# Patient Record
Sex: FEMALE | Race: WHITE | Employment: OTHER | URBAN - METROPOLITAN AREA
[De-identification: names, ages, dates, MRNs, and addresses within clinical notes are randomized per-mention and may not be internally consistent; named-entity substitution may affect disease eponyms.]

---

## 2017-01-04 DIAGNOSIS — E11.9 DIABETES TYPE 2, CONTROLLED (HCC): ICD-10-CM

## 2017-01-05 ENCOUNTER — TELEPHONE (OUTPATIENT)
Dept: FAMILY MEDICINE CLINIC | Age: 77
End: 2017-01-05

## 2017-01-05 DIAGNOSIS — B37.9 YEAST INFECTION: Primary | ICD-10-CM

## 2017-01-05 RX ORDER — FLUCONAZOLE 150 MG/1
150 TABLET ORAL ONCE
Qty: 1 TABLET | Refills: 0 | Status: SHIPPED | OUTPATIENT
Start: 2017-01-05 | End: 2017-01-05

## 2017-01-06 ENCOUNTER — HOSPITAL ENCOUNTER (OUTPATIENT)
Dept: PHYSICAL THERAPY | Age: 77
Discharge: HOME | End: 2017-01-06
Admitting: FAMILY MEDICINE

## 2017-01-09 ENCOUNTER — HOSPITAL ENCOUNTER (OUTPATIENT)
Dept: PHYSICAL THERAPY | Age: 77
Discharge: HOME | End: 2017-01-09
Admitting: FAMILY MEDICINE

## 2017-01-09 RX ORDER — FLUCONAZOLE 150 MG/1
150 TABLET ORAL ONCE
Qty: 1 TABLET | Refills: 1 | Status: SHIPPED | OUTPATIENT
Start: 2017-01-09 | End: 2017-01-09

## 2017-01-13 ENCOUNTER — HOSPITAL ENCOUNTER (OUTPATIENT)
Dept: PHYSICAL THERAPY | Age: 77
Discharge: HOME | End: 2017-01-13
Admitting: FAMILY MEDICINE

## 2017-01-20 ENCOUNTER — HOSPITAL ENCOUNTER (OUTPATIENT)
Dept: PHYSICAL THERAPY | Age: 77
Discharge: HOME | End: 2017-01-20
Admitting: FAMILY MEDICINE

## 2017-01-25 ENCOUNTER — OFFICE VISIT (OUTPATIENT)
Dept: FAMILY MEDICINE CLINIC | Age: 77
End: 2017-01-25

## 2017-01-25 VITALS
TEMPERATURE: 98.2 F | SYSTOLIC BLOOD PRESSURE: 120 MMHG | RESPIRATION RATE: 18 BRPM | BODY MASS INDEX: 39.15 KG/M2 | HEIGHT: 65 IN | HEART RATE: 88 BPM | WEIGHT: 235 LBS | DIASTOLIC BLOOD PRESSURE: 80 MMHG | OXYGEN SATURATION: 96 %

## 2017-01-25 DIAGNOSIS — K21.9 GASTROESOPHAGEAL REFLUX DISEASE WITHOUT ESOPHAGITIS: ICD-10-CM

## 2017-01-25 DIAGNOSIS — I50.9 CHRONIC CONGESTIVE HEART FAILURE, UNSPECIFIED CONGESTIVE HEART FAILURE TYPE: ICD-10-CM

## 2017-01-25 DIAGNOSIS — J30.1 SEASONAL ALLERGIC RHINITIS DUE TO POLLEN: Primary | ICD-10-CM

## 2017-01-25 DIAGNOSIS — E11.40 CONTROLLED TYPE 2 DIABETES MELLITUS WITH DIABETIC NEUROPATHY, WITHOUT LONG-TERM CURRENT USE OF INSULIN (HCC): ICD-10-CM

## 2017-01-25 DIAGNOSIS — E11.49 OTHER DIABETIC NEUROLOGICAL COMPLICATION ASSOCIATED WITH TYPE 2 DIABETES MELLITUS (HCC): ICD-10-CM

## 2017-01-25 PROCEDURE — G8419 CALC BMI OUT NRM PARAM NOF/U: HCPCS | Performed by: FAMILY MEDICINE

## 2017-01-25 PROCEDURE — G8484 FLU IMMUNIZE NO ADMIN: HCPCS | Performed by: FAMILY MEDICINE

## 2017-01-25 PROCEDURE — G8598 ASA/ANTIPLAT THER USED: HCPCS | Performed by: FAMILY MEDICINE

## 2017-01-25 PROCEDURE — 4040F PNEUMOC VAC/ADMIN/RCVD: CPT | Performed by: FAMILY MEDICINE

## 2017-01-25 PROCEDURE — 99213 OFFICE O/P EST LOW 20 MIN: CPT | Performed by: FAMILY MEDICINE

## 2017-01-25 PROCEDURE — 3017F COLORECTAL CA SCREEN DOC REV: CPT | Performed by: FAMILY MEDICINE

## 2017-01-25 PROCEDURE — G8400 PT W/DXA NO RESULTS DOC: HCPCS | Performed by: FAMILY MEDICINE

## 2017-01-25 PROCEDURE — G8427 DOCREV CUR MEDS BY ELIG CLIN: HCPCS | Performed by: FAMILY MEDICINE

## 2017-01-25 PROCEDURE — 3045F PR MOST RECENT HEMOGLOBIN A1C LEVEL 7.0-9.0%: CPT | Performed by: FAMILY MEDICINE

## 2017-01-25 PROCEDURE — 1123F ACP DISCUSS/DSCN MKR DOCD: CPT | Performed by: FAMILY MEDICINE

## 2017-01-25 PROCEDURE — 1090F PRES/ABSN URINE INCON ASSESS: CPT | Performed by: FAMILY MEDICINE

## 2017-01-25 PROCEDURE — 1036F TOBACCO NON-USER: CPT | Performed by: FAMILY MEDICINE

## 2017-01-25 RX ORDER — BENZONATATE 200 MG/1
200 CAPSULE ORAL 3 TIMES DAILY PRN
Qty: 90 CAPSULE | Refills: 1 | Status: SHIPPED | OUTPATIENT
Start: 2017-01-25 | End: 2017-02-04

## 2017-01-25 RX ORDER — AZELASTINE 1 MG/ML
2 SPRAY, METERED NASAL 2 TIMES DAILY
Qty: 1 BOTTLE | Refills: 3 | Status: SHIPPED | OUTPATIENT
Start: 2017-01-25 | End: 2017-06-26 | Stop reason: DRUGHIGH

## 2017-01-25 ASSESSMENT — ENCOUNTER SYMPTOMS
RHINORRHEA: 0
SORE THROAT: 0
EYES NEGATIVE: 1
COUGH: 1
STRIDOR: 0
APNEA: 0
FACIAL SWELLING: 0
CHEST TIGHTNESS: 0
SINUS PRESSURE: 0

## 2017-01-30 ENCOUNTER — HOSPITAL ENCOUNTER (OUTPATIENT)
Dept: PHYSICAL THERAPY | Age: 77
Discharge: HOME | End: 2017-01-30
Admitting: FAMILY MEDICINE

## 2017-01-31 ENCOUNTER — OFFICE VISIT (OUTPATIENT)
Dept: PULMONOLOGY | Age: 77
End: 2017-01-31

## 2017-01-31 VITALS
DIASTOLIC BLOOD PRESSURE: 92 MMHG | OXYGEN SATURATION: 94 % | RESPIRATION RATE: 16 BRPM | SYSTOLIC BLOOD PRESSURE: 150 MMHG | HEIGHT: 65 IN | BODY MASS INDEX: 39.15 KG/M2 | TEMPERATURE: 98.3 F | HEART RATE: 98 BPM | WEIGHT: 235 LBS

## 2017-01-31 DIAGNOSIS — Z99.89 OSA ON CPAP: Primary | Chronic | ICD-10-CM

## 2017-01-31 DIAGNOSIS — E66.9 OBESITY (BMI 30-39.9): ICD-10-CM

## 2017-01-31 DIAGNOSIS — G47.33 OSA ON CPAP: Primary | Chronic | ICD-10-CM

## 2017-01-31 DIAGNOSIS — I10 ESSENTIAL HYPERTENSION: Chronic | ICD-10-CM

## 2017-01-31 PROCEDURE — G8427 DOCREV CUR MEDS BY ELIG CLIN: HCPCS | Performed by: NURSE PRACTITIONER

## 2017-01-31 PROCEDURE — 4040F PNEUMOC VAC/ADMIN/RCVD: CPT | Performed by: NURSE PRACTITIONER

## 2017-01-31 PROCEDURE — G8598 ASA/ANTIPLAT THER USED: HCPCS | Performed by: NURSE PRACTITIONER

## 2017-01-31 PROCEDURE — G8484 FLU IMMUNIZE NO ADMIN: HCPCS | Performed by: NURSE PRACTITIONER

## 2017-01-31 PROCEDURE — 3017F COLORECTAL CA SCREEN DOC REV: CPT | Performed by: NURSE PRACTITIONER

## 2017-01-31 PROCEDURE — 99213 OFFICE O/P EST LOW 20 MIN: CPT | Performed by: NURSE PRACTITIONER

## 2017-01-31 PROCEDURE — 1090F PRES/ABSN URINE INCON ASSESS: CPT | Performed by: NURSE PRACTITIONER

## 2017-01-31 PROCEDURE — G8419 CALC BMI OUT NRM PARAM NOF/U: HCPCS | Performed by: NURSE PRACTITIONER

## 2017-01-31 PROCEDURE — G8400 PT W/DXA NO RESULTS DOC: HCPCS | Performed by: NURSE PRACTITIONER

## 2017-01-31 PROCEDURE — 1036F TOBACCO NON-USER: CPT | Performed by: NURSE PRACTITIONER

## 2017-01-31 PROCEDURE — 1123F ACP DISCUSS/DSCN MKR DOCD: CPT | Performed by: NURSE PRACTITIONER

## 2017-01-31 ASSESSMENT — SLEEP AND FATIGUE QUESTIONNAIRES
NECK CIRCUMFERENCE (INCHES): 18
HOW LIKELY ARE YOU TO NOD OFF OR FALL ASLEEP WHILE SITTING INACTIVE IN A PUBLIC PLACE: 0
HOW LIKELY ARE YOU TO NOD OFF OR FALL ASLEEP WHILE SITTING QUIETLY AFTER LUNCH WITHOUT ALCOHOL: 3
HOW LIKELY ARE YOU TO NOD OFF OR FALL ASLEEP WHEN YOU ARE A PASSENGER IN A CAR FOR AN HOUR WITHOUT A BREAK: 3
HOW LIKELY ARE YOU TO NOD OFF OR FALL ASLEEP WHILE WATCHING TV: 1
ESS TOTAL SCORE: 11
HOW LIKELY ARE YOU TO NOD OFF OR FALL ASLEEP IN A CAR, WHILE STOPPED FOR A FEW MINUTES IN TRAFFIC: 0
HOW LIKELY ARE YOU TO NOD OFF OR FALL ASLEEP WHILE LYING DOWN TO REST IN THE AFTERNOON WHEN CIRCUMSTANCES PERMIT: 3
HOW LIKELY ARE YOU TO NOD OFF OR FALL ASLEEP WHILE SITTING AND TALKING TO SOMEONE: 0
HOW LIKELY ARE YOU TO NOD OFF OR FALL ASLEEP WHILE SITTING AND READING: 1

## 2017-02-02 ENCOUNTER — OFFICE VISIT (OUTPATIENT)
Dept: FAMILY MEDICINE CLINIC | Age: 77
End: 2017-02-02

## 2017-02-02 VITALS
WEIGHT: 238 LBS | OXYGEN SATURATION: 95 % | TEMPERATURE: 98.6 F | BODY MASS INDEX: 39.65 KG/M2 | RESPIRATION RATE: 16 BRPM | HEART RATE: 86 BPM | SYSTOLIC BLOOD PRESSURE: 140 MMHG | DIASTOLIC BLOOD PRESSURE: 80 MMHG | HEIGHT: 65 IN

## 2017-02-02 DIAGNOSIS — K29.50 CHRONIC GASTRITIS WITHOUT BLEEDING, UNSPECIFIED GASTRITIS TYPE: ICD-10-CM

## 2017-02-02 DIAGNOSIS — E11.40 CONTROLLED TYPE 2 DIABETES MELLITUS WITH DIABETIC NEUROPATHY, WITHOUT LONG-TERM CURRENT USE OF INSULIN (HCC): ICD-10-CM

## 2017-02-02 DIAGNOSIS — R11.2 NON-INTRACTABLE VOMITING WITH NAUSEA, UNSPECIFIED VOMITING TYPE: ICD-10-CM

## 2017-02-02 DIAGNOSIS — F41.9 ANXIETY AND DEPRESSION: Primary | ICD-10-CM

## 2017-02-02 DIAGNOSIS — F32.A ANXIETY AND DEPRESSION: Primary | ICD-10-CM

## 2017-02-02 PROCEDURE — 4040F PNEUMOC VAC/ADMIN/RCVD: CPT | Performed by: FAMILY MEDICINE

## 2017-02-02 PROCEDURE — 1036F TOBACCO NON-USER: CPT | Performed by: FAMILY MEDICINE

## 2017-02-02 PROCEDURE — 1123F ACP DISCUSS/DSCN MKR DOCD: CPT | Performed by: FAMILY MEDICINE

## 2017-02-02 PROCEDURE — G8598 ASA/ANTIPLAT THER USED: HCPCS | Performed by: FAMILY MEDICINE

## 2017-02-02 PROCEDURE — G8400 PT W/DXA NO RESULTS DOC: HCPCS | Performed by: FAMILY MEDICINE

## 2017-02-02 PROCEDURE — 99214 OFFICE O/P EST MOD 30 MIN: CPT | Performed by: FAMILY MEDICINE

## 2017-02-02 PROCEDURE — 3045F PR MOST RECENT HEMOGLOBIN A1C LEVEL 7.0-9.0%: CPT | Performed by: FAMILY MEDICINE

## 2017-02-02 PROCEDURE — G8484 FLU IMMUNIZE NO ADMIN: HCPCS | Performed by: FAMILY MEDICINE

## 2017-02-02 PROCEDURE — 1090F PRES/ABSN URINE INCON ASSESS: CPT | Performed by: FAMILY MEDICINE

## 2017-02-02 PROCEDURE — G8419 CALC BMI OUT NRM PARAM NOF/U: HCPCS | Performed by: FAMILY MEDICINE

## 2017-02-02 PROCEDURE — 3017F COLORECTAL CA SCREEN DOC REV: CPT | Performed by: FAMILY MEDICINE

## 2017-02-02 PROCEDURE — G8427 DOCREV CUR MEDS BY ELIG CLIN: HCPCS | Performed by: FAMILY MEDICINE

## 2017-02-02 RX ORDER — DULOXETIN HYDROCHLORIDE 60 MG/1
60 CAPSULE, DELAYED RELEASE ORAL DAILY
Qty: 30 CAPSULE | Refills: 3 | Status: SHIPPED | OUTPATIENT
Start: 2017-02-02 | End: 2017-03-31 | Stop reason: SDUPTHER

## 2017-02-02 RX ORDER — ONDANSETRON 4 MG/1
4 TABLET, FILM COATED ORAL DAILY PRN
Qty: 60 TABLET | Refills: 1 | Status: SHIPPED | OUTPATIENT
Start: 2017-02-02 | End: 2017-08-31 | Stop reason: SDUPTHER

## 2017-02-02 ASSESSMENT — ENCOUNTER SYMPTOMS
BLOOD IN STOOL: 0
DIARRHEA: 0
CONSTIPATION: 0
ABDOMINAL PAIN: 1
NAUSEA: 1
ABDOMINAL DISTENTION: 1
ANAL BLEEDING: 0
VOMITING: 1
EYES NEGATIVE: 1
RECTAL PAIN: 0
RESPIRATORY NEGATIVE: 1

## 2017-02-03 ENCOUNTER — HOSPITAL ENCOUNTER (OUTPATIENT)
Dept: PHYSICAL THERAPY | Age: 77
Discharge: HOME | End: 2017-02-03
Admitting: FAMILY MEDICINE

## 2017-02-10 ENCOUNTER — HOSPITAL ENCOUNTER (OUTPATIENT)
Dept: PHYSICAL THERAPY | Age: 77
Discharge: HOME | End: 2017-02-10
Admitting: FAMILY MEDICINE

## 2017-02-10 DIAGNOSIS — E11.9 DIABETES TYPE 2, CONTROLLED (HCC): ICD-10-CM

## 2017-02-13 ENCOUNTER — HOSPITAL ENCOUNTER (OUTPATIENT)
Dept: PHYSICAL THERAPY | Age: 77
Discharge: HOME | End: 2017-02-13
Admitting: FAMILY MEDICINE

## 2017-02-16 ENCOUNTER — OFFICE VISIT (OUTPATIENT)
Dept: CARDIOLOGY CLINIC | Age: 77
End: 2017-02-16

## 2017-02-16 VITALS
DIASTOLIC BLOOD PRESSURE: 88 MMHG | WEIGHT: 236 LBS | OXYGEN SATURATION: 98 % | BODY MASS INDEX: 39.32 KG/M2 | HEART RATE: 99 BPM | SYSTOLIC BLOOD PRESSURE: 152 MMHG | HEIGHT: 65 IN

## 2017-02-16 DIAGNOSIS — I50.32 CHRONIC DIASTOLIC CONGESTIVE HEART FAILURE (HCC): ICD-10-CM

## 2017-02-16 DIAGNOSIS — R53.83 OTHER FATIGUE: ICD-10-CM

## 2017-02-16 DIAGNOSIS — E78.2 MIXED HYPERLIPIDEMIA: ICD-10-CM

## 2017-02-16 DIAGNOSIS — I10 ESSENTIAL HYPERTENSION: ICD-10-CM

## 2017-02-16 DIAGNOSIS — Z99.89 OSA ON CPAP: ICD-10-CM

## 2017-02-16 DIAGNOSIS — G47.33 OSA ON CPAP: ICD-10-CM

## 2017-02-16 DIAGNOSIS — Q24.8 LEFT VENTRICULAR OUTFLOW TRACT OBSTRUCTION: Primary | ICD-10-CM

## 2017-02-16 PROCEDURE — G8400 PT W/DXA NO RESULTS DOC: HCPCS | Performed by: NURSE PRACTITIONER

## 2017-02-16 PROCEDURE — 3017F COLORECTAL CA SCREEN DOC REV: CPT | Performed by: NURSE PRACTITIONER

## 2017-02-16 PROCEDURE — 1123F ACP DISCUSS/DSCN MKR DOCD: CPT | Performed by: NURSE PRACTITIONER

## 2017-02-16 PROCEDURE — 99214 OFFICE O/P EST MOD 30 MIN: CPT | Performed by: NURSE PRACTITIONER

## 2017-02-16 PROCEDURE — 4040F PNEUMOC VAC/ADMIN/RCVD: CPT | Performed by: NURSE PRACTITIONER

## 2017-02-16 PROCEDURE — 1036F TOBACCO NON-USER: CPT | Performed by: NURSE PRACTITIONER

## 2017-02-16 PROCEDURE — 1090F PRES/ABSN URINE INCON ASSESS: CPT | Performed by: NURSE PRACTITIONER

## 2017-02-16 PROCEDURE — G8427 DOCREV CUR MEDS BY ELIG CLIN: HCPCS | Performed by: NURSE PRACTITIONER

## 2017-02-16 PROCEDURE — G8484 FLU IMMUNIZE NO ADMIN: HCPCS | Performed by: NURSE PRACTITIONER

## 2017-02-16 PROCEDURE — G8417 CALC BMI ABV UP PARAM F/U: HCPCS | Performed by: NURSE PRACTITIONER

## 2017-02-16 PROCEDURE — G8598 ASA/ANTIPLAT THER USED: HCPCS | Performed by: NURSE PRACTITIONER

## 2017-02-16 RX ORDER — CARVEDILOL 6.25 MG/1
TABLET ORAL
Qty: 60 TABLET | Refills: 3 | Status: SHIPPED | OUTPATIENT
Start: 2017-02-16 | End: 2017-07-18 | Stop reason: SDUPTHER

## 2017-02-17 ENCOUNTER — HOSPITAL ENCOUNTER (OUTPATIENT)
Dept: PHYSICAL THERAPY | Age: 77
Discharge: HOME | End: 2017-02-17
Admitting: FAMILY MEDICINE

## 2017-02-20 ENCOUNTER — HOSPITAL ENCOUNTER (OUTPATIENT)
Dept: PHYSICAL THERAPY | Age: 77
Discharge: HOME | End: 2017-02-20
Admitting: FAMILY MEDICINE

## 2017-02-23 RX ORDER — PRAMIPEXOLE DIHYDROCHLORIDE 0.5 MG/1
TABLET ORAL
Qty: 90 TABLET | Refills: 0 | Status: SHIPPED | OUTPATIENT
Start: 2017-02-23 | End: 2017-08-06 | Stop reason: SDUPTHER

## 2017-02-27 ENCOUNTER — HOSPITAL ENCOUNTER (OUTPATIENT)
Dept: PHYSICAL THERAPY | Age: 77
Discharge: HOME | End: 2017-02-27
Admitting: FAMILY MEDICINE

## 2017-03-03 ENCOUNTER — HOSPITAL ENCOUNTER (OUTPATIENT)
Dept: PHYSICAL THERAPY | Age: 77
Discharge: HOME | End: 2017-03-03
Admitting: FAMILY MEDICINE

## 2017-03-09 ENCOUNTER — HOSPITAL ENCOUNTER (OUTPATIENT)
Dept: PHYSICAL THERAPY | Age: 77
Discharge: HOME | End: 2017-03-09
Admitting: FAMILY MEDICINE

## 2017-03-10 ENCOUNTER — TELEPHONE (OUTPATIENT)
Dept: PULMONOLOGY | Age: 77
End: 2017-03-10

## 2017-03-15 DIAGNOSIS — F32.A ANXIETY AND DEPRESSION: ICD-10-CM

## 2017-03-15 DIAGNOSIS — F41.9 ANXIETY AND DEPRESSION: ICD-10-CM

## 2017-03-15 RX ORDER — BUPROPION HYDROCHLORIDE 150 MG/1
TABLET ORAL
Qty: 30 TABLET | Refills: 4 | Status: SHIPPED | OUTPATIENT
Start: 2017-03-15 | End: 2017-03-31 | Stop reason: SDUPTHER

## 2017-03-31 DIAGNOSIS — F32.A ANXIETY AND DEPRESSION: ICD-10-CM

## 2017-03-31 DIAGNOSIS — F41.9 ANXIETY AND DEPRESSION: ICD-10-CM

## 2017-03-31 RX ORDER — ISOSORBIDE MONONITRATE 30 MG/1
TABLET, EXTENDED RELEASE ORAL
Qty: 45 TABLET | Refills: 3 | Status: SHIPPED | OUTPATIENT
Start: 2017-03-31 | End: 2017-11-15 | Stop reason: SDUPTHER

## 2017-03-31 RX ORDER — BUPROPION HYDROCHLORIDE 150 MG/1
150 TABLET ORAL EVERY MORNING
Qty: 90 TABLET | Refills: 1 | Status: SHIPPED | OUTPATIENT
Start: 2017-03-31 | End: 2017-08-01

## 2017-03-31 RX ORDER — DULOXETIN HYDROCHLORIDE 60 MG/1
60 CAPSULE, DELAYED RELEASE ORAL DAILY
Qty: 90 CAPSULE | Refills: 1 | Status: SHIPPED | OUTPATIENT
Start: 2017-03-31 | End: 2017-06-26 | Stop reason: SDUPTHER

## 2017-03-31 RX ORDER — PRAVASTATIN SODIUM 20 MG
TABLET ORAL
Qty: 90 TABLET | Refills: 3 | Status: SHIPPED | OUTPATIENT
Start: 2017-03-31 | End: 2017-11-15 | Stop reason: SDUPTHER

## 2017-04-10 ENCOUNTER — OFFICE VISIT (OUTPATIENT)
Dept: FAMILY MEDICINE CLINIC | Age: 77
End: 2017-04-10

## 2017-04-10 VITALS
DIASTOLIC BLOOD PRESSURE: 80 MMHG | TEMPERATURE: 98.4 F | HEART RATE: 89 BPM | RESPIRATION RATE: 16 BRPM | BODY MASS INDEX: 39.15 KG/M2 | SYSTOLIC BLOOD PRESSURE: 118 MMHG | WEIGHT: 235 LBS | OXYGEN SATURATION: 94 % | HEIGHT: 65 IN

## 2017-04-10 DIAGNOSIS — I25.10 CORONARY ARTERY DISEASE INVOLVING NATIVE CORONARY ARTERY OF NATIVE HEART WITHOUT ANGINA PECTORIS: ICD-10-CM

## 2017-04-10 DIAGNOSIS — Z01.818 PRE-OP EVALUATION: Primary | ICD-10-CM

## 2017-04-10 DIAGNOSIS — J45.40 MODERATE PERSISTENT ASTHMA WITHOUT COMPLICATION: Chronic | ICD-10-CM

## 2017-04-10 DIAGNOSIS — N30.10 CHRONIC INTERSTITIAL CYSTITIS WITHOUT HEMATURIA: ICD-10-CM

## 2017-04-10 DIAGNOSIS — N39.41 URGE INCONTINENCE: ICD-10-CM

## 2017-04-10 DIAGNOSIS — I50.32 CHRONIC DIASTOLIC CONGESTIVE HEART FAILURE (HCC): ICD-10-CM

## 2017-04-10 DIAGNOSIS — N35.9 URETHRAL STRICTURE, UNSPECIFIED STRICTURE TYPE: ICD-10-CM

## 2017-04-10 DIAGNOSIS — E78.2 MIXED HYPERLIPIDEMIA: ICD-10-CM

## 2017-04-10 PROCEDURE — 1090F PRES/ABSN URINE INCON ASSESS: CPT | Performed by: FAMILY MEDICINE

## 2017-04-10 PROCEDURE — 93000 ELECTROCARDIOGRAM COMPLETE: CPT | Performed by: FAMILY MEDICINE

## 2017-04-10 PROCEDURE — G8598 ASA/ANTIPLAT THER USED: HCPCS | Performed by: FAMILY MEDICINE

## 2017-04-10 PROCEDURE — G8427 DOCREV CUR MEDS BY ELIG CLIN: HCPCS | Performed by: FAMILY MEDICINE

## 2017-04-10 PROCEDURE — G8400 PT W/DXA NO RESULTS DOC: HCPCS | Performed by: FAMILY MEDICINE

## 2017-04-10 PROCEDURE — 0509F URINE INCON PLAN DOCD: CPT | Performed by: FAMILY MEDICINE

## 2017-04-10 PROCEDURE — 1123F ACP DISCUSS/DSCN MKR DOCD: CPT | Performed by: FAMILY MEDICINE

## 2017-04-10 PROCEDURE — 4040F PNEUMOC VAC/ADMIN/RCVD: CPT | Performed by: FAMILY MEDICINE

## 2017-04-10 PROCEDURE — 99214 OFFICE O/P EST MOD 30 MIN: CPT | Performed by: FAMILY MEDICINE

## 2017-04-10 PROCEDURE — 3017F COLORECTAL CA SCREEN DOC REV: CPT | Performed by: FAMILY MEDICINE

## 2017-04-10 PROCEDURE — G8417 CALC BMI ABV UP PARAM F/U: HCPCS | Performed by: FAMILY MEDICINE

## 2017-04-10 PROCEDURE — 1036F TOBACCO NON-USER: CPT | Performed by: FAMILY MEDICINE

## 2017-04-10 ASSESSMENT — ENCOUNTER SYMPTOMS
COUGH: 0
HEMOPTYSIS: 0
SPUTUM PRODUCTION: 0
SHORTNESS OF BREATH: 0
WHEEZING: 0
GASTROINTESTINAL NEGATIVE: 1
EYES NEGATIVE: 1

## 2017-04-13 ENCOUNTER — TELEPHONE (OUTPATIENT)
Dept: PULMONOLOGY | Age: 77
End: 2017-04-13

## 2017-04-21 ENCOUNTER — OFFICE VISIT (OUTPATIENT)
Dept: ORTHOPEDIC SURGERY | Age: 77
End: 2017-04-21

## 2017-04-21 VITALS — HEIGHT: 65 IN | WEIGHT: 235.01 LBS | BODY MASS INDEX: 39.16 KG/M2

## 2017-04-21 DIAGNOSIS — M19.012 PRIMARY OSTEOARTHRITIS OF LEFT SHOULDER: ICD-10-CM

## 2017-04-21 DIAGNOSIS — M25.512 LEFT SHOULDER PAIN, UNSPECIFIED CHRONICITY: Primary | ICD-10-CM

## 2017-04-21 PROCEDURE — 3017F COLORECTAL CA SCREEN DOC REV: CPT | Performed by: ORTHOPAEDIC SURGERY

## 2017-04-21 PROCEDURE — 99214 OFFICE O/P EST MOD 30 MIN: CPT | Performed by: ORTHOPAEDIC SURGERY

## 2017-04-21 PROCEDURE — 4040F PNEUMOC VAC/ADMIN/RCVD: CPT | Performed by: ORTHOPAEDIC SURGERY

## 2017-04-21 PROCEDURE — 1036F TOBACCO NON-USER: CPT | Performed by: ORTHOPAEDIC SURGERY

## 2017-04-21 PROCEDURE — 20611 DRAIN/INJ JOINT/BURSA W/US: CPT | Performed by: ORTHOPAEDIC SURGERY

## 2017-04-21 PROCEDURE — G8400 PT W/DXA NO RESULTS DOC: HCPCS | Performed by: ORTHOPAEDIC SURGERY

## 2017-04-21 PROCEDURE — 1090F PRES/ABSN URINE INCON ASSESS: CPT | Performed by: ORTHOPAEDIC SURGERY

## 2017-04-21 PROCEDURE — G8417 CALC BMI ABV UP PARAM F/U: HCPCS | Performed by: ORTHOPAEDIC SURGERY

## 2017-04-21 PROCEDURE — G8598 ASA/ANTIPLAT THER USED: HCPCS | Performed by: ORTHOPAEDIC SURGERY

## 2017-04-21 PROCEDURE — 73030 X-RAY EXAM OF SHOULDER: CPT | Performed by: ORTHOPAEDIC SURGERY

## 2017-04-21 PROCEDURE — 1123F ACP DISCUSS/DSCN MKR DOCD: CPT | Performed by: ORTHOPAEDIC SURGERY

## 2017-04-21 PROCEDURE — G8427 DOCREV CUR MEDS BY ELIG CLIN: HCPCS | Performed by: ORTHOPAEDIC SURGERY

## 2017-04-26 ENCOUNTER — OFFICE VISIT (OUTPATIENT)
Dept: PULMONOLOGY | Age: 77
End: 2017-04-26

## 2017-04-26 VITALS
HEART RATE: 86 BPM | RESPIRATION RATE: 16 BRPM | OXYGEN SATURATION: 97 % | SYSTOLIC BLOOD PRESSURE: 154 MMHG | TEMPERATURE: 97.3 F | BODY MASS INDEX: 41.36 KG/M2 | DIASTOLIC BLOOD PRESSURE: 84 MMHG | WEIGHT: 233.4 LBS | HEIGHT: 63 IN

## 2017-04-26 DIAGNOSIS — I10 ESSENTIAL HYPERTENSION: Chronic | ICD-10-CM

## 2017-04-26 DIAGNOSIS — G47.33 OSA ON CPAP: Primary | Chronic | ICD-10-CM

## 2017-04-26 DIAGNOSIS — E66.01 OBESITY, CLASS III, BMI 40-49.9 (MORBID OBESITY) (HCC): ICD-10-CM

## 2017-04-26 DIAGNOSIS — Z99.89 OSA ON CPAP: Primary | Chronic | ICD-10-CM

## 2017-04-26 PROCEDURE — 1036F TOBACCO NON-USER: CPT | Performed by: NURSE PRACTITIONER

## 2017-04-26 PROCEDURE — G8427 DOCREV CUR MEDS BY ELIG CLIN: HCPCS | Performed by: NURSE PRACTITIONER

## 2017-04-26 PROCEDURE — G8598 ASA/ANTIPLAT THER USED: HCPCS | Performed by: NURSE PRACTITIONER

## 2017-04-26 PROCEDURE — 1123F ACP DISCUSS/DSCN MKR DOCD: CPT | Performed by: NURSE PRACTITIONER

## 2017-04-26 PROCEDURE — 1090F PRES/ABSN URINE INCON ASSESS: CPT | Performed by: NURSE PRACTITIONER

## 2017-04-26 PROCEDURE — G8417 CALC BMI ABV UP PARAM F/U: HCPCS | Performed by: NURSE PRACTITIONER

## 2017-04-26 PROCEDURE — G8400 PT W/DXA NO RESULTS DOC: HCPCS | Performed by: NURSE PRACTITIONER

## 2017-04-26 PROCEDURE — 99213 OFFICE O/P EST LOW 20 MIN: CPT | Performed by: NURSE PRACTITIONER

## 2017-04-26 PROCEDURE — 4040F PNEUMOC VAC/ADMIN/RCVD: CPT | Performed by: NURSE PRACTITIONER

## 2017-04-26 PROCEDURE — 3017F COLORECTAL CA SCREEN DOC REV: CPT | Performed by: NURSE PRACTITIONER

## 2017-04-26 ASSESSMENT — SLEEP AND FATIGUE QUESTIONNAIRES
HOW LIKELY ARE YOU TO NOD OFF OR FALL ASLEEP WHILE SITTING AND TALKING TO SOMEONE: 0
HOW LIKELY ARE YOU TO NOD OFF OR FALL ASLEEP WHILE LYING DOWN TO REST IN THE AFTERNOON WHEN CIRCUMSTANCES PERMIT: 2
HOW LIKELY ARE YOU TO NOD OFF OR FALL ASLEEP WHILE SITTING QUIETLY AFTER LUNCH WITHOUT ALCOHOL: 3
HOW LIKELY ARE YOU TO NOD OFF OR FALL ASLEEP IN A CAR, WHILE STOPPED FOR A FEW MINUTES IN TRAFFIC: 0
HOW LIKELY ARE YOU TO NOD OFF OR FALL ASLEEP WHILE SITTING INACTIVE IN A PUBLIC PLACE: 0
HOW LIKELY ARE YOU TO NOD OFF OR FALL ASLEEP WHEN YOU ARE A PASSENGER IN A CAR FOR AN HOUR WITHOUT A BREAK: 1
HOW LIKELY ARE YOU TO NOD OFF OR FALL ASLEEP WHILE SITTING AND READING: 1
HOW LIKELY ARE YOU TO NOD OFF OR FALL ASLEEP WHILE WATCHING TV: 2
ESS TOTAL SCORE: 9
NECK CIRCUMFERENCE (INCHES): 19

## 2017-05-02 ENCOUNTER — OFFICE VISIT (OUTPATIENT)
Dept: URGENT CARE | Age: 77
End: 2017-05-02

## 2017-05-02 VITALS
DIASTOLIC BLOOD PRESSURE: 80 MMHG | WEIGHT: 230 LBS | HEART RATE: 89 BPM | HEIGHT: 65 IN | OXYGEN SATURATION: 93 % | BODY MASS INDEX: 38.32 KG/M2 | RESPIRATION RATE: 16 BRPM | SYSTOLIC BLOOD PRESSURE: 124 MMHG

## 2017-05-02 DIAGNOSIS — J40 BRONCHITIS: Primary | ICD-10-CM

## 2017-05-02 PROCEDURE — 1090F PRES/ABSN URINE INCON ASSESS: CPT | Performed by: EMERGENCY MEDICINE

## 2017-05-02 PROCEDURE — 99203 OFFICE O/P NEW LOW 30 MIN: CPT | Performed by: EMERGENCY MEDICINE

## 2017-05-02 PROCEDURE — G8427 DOCREV CUR MEDS BY ELIG CLIN: HCPCS | Performed by: EMERGENCY MEDICINE

## 2017-05-02 PROCEDURE — 1123F ACP DISCUSS/DSCN MKR DOCD: CPT | Performed by: EMERGENCY MEDICINE

## 2017-05-02 PROCEDURE — G8417 CALC BMI ABV UP PARAM F/U: HCPCS | Performed by: EMERGENCY MEDICINE

## 2017-05-02 PROCEDURE — 3017F COLORECTAL CA SCREEN DOC REV: CPT | Performed by: EMERGENCY MEDICINE

## 2017-05-02 PROCEDURE — G8400 PT W/DXA NO RESULTS DOC: HCPCS | Performed by: EMERGENCY MEDICINE

## 2017-05-02 PROCEDURE — 1036F TOBACCO NON-USER: CPT | Performed by: EMERGENCY MEDICINE

## 2017-05-02 PROCEDURE — G8598 ASA/ANTIPLAT THER USED: HCPCS | Performed by: EMERGENCY MEDICINE

## 2017-05-02 PROCEDURE — 4040F PNEUMOC VAC/ADMIN/RCVD: CPT | Performed by: EMERGENCY MEDICINE

## 2017-05-02 RX ORDER — BENZONATATE 100 MG/1
100 CAPSULE ORAL 3 TIMES DAILY PRN
Qty: 20 CAPSULE | Refills: 0 | Status: SHIPPED | OUTPATIENT
Start: 2017-05-02 | End: 2017-05-09

## 2017-05-02 RX ORDER — AZITHROMYCIN 250 MG/1
TABLET, FILM COATED ORAL
Qty: 1 PACKET | Refills: 0 | Status: SHIPPED | OUTPATIENT
Start: 2017-05-02 | End: 2017-05-12

## 2017-05-02 ASSESSMENT — ENCOUNTER SYMPTOMS
WHEEZING: 0
COUGH: 1

## 2017-05-09 ENCOUNTER — HOSPITAL ENCOUNTER (OUTPATIENT)
Dept: PHYSICAL THERAPY | Age: 77
Discharge: HOME | End: 2017-04-30
Admitting: ORTHOPAEDIC SURGERY

## 2017-05-11 RX ORDER — ERGOCALCIFEROL 1.25 MG/1
CAPSULE ORAL
Qty: 4 CAPSULE | Refills: 5 | Status: SHIPPED | OUTPATIENT
Start: 2017-05-11 | End: 2017-11-15 | Stop reason: SDUPTHER

## 2017-05-30 ENCOUNTER — TELEPHONE (OUTPATIENT)
Dept: FAMILY MEDICINE CLINIC | Age: 77
End: 2017-05-30

## 2017-06-05 ENCOUNTER — OFFICE VISIT (OUTPATIENT)
Dept: FAMILY MEDICINE CLINIC | Age: 77
End: 2017-06-05

## 2017-06-05 VITALS
DIASTOLIC BLOOD PRESSURE: 82 MMHG | SYSTOLIC BLOOD PRESSURE: 122 MMHG | HEART RATE: 78 BPM | BODY MASS INDEX: 38.32 KG/M2 | RESPIRATION RATE: 16 BRPM | TEMPERATURE: 98.6 F | WEIGHT: 230 LBS | HEIGHT: 65 IN | OXYGEN SATURATION: 97 %

## 2017-06-05 DIAGNOSIS — B37.2 YEAST INFECTION OF THE SKIN: ICD-10-CM

## 2017-06-05 DIAGNOSIS — R22.0 SWELLING, MASS, OR LUMP ON FACE: Primary | ICD-10-CM

## 2017-06-05 PROCEDURE — G8598 ASA/ANTIPLAT THER USED: HCPCS | Performed by: FAMILY MEDICINE

## 2017-06-05 PROCEDURE — G8400 PT W/DXA NO RESULTS DOC: HCPCS | Performed by: FAMILY MEDICINE

## 2017-06-05 PROCEDURE — 1036F TOBACCO NON-USER: CPT | Performed by: FAMILY MEDICINE

## 2017-06-05 PROCEDURE — G8427 DOCREV CUR MEDS BY ELIG CLIN: HCPCS | Performed by: FAMILY MEDICINE

## 2017-06-05 PROCEDURE — 1123F ACP DISCUSS/DSCN MKR DOCD: CPT | Performed by: FAMILY MEDICINE

## 2017-06-05 PROCEDURE — 99213 OFFICE O/P EST LOW 20 MIN: CPT | Performed by: FAMILY MEDICINE

## 2017-06-05 PROCEDURE — G8417 CALC BMI ABV UP PARAM F/U: HCPCS | Performed by: FAMILY MEDICINE

## 2017-06-05 PROCEDURE — 4040F PNEUMOC VAC/ADMIN/RCVD: CPT | Performed by: FAMILY MEDICINE

## 2017-06-05 PROCEDURE — 1090F PRES/ABSN URINE INCON ASSESS: CPT | Performed by: FAMILY MEDICINE

## 2017-06-05 RX ORDER — AZITHROMYCIN 250 MG/1
TABLET, FILM COATED ORAL
Qty: 1 PACKET | Refills: 0 | Status: SHIPPED | OUTPATIENT
Start: 2017-06-05 | End: 2017-06-15

## 2017-06-05 RX ORDER — CLOTRIMAZOLE AND BETAMETHASONE DIPROPIONATE 10; .64 MG/G; MG/G
CREAM TOPICAL
Qty: 45 G | Refills: 1 | Status: SHIPPED | OUTPATIENT
Start: 2017-06-05 | End: 2017-10-16 | Stop reason: SDUPTHER

## 2017-06-14 ENCOUNTER — TELEPHONE (OUTPATIENT)
Dept: FAMILY MEDICINE CLINIC | Age: 77
End: 2017-06-14

## 2017-06-23 ENCOUNTER — CARE COORDINATION (OUTPATIENT)
Dept: CARE COORDINATION | Age: 77
End: 2017-06-23

## 2017-06-26 ENCOUNTER — OFFICE VISIT (OUTPATIENT)
Dept: FAMILY MEDICINE CLINIC | Age: 77
End: 2017-06-26

## 2017-06-26 VITALS
RESPIRATION RATE: 18 BRPM | HEART RATE: 86 BPM | WEIGHT: 235 LBS | BODY MASS INDEX: 39.15 KG/M2 | DIASTOLIC BLOOD PRESSURE: 78 MMHG | HEIGHT: 65 IN | SYSTOLIC BLOOD PRESSURE: 130 MMHG | OXYGEN SATURATION: 95 % | TEMPERATURE: 98.3 F

## 2017-06-26 DIAGNOSIS — F41.9 ANXIETY AND DEPRESSION: Primary | ICD-10-CM

## 2017-06-26 DIAGNOSIS — R22.0 PREAURICULAR MASS: ICD-10-CM

## 2017-06-26 DIAGNOSIS — J30.1 SEASONAL ALLERGIC RHINITIS DUE TO POLLEN: ICD-10-CM

## 2017-06-26 DIAGNOSIS — F32.A ANXIETY AND DEPRESSION: Primary | ICD-10-CM

## 2017-06-26 PROCEDURE — G8417 CALC BMI ABV UP PARAM F/U: HCPCS | Performed by: FAMILY MEDICINE

## 2017-06-26 PROCEDURE — G8400 PT W/DXA NO RESULTS DOC: HCPCS | Performed by: FAMILY MEDICINE

## 2017-06-26 PROCEDURE — 1090F PRES/ABSN URINE INCON ASSESS: CPT | Performed by: FAMILY MEDICINE

## 2017-06-26 PROCEDURE — 4040F PNEUMOC VAC/ADMIN/RCVD: CPT | Performed by: FAMILY MEDICINE

## 2017-06-26 PROCEDURE — 99214 OFFICE O/P EST MOD 30 MIN: CPT | Performed by: FAMILY MEDICINE

## 2017-06-26 PROCEDURE — 1123F ACP DISCUSS/DSCN MKR DOCD: CPT | Performed by: FAMILY MEDICINE

## 2017-06-26 PROCEDURE — 1036F TOBACCO NON-USER: CPT | Performed by: FAMILY MEDICINE

## 2017-06-26 PROCEDURE — G8598 ASA/ANTIPLAT THER USED: HCPCS | Performed by: FAMILY MEDICINE

## 2017-06-26 PROCEDURE — G8427 DOCREV CUR MEDS BY ELIG CLIN: HCPCS | Performed by: FAMILY MEDICINE

## 2017-06-26 RX ORDER — DULOXETIN HYDROCHLORIDE 60 MG/1
60 CAPSULE, DELAYED RELEASE ORAL DAILY
Qty: 90 CAPSULE | Refills: 2 | Status: SHIPPED | OUTPATIENT
Start: 2017-06-26 | End: 2017-11-15 | Stop reason: SDUPTHER

## 2017-06-26 RX ORDER — AZELASTINE 1 MG/ML
2 SPRAY, METERED NASAL 2 TIMES DAILY
Qty: 1 BOTTLE | Refills: 3 | Status: SHIPPED | OUTPATIENT
Start: 2017-06-26 | End: 2017-08-01 | Stop reason: SDUPTHER

## 2017-06-26 RX ORDER — BENZONATATE 100 MG/1
100 CAPSULE ORAL 3 TIMES DAILY PRN
Qty: 90 CAPSULE | Refills: 1 | Status: SHIPPED | OUTPATIENT
Start: 2017-06-26 | End: 2017-11-27

## 2017-06-26 RX ORDER — AZELASTINE 1 MG/ML
2 SPRAY, METERED NASAL 2 TIMES DAILY
Qty: 1 BOTTLE | Refills: 5 | Status: SHIPPED | OUTPATIENT
Start: 2017-06-26 | End: 2017-11-15 | Stop reason: SDUPTHER

## 2017-06-26 ASSESSMENT — ENCOUNTER SYMPTOMS
SINUS PRESSURE: 0
EYES NEGATIVE: 1
RHINORRHEA: 1
RESPIRATORY NEGATIVE: 1

## 2017-06-30 ENCOUNTER — TELEPHONE (OUTPATIENT)
Dept: FAMILY MEDICINE CLINIC | Age: 77
End: 2017-06-30

## 2017-06-30 DIAGNOSIS — E78.2 MIXED HYPERLIPIDEMIA: ICD-10-CM

## 2017-06-30 DIAGNOSIS — I10 ESSENTIAL HYPERTENSION: Primary | Chronic | ICD-10-CM

## 2017-06-30 DIAGNOSIS — E11.40 CONTROLLED TYPE 2 DIABETES MELLITUS WITH DIABETIC NEUROPATHY, WITHOUT LONG-TERM CURRENT USE OF INSULIN (HCC): ICD-10-CM

## 2017-07-01 ENCOUNTER — HOSPITAL ENCOUNTER (OUTPATIENT)
Dept: OTHER | Age: 77
Discharge: HOME | End: 2017-07-01
Attending: FAMILY MEDICINE | Admitting: FAMILY MEDICINE

## 2017-07-01 DIAGNOSIS — I10 ESSENTIAL HYPERTENSION: Chronic | ICD-10-CM

## 2017-07-01 DIAGNOSIS — E11.40 CONTROLLED TYPE 2 DIABETES MELLITUS WITH DIABETIC NEUROPATHY, WITHOUT LONG-TERM CURRENT USE OF INSULIN (HCC): ICD-10-CM

## 2017-07-01 DIAGNOSIS — E78.2 MIXED HYPERLIPIDEMIA: ICD-10-CM

## 2017-07-01 LAB
ALBUMIN SERPL-MCNC: 4.2 G/DL (ref 3.4–5)
ALP BLD-CCNC: 67 U/L (ref 40–129)
ALT SERPL-CCNC: 9 U/L (ref 10–40)
ANION GAP SERPL CALCULATED.3IONS-SCNC: 14 MMOL/L (ref 3–16)
AST SERPL-CCNC: 11 U/L (ref 15–37)
BASOPHILS ABSOLUTE: 0.1 K/UL (ref 0–0.2)
BASOPHILS RELATIVE PERCENT: 1.1 %
BILIRUB SERPL-MCNC: 0.4 MG/DL (ref 0–1)
BILIRUBIN DIRECT: <0.2 MG/DL (ref 0–0.3)
BILIRUBIN, INDIRECT: ABNORMAL MG/DL (ref 0–1)
BUN BLDV-MCNC: 20 MG/DL (ref 7–20)
CALCIUM SERPL-MCNC: 9.9 MG/DL (ref 8.3–10.6)
CHLORIDE BLD-SCNC: 98 MMOL/L (ref 99–110)
CHOLESTEROL, TOTAL: 179 MG/DL (ref 0–199)
CO2: 30 MMOL/L (ref 21–32)
CREAT SERPL-MCNC: 0.9 MG/DL (ref 0.6–1.2)
CREATININE URINE: 55.4 MG/DL (ref 28–259)
EOSINOPHILS ABSOLUTE: 0.2 K/UL (ref 0–0.6)
EOSINOPHILS RELATIVE PERCENT: 2 %
GFR AFRICAN AMERICAN: >60
GFR NON-AFRICAN AMERICAN: >60
GLUCOSE BLD-MCNC: 129 MG/DL (ref 70–99)
HCT VFR BLD CALC: 36.8 % (ref 36–48)
HDLC SERPL-MCNC: 54 MG/DL (ref 40–60)
HEMOGLOBIN: 11.9 G/DL (ref 12–16)
LDL CHOLESTEROL CALCULATED: 100 MG/DL
LYMPHOCYTES ABSOLUTE: 2.5 K/UL (ref 1–5.1)
LYMPHOCYTES RELATIVE PERCENT: 30.9 %
MCH RBC QN AUTO: 30.2 PG (ref 26–34)
MCHC RBC AUTO-ENTMCNC: 32.3 G/DL (ref 31–36)
MCV RBC AUTO: 93.5 FL (ref 80–100)
MICROALBUMIN UR-MCNC: 3.3 MG/DL
MICROALBUMIN/CREAT UR-RTO: 59.6 MG/G (ref 0–30)
MONOCYTES ABSOLUTE: 0.6 K/UL (ref 0–1.3)
MONOCYTES RELATIVE PERCENT: 7.4 %
NEUTROPHILS ABSOLUTE: 4.8 K/UL (ref 1.7–7.7)
NEUTROPHILS RELATIVE PERCENT: 58.6 %
PDW BLD-RTO: 15.9 % (ref 12.4–15.4)
PLATELET # BLD: 292 K/UL (ref 135–450)
PMV BLD AUTO: 9 FL (ref 5–10.5)
POTASSIUM SERPL-SCNC: 4.5 MMOL/L (ref 3.5–5.1)
RBC # BLD: 3.94 M/UL (ref 4–5.2)
SODIUM BLD-SCNC: 142 MMOL/L (ref 136–145)
TOTAL PROTEIN: 7 G/DL (ref 6.4–8.2)
TRIGL SERPL-MCNC: 124 MG/DL (ref 0–150)
VLDLC SERPL CALC-MCNC: 25 MG/DL
WBC # BLD: 8.2 K/UL (ref 4–11)

## 2017-07-02 LAB
ESTIMATED AVERAGE GLUCOSE: 159.9 MG/DL
HBA1C MFR BLD: 7.2 %

## 2017-07-05 DIAGNOSIS — E11.9 DIABETES TYPE 2, CONTROLLED (HCC): ICD-10-CM

## 2017-07-07 ENCOUNTER — CARE COORDINATION (OUTPATIENT)
Dept: CARE COORDINATION | Age: 77
End: 2017-07-07

## 2017-07-10 ENCOUNTER — OFFICE VISIT (OUTPATIENT)
Dept: ENT CLINIC | Age: 77
End: 2017-07-10

## 2017-07-10 VITALS
SYSTOLIC BLOOD PRESSURE: 122 MMHG | TEMPERATURE: 98.2 F | WEIGHT: 238.2 LBS | DIASTOLIC BLOOD PRESSURE: 74 MMHG | BODY MASS INDEX: 39.69 KG/M2 | HEIGHT: 65 IN

## 2017-07-10 DIAGNOSIS — K11.8 PAROTID MASS: Primary | ICD-10-CM

## 2017-07-10 PROCEDURE — 1123F ACP DISCUSS/DSCN MKR DOCD: CPT | Performed by: OTOLARYNGOLOGY

## 2017-07-10 PROCEDURE — 1036F TOBACCO NON-USER: CPT | Performed by: OTOLARYNGOLOGY

## 2017-07-10 PROCEDURE — G8417 CALC BMI ABV UP PARAM F/U: HCPCS | Performed by: OTOLARYNGOLOGY

## 2017-07-10 PROCEDURE — 4040F PNEUMOC VAC/ADMIN/RCVD: CPT | Performed by: OTOLARYNGOLOGY

## 2017-07-10 PROCEDURE — G8400 PT W/DXA NO RESULTS DOC: HCPCS | Performed by: OTOLARYNGOLOGY

## 2017-07-10 PROCEDURE — G8598 ASA/ANTIPLAT THER USED: HCPCS | Performed by: OTOLARYNGOLOGY

## 2017-07-10 PROCEDURE — G8427 DOCREV CUR MEDS BY ELIG CLIN: HCPCS | Performed by: OTOLARYNGOLOGY

## 2017-07-10 PROCEDURE — 1090F PRES/ABSN URINE INCON ASSESS: CPT | Performed by: OTOLARYNGOLOGY

## 2017-07-10 PROCEDURE — 99215 OFFICE O/P EST HI 40 MIN: CPT | Performed by: OTOLARYNGOLOGY

## 2017-07-14 ENCOUNTER — CARE COORDINATION (OUTPATIENT)
Dept: CARE COORDINATION | Age: 77
End: 2017-07-14

## 2017-07-18 RX ORDER — CARVEDILOL 6.25 MG/1
TABLET ORAL
Qty: 60 TABLET | Refills: 3 | Status: SHIPPED | OUTPATIENT
Start: 2017-07-18 | End: 2017-11-15 | Stop reason: SDUPTHER

## 2017-07-20 ENCOUNTER — HOSPITAL ENCOUNTER (OUTPATIENT)
Dept: ULTRASOUND IMAGING | Age: 77
Discharge: HOME | End: 2017-07-20
Attending: OTOLARYNGOLOGY | Admitting: OTOLARYNGOLOGY

## 2017-07-20 DIAGNOSIS — K11.8 PAROTID MASS: ICD-10-CM

## 2017-07-20 DIAGNOSIS — R22.0 LOCALIZED SWELLING, MASS, AND LUMP OF HEAD: ICD-10-CM

## 2017-07-25 ENCOUNTER — CARE COORDINATION (OUTPATIENT)
Dept: CARE COORDINATION | Age: 77
End: 2017-07-25

## 2017-07-31 ENCOUNTER — TELEPHONE (OUTPATIENT)
Dept: ENT CLINIC | Age: 77
End: 2017-07-31

## 2017-08-01 ENCOUNTER — CARE COORDINATION (OUTPATIENT)
Dept: CARE COORDINATION | Age: 77
End: 2017-08-01

## 2017-08-01 ENCOUNTER — TELEPHONE (OUTPATIENT)
Dept: CARDIOLOGY CLINIC | Age: 77
End: 2017-08-01

## 2017-08-01 DIAGNOSIS — K11.8 PAROTID MASS: Primary | ICD-10-CM

## 2017-08-02 ENCOUNTER — OFFICE VISIT (OUTPATIENT)
Dept: FAMILY MEDICINE CLINIC | Age: 77
End: 2017-08-02

## 2017-08-02 VITALS
HEIGHT: 65 IN | HEART RATE: 82 BPM | TEMPERATURE: 98.5 F | WEIGHT: 237 LBS | OXYGEN SATURATION: 97 % | DIASTOLIC BLOOD PRESSURE: 78 MMHG | SYSTOLIC BLOOD PRESSURE: 120 MMHG | RESPIRATION RATE: 16 BRPM | BODY MASS INDEX: 39.49 KG/M2

## 2017-08-02 DIAGNOSIS — L08.9 SKIN INFECTION: ICD-10-CM

## 2017-08-02 DIAGNOSIS — L30.9 DERMATITIS: Primary | ICD-10-CM

## 2017-08-02 PROCEDURE — G8417 CALC BMI ABV UP PARAM F/U: HCPCS | Performed by: FAMILY MEDICINE

## 2017-08-02 PROCEDURE — 1090F PRES/ABSN URINE INCON ASSESS: CPT | Performed by: FAMILY MEDICINE

## 2017-08-02 PROCEDURE — G8400 PT W/DXA NO RESULTS DOC: HCPCS | Performed by: FAMILY MEDICINE

## 2017-08-02 PROCEDURE — 1036F TOBACCO NON-USER: CPT | Performed by: FAMILY MEDICINE

## 2017-08-02 PROCEDURE — G8427 DOCREV CUR MEDS BY ELIG CLIN: HCPCS | Performed by: FAMILY MEDICINE

## 2017-08-02 PROCEDURE — 1123F ACP DISCUSS/DSCN MKR DOCD: CPT | Performed by: FAMILY MEDICINE

## 2017-08-02 PROCEDURE — 99213 OFFICE O/P EST LOW 20 MIN: CPT | Performed by: FAMILY MEDICINE

## 2017-08-02 PROCEDURE — G8598 ASA/ANTIPLAT THER USED: HCPCS | Performed by: FAMILY MEDICINE

## 2017-08-02 PROCEDURE — 4040F PNEUMOC VAC/ADMIN/RCVD: CPT | Performed by: FAMILY MEDICINE

## 2017-08-02 RX ORDER — FLUCONAZOLE 150 MG/1
TABLET ORAL
Qty: 3 TABLET | Refills: 1 | Status: SHIPPED | OUTPATIENT
Start: 2017-08-02 | End: 2017-12-12 | Stop reason: SDUPTHER

## 2017-08-02 RX ORDER — AZITHROMYCIN 250 MG/1
TABLET, FILM COATED ORAL
Qty: 1 PACKET | Refills: 0 | Status: SHIPPED | OUTPATIENT
Start: 2017-08-02 | End: 2017-08-12

## 2017-08-02 ASSESSMENT — ENCOUNTER SYMPTOMS
SORE THROAT: 0
SINUS PRESSURE: 0
COLOR CHANGE: 1

## 2017-08-07 RX ORDER — PRAMIPEXOLE DIHYDROCHLORIDE 0.5 MG/1
TABLET ORAL
Qty: 90 TABLET | Refills: 1 | Status: SHIPPED | OUTPATIENT
Start: 2017-08-07 | End: 2017-11-15 | Stop reason: SDUPTHER

## 2017-08-08 ENCOUNTER — CARE COORDINATION (OUTPATIENT)
Dept: CARE COORDINATION | Age: 77
End: 2017-08-08

## 2017-08-09 ENCOUNTER — HOSPITAL ENCOUNTER (OUTPATIENT)
Dept: ULTRASOUND IMAGING | Age: 77
Discharge: HOME | End: 2017-08-09
Attending: OTOLARYNGOLOGY | Admitting: OTOLARYNGOLOGY

## 2017-08-09 ENCOUNTER — CARE COORDINATION (OUTPATIENT)
Dept: CARE COORDINATION | Age: 77
End: 2017-08-09

## 2017-08-09 DIAGNOSIS — K11.9 DISEASE OF SALIVARY GLAND: ICD-10-CM

## 2017-08-09 DIAGNOSIS — K11.8 PAROTID MASS: ICD-10-CM

## 2017-08-16 ENCOUNTER — CARE COORDINATION (OUTPATIENT)
Dept: CARE COORDINATION | Age: 77
End: 2017-08-16

## 2017-08-23 ENCOUNTER — CARE COORDINATION (OUTPATIENT)
Dept: CARE COORDINATION | Age: 77
End: 2017-08-23

## 2017-08-29 ENCOUNTER — OFFICE VISIT (OUTPATIENT)
Dept: FAMILY MEDICINE CLINIC | Age: 77
End: 2017-08-29

## 2017-08-29 VITALS
RESPIRATION RATE: 16 BRPM | SYSTOLIC BLOOD PRESSURE: 122 MMHG | DIASTOLIC BLOOD PRESSURE: 78 MMHG | TEMPERATURE: 98.3 F | HEART RATE: 76 BPM | BODY MASS INDEX: 39.49 KG/M2 | HEIGHT: 65 IN | WEIGHT: 237 LBS | OXYGEN SATURATION: 96 %

## 2017-08-29 DIAGNOSIS — L30.9 DERMATITIS: ICD-10-CM

## 2017-08-29 DIAGNOSIS — R39.9 UTI SYMPTOMS: Primary | ICD-10-CM

## 2017-08-29 LAB
BILIRUBIN, POC: NORMAL
BLOOD URINE, POC: NORMAL
CLARITY, POC: CLEAR
COLOR, POC: YELLOW
GLUCOSE URINE, POC: NORMAL
KETONES, POC: NORMAL
LEUKOCYTE EST, POC: NORMAL
NITRITE, POC: NORMAL
PH, POC: 7
PROTEIN, POC: NORMAL
SPECIFIC GRAVITY, POC: 1.02
UROBILINOGEN, POC: 0.2

## 2017-08-29 PROCEDURE — 1123F ACP DISCUSS/DSCN MKR DOCD: CPT | Performed by: FAMILY MEDICINE

## 2017-08-29 PROCEDURE — 4040F PNEUMOC VAC/ADMIN/RCVD: CPT | Performed by: FAMILY MEDICINE

## 2017-08-29 PROCEDURE — G8598 ASA/ANTIPLAT THER USED: HCPCS | Performed by: FAMILY MEDICINE

## 2017-08-29 PROCEDURE — G8417 CALC BMI ABV UP PARAM F/U: HCPCS | Performed by: FAMILY MEDICINE

## 2017-08-29 PROCEDURE — G8400 PT W/DXA NO RESULTS DOC: HCPCS | Performed by: FAMILY MEDICINE

## 2017-08-29 PROCEDURE — G8427 DOCREV CUR MEDS BY ELIG CLIN: HCPCS | Performed by: FAMILY MEDICINE

## 2017-08-29 PROCEDURE — 99213 OFFICE O/P EST LOW 20 MIN: CPT | Performed by: FAMILY MEDICINE

## 2017-08-29 PROCEDURE — 1090F PRES/ABSN URINE INCON ASSESS: CPT | Performed by: FAMILY MEDICINE

## 2017-08-29 PROCEDURE — 1036F TOBACCO NON-USER: CPT | Performed by: FAMILY MEDICINE

## 2017-08-29 PROCEDURE — 81002 URINALYSIS NONAUTO W/O SCOPE: CPT | Performed by: FAMILY MEDICINE

## 2017-08-29 RX ORDER — AZITHROMYCIN 250 MG/1
TABLET, FILM COATED ORAL
Qty: 1 PACKET | Refills: 0 | Status: SHIPPED | OUTPATIENT
Start: 2017-08-29 | End: 2017-09-08

## 2017-08-29 ASSESSMENT — ENCOUNTER SYMPTOMS
NAUSEA: 0
ANAL BLEEDING: 0
ABDOMINAL PAIN: 1
DIARRHEA: 0
CONSTIPATION: 0
VOMITING: 0
ABDOMINAL DISTENTION: 1
RECTAL PAIN: 0

## 2017-08-30 ENCOUNTER — TELEPHONE (OUTPATIENT)
Dept: ENT CLINIC | Age: 77
End: 2017-08-30

## 2017-08-30 LAB
ORGANISM: ABNORMAL
URINE CULTURE, ROUTINE: ABNORMAL

## 2017-08-31 ENCOUNTER — TELEPHONE (OUTPATIENT)
Dept: FAMILY MEDICINE CLINIC | Age: 77
End: 2017-08-31

## 2017-08-31 DIAGNOSIS — R11.2 NON-INTRACTABLE VOMITING WITH NAUSEA, UNSPECIFIED VOMITING TYPE: ICD-10-CM

## 2017-08-31 RX ORDER — ONDANSETRON 4 MG/1
4 TABLET, FILM COATED ORAL DAILY PRN
Qty: 60 TABLET | Refills: 1 | Status: SHIPPED | OUTPATIENT
Start: 2017-08-31 | End: 2017-11-15 | Stop reason: SDUPTHER

## 2017-08-31 RX ORDER — ONDANSETRON 4 MG/1
4 TABLET, FILM COATED ORAL DAILY PRN
Qty: 30 TABLET | Refills: 0 | Status: CANCELLED | OUTPATIENT
Start: 2017-08-31

## 2017-09-08 DIAGNOSIS — E11.9 DIABETES TYPE 2, CONTROLLED (HCC): ICD-10-CM

## 2017-09-20 ENCOUNTER — TELEPHONE (OUTPATIENT)
Dept: CARDIOLOGY CLINIC | Age: 77
End: 2017-09-20

## 2017-09-22 ENCOUNTER — TELEPHONE (OUTPATIENT)
Dept: CARDIOLOGY CLINIC | Age: 77
End: 2017-09-22

## 2017-09-22 NOTE — TELEPHONE ENCOUNTER
[place] Marie, pt called to reschedule her August 2017 appt for her yearly followup. She cancelled the August appt due to bad leg cramping. Your next available date is 11-21-17. Would you be willing to  [facility] your schedule on 10-16-17 at [facility]? I informed the pt that our office will call her back.

## 2017-09-27 NOTE — TELEPHONE ENCOUNTER
Added on to schedule 10/17/17 LM on pt AM and notified she was added on and may have a slight wait Bridget Bright RN

## 2017-09-29 ENCOUNTER — OFFICE VISIT (OUTPATIENT)
Dept: FAMILY MEDICINE CLINIC | Age: 77
End: 2017-09-29

## 2017-09-29 VITALS
TEMPERATURE: 98.1 F | HEIGHT: 65 IN | BODY MASS INDEX: 40.98 KG/M2 | RESPIRATION RATE: 16 BRPM | HEART RATE: 84 BPM | OXYGEN SATURATION: 95 % | DIASTOLIC BLOOD PRESSURE: 74 MMHG | SYSTOLIC BLOOD PRESSURE: 126 MMHG | WEIGHT: 246 LBS

## 2017-09-29 DIAGNOSIS — Z01.818 PRE-OP EXAM: ICD-10-CM

## 2017-09-29 DIAGNOSIS — N30.10 INTERSTITIAL CYSTITIS: Primary | ICD-10-CM

## 2017-09-29 PROCEDURE — 99214 OFFICE O/P EST MOD 30 MIN: CPT | Performed by: FAMILY MEDICINE

## 2017-09-29 PROCEDURE — G8598 ASA/ANTIPLAT THER USED: HCPCS | Performed by: FAMILY MEDICINE

## 2017-09-29 PROCEDURE — 1036F TOBACCO NON-USER: CPT | Performed by: FAMILY MEDICINE

## 2017-09-29 PROCEDURE — G8427 DOCREV CUR MEDS BY ELIG CLIN: HCPCS | Performed by: FAMILY MEDICINE

## 2017-09-29 PROCEDURE — G8400 PT W/DXA NO RESULTS DOC: HCPCS | Performed by: FAMILY MEDICINE

## 2017-09-29 PROCEDURE — 1123F ACP DISCUSS/DSCN MKR DOCD: CPT | Performed by: FAMILY MEDICINE

## 2017-09-29 PROCEDURE — G8417 CALC BMI ABV UP PARAM F/U: HCPCS | Performed by: FAMILY MEDICINE

## 2017-09-29 PROCEDURE — 1090F PRES/ABSN URINE INCON ASSESS: CPT | Performed by: FAMILY MEDICINE

## 2017-09-29 PROCEDURE — 4040F PNEUMOC VAC/ADMIN/RCVD: CPT | Performed by: FAMILY MEDICINE

## 2017-09-29 PROCEDURE — 93000 ELECTROCARDIOGRAM COMPLETE: CPT | Performed by: FAMILY MEDICINE

## 2017-10-02 RX ORDER — FUROSEMIDE 40 MG/1
TABLET ORAL
Qty: 60 TABLET | Refills: 5 | Status: SHIPPED | OUTPATIENT
Start: 2017-10-02 | End: 2017-11-15 | Stop reason: SDUPTHER

## 2017-10-04 ENCOUNTER — CARE COORDINATION (OUTPATIENT)
Dept: CARE COORDINATION | Age: 77
End: 2017-10-04

## 2017-10-04 VITALS — WEIGHT: 247 LBS | BODY MASS INDEX: 41.1 KG/M2

## 2017-10-04 RX ORDER — LANCETS
EACH MISCELLANEOUS
Qty: 50 EACH | Refills: 5 | Status: SHIPPED | OUTPATIENT
Start: 2017-10-04 | End: 2017-11-15 | Stop reason: SDUPTHER

## 2017-10-04 NOTE — TELEPHONE ENCOUNTER
Refill request for Accu-Chek lansets medication.      Name of AudioCatch    Last visit - 9/29/17    Last refill -8/11/16

## 2017-10-06 ENCOUNTER — OFFICE VISIT (OUTPATIENT)
Dept: ENT CLINIC | Age: 77
End: 2017-10-06

## 2017-10-06 VITALS
WEIGHT: 241 LBS | HEIGHT: 65 IN | SYSTOLIC BLOOD PRESSURE: 136 MMHG | BODY MASS INDEX: 40.15 KG/M2 | DIASTOLIC BLOOD PRESSURE: 69 MMHG

## 2017-10-06 DIAGNOSIS — K11.8 PAROTID MASS: Primary | ICD-10-CM

## 2017-10-06 PROCEDURE — G8417 CALC BMI ABV UP PARAM F/U: HCPCS | Performed by: OTOLARYNGOLOGY

## 2017-10-06 PROCEDURE — 1036F TOBACCO NON-USER: CPT | Performed by: OTOLARYNGOLOGY

## 2017-10-06 PROCEDURE — G8484 FLU IMMUNIZE NO ADMIN: HCPCS | Performed by: OTOLARYNGOLOGY

## 2017-10-06 PROCEDURE — 99214 OFFICE O/P EST MOD 30 MIN: CPT | Performed by: OTOLARYNGOLOGY

## 2017-10-06 PROCEDURE — G8427 DOCREV CUR MEDS BY ELIG CLIN: HCPCS | Performed by: OTOLARYNGOLOGY

## 2017-10-06 PROCEDURE — 1090F PRES/ABSN URINE INCON ASSESS: CPT | Performed by: OTOLARYNGOLOGY

## 2017-10-06 PROCEDURE — G8598 ASA/ANTIPLAT THER USED: HCPCS | Performed by: OTOLARYNGOLOGY

## 2017-10-06 PROCEDURE — 4040F PNEUMOC VAC/ADMIN/RCVD: CPT | Performed by: OTOLARYNGOLOGY

## 2017-10-06 NOTE — PROGRESS NOTES
CHIEF COMPLAINT: Mass right parotid present for several months. HISTORY OF PRESENT ILLNESS:  69 y.o. female who presents with a mass of the right parotid which is been present for several months. It is painless. It is stable in size. Has been evaluated in the past both with ultrasound with fine-needle aspiration    PAST MEDICAL HISTORY:   History   Smoking Status    Former Smoker    Packs/day: 0.25    Years: 0.10    Types: Cigarettes   Smokeless Tobacco    Never Used     Comment: only smoked for 1 month                                                    History   Alcohol Use No                                                    Current Outpatient Prescriptions:     Accu-Chek Softclix Lancets MISC, USE TO TEST BLOOD SUGAR TWO TIMES DAILY, Disp: 50 each, Rfl: 5    furosemide (LASIX) 40 MG tablet, TAKE ONE TABLET BY MOUTH TWICE A DAY, Disp: 60 tablet, Rfl: 5    metFORMIN (GLUCOPHAGE) 500 MG tablet, TAKE ONE TABLET BY MOUTH THREE TIMES A DAY, Disp: 90 tablet, Rfl: 0    ondansetron (ZOFRAN) 4 MG tablet, Take 1 tablet by mouth daily as needed for Nausea or Vomiting, Disp: 60 tablet, Rfl: 1    pramipexole (MIRAPEX) 0.5 MG tablet, TAKE ONE TABLET BY MOUTH ONCE NIGHTLY, Disp: 90 tablet, Rfl: 1    fluconazole (DIFLUCAN) 150 MG tablet, Take  1 tablet once a week x 3 weeks. , Disp: 3 tablet, Rfl: 1    carvedilol (COREG) 6.25 MG tablet, TAKE ONE TABLET BY MOUTH TWICE A DAY WITH MEALS, Disp: 60 tablet, Rfl: 3    DULoxetine (CYMBALTA) 60 MG extended release capsule, Take 1 capsule by mouth daily, Disp: 90 capsule, Rfl: 2    benzonatate (TESSALON PERLES) 100 MG capsule, Take 1 capsule by mouth 3 times daily as needed for Cough, Disp: 90 capsule, Rfl: 1    azelastine (ASTELIN) 0.1 % nasal spray, 2 sprays by Nasal route 2 times daily Use in each nostril as directed, Disp: 1 Bottle, Rfl: 5    clotrimazole-betamethasone (LOTRISONE) 1-0.05 % cream, Apply bid to affected area., Disp: 45 g, Rfl: 1    vitamin D (ERGOCALCIFEROL) 85145 UNITS CAPS capsule, TAKE ONE CAPSULE BY MOUTH ONCE WEEKLY, Disp: 4 capsule, Rfl: 5    POTASSIUM PO, Take by mouth, Disp: , Rfl:     metFORMIN (GLUCOPHAGE) 500 MG tablet, TAKE ONE TABLET BY MOUTH THREE TIMES A DAY, Disp: 90 tablet, Rfl: 1    pravastatin (PRAVACHOL) 20 MG tablet, TAKE ONE TABLET BY MOUTH DAILY, Disp: 90 tablet, Rfl: 3    isosorbide mononitrate (IMDUR) 30 MG extended release tablet, TAKE ONE-HALF TABLET BY MOUTH ONE TIME A DAY, Disp: 45 tablet, Rfl: 3    glucose blood VI test strips (ACCU-CHEK ARABELLA PLUS) strip, Test blood sugar BID, Disp: 200 strip, Rfl: 5    mometasone-formoterol (DULERA) 100-5 MCG/ACT inhaler, Inhale 2 puffs into the lungs 2 times daily, Disp: 1 Inhaler, Rfl: 1    glucose blood VI test strips (EXACTECH TEST) strip, Apply 1 each topically 2 times daily Lancets #100 for BID testing.    Glucometer #1  For BS test., Disp: 100 strip, Rfl: 5    hydrocortisone 2.5 % cream, APPLY TOPICALLY TWO TIMES A DAY, Disp: 1 Tube, Rfl: 1    DiphenhydrAMINE HCl (BENADRYL ALLERGY PO), Take by mouth as needed, Disp: , Rfl:     Accu-Chek Multiclix Lancets MISC, 1 Units by Does not apply route 2 times daily, Disp: 50 each, Rfl: 5    calcium carbonate 600 MG TABS tablet, Take 1 tablet by mouth 2 times daily, Disp: , Rfl:     ONE TOUCH ULTRASOFT LANCETS MISC, Test blood sugar 6 times a day, Disp: 50 each, Rfl: 5    albuterol sulfate HFA (PROAIR HFA) 108 (90 BASE) MCG/ACT inhaler, Inhale 2 puffs into the lungs every 6 hours as needed for Wheezing, Disp: 1 Inhaler, Rfl: 5    Zinc 25 MG TABS, Take 50 mg by mouth nightly , Disp: , Rfl:     famotidine (PEPCID) 20 MG tablet, Take 1 tablet by mouth 2 times daily (Patient taking differently: Take 20 mg by mouth 3 times daily ), Disp: 20 tablet, Rfl: 0    nitroGLYCERIN (NITROSTAT) 0.4 MG SL tablet, Place 1 tablet under the tongue every 5 minutes as needed for Chest pain, Disp: 25 tablet, Rfl: 5    nystatin-triamcinolone (MYCOLOG) 339755-7.1 UNIT/GM-% ointment, APPLY A SMALL AMOUNT TOPICALLY FOUR TIMES A DAY, Disp: 1 Tube, Rfl: 5    vitamin E 400 UNIT capsule, Take 400 Units by mouth daily, Disp: , Rfl:     Blood Glucose Monitoring Suppl (ONE TOUCH ULTRA 2) W/DEVICE KIT, ZORAIDA BLOOD SUGAR 6 TIMES DAILY, Disp: 1 kit, Rfl: 0    lisinopril (PRINIVIL;ZESTRIL) 10 MG tablet, TAKE ONE TABLET BY MOUTH EVERY DAY, Disp: 30 tablet, Rfl: 5    clonazePAM (KLONOPIN) 0.5 MG tablet, Take 1 tablet by mouth 2 times daily as needed. , Disp: 60 tablet, Rfl: 0    acetaminophen (TYLENOL) 500 MG tablet, Take 500 mg by mouth every 6 hours as needed for Pain., Disp: , Rfl:     aspirin 81 MG tablet, Take 81 mg by mouth daily. , Disp: , Rfl:                                                  Past Medical History:   Diagnosis Date    Arthritis     Asthma     Bronchial asthma     Bursitis 12/2011    ACHILLES TENDON LEFT    [facility]     Constipation     COPD (chronic obstructive pulmonary disease) (HCC)     Depression     Diverticulitis     Dizziness     Hyperlipidemia     Hypertension     Leg edema     Nausea & vomiting     POSTOPERATIVE    PONV (postoperative nausea and vomiting)     Thyroid disease     Type II or unspecified type diabetes mellitus without mention of complication, not stated as uncontrolled     Unspecified cerebral artery occlusion with cerebral infarction     mini stroke                                                    Past Surgical History:   Procedure Laterality Date    ACHILLES TENDON SURGERY  2/2/12    LEFT ACHILLES DEBRIDEMENT, HAGLUNDS AND RETROCALCANEAL BURSA EXCISION WITH FLEXOR HALLUS LONGUS TENDON TRANSFER WITH BLOCK FOR PAIN CONTROL    APPENDECTOMY      CARDIAC CATHETERIZATION  8/21/14    CARPAL TUNNEL RELEASE      both hands    CHOLECYSTECTOMY      COLONOSCOPY      COLONOSCOPY      COLONOSCOPY  2/10/2016    DIAGNOSTIC CARDIAC CATH LAB PROCEDURE      EYE SURGERY     

## 2017-10-11 ENCOUNTER — CARE COORDINATION (OUTPATIENT)
Dept: CARE COORDINATION | Age: 77
End: 2017-10-11

## 2017-10-11 NOTE — CARE COORDINATION
Ambulatory Care Coordination Note  10/11/2017  CM Risk Score: 9  Pablo Mortality Risk Score: 15.8    ACC: Luan Salcedo, RN    Summary Note: Spoke with patient to follow up on multiple chronic health conditions, recent ED visit, and to check on daily weight. She reports she has an appt with Dr. Silvano Gomes on 10/25/2017 at 2:45 pm, and appt with Dr. Arianna Huang on 10/16/2017, was seen in the ED and diagnosed with diverticulosis on CT Scan from 10/6/2017. Mailing clinical references on diverticulosis and patient to follow up with Dr. Arianna Huang as scheduled. Discussed weight fluctuations and concerned about medication compliance and consistent weights. Per last conversation on 10/6/2017, patient to resume normal dose of Lasix 40 mg BID and to take extra Lasix dose prn weight gain. Patient could not verbalize these instructions from last week. Instructed patient to write down dose of Lasix at 40 mg BID and to take additional dose prn weight gain of 3 lbs in 24 hours and/or 5 lbs in 1 week. Patient reports slight edema, denies SOB, denies chest pain. Re-educated on CHF management and to continue to weigh daily and record on log. Will mail clinical reference on COPD and highlight specific areas on how to care for herself at home and when it is important to act on worsening symptoms:  Top 5 self-care tips for every day  1. Take your medicines as prescribed. This gives them the best chance of helping you. 2. Watch for signs that you're getting worse. Weighing yourself every day is one of the best ways to do this. Weight gain may be a sign that your body is holding on to too much fluid. Weigh yourself at the same time each day, using the same scale, on a hard, flat surface. The best time is in the morning after you go to the bathroom and before you eat or drink anything. 3. Find out what your triggers are, and learn to avoid them. Triggers are things that make your heart failure worse, often suddenly.  A trigger may be eating too much salt, missing a dose of your medicine, or exercising too hard. 4. Limit sodium. This helps keep fluid from building up and may help you feel better. Your doctor may suggest that you limit sodium to 2,000 milligrams (mg) a day or less. That's less than 1 teaspoon. You can stay under this amount by limiting the salt you eat at home and by watching for \"hidden\" sodium when you eat out or shop for food. 5. Try to exercise throughout the week. Exercise makes your heart stronger and can help you avoid symptoms. Walking is a great way to get exercise. If your doctor says it's safe, start out with some short walks, and then slowly build up to longer ones. When should you act? Try to become familiar with signs that mean your heart failure is getting worse. If you need help, talk with your doctor about making a personal plan. Here are some things to watch for as you practice your daily self-care. Call your doctor if:  · You have sudden weight gain, such as more than 2 to 3 pounds in a day or 5 pounds in a week. (Your doctor may suggest a different range of weight gain.)  · You have new or worse swelling in your feet, ankles, or legs. · Your breathing gets worse. Activities that did not make you short of breath before are hard for you now. · Your breathing when you lie down is worse than usual, or you wake up at night needing to catch your breath. Be sure to make and go to all of your follow-up appointments. And it's always a good idea to call your doctor anytime you have a sudden change in symptoms. Weight  10/11-  240 lbs- Due to weight gain of 3 lbs in 24 hours, patient to take extra dose of Lasix today and continue to monitor weight. 10/10-  237 lbs  10/9-  239 lbs  10/8-  239 lbs  10/7-  238 lbs      Care Coordination Interventions    Program Enrollment:  Complex Care  Referral from Primary Care Provider:  No  Suggested Interventions and Community Resources  Diabetes Education:   In Process (Comment: With [facility])  Disease Specific Clinic:  In Process (Comment: CHF education with [facility])  Zone Management Tools:  Completed (Comment: CHF, Diabetes [facility] Management mailed to patient 8/1/2017)  Other Services or Interventions:  low Na diet, weight log mailed to patient 8/9/2017         Goals Addressed             Most Recent       Care Coordination     Conditions and Symptoms   No change (10/11/2017)             I will schedule office visits, as directed by my provider. I will notify my provider of any barriers to my plan of care. I will notify my provider of any symptoms that indicate a worsening of my condition. Barriers: lack of education  Plan for overcoming my barriers: I will call the [facility] with any problems maintaining health  Confidence: 6/10  Anticipated Goal Completion Date: 11/1/2017           Nutrition Plan   No change (10/11/2017)             I will follow a nutritional plan as directed   Low Sodium:  2g  I will consistent decreased carbohydrate diet     Barriers: lack of education  Plan for overcoming my barriers: Will call [facility] with any questions and will read all educational resources mailed. Confidence: 7/10  Anticipated Goal Completion Date: 11/1/2017         Self Monitoring   Improving (10/11/2017)             Self-Monitored Blood Glucose - I will check my blood sugar Fasting blood sugar  I will notify my provider of any trends of increasing or decreasing blood sugars over a 1 month period. Daily Weights - I will weight myself as directed - Daily and write down weights  I will notify my provider of any increase in weight by 3 or more pounds in 2 days OR 5 or more pounds in a week.     Patient Reported Weight:   Patient Reported Blood Glucose:   Blood Glucose Tracker 10/4/2017 10/3/2017 10/2/2017 9/30/2017 9/29/2017   Before breakfast blood glucose [phone] 133 125       Barriers: lack of education  Plan for overcoming my barriers: Patient to work with [facility] and read all educational resources given  Confidence: 7/10  Anticipated Goal Completion Date: 12/1/2017                Prior to Admission medications    Medication Sig Start Date End Date Taking? Authorizing Provider   nystatin (MYCOSTATIN) 573474 UNIT/GM cream Apply topically 2 times daily. 10/6/17   David Ruff MD   Accu-Chek Softclix Lancets MISC USE TO TEST BLOOD SUGAR TWO TIMES DAILY 10/4/17   La Nena Ramirez MD   furosemide (LASIX) 40 MG tablet TAKE ONE TABLET BY MOUTH TWICE A DAY 10/2/17   La Nena Ramirez MD   metFORMIN (GLUCOPHAGE) 500 MG tablet TAKE ONE TABLET BY MOUTH THREE TIMES A DAY 9/8/17   Kervin Aly MD   ondansetron [facility]) 4 MG tablet Take 1 tablet by mouth daily as needed for Nausea or Vomiting 8/31/17   La Nena Ramirez MD   pramipexole (MIRAPEX) 0.5 MG tablet TAKE ONE TABLET BY MOUTH ONCE NIGHTLY 8/7/17   La Nena Ramirez MD   fluconazole (DIFLUCAN) 150 MG tablet Take  1 tablet once a week x 3 weeks. 8/2/17   La Nena Ramirez MD   carvedilol (COREG) 6.25 MG tablet TAKE ONE TABLET BY MOUTH TWICE A DAY WITH MEALS 7/18/17   Luigi Carrier, NP   DULoxetine (CYMBALTA) 60 MG extended release capsule Take 1 capsule by mouth daily 6/26/17   La Nena Ramirez MD   benzonatate (TESSALON PERLES) 100 MG capsule Take 1 capsule by mouth 3 times daily as needed for Cough 6/26/17   La Nena Ramirez MD   azelastine (ASTELIN) 0.1 % nasal spray 2 sprays by Nasal route 2 times daily Use in each nostril as directed 6/26/17   La Nena Ramirez MD   clotrimazole-betamethasone (LOTRISONE) 1-0.05 % cream Apply bid to affected area.  6/5/17   La Nena Ramirez MD   vitamin D (ERGOCALCIFEROL) 83546 UNITS CAPS capsule TAKE ONE CAPSULE BY MOUTH ONCE WEEKLY 5/11/17   La Nena Ramirez MD   POTASSIUM PO Take by mouth    Historical Provider, MD   metFORMIN (GLUCOPHAGE) 500 MG tablet TAKE ONE TABLET BY MOUTH THREE TIMES A DAY 3/31/17   Kervin Aly MD   pravastatin (PRAVACHOL) 20 MG tablet TAKE ONE TABLET BY MOUTH DAILY 3/31/17   Lona Pham Duran Callahan MD   lisinopril (PRINIVIL;ZESTRIL) 10 MG tablet TAKE ONE TABLET BY MOUTH EVERY DAY 5/20/15   Carmen Owen MD   clonazePAM (KLONOPIN) 0.5 MG tablet Take 1 tablet by mouth 2 times daily as needed. 10/30/14   Carmen Owen MD   acetaminophen (TYLENOL) 500 MG tablet Take 500 mg by mouth every 6 hours as needed for Pain. Historical Provider, MD   aspirin 81 MG tablet Take 81 mg by mouth daily.     Historical Provider, MD       Future Appointments  Date Time Provider Ann Yelitza   10/16/2017 1:00 PM Carmen Owen MD [facility]   10/17/2017 9:00 AM Kyra Bennett MD [facility]   12/5/2017 3:00 PM [address] [place] [address]   4/10/2018 1:00 PM Hermilo Billings MD [email or web address]   4/26/2018 10:30 AM Nancy Gasca CNP [facility]     Erik Gonzalez, RN   Care Coordinator  [phone]

## 2017-10-16 ENCOUNTER — OFFICE VISIT (OUTPATIENT)
Dept: FAMILY MEDICINE CLINIC | Age: 77
End: 2017-10-16

## 2017-10-16 VITALS
WEIGHT: 241 LBS | SYSTOLIC BLOOD PRESSURE: 128 MMHG | DIASTOLIC BLOOD PRESSURE: 78 MMHG | HEART RATE: 78 BPM | HEIGHT: 65 IN | OXYGEN SATURATION: 95 % | BODY MASS INDEX: 40.15 KG/M2 | TEMPERATURE: 98.3 F | RESPIRATION RATE: 18 BRPM

## 2017-10-16 DIAGNOSIS — E66.09 OBESITY DUE TO EXCESS CALORIES WITHOUT SERIOUS COMORBIDITY, UNSPECIFIED CLASSIFICATION: ICD-10-CM

## 2017-10-16 DIAGNOSIS — B37.2 YEAST INFECTION OF THE SKIN: Primary | ICD-10-CM

## 2017-10-16 PROCEDURE — G8400 PT W/DXA NO RESULTS DOC: HCPCS | Performed by: FAMILY MEDICINE

## 2017-10-16 PROCEDURE — G8417 CALC BMI ABV UP PARAM F/U: HCPCS | Performed by: FAMILY MEDICINE

## 2017-10-16 PROCEDURE — G8427 DOCREV CUR MEDS BY ELIG CLIN: HCPCS | Performed by: FAMILY MEDICINE

## 2017-10-16 PROCEDURE — G8484 FLU IMMUNIZE NO ADMIN: HCPCS | Performed by: FAMILY MEDICINE

## 2017-10-16 PROCEDURE — 1090F PRES/ABSN URINE INCON ASSESS: CPT | Performed by: FAMILY MEDICINE

## 2017-10-16 PROCEDURE — 4040F PNEUMOC VAC/ADMIN/RCVD: CPT | Performed by: FAMILY MEDICINE

## 2017-10-16 PROCEDURE — G8598 ASA/ANTIPLAT THER USED: HCPCS | Performed by: FAMILY MEDICINE

## 2017-10-16 PROCEDURE — 1036F TOBACCO NON-USER: CPT | Performed by: FAMILY MEDICINE

## 2017-10-16 PROCEDURE — 99213 OFFICE O/P EST LOW 20 MIN: CPT | Performed by: FAMILY MEDICINE

## 2017-10-16 PROCEDURE — 1123F ACP DISCUSS/DSCN MKR DOCD: CPT | Performed by: FAMILY MEDICINE

## 2017-10-16 RX ORDER — CLOTRIMAZOLE AND BETAMETHASONE DIPROPIONATE 10; .64 MG/G; MG/G
CREAM TOPICAL
Qty: 45 G | Refills: 3 | Status: SHIPPED | OUTPATIENT
Start: 2017-10-16 | End: 2017-11-15 | Stop reason: SDUPTHER

## 2017-10-16 NOTE — PROGRESS NOTES
Subjective:      Patient ID: Urvashi Alvarez is a 69 y.o. female. HPIPatient is here for follow up of ED visit. She had abdominal pain  Just after  A urethral dilation, she had the pain for a week. Until she went to the ED. She  Had a CT  Which showed Diverticulosis. She  Has an appt. With Dr. Keaton Frankel. She  Still have occasional discomfort. Have  Yeast infection in her groin, have been using the Lotrisone  Cream, it comes and goes. .    Review of Systems  Comprehensive  ROS reviewed are negative except the positives in [place]    Objective:   Physical Exam   Constitutional: She is oriented to person, place, and time. She appears well-developed and well-nourished. No distress. Abdominal: Soft. Bowel sounds are normal. She exhibits distension. She exhibits no mass. There is no tenderness. There is no rebound and no guarding. Neurological: She is alert and oriented to person, place, and time. Skin: Skin is warm and dry. She is not diaphoretic. Have well defined  Erythematous rash in her groin. No  Vesiculations. Nursing note and vitals reviewed. Assessment:      1. Yeast infection of the skin    2. Obesity due to excess calories without serious comorbidity, unspecified classification             Plan:      Orders Placed This Encounter   Medications    clotrimazole-betamethasone (LOTRISONE) 1-0.05 % cream     Sig: Apply bid to affected area. Dispense:  45 g     Refill:  3   Advised to keep the area dry and clean .

## 2017-10-17 ENCOUNTER — OFFICE VISIT (OUTPATIENT)
Dept: CARDIOLOGY CLINIC | Age: 77
End: 2017-10-17

## 2017-10-17 VITALS
WEIGHT: 242.4 LBS | SYSTOLIC BLOOD PRESSURE: 148 MMHG | DIASTOLIC BLOOD PRESSURE: 84 MMHG | BODY MASS INDEX: 40.39 KG/M2 | HEART RATE: 91 BPM | HEIGHT: 65 IN | OXYGEN SATURATION: 93 %

## 2017-10-17 DIAGNOSIS — I25.10 CORONARY ARTERY DISEASE INVOLVING NATIVE CORONARY ARTERY OF NATIVE HEART WITHOUT ANGINA PECTORIS: ICD-10-CM

## 2017-10-17 DIAGNOSIS — I50.32 CHRONIC DIASTOLIC CONGESTIVE HEART FAILURE (HCC): Chronic | ICD-10-CM

## 2017-10-17 DIAGNOSIS — I10 ESSENTIAL HYPERTENSION: Primary | Chronic | ICD-10-CM

## 2017-10-17 DIAGNOSIS — I35.0 AORTIC STENOSIS, MILD: ICD-10-CM

## 2017-10-17 DIAGNOSIS — I49.1 PREMATURE ATRIAL BEATS: ICD-10-CM

## 2017-10-17 DIAGNOSIS — E11.9 DIABETES TYPE 2, CONTROLLED (HCC): ICD-10-CM

## 2017-10-17 PROCEDURE — G8484 FLU IMMUNIZE NO ADMIN: HCPCS | Performed by: INTERNAL MEDICINE

## 2017-10-17 PROCEDURE — 1123F ACP DISCUSS/DSCN MKR DOCD: CPT | Performed by: INTERNAL MEDICINE

## 2017-10-17 PROCEDURE — G8598 ASA/ANTIPLAT THER USED: HCPCS | Performed by: INTERNAL MEDICINE

## 2017-10-17 PROCEDURE — G8427 DOCREV CUR MEDS BY ELIG CLIN: HCPCS | Performed by: INTERNAL MEDICINE

## 2017-10-17 PROCEDURE — 99214 OFFICE O/P EST MOD 30 MIN: CPT | Performed by: INTERNAL MEDICINE

## 2017-10-17 PROCEDURE — 1036F TOBACCO NON-USER: CPT | Performed by: INTERNAL MEDICINE

## 2017-10-17 PROCEDURE — 4040F PNEUMOC VAC/ADMIN/RCVD: CPT | Performed by: INTERNAL MEDICINE

## 2017-10-17 PROCEDURE — G8417 CALC BMI ABV UP PARAM F/U: HCPCS | Performed by: INTERNAL MEDICINE

## 2017-10-17 PROCEDURE — 1090F PRES/ABSN URINE INCON ASSESS: CPT | Performed by: INTERNAL MEDICINE

## 2017-10-17 PROCEDURE — G8400 PT W/DXA NO RESULTS DOC: HCPCS | Performed by: INTERNAL MEDICINE

## 2017-10-17 NOTE — COMMUNICATION BODY
[address] Office Note  10/17/2017     Subjective:  Ms. Oscar Celaya is here for cardiology follow up of CAD, CHF, HTN, HLD   Today she reports feeling well overall. She states she woke last night and felt like her heart was beating very slow. HR today is 91. Denies chest pain, shortness of breath, edema, dizziness, palpitations and syncope. HPI: She has dynamic out flow tract obstruction. Non obst CAD per cath Aug. 2014. States at times she feels she has to gasp for air at times, however she does not feel SOB. She denies chest pain. Review of Systems: 12 point ROS negative in all areas as listed below except as in [place]  Constitutional, EENT, Cardiovascular, pulmonary, GI, , Musculoskeletal, skin, neurological, hematological, endocrine, Psychiatric    Reviewed past medical history, social, and family history. Non-smoker  Mother, , age 68, CHG;  Father, , age 76, cancer; Brother, living, OHS at age 77; Brother, rheumatic fever; Sister, living, age 71, irregular/skipping heart beats  Past Medical History:   Diagnosis Date    Arthritis     Asthma     Bronchial asthma     Bursitis 2011    ACHILLES TENDON LEFT    [facility] [address]     Constipation     COPD [facility]ronic obstructive [facility] disease) [facility] [address]     Depression     Diverticulitis     Dizziness     Hyperlipidemia     Hypertension     Leg edema     Nausea & vomiting     POSTOPERATIVE    PONV (postoperative nausea and vomiting)     Thyroid disease     Type II or unspecified type diabetes mellitus without mention of complication, not stated as uncontrolled     Unspecified cerebral artery occlusion with cerebral infarction     mini stroke     Past Surgical History:   Procedure Laterality Date    ACHILLES TENDON SURGERY  12    LEFT ACHILLES DEBRIDEMENT, HAGLUNDS AND RETROCALCANEAL BURSA EXCISION WITH FLEXOR HALLUS LONGUS TENDON TRANSFER WITH BLOCK FOR PAIN CONTROL    APPENDECTOMY      CARDIAC CATHETERIZATION  8/21/14    CARPAL TUNNEL RELEASE      both hands    CHOLECYSTECTOMY      COLONOSCOPY      COLONOSCOPY      COLONOSCOPY  2/10/2016    CYSTOSCOPY  10/02/2017    DIAGNOSTIC CARDIAC CATH LAB PROCEDURE      EYE SURGERY      HYSTERECTOMY      JOINT REPLACEMENT      bilateral knee    KNEE SURGERY      both replaced    POLYPECTOMY      SHOULDER SURGERY      TOENAIL EXCISION  08/04/2016    right big toe    TONSILLECTOMY      TUBAL LIGATION      UPPER GASTROINTESTINAL ENDOSCOPY  10/29/12    gastritis    UPPER GASTROINTESTINAL ENDOSCOPY  2/10/2016    URETHRAL STRICTURE DILATATION         Objective:   BP (!) 148/84   Pulse 91   Ht 5' 5\" (1.651 m)   Wt 242 lb 6.4 oz (110 kg)   SpO2 93%   BMI 40.34 kg/m²      Wt Readings from Last 3 Encounters:   10/17/17 242 lb 6.4 oz (110 kg)   10/16/17 241 lb (109.3 kg)   10/06/17 237 lb (107.5 kg)       Physical Exam:  General: No Respiratory distress, appears well developed and well nourished. Eyes:  Sclera nonicteric  Nose/Sinuses:  negative findings: nose shows no deformity, asymmetry, or inflammation, nasal mucosa normal, septum midline with no perforation or bleeding  Back:  no pain to palpation  Joint:  no active joint inflammation  Musculoskeletal:  negative  Skin:  Warm and dry  Neck:  Negative for JVD and R Carotid Bruits. Chest:  Clear to auscultation, respiration easy  Cardiovascular:  RRR, S1S2 normal, 2/6 systolic murmur in aortic area, no rub or thrill. Extremities: Trace non-pitting edema left leg, No clubbing, cyanosis,  Pulses: Pedal pulses are normal.  Neuro: intact    Medications:   Current Outpatient Prescriptions   Medication Sig Dispense Refill    IBUPROFEN PO Take by mouth as needed      clotrimazole-betamethasone (LOTRISONE) 1-0.05 % cream Apply bid to affected area. 45 g 3    nystatin (MYCOSTATIN) 618140 UNIT/GM cream Apply topically 2 times daily.  15 g 0    Accu-Chek Softclix Lancets [facility] USE TO TEST BLOOD SUGAR TWO aortic regurgitation is present. The tricuspid valve was not well visualized appears grossly normal.  Mild tricuspid regurgitation. Systolic pulmonary artery pressure (SPAP) is estimated at 41 mmHg   consistent  with mild pulmonary hypertension (RA pressure 8 mmHg). There is a trivial localized near right ventricle pericardial effusion  noted. 1/27/14 ECHO   Summary  1. Normal left ventricle size, borderline concentric left ventricular  hypertrophy and systolic function with an estimated ejection fraction of  60-65%. 2. There is a late peaking gradient recorded across the LV outflow tract  with valsalva consistent with dynamic obstruction with a peak   gradient  of 30mmHg. 3. Mild calcification of the posterior leaflet of the mitral valve. Mild  mitral regurgitation. 4. The aortic valve is thickened/calcified with decreased leaflet   mobility. Mild aortic stenosis. Mild aortic valve regurgitation. 01/09/2013 Normal EF of 65%. Diastolic non compliance. Mild LV out flow and mild aortic regurgitation. 48 hour Holter Monitor 01/09/2013: The rhythm was sinus premature atrial beats no symptoms. 01/09/2013 Normal EF of 65%. Diastolic non compliance. Mild LV out flow and mild aortic regurgitation. 48 hour Holter Monitor 01/09/2013: The rhythm was sinus premature atrial beats no symptoms. CATH 8/21/14   Mild coronary artery disease with preserved left ventricular  function. There is evidence for volume overload and slight decompensation. RECOMMENDATIONS: At this point is aggressive risk factor modification along  with aggressive diuresis. Carol Yee MD  [id number]  DD: 08/21/2014 14:20     Stress test:  9/7/2012  1. Normal left ventricular ejection fraction and wall motion. EF 79%. 2. No large areas of reversibility to suggest ischemia. Assessment:  1. Hypertension controlled  2. CHF (congestive heart failure)diastolic compensated  3.  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 Non obst CAD        Plan:  1. Healthy lifestyle education reviewed including nutrition, exercise and activity  2. Continue taking all medications as prescribed, she is compliant with therapy  3.  Follow up with me in 1 year        Lia Ocampo MD 10/17/2017 9:40 AM

## 2017-10-17 NOTE — LETTER
 mometasone-formoterol (DULERA) 100-5 MCG/ACT inhaler Inhale 2 puffs into the lungs 2 times daily 1 Inhaler 1    glucose blood VI test strips (EXACTECH TEST) strip Apply 1 each topically 2 times daily Lancets #100 for BID testing. Glucometer #1  For BS test. 100 strip 5    hydrocortisone 2.5 % cream APPLY TOPICALLY TWO TIMES A DAY 1 Tube 1    DiphenhydrAMINE HCl (BENADRYL ALLERGY PO) Take by mouth as needed      Accu-Chek Multiclix Lancets MISC 1 Units by Does not apply route 2 times daily 50 each 5    calcium carbonate 600 MG TABS tablet Take 1 tablet by mouth 2 times daily      ONE TOUCH ULTRASOFT LANCETS MISC Test blood sugar 6 times a day 50 each 5    albuterol sulfate HFA (PROAIR HFA) 108 (90 BASE) MCG/ACT inhaler Inhale 2 puffs into the lungs every 6 hours as needed for Wheezing 1 Inhaler 5    Zinc 25 MG TABS Take 50 mg by mouth nightly       famotidine (PEPCID) 20 MG tablet Take 1 tablet by mouth 2 times daily (Patient taking differently: Take 20 mg by mouth 3 times daily ) 20 tablet 0    nitroGLYCERIN (NITROSTAT) 0.4 MG SL tablet Place 1 tablet under the tongue every 5 minutes as needed for Chest pain 25 tablet 5    nystatin-triamcinolone (MYCOLOG) 970366-9.1 UNIT/GM-% ointment APPLY A SMALL AMOUNT TOPICALLY FOUR TIMES A DAY 1 Tube 5    vitamin E 400 UNIT capsule Take 400 Units by mouth daily      Blood Glucose Monitoring Suppl (ONE TOUCH ULTRA 2) W/DEVICE KIT ZORAIDA BLOOD SUGAR 6 TIMES DAILY 1 kit 0    lisinopril (PRINIVIL;ZESTRIL) 10 MG tablet TAKE ONE TABLET BY MOUTH EVERY DAY 30 tablet 5    clonazePAM (KLONOPIN) 0.5 MG tablet Take 1 tablet by mouth 2 times daily as needed. 60 tablet 0    aspirin 81 MG tablet Take 81 mg by mouth daily.       metFORMIN (GLUCOPHAGE) 500 MG tablet TAKE ONE TABLET BY MOUTH THREE TIMES A DAY 90 tablet 0    ondansetron (ZOFRAN) 4 MG tablet Take 1 tablet by mouth daily as needed for Nausea or Vomiting 60 tablet 1  acetaminophen (TYLENOL) 500 MG tablet Take 500 mg by mouth every 6 hours as needed for Pain. No current facility-administered medications for this visit. Lab Data:  CBC: No results for input(s): WBC, HGB, HCT, MCV, PLT in the last 72 hours. BMP:   No results for input(s): NA, K, CL, CO2, PHOS, BUN, CREATININE in the last 72 hours. Invalid input(s): CA  LIVER PROFILE: No results for input(s): AST, ALT, LIPASE, BILIDIR, BILITOT, ALKPHOS in the last 72 hours. Invalid input(s): AMYLASE,  ALB  LIPID:   No components found for: CHLPL  Lab Results   Component Value Date    TRIG 124 07/01/2017    TRIG 177 (H) 08/18/2015    TRIG 94 02/06/2015     Lab Results   Component Value Date    HDL 54 07/01/2017    HDL 40 08/18/2015    HDL 46 02/06/2015     Lab Results   Component Value Date    LDLCALC 100 (H) 07/01/2017    LDLCALC 85 08/18/2015    LDLCALC 98 02/06/2015     Lab Results   Component Value Date    LABVLDL 25 07/01/2017    LABVLDL 35 08/18/2015    LABVLDL 19 02/06/2015     PT/INR: No results for input(s): PROTIME, INR in the last 72 hours. A1C:   Lab Results   Component Value Date    LABA1C 7.2 07/01/2017     BNP:  No results for input(s): BNP in the last 72 hours. IMAGING:   Carotid Doppler 12/7/16  1. There is no gross plaque or stenosis in the internal carotid arteries  bilaterally. 2. The vertebral arteries are patent with antegrade flow bilaterally. 3. Changes in lab criteria have down-graded the severity of disease  bilaterally compared to previous exam on 3/5/2014. Echo doppler of heart 0.30.99   Normal systolic function with an estimated ejection fraction of 55%.   Mild concentric left ventricular hypertrophy.   Diastolic filling parameters suggests grade I diastolic dysfunction.   Mitral annular calcification is present. Mild mitral regurgitation.   The aortic valve is thickened/calcified with decreased leaflet mobility c/w   mild aortic stenosis.   Mild aortic regurgitation.  Systolic pulmonary artery pressure (SPAP) is normal and estimated at 26 mmHg   (RA pressure 3 mmHg).    EKG 8.9.15 normal sinus rhythm. Computer read it wrong, no flutter present on my review. 8/21/14 ECHO  Summary  Left ventricle size is normal.  Mild concentric left ventricular hypertrophy is present. Global ejection fraction is normal and estimated from 50 % to 55 %. No regional wall motion abnormalities are noted. Diastolic filling parameters suggests grade I diastolic dysfunction . Mild mitral regurgitation is present. Mild annular calcification is present. Aortic valve appears sclerotic but opens adequately. Mild aortic regurgitation is present. The tricuspid valve was not well visualized appears grossly normal.  Mild tricuspid regurgitation. Systolic pulmonary artery pressure (SPAP) is estimated at 41 mmHg   consistent  with mild pulmonary hypertension (RA pressure 8 mmHg). There is a trivial localized near right ventricle pericardial effusion  noted. 1/27/14 ECHO   Summary  1. Normal left ventricle size, borderline concentric left ventricular  hypertrophy and systolic function with an estimated ejection fraction of  60-65%. 2. There is a late peaking gradient recorded across the LV outflow tract  with valsalva consistent with dynamic obstruction with a peak   gradient  of 30mmHg. 3. Mild calcification of the posterior leaflet of the mitral valve. Mild  mitral regurgitation. 4. The aortic valve is thickened/calcified with decreased leaflet   mobility. Mild aortic stenosis. Mild aortic valve regurgitation. 01/09/2013 Normal EF of 65%. Diastolic non compliance. Mild LV out flow and mild aortic regurgitation. 48 hour Holter Monitor 01/09/2013: The rhythm was sinus premature atrial beats no symptoms. 01/09/2013 Normal EF of 65%. Diastolic non compliance. Mild LV out flow and mild aortic regurgitation. 48 hour Holter Monitor 01/09/2013:  The rhythm was sinus premature atrial beats no symptoms. CATH 8/21/14   Mild coronary artery disease with preserved left ventricular  function. There is evidence for volume overload and slight decompensation. RECOMMENDATIONS: At this point is aggressive risk factor modification along  with aggressive diuresis. MD FRANKY Becerra/[id number]  DD: 08/21/2014 14:20     Stress test:  9/7/2012  1. Normal left ventricular ejection fraction and wall motion. EF 79%. 2. No large areas of reversibility to suggest ischemia. Assessment:  1. Hypertension controlled  2. CHF (congestive heart failure)diastolic compensated  3. 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 Non obst CAD        Plan:  1. Healthy lifestyle education reviewed including nutrition, exercise and activity  2. Continue taking all medications as prescribed, she is compliant with therapy  3. Follow up with me in 1 year        Robby Saeed MD 10/17/2017 9:40 AM            If you have questions, please do not hesitate to call me. I look forward to following Glenna along with you.     Sincerely,        [address] MD [place]

## 2017-10-17 NOTE — PROGRESS NOTES
CATHETERIZATION  8/21/14    CARPAL TUNNEL RELEASE      both hands    CHOLECYSTECTOMY      COLONOSCOPY      COLONOSCOPY      COLONOSCOPY  2/10/2016    CYSTOSCOPY  10/02/2017    DIAGNOSTIC CARDIAC CATH LAB PROCEDURE      EYE SURGERY      HYSTERECTOMY      JOINT REPLACEMENT      bilateral knee    KNEE SURGERY      both replaced    POLYPECTOMY      SHOULDER SURGERY      TOENAIL EXCISION  08/04/2016    right big toe    TONSILLECTOMY      TUBAL LIGATION      UPPER GASTROINTESTINAL ENDOSCOPY  10/29/12    gastritis    UPPER GASTROINTESTINAL ENDOSCOPY  2/10/2016    URETHRAL STRICTURE DILATATION         Objective:   BP (!) 148/84   Pulse 91   Ht 5' 5\" (1.651 m)   Wt 242 lb 6.4 oz (110 kg)   SpO2 93%   BMI 40.34 kg/m²     Wt Readings from Last 3 Encounters:   10/17/17 242 lb 6.4 oz (110 kg)   10/16/17 241 lb (109.3 kg)   10/06/17 237 lb (107.5 kg)       Physical Exam:  General: No Respiratory distress, appears well developed and well nourished. Eyes:  Sclera nonicteric  Nose/Sinuses:  negative findings: nose shows no deformity, asymmetry, or inflammation, nasal mucosa normal, septum midline with no perforation or bleeding  Back:  no pain to palpation  Joint:  no active joint inflammation  Musculoskeletal:  negative  Skin:  Warm and dry  Neck:  Negative for JVD and R Carotid Bruits. Chest:  Clear to auscultation, respiration easy  Cardiovascular:  RRR, S1S2 normal, 2/6 systolic murmur in aortic area, no rub or thrill. Extremities: Trace non-pitting edema left leg, No clubbing, cyanosis,  Pulses: Pedal pulses are normal.  Neuro: intact    Medications:   Current Outpatient Prescriptions   Medication Sig Dispense Refill    IBUPROFEN PO Take by mouth as needed      clotrimazole-betamethasone (LOTRISONE) 1-0.05 % cream Apply bid to affected area. 45 g 3    nystatin (MYCOSTATIN) 524743 UNIT/GM cream Apply topically 2 times daily.  15 g 0    Accu-Chek Softclix Lancets [facility] USE TO TEST BLOOD SUGAR TWO TIMES DAILY 50 each 5    furosemide (LASIX) 40 MG tablet TAKE ONE TABLET BY MOUTH TWICE A DAY 60 tablet 5    pramipexole (MIRAPEX) 0.5 MG tablet TAKE ONE TABLET BY MOUTH ONCE NIGHTLY 90 tablet 1    fluconazole (DIFLUCAN) 150 MG tablet Take  1 tablet once a week x 3 weeks. 3 tablet 1    carvedilol (COREG) 6.25 MG tablet TAKE ONE TABLET BY MOUTH TWICE A DAY WITH MEALS 60 tablet 3    DULoxetine (CYMBALTA) 60 MG extended release capsule Take 1 capsule by mouth daily 90 capsule 2    benzonatate (TESSALON PERLES) 100 MG capsule Take 1 capsule by mouth 3 times daily as needed for Cough 90 capsule 1    azelastine (ASTELIN) 0.1 % nasal spray 2 sprays by Nasal route 2 times daily Use in each nostril as directed 1 Bottle 5    vitamin D (ERGOCALCIFEROL) 73132 UNITS CAPS capsule TAKE ONE CAPSULE BY MOUTH ONCE WEEKLY 4 capsule 5    POTASSIUM PO Take by mouth daily       metFORMIN (GLUCOPHAGE) 500 MG tablet TAKE ONE TABLET BY MOUTH THREE TIMES A DAY (Patient taking differently: Takes Two in the AM and one tablet at night) 90 tablet 1    pravastatin (PRAVACHOL) 20 MG tablet TAKE ONE TABLET BY MOUTH DAILY 90 tablet 3    isosorbide mononitrate (IMDUR) 30 MG extended release tablet TAKE ONE-HALF TABLET BY MOUTH ONE TIME A DAY 45 tablet 3    glucose blood VI test strips (ACCU-CHEK ARABELLA PLUS) strip Test blood sugar  strip 5    mometasone-formoterol (DULERA) 100-5 MCG/ACT inhaler Inhale 2 puffs into the lungs 2 times daily 1 Inhaler 1    glucose blood VI test strips (EXACTECH TEST) strip Apply 1 each topically 2 times daily Lancets #100 for BID testing.       Glucometer #1  For BS test. 100 strip 5    hydrocortisone 2.5 % cream APPLY TOPICALLY TWO TIMES A DAY 1 Tube 1    DiphenhydrAMINE HCl (BENADRYL ALLERGY PO) Take by mouth as needed      Accu-Chek Multiclix Lancets MISC 1 Units by Does not apply route 2 times daily 50 each 5    calcium carbonate 600 MG TABS tablet Take 1 tablet by mouth 2 times daily      TRIG 94 02/06/2015     Lab Results   Component Value Date    HDL 54 07/01/2017    HDL 40 08/18/2015    HDL 46 02/06/2015     Lab Results   Component Value Date    LDLCALC 100 (H) 07/01/2017    LDLCALC 85 08/18/2015    LDLCALC 98 02/06/2015     Lab Results   Component Value Date    LABVLDL 25 07/01/2017    LABVLDL 35 08/18/2015    LABVLDL 19 02/06/2015     PT/INR: No results for input(s): PROTIME, INR in the last 72 hours. A1C:   Lab Results   Component Value Date    LABA1C 7.2 07/01/2017     BNP:  No results for input(s): BNP in the last 72 hours. IMAGING:   Carotid Doppler 12/7/16  1. There is no gross plaque or stenosis in the internal carotid arteries  bilaterally. 2. The vertebral arteries are patent with antegrade flow bilaterally. 3. Changes in lab criteria have down-graded the severity of disease  bilaterally compared to previous exam on 3/5/2014. Echo doppler of heart 2.26.59   Normal systolic function with an estimated ejection fraction of 55%.   Mild concentric left ventricular hypertrophy.   Diastolic filling parameters suggests grade I diastolic dysfunction.   Mitral annular calcification is present. Mild mitral regurgitation.   The aortic valve is thickened/calcified with decreased leaflet mobility c/w   mild aortic stenosis.   Mild aortic regurgitation.   Systolic pulmonary artery pressure (SPAP) is normal and estimated at 26 mmHg   (RA pressure 3 mmHg).    EKG 8.9.15 normal sinus rhythm. Computer read it wrong, no flutter present on my review. 8/21/14 ECHO  Summary  Left ventricle size is normal.  Mild concentric left ventricular hypertrophy is present. Global ejection fraction is normal and estimated from 50 % to 55 %. No regional wall motion abnormalities are noted. Diastolic filling parameters suggests grade I diastolic dysfunction . Mild mitral regurgitation is present. Mild annular calcification is present. Aortic valve appears sclerotic but opens adequately.   Mild aortic regurgitation is present. The tricuspid valve was not well visualized appears grossly normal.  Mild tricuspid regurgitation. Systolic pulmonary artery pressure (SPAP) is estimated at 41 mmHg   consistent  with mild pulmonary hypertension (RA pressure 8 mmHg). There is a trivial localized near right ventricle pericardial effusion  noted. 1/27/14 ECHO   Summary  1. Normal left ventricle size, borderline concentric left ventricular  hypertrophy and systolic function with an estimated ejection fraction of  60-65%. 2. There is a late peaking gradient recorded across the LV outflow tract  with valsalva consistent with dynamic obstruction with a peak   gradient  of 30mmHg. 3. Mild calcification of the posterior leaflet of the mitral valve. Mild  mitral regurgitation. 4. The aortic valve is thickened/calcified with decreased leaflet   mobility. Mild aortic stenosis. Mild aortic valve regurgitation. 01/09/2013 Normal EF of 65%. Diastolic non compliance. Mild LV out flow and mild aortic regurgitation. 48 hour Holter Monitor 01/09/2013: The rhythm was sinus premature atrial beats no symptoms. 01/09/2013 Normal EF of 65%. Diastolic non compliance. Mild LV out flow and mild aortic regurgitation. 48 hour Holter Monitor 01/09/2013: The rhythm was sinus premature atrial beats no symptoms. CATH 8/21/14   Mild coronary artery disease with preserved left ventricular  function. There is evidence for volume overload and slight decompensation. RECOMMENDATIONS: At this point is aggressive risk factor modification along  with aggressive diuresis. MD LAI Anne/[id number]  DD: 08/21/2014 14:20     Stress test:  9/7/2012  1. Normal left ventricular ejection fraction and wall motion. EF 79%. 2. No large areas of reversibility to suggest ischemia. Assessment:  1. Hypertension controlled  2. CHF (congestive heart failure)diastolic compensated  3.  Dynamic outflow tract obstruction no

## 2017-10-19 ENCOUNTER — CARE COORDINATION (OUTPATIENT)
Dept: CARE COORDINATION | Age: 77
End: 2017-10-19

## 2017-10-19 NOTE — CARE COORDINATION
Contacted patient to obtain Daily BS readings and weights. Patient reports that it was not a good time to obtain readings and to call back tomorrow afternoon. Will follow up with patient again tomorrow.     [facility] Coordination   [phone]

## 2017-10-26 ENCOUNTER — CARE COORDINATION (OUTPATIENT)
Dept: CARE COORDINATION | Age: 77
End: 2017-10-26

## 2017-10-26 NOTE — CARE COORDINATION
Contacted patient to obtain Daily BS readings and weights. Patient reports that she is not at home currently. Informed patient I will follow up with her again tomorrow to obtain BS readings and weights, she was agreeable.     [facility] Coordination   [phone]

## 2017-11-02 ENCOUNTER — CARE COORDINATION (OUTPATIENT)
Dept: CARE COORDINATION | Age: 77
End: 2017-11-02

## 2017-11-02 NOTE — CARE COORDINATION
Contacted patient to obtain Daily BS readings and weights. Left message for a return call to obtain BS readings and weights. Will wait for a return call.          [facility] Coordination   [phone]

## 2017-11-07 ENCOUNTER — TELEPHONE (OUTPATIENT)
Dept: FAMILY MEDICINE CLINIC | Age: 77
End: 2017-11-07

## 2017-11-08 ENCOUNTER — CARE COORDINATION (OUTPATIENT)
Dept: CARE COORDINATION | Age: 77
End: 2017-11-08

## 2017-11-08 ENCOUNTER — TELEPHONE (OUTPATIENT)
Dept: FAMILY MEDICINE CLINIC | Age: 77
End: 2017-11-08

## 2017-11-08 DIAGNOSIS — M19.90 ARTHRITIS: Primary | Chronic | ICD-10-CM

## 2017-11-08 NOTE — CARE COORDINATION
Contacted patient to obtain daily blood sugar readings and weights. Please see readings below. Patient apologized for not returning my call last week and reports that she is out and about most of the time and returning calls is the last thing she wants to do after she gets home. Will follow up with patient again next week. Daily Weights                  Day Weight                   10/28 240         10/29 239         10/30 239         10/31 240         11/1 240         11/2 237         11/3 236         11/4 236         11/5 239         11/6 238         11/7 238         11/8 238           Average BGs by Time of Day                   Day Before After  Before 2 hours after Before 2 hours After      Breakfast Breakfast Lunch Lunch Dinner Dinner    10/28 150         10/29 129         10/30 131         10/31 187         11/1 131         11/2 134         11/3 142         11/4 129         11/5 157         11/6 114                             AVG BG  140 #DIV/0! #DIV/0! #DIV/0! #DIV/0! #DIV/0!      11/7 136         11/8 147                                                                                                   AVG BG  142 #DIV/0! #DIV/0! #DIV/0! #DIV/0! #DIV/0!      [facility] Coordination   [phone]

## 2017-11-08 NOTE — TELEPHONE ENCOUNTER
You're welcome! Will follow up with patient again next week.     [address] [facility] Coordination   [phone]

## 2017-11-15 ENCOUNTER — TELEPHONE (OUTPATIENT)
Dept: FAMILY MEDICINE CLINIC | Age: 77
End: 2017-11-15

## 2017-11-15 DIAGNOSIS — F32.A ANXIETY AND DEPRESSION: ICD-10-CM

## 2017-11-15 DIAGNOSIS — I10 ESSENTIAL HYPERTENSION: Primary | ICD-10-CM

## 2017-11-15 DIAGNOSIS — R11.2 NON-INTRACTABLE VOMITING WITH NAUSEA, UNSPECIFIED VOMITING TYPE: ICD-10-CM

## 2017-11-15 DIAGNOSIS — J30.1 CHRONIC SEASONAL ALLERGIC RHINITIS DUE TO POLLEN: ICD-10-CM

## 2017-11-15 DIAGNOSIS — B37.2 YEAST INFECTION OF THE SKIN: ICD-10-CM

## 2017-11-15 DIAGNOSIS — E78.2 MIXED HYPERLIPIDEMIA: ICD-10-CM

## 2017-11-15 DIAGNOSIS — E11.9 CONTROLLED TYPE 2 DIABETES MELLITUS WITHOUT COMPLICATION, WITHOUT LONG-TERM CURRENT USE OF INSULIN (HCC): ICD-10-CM

## 2017-11-15 DIAGNOSIS — E55.9 VITAMIN D DEFICIENCY: ICD-10-CM

## 2017-11-15 DIAGNOSIS — G25.81 RLS (RESTLESS LEGS SYNDROME): ICD-10-CM

## 2017-11-15 DIAGNOSIS — F41.9 ANXIETY AND DEPRESSION: ICD-10-CM

## 2017-11-15 RX ORDER — CLOTRIMAZOLE AND BETAMETHASONE DIPROPIONATE 10; .64 MG/G; MG/G
CREAM TOPICAL
Qty: 45 G | Refills: 3 | Status: SHIPPED | OUTPATIENT
Start: 2017-11-15 | End: 2018-02-15 | Stop reason: SDUPTHER

## 2017-11-15 RX ORDER — AZELASTINE 1 MG/ML
2 SPRAY, METERED NASAL 2 TIMES DAILY
Qty: 1 BOTTLE | Refills: 5 | Status: ON HOLD | OUTPATIENT
Start: 2017-11-15 | End: 2018-01-08

## 2017-11-15 RX ORDER — ONDANSETRON 4 MG/1
4 TABLET, FILM COATED ORAL DAILY PRN
Qty: 60 TABLET | Refills: 1 | Status: SHIPPED | OUTPATIENT
Start: 2017-11-15 | End: 2017-11-27 | Stop reason: ALTCHOICE

## 2017-11-15 RX ORDER — NYSTATIN 100000 U/G
CREAM TOPICAL
Qty: 15 G | Refills: 5 | Status: SHIPPED | OUTPATIENT
Start: 2017-11-15 | End: 2018-02-21 | Stop reason: ALTCHOICE

## 2017-11-15 RX ORDER — LANCETS
EACH MISCELLANEOUS
Qty: 50 EACH | Refills: 5 | Status: ON HOLD | OUTPATIENT
Start: 2017-11-15 | End: 2019-09-11 | Stop reason: HOSPADM

## 2017-11-15 RX ORDER — ISOSORBIDE MONONITRATE 30 MG/1
TABLET, EXTENDED RELEASE ORAL
Qty: 45 TABLET | Refills: 3 | Status: SHIPPED | OUTPATIENT
Start: 2017-11-15 | End: 2019-01-15 | Stop reason: SDUPTHER

## 2017-11-15 RX ORDER — PRAVASTATIN SODIUM 20 MG
TABLET ORAL
Qty: 90 TABLET | Refills: 3 | Status: SHIPPED | OUTPATIENT
Start: 2017-11-15 | End: 2018-11-26 | Stop reason: SDUPTHER

## 2017-11-15 RX ORDER — CARVEDILOL 6.25 MG/1
TABLET ORAL
Qty: 60 TABLET | Refills: 5 | Status: SHIPPED | OUTPATIENT
Start: 2017-11-15 | End: 2018-06-26 | Stop reason: DRUGHIGH

## 2017-11-15 RX ORDER — PRAMIPEXOLE DIHYDROCHLORIDE 0.5 MG/1
TABLET ORAL
Qty: 30 TABLET | Refills: 5 | Status: SHIPPED | OUTPATIENT
Start: 2017-11-15 | End: 2018-11-26 | Stop reason: SDUPTHER

## 2017-11-15 RX ORDER — ERGOCALCIFEROL 1.25 MG/1
CAPSULE ORAL
Qty: 4 CAPSULE | Refills: 5 | Status: SHIPPED | OUTPATIENT
Start: 2017-11-15 | End: 2018-09-11 | Stop reason: SDUPTHER

## 2017-11-15 RX ORDER — FUROSEMIDE 40 MG/1
TABLET ORAL
Qty: 60 TABLET | Refills: 5 | Status: SHIPPED | OUTPATIENT
Start: 2017-11-15 | End: 2018-06-25 | Stop reason: SDUPTHER

## 2017-11-15 RX ORDER — DULOXETIN HYDROCHLORIDE 60 MG/1
60 CAPSULE, DELAYED RELEASE ORAL DAILY
Qty: 90 CAPSULE | Refills: 2 | Status: SHIPPED | OUTPATIENT
Start: 2017-11-15 | End: 2018-06-25 | Stop reason: SDUPTHER

## 2017-11-15 NOTE — TELEPHONE ENCOUNTER
Pt called and stated that before the doctor is gone she needs ALL of her medication refills sent to her pharmacy. She uses the [facility] in [facility]. Pt phone # to be reached at is [phone]. Please Advise. Thank You.

## 2017-11-16 ENCOUNTER — CARE COORDINATION (OUTPATIENT)
Dept: CARE COORDINATION | Age: 77
End: 2017-11-16

## 2017-11-16 NOTE — CARE COORDINATION
Contacted patient to obtain daily blood sugar readings and weights. Patient reports that she is sleeping right now, she had to go and pick daughter up from the hospital last night. Patient reports that she will call me back when she gets up. Will wait for a return call.      [facility] Coordination   [phone]

## 2017-11-17 NOTE — TELEPHONE ENCOUNTER
Patient is doing well tracking her blood sugars and weights as instructed. Thank you for continued follow up!     Radha Miller RN   Care Coordinator  [phone]

## 2017-11-24 ENCOUNTER — CARE COORDINATION (OUTPATIENT)
Dept: CARE COORDINATION | Age: 77
End: 2017-11-24

## 2017-11-24 NOTE — CARE COORDINATION
[facility] ED Follow up Call    Reason for ED visit:  bronchitis  Status:     Improved, no longer wheezing, afebrile. Did you call your PCP prior to going to the ED? Yes      Did you receive a discharge instructions from the Emergency Room? Yes  Review of Instructions:     Understands what to report/when to return?:  Yes   Understands discharge instructions?:  Yes   Following discharge instructions?:  Yes   If not why? N/A    Are there any new complaints of pain? No  New Pain Meds? No    Constipation prophylaxis needed? N/A    If you have a wound is the dressing clean, dry, and intact? N/A  Understands wound care regimen? N/A    Are there any other complaints/concerns that you wish to tell your provider? No    FU appts/Provider:    Future Appointments  Date Time Provider Ann Torre   12/5/2017 3:00 PM DO TASHA Harrell [facility]   4/10/2018 1:00 PM Prosper Rodríguez MD [facility]   4/26/2018 10:30 AM Tanja Escalante CNP [facility]       New Medications?:   Yes, patient states she was prescribed an inhaler, cough medicine, and steroids. She has not filled the steroids and states, \"I can't take those because they make me so nervous so I won't fill that. \"  Patient did fill all other medications and also has antibiotics she is on. Medication Reconciliation by phone - Yes  Understands Medications? Yes  Taking Medications? Yes  Can you swallow your pills? Yes    Any further needs in the home i.e. Equipment?   No    Link to services in community?:  No   Which services:  N/A      Luis Devine RN   Care Coordinator  [phone]

## 2017-11-27 ENCOUNTER — CARE COORDINATION (OUTPATIENT)
Dept: CARE COORDINATION | Age: 77
End: 2017-11-27

## 2017-11-27 ENCOUNTER — OFFICE VISIT (OUTPATIENT)
Dept: URGENT CARE | Age: 77
End: 2017-11-27

## 2017-11-27 VITALS
TEMPERATURE: 98.8 F | HEART RATE: 92 BPM | OXYGEN SATURATION: 94 % | SYSTOLIC BLOOD PRESSURE: 152 MMHG | RESPIRATION RATE: 24 BRPM | DIASTOLIC BLOOD PRESSURE: 94 MMHG

## 2017-11-27 DIAGNOSIS — J40 BRONCHITIS: Primary | ICD-10-CM

## 2017-11-27 DIAGNOSIS — Z76.0 MEDICATION REFILL: ICD-10-CM

## 2017-11-27 PROCEDURE — 1123F ACP DISCUSS/DSCN MKR DOCD: CPT | Performed by: EMERGENCY MEDICINE

## 2017-11-27 PROCEDURE — G8417 CALC BMI ABV UP PARAM F/U: HCPCS | Performed by: EMERGENCY MEDICINE

## 2017-11-27 PROCEDURE — 1090F PRES/ABSN URINE INCON ASSESS: CPT | Performed by: EMERGENCY MEDICINE

## 2017-11-27 PROCEDURE — G8598 ASA/ANTIPLAT THER USED: HCPCS | Performed by: EMERGENCY MEDICINE

## 2017-11-27 PROCEDURE — G8484 FLU IMMUNIZE NO ADMIN: HCPCS | Performed by: EMERGENCY MEDICINE

## 2017-11-27 PROCEDURE — 99214 OFFICE O/P EST MOD 30 MIN: CPT | Performed by: EMERGENCY MEDICINE

## 2017-11-27 PROCEDURE — G8427 DOCREV CUR MEDS BY ELIG CLIN: HCPCS | Performed by: EMERGENCY MEDICINE

## 2017-11-27 PROCEDURE — 1036F TOBACCO NON-USER: CPT | Performed by: EMERGENCY MEDICINE

## 2017-11-27 PROCEDURE — G8400 PT W/DXA NO RESULTS DOC: HCPCS | Performed by: EMERGENCY MEDICINE

## 2017-11-27 PROCEDURE — 4040F PNEUMOC VAC/ADMIN/RCVD: CPT | Performed by: EMERGENCY MEDICINE

## 2017-11-27 RX ORDER — LISINOPRIL 10 MG/1
10 TABLET ORAL DAILY
Qty: 30 TABLET | Refills: 0 | Status: SHIPPED | OUTPATIENT
Start: 2017-11-27 | End: 2017-12-27 | Stop reason: CLARIF

## 2017-11-27 RX ORDER — METFORMIN HYDROCHLORIDE 500 MG/1
500 TABLET, EXTENDED RELEASE ORAL 3 TIMES DAILY
Qty: 90 TABLET | Refills: 0 | Status: SHIPPED | OUTPATIENT
Start: 2017-11-27 | End: 2018-01-09 | Stop reason: SDUPTHER

## 2017-11-27 ASSESSMENT — ENCOUNTER SYMPTOMS
COUGH: 1
WHEEZING: 1
SHORTNESS OF BREATH: 1

## 2017-11-27 NOTE — CARE COORDINATION
Spoke with patient and she states she is not feeling much better since being diagnosed and treated for bronchitis. She c/o cough, congestion, wheezing, and SOB, denies fever. Patient's PCP has retired and she is scheduled to see new PCP, Dr. Charissa Moses, next week. Advised patient be seen today at [facility] in [facility], provided directions, and patient states she will go today. She will contact this [facility] with any further needs or concerns.      Future Appointments  Date Time Provider Ann Torre   12/5/2017 3:00 PM [facility]   4/10/2018 1:00 PM Chantel Jackson MD [facility]   4/26/2018 10:30 AM [facility]       Juliana Monday, RN   Care Coordinator  [phone]

## 2017-11-27 NOTE — TELEPHONE ENCOUNTER
Thank you for following up. I spoke with patient today- see note, referred to urgent care, just FYI.     Zayda Aaron, RN   Care Coordinator  [phone]

## 2017-11-27 NOTE — PROGRESS NOTES
Soft. Bowel sounds are normal.   Musculoskeletal: Normal range of motion. Lymphadenopathy:     She has no cervical adenopathy. Neurological: She is alert and oriented to person, place, and time. She has normal reflexes. No cranial nerve deficit. She exhibits normal muscle tone. Coordination normal.   Skin: Skin is warm and dry. She is not diaphoretic. Psychiatric: She has a normal mood and affect. Her behavior is normal. Judgment and thought content normal.   Nursing note and vitals reviewed. Assessment:      1. Bronchitis    2. Medication refill              Plan:     Jersey Warren was seen today for cough. Diagnoses and all orders for this visit:    Bronchitis    Medication refill    Other orders  -     metFORMIN (GLUCOPHAGE XR) 500 MG extended release tablet; Take 1 tablet by mouth three times daily  -     lisinopril (PRINIVIL) 10 MG tablet; Take 1 tablet by mouth daily          Patient advised that her medical problem was beyond the scope of what I could address in an urgent care setting. Risks and benefits of seeking further treatment were discussed at length with the patient at bedside. Patient verbalized her understanding of the risks and benefits to myself. Patient was advised to go directly to the emergency department for further evaluation. Patient to go to hospitals ED by private vehicle for reevaluation. I reviewed records from her 11/22/17 ED evaluation. Patient reports she feels significantly worse.

## 2017-12-05 ENCOUNTER — OFFICE VISIT (OUTPATIENT)
Dept: FAMILY MEDICINE CLINIC | Age: 77
End: 2017-12-05

## 2017-12-05 VITALS
DIASTOLIC BLOOD PRESSURE: 80 MMHG | BODY MASS INDEX: 39.77 KG/M2 | WEIGHT: 239 LBS | SYSTOLIC BLOOD PRESSURE: 122 MMHG | OXYGEN SATURATION: 98 % | HEART RATE: 69 BPM

## 2017-12-05 DIAGNOSIS — J20.9 ACUTE BRONCHITIS, UNSPECIFIED ORGANISM: Primary | ICD-10-CM

## 2017-12-05 DIAGNOSIS — I50.32 CHRONIC DIASTOLIC CONGESTIVE HEART FAILURE (HCC): Chronic | ICD-10-CM

## 2017-12-05 DIAGNOSIS — J44.9 CHRONIC OBSTRUCTIVE PULMONARY DISEASE, UNSPECIFIED COPD TYPE (HCC): ICD-10-CM

## 2017-12-05 DIAGNOSIS — I10 ESSENTIAL HYPERTENSION: ICD-10-CM

## 2017-12-05 LAB
A/G RATIO: 1.9 (ref 1.1–2.2)
ALBUMIN SERPL-MCNC: 4.4 G/DL (ref 3.4–5)
ALP BLD-CCNC: 81 U/L (ref 40–129)
ALT SERPL-CCNC: 12 U/L (ref 10–40)
ANION GAP SERPL CALCULATED.3IONS-SCNC: 16 MMOL/L (ref 3–16)
AST SERPL-CCNC: 15 U/L (ref 15–37)
BILIRUB SERPL-MCNC: 0.5 MG/DL (ref 0–1)
BUN BLDV-MCNC: 13 MG/DL (ref 7–20)
CALCIUM SERPL-MCNC: 9.9 MG/DL (ref 8.3–10.6)
CHLORIDE BLD-SCNC: 95 MMOL/L (ref 99–110)
CO2: 29 MMOL/L (ref 21–32)
CREAT SERPL-MCNC: 0.8 MG/DL (ref 0.6–1.2)
GFR AFRICAN AMERICAN: >60
GFR NON-AFRICAN AMERICAN: >60
GLOBULIN: 2.3 G/DL
GLUCOSE BLD-MCNC: 177 MG/DL (ref 70–99)
POTASSIUM SERPL-SCNC: 4.2 MMOL/L (ref 3.5–5.1)
SODIUM BLD-SCNC: 140 MMOL/L (ref 136–145)
TOTAL PROTEIN: 6.7 G/DL (ref 6.4–8.2)

## 2017-12-05 PROCEDURE — 99214 OFFICE O/P EST MOD 30 MIN: CPT | Performed by: FAMILY MEDICINE

## 2017-12-05 PROCEDURE — 3023F SPIROM DOC REV: CPT | Performed by: FAMILY MEDICINE

## 2017-12-05 PROCEDURE — G8484 FLU IMMUNIZE NO ADMIN: HCPCS | Performed by: FAMILY MEDICINE

## 2017-12-05 PROCEDURE — G8926 SPIRO NO PERF OR DOC: HCPCS | Performed by: FAMILY MEDICINE

## 2017-12-05 PROCEDURE — G8400 PT W/DXA NO RESULTS DOC: HCPCS | Performed by: FAMILY MEDICINE

## 2017-12-05 PROCEDURE — 4040F PNEUMOC VAC/ADMIN/RCVD: CPT | Performed by: FAMILY MEDICINE

## 2017-12-05 PROCEDURE — G8598 ASA/ANTIPLAT THER USED: HCPCS | Performed by: FAMILY MEDICINE

## 2017-12-05 PROCEDURE — 1036F TOBACCO NON-USER: CPT | Performed by: FAMILY MEDICINE

## 2017-12-05 PROCEDURE — 1123F ACP DISCUSS/DSCN MKR DOCD: CPT | Performed by: FAMILY MEDICINE

## 2017-12-05 PROCEDURE — G8427 DOCREV CUR MEDS BY ELIG CLIN: HCPCS | Performed by: FAMILY MEDICINE

## 2017-12-05 PROCEDURE — 1090F PRES/ABSN URINE INCON ASSESS: CPT | Performed by: FAMILY MEDICINE

## 2017-12-05 PROCEDURE — G8417 CALC BMI ABV UP PARAM F/U: HCPCS | Performed by: FAMILY MEDICINE

## 2017-12-05 RX ORDER — BENZONATATE 200 MG/1
200 CAPSULE ORAL 3 TIMES DAILY PRN
Qty: 30 CAPSULE | Refills: 1 | Status: SHIPPED | OUTPATIENT
Start: 2017-12-05 | End: 2018-02-21 | Stop reason: ALTCHOICE

## 2017-12-05 RX ORDER — AZITHROMYCIN 250 MG/1
TABLET, FILM COATED ORAL
Qty: 1 PACKET | Refills: 0 | Status: ON HOLD | OUTPATIENT
Start: 2017-12-05 | End: 2018-01-08 | Stop reason: HOSPADM

## 2017-12-05 RX ORDER — GUAIFENESIN/DEXTROMETHORPHAN 100-10MG/5
5 SYRUP ORAL 3 TIMES DAILY PRN
Qty: 120 ML | Refills: 1 | Status: SHIPPED | OUTPATIENT
Start: 2017-12-05 | End: 2017-12-15

## 2017-12-05 ASSESSMENT — ENCOUNTER SYMPTOMS: COUGH: 1

## 2017-12-05 NOTE — PROGRESS NOTES
affect. Current Outpatient Prescriptions   Medication Sig Dispense Refill    benzonatate (TESSALON) 200 MG capsule Take 1 capsule by mouth 3 times daily as needed for Cough 30 capsule 1    guaiFENesin-dextromethorphan (ROBITUSSIN DM) 100-10 MG/5ML syrup Take 5 mLs by mouth 3 times daily as needed for Cough 120 mL 1    azithromycin (ZITHROMAX) 250 MG tablet Take 2 tabs (500 mg) on Day 1, and take 1 tab (250 mg) on days 2 through 5. 1 packet 0    mometasone-formoterol (DULERA) 100-5 MCG/ACT inhaler Inhale 2 puffs into the lungs 2 times daily 1 Inhaler 1    metFORMIN (GLUCOPHAGE XR) 500 MG extended release tablet Take 1 tablet by mouth three times daily 90 tablet 0    lisinopril (PRINIVIL) 10 MG tablet Take 1 tablet by mouth daily 30 tablet 0    montelukast (SINGULAIR) 10 MG tablet Take 10 mg by mouth nightly      clotrimazole-betamethasone (LOTRISONE) 1-0.05 % cream Apply bid to affected area. 45 g 3    nystatin (MYCOSTATIN) 370075 UNIT/GM cream Apply topically 2 times daily.  15 g 5    furosemide (LASIX) 40 MG tablet TAKE ONE TABLET BY MOUTH TWICE A DAY 60 tablet 5    pramipexole (MIRAPEX) 0.5 MG tablet TAKE ONE TABLET BY MOUTH ONCE NIGHTLY 30 tablet 5    carvedilol (COREG) 6.25 MG tablet TAKE ONE TABLET BY MOUTH TWICE A DAY WITH MEALS 60 tablet 5    DULoxetine (CYMBALTA) 60 MG extended release capsule Take 1 capsule by mouth daily 90 capsule 2    azelastine (ASTELIN) 0.1 % nasal spray 2 sprays by Nasal route 2 times daily Use in each nostril as directed 1 Bottle 5    vitamin D (ERGOCALCIFEROL) 57324 units CAPS capsule TAKE ONE CAPSULE BY MOUTH ONCE WEEKLY 4 capsule 5    pravastatin (PRAVACHOL) 20 MG tablet TAKE ONE TABLET BY MOUTH DAILY 90 tablet 3    isosorbide mononitrate (IMDUR) 30 MG extended release tablet TAKE ONE-HALF TABLET BY MOUTH ONE TIME A DAY 45 tablet 3    ACCU-CHEK SOFTCLIX LANCETS MISC USE TO TEST BLOOD SUGAR TWO TIMES DAILY 50 each 5    fluconazole (DIFLUCAN) 150 MG tablet Take  1 tablet once a week x 3 weeks. 3 tablet 1    POTASSIUM PO Take by mouth daily       glucose blood VI test strips (ACCU-CHEK ARABELLA PLUS) strip Test blood sugar  strip 5    Accu-Chek Multiclix Lancets MISC 1 Units by Does not apply route 2 times daily 50 each 5    calcium carbonate 600 MG TABS tablet Take 1 tablet by mouth 2 times daily      ONE TOUCH ULTRASOFT LANCETS MISC Test blood sugar 6 times a day 50 each 5    albuterol sulfate HFA (PROAIR HFA) 108 (90 BASE) MCG/ACT inhaler Inhale 2 puffs into the lungs every 6 hours as needed for Wheezing 1 Inhaler 5    Zinc 25 MG TABS Take 50 mg by mouth nightly       famotidine (PEPCID) 20 MG tablet Take 1 tablet by mouth 2 times daily (Patient taking differently: Take 20 mg by mouth 3 times daily ) 20 tablet 0    nitroGLYCERIN (NITROSTAT) 0.4 MG SL tablet Place 1 tablet under the tongue every 5 minutes as needed for Chest pain 25 tablet 5    nystatin-triamcinolone (MYCOLOG) 722500-5.1 UNIT/GM-% ointment APPLY A SMALL AMOUNT TOPICALLY FOUR TIMES A DAY 1 Tube 5    vitamin E 400 UNIT capsule Take 400 Units by mouth daily      lisinopril (PRINIVIL;ZESTRIL) 10 MG tablet TAKE ONE TABLET BY MOUTH EVERY DAY 30 tablet 5    clonazePAM (KLONOPIN) 0.5 MG tablet Take 1 tablet by mouth 2 times daily as needed. 60 tablet 0    aspirin 81 MG tablet Take 81 mg by mouth daily. No current facility-administered medications for this visit. Assessment:    1. Acute bronchitis, unspecified organism    2. Chronic obstructive pulmonary disease, unspecified COPD type ([address]    3. Essential hypertension    4. Chronic diastolic congestive heart failure ([address]        Plan:    1. Acute bronchitis, unspecified organism  Given 3 weeks of symptoms, will treat with Z-pack. She said it is the only antibiotic that will work for her. Patient cannot tolerate steroids. - benzonatate (TESSALON) 200 MG capsule;  Take 1 capsule by mouth 3 times daily as needed for Cough

## 2017-12-06 ENCOUNTER — TELEPHONE (OUTPATIENT)
Dept: FAMILY MEDICINE CLINIC | Age: 77
End: 2017-12-06

## 2017-12-11 ENCOUNTER — CARE COORDINATION (OUTPATIENT)
Dept: CARE COORDINATION | Age: 77
End: 2017-12-11

## 2017-12-11 ENCOUNTER — TELEPHONE (OUTPATIENT)
Dept: FAMILY MEDICINE CLINIC | Age: 77
End: 2017-12-11

## 2017-12-11 DIAGNOSIS — L30.9 DERMATITIS: ICD-10-CM

## 2017-12-12 RX ORDER — FLUCONAZOLE 150 MG/1
TABLET ORAL
Qty: 3 TABLET | Refills: 1 | Status: SHIPPED | OUTPATIENT
Start: 2017-12-12 | End: 2018-02-27 | Stop reason: ALTCHOICE

## 2017-12-20 ENCOUNTER — HOSPITAL ENCOUNTER (OUTPATIENT)
Dept: MAMMOGRAPHY | Age: 77
Discharge: HOME | End: 2017-12-20
Attending: OBSTETRICS & GYNECOLOGY | Admitting: OBSTETRICS & GYNECOLOGY

## 2017-12-20 DIAGNOSIS — Z12.31 ENCOUNTER FOR SCREENING MAMMOGRAM FOR BREAST CANCER: ICD-10-CM

## 2017-12-27 ENCOUNTER — CARE COORDINATION (OUTPATIENT)
Dept: CARE COORDINATION | Age: 77
End: 2017-12-27

## 2017-12-27 ENCOUNTER — CARE COORDINATION (OUTPATIENT)
Dept: CASE MANAGEMENT | Age: 77
End: 2017-12-27

## 2017-12-27 RX ORDER — LISINOPRIL 10 MG/1
TABLET ORAL
Qty: 30 TABLET | Refills: 3 | Status: ON HOLD | OUTPATIENT
Start: 2017-12-27 | End: 2018-01-08 | Stop reason: HOSPADM

## 2017-12-27 NOTE — TELEPHONE ENCOUNTER
Last office visit 10/16/2017     Last written 11-27-17  # 30 0 RF    Next office visit scheduled Visit date not found    Requested Prescriptions     Pending Prescriptions Disp Refills    lisinopril (PRINIVIL;ZESTRIL) 10 MG tablet [Pharmacy Med Name: LISINOPRIL 10 MG TABLET] 30 tablet 2     Sig: TAKE ONE TABLET BY MOUTH DAILY

## 2017-12-28 ENCOUNTER — CARE COORDINATION (OUTPATIENT)
Dept: CARE COORDINATION | Age: 77
End: 2017-12-28

## 2017-12-28 NOTE — CARE COORDINATION
TC to pt for readings, see below. Pt states she was in the ED on December 26th for her COPD and that she hasn't been feeling well in weeks. Pt was called by [facility] for ED follow up, but would like to talk with her ACC. Advised pt that I would send note to Quinten Amado, who is covering for [place] pass. Pt agreeable.      Average BGs by Time of Day   Average Wts           Day Before   Day Weight    Breakfast       12.12 144   12.12 238   12.13 193   12.13 239   12.14 148   12.14 236   12.15 153   12.15 237   12.16 172   12.16 235   12.17 173   12.17 238   12.18 150   12.18 237   12.19 148   12.19 237   12.20 150   12.20 237   12.21 154   12.21 237   12.22 136   12.22 236   12.23 98   12.23 236   12.24 161   12.24 235   12.25 195   12.25 238   AVG BG  155   AVG WT 82 [facility]  [phone]

## 2017-12-29 ENCOUNTER — OFFICE VISIT (OUTPATIENT)
Dept: FAMILY MEDICINE CLINIC | Age: 77
End: 2017-12-29

## 2017-12-29 VITALS
BODY MASS INDEX: 39.49 KG/M2 | DIASTOLIC BLOOD PRESSURE: 88 MMHG | TEMPERATURE: 98 F | HEART RATE: 83 BPM | SYSTOLIC BLOOD PRESSURE: 130 MMHG | WEIGHT: 237 LBS | OXYGEN SATURATION: 95 % | HEIGHT: 65 IN

## 2017-12-29 DIAGNOSIS — J20.9 ACUTE BRONCHITIS, UNSPECIFIED ORGANISM: Primary | ICD-10-CM

## 2017-12-29 PROCEDURE — 1090F PRES/ABSN URINE INCON ASSESS: CPT | Performed by: FAMILY MEDICINE

## 2017-12-29 PROCEDURE — 1036F TOBACCO NON-USER: CPT | Performed by: FAMILY MEDICINE

## 2017-12-29 PROCEDURE — G8484 FLU IMMUNIZE NO ADMIN: HCPCS | Performed by: FAMILY MEDICINE

## 2017-12-29 PROCEDURE — 99213 OFFICE O/P EST LOW 20 MIN: CPT | Performed by: FAMILY MEDICINE

## 2017-12-29 PROCEDURE — G8417 CALC BMI ABV UP PARAM F/U: HCPCS | Performed by: FAMILY MEDICINE

## 2017-12-29 PROCEDURE — G8400 PT W/DXA NO RESULTS DOC: HCPCS | Performed by: FAMILY MEDICINE

## 2017-12-29 PROCEDURE — G8427 DOCREV CUR MEDS BY ELIG CLIN: HCPCS | Performed by: FAMILY MEDICINE

## 2017-12-29 PROCEDURE — G8598 ASA/ANTIPLAT THER USED: HCPCS | Performed by: FAMILY MEDICINE

## 2017-12-29 PROCEDURE — 1123F ACP DISCUSS/DSCN MKR DOCD: CPT | Performed by: FAMILY MEDICINE

## 2017-12-29 PROCEDURE — 4040F PNEUMOC VAC/ADMIN/RCVD: CPT | Performed by: FAMILY MEDICINE

## 2017-12-29 RX ORDER — IPRATROPIUM BROMIDE AND ALBUTEROL SULFATE 2.5; .5 MG/3ML; MG/3ML
1 SOLUTION RESPIRATORY (INHALATION) ONCE
Status: DISCONTINUED | OUTPATIENT
Start: 2017-12-29 | End: 2018-06-26 | Stop reason: ALTCHOICE

## 2017-12-29 RX ORDER — AZITHROMYCIN 250 MG/1
TABLET, FILM COATED ORAL
Qty: 6 TABLET | Refills: 0 | Status: ON HOLD | OUTPATIENT
Start: 2017-12-29 | End: 2018-01-08 | Stop reason: HOSPADM

## 2017-12-29 RX ORDER — PROMETHAZINE HYDROCHLORIDE AND CODEINE PHOSPHATE 6.25; 1 MG/5ML; MG/5ML
5 SYRUP ORAL EVERY 6 HOURS PRN
Qty: 180 ML | Refills: 0 | Status: SHIPPED | OUTPATIENT
Start: 2017-12-29 | End: 2017-12-31

## 2017-12-29 ASSESSMENT — PATIENT HEALTH QUESTIONNAIRE - PHQ9
SUM OF ALL RESPONSES TO PHQ QUESTIONS 1-9: 0
SUM OF ALL RESPONSES TO PHQ9 QUESTIONS 1 & 2: 0
1. LITTLE INTEREST OR PLEASURE IN DOING THINGS: 0
2. FEELING DOWN, DEPRESSED OR HOPELESS: 0

## 2017-12-29 ASSESSMENT — ENCOUNTER SYMPTOMS
COUGH: 1
SHORTNESS OF BREATH: 1
NAUSEA: 1

## 2017-12-29 NOTE — CARE COORDINATION
substitute another form of Doxycycline if insurance requires. 12/26/17 1/5/18  Richie Sheffield MD   predniSONE (DELTASONE) 20 MG tablet 3 tabs po qam for 3 days then 2 tabs qam for 3 days the 1 tab qam for 3 days 12/26/17 1/5/18  Richie Sheffield MD   fluconazole (DIFLUCAN) 150 MG tablet Take  1 tablet once a week x 3 weeks. 12/12/17   Leyditirjayde Adkins DO   benzonatate (TESSALON) 200 MG capsule Take 1 capsule by mouth 3 times daily as needed for Cough 12/5/17   Tiffanie Adkins DO   azithromycin (ZITHROMAX) 250 MG tablet Take 2 tabs (500 mg) on Day 1, and take 1 tab (250 mg) on days 2 through 5. 12/5/17   Tiffanie Adkins DO   mometasone-formoterol (DULERA) 100-5 MCG/ACT inhaler Inhale 2 puffs into the lungs 2 times daily 12/5/17   Tiffanie Adkins DO   metFORMIN (GLUCOPHAGE XR) 500 MG extended release tablet Take 1 tablet by mouth three times daily 11/27/17   Taty Keith MD   montelukast (SINGULAIR) 10 MG tablet Take 10 mg by mouth nightly    Wisam Haque MD   clotrimazole-betamethasone (LOTRISONE) 1-0.05 % cream Apply bid to affected area. 11/15/17   Abbi Muñoz MD   nystatin (MYCOSTATIN) 349224 UNIT/GM cream Apply topically 2 times daily.  11/15/17   Abbi Muñoz MD   furosemide (LASIX) 40 MG tablet TAKE ONE TABLET BY MOUTH TWICE A DAY 11/15/17   Abbi Muñoz MD   pramipexole (MIRAPEX) 0.5 MG tablet TAKE ONE TABLET BY MOUTH ONCE NIGHTLY 11/15/17   Abbi Muñoz MD   carvedilol (COREG) 6.25 MG tablet TAKE ONE TABLET BY MOUTH TWICE A DAY WITH MEALS 11/15/17   Abbi Muñoz MD   DULoxetine (CYMBALTA) 60 MG extended release capsule Take 1 capsule by mouth daily 11/15/17   Abbi Muñoz MD   azelastine (ASTELIN) 0.1 % nasal spray 2 sprays by Nasal route 2 times daily Use in each nostril as directed 11/15/17   Abbi Muñoz MD   vitamin D (ERGOCALCIFEROL) 01800 units CAPS capsule TAKE ONE CAPSULE BY MOUTH ONCE WEEKLY 11/15/17   Abbi Muñoz MD   pravastatin (PRAVACHOL) 20 MG tablet TAKE ONE TABLET BY MOUTH DAILY 11/15/17   Carmen Owen MD   isosorbide mononitrate (IMDUR) 30 MG extended release tablet TAKE ONE-HALF TABLET BY MOUTH ONE TIME A DAY 11/15/17   Carmen Owen MD   ACCU-CHEK SOFTCLIX LANCETS MISC USE TO TEST BLOOD SUGAR TWO TIMES DAILY 11/15/17   Carmen Owen MD   POTASSIUM PO Take by mouth daily     Historical Provider, MD   glucose blood VI test strips (ACCU-CHEK ARABELLA PLUS) strip Test blood sugar BID 2/2/17   Carmen Owen MD   Accu-Chek Multiclix Lancets MISC 1 Units by Does not apply route 2 times daily 8/11/16   Carmen Owen MD   calcium carbonate 600 MG TABS tablet Take 1 tablet by mouth 2 times daily    Historical Provider, MD   ONE TOUCH ULTRASOFT LANCETS MISC Test blood sugar 6 times a day 6/13/16   Carmen Owen MD   Zinc 25 MG TABS Take 50 mg by mouth nightly     Historical Provider, MD   famotidine (PEPCID) 20 MG tablet Take 1 tablet by mouth 2 times daily  Patient taking differently: Take 20 mg by mouth 3 times daily  5/22/16   Hina Garcia DO   nitroGLYCERIN (NITROSTAT) 0.4 MG SL tablet Place 1 tablet under the tongue every 5 minutes as needed for Chest pain 5/13/16   Carmen Owen MD   nystatin-triamcinolone [facility]) 198121-8.1 UNIT/GM-% ointment APPLY A SMALL AMOUNT TOPICALLY FOUR TIMES A DAY 3/21/16   Carmen Owen MD   vitamin E 400 UNIT capsule Take 400 Units by mouth daily    Historical Provider, MD   clonazePAM (KLONOPIN) 0.5 MG tablet Take 1 tablet by mouth 2 times daily as needed. 10/30/14   Carmen Owen MD   aspirin 81 MG tablet Take 81 mg by mouth daily.     Historical Provider, MD       Future Appointments  Date Time Provider Ann Torre   12/29/2017 3:00 PM Katherine Woodard DO [facility]   1/8/2018 1:30 PM Tiffanie Adkins DO [facility]   4/10/2018 1:00 PM [facility]   4/26/2018 10:30 AM Nancy Gasca CNP [facility]     General Assessment    Do you have any symptoms that are causing

## 2017-12-29 NOTE — PROGRESS NOTES
Sig Take 1 tablet by mouth 2 times daily, Taking? Yes, Authorizing Provider Wisam Haque MD    Medication ONE TOUCH ULTRASOFT LANCETS MISC, Sig Test blood sugar 6 times a day, Taking? Yes, Authorizing Provider Dre Santiago MD    Medication Zinc 25 MG TABS, Sig Take 50 mg by mouth nightly , Taking? Yes, Authorizing Provider Wisam Haque MD    Medication famotidine (PEPCID) 20 MG tablet, Sig Take 1 tablet by mouth 2 times daily  Patient taking differently: Take 20 mg by mouth 3 times daily , Taking? Yes, Authorizing Provider Seb Gums,     Medication nitroGLYCERIN (NITROSTAT) 0.4 MG SL tablet, Sig Place 1 tablet under the tongue every 5 minutes as needed for Chest pain, Taking? Yes, Authorizing Provider Dre Santiago MD    Medication nystatin-triamcinolone (MYCOLOG) 518881-5.2 UNIT/GM-% ointment, Sig APPLY A SMALL AMOUNT TOPICALLY FOUR TIMES A DAY, Taking? Yes, Authorizing Provider Dre Santiago MD    Medication vitamin E 400 UNIT capsule, Sig Take 400 Units by mouth daily, Taking? Yes, Authorizing Provider Wisam Haque MD    Medication clonazePAM (KLONOPIN) 0.5 MG tablet, Sig Take 1 tablet by mouth 2 times daily as needed. , Taking? Yes, Authorizing Provider Dre Santiago MD    Medication aspirin 81 MG tablet, Sig Take 81 mg by mouth daily. , Taking?  Yes, Authorizing Provider Wisam Haque MD    Medication benzonatate (TESSALON) 200 MG capsule, Sig Take 1 capsule by mouth 3 times daily as needed for Cough, Taking? , Authorizing Provider Keanu Pop DO    Medication mometasone-formoterol (DULERA) 100-5 MCG/ACT inhaler, Sig Inhale 2 puffs into the lungs 2 times daily, Taking? , Authorizing Provider Keanu Pop DO      Past Medical History:  No date: Arthritis  No date: Asthma  No date: Bronchial asthma  12/2011: Bursitis      Comment: ACHILLES TENDON LEFT  No date: CHF (congestive heart failure) ([facility])  No date: Constipation  No date: COPD (chronic obstructive

## 2018-01-03 ENCOUNTER — OFFICE VISIT (OUTPATIENT)
Dept: FAMILY MEDICINE CLINIC | Age: 78
End: 2018-01-03

## 2018-01-03 VITALS
HEART RATE: 115 BPM | WEIGHT: 230 LBS | SYSTOLIC BLOOD PRESSURE: 128 MMHG | BODY MASS INDEX: 38.27 KG/M2 | OXYGEN SATURATION: 97 % | TEMPERATURE: 98.1 F | RESPIRATION RATE: 28 BRPM | DIASTOLIC BLOOD PRESSURE: 82 MMHG

## 2018-01-03 DIAGNOSIS — J18.9 PNEUMONIA OF BOTH LOWER LOBES DUE TO INFECTIOUS ORGANISM: Primary | ICD-10-CM

## 2018-01-03 PROCEDURE — 4040F PNEUMOC VAC/ADMIN/RCVD: CPT | Performed by: FAMILY MEDICINE

## 2018-01-03 PROCEDURE — 1090F PRES/ABSN URINE INCON ASSESS: CPT | Performed by: FAMILY MEDICINE

## 2018-01-03 PROCEDURE — G8417 CALC BMI ABV UP PARAM F/U: HCPCS | Performed by: FAMILY MEDICINE

## 2018-01-03 PROCEDURE — 1123F ACP DISCUSS/DSCN MKR DOCD: CPT | Performed by: FAMILY MEDICINE

## 2018-01-03 PROCEDURE — G8400 PT W/DXA NO RESULTS DOC: HCPCS | Performed by: FAMILY MEDICINE

## 2018-01-03 PROCEDURE — G8598 ASA/ANTIPLAT THER USED: HCPCS | Performed by: FAMILY MEDICINE

## 2018-01-03 PROCEDURE — 1036F TOBACCO NON-USER: CPT | Performed by: FAMILY MEDICINE

## 2018-01-03 PROCEDURE — G8427 DOCREV CUR MEDS BY ELIG CLIN: HCPCS | Performed by: FAMILY MEDICINE

## 2018-01-03 PROCEDURE — 99213 OFFICE O/P EST LOW 20 MIN: CPT | Performed by: FAMILY MEDICINE

## 2018-01-03 PROCEDURE — G8484 FLU IMMUNIZE NO ADMIN: HCPCS | Performed by: FAMILY MEDICINE

## 2018-01-04 PROBLEM — J20.9 ACUTE BRONCHITIS: Status: ACTIVE | Noted: 2018-01-04

## 2018-01-04 PROBLEM — R05.3 PERSISTENT COUGH: Status: ACTIVE | Noted: 2018-01-04

## 2018-01-04 PROBLEM — J44.1 COPD EXACERBATION (HCC): Status: ACTIVE | Noted: 2018-01-04

## 2018-01-04 NOTE — PROGRESS NOTES
Chief Complaint   Patient presents with    Nausea     Trouble breathing, coughing, unable to eat. Nausea. Severe pain in stomach. Flu test negative in hospital     Other     Was in the hospital 12/26/17         C/o intractable coughing, dyspnea ongoing x 2-3wks and worsening; generalized weakness, SWEET  Here with friend who tries to help out  S/p 3 rounds abx, steroids, albuterol. .... No improvement  Cannot stop coughing. Lives alone, no family to help per pt. [facility], has not help per friend who brings to visit tonight  No eating/drinking well, feels like going to fall down-  Gait instability as baseline uses wheeled walker  Dizziness, lightheaded, nausea,    Lost 11 lbs over last few wks     /82 (Site: Left Arm, Position: Sitting, Cuff Size: Large Adult)   Pulse 115   Temp 98.1 °F (36.7 °C)   Resp 28   Wt 230 lb (104.3 kg)   SpO2 97%   Breastfeeding? No   BMI 38.27 kg/m²     A+Ox4, appears weak, tired, frequent coughing  Conj clear, no icterus  OP clear, MMM  Neck supple, no LAD  Lungs L rales basilar to mid field, end exp wheezes. R basilar rales. CV: RRR, 3/6 murmur noted  Abd: soft, NT/ND  Extr: tr bilat edema  Skin: warm dry, no rash no obvious lesions    Assessment/plan:     Mariana Bautista was seen today for nausea and other. Diagnoses and all orders for this visit:    Pneumonia - suspected with tachypnea, tachycardia,     Outpatient evaluation/treatment has been ineffective and needs more urgent eval and given late evening, limited social resources, risk of fall et cet see above, referred to ER for eval and possible admission. Notified MD at [facility] of above (friend will drive).

## 2018-01-08 ENCOUNTER — TELEPHONE (OUTPATIENT)
Dept: PULMONOLOGY | Age: 78
End: 2018-01-08

## 2018-01-09 ENCOUNTER — CARE COORDINATION (OUTPATIENT)
Dept: CASE MANAGEMENT | Age: 78
End: 2018-01-09

## 2018-01-09 RX ORDER — METFORMIN HYDROCHLORIDE 500 MG/1
500 TABLET, EXTENDED RELEASE ORAL 3 TIMES DAILY
Qty: 90 TABLET | Refills: 3 | Status: SHIPPED | OUTPATIENT
Start: 2018-01-09 | End: 2018-03-14 | Stop reason: SDUPTHER

## 2018-01-10 NOTE — CARE COORDINATION
[facility] Transitions Initial Follow Up Call    Call within 2 business days of discharge: Yes    Patient: Ky Guevara Patient : 1940   MRN: [id number]  Reason for Admission: COPD  Discharge Date: 18 RARS: [facility] Risk Score: 12.25     Spoke with: Gavi Nav (the patient)    Facility: [facility]    Non-face-to-face services provided:  Obtained and reviewed discharge summary and/or continuity of care documents  Communication with home health agencies or other community services the patient is currently [facility]  Education of patient/family/caregiver/guardian to support self-management-CHF education  Assessment and support for treatment adherence and medication management-attempted med review - incomplete    Care Transitions 24 Hour Call    Do you have any ongoing symptoms?:  Yes  Patient-reported symptoms:  Vomiting  Interventions for patient-reported symptoms:  Notified Home Care (Comment: [facility])  Do you have a copy of your discharge instructions?:  Yes  Do you have all of your prescriptions and are they filled?:  Yes  Have you been contacted by a [address] [place] [address] Avenue?:  No  Have you scheduled your follow up appointment?:  Yes  How are you going to get to your appointment?:  Car - drive self  Were you discharged with any Home Care or Post Acute Services:  Yes  Post Acute Services:  Home Health (Comment: [facility])  Patient DME:  Tub transfer bench, Walker, Noah walker, Straight cane, Other  Other Patient DME:  3in1 commode, grab bars in shower and around toilet, reacher  Patient Home Equipment:  CPAP  Do you have support at home?:  Alone  Do you feel like you have everything you need to keep you well at home?:  Yes  Are you an active caregiver in your home?:  No  Care Transitions Interventions     Other Services:  (Comment: food pantry information )    Meals on Wheels:  Completed       Senior Services:  Completed         Patient reports she had abd pain, congestion and vomiting overnight.  At the

## 2018-01-11 ENCOUNTER — CARE COORDINATION (OUTPATIENT)
Dept: CASE MANAGEMENT | Age: 78
End: 2018-01-11

## 2018-01-12 NOTE — CARE COORDINATION
[facility] Transitions Follow Up Call    2018    Patient: Genesis Encarnacion  Patient : 1940   MRN: [id number]  Reason for Admission: COPD Exac  Discharge Date: 18 RARS: Risk Score: 12.25       Spoke with: patient [facility] Transitions Subsequent and Final Call    Subsequent and Final Calls  Do you have any ongoing symptoms?:  Yes  Patient-reported symptoms:  Vomiting, Abdominal Pain, Other  Have your medications changed?:  No  Do you have any questions related to your medications?:  No  Do you currently have any active services?:  Yes  Are you currently active with any services?:  Home Health  Care Transitions Interventions     Other Services:  (Comment: food pantry information )    Meals on Wheels:  Completed       Senior Services:  Completed    Other Interventions:        Patient reports that she is having v/d since being home from the hosp. Says has emesis once daily and diarrhea 2-3x daily-loose to formed. Says stomach upset at times. Cannot determine a pattern to the v/d. Said she has been eating and drinking. Asked her if she had called her PCP and she said no that she has an appt on Mon and if it gets worse she will call 911. Asked her if nurse has been out and she said she was out yesterday, she declined for me to call for nurse visit today stating she didn't feel that bad. Said she's been out walking in the halls every evening to get her exercise and keep her lungs expanding for her breathing. Said her breathing has been pretty good. Told her to continue to drink fluids to prevent dehydration and if necessary go to urgent care over the w/e or call [facility] for a nurse visit, she verbalized understanding. Agreeable to f/u calls.      Follow Up  Future Appointments  Date Time Provider Ann Torre   1/15/2018 3:30 PM DO TASHA Harrell   2018 1:40 PM Charli Pham MD AND [facility]   2018 8:15 AM MD Gila Dias [facility]   4/10/2018 1:00

## 2018-01-15 ENCOUNTER — OFFICE VISIT (OUTPATIENT)
Dept: FAMILY MEDICINE CLINIC | Age: 78
End: 2018-01-15

## 2018-01-15 VITALS — DIASTOLIC BLOOD PRESSURE: 84 MMHG | OXYGEN SATURATION: 95 % | SYSTOLIC BLOOD PRESSURE: 132 MMHG | HEART RATE: 113 BPM

## 2018-01-15 DIAGNOSIS — I50.32 CHRONIC DIASTOLIC CONGESTIVE HEART FAILURE (HCC): ICD-10-CM

## 2018-01-15 DIAGNOSIS — J44.1 COPD EXACERBATION (HCC): ICD-10-CM

## 2018-01-15 DIAGNOSIS — R05.3 CHRONIC COUGH: ICD-10-CM

## 2018-01-15 DIAGNOSIS — I10 ESSENTIAL HYPERTENSION: ICD-10-CM

## 2018-01-15 DIAGNOSIS — J44.9 CHRONIC OBSTRUCTIVE PULMONARY DISEASE, UNSPECIFIED COPD TYPE (HCC): Primary | ICD-10-CM

## 2018-01-15 DIAGNOSIS — E11.42 DIABETIC POLYNEUROPATHY ASSOCIATED WITH TYPE 2 DIABETES MELLITUS (HCC): ICD-10-CM

## 2018-01-15 PROCEDURE — 1111F DSCHRG MED/CURRENT MED MERGE: CPT | Performed by: FAMILY MEDICINE

## 2018-01-15 PROCEDURE — 1036F TOBACCO NON-USER: CPT | Performed by: FAMILY MEDICINE

## 2018-01-15 PROCEDURE — 4040F PNEUMOC VAC/ADMIN/RCVD: CPT | Performed by: FAMILY MEDICINE

## 2018-01-15 PROCEDURE — 99214 OFFICE O/P EST MOD 30 MIN: CPT | Performed by: FAMILY MEDICINE

## 2018-01-15 PROCEDURE — G8400 PT W/DXA NO RESULTS DOC: HCPCS | Performed by: FAMILY MEDICINE

## 2018-01-15 PROCEDURE — 1123F ACP DISCUSS/DSCN MKR DOCD: CPT | Performed by: FAMILY MEDICINE

## 2018-01-15 PROCEDURE — G8926 SPIRO NO PERF OR DOC: HCPCS | Performed by: FAMILY MEDICINE

## 2018-01-15 PROCEDURE — G8417 CALC BMI ABV UP PARAM F/U: HCPCS | Performed by: FAMILY MEDICINE

## 2018-01-15 PROCEDURE — 3023F SPIROM DOC REV: CPT | Performed by: FAMILY MEDICINE

## 2018-01-15 PROCEDURE — G8428 CUR MEDS NOT DOCUMENT: HCPCS | Performed by: FAMILY MEDICINE

## 2018-01-15 PROCEDURE — G8484 FLU IMMUNIZE NO ADMIN: HCPCS | Performed by: FAMILY MEDICINE

## 2018-01-15 PROCEDURE — 1090F PRES/ABSN URINE INCON ASSESS: CPT | Performed by: FAMILY MEDICINE

## 2018-01-15 PROCEDURE — G8598 ASA/ANTIPLAT THER USED: HCPCS | Performed by: FAMILY MEDICINE

## 2018-01-15 NOTE — PROGRESS NOTES
2 times daily             fluconazole (DIFLUCAN) 150 MG tablet  Take  1 tablet once a week x 3 weeks. fluticasone (FLONASE) 50 MCG/ACT nasal spray  1 spray by Nasal route daily             furosemide (LASIX) 40 MG tablet  TAKE ONE TABLET BY MOUTH TWICE A DAY             glucose blood VI test strips (ACCU-CHEK ARABELLA PLUS) strip  Test blood sugar BID             guaiFENesin-codeine (GUAIFENESIN AC) 100-10 MG/5ML liquid  Take 5 mLs by mouth every 4 hours as needed for Cough for up to 7 days. hydrALAZINE (APRESOLINE) 25 MG tablet  Take 1 tablet by mouth every 8 hours             isosorbide mononitrate (IMDUR) 30 MG extended release tablet  TAKE ONE-HALF TABLET BY MOUTH ONE TIME A DAY             metFORMIN (GLUCOPHAGE XR) 500 MG extended release tablet  Take 1 tablet by mouth three times daily             mometasone-formoterol (DULERA) 100-5 MCG/ACT inhaler  Inhale 2 puffs into the lungs 2 times daily             montelukast (SINGULAIR) 10 MG tablet  Take 10 mg by mouth nightly             nitroGLYCERIN (NITROSTAT) 0.4 MG SL tablet  Place 1 tablet under the tongue every 5 minutes as needed for Chest pain             nystatin (MYCOSTATIN) 959801 UNIT/GM cream  Apply topically 2 times daily.              ONE TOUCH ULTRASOFT LANCETS MISC  Test blood sugar 6 times a day             POTASSIUM PO  Take by mouth daily              pramipexole (MIRAPEX) 0.5 MG tablet  TAKE ONE TABLET BY MOUTH ONCE NIGHTLY             pravastatin (PRAVACHOL) 20 MG tablet  TAKE ONE TABLET BY MOUTH DAILY             vitamin D (ERGOCALCIFEROL) 20561 units CAPS capsule  TAKE ONE CAPSULE BY MOUTH ONCE WEEKLY             vitamin E 400 UNIT capsule  Take 400 Units by mouth daily             Zinc 25 MG TABS  Take 50 mg by mouth nightly                    Medications marked \"taking\" at this time  Outpatient Prescriptions Marked as Taking for the 1/15/18 encounter (Office Visit) with Rivas Monk, DO   Medication Sig Dispense isosorbide mononitrate (IMDUR) 30 MG extended release tablet TAKE ONE-HALF TABLET BY MOUTH ONE TIME A DAY 45 tablet 3    ACCU-CHEK SOFTCLIX LANCETS MISC USE TO TEST BLOOD SUGAR TWO TIMES DAILY 50 each 5    POTASSIUM PO Take by mouth daily       glucose blood VI test strips (ACCU-CHEK ARABELLA PLUS) strip Test blood sugar  strip 5    Accu-Chek Multiclix Lancets MISC 1 Units by Does not apply route 2 times daily 50 each 5    calcium carbonate 600 MG TABS tablet Take 1 tablet by mouth 2 times daily      ONE TOUCH ULTRASOFT LANCETS MISC Test blood sugar 6 times a day 50 each 5    Zinc 25 MG TABS Take 50 mg by mouth nightly       famotidine (PEPCID) 20 MG tablet Take 1 tablet by mouth 2 times daily (Patient taking differently: Take 20 mg by mouth 3 times daily ) 20 tablet 0    nitroGLYCERIN (NITROSTAT) 0.4 MG SL tablet Place 1 tablet under the tongue every 5 minutes as needed for Chest pain 25 tablet 5    vitamin E 400 UNIT capsule Take 400 Units by mouth daily      clonazePAM (KLONOPIN) 0.5 MG tablet Take 1 tablet by mouth 2 times daily as needed. 60 tablet 0    aspirin 81 MG tablet Take 81 mg by mouth daily. Medications patient taking as of now reconciled against medications ordered at time of hospital discharge: hydralazine    Vitals:    01/15/18 1525   BP: 132/84   Site: Left Arm   Position: Sitting   Cuff Size: Large Adult   Pulse: 113   SpO2: 95%     There is no height or weight on file to calculate BMI. Wt Readings from Last 3 Encounters:   01/08/18 231 lb 9.6 oz (105.1 kg)   01/03/18 230 lb (104.3 kg)   12/29/17 237 lb (107.5 kg)     BP Readings from Last 3 Encounters:   01/15/18 132/84   01/08/18 132/83   01/03/18 128/82        Inpatient course: Discharge summary reviewed- see chart. Chief Complaint   Patient presents with    Follow-Up from Hospital     COPD, will have more testing on breathing after she gets rid of cold. See's pulm. tomorrow.       History of Present illness -

## 2018-01-16 ENCOUNTER — CARE COORDINATION (OUTPATIENT)
Dept: CASE MANAGEMENT | Age: 78
End: 2018-01-16

## 2018-01-16 ENCOUNTER — TELEPHONE (OUTPATIENT)
Dept: PULMONOLOGY | Age: 78
End: 2018-01-16

## 2018-01-17 ENCOUNTER — TELEPHONE (OUTPATIENT)
Dept: CARDIOLOGY CLINIC | Age: 78
End: 2018-01-17

## 2018-01-18 ENCOUNTER — CARE COORDINATION (OUTPATIENT)
Dept: CASE MANAGEMENT | Age: 78
End: 2018-01-18

## 2018-01-18 NOTE — CARE COORDINATION
[facility] Transitions Follow Up Call    2018    Patient: Joanie oCelho  Patient : 1940   MRN: [id number]  Reason for Admission: COPD Exacerbation   Discharge Date: 18 RARS: Risk Score: 12.25        2nd attempt made to reach patient for transition call, no answer. VM left stating purpose of call along with my contact information requesting a return call.     Mima Brothers, RN  Care Transitions Coordinator  [phone]      Follow Up  Future Appointments  Date Time Provider Ann Torre   2018 1:00 PM Sarah Hamlin MD AND [facility]M [facility]   2018 8:15 AM [facility]   4/10/2018 1:00 PM MD LOLY Ellington [facility]   2018 2:00 PM [address] [place] [address]   2018 10:30 AM Ashley Oglesby [facility]       Mima Brothers RN

## 2018-01-19 ENCOUNTER — OFFICE VISIT (OUTPATIENT)
Dept: PULMONOLOGY | Age: 78
End: 2018-01-19

## 2018-01-19 VITALS
WEIGHT: 234 LBS | SYSTOLIC BLOOD PRESSURE: 140 MMHG | HEART RATE: 102 BPM | BODY MASS INDEX: 38.99 KG/M2 | DIASTOLIC BLOOD PRESSURE: 78 MMHG | OXYGEN SATURATION: 99 % | HEIGHT: 65 IN | TEMPERATURE: 97.6 F | RESPIRATION RATE: 16 BRPM

## 2018-01-19 DIAGNOSIS — G47.33 OBSTRUCTIVE SLEEP APNEA: ICD-10-CM

## 2018-01-19 DIAGNOSIS — R05.9 COUGH: ICD-10-CM

## 2018-01-19 DIAGNOSIS — R06.02 SOB (SHORTNESS OF BREATH): ICD-10-CM

## 2018-01-19 DIAGNOSIS — J45.909 UNCOMPLICATED ASTHMA, UNSPECIFIED ASTHMA SEVERITY, UNSPECIFIED WHETHER PERSISTENT: Primary | Chronic | ICD-10-CM

## 2018-01-19 DIAGNOSIS — K21.9 GASTROESOPHAGEAL REFLUX DISEASE, ESOPHAGITIS PRESENCE NOT SPECIFIED: ICD-10-CM

## 2018-01-19 DIAGNOSIS — J30.89 CHRONIC NON-SEASONAL ALLERGIC RHINITIS, UNSPECIFIED TRIGGER: ICD-10-CM

## 2018-01-19 PROCEDURE — G8598 ASA/ANTIPLAT THER USED: HCPCS | Performed by: INTERNAL MEDICINE

## 2018-01-19 PROCEDURE — G8484 FLU IMMUNIZE NO ADMIN: HCPCS | Performed by: INTERNAL MEDICINE

## 2018-01-19 PROCEDURE — 1090F PRES/ABSN URINE INCON ASSESS: CPT | Performed by: INTERNAL MEDICINE

## 2018-01-19 PROCEDURE — 1111F DSCHRG MED/CURRENT MED MERGE: CPT | Performed by: INTERNAL MEDICINE

## 2018-01-19 PROCEDURE — G8427 DOCREV CUR MEDS BY ELIG CLIN: HCPCS | Performed by: INTERNAL MEDICINE

## 2018-01-19 PROCEDURE — G8417 CALC BMI ABV UP PARAM F/U: HCPCS | Performed by: INTERNAL MEDICINE

## 2018-01-19 PROCEDURE — 1123F ACP DISCUSS/DSCN MKR DOCD: CPT | Performed by: INTERNAL MEDICINE

## 2018-01-19 PROCEDURE — G8400 PT W/DXA NO RESULTS DOC: HCPCS | Performed by: INTERNAL MEDICINE

## 2018-01-19 PROCEDURE — 99213 OFFICE O/P EST LOW 20 MIN: CPT | Performed by: INTERNAL MEDICINE

## 2018-01-19 PROCEDURE — 4040F PNEUMOC VAC/ADMIN/RCVD: CPT | Performed by: INTERNAL MEDICINE

## 2018-01-19 PROCEDURE — 1036F TOBACCO NON-USER: CPT | Performed by: INTERNAL MEDICINE

## 2018-01-19 RX ORDER — OMEPRAZOLE 20 MG/1
20 CAPSULE, DELAYED RELEASE ORAL 2 TIMES DAILY
Qty: 30 CAPSULE | Refills: 3 | Status: SHIPPED | OUTPATIENT
Start: 2018-01-19 | End: 2018-04-12 | Stop reason: SDUPTHER

## 2018-01-19 NOTE — CARE COORDINATION
Jase [facility] Transitions Follow Up Call    2018    Patient: Margarita Kenney  Patient : 1940   MRN: [id number]  Reason for Admission: COPD Exac  Discharge Date: 18 RARS: Risk Score: 12.25  CMRS 10       Spoke with: patient [place]    Care Transitions Subsequent and Final Call    Subsequent and Final Calls  Do you have any ongoing symptoms?:  No  Have your medications changed?:  Yes  Do you have any questions related to your medications?:  No  Do you currently have any active services?:  Yes  Are you currently active with any services?:  Home Health  Do you have any needs or concerns that I can assist you with?:  No  Identified Barriers:  None  Care Transitions Interventions     Other Services:  (Comment: food pantry information )    Meals on Wheels:  Completed       Senior Services:  Completed    Other Interventions:        Informed patient of final call. Reports she's doing much better. States she just got home from the \"lung doctor\" and he was very pleased and said she was \"healing really good\". Said she only gets SOB with exertion. Ej Martinez she is weighing herself daily-229 today, was able to tell me when she would need to call MD.  Denies any edema. Said v/d had resolved and she is eating well, said doctor today did start her on another med for her acid reflux-per AVS started Omeprazole 20mg QD. Still active with [facility]. Educated patient on s/s of the flu:  Fever or feeling feverish/chills,Cough, Sore throat, Runny or stuffy nose, Muscle or body aches, Headaches Fatigue (tiredness), Some people may have vomiting and diarrhea, Difficulty breathing. Advised patient to notify PCP/seek medical attention at onset of any of the above sx. Advised patient to avoid crowds unnecessarily, wash hands frequently with soap and water and to get flu vaccine if they haven't already done so if isn't medically contraindicated.  She stated she did NOT get the flu shot and is NOT going to b/c the last time she got it Jorge A Jacob was deathly sick all winter\". Denies any needs at present time, in very good spirits. Advised patient that [address] at PCP office would be coordinating care moving forward and provided patient with that contact information.      Follow Up  Future Appointments  Date Time Provider Ann Torre   2/2/2018 8:15 AM Cooper Goldmann, MD Christel Gu[facility]   2/21/2018 10:30 AM PULMONARY FUNCTION   SAM A PFT None   2/21/2018 11:20 AM Daniel Oneil MD AND PULM [facility]   4/10/2018 1:00 PM Mark Garcia MD [facility]   4/18/2018 2:00 PM [facility]   4/26/2018 10:30 AM Shane Mott CNP [facility]       Lala Peters RN

## 2018-01-19 NOTE — PROGRESS NOTES
of all the time. Since yesterday, she has had no phlegm production, occasionally had greenish phlegm. She remains short of breath. She has perennial allergic rhinitis. She has severe GERD, was on Pepcid AC. She was much better with Nexium. She has been told by a home visiting nurse that she has wheezing. The patient has sleep apnea, was diagnosed about 11 years ago. She received a new machine about one year ago, has been using nasal pillows, is not very comfortable with it being pulled up tight, if it is slightly loose she has a leak. Spirometry when hospitalized:  FVC 1.39 L, 51% predicted, FEV1 1.09 L, 54% predicted, with a ratio of 78%. Sleep data downloaded:  Usage for greater than 4 hours 20/30 nights. Thirty-day average usage was 5.6 hours. Mask fit over 30 days 93%. 30 day AHI average 5.7. No periodic breathing. Previous hospital consult note reviewed and edited as necessary. Chief Complaint/Referring Provider:  Patient is being seen at the request of KIMI Machado for a consultation for persistent cough. Presenting HPI: Hilda Leonard is a 67-year-old nonsmoking female who presented to the ER in the evening of 1/3/18 with several weeks of worsening cough, shortness of breath. She had been treated for bronchitis with 2 rounds of Z-Rakesh and a course of doxycycline. She had also been given steroids and Dulera inhaler. She was also treated with the promethazine plus codeine cough suppressant. She had a nonproductive cough, denied chest pain, fever, chills. She was evaluated in the ER and found to be afebrile, mildly tachycardic. Chest imaging including CTA was performed. She was admitted to the medical floor on 1/4/18. She was noted to have bibasilar crackles and the possibility of CHF was entertained. She was given a trial of diuresis. She is on metformin at home, which was changed to NPH and SSI.   She was placed on Lovenox for DVT prophylaxis.     This is a very pleasant overweight 60-year-old female, who lives by herself. Her friends were visiting) bedside. The patient has been a lifelong nonsmoker, denies any prior history of pneumonia or asthma. At the onset of her cough symptoms, she did have fever and chills. There was no travel or sick contacts. She describes chest pain that is nonspecific, present during her episode of bronchitis. When she started out with bronchitis, cough is productive of sputum, presently it is essentially dry. She has GE reflux for which she is on Pepcid AC. If she does not take her medication, she does have reflux. She has mild postnasal drip which is present throughout the year. At times her cough is hard where she has incontinence. There is no diurnal or positional variation in her cough, she coughs through the day and night. She is tired from her persistent cough. She has mild exertional dyspnea, no wheezing. She lives alone, is not aware whether she has sleep apnea.     CXR 1/6/18  No acute abnormality     Cta Pulmonary W Contrast  Result Date: 1/3/2018  EXAMINATION: CTA OF THE CHEST 1/3/2018 10:58 pm TECHNIQUE: CTA of the chest was performed after the administration of intravenous contrast.  Multiplanar reformatted images are provided for review. MIP images are provided for review. Dose modulation, iterative reconstruction, and/or weight based adjustment of the mA/kV was utilized to reduce the radiation dose to as low as reasonably achievable. COMPARISON: 05/20/2016 CT HISTORY: ORDERING PHYSICIAN PROVIDED HISTORY: cp/sob TECHNOLOGIST PROVIDED HISTORY: Technologist Provided Reason for Exam: chest pain, cough, and recent dx pneumonia; pt state sick with bronchitis since nov 2017; FINDINGS: Pulmonary Arteries: Study is of good technical quality for evaluation of pulmonary embolism. There are no filling defects to suggest pulmonary embolism. Main pulmonary artery is normal in caliber. No evidence of right ventricular strain. Hyperlipidemia     Hypertension     Leg edema     Nausea & vomiting     POSTOPERATIVE    PONV (postoperative nausea and vomiting)     Thyroid disease     Type II or unspecified type diabetes mellitus without mention of complication, not stated as uncontrolled     Unspecified cerebral artery occlusion with cerebral infarction     mini stroke       Past Surgical History:   Procedure Laterality Date    ACHILLES TENDON SURGERY  2/2/12    LEFT ACHILLES DEBRIDEMENT, HAGLUNDS AND RETROCALCANEAL BURSA EXCISION WITH FLEXOR HALLUS LONGUS TENDON TRANSFER WITH BLOCK FOR PAIN CONTROL    APPENDECTOMY      CARDIAC CATHETERIZATION  8/21/14    CARPAL TUNNEL RELEASE      both hands    CHOLECYSTECTOMY      COLONOSCOPY      COLONOSCOPY      COLONOSCOPY  2/10/2016    CYSTOSCOPY  10/02/2017    DIAGNOSTIC CARDIAC CATH LAB PROCEDURE      EYE SURGERY      HYSTERECTOMY      JOINT REPLACEMENT      bilateral knee    KNEE SURGERY      both replaced    POLYPECTOMY      SHOULDER SURGERY      TOENAIL EXCISION  08/04/2016    right big toe    TONSILLECTOMY      TUBAL LIGATION      UPPER GASTROINTESTINAL ENDOSCOPY  10/29/12    gastritis    UPPER GASTROINTESTINAL ENDOSCOPY  2/10/2016    URETHRAL STRICTURE DILATATION         Social History   Substance Use Topics    Smoking status: Former Smoker     Packs/day: 0.25     Years: 0.10     Types: Cigarettes     Quit date: 1/4/1975    Smokeless tobacco: Never Used      Comment: only smoked for 1 month    Alcohol use No       Family History   Problem Relation Age of Onset    Cancer Father      prostate    Heart Disease Mother     Heart Disease Brother     Heart Disease Brother     Heart Disease Sister     Diabetes Sister          Current Outpatient Prescriptions:     omeprazole (PRILOSEC) 20 MG delayed release capsule, Take 1 capsule by mouth 2 times daily, Disp: 30 capsule, Rfl: 3    metFORMIN (GLUCOPHAGE XR) 500 MG extended release tablet, Take 1 tablet by mouth three times daily, Disp: 90 tablet, Rfl: 3    hydrALAZINE (APRESOLINE) 25 MG tablet, Take 1 tablet by mouth every 8 hours, Disp: 90 tablet, Rfl: 3    azelastine (ASTELIN) 0.1 % nasal spray, 2 sprays by Nasal route 2 times daily Use in each nostril as directed, Disp: 1 Bottle, Rfl: 0    fluticasone (FLONASE) 50 MCG/ACT nasal spray, 1 spray by Nasal route daily, Disp: 1 Bottle, Rfl: 0    albuterol sulfate HFA (PROAIR HFA) 108 (90 Base) MCG/ACT inhaler, Use every 4 hours while awake for 7-10 days then PRN wheezing  Dispense with SPACER and Instruct on use. May sub Ventolin or Proventil as needed per Insurance., Disp: 1 Inhaler, Rfl: 1    fluconazole (DIFLUCAN) 150 MG tablet, Take  1 tablet once a week x 3 weeks. , Disp: 3 tablet, Rfl: 1    montelukast (SINGULAIR) 10 MG tablet, Take 10 mg by mouth nightly, Disp: , Rfl:     clotrimazole-betamethasone (LOTRISONE) 1-0.05 % cream, Apply bid to affected area., Disp: 45 g, Rfl: 3    furosemide (LASIX) 40 MG tablet, TAKE ONE TABLET BY MOUTH TWICE A DAY, Disp: 60 tablet, Rfl: 5    pramipexole (MIRAPEX) 0.5 MG tablet, TAKE ONE TABLET BY MOUTH ONCE NIGHTLY, Disp: 30 tablet, Rfl: 5    carvedilol (COREG) 6.25 MG tablet, TAKE ONE TABLET BY MOUTH TWICE A DAY WITH MEALS, Disp: 60 tablet, Rfl: 5    DULoxetine (CYMBALTA) 60 MG extended release capsule, Take 1 capsule by mouth daily, Disp: 90 capsule, Rfl: 2    vitamin D (ERGOCALCIFEROL) 02801 units CAPS capsule, TAKE ONE CAPSULE BY MOUTH ONCE WEEKLY, Disp: 4 capsule, Rfl: 5    pravastatin (PRAVACHOL) 20 MG tablet, TAKE ONE TABLET BY MOUTH DAILY, Disp: 90 tablet, Rfl: 3    isosorbide mononitrate (IMDUR) 30 MG extended release tablet, TAKE ONE-HALF TABLET BY MOUTH ONE TIME A DAY, Disp: 45 tablet, Rfl: 3    ACCU-CHEK SOFTCLIX LANCETS MISC, USE TO TEST BLOOD SUGAR TWO TIMES DAILY, Disp: 50 each, Rfl: 5    POTASSIUM PO, Take by mouth daily , Disp: , Rfl:     glucose blood VI test strips (ACCU-CHEK ARABELLA PLUS) strip, Test blood rhinorrhea. Respiratory: Positive for cough, shortness of breath and wheezing. Cardiovascular: Positive for chest pain. Gastrointestinal:        OLY   Psychiatric/Behavioral: Positive for sleep disturbance. All other systems reviewed and are negative. Physical Exam   Constitutional: She is oriented to person, place, and time. She appears well-developed and well-nourished. No distress. Pleasant, obese   HENT:   Head: Normocephalic and atraumatic. Nose: Nose normal.   Mouth/Throat: Oropharynx is clear and moist. No oropharyngeal exudate. Class III airway, dental fillings   Eyes: Conjunctivae are normal. Pupils are equal, round, and reactive to light. Right eye exhibits no discharge. Left eye exhibits no discharge. No scleral icterus. Neck: Normal range of motion. Neck supple. No JVD present. No tracheal deviation present. No thyromegaly present. Relatively short and large neck   Cardiovascular: Normal rate and regular rhythm. Exam reveals no gallop. No murmur heard. Pulmonary/Chest: Effort normal and breath sounds normal. No stridor. No respiratory distress. She has no wheezes. She has no rales. Abdominal: Soft. There is no tenderness. Fatty abdomen   Musculoskeletal: She exhibits no edema. Lymphadenopathy:     She has no cervical adenopathy. Neurological: She is alert and oriented to person, place, and time. Skin: Skin is warm and dry. No rash noted. She is not diaphoretic. No erythema. No pallor. Psychiatric: She has a normal mood and affect. Her behavior is normal.   Vitals reviewed.

## 2018-01-21 ASSESSMENT — ENCOUNTER SYMPTOMS
SHORTNESS OF BREATH: 1
COUGH: 1
WHEEZING: 1
RHINORRHEA: 1

## 2018-01-22 ENCOUNTER — CARE COORDINATION (OUTPATIENT)
Dept: CARE COORDINATION | Age: 78
End: 2018-01-22

## 2018-01-22 NOTE — CARE COORDINATION
TC to pt to get blood sugar readings and daily weights. Left VM for pt detailing reason for call. Also reminded pt of new ACC in office and to call with questions or concerns.  Will call pt again

## 2018-01-22 NOTE — LETTER
1/22/2018    Frankie Mancia Do [facility] [address]      Ms. Farrell,    Good day to you. I hope this letter finds you well. I wanted to take this time to introduce myself. I am the new Care Coordinator at [address] [place] [address]. I work with the physicians and nurse practioners here and will be part of your care team moving forward. I will work  closely with Lyubov Frankel DO in supporting your care. As a care coordinator, I am able to provide additional support and resources like supporting you in managing your health. This can be done by educating and assisting our patients who have chronic health conditions, multiple health conditions, and or complex health needs. Examples of the types of support provided include service coordination (finding providers in your network and community, assistance in scheduling services, ensuring test results are available, etc), education, and promoting your ability to follow your doctor's recommended treatment plan. I may work with you following some of your appointments in our office. I will also contact you via phone to help you learn and understand how to manage your health and work towards your chosen health goals. Please feel free to call me for any questions or concerns you may have. I look forward to working with you.       Sincerely,     Urban Yeung, RN, BSN  RN Care Coordinator  [facility]   [phone]

## 2018-01-26 NOTE — CARE COORDINATION
TC to pt for readings, see below. Pt is still active with Yamilet Padron and states she doesn't feel great, but her HHN told her that she is still recovering and it may take a while. Pt reports some SOB, but states she is using what sounds like an incentive spirometer and that helps. Pt states she is taking her Lasix and it helps with any swelling she may have. Pt ran out of test strips and hasn't taken readings for the past two days, but will be picking up more tonight. Reminded pt that she has a new ACC in the office and gave her Dione's number. Advised her to call [facility] or office with questions or concerns. CCSS will follow up in two weeks, pt agreeable.      Average BGs by Time of Day  Average Wts          Day Before  Day Weight    Breakfast      1.9   1.9    1.10   1.10    1.11   1.11    1.12 217  1.12 225   1.13 195  1.13 226   1.14 165  1.14 226   1.15 167  1.15 226   1.16 183  1.16 230   1.17 175  1.17 230   1.18 167  1.18 230   1.19 171  1.19 229   1.20 158  1.20 230   1.21 165  1.21 232   1.22 178  1.22 231   1.23 172  1.23 232   1.24 152  1.24 232   1.25   1.25 233   1.26   1.26 232   AVG BG  174  AVG WT [facility] Coordination   [phone]

## 2018-02-01 ENCOUNTER — OFFICE VISIT (OUTPATIENT)
Dept: CARDIOLOGY CLINIC | Age: 78
End: 2018-02-01

## 2018-02-01 VITALS
SYSTOLIC BLOOD PRESSURE: 136 MMHG | DIASTOLIC BLOOD PRESSURE: 60 MMHG | HEIGHT: 65 IN | OXYGEN SATURATION: 96 % | HEART RATE: 90 BPM | BODY MASS INDEX: 40.24 KG/M2 | WEIGHT: 241.5 LBS

## 2018-02-01 DIAGNOSIS — I10 ESSENTIAL HYPERTENSION: Primary | Chronic | ICD-10-CM

## 2018-02-01 DIAGNOSIS — E78.2 MIXED HYPERLIPIDEMIA: ICD-10-CM

## 2018-02-01 DIAGNOSIS — I50.32 CHRONIC DIASTOLIC CONGESTIVE HEART FAILURE (HCC): Chronic | ICD-10-CM

## 2018-02-01 PROCEDURE — 4040F PNEUMOC VAC/ADMIN/RCVD: CPT | Performed by: INTERNAL MEDICINE

## 2018-02-01 PROCEDURE — 99214 OFFICE O/P EST MOD 30 MIN: CPT | Performed by: INTERNAL MEDICINE

## 2018-02-01 PROCEDURE — 1090F PRES/ABSN URINE INCON ASSESS: CPT | Performed by: INTERNAL MEDICINE

## 2018-02-01 PROCEDURE — G8598 ASA/ANTIPLAT THER USED: HCPCS | Performed by: INTERNAL MEDICINE

## 2018-02-01 PROCEDURE — 1111F DSCHRG MED/CURRENT MED MERGE: CPT | Performed by: INTERNAL MEDICINE

## 2018-02-01 PROCEDURE — 1036F TOBACCO NON-USER: CPT | Performed by: INTERNAL MEDICINE

## 2018-02-01 PROCEDURE — 1123F ACP DISCUSS/DSCN MKR DOCD: CPT | Performed by: INTERNAL MEDICINE

## 2018-02-01 PROCEDURE — G8484 FLU IMMUNIZE NO ADMIN: HCPCS | Performed by: INTERNAL MEDICINE

## 2018-02-01 PROCEDURE — G8400 PT W/DXA NO RESULTS DOC: HCPCS | Performed by: INTERNAL MEDICINE

## 2018-02-01 PROCEDURE — G8417 CALC BMI ABV UP PARAM F/U: HCPCS | Performed by: INTERNAL MEDICINE

## 2018-02-01 PROCEDURE — G8427 DOCREV CUR MEDS BY ELIG CLIN: HCPCS | Performed by: INTERNAL MEDICINE

## 2018-02-01 NOTE — LETTER
Joint:  no active joint inflammation  Musculoskeletal:  negative  Skin:  Warm and dry  Neck:  Negative for JVD and R Carotid Bruits. Chest:  Clear to auscultation, respiration easy  Cardiovascular:  RRR, S1S2 normal, 2/6 systolic murmur in aortic area as well as left sternal border, no rub or thrill. Extremities: 1+ edema left leg, No clubbing, cyanosis,  Pulses: Pedal pulses are normal.  Neuro: intact    Medications:   Current Outpatient Prescriptions   Medication Sig Dispense Refill    omeprazole (PRILOSEC) 20 MG delayed release capsule Take 1 capsule by mouth 2 times daily 30 capsule 3    metFORMIN (GLUCOPHAGE XR) 500 MG extended release tablet Take 1 tablet by mouth three times daily 90 tablet 3    hydrALAZINE (APRESOLINE) 25 MG tablet Take 1 tablet by mouth every 8 hours 90 tablet 3    azelastine (ASTELIN) 0.1 % nasal spray 2 sprays by Nasal route 2 times daily Use in each nostril as directed 1 Bottle 0    fluticasone (FLONASE) 50 MCG/ACT nasal spray 1 spray by Nasal route daily 1 Bottle 0    albuterol sulfate HFA (PROAIR HFA) 108 (90 Base) MCG/ACT inhaler Use every 4 hours while awake for 7-10 days then PRN wheezing  Dispense with SPACER and Instruct on use. May sub Ventolin or Proventil as needed per [employer]. 1 Inhaler 1    clotrimazole-betamethasone (LOTRISONE) 1-0.05 % cream Apply bid to affected area.  45 g 3    furosemide (LASIX) 40 MG tablet TAKE ONE TABLET BY MOUTH TWICE A DAY 60 tablet 5    pramipexole (MIRAPEX) 0.5 MG tablet TAKE ONE TABLET BY MOUTH ONCE NIGHTLY 30 tablet 5    carvedilol (COREG) 6.25 MG tablet TAKE ONE TABLET BY MOUTH TWICE A DAY WITH MEALS 60 tablet 5    DULoxetine (CYMBALTA) 60 MG extended release capsule Take 1 capsule by mouth daily 90 capsule 2    vitamin D (ERGOCALCIFEROL) 21211 units CAPS capsule TAKE ONE CAPSULE BY MOUTH ONCE WEEKLY 4 capsule 5    pravastatin (PRAVACHOL) 20 MG tablet TAKE ONE TABLET BY MOUTH DAILY 90 tablet 3 LIVER PROFILE: No results for input(s): AST, ALT, LIPASE, BILIDIR, BILITOT, ALKPHOS in the last 72 hours. Invalid input(s): AMYLASE,  ALB  LIPID:   No components found for: CHLPL  Lab Results   Component Value Date    TRIG 124 07/01/2017    TRIG 177 (H) 08/18/2015    TRIG 94 02/06/2015     Lab Results   Component Value Date    HDL 54 07/01/2017    HDL 40 08/18/2015    HDL 46 02/06/2015     Lab Results   Component Value Date    LDLCALC 100 (H) 07/01/2017    LDLCALC 85 08/18/2015    LDLCALC 98 02/06/2015     Lab Results   Component Value Date    LABVLDL 25 07/01/2017    LABVLDL 35 08/18/2015    LABVLDL 19 02/06/2015     PT/INR: No results for input(s): PROTIME, INR in the last 72 hours. A1C:   Lab Results   Component Value Date    LABA1C 7.2 07/01/2017     BNP:  No results for input(s): BNP in the last 72 hours. IMAGING:   Carotid Doppler 12/7/16  1. There is no gross plaque or stenosis in the internal carotid arteries  bilaterally. 2. The vertebral arteries are patent with antegrade flow bilaterally. 3. Changes in lab criteria have down-graded the severity of disease  bilaterally compared to previous exam on 3/5/2014. Echo doppler of heart 2.69.77   Normal systolic function with an estimated ejection fraction of 55%.   Mild concentric left ventricular hypertrophy.   Diastolic filling parameters suggests grade I diastolic dysfunction.   Mitral annular calcification is present. Mild mitral regurgitation.   The aortic valve is thickened/calcified with decreased leaflet mobility c/w   mild aortic stenosis.   Mild aortic regurgitation.   Systolic pulmonary artery pressure (SPAP) is normal and estimated at 26 mmHg   (RA pressure 3 mmHg).    EKG 8.9.15 normal sinus rhythm. Computer read it wrong, no flutter present on my review. 1/27/14 ECHO   Summary  1. Normal left ventricle size, borderline concentric left ventricular  hypertrophy and systolic function with an estimated ejection fraction of  60-65%.

## 2018-02-01 NOTE — PROGRESS NOTES
[address] Office Note  2018     Subjective:  Ms. Rigoberto Patel is here for cardiology follow up of CAD, CHF, HTN, HLD     Today she states she is feeling ok. She is wearing a c-pap at night and following up with her pulmonologist. She states she is tired a lot. She states she continues with shortness of breath. She is doing therapy at home for this. She denies chest pain, syncope, dizziness. HPI: She has dynamic out flow tract obstruction. Non obst CAD per cath Aug. 2014. States at times she feels she has to gasp for air at times, however she does not feel SOB. She denies chest pain. She is being evaluated by pulmonary Dr Tarah Dickey for dyspnea. She has dynamic left ventricular outflow obstruction treated with beta blockers. Review of Systems: 12 point ROS negative in all areas as listed below except as in [place]  Constitutional, EENT, Cardiovascular, pulmonary, GI, , Musculoskeletal, skin, neurological, hematological, endocrine, Psychiatric    Reviewed past medical history, social, and family history. Non-smoker  Mother, , age 68, CHG;  Father, , age 76, cancer; Brother, living, OHS at age 77; Brother, rheumatic fever; Sister, living, age 71, irregular/skipping heart beats  Past Medical History:   Diagnosis Date    Arthritis     Asthma     Bronchial asthma     Bursitis 2011    ACHILLES TENDON LEFT    [facility] [address]     Constipation     COPD [facility]ronic obstructive pulmonary disease) [facility] [address]     Depression     Diverticulitis     Dizziness     Hyperlipidemia     Hypertension     Leg edema     Nausea & vomiting     POSTOPERATIVE    PONV (postoperative nausea and vomiting)     Thyroid disease     Type II or unspecified type diabetes mellitus without mention of complication, not stated as uncontrolled     Unspecified cerebral artery occlusion with cerebral infarction     mini stroke     Past Surgical History:   Procedure Laterality Date    ACHILLES Zinc 25 MG TABS Take 50 mg by mouth nightly       famotidine (PEPCID) 20 MG tablet Take 1 tablet by mouth 2 times daily (Patient taking differently: Take 20 mg by mouth 3 times daily ) 20 tablet 0    nitroGLYCERIN (NITROSTAT) 0.4 MG SL tablet Place 1 tablet under the tongue every 5 minutes as needed for Chest pain 25 tablet 5    vitamin E 400 UNIT capsule Take 400 Units by mouth daily      clonazePAM (KLONOPIN) 0.5 MG tablet Take 1 tablet by mouth 2 times daily as needed. 60 tablet 0    aspirin 81 MG tablet Take 81 mg by mouth daily.  fluconazole (DIFLUCAN) 150 MG tablet Take  1 tablet once a week x 3 weeks. 3 tablet 1    benzonatate (TESSALON) 200 MG capsule Take 1 capsule by mouth 3 times daily as needed for Cough 30 capsule 1    montelukast (SINGULAIR) 10 MG tablet Take 10 mg by mouth nightly      nystatin (MYCOSTATIN) 696002 UNIT/GM cream Apply topically 2 times daily. 15 g 5     Current Facility-Administered Medications   Medication Dose Route Frequency Provider Last Rate Last Dose    ipratropium-albuterol (DUONEB) nebulizer solution 1 ampule  1 ampule Inhalation Once Doc Campbell DO           Lab Data:  CBC: No results for input(s): WBC, HGB, HCT, MCV, PLT in the last 72 hours. BMP:   No results for input(s): NA, K, CL, CO2, PHOS, BUN, CREATININE in the last 72 hours. Invalid input(s): CA  LIVER PROFILE: No results for input(s): AST, ALT, LIPASE, BILIDIR, BILITOT, ALKPHOS in the last 72 hours. Invalid input(s):   AMYLASE,  ALB  LIPID:   No components found for: CHLPL  Lab Results   Component Value Date    TRIG 124 07/01/2017    TRIG 177 (H) 08/18/2015    TRIG 94 02/06/2015     Lab Results   Component Value Date    HDL 54 07/01/2017    HDL 40 08/18/2015    HDL 46 02/06/2015     Lab Results   Component Value Date    LDLCALC 100 (H) 07/01/2017    LDLCALC 85 08/18/2015    LDLCALC 98 02/06/2015     Lab Results   Component Value Date    LABVLDL 25 07/01/2017    LABVLDL 35 08/18/2015    LABVLDL 19 02/06/2015     PT/INR: No results for input(s): PROTIME, INR in the last 72 hours. A1C:   Lab Results   Component Value Date    LABA1C 7.2 07/01/2017     BNP:  No results for input(s): BNP in the last 72 hours. IMAGING:   Carotid Doppler 12/7/16  1. There is no gross plaque or stenosis in the internal carotid arteries  bilaterally. 2. The vertebral arteries are patent with antegrade flow bilaterally. 3. Changes in lab criteria have down-graded the severity of disease  bilaterally compared to previous exam on 3/5/2014. Echo doppler of heart 9.83.51   Normal systolic function with an estimated ejection fraction of 55%.   Mild concentric left ventricular hypertrophy.   Diastolic filling parameters suggests grade I diastolic dysfunction.   Mitral annular calcification is present. Mild mitral regurgitation.   The aortic valve is thickened/calcified with decreased leaflet mobility c/w   mild aortic stenosis.   Mild aortic regurgitation.   Systolic pulmonary artery pressure (SPAP) is normal and estimated at 26 mmHg   (RA pressure 3 mmHg).    EKG 8.9.15 normal sinus rhythm. Computer read it wrong, no flutter present on my review. 1/27/14 ECHO   Summary  1. Normal left ventricle size, borderline concentric left ventricular  hypertrophy and systolic function with an estimated ejection fraction of  60-65%. 2. There is a late peaking gradient recorded across the LV outflow tract  with valsalva consistent with dynamic obstruction with a peak   gradient  of 30mmHg. 3. Mild calcification of the posterior leaflet of the mitral valve. Mild  mitral regurgitation. 4. The aortic valve is thickened/calcified with decreased leaflet   mobility. Mild aortic stenosis. Mild aortic valve regurgitation. 48 hour Holter Monitor 01/09/2013: The rhythm was sinus premature atrial beats no symptoms. CATH 8/21/14   Mild coronary artery disease with preserved left ventricular  function.  There is evidence for volume

## 2018-02-01 NOTE — PATIENT INSTRUCTIONS
Plan:  1. Healthy lifestyle education reviewed including nutrition, exercise and activity  2. Continue taking all medications as prescribed, she is compliant with therapy  3.  Follow up with me in 6 months

## 2018-02-09 ENCOUNTER — CARE COORDINATION (OUTPATIENT)
Dept: CARE COORDINATION | Age: 78
End: 2018-02-09

## 2018-02-09 NOTE — CARE COORDINATION
TC to pt for blood sugar readings and weights for the past two weeks. Pt did not answer, left VM and will call again before end of day.

## 2018-02-14 NOTE — COMMUNICATION BODY
[facility] Office Note  2018     Subjective:  Ms. Lucas Dodge is here for cardiology follow up of CAD, CHF, HTN, HLD     Today she states she is feeling ok. She is wearing a c-pap at night and following up with her pulmonologist. She states she is tired a lot. She states she continues with shortness of breath. She is doing therapy at home for this. She denies chest pain, syncope, dizziness. HPI: She has dynamic out flow tract obstruction. Non obst CAD per cath Aug. 2014. States at times she feels she has to gasp for air at times, however she does not feel SOB. She denies chest pain. She is being evaluated by pulmonary Dr Maulik Goldman for dyspnea. She has dynamic left ventricular outflow obstruction treated with beta blockers. Review of Systems: 12 point ROS negative in all areas as listed below except as in [place]  Constitutional, EENT, Cardiovascular, pulmonary, GI, , Musculoskeletal, skin, neurological, hematological, endocrine, Psychiatric    Reviewed past medical history, social, and family history. Non-smoker  Mother, , age 68, CHG;  Father, , age 76, cancer; Brother, living, OHS at age 77; Brother, rheumatic fever; Sister, living, age 71, irregular/skipping heart beats  Past Medical History:   Diagnosis Date    Arthritis     Asthma     Bronchial asthma     Bursitis 2011    ACHILLES TENDON LEFT    [facility] [address]     Constipation     COPD (chronic obstructive pulmonary disease) ([facility] [address]     Depression     Diverticulitis     Dizziness     Hyperlipidemia     Hypertension     Leg edema     Nausea & vomiting     POSTOPERATIVE    PONV (postoperative nausea and vomiting)     Thyroid disease     Type II or unspecified type diabetes mellitus without mention of complication, not stated as uncontrolled     Unspecified cerebral artery occlusion with cerebral infarction     mini stroke     Past Surgical History:   Procedure Laterality Date    ACHILLES by mouth 2 times daily 30 capsule 3    metFORMIN (GLUCOPHAGE XR) 500 MG extended release tablet Take 1 tablet by mouth three times daily 90 tablet 3    hydrALAZINE (APRESOLINE) 25 MG tablet Take 1 tablet by mouth every 8 hours 90 tablet 3    azelastine (ASTELIN) 0.1 % nasal spray 2 sprays by Nasal route 2 times daily Use in each nostril as directed 1 Bottle 0    fluticasone (FLONASE) 50 MCG/ACT nasal spray 1 spray by Nasal route daily 1 Bottle 0    albuterol sulfate HFA (PROAIR HFA) 108 (90 Base) MCG/ACT inhaler Use every 4 hours while awake for 7-10 days then PRN wheezing  Dispense with SPACER and Instruct on use. May sub Ventolin or Proventil as needed per [employer]. 1 Inhaler 1    clotrimazole-betamethasone (LOTRISONE) 1-0.05 % cream Apply bid to affected area.  45 g 3    furosemide (LASIX) 40 MG tablet TAKE ONE TABLET BY MOUTH TWICE A DAY 60 tablet 5    pramipexole (MIRAPEX) 0.5 MG tablet TAKE ONE TABLET BY MOUTH ONCE NIGHTLY 30 tablet 5    carvedilol (COREG) 6.25 MG tablet TAKE ONE TABLET BY MOUTH TWICE A DAY WITH MEALS 60 tablet 5    DULoxetine (CYMBALTA) 60 MG extended release capsule Take 1 capsule by mouth daily 90 capsule 2    vitamin D (ERGOCALCIFEROL) 14011 units CAPS capsule TAKE ONE CAPSULE BY MOUTH ONCE WEEKLY 4 capsule 5    pravastatin (PRAVACHOL) 20 MG tablet TAKE ONE TABLET BY MOUTH DAILY 90 tablet 3    isosorbide mononitrate (IMDUR) 30 MG extended release tablet TAKE ONE-HALF TABLET BY MOUTH ONE TIME A DAY 45 tablet 3    ACCU-CHEK SOFTCLIX LANCETS MISC USE TO TEST BLOOD SUGAR TWO TIMES DAILY 50 each 5    POTASSIUM PO Take by mouth daily       glucose blood VI test strips (ACCU-CHEK ARABELLA PLUS) strip Test blood sugar  strip 5    Accu-Chek Multiclix Lancets MISC 1 Units by Does not apply route 2 times daily 50 each 5    calcium carbonate 600 MG TABS tablet Take 1 tablet by mouth 2 times daily      ONE TOUCH ULTRASOFT LANCETS MISC Test blood sugar 6 times a day 50 each 5    Zinc 25 MG TABS Take 50 mg by mouth nightly       famotidine (PEPCID) 20 MG tablet Take 1 tablet by mouth 2 times daily (Patient taking differently: Take 20 mg by mouth 3 times daily ) 20 tablet 0    nitroGLYCERIN (NITROSTAT) 0.4 MG SL tablet Place 1 tablet under the tongue every 5 minutes as needed for Chest pain 25 tablet 5    vitamin E 400 UNIT capsule Take 400 Units by mouth daily      clonazePAM (KLONOPIN) 0.5 MG tablet Take 1 tablet by mouth 2 times daily as needed. 60 tablet 0    aspirin 81 MG tablet Take 81 mg by mouth daily.  fluconazole (DIFLUCAN) 150 MG tablet Take  1 tablet once a week x 3 weeks. 3 tablet 1    benzonatate (TESSALON) 200 MG capsule Take 1 capsule by mouth 3 times daily as needed for Cough 30 capsule 1    montelukast (SINGULAIR) 10 MG tablet Take 10 mg by mouth nightly      nystatin (MYCOSTATIN) 909395 UNIT/GM cream Apply topically 2 times daily. 15 g 5     Current Facility-Administered Medications   Medication Dose Route Frequency Provider Last Rate Last Dose    ipratropium-albuterol (DUONEB) nebulizer solution 1 ampule  1 ampule Inhalation Once Doc Campbell DO           Lab Data:  CBC: No results for input(s): WBC, HGB, HCT, MCV, PLT in the last 72 hours. BMP:   No results for input(s): NA, K, CL, CO2, PHOS, BUN, CREATININE in the last 72 hours. Invalid input(s): CA  LIVER PROFILE: No results for input(s): AST, ALT, LIPASE, BILIDIR, BILITOT, ALKPHOS in the last 72 hours. Invalid input(s):   AMYLASE,  ALB  LIPID:   No components found for: CHLPL  Lab Results   Component Value Date    TRIG 124 07/01/2017    TRIG 177 (H) 08/18/2015    TRIG 94 02/06/2015     Lab Results   Component Value Date    HDL 54 07/01/2017    HDL 40 08/18/2015    HDL 46 02/06/2015     Lab Results   Component Value Date    LDLCALC 100 (H) 07/01/2017    LDLCALC 85 08/18/2015    LDLCALC 98 02/06/2015     Lab Results   Component Value Date    LABVLDL 25 07/01/2017    LABVLDL 35 08/18/2015    LABVLDL

## 2018-02-15 DIAGNOSIS — B37.2 YEAST INFECTION OF THE SKIN: ICD-10-CM

## 2018-02-15 RX ORDER — CLOTRIMAZOLE AND BETAMETHASONE DIPROPIONATE 10; .64 MG/G; MG/G
CREAM TOPICAL
Qty: 45 G | Refills: 3 | Status: SHIPPED | OUTPATIENT
Start: 2018-02-15 | End: 2018-10-12 | Stop reason: SDUPTHER

## 2018-02-19 ENCOUNTER — CARE COORDINATION (OUTPATIENT)
Dept: CARE COORDINATION | Age: 78
End: 2018-02-19

## 2018-02-19 NOTE — CARE COORDINATION
mailed to patient 8/9/2017         Goals Addressed     None          Prior to Admission medications    Medication Sig Start Date End Date Taking? Authorizing Provider   clotrimazole-betamethasone (LOTRISONE) 1-0.05 % cream Apply bid to affected area. 2/15/18   Tiffanie Adkins DO   hydrocortisone 2.5 % cream APPLY TOPICALLY TWO TIMES A DAY 2/15/18   Tiffanie Adkins DO   omeprazole (PRILOSEC) 20 MG delayed release capsule Take 1 capsule by mouth 2 times daily 1/19/18   Nimco Mckeon MD   metFORMIN (GLUCOPHAGE XR) 500 MG extended release tablet Take 1 tablet by mouth three times daily 1/9/18   Tiffanie Adkins DO   hydrALAZINE (APRESOLINE) 25 MG tablet Take 1 tablet by mouth every 8 hours 1/8/18   Darling Vazquez MD   azelastine (ASTELIN) 0.1 % nasal spray 2 sprays by Nasal route 2 times daily Use in each nostril as directed 1/8/18   Darling Vazquez MD   fluticasone (FLONASE) 50 MCG/ACT nasal spray 1 spray by Nasal route daily 1/9/18   Darling Vazquez MD   albuterol sulfate HFA (PROAIR HFA) 108 (90 Base) MCG/ACT inhaler Use every 4 hours while awake for 7-10 days then PRN wheezing  Dispense with SPACER and Instruct on use. May sub Ventolin or Proventil as needed per [employer]. 12/26/17   Zhou Modi MD   fluconazole (DIFLUCAN) 150 MG tablet Take  1 tablet once a week x 3 weeks. 12/12/17   Tiffanie Adkins DO   benzonatate (TESSALON) 200 MG capsule Take 1 capsule by mouth 3 times daily as needed for Cough 12/5/17   Tiffanie Adkins DO   montelukast (SINGULAIR) 10 MG tablet Take 10 mg by mouth nightly    Historical Provider, MD   nystatin (MYCOSTATIN) 508472 UNIT/GM cream Apply topically 2 times daily.  11/15/17   Betzaida Carreno MD   furosemide (LASIX) 40 MG tablet TAKE ONE TABLET BY MOUTH TWICE A DAY 11/15/17   Betzaida Carreno MD   pramipexole (MIRAPEX) 0.5 MG tablet TAKE ONE TABLET BY MOUTH ONCE NIGHTLY 11/15/17   Betzaida Carreno MD   carvedilol (COREG) 6.25 MG tablet TAKE ONE TABLET BY MOUTH TWICE A DAY

## 2018-02-21 ENCOUNTER — OFFICE VISIT (OUTPATIENT)
Dept: PULMONOLOGY | Age: 78
End: 2018-02-21

## 2018-02-21 ENCOUNTER — HOSPITAL ENCOUNTER (OUTPATIENT)
Dept: PULMONOLOGY | Age: 78
Discharge: HOME | End: 2018-02-21
Attending: INTERNAL MEDICINE

## 2018-02-21 VITALS
HEIGHT: 65 IN | TEMPERATURE: 97.3 F | SYSTOLIC BLOOD PRESSURE: 138 MMHG | WEIGHT: 239 LBS | OXYGEN SATURATION: 95 % | BODY MASS INDEX: 39.82 KG/M2 | HEART RATE: 84 BPM | DIASTOLIC BLOOD PRESSURE: 80 MMHG | RESPIRATION RATE: 16 BRPM

## 2018-02-21 VITALS — OXYGEN SATURATION: 94 %

## 2018-02-21 DIAGNOSIS — J45.909 UNCOMPLICATED ASTHMA: ICD-10-CM

## 2018-02-21 DIAGNOSIS — K21.9 GASTROESOPHAGEAL REFLUX DISEASE, ESOPHAGITIS PRESENCE NOT SPECIFIED: ICD-10-CM

## 2018-02-21 DIAGNOSIS — J45.909 UNCOMPLICATED ASTHMA, UNSPECIFIED ASTHMA SEVERITY, UNSPECIFIED WHETHER PERSISTENT: Primary | ICD-10-CM

## 2018-02-21 DIAGNOSIS — G47.33 OSA ON CPAP: ICD-10-CM

## 2018-02-21 DIAGNOSIS — R06.02 SOB (SHORTNESS OF BREATH): ICD-10-CM

## 2018-02-21 DIAGNOSIS — Z99.89 OSA ON CPAP: ICD-10-CM

## 2018-02-21 DIAGNOSIS — E66.9 OBESITY (BMI 30-39.9): ICD-10-CM

## 2018-02-21 PROCEDURE — 1123F ACP DISCUSS/DSCN MKR DOCD: CPT | Performed by: INTERNAL MEDICINE

## 2018-02-21 PROCEDURE — G8598 ASA/ANTIPLAT THER USED: HCPCS | Performed by: INTERNAL MEDICINE

## 2018-02-21 PROCEDURE — G8484 FLU IMMUNIZE NO ADMIN: HCPCS | Performed by: INTERNAL MEDICINE

## 2018-02-21 PROCEDURE — G8400 PT W/DXA NO RESULTS DOC: HCPCS | Performed by: INTERNAL MEDICINE

## 2018-02-21 PROCEDURE — 99213 OFFICE O/P EST LOW 20 MIN: CPT | Performed by: INTERNAL MEDICINE

## 2018-02-21 PROCEDURE — G8417 CALC BMI ABV UP PARAM F/U: HCPCS | Performed by: INTERNAL MEDICINE

## 2018-02-21 PROCEDURE — 1036F TOBACCO NON-USER: CPT | Performed by: INTERNAL MEDICINE

## 2018-02-21 PROCEDURE — 1090F PRES/ABSN URINE INCON ASSESS: CPT | Performed by: INTERNAL MEDICINE

## 2018-02-21 PROCEDURE — G8427 DOCREV CUR MEDS BY ELIG CLIN: HCPCS | Performed by: INTERNAL MEDICINE

## 2018-02-21 PROCEDURE — 4040F PNEUMOC VAC/ADMIN/RCVD: CPT | Performed by: INTERNAL MEDICINE

## 2018-02-21 RX ORDER — ALBUTEROL SULFATE 90 UG/1
4 AEROSOL, METERED RESPIRATORY (INHALATION) ONCE
Status: COMPLETED | OUTPATIENT
Start: 2018-02-21 | End: 2018-02-21

## 2018-02-21 RX ADMIN — ALBUTEROL SULFATE 4 PUFF: 90 AEROSOL, METERED RESPIRATORY (INHALATION) at 10:37

## 2018-02-21 ASSESSMENT — ENCOUNTER SYMPTOMS
SHORTNESS OF BREATH: 1
RHINORRHEA: 1
WHEEZING: 1
COUGH: 1

## 2018-02-21 NOTE — PROGRESS NOTES
a leak. Spirometry when hospitalized:  FVC 1.39 L, 51% predicted, FEV1 1.09 L, 54% predicted, with a ratio of 78%. Sleep data downloaded:  Usage for greater than 4 hours 20/30 nights. Thirty-day average usage was 5.6 hours. Mask fit over 30 days 93%. 30 day AHI average 5.7. No periodic breathing. Previous hospital consult note reviewed and edited as necessary. Chief Complaint/Referring Provider:  Patient is being seen at the request of KIMI Malloy for a consultation for persistent cough. Presenting HPI: Viri Cortez is a 67-year-old nonsmoking female who presented to the ER in the evening of 1/3/18 with several weeks of worsening cough, shortness of breath. She had been treated for bronchitis with 2 rounds of Z-Rakesh and a course of doxycycline. She had also been given steroids and Dulera inhaler. She was also treated with the promethazine plus codeine cough suppressant. She had a nonproductive cough, denied chest pain, fever, chills. She was evaluated in the ER and found to be afebrile, mildly tachycardic. Chest imaging including CTA was performed. She was admitted to the medical floor on 1/4/18. She was noted to have bibasilar crackles and the possibility of CHF was entertained. She was given a trial of diuresis. She is on metformin at home, which was changed to NPH and SSI. She was placed on Lovenox for DVT prophylaxis.     This is a very pleasant overweight 67-year-old female, who lives by herself. Her friends were visiting) bedside. The patient has been a lifelong nonsmoker, denies any prior history of pneumonia or asthma. At the onset of her cough symptoms, she did have fever and chills. There was no travel or sick contacts. She describes chest pain that is nonspecific, present during her episode of bronchitis. When she started out with bronchitis, cough is productive of sputum, presently it is essentially dry. She has GE reflux for which she is on Pepcid AC.   If she does not take her medication, she does have reflux. She has mild postnasal drip which is present throughout the year. At times her cough is hard where she has incontinence. There is no diurnal or positional variation in her cough, she coughs through the day and night. She is tired from her persistent cough. She has mild exertional dyspnea, no wheezing. She lives alone, is not aware whether she has sleep apnea.     CXR 1/6/18  No acute abnormality     Cta Pulmonary W Contrast  Result Date: 1/3/2018  EXAMINATION: CTA OF THE CHEST 1/3/2018 10:58 pm TECHNIQUE: CTA of the chest was performed after the administration of intravenous contrast.  Multiplanar reformatted images are provided for review. MIP images are provided for review. Dose modulation, iterative reconstruction, and/or weight based adjustment of the mA/kV was utilized to reduce the radiation dose to as low as reasonably achievable. COMPARISON: 05/20/2016 CT HISTORY: ORDERING PHYSICIAN PROVIDED HISTORY: cp/sob TECHNOLOGIST PROVIDED HISTORY: Technologist Provided Reason for Exam: chest pain, cough, and recent dx pneumonia; pt state sick with bronchitis since nov 2017; FINDINGS: Pulmonary Arteries: Study is of good technical quality for evaluation of pulmonary embolism. There are no filling defects to suggest pulmonary embolism. Main pulmonary artery is normal in caliber. No evidence of right ventricular strain. Mediastinum: The heart size is normal. Aorta is normal in caliber. Superior mediastinum is normal. No mediastinal or hilar lymph node enlargement. Lungs/pleura: Airways are patent. No pleural fluid is present. No pneumothorax. The lungs are well expanded and clear. Upper Abdomen: Visualized abdominal structures in the upper abdomen are normal. Soft Tissues/Bones: No skeletal abnormalities throughout the chest.      1. No pulmonary embolism.  2. No other acute findings in the chest.         Echocardiogram 1/8/18:   Technically difficult 5    carvedilol (COREG) 6.25 MG tablet, TAKE ONE TABLET BY MOUTH TWICE A DAY WITH MEALS, Disp: 60 tablet, Rfl: 5    DULoxetine (CYMBALTA) 60 MG extended release capsule, Take 1 capsule by mouth daily, Disp: 90 capsule, Rfl: 2    vitamin D (ERGOCALCIFEROL) 64810 units CAPS capsule, TAKE ONE CAPSULE BY MOUTH ONCE WEEKLY, Disp: 4 capsule, Rfl: 5    pravastatin (PRAVACHOL) 20 MG tablet, TAKE ONE TABLET BY MOUTH DAILY, Disp: 90 tablet, Rfl: 3    ACCU-CHEK SOFTCLIX LANCETS MISC, USE TO TEST BLOOD SUGAR TWO TIMES DAILY, Disp: 50 each, Rfl: 5    POTASSIUM PO, Take by mouth daily , Disp: , Rfl:     glucose blood VI test strips (ACCU-CHEK ARABELLA PLUS) strip, Test blood sugar BID, Disp: 200 strip, Rfl: 5    Accu-Chek Multiclix Lancets MISC, 1 Units by Does not apply route 2 times daily, Disp: 50 each, Rfl: 5    calcium carbonate 600 MG TABS tablet, Take 1 tablet by mouth 2 times daily, Disp: , Rfl:     ONE TOUCH ULTRASOFT LANCETS MISC, Test blood sugar 6 times a day, Disp: 50 each, Rfl: 5    Zinc 25 MG TABS, Take 50 mg by mouth nightly , Disp: , Rfl:     famotidine (PEPCID) 20 MG tablet, Take 1 tablet by mouth 2 times daily (Patient taking differently: Take 20 mg by mouth 3 times daily ), Disp: 20 tablet, Rfl: 0    nitroGLYCERIN (NITROSTAT) 0.4 MG SL tablet, Place 1 tablet under the tongue every 5 minutes as needed for Chest pain, Disp: 25 tablet, Rfl: 5    vitamin E 400 UNIT capsule, Take 400 Units by mouth daily, Disp: , Rfl:     clonazePAM (KLONOPIN) 0.5 MG tablet, Take 1 tablet by mouth 2 times daily as needed. , Disp: 60 tablet, Rfl: 0    aspirin 81 MG tablet, Take 81 mg by mouth daily. , Disp: , Rfl:     azelastine (ASTELIN) 0.1 % nasal spray, 2 sprays by Nasal route 2 times daily Use in each nostril as directed, Disp: 1 Bottle, Rfl: 0    fluticasone (FLONASE) 50 MCG/ACT nasal spray, 1 spray by Nasal route daily, Disp: 1 Bottle, Rfl: 0    albuterol sulfate HFA (PROAIR HFA) 108 (90 Base) MCG/ACT inhaler,

## 2018-02-22 ENCOUNTER — TELEPHONE (OUTPATIENT)
Dept: FAMILY MEDICINE CLINIC | Age: 78
End: 2018-02-22

## 2018-02-22 DIAGNOSIS — E66.01 OBESITY, CLASS III, BMI 40-49.9 (MORBID OBESITY) (HCC): Primary | ICD-10-CM

## 2018-02-22 NOTE — PROCEDURES
[address] [facility]                   [facility]                                PULMONARY FUNCTION    PATIENT NAME: Baljeet Mark                   :        1940  MED REC NO:   [id number]                          ROOM:  ACCOUNT NO:   [de-identified]                          ADMIT DATE: 2018  PROVIDER:     Yaa Hsu MD    DATE OF PROCEDURE:  2018    Spirometry showed FVC 1.96 liters, 65% predicted, FEV1 1.53 liters, 71%  predicted, with FEV1/FVC ratio of 78%. There was no response to  bronchodilator. TLC was mildly reduced at 75% predicted. Diffusion  capacity was normal at 104% predicted. IMPRESSION:  PFT is compatible with a moderate restrictive defect with no  evidence of obstruction. Clinical correlation is recommended.         Yany Stevenson MD    D: 2018 15:56:56       T: 2018 19:01:08     [id number]  Job#: [id number]     Doc#: [id number]    CC:  Rosalio Dawn DO

## 2018-02-23 ENCOUNTER — CARE COORDINATION (OUTPATIENT)
Dept: CARE COORDINATION | Age: 78
End: 2018-02-23

## 2018-02-27 ENCOUNTER — TELEPHONE (OUTPATIENT)
Dept: FAMILY MEDICINE CLINIC | Age: 78
End: 2018-02-27

## 2018-02-27 RX ORDER — HYDRALAZINE HYDROCHLORIDE 25 MG/1
25 TABLET, FILM COATED ORAL EVERY 8 HOURS SCHEDULED
Qty: 90 TABLET | Refills: 3 | Status: SHIPPED | OUTPATIENT
Start: 2018-02-27 | End: 2018-04-12 | Stop reason: ALTCHOICE

## 2018-02-27 RX ORDER — FLUCONAZOLE 150 MG/1
150 TABLET ORAL DAILY
Qty: 3 TABLET | Refills: 0 | Status: SHIPPED | OUTPATIENT
Start: 2018-02-27 | End: 2018-04-12 | Stop reason: ALTCHOICE

## 2018-03-07 ENCOUNTER — OFFICE VISIT (OUTPATIENT)
Dept: FAMILY MEDICINE CLINIC | Age: 78
End: 2018-03-07

## 2018-03-07 VITALS
WEIGHT: 236 LBS | DIASTOLIC BLOOD PRESSURE: 82 MMHG | HEART RATE: 76 BPM | OXYGEN SATURATION: 97 % | SYSTOLIC BLOOD PRESSURE: 112 MMHG | BODY MASS INDEX: 39.27 KG/M2

## 2018-03-07 DIAGNOSIS — G44.219 EPISODIC TENSION-TYPE HEADACHE, NOT INTRACTABLE: Primary | ICD-10-CM

## 2018-03-07 DIAGNOSIS — Z13.29 SCREENING FOR THYROID DISORDER: ICD-10-CM

## 2018-03-07 DIAGNOSIS — R42 DIZZINESS: ICD-10-CM

## 2018-03-07 LAB
A/G RATIO: 2.2 (ref 1.1–2.2)
ALBUMIN SERPL-MCNC: 4.6 G/DL (ref 3.4–5)
ALP BLD-CCNC: 77 U/L (ref 40–129)
ALT SERPL-CCNC: 13 U/L (ref 10–40)
ANION GAP SERPL CALCULATED.3IONS-SCNC: 17 MMOL/L (ref 3–16)
AST SERPL-CCNC: 15 U/L (ref 15–37)
BASOPHILS ABSOLUTE: 0.1 K/UL (ref 0–0.2)
BASOPHILS RELATIVE PERCENT: 0.8 %
BILIRUB SERPL-MCNC: 0.4 MG/DL (ref 0–1)
BUN BLDV-MCNC: 15 MG/DL (ref 7–20)
CALCIUM SERPL-MCNC: 9.7 MG/DL (ref 8.3–10.6)
CHLORIDE BLD-SCNC: 101 MMOL/L (ref 99–110)
CO2: 27 MMOL/L (ref 21–32)
CREAT SERPL-MCNC: 0.7 MG/DL (ref 0.6–1.2)
EOSINOPHILS ABSOLUTE: 0.3 K/UL (ref 0–0.6)
EOSINOPHILS RELATIVE PERCENT: 4.1 %
GFR AFRICAN AMERICAN: >60
GFR NON-AFRICAN AMERICAN: >60
GLOBULIN: 2.1 G/DL
GLUCOSE BLD-MCNC: 152 MG/DL (ref 70–99)
HCT VFR BLD CALC: 35.2 % (ref 36–48)
HEMOGLOBIN: 12.2 G/DL (ref 12–16)
LYMPHOCYTES ABSOLUTE: 2.3 K/UL (ref 1–5.1)
LYMPHOCYTES RELATIVE PERCENT: 33.6 %
MCH RBC QN AUTO: 31.3 PG (ref 26–34)
MCHC RBC AUTO-ENTMCNC: 34.7 G/DL (ref 31–36)
MCV RBC AUTO: 90.3 FL (ref 80–100)
MONOCYTES ABSOLUTE: 0.5 K/UL (ref 0–1.3)
MONOCYTES RELATIVE PERCENT: 7.6 %
NEUTROPHILS ABSOLUTE: 3.7 K/UL (ref 1.7–7.7)
NEUTROPHILS RELATIVE PERCENT: 53.9 %
PDW BLD-RTO: 15.2 % (ref 12.4–15.4)
PLATELET # BLD: 301 K/UL (ref 135–450)
PMV BLD AUTO: 9.2 FL (ref 5–10.5)
POTASSIUM SERPL-SCNC: 4.3 MMOL/L (ref 3.5–5.1)
RBC # BLD: 3.9 M/UL (ref 4–5.2)
SODIUM BLD-SCNC: 145 MMOL/L (ref 136–145)
TOTAL PROTEIN: 6.7 G/DL (ref 6.4–8.2)
TSH REFLEX: 0.71 UIU/ML (ref 0.27–4.2)
WBC # BLD: 6.9 K/UL (ref 4–11)

## 2018-03-07 PROCEDURE — 4040F PNEUMOC VAC/ADMIN/RCVD: CPT | Performed by: FAMILY MEDICINE

## 2018-03-07 PROCEDURE — G8598 ASA/ANTIPLAT THER USED: HCPCS | Performed by: FAMILY MEDICINE

## 2018-03-07 PROCEDURE — G8484 FLU IMMUNIZE NO ADMIN: HCPCS | Performed by: FAMILY MEDICINE

## 2018-03-07 PROCEDURE — 1036F TOBACCO NON-USER: CPT | Performed by: FAMILY MEDICINE

## 2018-03-07 PROCEDURE — 1123F ACP DISCUSS/DSCN MKR DOCD: CPT | Performed by: FAMILY MEDICINE

## 2018-03-07 PROCEDURE — G8400 PT W/DXA NO RESULTS DOC: HCPCS | Performed by: FAMILY MEDICINE

## 2018-03-07 PROCEDURE — 1090F PRES/ABSN URINE INCON ASSESS: CPT | Performed by: FAMILY MEDICINE

## 2018-03-07 PROCEDURE — G8427 DOCREV CUR MEDS BY ELIG CLIN: HCPCS | Performed by: FAMILY MEDICINE

## 2018-03-07 PROCEDURE — G8417 CALC BMI ABV UP PARAM F/U: HCPCS | Performed by: FAMILY MEDICINE

## 2018-03-07 PROCEDURE — 99213 OFFICE O/P EST LOW 20 MIN: CPT | Performed by: FAMILY MEDICINE

## 2018-03-07 ASSESSMENT — ENCOUNTER SYMPTOMS
BLURRED VISION: 1
PHOTOPHOBIA: 0

## 2018-03-12 ENCOUNTER — CARE COORDINATION (OUTPATIENT)
Dept: CARE COORDINATION | Age: 78
End: 2018-03-12

## 2018-03-13 NOTE — CARE COORDINATION
Second attempt for TC to pt for blood sugar readings and daily weights. Left vm, will try again tomorrow.

## 2018-03-14 ENCOUNTER — OFFICE VISIT (OUTPATIENT)
Dept: FAMILY MEDICINE CLINIC | Age: 78
End: 2018-03-14

## 2018-03-14 ENCOUNTER — CARE COORDINATION (OUTPATIENT)
Dept: CARE COORDINATION | Age: 78
End: 2018-03-14

## 2018-03-14 VITALS — DIASTOLIC BLOOD PRESSURE: 82 MMHG | HEART RATE: 98 BPM | SYSTOLIC BLOOD PRESSURE: 118 MMHG | OXYGEN SATURATION: 97 %

## 2018-03-14 DIAGNOSIS — E11.40 CONTROLLED TYPE 2 DIABETES MELLITUS WITH DIABETIC NEUROPATHY, WITHOUT LONG-TERM CURRENT USE OF INSULIN (HCC): ICD-10-CM

## 2018-03-14 DIAGNOSIS — R42 DIZZINESS: ICD-10-CM

## 2018-03-14 DIAGNOSIS — E11.9 CONTROLLED TYPE 2 DIABETES MELLITUS WITHOUT COMPLICATION, WITHOUT LONG-TERM CURRENT USE OF INSULIN (HCC): ICD-10-CM

## 2018-03-14 DIAGNOSIS — G44.219 EPISODIC TENSION-TYPE HEADACHE, NOT INTRACTABLE: Primary | ICD-10-CM

## 2018-03-14 DIAGNOSIS — I50.32 CHRONIC DIASTOLIC CONGESTIVE HEART FAILURE (HCC): Primary | Chronic | ICD-10-CM

## 2018-03-14 LAB — HBA1C MFR BLD: 8.3 %

## 2018-03-14 PROCEDURE — 83036 HEMOGLOBIN GLYCOSYLATED A1C: CPT | Performed by: FAMILY MEDICINE

## 2018-03-14 PROCEDURE — G8484 FLU IMMUNIZE NO ADMIN: HCPCS | Performed by: FAMILY MEDICINE

## 2018-03-14 PROCEDURE — G8598 ASA/ANTIPLAT THER USED: HCPCS | Performed by: FAMILY MEDICINE

## 2018-03-14 PROCEDURE — 99213 OFFICE O/P EST LOW 20 MIN: CPT | Performed by: FAMILY MEDICINE

## 2018-03-14 PROCEDURE — 1123F ACP DISCUSS/DSCN MKR DOCD: CPT | Performed by: FAMILY MEDICINE

## 2018-03-14 PROCEDURE — G8427 DOCREV CUR MEDS BY ELIG CLIN: HCPCS | Performed by: FAMILY MEDICINE

## 2018-03-14 PROCEDURE — G8400 PT W/DXA NO RESULTS DOC: HCPCS | Performed by: FAMILY MEDICINE

## 2018-03-14 PROCEDURE — 1036F TOBACCO NON-USER: CPT | Performed by: FAMILY MEDICINE

## 2018-03-14 PROCEDURE — 4040F PNEUMOC VAC/ADMIN/RCVD: CPT | Performed by: FAMILY MEDICINE

## 2018-03-14 PROCEDURE — G8417 CALC BMI ABV UP PARAM F/U: HCPCS | Performed by: FAMILY MEDICINE

## 2018-03-14 PROCEDURE — 1090F PRES/ABSN URINE INCON ASSESS: CPT | Performed by: FAMILY MEDICINE

## 2018-03-14 RX ORDER — METFORMIN HYDROCHLORIDE 500 MG/1
500 TABLET, EXTENDED RELEASE ORAL 3 TIMES DAILY
Qty: 90 TABLET | Refills: 5 | Status: SHIPPED | OUTPATIENT
Start: 2018-03-14 | End: 2018-06-27 | Stop reason: SDUPTHER

## 2018-03-14 NOTE — TELEPHONE ENCOUNTER
I met with her today. She had stopped her metformin. Would you be able to call her next week towards the end of the week. Thursday would be good. That way we can see if her BGs change. A1c was up to 8.3 today.    Thanks Renita Del Cid

## 2018-03-14 NOTE — CARE COORDINATION
Process (Comment: With [facility])  Disease Specific Clinic:  In Process (Comment: CHF education with [facility])  Pharmacist:  Not Started  Senior Services:  Not Started  Social Work:  In Process  Other Services:  Declined (Comment: Spiritual Care - Declines for now on 3/14/2018)  [facility] Management Tools:  Completed (Comment: CHF, [facility] mailed to patient 8/1/2017)  Other Services or Interventions:  low Na diet, weight log mailed to patient 8/9/2017

## 2018-03-14 NOTE — PROGRESS NOTES
Americo Olivares is a 69 y.o. female    Chief Complaint   Patient presents with    Headache     Doing much betters, Some dizziness off and on.  Diabetes       HPI:    HPI    Headache and dizziness follow up. The patient has been using tylenol for her headaches which seems to be helping. She also has been trying to increase her water intake. She has cut her Coreg dose in half and is taking it twice a day. She said when she first cut it in half she felt better right away. She does have heart failure but her ejection fraction is normal at her diastolic dysfunction is only grade 1. This was seen on her recent echocardiogram in January 2018. Diabetes mellitus type 2. She automatically just stopped taking her metformin for unknown reasons. Her kidney function has normalized but was not significantly elevated prior. She is willing to start her metformin again. She takes it usually 3 times a day. It is extended release that she takes. ROS:    Review of Systems   Neurological: Negative for dizziness (improved) and headaches. /82 (Site: Left Arm, Position: Sitting, Cuff Size: Large Adult)   Pulse 98   SpO2 97%   Breastfeeding? No     Physical Exam:    Physical Exam   Constitutional: No distress. Cardiovascular: Normal rate and regular rhythm. No murmur heard. Pulmonary/Chest: Effort normal and breath sounds normal. No respiratory distress. Neurological: She is alert. Skin: She is not diaphoretic. Psychiatric: She has a normal mood and affect. Current Outpatient Prescriptions   Medication Sig Dispense Refill    metFORMIN (GLUCOPHAGE XR) 500 MG extended release tablet Take 1 tablet by mouth three times daily 90 tablet 5    hydrALAZINE (APRESOLINE) 25 MG tablet Take 1 tablet by mouth every 8 hours 90 tablet 3    fluconazole (DIFLUCAN) 150 MG tablet Take 1 tablet by mouth daily 3 tablet 0    clotrimazole-betamethasone (LOTRISONE) 1-0.05 % cream Apply bid to affected area.  45 g 3   

## 2018-03-15 ENCOUNTER — CARE COORDINATION (OUTPATIENT)
Dept: CARE COORDINATION | Age: 78
End: 2018-03-15

## 2018-03-15 NOTE — CARE COORDINATION
Mini-Mental Status Exam      3/15/2018    Joseph Coffee   1940    1. What is the:      Year?     1 / 1       Season? 1 / 1       Date?   0 / 1 - NOTE - Response - 3/18       Day of Week?  1 / 1       Month?              1 / 1    2. Where are we? State?   1 / [address]? 1 / 1       Town/City?  1 / 1       Street/Hosoptial? 1 / 1       Floor/Room? 1 / 1  Registration  3. Name three objects, taking one second to say each. Then ask the patient all three after you have said them. Give one point for each correct answer. Repeat the       answers until the patient learns all three. 3 / 3  Attention and Calculation  4. Serial 7s:  Give one point for each correct answer. Stop after 5 answers. Alternate: Spell WORLD backwards. 3 / 5  NOTE: DLORW was response - correct on second attempt  Recall  5. Ask for the names of three objects from Q3. Give       One point for each correct answer. 2 / 3  Language  6. Point to a pencil and a watch. Have patient       Name them as you point to each. 2 / 2  7. Have the patient repeat \"No ifs, ands or buts\". 1 / 1  8. Have patient follow a three stage command: \"Take the paper in your right hand. Fold the paper      In half. Put the paper on the floor\". 3 / 3  9. Have the patient read and obey the following:        \"Close your eyes\"       1 / 1  10. Have the patient write a sentence of his or her choice. (should have subject and object and make sense) 1 / 1 - NOTE:  Response - Nazanin is amost upon us. 11. Enlarge the design printed below to 1.5 cm per side         and have the patient copy it. (1 pt if all sides and         Angles are preserved and if the intersecting sides         For a quadtriangle)     1 / 1            Total / Overview:  26/30, deficits in date (date), world backwards (3/5), 3 items recall (2/3).     Dulce Lozano RN

## 2018-03-16 DIAGNOSIS — F41.9 ANXIETY AND DEPRESSION: ICD-10-CM

## 2018-03-16 DIAGNOSIS — F32.A ANXIETY AND DEPRESSION: ICD-10-CM

## 2018-03-16 DIAGNOSIS — I10 ESSENTIAL HYPERTENSION: Primary | Chronic | ICD-10-CM

## 2018-03-16 DIAGNOSIS — I50.32 CHRONIC DIASTOLIC CONGESTIVE HEART FAILURE (HCC): Chronic | ICD-10-CM

## 2018-03-20 ENCOUNTER — CARE COORDINATION (OUTPATIENT)
Dept: CARE COORDINATION | Age: 78
End: 2018-03-20

## 2018-03-22 ENCOUNTER — CARE COORDINATION (OUTPATIENT)
Dept: CARE COORDINATION | Age: 78
End: 2018-03-22

## 2018-03-23 ENCOUNTER — OFFICE VISIT (OUTPATIENT)
Dept: FAMILY MEDICINE CLINIC | Age: 78
End: 2018-03-23

## 2018-03-23 VITALS
SYSTOLIC BLOOD PRESSURE: 138 MMHG | DIASTOLIC BLOOD PRESSURE: 86 MMHG | OXYGEN SATURATION: 98 % | BODY MASS INDEX: 38.27 KG/M2 | HEART RATE: 93 BPM | WEIGHT: 230 LBS

## 2018-03-23 DIAGNOSIS — J01.00 ACUTE NON-RECURRENT MAXILLARY SINUSITIS: Primary | ICD-10-CM

## 2018-03-23 PROCEDURE — G8400 PT W/DXA NO RESULTS DOC: HCPCS | Performed by: FAMILY MEDICINE

## 2018-03-23 PROCEDURE — G8427 DOCREV CUR MEDS BY ELIG CLIN: HCPCS | Performed by: FAMILY MEDICINE

## 2018-03-23 PROCEDURE — 1090F PRES/ABSN URINE INCON ASSESS: CPT | Performed by: FAMILY MEDICINE

## 2018-03-23 PROCEDURE — G8484 FLU IMMUNIZE NO ADMIN: HCPCS | Performed by: FAMILY MEDICINE

## 2018-03-23 PROCEDURE — G8598 ASA/ANTIPLAT THER USED: HCPCS | Performed by: FAMILY MEDICINE

## 2018-03-23 PROCEDURE — 1036F TOBACCO NON-USER: CPT | Performed by: FAMILY MEDICINE

## 2018-03-23 PROCEDURE — 1123F ACP DISCUSS/DSCN MKR DOCD: CPT | Performed by: FAMILY MEDICINE

## 2018-03-23 PROCEDURE — 4040F PNEUMOC VAC/ADMIN/RCVD: CPT | Performed by: FAMILY MEDICINE

## 2018-03-23 PROCEDURE — 99213 OFFICE O/P EST LOW 20 MIN: CPT | Performed by: FAMILY MEDICINE

## 2018-03-23 PROCEDURE — G8417 CALC BMI ABV UP PARAM F/U: HCPCS | Performed by: FAMILY MEDICINE

## 2018-03-23 RX ORDER — AZITHROMYCIN 250 MG/1
TABLET, FILM COATED ORAL
Qty: 1 PACKET | Refills: 0 | Status: SHIPPED | OUTPATIENT
Start: 2018-03-23 | End: 2018-04-08 | Stop reason: ALTCHOICE

## 2018-03-23 NOTE — PROGRESS NOTES
Junaid Rayo is a 69 y.o. female    Chief Complaint   Patient presents with    Dizziness     2 weeks    Otalgia     1 week       HPI:    Otalgia    There is pain in the left ear. This is a new problem. The current episode started yesterday. The problem has been gradually worsening. Pertinent negatives include no ear discharge. Associated symptoms comments: Sinus congestion  Dizziness . She has tried nothing for the symptoms. ROS:    Review of Systems   HENT: Positive for ear pain. Negative for ear discharge. /86 (Site: Left Arm, Position: Sitting, Cuff Size: Large Adult)   Pulse 93   Wt 230 lb (104.3 kg)   SpO2 98%   Breastfeeding? No   BMI 38.27 kg/m²     Physical Exam:    Physical Exam   Constitutional: She appears well-developed. No distress. HENT:   Right Ear: Tympanic membrane normal.   Left Ear: Ear canal normal. Tympanic membrane is erythematous (very mild). Cardiovascular: Normal rate and regular rhythm. No murmur heard. Pulmonary/Chest: Effort normal and breath sounds normal. No respiratory distress. Neurological: She is alert. Skin: She is not diaphoretic. Psychiatric: She has a normal mood and affect. Current Outpatient Prescriptions   Medication Sig Dispense Refill    azithromycin (ZITHROMAX) 250 MG tablet Take 2 tabs (500 mg) on Day 1, and take 1 tab (250 mg) on days 2 through 5. 1 packet 0    metFORMIN (GLUCOPHAGE XR) 500 MG extended release tablet Take 1 tablet by mouth three times daily 90 tablet 5    hydrALAZINE (APRESOLINE) 25 MG tablet Take 1 tablet by mouth every 8 hours 90 tablet 3    fluconazole (DIFLUCAN) 150 MG tablet Take 1 tablet by mouth daily 3 tablet 0    clotrimazole-betamethasone (LOTRISONE) 1-0.05 % cream Apply bid to affected area.  45 g 3    hydrocortisone 2.5 % cream APPLY TOPICALLY TWO TIMES A DAY 1 Tube 1    omeprazole (PRILOSEC) 20 MG delayed release capsule Take 1 capsule by mouth 2 times daily 30 capsule 3   

## 2018-03-27 ENCOUNTER — CARE COORDINATION (OUTPATIENT)
Dept: CARE COORDINATION | Age: 78
End: 2018-03-27

## 2018-03-27 NOTE — LETTER
March 29, 2018    Frankie Mancia Do [facility] [address]      Dear Katie Perez:    1. These are the instructions from Dr. Geri Plaza for you regarding your recent headache: You may use:     · Afrin nasal spray as directed - over the counter - ONLY use for 3 Days   · Saline nasal spray or rinse as directed - over the counter  · Tylenol (Generic - Acetaminophen) as directed - over the counter    Your pharmacist will be able to assist you with these choices. Cynthia Maher from [facility] will call you about scheduling your Memory (neurocognitive) Evaluation    3. Please note that you have an appointment with Dr. Ana Jackson at the [facility] office on Thursday April 5th at 115pm.  Please arrive a few minutes early to handle check in paperwork. Please remember to call me if you have any concerns or questions or if your symptoms worsen.      Your future appointments with [address] [place] [address] are below:  Future Appointments  Date Time Provider Ann Heardi   4/10/2018 1:00 PM Ying Sandoval MD [facility]   4/26/2018 10:30 AM PATTI Perry SLEEP [facility]   7/31/2018 1:30 PM Jeffy Monk MD Avonne Fees [facility]   9/14/2018 1:00 PM DO TASHA Hou FP [facility]   2/21/2019 1:20 PM [facility]     Regards,       Junie Boeck, RN, BSN  RN Care Coordinator  [facility]   Cell:  [phone]

## 2018-03-28 RX ORDER — ACETAMINOPHEN 325 MG/1
650 TABLET ORAL EVERY 6 HOURS PRN
COMMUNITY
End: 2019-03-27 | Stop reason: CLARIF

## 2018-03-28 RX ORDER — OXYMETAZOLINE HYDROCHLORIDE 0.05 G/100ML
2 SPRAY NASAL 2 TIMES DAILY
Status: ON HOLD | COMMUNITY
End: 2018-04-19 | Stop reason: HOSPADM

## 2018-03-28 ASSESSMENT — ENCOUNTER SYMPTOMS: DYSPNEA ASSOCIATED WITH: EXERTION

## 2018-03-28 NOTE — CARE COORDINATION
ACC: [place], RN  CM Risk Score: 13  Pablo Mortality Risk Score: 20.21    Assessment:   Had a fall on evening of 3/23/18 was by the chair, when turn to look at weather out of the window and 'just went down' stated she through herself into chair but her nightgown was slippery and she slid out and onto floor - denies syncope  Seeing Ortho tomorrow. Dizziness is better today. Yesterday headache on left side right above left eye - She did suffer diminished vision yesterday but today feels like eye is being pushed out and vision is improved. Seeing Neuro 4/6 - Dr. Paul Akins  Practicing breathing techniques - and states that her breath is easier at night since doing these. A few nights ago - could not go to sleep - she believes it was anxiety      Did not weigh yesterday due to headache -   Future Appointments  Date Time Provider [facility]   3/29/2018 10:45 AM MD SKYLER Candelario AND [facility]   4/10/2018 1:00 PM Jannetta Felty, MD [facility]ENT [facility]   4/26/2018 10:30 AM Douglas Sahu [facility]   7/31/2018 1:30 PM Marzena Light MD Clofadia Kand [facility]   9/14/2018 1:00  [facility]   2/21/2019 1:20 PM MD Siobhan Baumann Dr. - Neurologist - Kirk Campbell - April 6th - 100p       Patient Reported Weight   Patient Reported Weights 3/28/2018 3/25/2018 3/19/2018 2/21/2018 2/20/2018   Weight 233 lb 232 lb 233 lb 233 lb 234 lb     Patient Reported Blood Glucose   Blood Glucose Tracker 3/26/2018 3/23/2018 2/21/2018 2/20/2018 2/19/2018   Before breakfast blood glucose 149 151 146 147 170       Intervention:   Call to [address] [place] [address] [id number] Neuro for sooner appt with Dr. Blanca Sandoval - April 4th 115p - DONE  Call to patient to confirm that she has the address and she will arrange for transportation with [employer].    PCP huddle - New pt instructions noted - Saline nasal rinse/spray, Afrin OTC as directed for 3 days, Acetaminophen OTC as directed for headache    Plan:    FU re PCP instructions  Continue to monitor for outstanding referrals      Care Coordination Interventions    Program Enrollment:  Complex Care  Referral from Primary Care Provider:  No  Suggested Interventions and Community Resources  Diabetes Education: In Process (Comment: With [facility])  Disease Specific Clinic:  In Process (Comment: CHF education with [facility])  Pharmacist:  Not Started  Senior Services:  Not Started  Social Work:  In Process  Other Therapy Services: In Process (Comment: Neurocognitive Eval with [facility] )  Other Services:  Declined (Comment: Spiritual Care - Declines for now on 3/14/2018)  Zone Management Tools:  Completed (Comment: CHF, Diabetes Zone Management mailed to patient 8/1/2017)  Other Services or Interventions:  low Na diet, weight log mailed to patient 8/9/2017       ,   Diabetes Assessment    Medic Alert ID:  No  Meal Planning:  None   How often do you test your blood sugar?:  Daily   Do you have barriers with adherence to non-pharmacologic self-management interventions?  (Nutrition/Exercise/Self-Monitoring):  No   Have you ever had to go to the ED for symptoms of low blood sugar?:  No       No patient-reported symptoms   Do you have hyperglycemia symptoms?:  No   Do you have hypoglycemia symptoms?:  No   Blood Sugar Monitoring Regimen:  Once a Day   Blood Sugar Trends:  No Change      ,   Congestive Heart Failure Assessment    Are you currently restricting fluids?:  No Restriction  Do you understand a low sodium diet?:  Yes  Do you understand how to read food labels?:  Yes  How many restaurant meals do you eat per week?:  5 or more  Do you salt your food before tasting it?:  No         Symptoms:   CHF associated leg swelling: Pos (Comment: Improved swelling )      Symptom course:  improving  Patient-reported weight (lb):  233  Weight trend:  stable     ,   COPD Assessment    Does the patient understand envrionmental exposure?:  Yes  Is the patient able to verbalize Rescue vs. Long Acting medications?:

## 2018-03-29 ENCOUNTER — OFFICE VISIT (OUTPATIENT)
Dept: ORTHOPEDIC SURGERY | Age: 78
End: 2018-03-29

## 2018-03-29 VITALS
HEIGHT: 65 IN | DIASTOLIC BLOOD PRESSURE: 83 MMHG | HEART RATE: 86 BPM | WEIGHT: 225 LBS | BODY MASS INDEX: 37.49 KG/M2 | SYSTOLIC BLOOD PRESSURE: 157 MMHG

## 2018-03-29 DIAGNOSIS — M25.551 RIGHT HIP PAIN: ICD-10-CM

## 2018-03-29 DIAGNOSIS — M70.61 GREATER TROCHANTERIC BURSITIS OF RIGHT HIP: ICD-10-CM

## 2018-03-29 DIAGNOSIS — M54.16 LUMBAR RADICULOPATHY: Primary | ICD-10-CM

## 2018-03-29 PROCEDURE — 99213 OFFICE O/P EST LOW 20 MIN: CPT | Performed by: ORTHOPAEDIC SURGERY

## 2018-03-29 PROCEDURE — G8484 FLU IMMUNIZE NO ADMIN: HCPCS | Performed by: ORTHOPAEDIC SURGERY

## 2018-03-29 PROCEDURE — 20610 DRAIN/INJ JOINT/BURSA W/O US: CPT | Performed by: ORTHOPAEDIC SURGERY

## 2018-03-29 PROCEDURE — G8417 CALC BMI ABV UP PARAM F/U: HCPCS | Performed by: ORTHOPAEDIC SURGERY

## 2018-03-29 PROCEDURE — G8598 ASA/ANTIPLAT THER USED: HCPCS | Performed by: ORTHOPAEDIC SURGERY

## 2018-03-29 PROCEDURE — G8400 PT W/DXA NO RESULTS DOC: HCPCS | Performed by: ORTHOPAEDIC SURGERY

## 2018-03-29 PROCEDURE — 1036F TOBACCO NON-USER: CPT | Performed by: ORTHOPAEDIC SURGERY

## 2018-03-29 PROCEDURE — 1123F ACP DISCUSS/DSCN MKR DOCD: CPT | Performed by: ORTHOPAEDIC SURGERY

## 2018-03-29 PROCEDURE — G8427 DOCREV CUR MEDS BY ELIG CLIN: HCPCS | Performed by: ORTHOPAEDIC SURGERY

## 2018-03-29 PROCEDURE — 1090F PRES/ABSN URINE INCON ASSESS: CPT | Performed by: ORTHOPAEDIC SURGERY

## 2018-03-29 PROCEDURE — 4040F PNEUMOC VAC/ADMIN/RCVD: CPT | Performed by: ORTHOPAEDIC SURGERY

## 2018-03-29 NOTE — PROGRESS NOTES
Betamethasone 30mg/5mL 2 cc Lot # [id number]  Exp 7/2019 NDC# 8356-7929-55  Marcaine . 5% 5 cc Lot [id number] Exp 8/1/2018  NDC# 2521-5382-44    SITE:   RIGHT HIP GREATER TROCHANTERIC BURSA

## 2018-03-29 NOTE — PROGRESS NOTES
Chief Complaint    Hip Pain (RIGHT and LEFT anterior/groin pain increased over past several years; initially relieved with going to bathroom; now having RIGHT lateral hip pain)      History of Present Illness:  Americo Olivares is a 69 y.o. female  presents with a long-standing history of bilateral hip pain right worse than left. She states recently she started noticing pain radiating down into her toes. She is not had any formal treatment. She does have a history of bilateral knee replacement from 2002. She has not had any recent physical therapy for her back or hip pain. The pain is worse with activities and improves with rest.  Her visual analogue pain score is 2. She has pain both on the lateral aspect of her hip as well as in the lumbar spine,       Pain Assessment  Location of Pain: Pelvis (hip)  Location Modifiers: Right, Anterior, Medial, Lateral  Severity of Pain: 2  Quality of Pain: Throbbing, Sharp, Dull, Aching  Duration of Pain: Persistent  Frequency of Pain: Constant  Aggravating Factors: Walking, Standing, Squatting, Stairs, Bending  Limiting Behavior: Some  Relieving Factors: Rest    Medical History:  Patient's medications, allergies, past medical, surgical, social and family histories were reviewed and updated as appropriate. Review of Systems:  Pertinent items are noted in HPI  Review of systems reviewed from Patient History Form dated on 3/29 and available in the patient's chart under the Media tab. Vital Signs:  BP (!) 157/83   Pulse 86   Ht 5' 5\" (1.651 m)   Wt 225 lb (102.1 kg)   BMI 37.44 kg/m²     General Exam:   Constitutional: Patient is adequately groomed with no evidence of malnutrition  Mental Status: The patient is oriented to time, place and person. The patient's mood and affect are appropriate. Lymphatic: The lymphatic examination bilaterally reveals all areas to be without enlargement or induration. Vascular: Examination reveals no swelling or calf tenderness. Peripheral pulses are palpable and 2+. Neurological: The patient has good coordination. There is no weakness or sensory deficit. Lumbar/Sacral Spine Examination:        Palpation:  Tenderness to palpation about the lower lumbar paraspinal muscles. Tenderness over the greater trochanteric    Range of Motion:  No pain with range of motion of the hip. Strength:  4+/5 strength hip flexion and extension and knee flexion and extension gastroc    Special Tests:  Normal equal sensation bilateral lower legs    Skin: There are no rashes, ulcerations or lesions. Gait: Antalgic gait with a walker    Reflex 1+ Achilles    Additional Comments:       Additional Examinations:         Left Lower Extremity: Examination of the left lower extremity does not show any tenderness, deformity or injury. Range of motion is unremarkable. There is no gross instability. There are no rashes, ulcerations or lesions. Strength and tone are normal.    Radiology:     X-rays obtained and reviewed in office:  Views 2 views right hip  Location right hip  Impression the joint spaces are well maintained. Part of the lumbar spine that is visualized shows some evidence of degenerative changes        Assessment :  Greater trochanteric bursitis    Impression:  Encounter Diagnoses   Name Primary?     Right hip pain     Lumbar radiculopathy Yes    Greater trochanteric bursitis of right hip        Office Procedures:  Orders Placed This Encounter   Procedures    XR HIP RIGHT (2-3 VIEWS)     [id number]     Order Specific Question:   Reason for exam:     Answer:   Pain    OSR PT - [facility]     Referral Priority:   Routine     Referral Type:   Eval and Treat     Referral Reason:   Specialty Services Required     Requested Specialty:   Physical Therapy     Number of Visits Requested:   1    NC ARTHROCENTESIS ASPIR&/INJ MAJOR JT/BURSA W/O US    NC BETAMETHASONE ACET&SOD PHOSP   Right hip greater trochanteric bursal injection with ultrasound guidance:      I discussed in detail the risks, benefits, and complications of an injection which include but are not limited to infection, skin reactions, hot swollen joints, and anaphylaxis with the patient. The patient verbalized good understanding and gave informed consent for the injection. The skin was prepped using sterile alcohol. A sterile 22-gauge needle was inserted into the area of maximal tenderness over the right greater trochanter and a mixture of 2ml Beta-Beta, 5 mL of 0.25% Marcaine was injected under sterile technique. The needle was withdrawn and the puncture site sealed with a Band-Aid. The patient tolerated the injection well. The patient was instructed to call the office immediately if there is any pain, redness, warmth, fever, or chills. Treatment Plan: Today discussed treatment options including therapy and cortisone injection. The patient's symptoms she opted for cortisone injection. We discussed that this is a temporizing measure that therapy is ultimately the mainstay of treatment. We'll see her back on an as-needed basis.

## 2018-04-05 ENCOUNTER — CARE COORDINATION (OUTPATIENT)
Dept: CARE COORDINATION | Age: 78
End: 2018-04-05

## 2018-04-05 ENCOUNTER — OFFICE VISIT (OUTPATIENT)
Dept: FAMILY MEDICINE CLINIC | Age: 78
End: 2018-04-05

## 2018-04-05 ENCOUNTER — CARE COORDINATION (OUTPATIENT)
Dept: FAMILY MEDICINE CLINIC | Age: 78
End: 2018-04-05

## 2018-04-05 ENCOUNTER — HOSPITAL ENCOUNTER (OUTPATIENT)
Dept: CT IMAGING | Age: 78
Discharge: HOME | End: 2018-04-05
Attending: FAMILY MEDICINE | Admitting: FAMILY MEDICINE

## 2018-04-05 VITALS
DIASTOLIC BLOOD PRESSURE: 86 MMHG | BODY MASS INDEX: 39.27 KG/M2 | HEART RATE: 83 BPM | OXYGEN SATURATION: 96 % | SYSTOLIC BLOOD PRESSURE: 132 MMHG | WEIGHT: 236 LBS

## 2018-04-05 DIAGNOSIS — G31.9 VENTRICULAR ENLARGEMENT DUE TO BRAIN ATROPHY (HCC): Primary | ICD-10-CM

## 2018-04-05 DIAGNOSIS — R42 DIZZINESS: ICD-10-CM

## 2018-04-05 DIAGNOSIS — R42 DIZZINESS AND GIDDINESS: ICD-10-CM

## 2018-04-05 DIAGNOSIS — R42 DIZZINESS: Primary | ICD-10-CM

## 2018-04-05 DIAGNOSIS — G91.2 NORMAL PRESSURE HYDROCEPHALUS (HCC): ICD-10-CM

## 2018-04-05 PROCEDURE — 99213 OFFICE O/P EST LOW 20 MIN: CPT | Performed by: FAMILY MEDICINE

## 2018-04-05 PROCEDURE — 1123F ACP DISCUSS/DSCN MKR DOCD: CPT | Performed by: FAMILY MEDICINE

## 2018-04-05 PROCEDURE — G8427 DOCREV CUR MEDS BY ELIG CLIN: HCPCS | Performed by: FAMILY MEDICINE

## 2018-04-05 PROCEDURE — G8598 ASA/ANTIPLAT THER USED: HCPCS | Performed by: FAMILY MEDICINE

## 2018-04-05 PROCEDURE — 1090F PRES/ABSN URINE INCON ASSESS: CPT | Performed by: FAMILY MEDICINE

## 2018-04-05 PROCEDURE — 1036F TOBACCO NON-USER: CPT | Performed by: FAMILY MEDICINE

## 2018-04-05 PROCEDURE — G8400 PT W/DXA NO RESULTS DOC: HCPCS | Performed by: FAMILY MEDICINE

## 2018-04-05 PROCEDURE — G8417 CALC BMI ABV UP PARAM F/U: HCPCS | Performed by: FAMILY MEDICINE

## 2018-04-05 PROCEDURE — 4040F PNEUMOC VAC/ADMIN/RCVD: CPT | Performed by: FAMILY MEDICINE

## 2018-04-06 ENCOUNTER — CARE COORDINATION (OUTPATIENT)
Dept: FAMILY MEDICINE CLINIC | Age: 78
End: 2018-04-06

## 2018-04-08 ENCOUNTER — CARE COORDINATION (OUTPATIENT)
Dept: FAMILY MEDICINE CLINIC | Age: 78
End: 2018-04-08

## 2018-04-09 ENCOUNTER — CARE COORDINATION (OUTPATIENT)
Dept: FAMILY MEDICINE CLINIC | Age: 78
End: 2018-04-09

## 2018-04-09 ENCOUNTER — OFFICE VISIT (OUTPATIENT)
Dept: FAMILY MEDICINE CLINIC | Age: 78
End: 2018-04-09

## 2018-04-09 VITALS — HEART RATE: 64 BPM | DIASTOLIC BLOOD PRESSURE: 86 MMHG | OXYGEN SATURATION: 98 % | SYSTOLIC BLOOD PRESSURE: 122 MMHG

## 2018-04-09 DIAGNOSIS — R90.89 ABNORMAL CT OF BRAIN: ICD-10-CM

## 2018-04-09 DIAGNOSIS — G44.219 EPISODIC TENSION-TYPE HEADACHE, NOT INTRACTABLE: Primary | ICD-10-CM

## 2018-04-09 PROCEDURE — G8417 CALC BMI ABV UP PARAM F/U: HCPCS | Performed by: FAMILY MEDICINE

## 2018-04-09 PROCEDURE — 1036F TOBACCO NON-USER: CPT | Performed by: FAMILY MEDICINE

## 2018-04-09 PROCEDURE — 99213 OFFICE O/P EST LOW 20 MIN: CPT | Performed by: FAMILY MEDICINE

## 2018-04-09 PROCEDURE — G8400 PT W/DXA NO RESULTS DOC: HCPCS | Performed by: FAMILY MEDICINE

## 2018-04-09 PROCEDURE — G8598 ASA/ANTIPLAT THER USED: HCPCS | Performed by: FAMILY MEDICINE

## 2018-04-09 PROCEDURE — 4040F PNEUMOC VAC/ADMIN/RCVD: CPT | Performed by: FAMILY MEDICINE

## 2018-04-09 PROCEDURE — 1123F ACP DISCUSS/DSCN MKR DOCD: CPT | Performed by: FAMILY MEDICINE

## 2018-04-09 PROCEDURE — 1090F PRES/ABSN URINE INCON ASSESS: CPT | Performed by: FAMILY MEDICINE

## 2018-04-09 PROCEDURE — G8427 DOCREV CUR MEDS BY ELIG CLIN: HCPCS | Performed by: FAMILY MEDICINE

## 2018-04-09 RX ORDER — NAPROXEN 500 MG/1
500 TABLET ORAL 2 TIMES DAILY WITH MEALS
Qty: 60 TABLET | Refills: 0 | Status: SHIPPED | OUTPATIENT
Start: 2018-04-09 | End: 2018-10-12 | Stop reason: SDUPTHER

## 2018-04-09 ASSESSMENT — ENCOUNTER SYMPTOMS: PHOTOPHOBIA: 1

## 2018-04-10 ENCOUNTER — CARE COORDINATION (OUTPATIENT)
Dept: FAMILY MEDICINE CLINIC | Age: 78
End: 2018-04-10

## 2018-04-10 ENCOUNTER — OFFICE VISIT (OUTPATIENT)
Dept: ENT CLINIC | Age: 78
End: 2018-04-10

## 2018-04-10 VITALS
TEMPERATURE: 97.2 F | DIASTOLIC BLOOD PRESSURE: 85 MMHG | HEIGHT: 66 IN | HEART RATE: 77 BPM | SYSTOLIC BLOOD PRESSURE: 181 MMHG | WEIGHT: 231 LBS | BODY MASS INDEX: 37.12 KG/M2

## 2018-04-10 DIAGNOSIS — H57.12 ORBITAL PAIN, LEFT: ICD-10-CM

## 2018-04-10 DIAGNOSIS — K11.8 PAROTID MASS: Primary | ICD-10-CM

## 2018-04-10 PROCEDURE — 99213 OFFICE O/P EST LOW 20 MIN: CPT | Performed by: OTOLARYNGOLOGY

## 2018-04-10 PROCEDURE — 1036F TOBACCO NON-USER: CPT | Performed by: OTOLARYNGOLOGY

## 2018-04-10 PROCEDURE — G8417 CALC BMI ABV UP PARAM F/U: HCPCS | Performed by: OTOLARYNGOLOGY

## 2018-04-10 PROCEDURE — 1090F PRES/ABSN URINE INCON ASSESS: CPT | Performed by: OTOLARYNGOLOGY

## 2018-04-10 PROCEDURE — G8400 PT W/DXA NO RESULTS DOC: HCPCS | Performed by: OTOLARYNGOLOGY

## 2018-04-10 PROCEDURE — 1123F ACP DISCUSS/DSCN MKR DOCD: CPT | Performed by: OTOLARYNGOLOGY

## 2018-04-10 PROCEDURE — G8598 ASA/ANTIPLAT THER USED: HCPCS | Performed by: OTOLARYNGOLOGY

## 2018-04-10 PROCEDURE — G8427 DOCREV CUR MEDS BY ELIG CLIN: HCPCS | Performed by: OTOLARYNGOLOGY

## 2018-04-10 PROCEDURE — 4040F PNEUMOC VAC/ADMIN/RCVD: CPT | Performed by: OTOLARYNGOLOGY

## 2018-04-11 ENCOUNTER — CARE COORDINATION (OUTPATIENT)
Dept: FAMILY MEDICINE CLINIC | Age: 78
End: 2018-04-11

## 2018-04-11 ENCOUNTER — HOSPITAL ENCOUNTER (OUTPATIENT)
Dept: MRI IMAGING | Age: 78
Discharge: HOME | End: 2018-04-11

## 2018-04-11 DIAGNOSIS — I10 ESSENTIAL HYPERTENSION: Primary | Chronic | ICD-10-CM

## 2018-04-11 DIAGNOSIS — J44.1 COPD EXACERBATION (HCC): ICD-10-CM

## 2018-04-11 DIAGNOSIS — R90.89 ABNORMAL CT OF BRAIN: ICD-10-CM

## 2018-04-11 DIAGNOSIS — I50.32 CHRONIC DIASTOLIC CONGESTIVE HEART FAILURE (HCC): Chronic | ICD-10-CM

## 2018-04-11 DIAGNOSIS — E11.40 CONTROLLED TYPE 2 DIABETES MELLITUS WITH DIABETIC NEUROPATHY, WITHOUT LONG-TERM CURRENT USE OF INSULIN (HCC): ICD-10-CM

## 2018-04-11 DIAGNOSIS — G44.219 EPISODIC TENSION-TYPE HEADACHE, NOT INTRACTABLE: ICD-10-CM

## 2018-04-11 DIAGNOSIS — I25.10 CORONARY ARTERY DISEASE INVOLVING NATIVE CORONARY ARTERY OF NATIVE HEART WITHOUT ANGINA PECTORIS: ICD-10-CM

## 2018-04-12 ENCOUNTER — CARE COORDINATION (OUTPATIENT)
Dept: FAMILY MEDICINE CLINIC | Age: 78
End: 2018-04-12

## 2018-04-12 RX ORDER — OMEPRAZOLE 20 MG/1
20 CAPSULE, DELAYED RELEASE ORAL 2 TIMES DAILY
Qty: 60 CAPSULE | Refills: 3 | Status: SHIPPED | OUTPATIENT
Start: 2018-04-12 | End: 2018-10-23 | Stop reason: SDUPTHER

## 2018-04-13 ENCOUNTER — CARE COORDINATION (OUTPATIENT)
Dept: FAMILY MEDICINE CLINIC | Age: 78
End: 2018-04-13

## 2018-04-13 DIAGNOSIS — R41.0 CONFUSION: Primary | ICD-10-CM

## 2018-04-13 PROBLEM — G93.41 METABOLIC ENCEPHALOPATHY: Status: ACTIVE | Noted: 2018-04-13

## 2018-04-14 PROBLEM — J98.4 RESTRICTIVE LUNG DISEASE: Status: ACTIVE | Noted: 2018-04-14

## 2018-04-20 ENCOUNTER — TELEPHONE (OUTPATIENT)
Dept: FAMILY MEDICINE CLINIC | Age: 78
End: 2018-04-20

## 2018-04-25 ENCOUNTER — CARE COORDINATION (OUTPATIENT)
Dept: CARE COORDINATION | Age: 78
End: 2018-04-25

## 2018-05-01 ENCOUNTER — CARE COORDINATION (OUTPATIENT)
Dept: CARE COORDINATION | Age: 78
End: 2018-05-01

## 2018-05-02 ENCOUNTER — CARE COORDINATION (OUTPATIENT)
Dept: CASE MANAGEMENT | Age: 78
End: 2018-05-02

## 2018-05-04 ENCOUNTER — CARE COORDINATION (OUTPATIENT)
Dept: CASE MANAGEMENT | Age: 78
End: 2018-05-04

## 2018-05-07 ENCOUNTER — CARE COORDINATION (OUTPATIENT)
Dept: CARE COORDINATION | Age: 78
End: 2018-05-07

## 2018-05-16 ENCOUNTER — OFFICE VISIT (OUTPATIENT)
Dept: PULMONOLOGY | Age: 78
End: 2018-05-16

## 2018-05-16 ENCOUNTER — CARE COORDINATION (OUTPATIENT)
Dept: CASE MANAGEMENT | Age: 78
End: 2018-05-16

## 2018-05-16 ENCOUNTER — TELEPHONE (OUTPATIENT)
Dept: PULMONOLOGY | Age: 78
End: 2018-05-16

## 2018-05-16 VITALS
OXYGEN SATURATION: 98 % | DIASTOLIC BLOOD PRESSURE: 79 MMHG | HEART RATE: 78 BPM | RESPIRATION RATE: 16 BRPM | TEMPERATURE: 97.2 F | BODY MASS INDEX: 39.32 KG/M2 | WEIGHT: 236 LBS | SYSTOLIC BLOOD PRESSURE: 167 MMHG | HEIGHT: 65 IN

## 2018-05-16 DIAGNOSIS — E66.9 OBESITY (BMI 30-39.9): ICD-10-CM

## 2018-05-16 DIAGNOSIS — Z72.821 POOR SLEEP HYGIENE: ICD-10-CM

## 2018-05-16 DIAGNOSIS — F51.04 PSYCHOPHYSIOLOGICAL INSOMNIA: ICD-10-CM

## 2018-05-16 DIAGNOSIS — I10 ESSENTIAL HYPERTENSION: Chronic | ICD-10-CM

## 2018-05-16 DIAGNOSIS — G47.33 OSA (OBSTRUCTIVE SLEEP APNEA): Primary | ICD-10-CM

## 2018-05-16 DIAGNOSIS — G47.33 OBSTRUCTIVE SLEEP APNEA: Primary | ICD-10-CM

## 2018-05-16 DIAGNOSIS — G47.34 NOCTURNAL HYPOXIA: ICD-10-CM

## 2018-05-16 PROCEDURE — 1111F DSCHRG MED/CURRENT MED MERGE: CPT | Performed by: NURSE PRACTITIONER

## 2018-05-16 PROCEDURE — G8417 CALC BMI ABV UP PARAM F/U: HCPCS | Performed by: NURSE PRACTITIONER

## 2018-05-16 PROCEDURE — 1090F PRES/ABSN URINE INCON ASSESS: CPT | Performed by: NURSE PRACTITIONER

## 2018-05-16 PROCEDURE — G8427 DOCREV CUR MEDS BY ELIG CLIN: HCPCS | Performed by: NURSE PRACTITIONER

## 2018-05-16 PROCEDURE — 4040F PNEUMOC VAC/ADMIN/RCVD: CPT | Performed by: NURSE PRACTITIONER

## 2018-05-16 PROCEDURE — 99214 OFFICE O/P EST MOD 30 MIN: CPT | Performed by: NURSE PRACTITIONER

## 2018-05-16 PROCEDURE — 1036F TOBACCO NON-USER: CPT | Performed by: NURSE PRACTITIONER

## 2018-05-16 PROCEDURE — G8400 PT W/DXA NO RESULTS DOC: HCPCS | Performed by: NURSE PRACTITIONER

## 2018-05-16 PROCEDURE — 1123F ACP DISCUSS/DSCN MKR DOCD: CPT | Performed by: NURSE PRACTITIONER

## 2018-05-16 PROCEDURE — G8598 ASA/ANTIPLAT THER USED: HCPCS | Performed by: NURSE PRACTITIONER

## 2018-05-16 ASSESSMENT — SLEEP AND FATIGUE QUESTIONNAIRES
HOW LIKELY ARE YOU TO NOD OFF OR FALL ASLEEP WHILE SITTING QUIETLY AFTER LUNCH WITHOUT ALCOHOL: 0
HOW LIKELY ARE YOU TO NOD OFF OR FALL ASLEEP WHILE SITTING AND TALKING TO SOMEONE: 0
HOW LIKELY ARE YOU TO NOD OFF OR FALL ASLEEP WHILE SITTING INACTIVE IN A PUBLIC PLACE: 0
HOW LIKELY ARE YOU TO NOD OFF OR FALL ASLEEP WHILE LYING DOWN TO REST IN THE AFTERNOON WHEN CIRCUMSTANCES PERMIT: 3
ESS TOTAL SCORE: 4
HOW LIKELY ARE YOU TO NOD OFF OR FALL ASLEEP WHILE SITTING AND READING: 0
NECK CIRCUMFERENCE (INCHES): 17.5
HOW LIKELY ARE YOU TO NOD OFF OR FALL ASLEEP IN A CAR, WHILE STOPPED FOR A FEW MINUTES IN TRAFFIC: 0
HOW LIKELY ARE YOU TO NOD OFF OR FALL ASLEEP WHEN YOU ARE A PASSENGER IN A CAR FOR AN HOUR WITHOUT A BREAK: 0
HOW LIKELY ARE YOU TO NOD OFF OR FALL ASLEEP WHILE WATCHING TV: 1

## 2018-05-16 NOTE — TELEPHONE ENCOUNTER
Watch for ONPO results. Order faxed to [facility] AT [place].   Pt resident of [facility] OF [address] [phone] fax [phone]  OV 05/16/18

## 2018-05-22 ENCOUNTER — CARE COORDINATION (OUTPATIENT)
Dept: CARE COORDINATION | Age: 78
End: 2018-05-22

## 2018-05-23 ENCOUNTER — CARE COORDINATION (OUTPATIENT)
Dept: CARE COORDINATION | Age: 78
End: 2018-05-23

## 2018-05-23 NOTE — TELEPHONE ENCOUNTER
Carmelita called back from [facility] [place]. She stated that overnight pulse ox was not done and she didn't even see an order in the patient's record. Order and office note was re faxed to facility at [phone].

## 2018-05-23 NOTE — TELEPHONE ENCOUNTER
Spoke w Nurse [phone] Leong @ [facility] AT [place]. Will check on ONPO and return call to office.

## 2018-05-24 ENCOUNTER — TELEPHONE (OUTPATIENT)
Dept: FAMILY MEDICINE CLINIC | Age: 78
End: 2018-05-24

## 2018-05-29 ENCOUNTER — CARE COORDINATION (OUTPATIENT)
Dept: CASE MANAGEMENT | Age: 78
End: 2018-05-29

## 2018-06-05 ENCOUNTER — CARE COORDINATION (OUTPATIENT)
Dept: CASE MANAGEMENT | Age: 78
End: 2018-06-05

## 2018-06-06 ENCOUNTER — CARE COORDINATION (OUTPATIENT)
Dept: CARE COORDINATION | Age: 78
End: 2018-06-06

## 2018-06-06 NOTE — TELEPHONE ENCOUNTER
Spoke to Triny at [facility] patient is currently in a skill nursing at [facility]. Test can't be completed by [facility] until patient is discharged.

## 2018-06-12 NOTE — TELEPHONE ENCOUNTER
Alyce Paige at [facility]. She said patient is set to be discharged on Saturday 6/16/18. PERRI Agosto at [facility] and informed her of this. She will reach out to patient early next week to change CPAP to 13 and complete ONPO.

## 2018-06-15 ENCOUNTER — CARE COORDINATION (OUTPATIENT)
Dept: CASE MANAGEMENT | Age: 78
End: 2018-06-15

## 2018-06-18 ENCOUNTER — CARE COORDINATION (OUTPATIENT)
Dept: CARE COORDINATION | Age: 78
End: 2018-06-18

## 2018-06-18 ENCOUNTER — TELEPHONE (OUTPATIENT)
Dept: FAMILY MEDICINE CLINIC | Age: 78
End: 2018-06-18

## 2018-06-18 ENCOUNTER — CARE COORDINATION (OUTPATIENT)
Dept: CASE MANAGEMENT | Age: 78
End: 2018-06-18

## 2018-06-18 DIAGNOSIS — I10 ESSENTIAL HYPERTENSION: Primary | Chronic | ICD-10-CM

## 2018-06-18 PROCEDURE — 1111F DSCHRG MED/CURRENT MED MERGE: CPT | Performed by: FAMILY MEDICINE

## 2018-06-19 NOTE — TELEPHONE ENCOUNTER
Spoke w/pt. Gave her [employer] number to contact; I also informed Triny @ [employer] pt. Is at home now. She will contact pt. Will watch for ONPO results.

## 2018-06-20 ENCOUNTER — CARE COORDINATION (OUTPATIENT)
Dept: CARE COORDINATION | Age: 78
End: 2018-06-20

## 2018-06-21 ENCOUNTER — HOSPITAL ENCOUNTER (OUTPATIENT)
Dept: OTHER | Age: 78
Discharge: HOME | End: 2018-06-30
Attending: FAMILY MEDICINE | Admitting: FAMILY MEDICINE

## 2018-06-21 ENCOUNTER — TELEPHONE (OUTPATIENT)
Dept: FAMILY MEDICINE CLINIC | Age: 78
End: 2018-06-21

## 2018-06-21 LAB
BACTERIA: ABNORMAL /HPF
BILIRUBIN URINE: NEGATIVE
BLOOD, URINE: ABNORMAL
CLARITY: ABNORMAL
COLOR: YELLOW
EPITHELIAL CELLS, UA: ABNORMAL /HPF
GLUCOSE URINE: NEGATIVE MG/DL
KETONES, URINE: NEGATIVE MG/DL
LEUKOCYTE ESTERASE, URINE: ABNORMAL
MICROSCOPIC EXAMINATION: YES
NITRITE, URINE: NEGATIVE
PH UA: 6
PROTEIN UA: 30 MG/DL
RBC UA: ABNORMAL /HPF (ref 0–2)
SPECIFIC GRAVITY UA: 1.02
URINE TYPE: ABNORMAL
UROBILINOGEN, URINE: 0.2 E.U./DL
WBC UA: >100 /HPF (ref 0–5)

## 2018-06-21 RX ORDER — CIPROFLOXACIN 250 MG/1
250 TABLET, FILM COATED ORAL 2 TIMES DAILY
Qty: 20 TABLET | Refills: 0 | Status: SHIPPED | OUTPATIENT
Start: 2018-06-21 | End: 2018-06-22

## 2018-06-22 ENCOUNTER — TELEPHONE (OUTPATIENT)
Dept: FAMILY MEDICINE CLINIC | Age: 78
End: 2018-06-22

## 2018-06-22 LAB
ORGANISM: ABNORMAL
URINE CULTURE, ROUTINE: ABNORMAL
URINE CULTURE, ROUTINE: ABNORMAL

## 2018-06-22 RX ORDER — AMOXICILLIN 500 MG/1
500 CAPSULE ORAL 3 TIMES DAILY
Qty: 30 CAPSULE | Refills: 0 | Status: SHIPPED | OUTPATIENT
Start: 2018-06-22 | End: 2018-07-02 | Stop reason: ALTCHOICE

## 2018-06-22 NOTE — PROGRESS NOTES
Amox(distant relative to PCN) appears to be an intolerance at [facility], [facility] she mind trying with food? ? Nothing else to use-Cipro will not work.

## 2018-06-25 ENCOUNTER — CARE COORDINATION (OUTPATIENT)
Dept: CARE COORDINATION | Age: 78
End: 2018-06-25

## 2018-06-25 DIAGNOSIS — I10 ESSENTIAL HYPERTENSION: ICD-10-CM

## 2018-06-25 DIAGNOSIS — F41.9 ANXIETY AND DEPRESSION: ICD-10-CM

## 2018-06-25 DIAGNOSIS — F32.A ANXIETY AND DEPRESSION: ICD-10-CM

## 2018-06-25 RX ORDER — POTASSIUM CHLORIDE 20 MEQ/1
20 TABLET, EXTENDED RELEASE ORAL DAILY
Qty: 30 TABLET | Refills: 0 | Status: SHIPPED | OUTPATIENT
Start: 2018-06-25 | End: 2018-08-17 | Stop reason: SDUPTHER

## 2018-06-25 RX ORDER — FUROSEMIDE 40 MG/1
TABLET ORAL
Qty: 60 TABLET | Refills: 0 | Status: SHIPPED | OUTPATIENT
Start: 2018-06-25 | End: 2018-08-17 | Stop reason: SDUPTHER

## 2018-06-25 RX ORDER — PHENOL 1.4 %
1 AEROSOL, SPRAY (ML) MUCOUS MEMBRANE 2 TIMES DAILY
Qty: 30 TABLET | Refills: 1 | Status: SHIPPED | OUTPATIENT
Start: 2018-06-25

## 2018-06-25 RX ORDER — TOLTERODINE 4 MG/1
4 CAPSULE, EXTENDED RELEASE ORAL DAILY
Qty: 30 CAPSULE | Refills: 3 | OUTPATIENT
Start: 2018-06-25

## 2018-06-25 RX ORDER — DULOXETIN HYDROCHLORIDE 60 MG/1
60 CAPSULE, DELAYED RELEASE ORAL DAILY
Qty: 90 CAPSULE | Refills: 0 | Status: SHIPPED | OUTPATIENT
Start: 2018-06-25 | End: 2018-08-17 | Stop reason: SDUPTHER

## 2018-06-26 RX ORDER — CARVEDILOL 25 MG/1
25 TABLET ORAL 2 TIMES DAILY WITH MEALS
COMMUNITY
End: 2018-07-02 | Stop reason: SDUPTHER

## 2018-06-26 RX ORDER — TOLTERODINE 4 MG/1
4 CAPSULE, EXTENDED RELEASE ORAL DAILY
COMMUNITY
End: 2018-06-28

## 2018-06-27 DIAGNOSIS — E11.9 CONTROLLED TYPE 2 DIABETES MELLITUS WITHOUT COMPLICATION, WITHOUT LONG-TERM CURRENT USE OF INSULIN (HCC): ICD-10-CM

## 2018-06-27 RX ORDER — METFORMIN HYDROCHLORIDE 500 MG/1
500 TABLET, EXTENDED RELEASE ORAL 3 TIMES DAILY
Qty: 90 TABLET | Refills: 0 | Status: SHIPPED | OUTPATIENT
Start: 2018-06-27 | End: 2018-07-02 | Stop reason: SDUPTHER

## 2018-06-27 NOTE — TELEPHONE ENCOUNTER
Judith Sibley at [facility]. Pt still has not called them back. I called and s/w patient. She said she has been in rehab and recently got home. She will call [facility] to schedule ONPO. They need a new order as the old one has . Please sign order and fax to [facility].

## 2018-06-29 ENCOUNTER — CARE COORDINATION (OUTPATIENT)
Dept: CARE COORDINATION | Age: 78
End: 2018-06-29

## 2018-07-01 ENCOUNTER — HOSPITAL ENCOUNTER (OUTPATIENT)
Dept: OTHER | Age: 78
Discharge: HOME | End: 2018-07-01
Attending: FAMILY MEDICINE | Admitting: FAMILY MEDICINE

## 2018-07-02 ENCOUNTER — CARE COORDINATION (OUTPATIENT)
Dept: CARE COORDINATION | Age: 78
End: 2018-07-02

## 2018-07-02 ENCOUNTER — OFFICE VISIT (OUTPATIENT)
Dept: FAMILY MEDICINE CLINIC | Age: 78
End: 2018-07-02

## 2018-07-02 VITALS
DIASTOLIC BLOOD PRESSURE: 82 MMHG | WEIGHT: 227 LBS | HEART RATE: 83 BPM | OXYGEN SATURATION: 94 % | HEIGHT: 65 IN | SYSTOLIC BLOOD PRESSURE: 130 MMHG | BODY MASS INDEX: 37.82 KG/M2

## 2018-07-02 DIAGNOSIS — F32.A ANXIETY AND DEPRESSION: ICD-10-CM

## 2018-07-02 DIAGNOSIS — F41.9 ANXIETY AND DEPRESSION: ICD-10-CM

## 2018-07-02 DIAGNOSIS — E11.9 CONTROLLED TYPE 2 DIABETES MELLITUS WITHOUT COMPLICATION, WITHOUT LONG-TERM CURRENT USE OF INSULIN (HCC): ICD-10-CM

## 2018-07-02 DIAGNOSIS — Z01.818 PRE-OP EXAM: Primary | ICD-10-CM

## 2018-07-02 DIAGNOSIS — R30.0 DYSURIA: ICD-10-CM

## 2018-07-02 LAB
BILIRUBIN, POC: NEGATIVE
BLOOD URINE, POC: NEGATIVE
CLARITY, POC: CLEAR
COLOR, POC: YELLOW
GLUCOSE URINE, POC: NEGATIVE
HBA1C MFR BLD: 8.2 %
KETONES, POC: NEGATIVE
LEUKOCYTE EST, POC: NEGATIVE
NITRITE, POC: NEGATIVE
PH, POC: 5.5
PROTEIN, POC: NORMAL
SPECIFIC GRAVITY, POC: 1.02
UROBILINOGEN, POC: NORMAL

## 2018-07-02 PROCEDURE — 99214 OFFICE O/P EST MOD 30 MIN: CPT | Performed by: FAMILY MEDICINE

## 2018-07-02 PROCEDURE — G8598 ASA/ANTIPLAT THER USED: HCPCS | Performed by: FAMILY MEDICINE

## 2018-07-02 PROCEDURE — G8417 CALC BMI ABV UP PARAM F/U: HCPCS | Performed by: FAMILY MEDICINE

## 2018-07-02 PROCEDURE — 81002 URINALYSIS NONAUTO W/O SCOPE: CPT | Performed by: FAMILY MEDICINE

## 2018-07-02 PROCEDURE — 1123F ACP DISCUSS/DSCN MKR DOCD: CPT | Performed by: FAMILY MEDICINE

## 2018-07-02 PROCEDURE — 1036F TOBACCO NON-USER: CPT | Performed by: FAMILY MEDICINE

## 2018-07-02 PROCEDURE — G8427 DOCREV CUR MEDS BY ELIG CLIN: HCPCS | Performed by: FAMILY MEDICINE

## 2018-07-02 PROCEDURE — 83036 HEMOGLOBIN GLYCOSYLATED A1C: CPT | Performed by: FAMILY MEDICINE

## 2018-07-02 PROCEDURE — 93000 ELECTROCARDIOGRAM COMPLETE: CPT | Performed by: FAMILY MEDICINE

## 2018-07-02 PROCEDURE — 4040F PNEUMOC VAC/ADMIN/RCVD: CPT | Performed by: FAMILY MEDICINE

## 2018-07-02 PROCEDURE — G8400 PT W/DXA NO RESULTS DOC: HCPCS | Performed by: FAMILY MEDICINE

## 2018-07-02 PROCEDURE — 1090F PRES/ABSN URINE INCON ASSESS: CPT | Performed by: FAMILY MEDICINE

## 2018-07-02 RX ORDER — CARVEDILOL 25 MG/1
25 TABLET ORAL 2 TIMES DAILY WITH MEALS
Qty: 180 TABLET | Refills: 1 | Status: SHIPPED | OUTPATIENT
Start: 2018-07-02 | End: 2018-12-15 | Stop reason: SDUPTHER

## 2018-07-02 NOTE — PROGRESS NOTES
maintaining sleep    Restless legs syndrome (RLS)    Sensory hearing loss, bilateral    Anxiety and depression    Peroneal tendon injury    Aortic stenosis, mild    Facial asymmetry    CAD (coronary artery disease)    Controlled type 2 diabetes mellitus with diabetic neuropathy, without long-term current use of insulin ([facility]    Obesity, Class III, BMI 40-49.9 (morbid obesity) ([facility])    Interstitial cystitis    Acute bronchitis    COPD exacerbation ([facility])    Persistent cough    Postnasal drip    Gastroesophageal reflux disease    Lumbar radiculopathy    Right hip pain    Metabolic encephalopathy    Restrictive lung disease    Acute confusion    Accidental fall from bed    Obesity (BMI 30-39. 9)       Past Medical History:   Diagnosis Date    Arthritis     Asthma     Bronchial asthma     Bursitis 12/2011    ACHILLES TENDON LEFT    CHF (congestive heart failure) ([facility])     Constipation     Depression     Diverticulitis     Dizziness     Hyperlipidemia     Hypertension     Leg edema     Nausea & vomiting     POSTOPERATIVE    PONV (postoperative nausea and vomiting)     Sleep apnea     CPAP, SLEEP STUDY 6/28 OVERNIGHT AT HOME    Type II or unspecified type diabetes mellitus without mention of complication, not stated as uncontrolled     Unspecified cerebral artery occlusion with cerebral infarction     mini stroke     Past Surgical History:   Procedure Laterality Date    ACHILLES TENDON SURGERY  2/2/12    LEFT ACHILLES DEBRIDEMENT, HAGLUNDS AND RETROCALCANEAL BURSA EXCISION WITH FLEXOR HALLUS LONGUS TENDON TRANSFER WITH BLOCK FOR PAIN CONTROL    APPENDECTOMY      CARDIAC CATHETERIZATION  8/21/14    CARPAL TUNNEL RELEASE      both hands    CHOLECYSTECTOMY      COLONOSCOPY      COLONOSCOPY      COLONOSCOPY  2/10/2016    CYSTOSCOPY  10/02/2017    DIAGNOSTIC CARDIAC CATH LAB PROCEDURE      EYE SURGERY      HYSTERECTOMY      JOINT REPLACEMENT      bilateral knee    KNEE SURGERY both replaced    POLYPECTOMY      SHOULDER SURGERY      TOENAIL EXCISION  08/04/2016    right big toe    TONSILLECTOMY      TUBAL LIGATION      UPPER GASTROINTESTINAL ENDOSCOPY  10/29/12    gastritis    UPPER GASTROINTESTINAL ENDOSCOPY  2/10/2016    URETHRAL STRICTURE DILATATION       Family History   Problem Relation Age of Onset    Cancer Father         prostate    Heart Disease Mother     Heart Disease Brother     Heart Disease Brother     Heart Disease Sister     Diabetes Sister      Social History     Social History    Marital status:      Spouse name: N/A    Number of children: 3    Years of education: 15     Occupational History    retired      Social History Main Topics    Smoking status: Former Smoker     Packs/day: 0.25     Years: 0.10     Types: Cigarettes     Quit date: 1/4/1975    Smokeless tobacco: Never Used      Comment: only smoked for 1 month    Alcohol use No    Drug use: No    Sexual activity: Not Currently     Partners: Male     Other Topics Concern    Not on file     Social History Narrative    No narrative on file       Review of Systems  A comprehensive review of systems was negative except for what was noted in the HPI. Physical Exam   Constitutional: She is oriented to person, place, and time. She appears well-developed and well-nourished. No distress. HENT:   Head: Normocephalic and atraumatic. Mouth/Throat: Uvula is midline, oropharynx is clear and moist and mucous membranes are normal.   Eyes: Conjunctivae and EOM are normal. Pupils are equal, round, and reactive to light. Neck: Trachea normal and normal range of motion. Neck supple. No JVD present. No mass and no thyromegaly present. Cardiovascular: Normal rate, regular rhythm and intact distal pulses. Exam reveals no gallop and no friction rub. Systolic heart murmur is heard. Pulmonary/Chest: Effort normal and breath sounds normal. No respiratory distress. She has no wheezes.  She has no rales. Abdominal: Soft. Normal aorta and bowel sounds are normal. She exhibits no distension and no mass. There is no hepatosplenomegaly. No tenderness. Musculoskeletal: She exhibits no edema and no tenderness. Neurological: She is alert and oriented to person, place, and time. She has normal strength. No cranial nerve deficit or sensory deficit. Coordination and gait normal.   Skin: Skin is warm and dry. No rash noted. No erythema. Psychiatric: She has a normal mood and affect. Her behavior is normal.     EKG Interpretation:  normal EKG, normal sinus rhythm, unchanged from previous tracings. Lab Review   Lab Results   Component Value Date     07/06/2018    K 4.3 07/06/2018    K 4.1 04/18/2018    CL 93 07/06/2018    CO2 24 07/06/2018    BUN 15 07/06/2018    CREATININE 0.8 07/06/2018    GLUCOSE 179 07/06/2018    CALCIUM 9.2 07/06/2018     Lab Results   Component Value Date    WBC 7.1 07/06/2018    HGB 11.5 07/06/2018    HCT 34.4 07/06/2018    MCV 89.3 07/06/2018     07/06/2018           Assessment:       69 y.o. patient with planned surgery as above. Known risk factors for perioperative complications: Congestive heart failure, Diabetes mellitus, Hypertension, obesity  Current medications which may produce withdrawal symptoms if withheld perioperatively: none      Plan:     1. Preoperative workup as follows: hemoglobin, hematocrit, electrolytes, creatinine, urinalysis (urinary tract instrumentation planned)  2. Change in medication regimen before surgery: Discontinue NSAIDs (Naproxen) 7 days before surgery  3.  Prophylaxis for cardiac events with perioperative beta-blockers: Currently taking  carvedilol  ACC/AHA indications for pre-operative beta-blocker use:    · Vascular surgery with history of postitive stress test  · Intermediate or high risk surgery with history of CAD   · Intermediate or high risk surgery with multiple clinical predictors of CAD- 2 of the following: history of compensated or prior heart failure, history of cerebrovascular disease, DM, or renal insufficiency    Routine administration of higher-dose, long-acting metoprolol in beta-blockernaïve patients on the day of surgery, and in the absence of dose titration is associated with an overall increase in mortality. Beta-blockers should be started days to weeks prior to surgery and titrated to pulse < 70.  4. Deep vein thrombosis prophylaxis: regimen to be chosen by surgical team   5. Type 2 diabetes mellitus without complication without long-term insulin use. Continue current medicines. 6.  Urine culture was unequivocal. One culture came back normal obdulia and another came back E.coli sensitive to Doxycycline. Patient had been on Amoxicillin prior to her urine culture so likely it had cleared her urine. Patient has no infectious symptoms. Urine last night (7/6/18) in the ER had no blood, leukocyte esterase or nitrites. It was also normal on 7/5/18. It would be advisable to consider starting prophylactic antibiotics prior to the procedure but I don't think she has an active UTI.    7. No contraindications to planned surgery    Fax: [phone]

## 2018-07-02 NOTE — TELEPHONE ENCOUNTER
James Vallejo at [facility]. Pt has machine. They are trying to get with patient to pick it up. Will check back in a few days.

## 2018-07-02 NOTE — CARE COORDINATION
ACC: Evelia Morales RN CM Risk Score: 17  Pablo Mortality Risk Score: 27.13    Summary/Assessment:   Pt seen in tandem with PCP today. Pt is here for pre-procedure eval for Cysto on 7/9 for interstitial cystitis  Pt reports that she has a ride and a responsible person to go with her  She reports that her medication box has not arrived yet  [facility] called with need for rx for carvedilol 25mg - PCP alerted      Plan:    Follow up re Pre op instructions prior to procedure  Follow up re mediset per the [facility]. Care Coordination Interventions    Program Enrollment:  Complex Care  Referral from Primary Care Provider:  No  Suggested Interventions and Community Resources  Diabetes Education: In Process (Comment: With [address])  Disease Specific Clinic:  In Process (Comment: CHF education with [address])  Home Health Services:  Completed  Medi Set or Pill Pack:  Not Started (Comment: Planned for discharge )  Pharmacist:  Completed  Senior Services: In Process  Social Work:  Completed  Other Therapy Services:   In Process (Comment: Neurocognitive Eval with UC )  Other Services:  Declined (Comment: Spiritual Care - Declines for now on 3/14/2018)  Zone Management Tools:  Completed (Comment: CHF, Diabetes Zone Management mailed to patient 8/1/2017)  Other Services or Interventions:  low Na diet, weight log mailed to patient 8/9/2017         Future Appointments  Date Time Provider Ann Torre   7/9/2018 1:00 PM Nelson Cassidy MD [facility] GEN SURG None   7/31/2018 1:30 PM MD Sadaf Farfan [facility]   8/9/2018 1:00 PM Deborah Conrad APRN - CNP EAST SLEEP [facility]   9/14/2018 1:00 PM [address] [place] [address], [facility]   2/21/2019 1:20 PM Silas Hathaway MD AND JEN [facility]

## 2018-07-03 ENCOUNTER — CARE COORDINATION (OUTPATIENT)
Dept: CARE COORDINATION | Age: 78
End: 2018-07-03

## 2018-07-03 LAB
A/G RATIO: 1.9 (ref 1.1–2.2)
ALBUMIN SERPL-MCNC: 4.3 G/DL (ref 3.4–5)
ALP BLD-CCNC: 95 U/L (ref 40–129)
ALT SERPL-CCNC: 8 U/L (ref 10–40)
ANION GAP SERPL CALCULATED.3IONS-SCNC: 16 MMOL/L (ref 3–16)
AST SERPL-CCNC: 11 U/L (ref 15–37)
BASOPHILS ABSOLUTE: 0.1 K/UL (ref 0–0.2)
BASOPHILS RELATIVE PERCENT: 1.3 %
BILIRUB SERPL-MCNC: 0.5 MG/DL (ref 0–1)
BUN BLDV-MCNC: 15 MG/DL (ref 7–20)
CALCIUM SERPL-MCNC: 9.8 MG/DL (ref 8.3–10.6)
CHLORIDE BLD-SCNC: 96 MMOL/L (ref 99–110)
CO2: 29 MMOL/L (ref 21–32)
CREAT SERPL-MCNC: 1.2 MG/DL (ref 0.6–1.2)
EOSINOPHILS ABSOLUTE: 0.3 K/UL (ref 0–0.6)
EOSINOPHILS RELATIVE PERCENT: 3.4 %
GFR AFRICAN AMERICAN: 53
GFR NON-AFRICAN AMERICAN: 44
GLOBULIN: 2.3 G/DL
GLUCOSE BLD-MCNC: 178 MG/DL (ref 70–99)
HCT VFR BLD CALC: 34 % (ref 36–48)
HEMOGLOBIN: 11.5 G/DL (ref 12–16)
LYMPHOCYTES ABSOLUTE: 2.4 K/UL (ref 1–5.1)
LYMPHOCYTES RELATIVE PERCENT: 29.6 %
MCH RBC QN AUTO: 30.1 PG (ref 26–34)
MCHC RBC AUTO-ENTMCNC: 33.7 G/DL (ref 31–36)
MCV RBC AUTO: 89.3 FL (ref 80–100)
MONOCYTES ABSOLUTE: 0.5 K/UL (ref 0–1.3)
MONOCYTES RELATIVE PERCENT: 5.7 %
NEUTROPHILS ABSOLUTE: 4.8 K/UL (ref 1.7–7.7)
NEUTROPHILS RELATIVE PERCENT: 60 %
PDW BLD-RTO: 16.7 % (ref 12.4–15.4)
PLATELET # BLD: 267 K/UL (ref 135–450)
PMV BLD AUTO: 9.3 FL (ref 5–10.5)
POTASSIUM SERPL-SCNC: 4.4 MMOL/L (ref 3.5–5.1)
RBC # BLD: 3.8 M/UL (ref 4–5.2)
SODIUM BLD-SCNC: 141 MMOL/L (ref 136–145)
TOTAL PROTEIN: 6.6 G/DL (ref 6.4–8.2)
WBC # BLD: 8 K/UL (ref 4–11)

## 2018-07-03 RX ORDER — METOCLOPRAMIDE 10 MG/1
10 TABLET ORAL 4 TIMES DAILY PRN
Qty: 30 TABLET | Refills: 0 | Status: SHIPPED | OUTPATIENT
Start: 2018-07-03 | End: 2019-07-19

## 2018-07-03 RX ORDER — METFORMIN HYDROCHLORIDE 500 MG/1
500 TABLET, EXTENDED RELEASE ORAL 3 TIMES DAILY
Qty: 90 TABLET | Refills: 0 | Status: SHIPPED | OUTPATIENT
Start: 2018-07-03 | End: 2018-08-14 | Stop reason: SDUPTHER

## 2018-07-03 RX ORDER — FAMOTIDINE 20 MG/1
20 TABLET, FILM COATED ORAL 2 TIMES DAILY
Qty: 20 TABLET | Refills: 0 | Status: SHIPPED | OUTPATIENT
Start: 2018-07-03 | End: 2018-07-03 | Stop reason: ALTCHOICE

## 2018-07-03 RX ORDER — SENNA AND DOCUSATE SODIUM 50; 8.6 MG/1; MG/1
2 TABLET, FILM COATED ORAL DAILY
Qty: 60 TABLET | Refills: 2 | Status: SHIPPED | OUTPATIENT
Start: 2018-07-03 | End: 2018-12-27 | Stop reason: SDUPTHER

## 2018-07-03 RX ORDER — CLONAZEPAM 0.5 MG/1
0.5 TABLET ORAL 2 TIMES DAILY PRN
Qty: 60 TABLET | Refills: 0 | OUTPATIENT
Start: 2018-07-03

## 2018-07-03 NOTE — CARE COORDINATION
07/03/18  Initial review and questions from [address] Chloe Murillo:  Review with PCP and final review of meds with [facility]

## 2018-07-04 LAB
ESTIMATED AVERAGE GLUCOSE: 203 MG/DL
HBA1C MFR BLD: 8.7 %

## 2018-07-05 ENCOUNTER — CARE COORDINATION (OUTPATIENT)
Dept: CARE COORDINATION | Age: 78
End: 2018-07-05

## 2018-07-05 NOTE — CARE COORDINATION
ACC: Troy Cardona RN  CM Risk Score: 17  Pablo Mortality Risk Score: 27.13    Summary/Assessment:   Pt daughter calls and reports that pt was confused and unable to walk. Yamilet Padron RN Supervisor reports that OT was out today and she is sending RN out to visit patient. States pt was alert and oriented and that she stated that she was sleepy. Despite pt and daughter being advised to send ALL meds to rx, pt had clonazepam in the home that was not sent to RX and she took one yesterday. Review of medication list and changes with Yamilet Padron RN supervisor  Call to [facility] and they reported no clonazepam was sent or brought to them when home meds arrived. Talked with patient. She is able to get around in home and is able to get food and go to bathroom. She is advised NOT TO TAKE any medications not in her pill box. She is advised that her daughter will come and remove the clonazepam when she is next there. Pt agrees to call tomorrow. Plan:    FU tomorrow with patient. Care Coordination Interventions    Program Enrollment:  Complex Care  Referral from Primary Care Provider:  No  Suggested Interventions and Community Resources  Diabetes Education: In Process (Comment: With [facility])  Disease Specific Clinic:  In Process (Comment: CHF education with [facility])  Home Health Services:  Completed  Medi Set or Pill Pack:  Not Started (Comment: Planned for discharge )  Pharmacist:  Completed  Senior Services: In Process  Social Work:  Completed  Other Therapy Services:   In Process (Comment: Neurocognitive Eval with UC )  Other Services:  Declined (Comment: Spiritual Care - Declines for now on 3/14/2018)  Zone Management Tools:  Completed (Comment: CHF, Diabetes Zone Management mailed to patient 8/1/2017)  Other Services or Interventions:  low Na diet, weight log mailed to patient 8/9/2017         Future Appointments  Date Time Provider Ann Torre   7/9/2018 1:00 PM Costa Antonio MD A GEN SURG None 7/31/2018 1:30 PM MD Bety Andrade [facility]   8/9/2018 1:00 PM KIMI Carlos - CNP [facility] SLEEP [facility]   9/14/2018 1:00 [facility]   2/21/2019 1:20 PM [facility]

## 2018-07-06 ENCOUNTER — HOSPITAL ENCOUNTER (OUTPATIENT)
Dept: OTHER | Age: 78
Discharge: HOME | End: 2018-07-06
Attending: UROLOGY | Admitting: UROLOGY

## 2018-07-06 LAB
ANION GAP SERPL CALCULATED.3IONS-SCNC: 16 MMOL/L (ref 3–16)
BASOPHILS ABSOLUTE: 0.1 K/UL (ref 0–0.2)
BASOPHILS RELATIVE PERCENT: 0.8 %
BILIRUBIN URINE: NEGATIVE
BLOOD, URINE: NEGATIVE
BUN BLDV-MCNC: 15 MG/DL (ref 7–20)
CALCIUM SERPL-MCNC: 9.2 MG/DL (ref 8.3–10.6)
CHLORIDE BLD-SCNC: 93 MMOL/L (ref 99–110)
CLARITY: CLEAR
CO2: 24 MMOL/L (ref 21–32)
COLOR: YELLOW
CREAT SERPL-MCNC: 0.8 MG/DL (ref 0.6–1.2)
EOSINOPHILS ABSOLUTE: 0.3 K/UL (ref 0–0.6)
EOSINOPHILS RELATIVE PERCENT: 4.5 %
EPITHELIAL CELLS, UA: NORMAL /HPF
GFR AFRICAN AMERICAN: >60
GFR NON-AFRICAN AMERICAN: >60
GLUCOSE BLD-MCNC: 179 MG/DL (ref 70–99)
GLUCOSE URINE: NEGATIVE MG/DL
HCT VFR BLD CALC: 34.4 % (ref 36–48)
HEMOGLOBIN: 11.5 G/DL (ref 12–16)
KETONES, URINE: NEGATIVE MG/DL
LEUKOCYTE ESTERASE, URINE: NEGATIVE
LYMPHOCYTES ABSOLUTE: 1.7 K/UL (ref 1–5.1)
LYMPHOCYTES RELATIVE PERCENT: 23.8 %
MCH RBC QN AUTO: 30 PG (ref 26–34)
MCHC RBC AUTO-ENTMCNC: 33.6 G/DL (ref 31–36)
MCV RBC AUTO: 89.3 FL (ref 80–100)
MICROSCOPIC EXAMINATION: YES
MONOCYTES ABSOLUTE: 0.4 K/UL (ref 0–1.3)
MONOCYTES RELATIVE PERCENT: 6.2 %
NEUTROPHILS ABSOLUTE: 4.6 K/UL (ref 1.7–7.7)
NEUTROPHILS RELATIVE PERCENT: 64.7 %
NITRITE, URINE: NEGATIVE
ORGANISM: ABNORMAL
PDW BLD-RTO: 16.2 % (ref 12.4–15.4)
PH UA: 6
PLATELET # BLD: 258 K/UL (ref 135–450)
PMV BLD AUTO: 9.2 FL (ref 5–10.5)
POTASSIUM SERPL-SCNC: 4.3 MMOL/L (ref 3.5–5.1)
PROTEIN UA: ABNORMAL MG/DL
RBC # BLD: 3.85 M/UL (ref 4–5.2)
RBC UA: NORMAL /HPF (ref 0–2)
SODIUM BLD-SCNC: 133 MMOL/L (ref 136–145)
SPECIFIC GRAVITY UA: 1.02
URINE CULTURE, ROUTINE: ABNORMAL
URINE CULTURE, ROUTINE: ABNORMAL
URINE TYPE: ABNORMAL
UROBILINOGEN, URINE: 0.2 E.U./DL
WBC # BLD: 7.1 K/UL (ref 4–11)
WBC UA: NORMAL /HPF (ref 0–5)

## 2018-07-06 RX ORDER — DOXYCYCLINE 100 MG/1
100 TABLET ORAL 2 TIMES DAILY
Qty: 20 TABLET | Refills: 0 | Status: CANCELLED | OUTPATIENT
Start: 2018-07-06 | End: 2018-07-16

## 2018-07-06 NOTE — CARE COORDINATION
Patient calls and is upset and crying. She reports that her legs are jumping and she does not know what to do. She took Naproxyn 30 mins prior to call   She has not taken her evening pills but did take her bedtime meds by her report about 30 mins ago - Mirapex is in her HS meds  RNCC is unable to safely assess medication compliance over the phone past this. Advised her that one of her HS meds is for restless leg syndrome and should work soon. Pt reports that she is able to ambulate. She is not able to tell me if the Yamilet  Nurse is coming this evening or tomorrow. She wants to go to ED  She does not have anyone with her that can assist her at this time.      Call to daughter and LM advising of situation and advised of call tomorrow    Plan:  Discuss caregiving plans again with patient and daughter

## 2018-07-08 LAB
ORGANISM: ABNORMAL
URINE CULTURE, ROUTINE: ABNORMAL
URINE CULTURE, ROUTINE: ABNORMAL

## 2018-07-09 ENCOUNTER — TELEPHONE (OUTPATIENT)
Dept: FAMILY MEDICINE CLINIC | Age: 78
End: 2018-07-09

## 2018-07-10 ENCOUNTER — TELEPHONE (OUTPATIENT)
Dept: PULMONOLOGY | Age: 78
End: 2018-07-10

## 2018-07-10 DIAGNOSIS — E66.01 MORBID OBESITY (HCC): ICD-10-CM

## 2018-07-10 DIAGNOSIS — G47.34 NOCTURNAL HYPOXIA: ICD-10-CM

## 2018-07-10 DIAGNOSIS — G47.33 OBSTRUCTIVE SLEEP APNEA: Primary | ICD-10-CM

## 2018-07-10 NOTE — TELEPHONE ENCOUNTER
CPAP changed remotely per Forest Donaldson RRT   2 week compliance report due: July 24; [facility]

## 2018-07-13 ENCOUNTER — CARE COORDINATION (OUTPATIENT)
Dept: CARE COORDINATION | Age: 78
End: 2018-07-13

## 2018-07-16 DIAGNOSIS — E11.40 CONTROLLED TYPE 2 DIABETES MELLITUS WITH DIABETIC NEUROPATHY, WITHOUT LONG-TERM CURRENT USE OF INSULIN (HCC): ICD-10-CM

## 2018-07-16 NOTE — TELEPHONE ENCOUNTER
.  Last office visit 7/2/2018     Last written 2-2-17 #200 with 5 refills      Next office visit scheduled 9/14/2018    Requested Prescriptions     Pending Prescriptions Disp Refills    blood glucose test strips (ACCU-CHEK ARABELLA PLUS) strip 200 strip 5     Sig: Test blood sugar BID

## 2018-07-19 ENCOUNTER — TELEPHONE (OUTPATIENT)
Dept: FAMILY MEDICINE CLINIC | Age: 78
End: 2018-07-19

## 2018-07-19 ENCOUNTER — CARE COORDINATION (OUTPATIENT)
Dept: CARE COORDINATION | Age: 78
End: 2018-07-19

## 2018-07-19 ASSESSMENT — ENCOUNTER SYMPTOMS: DYSPNEA ASSOCIATED WITH: EXERTION

## 2018-07-19 NOTE — CARE COORDINATION
Yes  Do you understand how to read food labels?:  Yes  How many restaurant meals do you eat per week?:  5 or more  Do you salt your food before tasting it?:  No         Symptoms:   CHF associated leg swelling: Neg      Symptom course:  stable  Weight trend:  stable  Salt intake watch compared to last visit:  stable      and   COPD Assessment    Does the patient understand envrionmental exposure?:  Yes  Is the patient able to verbalize Rescue vs. Long Acting medications?:  Yes  Does the patient have a nebulizer?:  No  Does the patient use a space with inhaled medications?:  No            Symptoms:   None:  Yes      Symptom course:  stable  Breathlessness:  exertion  Change in chronic cough?:  No/At Baseline  Change in sputum?:  No/At Baseline             Care Coordination Interventions    Program Enrollment:  Complex Care  Referral from Primary Care Provider:  No  Suggested Interventions and Community Resources  Diabetes Education: In Process (Comment: With [facility])  Disease Specific Clinic:  In Process (Comment: CHF education with [facility])  Home Health Services:  Completed  Medi Set or Pill Pack:  Not Started (Comment: Planned for discharge )  Pharmacist:  Completed  Senior Services: In Process  Social Work:  Completed  Other Therapy Services: In Process (Comment: Neurocognitive Eval with  )  Other Services:  Declined (Comment: Spiritual Care - Declines for now on 3/14/2018)  Zone Management Tools:  Completed (Comment: CHF, Diabetes Zone Management mailed to patient 8/1/2017)  Other Services or Interventions:  low Na diet, weight log mailed to patient 8/9/2017             Goals Addressed             Most Recent       Care Coordination     COMPLETED: Conditions and Symptoms   On track (7/19/2018)             I will schedule office visits, as directed by my provider. I will notify my provider of any barriers to my plan of care.   I will notify my provider of any symptoms that indicate a worsening of my

## 2018-07-24 DIAGNOSIS — G47.33 OSA (OBSTRUCTIVE SLEEP APNEA): Primary | ICD-10-CM

## 2018-07-25 ENCOUNTER — CARE COORDINATION (OUTPATIENT)
Dept: CARE COORDINATION | Age: 78
End: 2018-07-25

## 2018-07-27 ENCOUNTER — TELEPHONE (OUTPATIENT)
Dept: FAMILY MEDICINE CLINIC | Age: 78
End: 2018-07-27

## 2018-07-30 ENCOUNTER — CARE COORDINATION (OUTPATIENT)
Dept: CARE COORDINATION | Age: 78
End: 2018-07-30

## 2018-07-30 ASSESSMENT — ENCOUNTER SYMPTOMS: DYSPNEA ASSOCIATED WITH: EXERTION

## 2018-07-30 NOTE — CARE COORDINATION
exertion  Increase use of rapid acting/rescue inhaled medications?:  No  Change in chronic cough?:  No/At Baseline  Change in sputum?:  No/At Baseline           Future Appointments  Date Time Provider Ann Yelitza   7/31/2018 1:30 PM MD Benjamin Tineo [facility]   8/2/2018 10:00 AM DO Julio C [employer] [facility]   8/9/2018 1:00 PM KIMI Gagnon - CNP EAST SLEEP [facility]   9/14/2018 1:00 PM DO MARY IbanezGATE FP [facility]   2/21/2019 1:20 PM [facility]         Care Coordination Interventions    Program Enrollment:  Complex Care  Referral from Primary Care Provider:  No  Suggested Interventions and Community Resources  Diabetes Education: In Process (Comment: With [address])  Disease Specific Clinic:  In Process (Comment: CHF education with [address])  Home Health Services:  Completed  Medi Set or Pill Pack:  Not Started (Comment: Planned for discharge )  Pharmacist:  Completed  Senior Services: In Process  Social Work:  Completed  Other Therapy Services:   In Process (Comment: Neurocognitive Eval with UC )  Other Services:  Declined (Comment: Spiritual Care - Declines for now on 3/14/2018)  Zone Management Tools:  Completed (Comment: CHF, Diabetes Zone Management mailed to patient 8/1/2017)  Other Services or Interventions:  low Na diet, weight log mailed to patient 8/9/2017             Goals Addressed     None

## 2018-08-01 NOTE — TELEPHONE ENCOUNTER
CPAP compliance from 7/1/187/30/18 on CPAP 13 cm H2O reviewed. Compliance is good 96.7% AHI remains slightly elevated 5.4. Does not look like change was made per order to CPAP 14 cm H2O. Discussed with Monica WILLIS from [facility]. States he will change to CPAP 14 CMH H2O via modem today. Will need compliance report in 2 weeks. If AHI is controlled will need to repeat overnight pulse ox on these settings. If AHI is not controlled will need CPAP titration to evaluate for correct CPAP pressure and to evaluate if patient needs O2 at night. Is look for new compliance report in 2 weeks starting date 8/1/18.

## 2018-08-02 ENCOUNTER — CARE COORDINATION (OUTPATIENT)
Dept: CARE COORDINATION | Age: 78
End: 2018-08-02

## 2018-08-03 ENCOUNTER — TELEPHONE (OUTPATIENT)
Dept: FAMILY MEDICINE CLINIC | Age: 78
End: 2018-08-03

## 2018-08-03 ENCOUNTER — CARE COORDINATION (OUTPATIENT)
Dept: CARE COORDINATION | Age: 78
End: 2018-08-03

## 2018-08-03 NOTE — CARE COORDINATION
ACC: Eli Camarena RN  CM Risk Score: 17  Pablo Mortality Risk Score: 27.13    Summary/Assessment:   Pt reports that her restless legs are keeping her up at night and making her shaky and nervous. She was nervous and reported that she overate last evening. BGs are higher today and she also has some weight gain as she had higher sodium than yesterday    She is checking her weights and BGs daily  Reports that she is taking her medications  HHC Continues with OT/PT and SN  She recently had COA PASSPORT visit and this went successfully. She will be getting personal aide/homemaker and transportation services. She was encouraged to attend senior centers as well. Intervention:   Discussion re limiting sodium intake and carbs  Pt reports she understands   Call to Yamilet Padron and advised [address] available if any identified needs    Plan:    Follow up next week -   Routing note to PCP re RLS      Care Coordination Interventions    Program Enrollment:  Complex Care  Referral from Primary Care Provider:  No  Suggested Interventions and Community Resources  Diabetes Education: In Process (Comment: With [address])  Disease Specific Clinic:  In Process (Comment: CHF education with [address])  Home Health Services:  Completed  Medi Set or Pill Pack:  Not Started (Comment: Planned for discharge )  Pharmacist:  Completed  Senior Services: In Process  Social Work:  Completed  Other Therapy Services:   In Process (Comment: Neurocognitive Eval with UC )  Other Services:  Declined (Comment: Spiritual Care - Declines for now on 3/14/2018)  Zone Management Tools:  Completed (Comment: CHF, Diabetes Zone Management mailed to patient 8/1/2017)  Other Services or Interventions:  low Na diet, weight log mailed to patient 8/9/2017         Future Appointments  Date Time Provider Ann Torre   8/9/2018 1:00 PM KIMI Hwang CNP SLEEP [facility]   8/10/2018 1:15 PM DO TASHA Harrell FP [facility]   9/14/2018 1:00 PM DO TASHA Harrell

## 2018-08-03 NOTE — TELEPHONE ENCOUNTER
Patient needs to watch her salt intake and she needs to watch her carb intake. If she continues to eat very unhealthy, she will end up in the hospital again.

## 2018-08-06 ENCOUNTER — TELEPHONE (OUTPATIENT)
Dept: FAMILY MEDICINE CLINIC | Age: 78
End: 2018-08-06

## 2018-08-06 ENCOUNTER — CARE COORDINATION (OUTPATIENT)
Dept: CARE COORDINATION | Age: 78
End: 2018-08-06

## 2018-08-06 LAB
BILIRUBIN URINE: NEGATIVE
BLOOD, URINE: NEGATIVE
CLARITY: CLEAR
COLOR: ABNORMAL
GLUCOSE URINE: 500 MG/DL
KETONES, URINE: NEGATIVE MG/DL
LEUKOCYTE ESTERASE, URINE: NEGATIVE
MICROSCOPIC EXAMINATION: ABNORMAL
NITRITE, URINE: NEGATIVE
PH UA: 7
PROTEIN UA: NEGATIVE MG/DL
SPECIFIC GRAVITY UA: <=1.005
URINE TYPE: ABNORMAL
UROBILINOGEN, URINE: 0.2 E.U./DL

## 2018-08-06 NOTE — TELEPHONE ENCOUNTER
Pt. Was seen by home care yesterday. She was complaining of lower pubic pain and foul smelling urine.  They are sending a urine out for UA and UC

## 2018-08-06 NOTE — CARE COORDINATION
Call to patient to obtain daily BG's and weights. Please see readings below or in flowsheets. Instructed patient to contact PCP office or [place] with any needs or concerns, she was agreeable. Will follow up again in 1 week to obtain readings.      Patient Reported Blood Glucose   Blood Glucose Tracker 8/6/2018 8/5/2018 8/4/2018 8/3/2018 7/26/2018   Before breakfast blood glucose 188 184 164 283 170   After breakfast blood glucose - - - 259 -      Patient Reported Weight   Patient Reported Weights 8/6/2018 8/5/2018 8/4/2018 8/3/2018 7/26/2018   Weight 232 lb 230 lb 230 lb 234 lb 228 lb        [facility] Coordination   B:[phone]  C:[phone]

## 2018-08-08 ENCOUNTER — TELEPHONE (OUTPATIENT)
Dept: FAMILY MEDICINE CLINIC | Age: 78
End: 2018-08-08

## 2018-08-08 LAB
ORGANISM: ABNORMAL
URINE CULTURE, ROUTINE: ABNORMAL
URINE CULTURE, ROUTINE: ABNORMAL

## 2018-08-09 ENCOUNTER — OFFICE VISIT (OUTPATIENT)
Dept: PULMONOLOGY | Age: 78
End: 2018-08-09

## 2018-08-09 VITALS
HEART RATE: 80 BPM | BODY MASS INDEX: 38.99 KG/M2 | TEMPERATURE: 97.5 F | RESPIRATION RATE: 16 BRPM | OXYGEN SATURATION: 95 % | DIASTOLIC BLOOD PRESSURE: 80 MMHG | SYSTOLIC BLOOD PRESSURE: 140 MMHG | HEIGHT: 65 IN | WEIGHT: 234 LBS

## 2018-08-09 DIAGNOSIS — G47.33 OBSTRUCTIVE SLEEP APNEA: Primary | ICD-10-CM

## 2018-08-09 DIAGNOSIS — Z71.89 CPAP USE COUNSELING: ICD-10-CM

## 2018-08-09 DIAGNOSIS — G47.34 NOCTURNAL HYPOXIA: ICD-10-CM

## 2018-08-09 DIAGNOSIS — R68.2 DRY MOUTH: ICD-10-CM

## 2018-08-09 DIAGNOSIS — E66.9 OBESITY (BMI 30-39.9): ICD-10-CM

## 2018-08-09 PROCEDURE — G8427 DOCREV CUR MEDS BY ELIG CLIN: HCPCS | Performed by: NURSE PRACTITIONER

## 2018-08-09 PROCEDURE — G8400 PT W/DXA NO RESULTS DOC: HCPCS | Performed by: NURSE PRACTITIONER

## 2018-08-09 PROCEDURE — 1090F PRES/ABSN URINE INCON ASSESS: CPT | Performed by: NURSE PRACTITIONER

## 2018-08-09 PROCEDURE — G8417 CALC BMI ABV UP PARAM F/U: HCPCS | Performed by: NURSE PRACTITIONER

## 2018-08-09 PROCEDURE — 1036F TOBACCO NON-USER: CPT | Performed by: NURSE PRACTITIONER

## 2018-08-09 PROCEDURE — 4040F PNEUMOC VAC/ADMIN/RCVD: CPT | Performed by: NURSE PRACTITIONER

## 2018-08-09 PROCEDURE — 1101F PT FALLS ASSESS-DOCD LE1/YR: CPT | Performed by: NURSE PRACTITIONER

## 2018-08-09 PROCEDURE — 1123F ACP DISCUSS/DSCN MKR DOCD: CPT | Performed by: NURSE PRACTITIONER

## 2018-08-09 PROCEDURE — 99214 OFFICE O/P EST MOD 30 MIN: CPT | Performed by: NURSE PRACTITIONER

## 2018-08-09 PROCEDURE — G8598 ASA/ANTIPLAT THER USED: HCPCS | Performed by: NURSE PRACTITIONER

## 2018-08-09 ASSESSMENT — SLEEP AND FATIGUE QUESTIONNAIRES
HOW LIKELY ARE YOU TO NOD OFF OR FALL ASLEEP WHILE SITTING AND READING: 1
HOW LIKELY ARE YOU TO NOD OFF OR FALL ASLEEP WHEN YOU ARE A PASSENGER IN A CAR FOR AN HOUR WITHOUT A BREAK: 3
NECK CIRCUMFERENCE (INCHES): 17
HOW LIKELY ARE YOU TO NOD OFF OR FALL ASLEEP WHILE WATCHING TV: 1
HOW LIKELY ARE YOU TO NOD OFF OR FALL ASLEEP WHILE SITTING AND TALKING TO SOMEONE: 0
HOW LIKELY ARE YOU TO NOD OFF OR FALL ASLEEP IN A CAR, WHILE STOPPED FOR A FEW MINUTES IN TRAFFIC: 0
ESS TOTAL SCORE: 12
HOW LIKELY ARE YOU TO NOD OFF OR FALL ASLEEP WHILE LYING DOWN TO REST IN THE AFTERNOON WHEN CIRCUMSTANCES PERMIT: 3
HOW LIKELY ARE YOU TO NOD OFF OR FALL ASLEEP WHILE SITTING INACTIVE IN A PUBLIC PLACE: 1
HOW LIKELY ARE YOU TO NOD OFF OR FALL ASLEEP WHILE SITTING QUIETLY AFTER LUNCH WITHOUT ALCOHOL: 3

## 2018-08-09 NOTE — PATIENT INSTRUCTIONS
Please keep all of your future appointments scheduled by [facility] Pulmonary office. Sleep Hygiene. .. Tips for better sleep. .. Avoid naps. This will ensure you are sleepy at bedtime. If you have to take a nap, sleep less than 1 hour, before 3 pm.  Sleep only when sleepy; this reduces the time you are awake in bed. Regular exercise is recommended to help you deepen your sleep, but not within 4-6 hours of your bedtime. Timing of exercise is important, aim to exercise early in the morning or early afternoon. A light snack may help you fall asleep. Warm milk and foods high in the amino acid tryptophan, such as bananas, may help you to sleep  Be sure to avoid heavy, spicy or sugary foods 4-6 hours before bedtime and avoid at snack time. Stay away from stimulants such as caffeine and nicotine for at least 4-6 hours before bed. Stimulants can interfere with your ability to fall asleep. Caffeine is found in tea, cola, coffee, cocoa and chocolate and is best avoided at bedtime. Nicotine is found in tobacco products. Avoid alcohol 4-6 hours before bedtime. Alcohol has an immediate sleep-inducing effect, after a few hours when alcohol levels fall there is a stimulant or wake-up effect and will cause fragmented sleep. Sleep rituals are important. Give your body clues it is time to slow down and sleep. Examples include; yoga, deep breathing, listen to relaxing music, a hot bath or a few minutes of reading. Have a fixed bedtime and awakening time, Even on weekends! You will feel better keeping a regular sleep cycle, even if you are retired or not working. Get into your favorite sleep position. If not asleep in 30 minutes, get up and do something boring until you feel sleepy. Remember not to expose yourself to bright lights such as TV, phone or tablet screens. Only use your bed for sleeping. Do not use your bed as an office, workroom or recreation room. Use comfortable bedding. Uncomfortable bedding can prevent good sleep. Ensure your bedroom is quiet and comfortable. A cooler room along with enough blankets to stay warm is recommended. If your room is too noisy, try a white noise machine. If too bright, try black out shades or an eye mask. Dont take worries to bed. Leave worries about work, school etc. behind you when you go to bed. Some people find it helpful to assign a worry period in the evening or late afternoon to write down your worries and get them out of your system. CPAP Equipment Cleaning and Disinfecting Schedule  Equipment Cleaning Frequency Instructions  Disinfecting Frequency   Non-Disposable Filters  Weekly Mild soapy water, Rinse, Air Dry Not Required   Disposable Filters Change as needed  2-4 weeks Do Not Wash Not Required   Hose/tubing Daily Mild soapy water, Rinse, Air Dry Once a week   Mask / Nasal Pillows Daily Mild soapy water, Rinse, Air Dry Once a week   Headgear Weekly Hand wash, Mild soapy water, Rinse, Dry  Not Required   Humidifier Daily Empty water daily  Mild soapy water, Rinse well, Air Dry  Once a week   CPAP Unit As Needed Dust with damp cloth,  No detergents or sprays Not Required         Disinfect (per schedule) with 1 part white vinegar and 3 parts water- soak mask and water chamber for 30 minutes every 1-2 weeks, more often if sick. Allow water/vinegar mixture to run through tubing. Allow all equipment to air dry. Drying Hints:   Always hang tubing away from direct sunlight, as this will cause the tubing to become yellow, brittle and crack over a period of time. DO NOT attach the wet tubing to your CPAP unit to blow-dry it. The moisture from the tubing can drain back into your machine. Moisture in your unit can cause sudden pressure increases or short circuits  DO's and DON'Ts:  - Don't use alcohol-based products to clean your mask, because it can cause the materials to become hard and brittle.    - Don't put headgear in the washer or

## 2018-08-09 NOTE — PROGRESS NOTES
Laterality Date    ACHILLES TENDON SURGERY  2/2/12    LEFT ACHILLES DEBRIDEMENT, HAGLUNDS AND RETROCALCANEAL BURSA EXCISION WITH FLEXOR HALLUS LONGUS TENDON TRANSFER WITH BLOCK FOR PAIN CONTROL    APPENDECTOMY      CARDIAC CATHETERIZATION  8/21/14    CARPAL TUNNEL RELEASE      both hands    CHOLECYSTECTOMY      COLONOSCOPY      COLONOSCOPY      COLONOSCOPY  2/10/2016    CYSTOSCOPY  10/02/2017    DIAGNOSTIC CARDIAC CATH LAB PROCEDURE      EYE SURGERY      HYSTERECTOMY      JOINT REPLACEMENT      bilateral knee    KNEE SURGERY      both replaced    OTHER SURGICAL HISTORY  07/09/2018    CYSTOSCOPY, HYDRODISTENTION OF BLADDER WITH INSTILLATION OF    POLYPECTOMY      SHOULDER SURGERY      TOENAIL EXCISION  08/04/2016    right big toe    TONSILLECTOMY      TUBAL LIGATION      UPPER GASTROINTESTINAL ENDOSCOPY  10/29/12    gastritis    UPPER GASTROINTESTINAL ENDOSCOPY  2/10/2016    URETHRAL STRICTURE DILATATION         Allergies:  is allergic to latex; cephalexin; cephalexin; codeine; food; levofloxacin; pce [erythromycin]; pcn [penicillins]; pentazocine; sumycin [tetracycline hcl]; atarax [hydroxyzine hcl]; beef extract; beef-derived products; flagyl [metronidazole]; macrodantin [nitrofurantoin]; milk-related compounds; pyridium [phenazopyridine hcl]; sulfa antibiotics; and urised [methen-jenifer-meth bl-phen sal]. Social History:    TOBACCO:   reports that she quit smoking about 43 years ago. Her smoking use included Cigarettes. She has a 0.02 pack-year smoking history. She has never used smokeless tobacco.  ETOH:   reports that she does not drink alcohol.       Family History:   Family History   Problem Relation Age of Onset    Cancer Father         prostate    Heart Disease Mother     Heart Disease Brother     Heart Disease Brother     Heart Disease Sister     Diabetes Sister        Current Medications:    Current Outpatient Prescriptions:     blood glucose test strips (ACCU-CHEK ARABELLA PLUS) strip, Test blood sugar BID, Disp: 200 strip, Rfl: 5    metFORMIN (GLUCOPHAGE XR) 500 MG extended release tablet, Take 1 tablet by mouth three times daily, Disp: 90 tablet, Rfl: 0    carvedilol (COREG) 25 MG tablet, Take 1 tablet by mouth 2 times daily (with meals), Disp: 180 tablet, Rfl: 1    DULoxetine (CYMBALTA) 60 MG extended release capsule, Take 1 capsule by mouth daily, Disp: 90 capsule, Rfl: 0    furosemide (LASIX) 40 MG tablet, TAKE ONE TABLET BY MOUTH TWICE A DAY, Disp: 60 tablet, Rfl: 0    potassium chloride (KLOR-CON M) 20 MEQ extended release tablet, Take 1 tablet by mouth daily, Disp: 30 tablet, Rfl: 0    calcium carbonate 600 MG TABS tablet, Take 1 tablet by mouth 2 times daily, Disp: 30 tablet, Rfl: 1    omeprazole (PRILOSEC) 20 MG delayed release capsule, Take 1 capsule by mouth 2 times daily, Disp: 60 capsule, Rfl: 3    naproxen (NAPROSYN) 500 MG tablet, Take 1 tablet by mouth 2 times daily (with meals) As needed for pain, Disp: 60 tablet, Rfl: 0    acetaminophen (TYLENOL) 325 MG tablet, Take 650 mg by mouth every 6 hours as needed for Pain Take as directed, Disp: , Rfl:     clotrimazole-betamethasone (LOTRISONE) 1-0.05 % cream, Apply bid to affected area., Disp: 45 g, Rfl: 3    hydrocortisone 2.5 % cream, APPLY TOPICALLY TWO TIMES A DAY, Disp: 1 Tube, Rfl: 1    azelastine (ASTELIN) 0.1 % nasal spray, 2 sprays by Nasal route 2 times daily Use in each nostril as directed, Disp: 1 Bottle, Rfl: 0    fluticasone (FLONASE) 50 MCG/ACT nasal spray, 1 spray by Nasal route daily, Disp: 1 Bottle, Rfl: 0    albuterol sulfate HFA (PROAIR HFA) 108 (90 Base) MCG/ACT inhaler, Use every 4 hours while awake for 7-10 days then PRN wheezing  Dispense with SPACER and Instruct on use.   May sub Ventolin or Proventil as needed per Insurance., Disp: 1 Inhaler, Rfl: 1    pramipexole (MIRAPEX) 0.5 MG tablet, TAKE ONE TABLET BY MOUTH ONCE NIGHTLY, Disp: 30 tablet, Rfl: 5    vitamin D (ERGOCALCIFEROL) 45451 AHI 53.5  desat 68%   CPAP titration 6/23/2011 13 cmH2O     CPAP compliance data:  Compliance download report from 3/27/17 to 4/25/17  showed patient is using machine 7:54 hrs/night with 96.7 % compliance and AHI 2.2 within this time frame. 29/30days with greater than 4 hours of machine use. CPAP 11 cm H20. Compliance download report from 4/16/18 to 5/15/18 reviewed today by me and showed patient is using machine 9:51 hrs/night with 86.7% compliance and AHI 2.2 within this time frame. 29/30days with greater than 4 hours of machine use. CPAP 11 cm H20. Compliance download report from 7/9/18 to 8/7/18 reviewed today by me and showed patient is using machine 10:42 hrs/night with 96.7% compliance and AHI 5.0 within this time frame. 29/30days with greater than 4 hours of machine use. CPAP 13/ CPAP 14 cm H20. Since CPAP was changed to CPAP 14 cm H2O- Compliance download report from 8/1/18 to 8/8/18 reviewed today by me and showed patient is using machine 9:38 hrs/night with 100% compliance and AHI 4.6 within this time frame. 8/8 days with greater than 4 hours of machine use. CPAP 14 cm H20. Assessment:       · Severe GRACE. CPAP 11 cmH2O. Optimal compliance on review today. · Obesity  · HTN- BP elevated in office  · Asthma and cough followed by Dr. Eva Fall  · Sleep onset and maintenance insomnia- poor sleep hygiene, leg pain likely contributing  · Fatigue and tiredness during the day  · Dry mouth with CPAP        Plan:      · Continue CPAP 14 cm H2O  · ONPO on CPAP 14 room air to evaluate need for O2 bleed in at night. Will need new titration if O2 is low. · Mask fitting in office with RT- try full face mask as patient is having oral dryness with CPAP  · Advised to use CPAP 6-8 hrs at night and during naps. · Replacement of mask, tubing, head straps every 3-6 months or sooner if damaged.    · Follow up CPAP compliance and pressure adjustment if needed  · Sleep hygiene  · Cognitive behavioral therapy was discussed with patient including stimulus control and sleep restriction   · Avoid sedatives, alcohol and caffeinated drinks at bed time. · No driving motorized vehicles or operating heavy machinery while fatigue, drowsy or sleepy. · Weight loss is also recommended as a long-term intervention. · Complications of GRACE if not treated were discussed with patient patient to include systemic hypertension, pulmonary hypertension, cardiovascular morbidities, car accidents and all cause mortality. · Patient education handout provided regarding sleep tips and CPAP cleaning recommendations   · Continue to follow up with Dr. Thony Cunha as recommended      Follow up 6 months, sooner if needed    More than 25 minutes of time was spent in direct patient contact during this visit, more than 50% of which was spent counseling and/or coordinating care.

## 2018-08-10 ENCOUNTER — TELEPHONE (OUTPATIENT)
Dept: FAMILY MEDICINE CLINIC | Age: 78
End: 2018-08-10

## 2018-08-10 ENCOUNTER — CARE COORDINATION (OUTPATIENT)
Dept: FAMILY MEDICINE CLINIC | Age: 78
End: 2018-08-10

## 2018-08-10 DIAGNOSIS — H90.3 SENSORY HEARING LOSS, BILATERAL: ICD-10-CM

## 2018-08-10 DIAGNOSIS — G25.81 RESTLESS LEGS SYNDROME (RLS): ICD-10-CM

## 2018-08-10 DIAGNOSIS — R42 DIZZINESS: Primary | ICD-10-CM

## 2018-08-10 DIAGNOSIS — G25.0 TREMOR, ESSENTIAL: ICD-10-CM

## 2018-08-10 RX ORDER — CIPROFLOXACIN 250 MG/1
250 TABLET, FILM COATED ORAL 2 TIMES DAILY
Qty: 14 TABLET | Refills: 0 | Status: SHIPPED | OUTPATIENT
Start: 2018-08-10 | End: 2019-01-25 | Stop reason: SDUPTHER

## 2018-08-13 ENCOUNTER — CARE COORDINATION (OUTPATIENT)
Dept: FAMILY MEDICINE CLINIC | Age: 78
End: 2018-08-13

## 2018-08-13 ENCOUNTER — CARE COORDINATION (OUTPATIENT)
Dept: CARE COORDINATION | Age: 78
End: 2018-08-13

## 2018-08-14 ENCOUNTER — TELEPHONE (OUTPATIENT)
Dept: FAMILY MEDICINE CLINIC | Age: 78
End: 2018-08-14

## 2018-08-14 ENCOUNTER — CARE COORDINATION (OUTPATIENT)
Dept: CARE COORDINATION | Age: 78
End: 2018-08-14

## 2018-08-14 DIAGNOSIS — E11.9 CONTROLLED TYPE 2 DIABETES MELLITUS WITHOUT COMPLICATION, WITHOUT LONG-TERM CURRENT USE OF INSULIN (HCC): ICD-10-CM

## 2018-08-14 RX ORDER — METFORMIN HYDROCHLORIDE 500 MG/1
TABLET, EXTENDED RELEASE ORAL
Qty: 90 TABLET | Refills: 0 | Status: SHIPPED | OUTPATIENT
Start: 2018-08-14 | End: 2018-08-17 | Stop reason: SDUPTHER

## 2018-08-14 NOTE — TELEPHONE ENCOUNTER
Pt is now saying she is having pain and urinary frequency per Yandy.     Deena Leonard informed ok for PT

## 2018-08-14 NOTE — TELEPHONE ENCOUNTER
Iris Linn, the pt's [facility] nurse is asking if it is okay for the pt to take cipro due to listed allergy and a possible interaction with another med.     Call lina at [phone]

## 2018-08-14 NOTE — TELEPHONE ENCOUNTER
.  Last office visit 7/2/2018     Last written 7-3-18 #90 with 0 refills      Next office visit scheduled 9/14/2018

## 2018-08-15 ENCOUNTER — CARE COORDINATION (OUTPATIENT)
Dept: CARE COORDINATION | Age: 78
End: 2018-08-15

## 2018-08-15 NOTE — CARE COORDINATION
08/15/18  Social work visit today - see FATOU Note  Low income Medicare Subsidy from 2009 - 2017 which gave her Part D without premium payments. Letters was sent to pt and she did not understand what this meant for her Part D - after no response from pt, [employer] has cancelled Part D coverage  [employer] rep informed SW that appeal may be possible with statement from PCP/RNCC in addition to SW statement  $271 is due and this would need to be paid    Call to patient to follow up  Pt continues to feel well. She did take the Cipro and denies any ill effects. She is advised to take with food and to call [facility] if any issues. She states in the past had nausea with this medication. She is unable to report to [facility] who told her she was allergic as she reported earlier in the day. Patient Reported Weight   Patient Reported Weights 8/15/2018 8/13/2018 8/12/2018 8/11/2018 8/10/2018   Weight 229 lb 230 lb 230 lb 230 lb 230 lb   Weight Comments - - - - -     Patient Reported Blood Glucose   Blood Glucose Tracker 8/15/2018 8/13/2018 8/12/2018 8/11/2018 8/10/2018   Before breakfast blood glucose 154 164 143 182 190   After breakfast blood glucose - - - - -       Plan: Follow up next week for sx - pt in agreement  Letter of appeal re loss of part D for Medicare.   Continue with SW interventions per SW plan

## 2018-08-17 DIAGNOSIS — I10 ESSENTIAL HYPERTENSION: ICD-10-CM

## 2018-08-17 DIAGNOSIS — F41.9 ANXIETY AND DEPRESSION: ICD-10-CM

## 2018-08-17 DIAGNOSIS — F32.A ANXIETY AND DEPRESSION: ICD-10-CM

## 2018-08-17 DIAGNOSIS — E11.9 CONTROLLED TYPE 2 DIABETES MELLITUS WITHOUT COMPLICATION, WITHOUT LONG-TERM CURRENT USE OF INSULIN (HCC): ICD-10-CM

## 2018-08-17 RX ORDER — DULOXETIN HYDROCHLORIDE 60 MG/1
60 CAPSULE, DELAYED RELEASE ORAL DAILY
Qty: 90 CAPSULE | Refills: 0 | Status: SHIPPED | OUTPATIENT
Start: 2018-08-17 | End: 2018-12-27 | Stop reason: SDUPTHER

## 2018-08-17 RX ORDER — POTASSIUM CHLORIDE 20 MEQ/1
20 TABLET, EXTENDED RELEASE ORAL DAILY
Qty: 30 TABLET | Refills: 0 | Status: SHIPPED | OUTPATIENT
Start: 2018-08-17 | End: 2019-07-19

## 2018-08-17 RX ORDER — METFORMIN HYDROCHLORIDE 500 MG/1
TABLET, EXTENDED RELEASE ORAL
Qty: 90 TABLET | Refills: 1 | Status: SHIPPED | OUTPATIENT
Start: 2018-08-17 | End: 2018-08-20 | Stop reason: SDUPTHER

## 2018-08-17 RX ORDER — FUROSEMIDE 40 MG/1
TABLET ORAL
Qty: 60 TABLET | Refills: 0 | Status: SHIPPED | OUTPATIENT
Start: 2018-08-17 | End: 2018-09-11 | Stop reason: SDUPTHER

## 2018-08-17 NOTE — TELEPHONE ENCOUNTER
Last Seen: 7/2/2018  Next Appointment: 9/14/2018    Requested Prescriptions     Pending Prescriptions Disp Refills    furosemide (LASIX) 40 MG tablet 60 tablet 0     Sig: TAKE ONE TABLET BY MOUTH TWICE A DAY    potassium chloride (KLOR-CON M) 20 MEQ extended release tablet 30 tablet 0     Sig: Take 1 tablet by mouth daily

## 2018-08-17 NOTE — CARE COORDINATION
need to ask [facility] PERRI Carmita Ivory to follow up on this issue upon his return    Pr DOES have the antibiotic in question. It was delivered while I was in the home. Pt paid for it with debit card. Pt states that it was previously refused by pharmacy due to allergy concerns. Pharmacy states that pt declined delivery previously due to her own concerns about allergy. Regardless, informed [facility] RN that antibiotic is in pt's possession. Will need to return to pt's home in next couple of days to get her signature on above mentioned appeal letter. Informed [facility] RN of all.     Reggie Valles

## 2018-08-17 NOTE — TELEPHONE ENCOUNTER
The [place] [phone] called and is requesting new prescription of the Furosemide 40 mgs, Potassium Chloride and Pepcid.

## 2018-08-20 ENCOUNTER — CARE COORDINATION (OUTPATIENT)
Dept: CARE COORDINATION | Age: 78
End: 2018-08-20

## 2018-08-20 DIAGNOSIS — E11.9 CONTROLLED TYPE 2 DIABETES MELLITUS WITHOUT COMPLICATION, WITHOUT LONG-TERM CURRENT USE OF INSULIN (HCC): ICD-10-CM

## 2018-08-20 RX ORDER — METFORMIN HYDROCHLORIDE 500 MG/1
500 TABLET, EXTENDED RELEASE ORAL 3 TIMES DAILY
Qty: 90 TABLET | Refills: 1 | Status: SHIPPED | OUTPATIENT
Start: 2018-08-20 | End: 2018-10-12 | Stop reason: SDUPTHER

## 2018-08-20 NOTE — TELEPHONE ENCOUNTER
Pt. States she is suppose to be taking metformin 3 daily. Recent prescription was sent for twice daily but quantity 90. Can you please resend. RX corrected to three times daily.

## 2018-08-20 NOTE — CARE COORDINATION
Call to patient to obtain daily BG's and weights. Left message on machine asking patient to return my call. Will attempt to reach patient again.        [facility] Coordination   [phone]  [phone]

## 2018-08-21 ENCOUNTER — TELEPHONE (OUTPATIENT)
Dept: FAMILY MEDICINE CLINIC | Age: 78
End: 2018-08-21

## 2018-08-21 NOTE — TELEPHONE ENCOUNTER
Called earlier today. Pt was out of lasix over the weekend. Started it last night. Weight 236 today. BS was 195 this AM. Is out of Metformin. Said there was a medication she had been taken off of and patient is now complaining her legs are jumping and she is not sleeping.

## 2018-08-22 NOTE — CARE COORDINATION
Call to patient to obtain daily BG's and weights. Spoke to patient and she reports that she is outside. She states that she will call back later. Will wait for a return call.      135 [facility] Coordination   [phone]  B:[phone]

## 2018-08-23 ENCOUNTER — TELEPHONE (OUTPATIENT)
Dept: FAMILY MEDICINE CLINIC | Age: 78
End: 2018-08-23

## 2018-08-23 ENCOUNTER — CARE COORDINATION (OUTPATIENT)
Dept: CARE COORDINATION | Age: 78
End: 2018-08-23

## 2018-08-23 NOTE — TELEPHONE ENCOUNTER
Home Care PT calling to let you know that Blue Werner is now taking her medications but is experiencing increase weight gain. 8- weight 229    8-  weight 235    8- weight 237  Pt. Is experiencing SOB with any activity. /74 Pulse 74 and 02 sat 93.  ? What to do Adjust Lasix?

## 2018-08-27 ENCOUNTER — TELEPHONE (OUTPATIENT)
Dept: FAMILY MEDICINE CLINIC | Age: 78
End: 2018-08-27

## 2018-08-27 ENCOUNTER — CARE COORDINATION (OUTPATIENT)
Dept: CARE COORDINATION | Age: 78
End: 2018-08-27

## 2018-08-27 NOTE — TELEPHONE ENCOUNTER
Yes, the stomach pain and diarrhea does seem like symptoms of viral gastroenteritis. Increase fluids and eats lots of soft foods like chicken noodle soup.

## 2018-08-27 NOTE — CARE COORDINATION
Call to patient to obtain daily BG's and weights. Left message on machine to return call to obtain readings. Will attempt to reach patient again.     135 [facility] Coordination   [phone]  Y:[phone]

## 2018-08-29 ENCOUNTER — CARE COORDINATION (OUTPATIENT)
Dept: CARE COORDINATION | Age: 78
End: 2018-08-29

## 2018-08-30 ENCOUNTER — CARE COORDINATION (OUTPATIENT)
Dept: CARE COORDINATION | Age: 78
End: 2018-08-30

## 2018-08-30 NOTE — CARE COORDINATION
Call to patient to see if diarrhea and upset stomach has gotten any better since yesterday. Left message on machine letting patient know to not take anymore Milk of Magnesia and to return call with symptom update. Will wait for a return call.      135 [facility] Coordination   [phone]  D:[phone]

## 2018-08-31 ENCOUNTER — CARE COORDINATION (OUTPATIENT)
Dept: CARE COORDINATION | Age: 78
End: 2018-08-31

## 2018-08-31 NOTE — CARE COORDINATION
The following is a summary note from a home visit on 8-30-18 and correspondence today.  met with patient at her residence on 8-30-18. She was provided copies of support letters from the Physician/RNCC/LISW, along with the [facility] address for back premium payments, and the address to submit an appeal for reinstatement.  assisted patient and daughter today in drafting their own letter to request reinstatement of patient's Medicare Part D coverage. Patient/daughter have been advised not to submit the reinstatement request/support letters until back premium payments have been submitted and subsequently recorded. Chika Oh will be advised of the information.

## 2018-09-04 ENCOUNTER — CARE COORDINATION (OUTPATIENT)
Dept: CARE COORDINATION | Age: 78
End: 2018-09-04

## 2018-09-05 ENCOUNTER — CARE COORDINATION (OUTPATIENT)
Dept: CARE COORDINATION | Age: 78
End: 2018-09-05

## 2018-09-05 ENCOUNTER — OFFICE VISIT (OUTPATIENT)
Dept: ORTHOPEDIC SURGERY | Age: 78
End: 2018-09-05

## 2018-09-05 DIAGNOSIS — M54.16 LUMBAR RADICULOPATHY: ICD-10-CM

## 2018-09-05 DIAGNOSIS — M70.61 GREATER TROCHANTERIC BURSITIS OF RIGHT HIP: Primary | ICD-10-CM

## 2018-09-05 PROCEDURE — G8400 PT W/DXA NO RESULTS DOC: HCPCS | Performed by: PHYSICIAN ASSISTANT

## 2018-09-05 PROCEDURE — 1123F ACP DISCUSS/DSCN MKR DOCD: CPT | Performed by: PHYSICIAN ASSISTANT

## 2018-09-05 PROCEDURE — 4040F PNEUMOC VAC/ADMIN/RCVD: CPT | Performed by: PHYSICIAN ASSISTANT

## 2018-09-05 PROCEDURE — 1090F PRES/ABSN URINE INCON ASSESS: CPT | Performed by: PHYSICIAN ASSISTANT

## 2018-09-05 PROCEDURE — 1101F PT FALLS ASSESS-DOCD LE1/YR: CPT | Performed by: PHYSICIAN ASSISTANT

## 2018-09-05 PROCEDURE — G8417 CALC BMI ABV UP PARAM F/U: HCPCS | Performed by: PHYSICIAN ASSISTANT

## 2018-09-05 PROCEDURE — G8428 CUR MEDS NOT DOCUMENT: HCPCS | Performed by: PHYSICIAN ASSISTANT

## 2018-09-05 PROCEDURE — G8598 ASA/ANTIPLAT THER USED: HCPCS | Performed by: PHYSICIAN ASSISTANT

## 2018-09-05 PROCEDURE — 99213 OFFICE O/P EST LOW 20 MIN: CPT | Performed by: PHYSICIAN ASSISTANT

## 2018-09-05 PROCEDURE — 1036F TOBACCO NON-USER: CPT | Performed by: PHYSICIAN ASSISTANT

## 2018-09-05 RX ORDER — TIZANIDINE 4 MG/1
TABLET ORAL
Qty: 60 TABLET | Refills: 0 | Status: SHIPPED | OUTPATIENT
Start: 2018-09-05 | End: 2018-10-12 | Stop reason: SDUPTHER

## 2018-09-05 NOTE — PROGRESS NOTES
Chief Complaint    Hip Pain (rt hip lateral and groin pain, leg goes numb)      History of Present Illness:  Costa Maria is a 69 y.o. female  presents with a long-standing history of bilateral hip pain right worse than left. The patient also complains of central low back pain. We last saw the patient earlier this year when she received a right hip greater trochanteric injection. She states that it did help decrease her pain for a brief period of time but unfortunately her symptoms return. Unfortunately, she was recently hospitalized for an extended period of time and then in a skilled nursing facility for over a month. Since then she's been complaining of increasing pain in the central area of her low back, lateral hip, with numbness and tingling that radiates down into her right lower leg. She reports that the symptoms are intermittent and currently at this point but do affect certain levels of activity. Sitting and walking seem to aggravate her symptoms more than anything. She denies any groin pain     Pain Assessment  Location of Pain: Pelvis  Location Modifiers: Right  Severity of Pain: 10  Frequency of Pain: Intermittent  Aggravating Factors: Walking, Standing  Limiting Behavior: Yes  Result of Injury: No  Work-Related Injury: No  Are there other pain locations you wish to document?: No    Medical History:  Patient's medications, allergies, past medical, surgical, social and family histories were reviewed and updated as appropriate. Review of Systems:  Pertinent items are noted in HPI  Review of systems reviewed from Patient History Form dated on 3/29 and available in the patient's chart under the Media tab. Vital Signs: There were no vitals taken for this visit. General Exam:   Constitutional: Patient is adequately groomed with no evidence of malnutrition  Mental Status: The patient is oriented to time, place and person. The patient's mood and affect are appropriate.   Lymphatic: The

## 2018-09-10 ENCOUNTER — CARE COORDINATION (OUTPATIENT)
Dept: CARE COORDINATION | Age: 78
End: 2018-09-10

## 2018-09-10 NOTE — CARE COORDINATION
Call to patient to obtain daily BG's and weights. Left message on machine to return call to obtain readings.  Will attempt to reach patient again.       [facility] Coordination   [phone]

## 2018-09-14 ENCOUNTER — CARE COORDINATION (OUTPATIENT)
Dept: CARE COORDINATION | Age: 78
End: 2018-09-14

## 2018-09-14 ENCOUNTER — OFFICE VISIT (OUTPATIENT)
Dept: FAMILY MEDICINE CLINIC | Age: 78
End: 2018-09-14

## 2018-09-14 VITALS
BODY MASS INDEX: 40.1 KG/M2 | HEART RATE: 83 BPM | WEIGHT: 241 LBS | SYSTOLIC BLOOD PRESSURE: 126 MMHG | DIASTOLIC BLOOD PRESSURE: 72 MMHG | OXYGEN SATURATION: 98 %

## 2018-09-14 DIAGNOSIS — E11.9 CONTROLLED TYPE 2 DIABETES MELLITUS WITHOUT COMPLICATION, WITHOUT LONG-TERM CURRENT USE OF INSULIN (HCC): Primary | ICD-10-CM

## 2018-09-14 DIAGNOSIS — E78.2 MIXED HYPERLIPIDEMIA: ICD-10-CM

## 2018-09-14 DIAGNOSIS — I10 ESSENTIAL HYPERTENSION: ICD-10-CM

## 2018-09-14 PROCEDURE — G8598 ASA/ANTIPLAT THER USED: HCPCS | Performed by: FAMILY MEDICINE

## 2018-09-14 PROCEDURE — 99214 OFFICE O/P EST MOD 30 MIN: CPT | Performed by: FAMILY MEDICINE

## 2018-09-14 PROCEDURE — 1090F PRES/ABSN URINE INCON ASSESS: CPT | Performed by: FAMILY MEDICINE

## 2018-09-14 PROCEDURE — G8417 CALC BMI ABV UP PARAM F/U: HCPCS | Performed by: FAMILY MEDICINE

## 2018-09-14 PROCEDURE — 1036F TOBACCO NON-USER: CPT | Performed by: FAMILY MEDICINE

## 2018-09-14 PROCEDURE — G8427 DOCREV CUR MEDS BY ELIG CLIN: HCPCS | Performed by: FAMILY MEDICINE

## 2018-09-14 PROCEDURE — G8400 PT W/DXA NO RESULTS DOC: HCPCS | Performed by: FAMILY MEDICINE

## 2018-09-14 PROCEDURE — 1101F PT FALLS ASSESS-DOCD LE1/YR: CPT | Performed by: FAMILY MEDICINE

## 2018-09-14 PROCEDURE — 1123F ACP DISCUSS/DSCN MKR DOCD: CPT | Performed by: FAMILY MEDICINE

## 2018-09-14 PROCEDURE — 4040F PNEUMOC VAC/ADMIN/RCVD: CPT | Performed by: FAMILY MEDICINE

## 2018-09-14 ASSESSMENT — ENCOUNTER SYMPTOMS: DYSPNEA ASSOCIATED WITH: EXERTION

## 2018-09-14 NOTE — PROGRESS NOTES
Psychiatric: She has a normal mood and affect. Current Outpatient Prescriptions   Medication Sig Dispense Refill    furosemide (LASIX) 40 MG tablet TAKE ONE TABLET BY MOUTH TWICE A DAY 60 tablet 5    tiZANidine (ZANAFLEX) 4 MG tablet ONE TID PRN FOR MUSCLE SPASMS 60 tablet 0    metFORMIN (GLUCOPHAGE-XR) 500 MG extended release tablet Take 1 tablet by mouth 3 times daily 90 tablet 1    potassium chloride (KLOR-CON M) 20 MEQ extended release tablet Take 1 tablet by mouth daily 30 tablet 0    DULoxetine (CYMBALTA) 60 MG extended release capsule Take 1 capsule by mouth daily 90 capsule 0    blood glucose test strips (ACCU-CHEK ARABELLA PLUS) strip Test blood sugar  strip 5    metoclopramide (REGLAN) 10 MG tablet Take 1 tablet by mouth 4 times daily as needed (Nausea and/or headache) 30 tablet 0    sennosides-docusate sodium (SENOKOT-S) 8.6-50 MG tablet Take 2 tablets by mouth daily 60 tablet 2    carvedilol (COREG) 25 MG tablet Take 1 tablet by mouth 2 times daily (with meals) 180 tablet 1    calcium carbonate 600 MG TABS tablet Take 1 tablet by mouth 2 times daily 30 tablet 1    omeprazole (PRILOSEC) 20 MG delayed release capsule Take 1 capsule by mouth 2 times daily 60 capsule 3    naproxen (NAPROSYN) 500 MG tablet Take 1 tablet by mouth 2 times daily (with meals) As needed for pain 60 tablet 0    acetaminophen (TYLENOL) 325 MG tablet Take 650 mg by mouth every 6 hours as needed for Pain Take as directed      clotrimazole-betamethasone (LOTRISONE) 1-0.05 % cream Apply bid to affected area.  45 g 3    hydrocortisone 2.5 % cream APPLY TOPICALLY TWO TIMES A DAY 1 Tube 1    azelastine (ASTELIN) 0.1 % nasal spray 2 sprays by Nasal route 2 times daily Use in each nostril as directed 1 Bottle 0    fluticasone (FLONASE) 50 MCG/ACT nasal spray 1 spray by Nasal route daily 1 Bottle 0    albuterol sulfate HFA (PROAIR HFA) 108 (90 Base) MCG/ACT inhaler Use every 4 hours while awake for 7-10 days then PRN wheezing  Dispense with SPACER and Instruct on use. May sub Ventolin or Proventil as needed per [employer]. 1 Inhaler 1    pramipexole (MIRAPEX) 0.5 MG tablet TAKE ONE TABLET BY MOUTH ONCE NIGHTLY 30 tablet 5    pravastatin (PRAVACHOL) 20 MG tablet TAKE ONE TABLET BY MOUTH DAILY 90 tablet 3    isosorbide mononitrate (IMDUR) 30 MG extended release tablet TAKE ONE-HALF TABLET BY MOUTH ONE TIME A DAY 45 tablet 3    ACCU-CHEK SOFTCLIX LANCETS MISC USE TO TEST BLOOD SUGAR TWO TIMES DAILY 50 each 5    Accu-Chek Multiclix Lancets MISC 1 Units by Does not apply route 2 times daily 50 each 5    ONE TOUCH ULTRASOFT LANCETS MISC Test blood sugar 6 times a day 50 each 5    nitroGLYCERIN (NITROSTAT) 0.4 MG SL tablet Place 1 tablet under the tongue every 5 minutes as needed for Chest pain 25 tablet 5    vitamin E 400 UNIT capsule Take 400 Units by mouth daily      aspirin 81 MG tablet Take 81 mg by mouth daily. No current facility-administered medications for this visit. Assessment:    1. Controlled type 2 diabetes mellitus without complication, without long-term current use of insulin ([facility]    2. Essential hypertension    3. Mixed hyperlipidemia        Plan:    1. Controlled type 2 diabetes mellitus without complication, without long-term current use of insulin ([facility])  Stable. Continue current medications. 2. Essential hypertension  Stable. Continue current medications. 3. Mixed hyperlipidemia  Stable. Continue current medications. Return in about 6 months (around 3/14/2019) for medicare annual (DM, HTN, HLD).

## 2018-09-14 NOTE — CARE COORDINATION
Spoke with daughter about action items below. Letter re MA Plan enrollment is dropped in mail to patient.      Plan as below

## 2018-09-19 ENCOUNTER — TELEPHONE (OUTPATIENT)
Dept: FAMILY MEDICINE CLINIC | Age: 78
End: 2018-09-19

## 2018-09-21 ENCOUNTER — CARE COORDINATION (OUTPATIENT)
Dept: CARE COORDINATION | Age: 78
End: 2018-09-21

## 2018-09-24 ENCOUNTER — HOSPITAL ENCOUNTER (OUTPATIENT)
Dept: SLEEP CENTER | Age: 78
Discharge: HOME | End: 2018-09-26
Payer: MEDICARE

## 2018-09-24 ENCOUNTER — CARE COORDINATION (OUTPATIENT)
Dept: CARE COORDINATION | Age: 78
End: 2018-09-24

## 2018-09-24 DIAGNOSIS — E66.01 MORBID OBESITY (HCC): ICD-10-CM

## 2018-09-24 DIAGNOSIS — G47.33 OBSTRUCTIVE SLEEP APNEA: ICD-10-CM

## 2018-09-24 PROCEDURE — 95811 POLYSOM 6/>YRS CPAP 4/> PARM: CPT

## 2018-09-26 NOTE — CARE COORDINATION
Call to patient to obtain daily BG's and weights. Patient reports that she is still having dizzy spells. Patient reports this has been going on for several months now and PCP is aware. Patient reports that PCP thought the dizziness could be from her older CPAP machine that she was using, but patient reports that she got a new machine and is still having dizziness. Patient reports that she is mostly having the dizziness when she lays down, or tries to get up. Patient reports that sometimes she gets up and is so dizzy that she has to lay back down and just lays there until the dizziness gets better so she can walk. She reports that she had a sleep study done on 9/24 and reported she was so dizzy that night and it was the worst she had ever experienced before. Instructed patient to contact [address] [place] [address] or PCP office with any needs or concerns, she was agreeable. Please see readings below or in flowsheets. Will follow up with patient again in 1 week.     Patient Reported Weight   Patient Reported Weights 9/26/2018 9/25/2018 9/24/2018 9/23/2018 9/22/2018   Weight 229 lb 8 oz 229 lb 8 oz 229 lb 228 lb 229 lb      Patient Reported Blood Glucose   Blood Glucose Tracker 9/26/2018 9/25/2018 9/24/2018 9/23/2018 9/22/2018   Before breakfast blood glucose 183 204 [address] [facility] Coordination   [phone]  V:[phone]

## 2018-09-27 ENCOUNTER — CARE COORDINATION (OUTPATIENT)
Dept: CARE COORDINATION | Age: 78
End: 2018-09-27

## 2018-09-27 ENCOUNTER — TELEPHONE (OUTPATIENT)
Dept: FAMILY MEDICINE CLINIC | Age: 78
End: 2018-09-27

## 2018-09-27 DIAGNOSIS — I25.10 CORONARY ARTERY DISEASE INVOLVING NATIVE CORONARY ARTERY OF NATIVE HEART WITHOUT ANGINA PECTORIS: ICD-10-CM

## 2018-09-27 RX ORDER — NITROGLYCERIN 0.4 MG/1
0.4 TABLET SUBLINGUAL EVERY 5 MIN PRN
Qty: 25 TABLET | Refills: 1 | Status: SHIPPED | OUTPATIENT
Start: 2018-09-27

## 2018-09-27 ASSESSMENT — ENCOUNTER SYMPTOMS: DYSPNEA ASSOCIATED WITH: EXERTION

## 2018-09-28 DIAGNOSIS — G47.33 OSA (OBSTRUCTIVE SLEEP APNEA): Primary | ICD-10-CM

## 2018-10-01 ENCOUNTER — CARE COORDINATION (OUTPATIENT)
Dept: FAMILY MEDICINE CLINIC | Age: 78
End: 2018-10-01

## 2018-10-01 ASSESSMENT — ENCOUNTER SYMPTOMS: DYSPNEA ASSOCIATED WITH: EXERTION

## 2018-10-05 ENCOUNTER — CARE COORDINATION (OUTPATIENT)
Dept: CARE COORDINATION | Age: 78
End: 2018-10-05

## 2018-10-08 DIAGNOSIS — E55.9 VITAMIN D DEFICIENCY: ICD-10-CM

## 2018-10-08 RX ORDER — ERGOCALCIFEROL 1.25 MG/1
CAPSULE ORAL
Qty: 4 CAPSULE | OUTPATIENT
Start: 2018-10-08

## 2018-10-10 ENCOUNTER — CARE COORDINATION (OUTPATIENT)
Dept: CARE COORDINATION | Age: 78
End: 2018-10-10

## 2018-10-12 ENCOUNTER — CARE COORDINATION (OUTPATIENT)
Dept: CARE COORDINATION | Age: 78
End: 2018-10-12

## 2018-10-12 ENCOUNTER — OFFICE VISIT (OUTPATIENT)
Dept: FAMILY MEDICINE CLINIC | Age: 78
End: 2018-10-12
Payer: MEDICARE

## 2018-10-12 VITALS
WEIGHT: 235 LBS | HEART RATE: 80 BPM | DIASTOLIC BLOOD PRESSURE: 80 MMHG | OXYGEN SATURATION: 98 % | SYSTOLIC BLOOD PRESSURE: 138 MMHG | BODY MASS INDEX: 39.11 KG/M2

## 2018-10-12 DIAGNOSIS — E66.01 MORBID OBESITY (HCC): ICD-10-CM

## 2018-10-12 DIAGNOSIS — G44.219 EPISODIC TENSION-TYPE HEADACHE, NOT INTRACTABLE: ICD-10-CM

## 2018-10-12 DIAGNOSIS — I10 ESSENTIAL HYPERTENSION: ICD-10-CM

## 2018-10-12 DIAGNOSIS — E11.9 CONTROLLED TYPE 2 DIABETES MELLITUS WITHOUT COMPLICATION, WITHOUT LONG-TERM CURRENT USE OF INSULIN (HCC): ICD-10-CM

## 2018-10-12 DIAGNOSIS — Z00.00 ROUTINE GENERAL MEDICAL EXAMINATION AT A HEALTH CARE FACILITY: Primary | ICD-10-CM

## 2018-10-12 DIAGNOSIS — B37.2 YEAST INFECTION OF THE SKIN: ICD-10-CM

## 2018-10-12 DIAGNOSIS — E78.2 MIXED HYPERLIPIDEMIA: ICD-10-CM

## 2018-10-12 DIAGNOSIS — F33.2 SEVERE EPISODE OF RECURRENT MAJOR DEPRESSIVE DISORDER, WITHOUT PSYCHOTIC FEATURES (HCC): ICD-10-CM

## 2018-10-12 LAB
A/G RATIO: 1.9 (ref 1.1–2.2)
ALBUMIN SERPL-MCNC: 4.4 G/DL (ref 3.4–5)
ALP BLD-CCNC: 86 U/L (ref 40–129)
ALT SERPL-CCNC: 8 U/L (ref 10–40)
ANION GAP SERPL CALCULATED.3IONS-SCNC: 15 MMOL/L (ref 3–16)
AST SERPL-CCNC: 9 U/L (ref 15–37)
BASOPHILS ABSOLUTE: 0 K/UL (ref 0–0.2)
BASOPHILS RELATIVE PERCENT: 0.5 %
BILIRUB SERPL-MCNC: 0.4 MG/DL (ref 0–1)
BUN BLDV-MCNC: 12 MG/DL (ref 7–20)
CALCIUM SERPL-MCNC: 9.5 MG/DL (ref 8.3–10.6)
CHLORIDE BLD-SCNC: 97 MMOL/L (ref 99–110)
CO2: 29 MMOL/L (ref 21–32)
CREAT SERPL-MCNC: 0.8 MG/DL (ref 0.6–1.2)
EOSINOPHILS ABSOLUTE: 0.2 K/UL (ref 0–0.6)
EOSINOPHILS RELATIVE PERCENT: 2.9 %
GFR AFRICAN AMERICAN: >60
GFR NON-AFRICAN AMERICAN: >60
GLOBULIN: 2.3 G/DL
GLUCOSE BLD-MCNC: 209 MG/DL (ref 70–99)
HBA1C MFR BLD: 8.8 %
HCT VFR BLD CALC: 34.2 % (ref 36–48)
HEMOGLOBIN: 11.2 G/DL (ref 12–16)
LYMPHOCYTES ABSOLUTE: 2.1 K/UL (ref 1–5.1)
LYMPHOCYTES RELATIVE PERCENT: 29.3 %
MCH RBC QN AUTO: 29.7 PG (ref 26–34)
MCHC RBC AUTO-ENTMCNC: 32.6 G/DL (ref 31–36)
MCV RBC AUTO: 91.1 FL (ref 80–100)
MONOCYTES ABSOLUTE: 0.6 K/UL (ref 0–1.3)
MONOCYTES RELATIVE PERCENT: 7.6 %
NEUTROPHILS ABSOLUTE: 4.4 K/UL (ref 1.7–7.7)
NEUTROPHILS RELATIVE PERCENT: 59.7 %
PDW BLD-RTO: 15.9 % (ref 12.4–15.4)
PLATELET # BLD: 263 K/UL (ref 135–450)
PMV BLD AUTO: 9.5 FL (ref 5–10.5)
POTASSIUM SERPL-SCNC: 3.9 MMOL/L (ref 3.5–5.1)
RBC # BLD: 3.76 M/UL (ref 4–5.2)
SODIUM BLD-SCNC: 141 MMOL/L (ref 136–145)
TOTAL PROTEIN: 6.7 G/DL (ref 6.4–8.2)
WBC # BLD: 7.3 K/UL (ref 4–11)

## 2018-10-12 PROCEDURE — 4040F PNEUMOC VAC/ADMIN/RCVD: CPT | Performed by: FAMILY MEDICINE

## 2018-10-12 PROCEDURE — 1036F TOBACCO NON-USER: CPT | Performed by: FAMILY MEDICINE

## 2018-10-12 PROCEDURE — 83036 HEMOGLOBIN GLYCOSYLATED A1C: CPT | Performed by: FAMILY MEDICINE

## 2018-10-12 PROCEDURE — G8400 PT W/DXA NO RESULTS DOC: HCPCS | Performed by: FAMILY MEDICINE

## 2018-10-12 PROCEDURE — 1090F PRES/ABSN URINE INCON ASSESS: CPT | Performed by: FAMILY MEDICINE

## 2018-10-12 PROCEDURE — 1123F ACP DISCUSS/DSCN MKR DOCD: CPT | Performed by: FAMILY MEDICINE

## 2018-10-12 PROCEDURE — G8417 CALC BMI ABV UP PARAM F/U: HCPCS | Performed by: FAMILY MEDICINE

## 2018-10-12 PROCEDURE — G8484 FLU IMMUNIZE NO ADMIN: HCPCS | Performed by: FAMILY MEDICINE

## 2018-10-12 PROCEDURE — G0438 PPPS, INITIAL VISIT: HCPCS | Performed by: FAMILY MEDICINE

## 2018-10-12 PROCEDURE — G8427 DOCREV CUR MEDS BY ELIG CLIN: HCPCS | Performed by: FAMILY MEDICINE

## 2018-10-12 PROCEDURE — G8598 ASA/ANTIPLAT THER USED: HCPCS | Performed by: FAMILY MEDICINE

## 2018-10-12 PROCEDURE — 99214 OFFICE O/P EST MOD 30 MIN: CPT | Performed by: FAMILY MEDICINE

## 2018-10-12 PROCEDURE — 1101F PT FALLS ASSESS-DOCD LE1/YR: CPT | Performed by: FAMILY MEDICINE

## 2018-10-12 RX ORDER — CLOTRIMAZOLE AND BETAMETHASONE DIPROPIONATE 10; .64 MG/G; MG/G
CREAM TOPICAL
Qty: 45 G | Refills: 3 | Status: SHIPPED | OUTPATIENT
Start: 2018-10-12

## 2018-10-12 RX ORDER — TIZANIDINE 4 MG/1
TABLET ORAL
Qty: 60 TABLET | Refills: 1 | Status: SHIPPED | OUTPATIENT
Start: 2018-10-12 | End: 2019-05-24 | Stop reason: SDUPTHER

## 2018-10-12 RX ORDER — METFORMIN HYDROCHLORIDE 500 MG/1
1000 TABLET, EXTENDED RELEASE ORAL 2 TIMES DAILY WITH MEALS
Qty: 120 TABLET | Refills: 5 | Status: SHIPPED | OUTPATIENT
Start: 2018-10-12 | End: 2019-01-18 | Stop reason: SDUPTHER

## 2018-10-12 RX ORDER — NAPROXEN 500 MG/1
500 TABLET ORAL 2 TIMES DAILY WITH MEALS
Qty: 60 TABLET | Refills: 3 | Status: SHIPPED | OUTPATIENT
Start: 2018-10-12 | End: 2019-03-25

## 2018-10-12 ASSESSMENT — PATIENT HEALTH QUESTIONNAIRE - PHQ9
SUM OF ALL RESPONSES TO PHQ QUESTIONS 1-9: 2
SUM OF ALL RESPONSES TO PHQ QUESTIONS 1-9: 2

## 2018-10-12 ASSESSMENT — ANXIETY QUESTIONNAIRES: GAD7 TOTAL SCORE: 2

## 2018-10-12 ASSESSMENT — LIFESTYLE VARIABLES: HOW OFTEN DO YOU HAVE A DRINK CONTAINING ALCOHOL: 0

## 2018-10-12 NOTE — PATIENT INSTRUCTIONS
Learning About Low-Carbohydrate Diets for Weight Loss  What is a low-carbohydrate diet? Low-carb diets avoid foods that are high in carbohydrate. These high-carb foods include pasta, bread, rice, cereal, fruits, and starchy vegetables. Instead, these diets usually have you eat foods that are high in fat and protein. Many people lose weight quickly on a low-carb diet. But the early weight loss is water. People on this diet often gain the weight back after they start eating carbs again. Not all diet plans are safe or work well. A lot of the evidence shows that low-carb diets aren't healthy. That's because these diets often don't include healthy foods like fruits and vegetables. Losing weight safely means balancing protein, fat, and carbs with every meal and snack. And low-carb diets don't always provide the vitamins, minerals, and fiber you need. If you have a serious medical condition, talk to your doctor before you try any diet. These conditions include kidney disease, heart disease, type 2 diabetes, high cholesterol, and high blood pressure. If you are pregnant, it may not be safe for your baby if you are on a low-carb diet. How can you lose weight safely? You might have heard that a diet plan helped another person lose weight. But that doesn't mean that it will work for you. It is very hard to stay on a diet that includes lots of big changes in your eating habits. If you want to get to a healthy weight and stay there, making healthy lifestyle changes will often work better than dieting. These steps can help. · Make a plan for change. Work with your doctor to create a plan that is right for you. · See a dietitian. He or she can show you how to make healthy changes in your eating habits. · Manage stress. If you have a lot of stress in your life, it can be hard to focus on making healthy changes to your daily habits. · Track your food and activity.  You are likely to do better at losing weight if

## 2018-10-16 NOTE — CARE COORDINATION
ACC: Melia Bright RN  CM Risk Score: 17  Pablo Mortality Risk Score: 27.13    Summary/Assessment:   Late entry  Pt seen in tandem with PCP  Reports that she is doing well. She has missed appts   Has not been following carb prudent diet - eating out   Denies falls    Plan: Follow up on patient for adult day program to see if she can engage and have congregate meal      Care Coordination Interventions    Program Enrollment:  Complex Care  Referral from Primary Care Provider:  No  Suggested Interventions and Community Resources  Diabetes Education:  Completed (Comment: With [address])  Disease Specific Clinic:  Completed (Comment: CHF education with [address])  Home Health Services:  Completed  Medi Set or Pill Pack:  Not Started (Comment: Planned for discharge )  Pharmacist:  Completed  Senior Services: In Process  Social Work:  Completed  Other Therapy Services:   In Process (Comment: Neurocognitive Eval with UC )  Other Services:  Declined (Comment: Spiritual Care - Declines for now on 3/14/2018)  Zone Management Tools:  Completed (Comment: CHF, Diabetes Zone Management mailed to patient 8/1/2017)  Other Services or Interventions:  low Na diet, weight log mailed to patient 8/9/2017         Future Appointments  Date Time Provider Ann Tor[place]   11/27/2018 1:00 PM KIMI Rosario CNP [facility]   1/18/2019 1:00 PM [address] [place] [address]   2/7/2019 1:00 PM KIMI Rosario CNP [facility]   2/21/2019 1:20 [facility]

## 2018-10-17 ENCOUNTER — CARE COORDINATION (OUTPATIENT)
Dept: CARE COORDINATION | Age: 78
End: 2018-10-17

## 2018-10-18 ENCOUNTER — CARE COORDINATION (OUTPATIENT)
Dept: CARE COORDINATION | Age: 78
End: 2018-10-18

## 2018-10-19 ENCOUNTER — CARE COORDINATION (OUTPATIENT)
Dept: CARE COORDINATION | Age: 78
End: 2018-10-19

## 2018-10-19 NOTE — CARE COORDINATION
Call to patient to obtain daily weights and BG's. Left message on machine for patient to return call to report readings. Will attempt to reach patient again.     [address] [facility] Coordination   [phone]  [phone]

## 2018-10-22 NOTE — CARE COORDINATION
Call to patient to obtain daily weights and BG's. Patient reports that she is at a doctors appointment and will try to call back later. Will wait for a return call.      [facility] Coordination   [phone]  N:[phone]

## 2018-10-23 PROBLEM — J44.9 COPD (CHRONIC OBSTRUCTIVE PULMONARY DISEASE) (HCC): Status: ACTIVE | Noted: 2018-01-04

## 2018-10-23 RX ORDER — OMEPRAZOLE 20 MG/1
CAPSULE, DELAYED RELEASE ORAL
Qty: 60 CAPSULE | Refills: 5 | Status: SHIPPED | OUTPATIENT
Start: 2018-10-23 | End: 2019-05-06 | Stop reason: SDUPTHER

## 2018-10-24 ENCOUNTER — HOSPITAL ENCOUNTER (EMERGENCY)
Age: 78
Discharge: HOME | End: 2018-10-25
Attending: EMERGENCY MEDICINE
Payer: MEDICARE

## 2018-10-24 ENCOUNTER — TELEPHONE (OUTPATIENT)
Dept: FAMILY MEDICINE CLINIC | Age: 78
End: 2018-10-24

## 2018-10-24 ENCOUNTER — APPOINTMENT (OUTPATIENT)
Dept: CT IMAGING | Age: 78
End: 2018-10-24
Payer: MEDICARE

## 2018-10-24 DIAGNOSIS — R42 DIZZINESS: Primary | ICD-10-CM

## 2018-10-24 LAB
BACTERIA: ABNORMAL /HPF
BASOPHILS ABSOLUTE: 0.1 K/UL (ref 0–0.2)
BASOPHILS RELATIVE PERCENT: 1.4 %
BILIRUBIN URINE: NEGATIVE
BLOOD, URINE: NEGATIVE
CLARITY: CLEAR
COLOR: YELLOW
EOSINOPHILS ABSOLUTE: 0.3 K/UL (ref 0–0.6)
EOSINOPHILS RELATIVE PERCENT: 3.3 %
EPITHELIAL CELLS, UA: ABNORMAL /HPF
GLUCOSE URINE: NEGATIVE MG/DL
HCT VFR BLD CALC: 33 % (ref 36–48)
HEMOGLOBIN: 11 G/DL (ref 12–16)
KETONES, URINE: NEGATIVE MG/DL
LEUKOCYTE ESTERASE, URINE: NEGATIVE
LYMPHOCYTES ABSOLUTE: 2.4 K/UL (ref 1–5.1)
LYMPHOCYTES RELATIVE PERCENT: 25.8 %
MCH RBC QN AUTO: 30 PG (ref 26–34)
MCHC RBC AUTO-ENTMCNC: 33.3 G/DL (ref 31–36)
MCV RBC AUTO: 90 FL (ref 80–100)
MICROSCOPIC EXAMINATION: YES
MONOCYTES ABSOLUTE: 0.6 K/UL (ref 0–1.3)
MONOCYTES RELATIVE PERCENT: 6.3 %
MUCUS: ABNORMAL /LPF
NEUTROPHILS ABSOLUTE: 5.9 K/UL (ref 1.7–7.7)
NEUTROPHILS RELATIVE PERCENT: 63.2 %
NITRITE, URINE: NEGATIVE
PDW BLD-RTO: 16 % (ref 12.4–15.4)
PH UA: 7
PLATELET # BLD: 270 K/UL (ref 135–450)
PMV BLD AUTO: 9.1 FL (ref 5–10.5)
PROTEIN UA: 30 MG/DL
RBC # BLD: 3.66 M/UL (ref 4–5.2)
RBC UA: ABNORMAL /HPF (ref 0–2)
SPECIFIC GRAVITY UA: 1.02
URINE REFLEX TO CULTURE: ABNORMAL
URINE TYPE: ABNORMAL
UROBILINOGEN, URINE: 1 E.U./DL
WBC # BLD: 9.4 K/UL (ref 4–11)
WBC UA: ABNORMAL /HPF (ref 0–5)

## 2018-10-24 PROCEDURE — 99285 EMERGENCY DEPT VISIT HI MDM: CPT

## 2018-10-24 PROCEDURE — 83735 ASSAY OF MAGNESIUM: CPT

## 2018-10-24 PROCEDURE — 70450 CT HEAD/BRAIN W/O DYE: CPT

## 2018-10-24 PROCEDURE — 80053 COMPREHEN METABOLIC PANEL: CPT

## 2018-10-24 PROCEDURE — 84484 ASSAY OF TROPONIN QUANT: CPT

## 2018-10-24 PROCEDURE — 93005 ELECTROCARDIOGRAM TRACING: CPT | Performed by: EMERGENCY MEDICINE

## 2018-10-24 PROCEDURE — 81001 URINALYSIS AUTO W/SCOPE: CPT

## 2018-10-24 PROCEDURE — 85025 COMPLETE CBC W/AUTO DIFF WBC: CPT

## 2018-10-25 VITALS
SYSTOLIC BLOOD PRESSURE: 185 MMHG | HEIGHT: 65 IN | DIASTOLIC BLOOD PRESSURE: 64 MMHG | BODY MASS INDEX: 38.32 KG/M2 | TEMPERATURE: 98.6 F | WEIGHT: 230 LBS | OXYGEN SATURATION: 95 % | HEART RATE: 77 BPM

## 2018-10-25 LAB
A/G RATIO: 1.6 (ref 1.1–2.2)
ALBUMIN SERPL-MCNC: 3.9 G/DL (ref 3.4–5)
ALP BLD-CCNC: 76 U/L (ref 40–129)
ALT SERPL-CCNC: 8 U/L (ref 10–40)
ANION GAP SERPL CALCULATED.3IONS-SCNC: 7 MMOL/L (ref 3–16)
AST SERPL-CCNC: 9 U/L (ref 15–37)
BILIRUB SERPL-MCNC: 0.4 MG/DL (ref 0–1)
BUN BLDV-MCNC: 13 MG/DL (ref 7–20)
CALCIUM SERPL-MCNC: 9 MG/DL (ref 8.3–10.6)
CHLORIDE BLD-SCNC: 102 MMOL/L (ref 99–110)
CO2: 29 MMOL/L (ref 21–32)
CREAT SERPL-MCNC: 0.8 MG/DL (ref 0.6–1.2)
EKG ATRIAL RATE: 73 BPM
EKG DIAGNOSIS: NORMAL
EKG P AXIS: 48 DEGREES
EKG P-R INTERVAL: 188 MS
EKG Q-T INTERVAL: 410 MS
EKG QRS DURATION: 82 MS
EKG QTC CALCULATION (BAZETT): 451 MS
EKG R AXIS: -8 DEGREES
EKG T AXIS: 18 DEGREES
EKG VENTRICULAR RATE: 73 BPM
GFR AFRICAN AMERICAN: >60
GFR NON-AFRICAN AMERICAN: >60
GLOBULIN: 2.4 G/DL
GLUCOSE BLD-MCNC: 185 MG/DL (ref 70–99)
MAGNESIUM: 2 MG/DL (ref 1.8–2.4)
POTASSIUM SERPL-SCNC: 3.7 MMOL/L (ref 3.5–5.1)
SODIUM BLD-SCNC: 138 MMOL/L (ref 136–145)
TOTAL PROTEIN: 6.3 G/DL (ref 6.4–8.2)
TROPONIN: <0.01 NG/ML

## 2018-10-25 PROCEDURE — 93010 ELECTROCARDIOGRAM REPORT: CPT | Performed by: INTERNAL MEDICINE

## 2018-10-25 RX ORDER — MECLIZINE HCL 12.5 MG/1
12.5 TABLET ORAL 3 TIMES DAILY PRN
Qty: 15 TABLET | Refills: 0 | Status: SHIPPED | OUTPATIENT
Start: 2018-10-25 | End: 2018-12-28 | Stop reason: SDUPTHER

## 2018-10-25 NOTE — ED TRIAGE NOTES
Pt c/o feeling weak today, daughter thought that she should be evaluated. Came via EMS. EMD here to evaluate.

## 2018-10-25 NOTE — ED PROVIDER NOTES
sugar BID, Disp: 200 strip, Rfl: 5    metoclopramide (REGLAN) 10 MG tablet, Take 1 tablet by mouth 4 times daily as needed (Nausea and/or headache), Disp: 30 tablet, Rfl: 0    sennosides-docusate sodium (SENOKOT-S) 8.6-50 MG tablet, Take 2 tablets by mouth daily, Disp: 60 tablet, Rfl: 2    carvedilol (COREG) 25 MG tablet, Take 1 tablet by mouth 2 times daily (with meals), Disp: 180 tablet, Rfl: 1    calcium carbonate 600 MG TABS tablet, Take 1 tablet by mouth 2 times daily, Disp: 30 tablet, Rfl: 1    acetaminophen (TYLENOL) 325 MG tablet, Take 650 mg by mouth every 6 hours as needed for Pain Take as directed, Disp: , Rfl:     hydrocortisone 2.5 % cream, APPLY TOPICALLY TWO TIMES A DAY, Disp: 1 Tube, Rfl: 1    azelastine (ASTELIN) 0.1 % nasal spray, 2 sprays by Nasal route 2 times daily Use in each nostril as directed, Disp: 1 Bottle, Rfl: 0    fluticasone (FLONASE) 50 MCG/ACT nasal spray, 1 spray by Nasal route daily, Disp: 1 Bottle, Rfl: 0    albuterol sulfate HFA (PROAIR HFA) 108 (90 Base) MCG/ACT inhaler, Use every 4 hours while awake for 7-10 days then PRN wheezing  Dispense with SPACER and Instruct on use.   May sub Ventolin or Proventil as needed per Insurance., Disp: 1 Inhaler, Rfl: 1    pramipexole (MIRAPEX) 0.5 MG tablet, TAKE ONE TABLET BY MOUTH ONCE NIGHTLY, Disp: 30 tablet, Rfl: 5    pravastatin (PRAVACHOL) 20 MG tablet, TAKE ONE TABLET BY MOUTH DAILY, Disp: 90 tablet, Rfl: 3    isosorbide mononitrate (IMDUR) 30 MG extended release tablet, TAKE ONE-HALF TABLET BY MOUTH ONE TIME A DAY, Disp: 45 tablet, Rfl: 3    ACCU-CHEK SOFTCLIX LANCETS MISC, USE TO TEST BLOOD SUGAR TWO TIMES DAILY, Disp: 50 each, Rfl: 5    Accu-Chek Multiclix Lancets MISC, 1 Units by Does not apply route 2 times daily, Disp: 50 each, Rfl: 5    ONE TOUCH ULTRASOFT LANCETS MISC, Test blood sugar 6 times a day, Disp: 50 each, Rfl: 5    vitamin E 400 UNIT capsule, Take 400 Units by mouth daily, Disp: , Rfl:     aspirin 81 MG tablet, Take 81 mg by mouth daily. , Disp: , Rfl:     Allergies   Allergen Reactions    Latex Rash    Cephalexin Other (See Comments)     SHAKY AND SWEATY    Cephalexin     Codeine Nausea Only     STOMACH HURTS    Food      Pt allergic to: Beef, Milk (cow),     Levofloxacin Nausea Only    Pce [Erythromycin] Nausea Only     STOMACH HURTS    Pcn [Penicillins] Other (See Comments)     SHAKY AND SWEATY    Pentazocine      UNSURE OF REATION    Sumycin [Tetracycline Hcl] Nausea Only    Atarax [Hydroxyzine Hcl] Nausea And Vomiting    Beef Extract Nausea And Vomiting    Beef-Derived Products Nausea And Vomiting    Flagyl [Metronidazole] Nausea And Vomiting    Macrodantin [Nitrofurantoin] Palpitations and Other (See Comments)     SWEATY    Milk-Related Compounds Nausea And Vomiting    Pyridium [Phenazopyridine Hcl] Palpitations     SWEATY - PT DENIES REACTION    Sulfa Antibiotics Nausea And Vomiting and Nausea Only    Urised [Methen-Lisa-Meth Bl-Phen Sal] Palpitations     SWEATY       Social History     Social History    Marital status:       Spouse name: N/A    Number of children: 3    Years of education: 15     Occupational History    retired      Social History Main Topics    Smoking status: Former Smoker     Packs/day: 0.25     Years: 0.10     Types: Cigarettes     Quit date: 1/4/1975    Smokeless tobacco: Never Used      Comment: only smoked for 1 month    Alcohol use No    Drug use: No    Sexual activity: Not Currently     Partners: Male     Other Topics Concern    Not on file     Social History Narrative    No narrative on file       Nursing Notes Reviewed      ROS:  GENERAL:  No fever, no chills, no diaphoresis, no appetite changes  EYES: no eye discharge, no eye redness, no visual changes  ENT: no nasal congestion, no sore throat  CARDIAC: no chest pain, no palpitations, no leg swelling  PULM: no cough, no shortness of breath  ABD: no abdominal pain, + nausea, no vomiting, no assist.     Medical decision making:  Patient presents emergency department. Long problem for her and was improving until week. Her dizziness comes and goes and is very transient in nature. She's never been syncopal or presyncopal.  Currently she is not dizzy and is not experiencing any workers vertigo sensation is able to ambulate without signs of ataxia. She has a nonfocal neurologic exam otherwise. CT of her head is stable. She does have large ventricles. Normal pressure hydrocephalus has been entertained in the past but apparently her previous neurologist did not think that this was the case. She has no confusion or urinary difficulty. Her small vessel disease looks similar. Patient is not anemic. She is not orthostatic. Her labs do not look dehydrated. She has no arrhythmias while here. No chest pain or shortness of breath and nonischemic EKG. She is able to ambulate without any assistance using her walker and therefore does not require emergent placement for rehab. Patient was likely physical therapy restarted within her home as she is very determined to be able to walk without a walker in the future. I estimate there is LOW risk for ACUTE CORONARY SYNDROME, INTRACRANIAL HEMORRHAGE, MALIGNANT DYSRHYTHMIA,  PULMONARY EMBOLISM, SEPSIS, SUBARACHNOID HEMORRHAGE, SUBDURAL HEMATOMA, SEVERE ANEMIA OR BLOOD LOSS, or STROKE,thus I consider the discharge disposition reasonable. Chris Suarez and I have discussed the diagnosis and risks, and we agree with discharging home to follow-up with their primary doctor. We also discussed returning to the Emergency Department immediately if new or worsening symptoms occur. Clinical Impression:  1. Dizziness        Dispo:  Patient will be discharged  at this time. Patient was informed of this decision and agrees with plan. I have discussed lab and xray findings with patient and they understand.  Questions were answered to the best of my

## 2018-10-25 NOTE — ED NOTES
Patient ambulated out of room and down hallway and back with walker. Pt walked slowly, steady gait. Pt c/o dizziness with head movement.      Ewa Vasquez RN  10/25/18 [id number]

## 2018-10-26 ENCOUNTER — CARE COORDINATION (OUTPATIENT)
Dept: CARE COORDINATION | Age: 78
End: 2018-10-26

## 2018-10-26 NOTE — CARE COORDINATION
Services:   In Process (Comment: Neurocognitive Eval with UC )  Other Services:  Declined (Comment: Spiritual Care - Declines for now on 3/14/2018)  Zone Management Tools:  Completed (Comment: CHF, Diabetes Zone Management mailed to patient 8/1/2017)  Other Services or Interventions:  low Na diet, weight log mailed to patient 8/9/2017             Goals Addressed     None

## 2018-10-29 ENCOUNTER — CARE COORDINATION (OUTPATIENT)
Dept: CARE COORDINATION | Age: 78
End: 2018-10-29

## 2018-11-01 ENCOUNTER — CARE COORDINATION (OUTPATIENT)
Dept: CARE COORDINATION | Age: 78
End: 2018-11-01

## 2018-11-01 NOTE — CARE COORDINATION
Call to patient to obtain daily BG's and weights. Patient reports that she is not at her apartment currently, and to call back around 3pm today. Will follow up with patient again around 3pm today.     [facility] Coordination   L:[phone]  Y:[phone]

## 2018-11-06 ENCOUNTER — OFFICE VISIT (OUTPATIENT)
Dept: ENT CLINIC | Age: 78
End: 2018-11-06
Payer: MEDICARE

## 2018-11-06 VITALS
HEIGHT: 65 IN | WEIGHT: 233 LBS | DIASTOLIC BLOOD PRESSURE: 67 MMHG | TEMPERATURE: 99.5 F | HEART RATE: 87 BPM | BODY MASS INDEX: 38.82 KG/M2 | SYSTOLIC BLOOD PRESSURE: 150 MMHG

## 2018-11-06 DIAGNOSIS — R42 DIZZINESS: Primary | ICD-10-CM

## 2018-11-06 PROCEDURE — 1036F TOBACCO NON-USER: CPT | Performed by: OTOLARYNGOLOGY

## 2018-11-06 PROCEDURE — G8417 CALC BMI ABV UP PARAM F/U: HCPCS | Performed by: OTOLARYNGOLOGY

## 2018-11-06 PROCEDURE — 4040F PNEUMOC VAC/ADMIN/RCVD: CPT | Performed by: OTOLARYNGOLOGY

## 2018-11-06 PROCEDURE — G8484 FLU IMMUNIZE NO ADMIN: HCPCS | Performed by: OTOLARYNGOLOGY

## 2018-11-06 PROCEDURE — 99213 OFFICE O/P EST LOW 20 MIN: CPT | Performed by: OTOLARYNGOLOGY

## 2018-11-06 PROCEDURE — 1123F ACP DISCUSS/DSCN MKR DOCD: CPT | Performed by: OTOLARYNGOLOGY

## 2018-11-06 PROCEDURE — G8400 PT W/DXA NO RESULTS DOC: HCPCS | Performed by: OTOLARYNGOLOGY

## 2018-11-06 PROCEDURE — 1101F PT FALLS ASSESS-DOCD LE1/YR: CPT | Performed by: OTOLARYNGOLOGY

## 2018-11-06 PROCEDURE — G8427 DOCREV CUR MEDS BY ELIG CLIN: HCPCS | Performed by: OTOLARYNGOLOGY

## 2018-11-06 PROCEDURE — G8598 ASA/ANTIPLAT THER USED: HCPCS | Performed by: OTOLARYNGOLOGY

## 2018-11-06 PROCEDURE — 1090F PRES/ABSN URINE INCON ASSESS: CPT | Performed by: OTOLARYNGOLOGY

## 2018-11-06 NOTE — PROGRESS NOTES
Disp: , Rfl:                                                  Past Medical History:   Diagnosis Date    Arthritis     Asthma     Bronchial asthma     Bursitis 12/2011    ACHILLES TENDON LEFT    CHF (congestive heart failure) (HCC)     Constipation     Depression     Diverticulitis     Dizziness     GERD (gastroesophageal reflux disease)     Hearing loss     Hyperlipidemia     Hypertension     Leg edema     Lung disease     Nausea & vomiting     POSTOPERATIVE    PONV (postoperative nausea and vomiting)     WHEN SHE WAS 21    Sleep apnea     CPAP, SLEEP STUDY 6/28 OVERNIGHT AT HOME    Tinnitus     Type II or unspecified type diabetes mellitus without mention of complication, not stated as uncontrolled     Unspecified cerebral artery occlusion with cerebral infarction     mini stroke                                                    Past Surgical History:   Procedure Laterality Date    ACHILLES TENDON SURGERY  2/2/12    LEFT ACHILLES DEBRIDEMENT, HAGLUNDS AND RETROCALCANEAL BURSA EXCISION WITH FLEXOR HALLUS LONGUS TENDON TRANSFER WITH BLOCK FOR PAIN CONTROL    APPENDECTOMY      CARDIAC CATHETERIZATION  8/21/14    CARPAL TUNNEL RELEASE      both hands    CHOLECYSTECTOMY      COLONOSCOPY      COLONOSCOPY      COLONOSCOPY  2/10/2016    CYSTOSCOPY  10/02/2017    DIAGNOSTIC CARDIAC CATH LAB PROCEDURE      EYE SURGERY      HYSTERECTOMY      HYSTERECTOMY, TOTAL ABDOMINAL      JOINT REPLACEMENT      bilateral knee    KNEE SURGERY      both replaced    OTHER SURGICAL HISTORY  07/09/2018    CYSTOSCOPY, HYDRODISTENTION OF BLADDER WITH INSTILLATION OF    POLYPECTOMY      SHOULDER SURGERY      TOENAIL EXCISION  08/04/2016    right big toe    TONSILLECTOMY      TUBAL LIGATION      UPPER GASTROINTESTINAL ENDOSCOPY  10/29/12    gastritis    UPPER GASTROINTESTINAL ENDOSCOPY  2/10/2016    URETHRAL STRICTURE DILATATION           REVIEW OF SYSTEMS:  All pertinent positive and negative findings

## 2018-11-08 ENCOUNTER — CARE COORDINATION (OUTPATIENT)
Dept: CARE COORDINATION | Age: 78
End: 2018-11-08

## 2018-11-08 ENCOUNTER — TELEPHONE (OUTPATIENT)
Dept: PULMONOLOGY | Age: 78
End: 2018-11-08

## 2018-11-09 NOTE — TELEPHONE ENCOUNTER
Noted. Will follow up on  to obtain readings.     [facility] Coordination   N:[phone]  [phone]

## 2018-11-12 ENCOUNTER — CARE COORDINATION (OUTPATIENT)
Dept: CARE COORDINATION | Age: 78
End: 2018-11-12

## 2018-11-12 ASSESSMENT — ENCOUNTER SYMPTOMS: DYSPNEA ASSOCIATED WITH: EXERTION

## 2018-11-23 ENCOUNTER — TELEPHONE (OUTPATIENT)
Dept: FAMILY MEDICINE CLINIC | Age: 78
End: 2018-11-23

## 2018-11-23 DIAGNOSIS — B37.9 YEAST INFECTION: Primary | ICD-10-CM

## 2018-11-23 RX ORDER — FLUCONAZOLE 150 MG/1
TABLET ORAL
Qty: 3 TABLET | Refills: 0 | Status: SHIPPED | OUTPATIENT
Start: 2018-11-23 | End: 2019-01-18 | Stop reason: ALTCHOICE

## 2018-11-26 ENCOUNTER — CARE COORDINATION (OUTPATIENT)
Dept: CARE COORDINATION | Age: 78
End: 2018-11-26

## 2018-11-26 DIAGNOSIS — G25.81 RLS (RESTLESS LEGS SYNDROME): ICD-10-CM

## 2018-11-26 DIAGNOSIS — E78.2 MIXED HYPERLIPIDEMIA: ICD-10-CM

## 2018-11-26 RX ORDER — PRAVASTATIN SODIUM 20 MG
TABLET ORAL
Qty: 90 TABLET | Refills: 3 | Status: ON HOLD | OUTPATIENT
Start: 2018-11-26 | End: 2019-09-11 | Stop reason: SDUPTHER

## 2018-11-26 RX ORDER — PRAMIPEXOLE DIHYDROCHLORIDE 0.5 MG/1
TABLET ORAL
Qty: 30 TABLET | Refills: 5 | Status: SHIPPED | OUTPATIENT
Start: 2018-11-26 | End: 2018-11-27 | Stop reason: SDUPTHER

## 2018-11-26 NOTE — TELEPHONE ENCOUNTER
Last office visit 10/12/2018     Last written 11/15/2017     Next office visit scheduled 1/18/2019    Requested Prescriptions     Pending Prescriptions Disp Refills    pramipexole (MIRAPEX) 0.5 MG tablet 30 tablet 5     Sig: TAKE ONE TABLET BY MOUTH ONCE NIGHTLY    pravastatin (PRAVACHOL) 20 MG tablet 90 tablet 3     Sig: TAKE ONE TABLET BY MOUTH DAILY

## 2018-11-27 ENCOUNTER — TELEPHONE (OUTPATIENT)
Dept: PULMONOLOGY | Age: 78
End: 2018-11-27

## 2018-11-27 ENCOUNTER — TELEPHONE (OUTPATIENT)
Dept: FAMILY MEDICINE CLINIC | Age: 78
End: 2018-11-27

## 2018-11-27 ENCOUNTER — CARE COORDINATION (OUTPATIENT)
Dept: CARE COORDINATION | Age: 78
End: 2018-11-27

## 2018-11-27 DIAGNOSIS — G25.81 RLS (RESTLESS LEGS SYNDROME): ICD-10-CM

## 2018-11-27 RX ORDER — PRAMIPEXOLE DIHYDROCHLORIDE 0.5 MG/1
TABLET ORAL
Qty: 30 TABLET | Refills: 5 | Status: SHIPPED | OUTPATIENT
Start: 2018-11-27 | End: 2019-05-20 | Stop reason: SDUPTHER

## 2018-11-28 NOTE — CARE COORDINATION
Pt reports that she called Urology and provider called in antibx. She has appt on Tuesday and confirms that she has transportation.      Plan:  Surveillance with Monthly calls for weight and BGs per CCSS   Re eval in 3 months for quarterly calls

## 2018-12-28 ENCOUNTER — CARE COORDINATION (OUTPATIENT)
Dept: CARE COORDINATION | Age: 78
End: 2018-12-28

## 2018-12-28 RX ORDER — MECLIZINE HCL 12.5 MG/1
12.5 TABLET ORAL 3 TIMES DAILY PRN
Qty: 30 TABLET | Refills: 3 | Status: SHIPPED | OUTPATIENT
Start: 2018-12-28 | End: 2019-01-07

## 2018-12-31 ENCOUNTER — CARE COORDINATION (OUTPATIENT)
Dept: CARE COORDINATION | Age: 78
End: 2018-12-31

## 2019-01-02 ENCOUNTER — OFFICE VISIT (OUTPATIENT)
Dept: PULMONOLOGY | Age: 79
End: 2019-01-02
Payer: MEDICARE

## 2019-01-02 VITALS
WEIGHT: 234 LBS | BODY MASS INDEX: 38.99 KG/M2 | HEART RATE: 74 BPM | TEMPERATURE: 98.5 F | OXYGEN SATURATION: 96 % | SYSTOLIC BLOOD PRESSURE: 150 MMHG | HEIGHT: 65 IN | RESPIRATION RATE: 16 BRPM | DIASTOLIC BLOOD PRESSURE: 72 MMHG

## 2019-01-02 DIAGNOSIS — E66.9 OBESITY (BMI 30-39.9): ICD-10-CM

## 2019-01-02 DIAGNOSIS — R53.83 OTHER FATIGUE: ICD-10-CM

## 2019-01-02 DIAGNOSIS — Z71.89 ENCOUNTER FOR BIPAP USE COUNSELING: ICD-10-CM

## 2019-01-02 DIAGNOSIS — F51.04 PSYCHOPHYSIOLOGICAL INSOMNIA: ICD-10-CM

## 2019-01-02 DIAGNOSIS — G47.33 OBSTRUCTIVE SLEEP APNEA: Primary | ICD-10-CM

## 2019-01-02 PROCEDURE — G8417 CALC BMI ABV UP PARAM F/U: HCPCS | Performed by: NURSE PRACTITIONER

## 2019-01-02 PROCEDURE — 99213 OFFICE O/P EST LOW 20 MIN: CPT | Performed by: NURSE PRACTITIONER

## 2019-01-02 PROCEDURE — 1123F ACP DISCUSS/DSCN MKR DOCD: CPT | Performed by: NURSE PRACTITIONER

## 2019-01-02 PROCEDURE — 1036F TOBACCO NON-USER: CPT | Performed by: NURSE PRACTITIONER

## 2019-01-02 PROCEDURE — 4040F PNEUMOC VAC/ADMIN/RCVD: CPT | Performed by: NURSE PRACTITIONER

## 2019-01-02 PROCEDURE — G8427 DOCREV CUR MEDS BY ELIG CLIN: HCPCS | Performed by: NURSE PRACTITIONER

## 2019-01-02 PROCEDURE — 1101F PT FALLS ASSESS-DOCD LE1/YR: CPT | Performed by: NURSE PRACTITIONER

## 2019-01-02 PROCEDURE — 1090F PRES/ABSN URINE INCON ASSESS: CPT | Performed by: NURSE PRACTITIONER

## 2019-01-02 PROCEDURE — G8484 FLU IMMUNIZE NO ADMIN: HCPCS | Performed by: NURSE PRACTITIONER

## 2019-01-02 PROCEDURE — G8598 ASA/ANTIPLAT THER USED: HCPCS | Performed by: NURSE PRACTITIONER

## 2019-01-02 PROCEDURE — G8400 PT W/DXA NO RESULTS DOC: HCPCS | Performed by: NURSE PRACTITIONER

## 2019-01-02 ASSESSMENT — SLEEP AND FATIGUE QUESTIONNAIRES
HOW LIKELY ARE YOU TO NOD OFF OR FALL ASLEEP WHILE SITTING QUIETLY AFTER LUNCH WITHOUT ALCOHOL: 0
ESS TOTAL SCORE: 12
HOW LIKELY ARE YOU TO NOD OFF OR FALL ASLEEP WHILE SITTING AND TALKING TO SOMEONE: 0
HOW LIKELY ARE YOU TO NOD OFF OR FALL ASLEEP IN A CAR, WHILE STOPPED FOR A FEW MINUTES IN TRAFFIC: 0
HOW LIKELY ARE YOU TO NOD OFF OR FALL ASLEEP WHILE SITTING AND READING: 2
HOW LIKELY ARE YOU TO NOD OFF OR FALL ASLEEP WHILE WATCHING TV: 2
NECK CIRCUMFERENCE (INCHES): 17.5
HOW LIKELY ARE YOU TO NOD OFF OR FALL ASLEEP WHEN YOU ARE A PASSENGER IN A CAR FOR AN HOUR WITHOUT A BREAK: 3
HOW LIKELY ARE YOU TO NOD OFF OR FALL ASLEEP WHILE SITTING INACTIVE IN A PUBLIC PLACE: 2
HOW LIKELY ARE YOU TO NOD OFF OR FALL ASLEEP WHILE LYING DOWN TO REST IN THE AFTERNOON WHEN CIRCUMSTANCES PERMIT: 3

## 2019-01-03 ENCOUNTER — CARE COORDINATION (OUTPATIENT)
Dept: CARE COORDINATION | Age: 79
End: 2019-01-03

## 2019-01-07 ENCOUNTER — OFFICE VISIT (OUTPATIENT)
Dept: ORTHOPEDIC SURGERY | Age: 79
End: 2019-01-07
Payer: MEDICARE

## 2019-01-07 VITALS
WEIGHT: 230 LBS | SYSTOLIC BLOOD PRESSURE: 147 MMHG | DIASTOLIC BLOOD PRESSURE: 63 MMHG | HEART RATE: 80 BPM | BODY MASS INDEX: 38.32 KG/M2 | HEIGHT: 65 IN

## 2019-01-07 DIAGNOSIS — M25.512 LEFT SHOULDER PAIN, UNSPECIFIED CHRONICITY: Primary | ICD-10-CM

## 2019-01-07 DIAGNOSIS — M19.012 ARTHRITIS OF SHOULDER REGION, LEFT: ICD-10-CM

## 2019-01-07 PROCEDURE — G8417 CALC BMI ABV UP PARAM F/U: HCPCS | Performed by: ORTHOPAEDIC SURGERY

## 2019-01-07 PROCEDURE — 1123F ACP DISCUSS/DSCN MKR DOCD: CPT | Performed by: ORTHOPAEDIC SURGERY

## 2019-01-07 PROCEDURE — 1036F TOBACCO NON-USER: CPT | Performed by: ORTHOPAEDIC SURGERY

## 2019-01-07 PROCEDURE — 1101F PT FALLS ASSESS-DOCD LE1/YR: CPT | Performed by: ORTHOPAEDIC SURGERY

## 2019-01-07 PROCEDURE — G8400 PT W/DXA NO RESULTS DOC: HCPCS | Performed by: ORTHOPAEDIC SURGERY

## 2019-01-07 PROCEDURE — G8484 FLU IMMUNIZE NO ADMIN: HCPCS | Performed by: ORTHOPAEDIC SURGERY

## 2019-01-07 PROCEDURE — G8598 ASA/ANTIPLAT THER USED: HCPCS | Performed by: ORTHOPAEDIC SURGERY

## 2019-01-07 PROCEDURE — 99214 OFFICE O/P EST MOD 30 MIN: CPT | Performed by: ORTHOPAEDIC SURGERY

## 2019-01-07 PROCEDURE — 1090F PRES/ABSN URINE INCON ASSESS: CPT | Performed by: ORTHOPAEDIC SURGERY

## 2019-01-07 PROCEDURE — G8427 DOCREV CUR MEDS BY ELIG CLIN: HCPCS | Performed by: ORTHOPAEDIC SURGERY

## 2019-01-07 PROCEDURE — 4040F PNEUMOC VAC/ADMIN/RCVD: CPT | Performed by: ORTHOPAEDIC SURGERY

## 2019-01-07 RX ORDER — MELOXICAM 15 MG/1
15 TABLET ORAL DAILY
Qty: 30 TABLET | Refills: 0 | Status: SHIPPED | OUTPATIENT
Start: 2019-01-07 | End: 2019-01-28 | Stop reason: SDUPTHER

## 2019-01-15 DIAGNOSIS — E11.9 CONTROLLED TYPE 2 DIABETES MELLITUS WITHOUT COMPLICATION, WITHOUT LONG-TERM CURRENT USE OF INSULIN (HCC): ICD-10-CM

## 2019-01-15 RX ORDER — ISOSORBIDE MONONITRATE 30 MG/1
TABLET, EXTENDED RELEASE ORAL
Qty: 45 TABLET | Refills: 3 | Status: SHIPPED | OUTPATIENT
Start: 2019-01-15

## 2019-01-16 ENCOUNTER — CARE COORDINATION (OUTPATIENT)
Dept: CARE COORDINATION | Age: 79
End: 2019-01-16

## 2019-01-18 ENCOUNTER — OFFICE VISIT (OUTPATIENT)
Dept: FAMILY MEDICINE CLINIC | Age: 79
End: 2019-01-18
Payer: MEDICARE

## 2019-01-18 VITALS
OXYGEN SATURATION: 97 % | BODY MASS INDEX: 39.44 KG/M2 | DIASTOLIC BLOOD PRESSURE: 68 MMHG | SYSTOLIC BLOOD PRESSURE: 120 MMHG | WEIGHT: 237 LBS | HEART RATE: 65 BPM

## 2019-01-18 DIAGNOSIS — E11.40 CONTROLLED TYPE 2 DIABETES MELLITUS WITH DIABETIC NEUROPATHY, WITHOUT LONG-TERM CURRENT USE OF INSULIN (HCC): Primary | ICD-10-CM

## 2019-01-18 DIAGNOSIS — E78.2 MIXED HYPERLIPIDEMIA: ICD-10-CM

## 2019-01-18 DIAGNOSIS — I50.9 CONGESTIVE HEART FAILURE, UNSPECIFIED HF CHRONICITY, UNSPECIFIED HEART FAILURE TYPE (HCC): ICD-10-CM

## 2019-01-18 DIAGNOSIS — I10 ESSENTIAL HYPERTENSION: ICD-10-CM

## 2019-01-18 DIAGNOSIS — E11.9 CONTROLLED TYPE 2 DIABETES MELLITUS WITHOUT COMPLICATION, WITHOUT LONG-TERM CURRENT USE OF INSULIN (HCC): ICD-10-CM

## 2019-01-18 DIAGNOSIS — F41.9 ANXIETY AND DEPRESSION: ICD-10-CM

## 2019-01-18 DIAGNOSIS — F33.2 SEVERE EPISODE OF RECURRENT MAJOR DEPRESSIVE DISORDER, WITHOUT PSYCHOTIC FEATURES (HCC): ICD-10-CM

## 2019-01-18 DIAGNOSIS — J44.9 CHRONIC OBSTRUCTIVE PULMONARY DISEASE, UNSPECIFIED COPD TYPE (HCC): ICD-10-CM

## 2019-01-18 DIAGNOSIS — F32.A ANXIETY AND DEPRESSION: ICD-10-CM

## 2019-01-18 LAB — HBA1C MFR BLD: 7.7 %

## 2019-01-18 PROCEDURE — 83036 HEMOGLOBIN GLYCOSYLATED A1C: CPT | Performed by: FAMILY MEDICINE

## 2019-01-18 PROCEDURE — 99214 OFFICE O/P EST MOD 30 MIN: CPT | Performed by: FAMILY MEDICINE

## 2019-01-18 PROCEDURE — G8400 PT W/DXA NO RESULTS DOC: HCPCS | Performed by: FAMILY MEDICINE

## 2019-01-18 PROCEDURE — 3023F SPIROM DOC REV: CPT | Performed by: FAMILY MEDICINE

## 2019-01-18 PROCEDURE — G8926 SPIRO NO PERF OR DOC: HCPCS | Performed by: FAMILY MEDICINE

## 2019-01-18 PROCEDURE — 1101F PT FALLS ASSESS-DOCD LE1/YR: CPT | Performed by: FAMILY MEDICINE

## 2019-01-18 PROCEDURE — 1090F PRES/ABSN URINE INCON ASSESS: CPT | Performed by: FAMILY MEDICINE

## 2019-01-18 PROCEDURE — G8427 DOCREV CUR MEDS BY ELIG CLIN: HCPCS | Performed by: FAMILY MEDICINE

## 2019-01-18 PROCEDURE — G8484 FLU IMMUNIZE NO ADMIN: HCPCS | Performed by: FAMILY MEDICINE

## 2019-01-18 PROCEDURE — G8417 CALC BMI ABV UP PARAM F/U: HCPCS | Performed by: FAMILY MEDICINE

## 2019-01-18 PROCEDURE — 1036F TOBACCO NON-USER: CPT | Performed by: FAMILY MEDICINE

## 2019-01-18 PROCEDURE — G8598 ASA/ANTIPLAT THER USED: HCPCS | Performed by: FAMILY MEDICINE

## 2019-01-18 PROCEDURE — 1123F ACP DISCUSS/DSCN MKR DOCD: CPT | Performed by: FAMILY MEDICINE

## 2019-01-18 PROCEDURE — 4040F PNEUMOC VAC/ADMIN/RCVD: CPT | Performed by: FAMILY MEDICINE

## 2019-01-18 RX ORDER — FUROSEMIDE 40 MG/1
TABLET ORAL
Qty: 60 TABLET | Refills: 5 | Status: SHIPPED | OUTPATIENT
Start: 2019-01-18 | End: 2019-04-01 | Stop reason: SDUPTHER

## 2019-01-18 RX ORDER — CARVEDILOL 25 MG/1
25 TABLET ORAL 2 TIMES DAILY WITH MEALS
Qty: 180 TABLET | Refills: 1 | Status: SHIPPED | OUTPATIENT
Start: 2019-01-18 | End: 2019-06-17 | Stop reason: SDUPTHER

## 2019-01-18 RX ORDER — DULOXETIN HYDROCHLORIDE 60 MG/1
60 CAPSULE, DELAYED RELEASE ORAL DAILY
Qty: 90 CAPSULE | Refills: 1 | Status: SHIPPED | OUTPATIENT
Start: 2019-01-18 | End: 2019-05-24 | Stop reason: SDUPTHER

## 2019-01-18 RX ORDER — METFORMIN HYDROCHLORIDE 500 MG/1
1000 TABLET, EXTENDED RELEASE ORAL 2 TIMES DAILY WITH MEALS
Qty: 120 TABLET | Refills: 5 | Status: SHIPPED | OUTPATIENT
Start: 2019-01-18 | End: 2019-07-08 | Stop reason: SDUPTHER

## 2019-01-21 ENCOUNTER — CARE COORDINATION (OUTPATIENT)
Dept: CARE COORDINATION | Age: 79
End: 2019-01-21

## 2019-01-25 ENCOUNTER — TELEPHONE (OUTPATIENT)
Dept: FAMILY MEDICINE CLINIC | Age: 79
End: 2019-01-25

## 2019-01-25 RX ORDER — CIPROFLOXACIN 250 MG/1
250 TABLET, FILM COATED ORAL 2 TIMES DAILY
Qty: 14 TABLET | Refills: 0 | Status: SHIPPED | OUTPATIENT
Start: 2019-01-25 | End: 2019-02-01

## 2019-01-28 DIAGNOSIS — M19.012 ARTHRITIS OF SHOULDER REGION, LEFT: ICD-10-CM

## 2019-01-28 DIAGNOSIS — M25.512 LEFT SHOULDER PAIN, UNSPECIFIED CHRONICITY: ICD-10-CM

## 2019-01-28 RX ORDER — MELOXICAM 15 MG/1
15 TABLET ORAL DAILY
Qty: 30 TABLET | Refills: 0 | Status: SHIPPED | OUTPATIENT
Start: 2019-01-28 | End: 2019-02-22 | Stop reason: SDUPTHER

## 2019-02-04 ENCOUNTER — CARE COORDINATION (OUTPATIENT)
Dept: CARE COORDINATION | Age: 79
End: 2019-02-04

## 2019-02-06 ENCOUNTER — TELEPHONE (OUTPATIENT)
Dept: PULMONOLOGY | Age: 79
End: 2019-02-06

## 2019-02-07 ENCOUNTER — OFFICE VISIT (OUTPATIENT)
Dept: FAMILY MEDICINE CLINIC | Age: 79
End: 2019-02-07
Payer: MEDICARE

## 2019-02-07 VITALS
WEIGHT: 235.4 LBS | SYSTOLIC BLOOD PRESSURE: 126 MMHG | HEIGHT: 65 IN | DIASTOLIC BLOOD PRESSURE: 80 MMHG | BODY MASS INDEX: 39.22 KG/M2 | TEMPERATURE: 98.7 F | OXYGEN SATURATION: 96 % | HEART RATE: 72 BPM

## 2019-02-07 DIAGNOSIS — R11.2 NON-INTRACTABLE VOMITING WITH NAUSEA, UNSPECIFIED VOMITING TYPE: ICD-10-CM

## 2019-02-07 DIAGNOSIS — R10.84 GENERALIZED ABDOMINAL PAIN: Primary | ICD-10-CM

## 2019-02-07 DIAGNOSIS — R30.0 DYSURIA: ICD-10-CM

## 2019-02-07 LAB
A/G RATIO: 2 (ref 1.1–2.2)
ALBUMIN SERPL-MCNC: 4.4 G/DL (ref 3.4–5)
ALP BLD-CCNC: 71 U/L (ref 40–129)
ALT SERPL-CCNC: 8 U/L (ref 10–40)
ANION GAP SERPL CALCULATED.3IONS-SCNC: 12 MMOL/L (ref 3–16)
AST SERPL-CCNC: 12 U/L (ref 15–37)
BILIRUB SERPL-MCNC: 0.5 MG/DL (ref 0–1)
BILIRUBIN, POC: NORMAL
BLOOD URINE, POC: NORMAL
BUN BLDV-MCNC: 12 MG/DL (ref 7–20)
CALCIUM SERPL-MCNC: 9.6 MG/DL (ref 8.3–10.6)
CHLORIDE BLD-SCNC: 101 MMOL/L (ref 99–110)
CLARITY, POC: NORMAL
CO2: 28 MMOL/L (ref 21–32)
COLOR, POC: NORMAL
CREAT SERPL-MCNC: 0.9 MG/DL (ref 0.6–1.2)
GFR AFRICAN AMERICAN: >60
GFR NON-AFRICAN AMERICAN: >60
GLOBULIN: 2.2 G/DL
GLUCOSE BLD-MCNC: 154 MG/DL (ref 70–99)
GLUCOSE URINE, POC: NORMAL
HCT VFR BLD CALC: 34.5 % (ref 36–48)
HEMOGLOBIN: 11.2 G/DL (ref 12–16)
KETONES, POC: NORMAL
LEUKOCYTE EST, POC: NORMAL
MCH RBC QN AUTO: 29.5 PG (ref 26–34)
MCHC RBC AUTO-ENTMCNC: 32.5 G/DL (ref 31–36)
MCV RBC AUTO: 90.9 FL (ref 80–100)
NITRITE, POC: NORMAL
PDW BLD-RTO: 16.8 % (ref 12.4–15.4)
PH, POC: 7
PLATELET # BLD: 272 K/UL (ref 135–450)
PMV BLD AUTO: 9.1 FL (ref 5–10.5)
POTASSIUM SERPL-SCNC: 4.5 MMOL/L (ref 3.5–5.1)
PROTEIN, POC: 100
RBC # BLD: 3.8 M/UL (ref 4–5.2)
SODIUM BLD-SCNC: 141 MMOL/L (ref 136–145)
SPECIFIC GRAVITY, POC: 1.02
TOTAL PROTEIN: 6.6 G/DL (ref 6.4–8.2)
UROBILINOGEN, POC: 1
WBC # BLD: 7.5 K/UL (ref 4–11)

## 2019-02-07 PROCEDURE — 1036F TOBACCO NON-USER: CPT | Performed by: PHYSICIAN ASSISTANT

## 2019-02-07 PROCEDURE — G8427 DOCREV CUR MEDS BY ELIG CLIN: HCPCS | Performed by: PHYSICIAN ASSISTANT

## 2019-02-07 PROCEDURE — G8417 CALC BMI ABV UP PARAM F/U: HCPCS | Performed by: PHYSICIAN ASSISTANT

## 2019-02-07 PROCEDURE — 4040F PNEUMOC VAC/ADMIN/RCVD: CPT | Performed by: PHYSICIAN ASSISTANT

## 2019-02-07 PROCEDURE — 1123F ACP DISCUSS/DSCN MKR DOCD: CPT | Performed by: PHYSICIAN ASSISTANT

## 2019-02-07 PROCEDURE — G8598 ASA/ANTIPLAT THER USED: HCPCS | Performed by: PHYSICIAN ASSISTANT

## 2019-02-07 PROCEDURE — G8484 FLU IMMUNIZE NO ADMIN: HCPCS | Performed by: PHYSICIAN ASSISTANT

## 2019-02-07 PROCEDURE — G8400 PT W/DXA NO RESULTS DOC: HCPCS | Performed by: PHYSICIAN ASSISTANT

## 2019-02-07 PROCEDURE — 81002 URINALYSIS NONAUTO W/O SCOPE: CPT | Performed by: PHYSICIAN ASSISTANT

## 2019-02-07 PROCEDURE — 81000 URINALYSIS NONAUTO W/SCOPE: CPT | Performed by: PHYSICIAN ASSISTANT

## 2019-02-07 PROCEDURE — 1090F PRES/ABSN URINE INCON ASSESS: CPT | Performed by: PHYSICIAN ASSISTANT

## 2019-02-07 PROCEDURE — 1101F PT FALLS ASSESS-DOCD LE1/YR: CPT | Performed by: PHYSICIAN ASSISTANT

## 2019-02-07 PROCEDURE — 99213 OFFICE O/P EST LOW 20 MIN: CPT | Performed by: PHYSICIAN ASSISTANT

## 2019-02-07 RX ORDER — ONDANSETRON 4 MG/1
4 TABLET, FILM COATED ORAL 3 TIMES DAILY PRN
Qty: 30 TABLET | Refills: 0 | Status: SHIPPED | OUTPATIENT
Start: 2019-02-07 | End: 2019-07-19

## 2019-02-07 ASSESSMENT — ENCOUNTER SYMPTOMS
COUGH: 0
VOMITING: 1
SHORTNESS OF BREATH: 0
SORE THROAT: 0
DIARRHEA: 0
RHINORRHEA: 0
NAUSEA: 1
ABDOMINAL PAIN: 0
CONSTIPATION: 0

## 2019-02-09 LAB — URINE CULTURE, ROUTINE: NORMAL

## 2019-02-13 RX ORDER — DICLOFENAC SODIUM 75 MG/1
75 TABLET, DELAYED RELEASE ORAL 2 TIMES DAILY WITH MEALS
Qty: 180 TABLET | Refills: 0 | Status: SHIPPED | OUTPATIENT
Start: 2019-02-13 | End: 2019-05-11 | Stop reason: SDUPTHER

## 2019-02-18 ENCOUNTER — CARE COORDINATION (OUTPATIENT)
Dept: CARE COORDINATION | Age: 79
End: 2019-02-18

## 2019-02-22 ENCOUNTER — HOSPITAL ENCOUNTER (OUTPATIENT)
Dept: WOMENS IMAGING | Age: 79
Discharge: HOME | End: 2019-02-22
Payer: MEDICARE

## 2019-02-22 DIAGNOSIS — M25.512 LEFT SHOULDER PAIN, UNSPECIFIED CHRONICITY: ICD-10-CM

## 2019-02-22 DIAGNOSIS — M19.012 ARTHRITIS OF SHOULDER REGION, LEFT: ICD-10-CM

## 2019-02-22 DIAGNOSIS — Z12.31 ENCOUNTER FOR SCREENING MAMMOGRAM FOR MALIGNANT NEOPLASM OF BREAST: ICD-10-CM

## 2019-02-22 PROCEDURE — 77067 SCR MAMMO BI INCL CAD: CPT

## 2019-02-22 PROCEDURE — 77063 BREAST TOMOSYNTHESIS BI: CPT

## 2019-02-22 RX ORDER — MELOXICAM 15 MG/1
15 TABLET ORAL DAILY PRN
Qty: 30 TABLET | Refills: 2 | Status: SHIPPED | OUTPATIENT
Start: 2019-02-22 | End: 2019-06-07 | Stop reason: SDUPTHER

## 2019-02-25 ENCOUNTER — OFFICE VISIT (OUTPATIENT)
Dept: ENT CLINIC | Age: 79
End: 2019-02-25
Payer: MEDICARE

## 2019-02-25 VITALS
TEMPERATURE: 99.2 F | HEIGHT: 65 IN | WEIGHT: 238.6 LBS | SYSTOLIC BLOOD PRESSURE: 158 MMHG | DIASTOLIC BLOOD PRESSURE: 70 MMHG | BODY MASS INDEX: 39.75 KG/M2 | HEART RATE: 67 BPM

## 2019-02-25 DIAGNOSIS — K11.8 PAROTID MASS: ICD-10-CM

## 2019-02-25 DIAGNOSIS — R42 DIZZINESS: Primary | ICD-10-CM

## 2019-02-25 PROCEDURE — 1123F ACP DISCUSS/DSCN MKR DOCD: CPT | Performed by: OTOLARYNGOLOGY

## 2019-02-25 PROCEDURE — G8417 CALC BMI ABV UP PARAM F/U: HCPCS | Performed by: OTOLARYNGOLOGY

## 2019-02-25 PROCEDURE — G8427 DOCREV CUR MEDS BY ELIG CLIN: HCPCS | Performed by: OTOLARYNGOLOGY

## 2019-02-25 PROCEDURE — 1101F PT FALLS ASSESS-DOCD LE1/YR: CPT | Performed by: OTOLARYNGOLOGY

## 2019-02-25 PROCEDURE — 99213 OFFICE O/P EST LOW 20 MIN: CPT | Performed by: OTOLARYNGOLOGY

## 2019-02-25 PROCEDURE — G8598 ASA/ANTIPLAT THER USED: HCPCS | Performed by: OTOLARYNGOLOGY

## 2019-02-25 PROCEDURE — 4040F PNEUMOC VAC/ADMIN/RCVD: CPT | Performed by: OTOLARYNGOLOGY

## 2019-02-25 PROCEDURE — 1036F TOBACCO NON-USER: CPT | Performed by: OTOLARYNGOLOGY

## 2019-02-25 PROCEDURE — 1090F PRES/ABSN URINE INCON ASSESS: CPT | Performed by: OTOLARYNGOLOGY

## 2019-02-25 PROCEDURE — G8484 FLU IMMUNIZE NO ADMIN: HCPCS | Performed by: OTOLARYNGOLOGY

## 2019-02-25 PROCEDURE — G8400 PT W/DXA NO RESULTS DOC: HCPCS | Performed by: OTOLARYNGOLOGY

## 2019-02-26 ENCOUNTER — OFFICE VISIT (OUTPATIENT)
Dept: PULMONOLOGY | Age: 79
End: 2019-02-26
Payer: MEDICARE

## 2019-02-26 VITALS
HEIGHT: 65 IN | BODY MASS INDEX: 39.32 KG/M2 | DIASTOLIC BLOOD PRESSURE: 80 MMHG | HEART RATE: 68 BPM | OXYGEN SATURATION: 96 % | RESPIRATION RATE: 16 BRPM | WEIGHT: 236 LBS | TEMPERATURE: 97.2 F | SYSTOLIC BLOOD PRESSURE: 172 MMHG

## 2019-02-26 DIAGNOSIS — G47.33 OSA ON CPAP: ICD-10-CM

## 2019-02-26 DIAGNOSIS — J45.909 UNCOMPLICATED ASTHMA, UNSPECIFIED ASTHMA SEVERITY, UNSPECIFIED WHETHER PERSISTENT: Primary | ICD-10-CM

## 2019-02-26 DIAGNOSIS — R06.02 SOB (SHORTNESS OF BREATH): ICD-10-CM

## 2019-02-26 DIAGNOSIS — Z99.89 OSA ON CPAP: ICD-10-CM

## 2019-02-26 DIAGNOSIS — E66.9 OBESITY (BMI 30-39.9): ICD-10-CM

## 2019-02-26 PROCEDURE — G8427 DOCREV CUR MEDS BY ELIG CLIN: HCPCS | Performed by: INTERNAL MEDICINE

## 2019-02-26 PROCEDURE — 4040F PNEUMOC VAC/ADMIN/RCVD: CPT | Performed by: INTERNAL MEDICINE

## 2019-02-26 PROCEDURE — 1036F TOBACCO NON-USER: CPT | Performed by: INTERNAL MEDICINE

## 2019-02-26 PROCEDURE — G8598 ASA/ANTIPLAT THER USED: HCPCS | Performed by: INTERNAL MEDICINE

## 2019-02-26 PROCEDURE — G8484 FLU IMMUNIZE NO ADMIN: HCPCS | Performed by: INTERNAL MEDICINE

## 2019-02-26 PROCEDURE — 1101F PT FALLS ASSESS-DOCD LE1/YR: CPT | Performed by: INTERNAL MEDICINE

## 2019-02-26 PROCEDURE — 1123F ACP DISCUSS/DSCN MKR DOCD: CPT | Performed by: INTERNAL MEDICINE

## 2019-02-26 PROCEDURE — G8400 PT W/DXA NO RESULTS DOC: HCPCS | Performed by: INTERNAL MEDICINE

## 2019-02-26 PROCEDURE — G8417 CALC BMI ABV UP PARAM F/U: HCPCS | Performed by: INTERNAL MEDICINE

## 2019-02-26 PROCEDURE — 1090F PRES/ABSN URINE INCON ASSESS: CPT | Performed by: INTERNAL MEDICINE

## 2019-02-26 PROCEDURE — 99213 OFFICE O/P EST LOW 20 MIN: CPT | Performed by: INTERNAL MEDICINE

## 2019-02-26 RX ORDER — TRIMETHOPRIM 100 MG/1
TABLET ORAL NIGHTLY
Status: ON HOLD | COMMUNITY
Start: 2019-02-08 | End: 2019-09-11 | Stop reason: HOSPADM

## 2019-02-26 RX ORDER — ALBUTEROL SULFATE 90 UG/1
2 AEROSOL, METERED RESPIRATORY (INHALATION) 4 TIMES DAILY PRN
Qty: 3 INHALER | Refills: 1 | Status: SHIPPED | OUTPATIENT
Start: 2019-02-26

## 2019-02-26 ASSESSMENT — ENCOUNTER SYMPTOMS
WHEEZING: 1
COUGH: 1
RHINORRHEA: 1
SHORTNESS OF BREATH: 1

## 2019-02-26 ASSESSMENT — SLEEP AND FATIGUE QUESTIONNAIRES
HOW LIKELY ARE YOU TO NOD OFF OR FALL ASLEEP WHEN YOU ARE A PASSENGER IN A CAR FOR AN HOUR WITHOUT A BREAK: 1
HOW LIKELY ARE YOU TO NOD OFF OR FALL ASLEEP WHILE SITTING INACTIVE IN A PUBLIC PLACE: 0
HOW LIKELY ARE YOU TO NOD OFF OR FALL ASLEEP WHILE SITTING QUIETLY AFTER LUNCH WITHOUT ALCOHOL: 1
HOW LIKELY ARE YOU TO NOD OFF OR FALL ASLEEP WHILE SITTING AND TALKING TO SOMEONE: 0
HOW LIKELY ARE YOU TO NOD OFF OR FALL ASLEEP IN A CAR, WHILE STOPPED FOR A FEW MINUTES IN TRAFFIC: 0
NECK CIRCUMFERENCE (INCHES): 17.5
HOW LIKELY ARE YOU TO NOD OFF OR FALL ASLEEP WHILE LYING DOWN TO REST IN THE AFTERNOON WHEN CIRCUMSTANCES PERMIT: 3
HOW LIKELY ARE YOU TO NOD OFF OR FALL ASLEEP WHILE SITTING AND READING: 2
ESS TOTAL SCORE: 9
HOW LIKELY ARE YOU TO NOD OFF OR FALL ASLEEP WHILE WATCHING TV: 2

## 2019-03-01 ENCOUNTER — APPOINTMENT (OUTPATIENT)
Dept: CT IMAGING | Age: 79
End: 2019-03-01
Payer: MEDICARE

## 2019-03-01 ENCOUNTER — HOSPITAL ENCOUNTER (EMERGENCY)
Age: 79
Discharge: HOME | End: 2019-03-01
Attending: EMERGENCY MEDICINE
Payer: MEDICARE

## 2019-03-01 VITALS
DIASTOLIC BLOOD PRESSURE: 62 MMHG | OXYGEN SATURATION: 99 % | WEIGHT: 230 LBS | TEMPERATURE: 97.6 F | BODY MASS INDEX: 38.32 KG/M2 | HEIGHT: 65 IN | HEART RATE: 63 BPM | RESPIRATION RATE: 16 BRPM | SYSTOLIC BLOOD PRESSURE: 158 MMHG

## 2019-03-01 DIAGNOSIS — N30.00 ACUTE CYSTITIS WITHOUT HEMATURIA: ICD-10-CM

## 2019-03-01 DIAGNOSIS — R10.84 GENERALIZED ABDOMINAL PAIN: Primary | ICD-10-CM

## 2019-03-01 DIAGNOSIS — K57.30 DIVERTICULOSIS OF LARGE INTESTINE WITHOUT HEMORRHAGE: ICD-10-CM

## 2019-03-01 LAB
A/G RATIO: 1.6 (ref 1.1–2.2)
ALBUMIN SERPL-MCNC: 4.1 G/DL (ref 3.4–5)
ALP BLD-CCNC: 60 U/L (ref 40–129)
ALT SERPL-CCNC: 12 U/L (ref 10–40)
ANION GAP SERPL CALCULATED.3IONS-SCNC: 11 MMOL/L (ref 3–16)
AST SERPL-CCNC: 12 U/L (ref 15–37)
BACTERIA: ABNORMAL /HPF
BASOPHILS ABSOLUTE: 0.1 K/UL (ref 0–0.2)
BASOPHILS RELATIVE PERCENT: 1.7 %
BILIRUB SERPL-MCNC: 0.5 MG/DL (ref 0–1)
BILIRUBIN URINE: NEGATIVE
BLOOD, URINE: NEGATIVE
BUN BLDV-MCNC: 21 MG/DL (ref 7–20)
CALCIUM SERPL-MCNC: 9 MG/DL (ref 8.3–10.6)
CHLORIDE BLD-SCNC: 102 MMOL/L (ref 99–110)
CLARITY: CLEAR
CO2: 24 MMOL/L (ref 21–32)
COLOR: YELLOW
CREAT SERPL-MCNC: 0.9 MG/DL (ref 0.6–1.2)
EOSINOPHILS ABSOLUTE: 0.2 K/UL (ref 0–0.6)
EOSINOPHILS RELATIVE PERCENT: 2.5 %
EPITHELIAL CELLS, UA: ABNORMAL /HPF
GFR AFRICAN AMERICAN: >60
GFR NON-AFRICAN AMERICAN: >60
GLOBULIN: 2.6 G/DL
GLUCOSE BLD-MCNC: 172 MG/DL (ref 70–99)
GLUCOSE URINE: NEGATIVE MG/DL
HCT VFR BLD CALC: 33.2 % (ref 36–48)
HEMOGLOBIN: 10.7 G/DL (ref 12–16)
KETONES, URINE: ABNORMAL MG/DL
LEUKOCYTE ESTERASE, URINE: NEGATIVE
LIPASE: 23 U/L (ref 13–60)
LYMPHOCYTES ABSOLUTE: 2 K/UL (ref 1–5.1)
LYMPHOCYTES RELATIVE PERCENT: 29.2 %
MCH RBC QN AUTO: 29.2 PG (ref 26–34)
MCHC RBC AUTO-ENTMCNC: 32.4 G/DL (ref 31–36)
MCV RBC AUTO: 90.1 FL (ref 80–100)
MICROSCOPIC EXAMINATION: YES
MONOCYTES ABSOLUTE: 0.5 K/UL (ref 0–1.3)
MONOCYTES RELATIVE PERCENT: 7.9 %
NEUTROPHILS ABSOLUTE: 4.1 K/UL (ref 1.7–7.7)
NEUTROPHILS RELATIVE PERCENT: 58.7 %
NITRITE, URINE: NEGATIVE
PDW BLD-RTO: 17.1 % (ref 12.4–15.4)
PH UA: 6
PLATELET # BLD: 244 K/UL (ref 135–450)
PMV BLD AUTO: 8.6 FL (ref 5–10.5)
POTASSIUM SERPL-SCNC: 4.2 MMOL/L (ref 3.5–5.1)
PROTEIN UA: 30 MG/DL
RBC # BLD: 3.68 M/UL (ref 4–5.2)
RBC UA: ABNORMAL /HPF (ref 0–2)
SODIUM BLD-SCNC: 137 MMOL/L (ref 136–145)
SPECIFIC GRAVITY UA: 1.02
TOTAL PROTEIN: 6.7 G/DL (ref 6.4–8.2)
URINE REFLEX TO CULTURE: YES
URINE TYPE: ABNORMAL
UROBILINOGEN, URINE: 0.2 E.U./DL
WBC # BLD: 6.9 K/UL (ref 4–11)
WBC UA: ABNORMAL /HPF (ref 0–5)

## 2019-03-01 PROCEDURE — 81001 URINALYSIS AUTO W/SCOPE: CPT

## 2019-03-01 PROCEDURE — 6370000000 HC RX 637 (ALT 250 FOR IP): Performed by: PHYSICIAN ASSISTANT

## 2019-03-01 PROCEDURE — 83690 ASSAY OF LIPASE: CPT

## 2019-03-01 PROCEDURE — 6360000004 HC RX CONTRAST MEDICATION: Performed by: EMERGENCY MEDICINE

## 2019-03-01 PROCEDURE — 87086 URINE CULTURE/COLONY COUNT: CPT

## 2019-03-01 PROCEDURE — 74177 CT ABD & PELVIS W/CONTRAST: CPT

## 2019-03-01 PROCEDURE — 2580000003 HC RX 258: Performed by: PHYSICIAN ASSISTANT

## 2019-03-01 PROCEDURE — 85025 COMPLETE CBC W/AUTO DIFF WBC: CPT

## 2019-03-01 PROCEDURE — 80053 COMPREHEN METABOLIC PANEL: CPT

## 2019-03-01 PROCEDURE — 99284 EMERGENCY DEPT VISIT MOD MDM: CPT

## 2019-03-01 RX ORDER — CIPROFLOXACIN 500 MG/1
500 TABLET, FILM COATED ORAL ONCE
Status: COMPLETED | OUTPATIENT
Start: 2019-03-01 | End: 2019-03-01

## 2019-03-01 RX ORDER — CIPROFLOXACIN 500 MG/1
500 TABLET, FILM COATED ORAL 2 TIMES DAILY
Qty: 14 TABLET | Refills: 0 | Status: SHIPPED | OUTPATIENT
Start: 2019-03-01 | End: 2019-03-08

## 2019-03-01 RX ORDER — 0.9 % SODIUM CHLORIDE 0.9 %
500 INTRAVENOUS SOLUTION INTRAVENOUS ONCE
Status: COMPLETED | OUTPATIENT
Start: 2019-03-01 | End: 2019-03-01

## 2019-03-01 RX ADMIN — CIPROFLOXACIN HYDROCHLORIDE 500 MG: 500 TABLET, FILM COATED ORAL at 16:15

## 2019-03-01 RX ADMIN — SODIUM CHLORIDE 500 ML: 9 INJECTION, SOLUTION INTRAVENOUS at 14:13

## 2019-03-01 RX ADMIN — IOPAMIDOL 75 ML: 755 INJECTION, SOLUTION INTRAVENOUS at 15:09

## 2019-03-01 ASSESSMENT — ENCOUNTER SYMPTOMS
EYES NEGATIVE: 1
NAUSEA: 1
COLOR CHANGE: 0
VOMITING: 0
CONSTIPATION: 0
COUGH: 0
SHORTNESS OF BREATH: 0
ABDOMINAL PAIN: 1
DIARRHEA: 0

## 2019-03-01 ASSESSMENT — PAIN SCALES - GENERAL: PAINLEVEL_OUTOF10: 5

## 2019-03-01 ASSESSMENT — PAIN DESCRIPTION - PAIN TYPE: TYPE: ACUTE PAIN

## 2019-03-01 ASSESSMENT — PAIN DESCRIPTION - LOCATION: LOCATION: ABDOMEN

## 2019-03-02 LAB
ORGANISM: ABNORMAL
URINE CULTURE, ROUTINE: ABNORMAL
URINE CULTURE, ROUTINE: ABNORMAL

## 2019-03-04 ENCOUNTER — CARE COORDINATION (OUTPATIENT)
Dept: CARE COORDINATION | Age: 79
End: 2019-03-04

## 2019-03-06 ENCOUNTER — CARE COORDINATION (OUTPATIENT)
Dept: CARE COORDINATION | Age: 79
End: 2019-03-06

## 2019-03-07 ENCOUNTER — CARE COORDINATION (OUTPATIENT)
Dept: CARE COORDINATION | Age: 79
End: 2019-03-07

## 2019-03-18 ENCOUNTER — CARE COORDINATION (OUTPATIENT)
Dept: CARE COORDINATION | Age: 79
End: 2019-03-18

## 2019-03-19 ENCOUNTER — OFFICE VISIT (OUTPATIENT)
Dept: FAMILY MEDICINE CLINIC | Age: 79
End: 2019-03-19
Payer: MEDICARE

## 2019-03-19 VITALS
DIASTOLIC BLOOD PRESSURE: 84 MMHG | OXYGEN SATURATION: 98 % | BODY MASS INDEX: 39.27 KG/M2 | WEIGHT: 236 LBS | HEART RATE: 84 BPM | SYSTOLIC BLOOD PRESSURE: 122 MMHG

## 2019-03-19 DIAGNOSIS — N32.81 OVERACTIVE BLADDER: ICD-10-CM

## 2019-03-19 DIAGNOSIS — Z01.818 PRE-OP EXAMINATION: Primary | ICD-10-CM

## 2019-03-19 DIAGNOSIS — Z91.81 AT RISK FOR FALLS: ICD-10-CM

## 2019-03-19 DIAGNOSIS — R30.0 DYSURIA: ICD-10-CM

## 2019-03-19 LAB
BILIRUBIN, POC: NORMAL
BLOOD URINE, POC: NORMAL
CLARITY, POC: CLEAR
COLOR, POC: YELLOW
GLUCOSE URINE, POC: NORMAL
KETONES, POC: NORMAL
LEUKOCYTE EST, POC: NORMAL
NITRITE, POC: NORMAL
PH, POC: 5.5
PROTEIN, POC: 30
SPECIFIC GRAVITY, POC: 1.02
UROBILINOGEN, POC: 0.2

## 2019-03-19 PROCEDURE — 4040F PNEUMOC VAC/ADMIN/RCVD: CPT | Performed by: FAMILY MEDICINE

## 2019-03-19 PROCEDURE — G8417 CALC BMI ABV UP PARAM F/U: HCPCS | Performed by: FAMILY MEDICINE

## 2019-03-19 PROCEDURE — 1101F PT FALLS ASSESS-DOCD LE1/YR: CPT | Performed by: FAMILY MEDICINE

## 2019-03-19 PROCEDURE — G8598 ASA/ANTIPLAT THER USED: HCPCS | Performed by: FAMILY MEDICINE

## 2019-03-19 PROCEDURE — 99214 OFFICE O/P EST MOD 30 MIN: CPT | Performed by: FAMILY MEDICINE

## 2019-03-19 PROCEDURE — 81002 URINALYSIS NONAUTO W/O SCOPE: CPT | Performed by: FAMILY MEDICINE

## 2019-03-19 PROCEDURE — G8427 DOCREV CUR MEDS BY ELIG CLIN: HCPCS | Performed by: FAMILY MEDICINE

## 2019-03-19 PROCEDURE — 1123F ACP DISCUSS/DSCN MKR DOCD: CPT | Performed by: FAMILY MEDICINE

## 2019-03-19 PROCEDURE — 1036F TOBACCO NON-USER: CPT | Performed by: FAMILY MEDICINE

## 2019-03-19 PROCEDURE — 1090F PRES/ABSN URINE INCON ASSESS: CPT | Performed by: FAMILY MEDICINE

## 2019-03-19 PROCEDURE — 93000 ELECTROCARDIOGRAM COMPLETE: CPT | Performed by: FAMILY MEDICINE

## 2019-03-19 PROCEDURE — G8484 FLU IMMUNIZE NO ADMIN: HCPCS | Performed by: FAMILY MEDICINE

## 2019-03-19 PROCEDURE — G8400 PT W/DXA NO RESULTS DOC: HCPCS | Performed by: FAMILY MEDICINE

## 2019-03-21 ENCOUNTER — TELEPHONE (OUTPATIENT)
Dept: FAMILY MEDICINE CLINIC | Age: 79
End: 2019-03-21

## 2019-03-21 LAB — URINE CULTURE, ROUTINE: NORMAL

## 2019-03-27 ENCOUNTER — APPOINTMENT (OUTPATIENT)
Dept: CT IMAGING | Age: 79
End: 2019-03-27
Payer: MEDICARE

## 2019-03-27 ENCOUNTER — HOSPITAL ENCOUNTER (EMERGENCY)
Age: 79
Discharge: HOME | End: 2019-03-27
Attending: EMERGENCY MEDICINE
Payer: MEDICARE

## 2019-03-27 VITALS
HEART RATE: 77 BPM | DIASTOLIC BLOOD PRESSURE: 83 MMHG | OXYGEN SATURATION: 96 % | HEIGHT: 65 IN | BODY MASS INDEX: 38.65 KG/M2 | SYSTOLIC BLOOD PRESSURE: 187 MMHG | WEIGHT: 232 LBS | TEMPERATURE: 97.6 F | RESPIRATION RATE: 16 BRPM

## 2019-03-27 DIAGNOSIS — S00.83XA CONTUSION OF FACE, INITIAL ENCOUNTER: ICD-10-CM

## 2019-03-27 DIAGNOSIS — R51.9 ACUTE NONINTRACTABLE HEADACHE, UNSPECIFIED HEADACHE TYPE: ICD-10-CM
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
## 2019-03-27 DIAGNOSIS — F07.81 POST CONCUSSIVE SYNDROME: ICD-10-CM

## 2019-03-27 DIAGNOSIS — R11.0 NAUSEA: ICD-10-CM

## 2019-03-27 DIAGNOSIS — K11.9 LESION OF PAROTID GLAND: ICD-10-CM

## 2019-03-27 LAB
A/G RATIO: 1.5 (ref 1.1–2.2)
ALBUMIN SERPL-MCNC: 3.8 G/DL (ref 3.4–5)
ALP BLD-CCNC: 61 U/L (ref 40–129)
ALT SERPL-CCNC: 10 U/L (ref 10–40)
ANION GAP SERPL CALCULATED.3IONS-SCNC: 9 MMOL/L (ref 3–16)
AST SERPL-CCNC: 12 U/L (ref 15–37)
BILIRUB SERPL-MCNC: 0.6 MG/DL (ref 0–1)
BILIRUBIN URINE: NEGATIVE
BLOOD, URINE: NEGATIVE
BUN BLDV-MCNC: 15 MG/DL (ref 7–20)
CALCIUM SERPL-MCNC: 9.6 MG/DL (ref 8.3–10.6)
CHLORIDE BLD-SCNC: 104 MMOL/L (ref 99–110)
CLARITY: CLEAR
CO2: 28 MMOL/L (ref 21–32)
COLOR: ABNORMAL
CREAT SERPL-MCNC: 0.7 MG/DL (ref 0.6–1.2)
EKG ATRIAL RATE: 67 BPM
EKG DIAGNOSIS: NORMAL
EKG P AXIS: 56 DEGREES
EKG P-R INTERVAL: 184 MS
EKG Q-T INTERVAL: 416 MS
EKG QRS DURATION: 88 MS
EKG QTC CALCULATION (BAZETT): 439 MS
EKG R AXIS: 0 DEGREES
EKG T AXIS: 44 DEGREES
EKG VENTRICULAR RATE: 67 BPM
EPITHELIAL CELLS, UA: ABNORMAL /HPF
GFR AFRICAN AMERICAN: >60
GFR NON-AFRICAN AMERICAN: >60
GLOBULIN: 2.5 G/DL
GLUCOSE BLD-MCNC: 139 MG/DL (ref 70–99)
GLUCOSE URINE: NEGATIVE MG/DL
HCT VFR BLD CALC: 31.1 % (ref 36–48)
HEMOGLOBIN: 10.3 G/DL (ref 12–16)
KETONES, URINE: NEGATIVE MG/DL
LEUKOCYTE ESTERASE, URINE: NEGATIVE
MCH RBC QN AUTO: 30.4 PG (ref 26–34)
MCHC RBC AUTO-ENTMCNC: 33.2 G/DL (ref 31–36)
MCV RBC AUTO: 91.7 FL (ref 80–100)
MICROSCOPIC EXAMINATION: YES
MUCUS: ABNORMAL /LPF
NITRITE, URINE: NEGATIVE
PDW BLD-RTO: 17.7 % (ref 12.4–15.4)
PH UA: 6 (ref 5–8)
PLATELET # BLD: 225 K/UL (ref 135–450)
PMV BLD AUTO: 8.5 FL (ref 5–10.5)
POTASSIUM SERPL-SCNC: 4.4 MMOL/L (ref 3.5–5.1)
PROTEIN UA: 30 MG/DL
RBC # BLD: 3.39 M/UL (ref 4–5.2)
RBC UA: ABNORMAL /HPF (ref 0–2)
SODIUM BLD-SCNC: 141 MMOL/L (ref 136–145)
SPECIFIC GRAVITY UA: >=1.03 (ref 1–1.03)
TOTAL PROTEIN: 6.3 G/DL (ref 6.4–8.2)
TROPONIN: <0.01 NG/ML
URINE TYPE: ABNORMAL
UROBILINOGEN, URINE: 0.2 E.U./DL
WBC # BLD: 5.5 K/UL (ref 4–11)
WBC UA: ABNORMAL /HPF (ref 0–5)

## 2019-03-27 PROCEDURE — 72125 CT NECK SPINE W/O DYE: CPT

## 2019-03-27 PROCEDURE — 93010 ELECTROCARDIOGRAM REPORT: CPT | Performed by: INTERNAL MEDICINE

## 2019-03-27 PROCEDURE — 81001 URINALYSIS AUTO W/SCOPE: CPT

## 2019-03-27 PROCEDURE — 70486 CT MAXILLOFACIAL W/O DYE: CPT

## 2019-03-27 PROCEDURE — 70450 CT HEAD/BRAIN W/O DYE: CPT

## 2019-03-27 PROCEDURE — 99284 EMERGENCY DEPT VISIT MOD MDM: CPT

## 2019-03-27 PROCEDURE — 85027 COMPLETE CBC AUTOMATED: CPT

## 2019-03-27 PROCEDURE — 93005 ELECTROCARDIOGRAM TRACING: CPT | Performed by: NURSE PRACTITIONER

## 2019-03-27 PROCEDURE — 6370000000 HC RX 637 (ALT 250 FOR IP): Performed by: NURSE PRACTITIONER

## 2019-03-27 PROCEDURE — 80053 COMPREHEN METABOLIC PANEL: CPT

## 2019-03-27 PROCEDURE — 84484 ASSAY OF TROPONIN QUANT: CPT

## 2019-03-27 RX ORDER — ACETAMINOPHEN 500 MG
500 TABLET ORAL EVERY 6 HOURS PRN
Qty: 120 TABLET | Refills: 0 | Status: SHIPPED | OUTPATIENT
Start: 2019-03-27 | End: 2019-09-07

## 2019-03-27 RX ORDER — ACETAMINOPHEN 500 MG
1000 TABLET ORAL ONCE
Status: COMPLETED | OUTPATIENT
Start: 2019-03-27 | End: 2019-03-27

## 2019-03-27 RX ORDER — TRAMADOL HYDROCHLORIDE 50 MG/1
50 TABLET ORAL ONCE
Status: COMPLETED | OUTPATIENT
Start: 2019-03-27 | End: 2019-03-27

## 2019-03-27 RX ORDER — ONDANSETRON 4 MG/1
4 TABLET, ORALLY DISINTEGRATING ORAL EVERY 8 HOURS PRN
Qty: 20 TABLET | Refills: 0 | Status: SHIPPED | OUTPATIENT
Start: 2019-03-27 | End: 2019-07-11

## 2019-03-27 RX ADMIN — ACETAMINOPHEN 1000 MG: 500 TABLET ORAL at 10:43

## 2019-03-27 RX ADMIN — TRAMADOL HYDROCHLORIDE 50 MG: 50 TABLET, FILM COATED ORAL at 14:53

## 2019-03-27 ASSESSMENT — ENCOUNTER SYMPTOMS
COUGH: 0
SHORTNESS OF BREATH: 0
WHEEZING: 0
ALLERGIC/IMMUNOLOGIC NEGATIVE: 1
PHOTOPHOBIA: 0
VOMITING: 0
ABDOMINAL PAIN: 0
BACK PAIN: 0
DIARRHEA: 0
ABDOMINAL DISTENTION: 0
NAUSEA: 1

## 2019-03-27 ASSESSMENT — PAIN DESCRIPTION - LOCATION
LOCATION: HEAD
LOCATION: HEAD

## 2019-03-27 ASSESSMENT — PAIN SCALES - GENERAL
PAINLEVEL_OUTOF10: 5

## 2019-03-27 ASSESSMENT — PAIN DESCRIPTION - PAIN TYPE
TYPE: ACUTE PAIN
TYPE: ACUTE PAIN

## 2019-03-28 ENCOUNTER — HOSPITAL ENCOUNTER (EMERGENCY)
Age: 79
Discharge: HOME | End: 2019-03-28
Attending: EMERGENCY MEDICINE
Payer: MEDICARE

## 2019-03-28 ENCOUNTER — CARE COORDINATION (OUTPATIENT)
Dept: CARE COORDINATION | Age: 79
End: 2019-03-28

## 2019-03-28 ENCOUNTER — APPOINTMENT (OUTPATIENT)
Dept: GENERAL RADIOLOGY | Age: 79
End: 2019-03-28
Payer: MEDICARE

## 2019-03-28 ENCOUNTER — APPOINTMENT (OUTPATIENT)
Dept: CT IMAGING | Age: 79
End: 2019-03-28
Payer: MEDICARE

## 2019-03-28 VITALS
TEMPERATURE: 98.4 F | WEIGHT: 220 LBS | HEART RATE: 72 BPM | BODY MASS INDEX: 36.61 KG/M2 | DIASTOLIC BLOOD PRESSURE: 63 MMHG | RESPIRATION RATE: 16 BRPM | SYSTOLIC BLOOD PRESSURE: 127 MMHG | OXYGEN SATURATION: 94 %

## 2019-03-28 DIAGNOSIS — M25.532 LEFT WRIST PAIN: ICD-10-CM

## 2019-03-28 DIAGNOSIS — G44.309 POST-CONCUSSION HEADACHE: Primary | ICD-10-CM

## 2019-03-28 DIAGNOSIS — S00.83XD CONTUSION OF FACE, SUBSEQUENT ENCOUNTER: ICD-10-CM

## 2019-03-28 LAB
A/G RATIO: 1.6 (ref 1.1–2.2)
ALBUMIN SERPL-MCNC: 4.1 G/DL (ref 3.4–5)
ALP BLD-CCNC: 64 U/L (ref 40–129)
ALT SERPL-CCNC: 12 U/L (ref 10–40)
ANION GAP SERPL CALCULATED.3IONS-SCNC: 11 MMOL/L (ref 3–16)
AST SERPL-CCNC: 12 U/L (ref 15–37)
BASOPHILS ABSOLUTE: 0 K/UL (ref 0–0.2)
BASOPHILS RELATIVE PERCENT: 0.5 %
BILIRUB SERPL-MCNC: 0.4 MG/DL (ref 0–1)
BUN BLDV-MCNC: 20 MG/DL (ref 7–20)
CALCIUM SERPL-MCNC: 9 MG/DL (ref 8.3–10.6)
CHLORIDE BLD-SCNC: 100 MMOL/L (ref 99–110)
CO2: 28 MMOL/L (ref 21–32)
CREAT SERPL-MCNC: 0.8 MG/DL (ref 0.6–1.2)
EOSINOPHILS ABSOLUTE: 0.2 K/UL (ref 0–0.6)
EOSINOPHILS RELATIVE PERCENT: 3 %
GFR AFRICAN AMERICAN: >60
GFR NON-AFRICAN AMERICAN: >60
GLOBULIN: 2.6 G/DL
GLUCOSE BLD-MCNC: 134 MG/DL (ref 70–99)
HCT VFR BLD CALC: 31.9 % (ref 36–48)
HEMOGLOBIN: 10.5 G/DL (ref 12–16)
LYMPHOCYTES ABSOLUTE: 1.8 K/UL (ref 1–5.1)
LYMPHOCYTES RELATIVE PERCENT: 23.1 %
MCH RBC QN AUTO: 30.3 PG (ref 26–34)
MCHC RBC AUTO-ENTMCNC: 32.9 G/DL (ref 31–36)
MCV RBC AUTO: 92.1 FL (ref 80–100)
MONOCYTES ABSOLUTE: 0.6 K/UL (ref 0–1.3)
MONOCYTES RELATIVE PERCENT: 7.3 %
NEUTROPHILS ABSOLUTE: 5.1 K/UL (ref 1.7–7.7)
NEUTROPHILS RELATIVE PERCENT: 66.1 %
PDW BLD-RTO: 17.5 % (ref 12.4–15.4)
PLATELET # BLD: 249 K/UL (ref 135–450)
PMV BLD AUTO: 9 FL (ref 5–10.5)
POTASSIUM SERPL-SCNC: 4 MMOL/L (ref 3.5–5.1)
RBC # BLD: 3.46 M/UL (ref 4–5.2)
SODIUM BLD-SCNC: 139 MMOL/L (ref 136–145)
TOTAL PROTEIN: 6.7 G/DL (ref 6.4–8.2)
TROPONIN: <0.01 NG/ML
WBC # BLD: 7.7 K/UL (ref 4–11)

## 2019-03-28 PROCEDURE — 73200 CT UPPER EXTREMITY W/O DYE: CPT

## 2019-03-28 PROCEDURE — 6370000000 HC RX 637 (ALT 250 FOR IP): Performed by: NURSE PRACTITIONER

## 2019-03-28 PROCEDURE — 6360000002 HC RX W HCPCS: Performed by: NURSE PRACTITIONER

## 2019-03-28 PROCEDURE — 80053 COMPREHEN METABOLIC PANEL: CPT

## 2019-03-28 PROCEDURE — 93005 ELECTROCARDIOGRAM TRACING: CPT | Performed by: NURSE PRACTITIONER

## 2019-03-28 PROCEDURE — 73110 X-RAY EXAM OF WRIST: CPT

## 2019-03-28 PROCEDURE — 99284 EMERGENCY DEPT VISIT MOD MDM: CPT

## 2019-03-28 PROCEDURE — 85025 COMPLETE CBC W/AUTO DIFF WBC: CPT

## 2019-03-28 PROCEDURE — 96374 THER/PROPH/DIAG INJ IV PUSH: CPT

## 2019-03-28 PROCEDURE — 71045 X-RAY EXAM CHEST 1 VIEW: CPT

## 2019-03-28 PROCEDURE — 84484 ASSAY OF TROPONIN QUANT: CPT

## 2019-03-28 RX ORDER — ONDANSETRON 2 MG/ML
4 INJECTION INTRAMUSCULAR; INTRAVENOUS EVERY 30 MIN PRN
Status: DISCONTINUED | OUTPATIENT
Start: 2019-03-28 | End: 2019-03-29 | Stop reason: HOSPADM

## 2019-03-28 RX ORDER — HYDROCODONE BITARTRATE AND ACETAMINOPHEN 5; 325 MG/1; MG/1
1 TABLET ORAL ONCE
Status: COMPLETED | OUTPATIENT
Start: 2019-03-28 | End: 2019-03-28

## 2019-03-28 RX ORDER — HYDROCODONE BITARTRATE AND ACETAMINOPHEN 5; 325 MG/1; MG/1
1 TABLET ORAL EVERY 6 HOURS PRN
Qty: 20 TABLET | Refills: 0 | Status: SHIPPED | OUTPATIENT
Start: 2019-03-28 | End: 2019-03-31

## 2019-03-28 RX ADMIN — HYDROCODONE BITARTRATE AND ACETAMINOPHEN 1 TABLET: 5; 325 TABLET ORAL at 19:54

## 2019-03-28 RX ADMIN — ONDANSETRON 4 MG: 2 INJECTION INTRAMUSCULAR; INTRAVENOUS at 19:54

## 2019-03-28 ASSESSMENT — PAIN SCALES - GENERAL
PAINLEVEL_OUTOF10: 4
PAINLEVEL_OUTOF10: 10
PAINLEVEL_OUTOF10: 4

## 2019-03-28 ASSESSMENT — PAIN DESCRIPTION - LOCATION
LOCATION: BACK;ARM;HEAD
LOCATION: BACK;ARM;HEAD

## 2019-03-28 ASSESSMENT — PAIN DESCRIPTION - PAIN TYPE: TYPE: ACUTE PAIN

## 2019-03-28 ASSESSMENT — PAIN DESCRIPTION - ORIENTATION: ORIENTATION: LEFT

## 2019-03-28 NOTE — ED PROVIDER NOTES
[address] [facility]  ED      CHIEF COMPLAINT    Headache (seen here yesterday) and Arm Pain    HISTORY OF PRESENT ILLNESS  Cesar Larry is a 69 y.o. female who presents to the ED complaining of headache. Patient states the headache began today. Has had a terrible HA and then began having left forearm pain that went up her arm and into her back and the back of her head. Took two different things to help with the HA. Was concerned she was having a stroke because of the pain. Started hurting today without any known injury. Reports she took tylenol ES and took something else, thinks it started with a \"C\" but does not know for sure what it was. Reports her pain in the left arm got worse after taking the medications. Then the pain in her head was worse with turning. She is reporting nausea and dizziness that have been worsening and these were present prior to her fall 2 weeks ago. Was seen here yesterday for HA complaints after a fall and was diagnosed with post concussive syndrome. Pain will start in her head, go down in her left arm and then back into her head and her face will ache. Reports the only place she is having pain is on the left side of her face, then her arm hurt, her right leg hurt and then it went to her cheek. States its hard to explain. Now her forehead is hurting, like it rotates. The patient arrived to the ED via EMS transport.     PAST MEDICAL HISTORY    Past Medical History:   Diagnosis Date    Arthritis     Asthma     Bronchial asthma     Bursitis 12/2011    ACHILLES TENDON LEFT    CHF (congestive heart failure) (HCC)     Constipation     Depression     Diverticulitis     Dizziness     GERD (gastroesophageal reflux disease)     Hearing loss     Hyperlipidemia     Hypertension     Leg edema     Lung disease     Nausea & vomiting     POSTOPERATIVE    PONV (postoperative nausea and vomiting)     WHEN SHE WAS 21    Sleep apnea     CPAP, SLEEP STUDY 6/28 OVERNIGHT AT HOME    Tinnitus     Type II or unspecified type diabetes mellitus without mention of complication, not stated as uncontrolled     Unspecified cerebral artery occlusion with cerebral infarction     mini stroke       SURGICAL HISTORY    Past Surgical History:   Procedure Laterality Date    ACHILLES TENDON SURGERY  2/2/12    LEFT ACHILLES DEBRIDEMENT, HAGLUNDS AND RETROCALCANEAL BURSA EXCISION WITH FLEXOR HALLUS LONGUS TENDON TRANSFER WITH BLOCK FOR PAIN CONTROL    APPENDECTOMY      CARDIAC CATHETERIZATION  8/21/14    CARPAL TUNNEL RELEASE      both hands    CHOLECYSTECTOMY      COLONOSCOPY      COLONOSCOPY      COLONOSCOPY  2/10/2016    CYSTOSCOPY  10/02/2017    DIAGNOSTIC CARDIAC CATH LAB PROCEDURE      EYE SURGERY      HYSTERECTOMY      HYSTERECTOMY, TOTAL ABDOMINAL      JOINT REPLACEMENT      bilateral knee    KNEE SURGERY      both replaced    OTHER SURGICAL HISTORY  07/09/2018    CYSTOSCOPY, HYDRODISTENTION OF BLADDER WITH INSTILLATION OF    POLYPECTOMY      SHOULDER SURGERY      TOENAIL EXCISION  08/04/2016    right big toe    TONSILLECTOMY      TUBAL LIGATION      UPPER GASTROINTESTINAL ENDOSCOPY  10/29/12    gastritis    UPPER GASTROINTESTINAL ENDOSCOPY  2/10/2016    URETHRAL STRICTURE DILATATION         CURRENT MEDICATIONS    Current Outpatient Rx   Medication Sig Dispense Refill    HYDROcodone-acetaminophen (NORCO) 5-325 MG per tablet Take 1 tablet by mouth every 6 hours as needed for Pain for up to 3 days.  20 tablet 0    acetaminophen (APAP EXTRA STRENGTH) 500 MG tablet Take 1 tablet by mouth every 6 hours as needed for Pain 120 tablet 0    ondansetron (ZOFRAN ODT) 4 MG disintegrating tablet Take 1 tablet by mouth every 8 hours as needed for Nausea 20 tablet 0    oxybutynin (DITROPAN-XL) 10 MG extended release tablet Take 10 mg by mouth daily      naproxen (NAPROSYN) 500 MG tablet Take 500 mg by mouth 2 times daily (with meals)      trimethoprim (TRIMPEX) 100 MG tablet  albuterol sulfate  (90 Base) MCG/ACT inhaler Inhale 2 puffs into the lungs 4 times daily as needed for Wheezing 3 Inhaler 1    meloxicam (MOBIC) 15 MG tablet Take 1 tablet by mouth daily as needed for Pain 30 tablet 2    diclofenac (VOLTAREN) 75 MG EC tablet Take 1 tablet by mouth 2 times daily (with meals) 180 tablet 0    ondansetron (ZOFRAN) 4 MG tablet Take 1 tablet by mouth 3 times daily as needed for Nausea or Vomiting 30 tablet 0    blood glucose test strips (ACCU-CHEK ARABELLA PLUS) strip Test blood sugar  strip 11    metFORMIN (GLUCOPHAGE-XR) 500 MG extended release tablet Take 2 tablets by mouth 2 times daily (with meals) 120 tablet 5    furosemide (LASIX) 40 MG tablet TAKE ONE TABLET BY MOUTH TWICE A DAY prn swelling 60 tablet 5    carvedilol (COREG) 25 MG tablet Take 1 tablet by mouth 2 times daily (with meals) 180 tablet 1    DULoxetine (CYMBALTA) 60 MG extended release capsule Take 1 capsule by mouth daily 90 capsule 1    isosorbide mononitrate (IMDUR) 30 MG extended release tablet TAKE ONE-HALF TABLET BY MOUTH ONE TIME A DAY 45 tablet 3    sennosides-docusate sodium (SENOKOT-S) 8.6-50 MG tablet TAKE 2 TABLETS BY MOUTH DAILY 60 tablet 5    pramipexole (MIRAPEX) 0.5 MG tablet TAKE ONE TABLET BY MOUTH ONCE NIGHTLY 30 tablet 5    pravastatin (PRAVACHOL) 20 MG tablet TAKE ONE TABLET BY MOUTH DAILY 90 tablet 3    omeprazole (PRILOSEC) 20 MG delayed release capsule TAKE ONE CAPSULE TWICE A DAY 60 capsule 5    clotrimazole-betamethasone (LOTRISONE) 1-0.05 % cream Apply bid to affected area.  45 g 3    tiZANidine (ZANAFLEX) 4 MG tablet ONE TID PRN FOR MUSCLE SPASMS 60 tablet 1    nitroGLYCERIN (NITROSTAT) 0.4 MG SL tablet Place 1 tablet under the tongue every 5 minutes as needed for Chest pain 25 tablet 1    potassium chloride (KLOR-CON M) 20 MEQ extended release tablet Take 1 tablet by mouth daily 30 tablet 0    metoclopramide (REGLAN) 10 MG tablet Take 1 tablet by mouth 4 times daily as needed (Nausea and/or headache) 30 tablet 0    calcium carbonate 600 MG TABS tablet Take 1 tablet by mouth 2 times daily 30 tablet 1    hydrocortisone 2.5 % cream APPLY TOPICALLY TWO TIMES A DAY 1 Tube 1    azelastine (ASTELIN) 0.1 % nasal spray 2 sprays by Nasal route 2 times daily Use in each nostril as directed 1 Bottle 0    fluticasone (FLONASE) 50 MCG/ACT nasal spray 1 spray by Nasal route daily 1 Bottle 0    ACCU-CHEK SOFTCLIX LANCETS MISC USE TO TEST BLOOD SUGAR TWO TIMES DAILY 50 each 5    vitamin E 400 UNIT capsule Take 400 Units by mouth daily      aspirin 81 MG tablet Take 81 mg by mouth daily. ALLERGIES    Allergies   Allergen Reactions    Latex Rash    Cephalexin Other (See Comments)     SHAKY AND SWEATY    Codeine Nausea Only     STOMACH HURTS    Levofloxacin Nausea Only    Pce [Erythromycin] Nausea Only     STOMACH HURTS    Pcn [Penicillins] Other (See Comments)     SHAKY AND SWEATY    Pentazocine      UNSURE OF REATION    Sumycin [Tetracycline Hcl] Nausea Only    Atarax [Hydroxyzine Hcl] Nausea And Vomiting    Beef-Derived Products Nausea And Vomiting    Flagyl [Metronidazole] Nausea And Vomiting    Macrodantin [Nitrofurantoin] Palpitations and Other (See Comments)     SWEATY    Milk-Related Compounds Nausea And Vomiting    Pyridium [Phenazopyridine Hcl] Palpitations     SWEATY - PT DENIES REACTION    Sulfa Antibiotics Nausea And Vomiting and Nausea Only    Urised [Methen-Lisa-Meth Bl-Phen Sal] Palpitations     SWEATY       FAMILY HISTORY    Family History   Problem Relation Age of Onset    Cancer Father         prostate    Heart Disease Mother     Heart Disease Brother     Heart Disease Brother     Heart Disease Sister     Diabetes Sister        SOCIAL HISTORY    Social History     Socioeconomic History    Marital status:       Spouse name: None    Number of children: 3    Years of education: 15    Highest education level: None   Occupational History    Occupation: retired   Social Needs    Financial resource strain: None    Food insecurity:     Worry: None     Inability: None    Transportation needs:     Medical: None     Non-medical: None   Tobacco Use    Smoking status: Former Smoker     Packs/day: 0.25     Years: 0.10     Pack years: 0.02     Types: Cigarettes     Last attempt to quit: 1975     Years since quittin.2    Smokeless tobacco: Never Used    Tobacco comment: only smoked for 1 month   Substance and Sexual Activity    Alcohol use: No     Alcohol/week: 0.0 oz    Drug use: No    Sexual activity: Not Currently     Partners: Male   Lifestyle    Physical activity:     Days per week: None     Minutes per session: None    Stress: None   Relationships    Social connections:     Talks on phone: None     Gets together: None     Attends Synagogue service: None     Active member of club or organization: None     Attends meetings of clubs or organizations: None     Relationship status: None    Intimate partner violence:     Fear of current or ex partner: None     Emotionally abused: None     Physically abused: None     Forced sexual activity: None   Other Topics Concern    None   Social History Narrative    None       REVIEW OFSYSTEMS    10systems reviewed, pertinent positives per HPI otherwise noted to be negative      PHYSICAL EXAM  Physical Exam  GENERAL: Patient is well-developed, well-nourished. Awake and alert. Cooperative. Resting in bed. No apparent distress. HEENT:  Normocephalic. Right maxillary bruise. There are no bony depressions, instability, step-off, or crepitus to palpation of the facial bones. There is no raccoon's eyes or battles sign observed. PERRL. EOMI. Conjunctiva appear normal.  External ears are normal.  Bilateral TM are without erythema and are not bulging. There is no evidence of rupture or hemotympanum. There is no facial asymmetry. Tongue is midline, uvula is midline.  Posterior oropharynx is without edema, erythema, or exudate. NECK: Supple with normal ROM. Trachea midline  LUNGS: Equal and symmetric chest rise. Breathing is unlabored. Speaking comfortably in full sentences. Lungs are clear bilaterally to auscultation. Without wheezing, rales, or rhonchi. CADIOVASCULAR:  Regular rate and rhythm. Normal S1-S2 sounds. No murmurs, rubs, or gallops. GI: Soft, nontender, nondistended with positive bowel sounds. No rebound tenderness, guarding or any peritoneal signs. No masses orhepatosplenomegaly   MUSCULOSKELETAL: Left distal forearm with tenderness to palpation, no limitation of ROM to left wrist, hand or fingers. Other than the tenderness there is no abnormalities to the exam. To all other extremities: No gross deformities or trauma noted. Moving all extremities equally and appropriately. Normal ROM. SKIN: Warm/dry. Skin is intact. No rashes/lesions noted. PSYCHIATRIC: Mood and affect appropriate. Speech is clear and articulate. NEUROLOGIC: Alert and oriented. No focal motor or sensory deficits. LABS  I have reviewed all labs for this visit.    Results for orders placed or performed during the hospital encounter of 03/28/19   CBC auto differential   Result Value Ref Range    WBC 7.7 4.0 - 11.0 K/uL    RBC 3.46 (L) 4.00 - 5.20 M/uL    Hemoglobin 10.5 (L) 12.0 - 16.0 g/dL    Hematocrit 31.9 (L) 36.0 - 48.0 %    MCV 92.1 80.0 - 100.0 fL    MCH 30.3 26.0 - 34.0 pg    MCHC 32.9 31.0 - 36.0 g/dL    RDW 17.5 (H) 12.4 - 15.4 %    Platelets 956 598 - 302 K/uL    MPV 9.0 5.0 - 10.5 fL    Neutrophils % 66.1 %    Lymphocytes % 23.1 %    Monocytes % 7.3 %    Eosinophils % 3.0 %    Basophils % 0.5 %    Neutrophils # 5.1 1.7 - 7.7 K/uL    Lymphocytes # 1.8 1.0 - 5.1 K/uL    Monocytes # 0.6 0.0 - 1.3 K/uL    Eosinophils # 0.2 0.0 - 0.6 K/uL    Basophils # 0.0 0.0 - 0.2 K/uL   Comprehensive metabolic panel   Result Value Ref Range    Sodium 139 136 - 145 mmol/L    Potassium 4.0 3.5 - 5.1 mmol/L    Chloride 100 99 - 110 mmol/L    CO2 28 21 - 32 mmol/L    Anion Gap 11 3 - 16    Glucose 134 (H) 70 - 99 mg/dL    BUN 20 7 - 20 mg/dL    CREATININE 0.8 0.6 - 1.2 mg/dL    GFR Non-African American >60 >60    GFR African American >60 >60    Calcium 9.0 8.3 - 10.6 mg/dL    Total Protein 6.7 6.4 - 8.2 g/dL    Alb 4.1 3.4 - 5.0 g/dL    Albumin/Globulin Ratio 1.6 1.1 - 2.2    Total Bilirubin 0.4 0.0 - 1.0 mg/dL    Alkaline Phosphatase 64 40 - 129 U/L    ALT 12 10 - 40 U/L    AST 12 (L) 15 - 37 U/L    Globulin 2.6 g/dL   Troponin   Result Value Ref Range    Troponin <0.01 <0.01 ng/mL   EKG 12 Lead   Result Value Ref Range    Ventricular Rate 72 BPM    Atrial Rate 72 BPM    P-R Interval 192 ms    QRS Duration 88 ms    Q-T Interval 432 ms    QTc Calculation (Bazett) 473 ms    P Axis 48 degrees    R Axis -10 degrees    T Axis 25 degrees    Diagnosis       Normal sinus rhythmNormal ECGWhen compared with ECG of 27-MAR-2019 11:06,No significant change was found       RADIOLOGY    Xr Wrist Left (min 3 Views)    Result Date: 3/28/2019  EXAMINATION: XRAY VIEWS OF THE LEFT WRIST 3/28/2019 4:45 pm COMPARISON: None. HISTORY: ORDERING SYSTEM PROVIDED HISTORY: pain, fall 2 weeks ago TECHNOLOGIST PROVIDED HISTORY: Reason for exam:->pain, fall 2 weeks ago Ordering Physician Provided Reason for Exam: fall 2 weeks ago Acuity: Acute Type of Exam: Initial FINDINGS: The scapholunate and lunotriquetral interosseous distances are within normal limits. Multiple subchondral cysts are identified throughout the carpal bones, compatible with degenerative change. There is a questionable lucency within the distal radius, which may represent a nondisplaced fracture. In addition, there is soft tissue swelling, with effacement of the pronator fat pad. Possible nondisplaced fracture of the distal radius. Further evaluation with CT is recommended.      Ct Head Wo Contrast    Result Date: 3/27/2019  EXAMINATION: CT OF THE FACE WITHOUT CONTRAST; CT OF THE HEAD WITHOUT CONTRAST  3/27/2019 11:09 am TECHNIQUE: CT of the face was performed without the administration of intravenous contrast. Multiplanar reformatted images are provided for review. Dose modulation, iterative reconstruction, and/or weight based adjustment of the mA/kV was utilized to reduce the radiation dose to as low as reasonably achievable.; CT of the head was performed without the administration of intravenous contrast. Dose modulation, iterative reconstruction, and/or weight based adjustment of the mA/kV was utilized to reduce the radiation dose to as low as reasonably achievable. COMPARISON: CT 10/24/2018 HISTORY: ORDERING SYSTEM PROVIDED HISTORY: fall; ORDERING SYSTEM PROVIDED HISTORY: fall TECHNOLOGIST PROVIDED HISTORY: Has a \"code stroke\" or \"stroke alert\" been called? ->No Ordering Physician Provided Reason for Exam: Fall 2 weeks ago, HA Acuity: Acute Type of Exam: Ongoing FINDINGS: CT HEAD: BRAIN/VENTRICLES: There is no acute intracranial hemorrhage, mass effect or midline shift. No abnormal extra-axial fluid collection. The gray-white differentiation is maintained without evidence of an acute infarct. There is no evidence of hydrocephalus. Moderate periventricular white matter hypoattenuation, likely due to small vessel ischemic disease. ORBITS: The visualized portion of the orbits demonstrate no acute abnormality. SOFT TISSUES/SKULL:  No acute abnormality of the visualized skull or soft tissues. CT FACIAL BONES: FACIAL BONES: The maxilla, pterygoid plates and zygomatic arches are intact. The mandible is intact. The mandibular condyles are normally situated. The nasal bones and maxillary nasal processes are intact. ORBITS: The globes appear intact. The extraocular muscles, optic nerve sheath complexes and lacrimal glands appear unremarkable. No retrobulbar hematoma or mass is seen. The orbital walls and rims are intact.  SINUSES/MASTOIDS: The paranasal sinuses and mastoid air cells are well CONTRAST 3/1/2019 3:15 pm TECHNIQUE: CT of the abdomen and pelvis was performed with the administration of intravenous contrast. Multiplanar reformatted images are provided for review. Dose modulation, iterative reconstruction, and/or weight based adjustment of the mA/kV was utilized to reduce the radiation dose to as low as reasonably achievable. COMPARISON: 04/13/2018 CT HISTORY: ORDERING SYSTEM PROVIDED HISTORY: lower abdominal pain TECHNOLOGIST PROVIDED HISTORY: Additional Contrast?->None Ordering Physician Provided Reason for Exam: abdominal pain off and on x 2 weeks. nausea and a little diarrhea Acuity: Acute Type of Exam: Initial Relevant Medical/Surgical History: HX diabetes, HTN, diverticulitis. GB removed, APPY, hysterectomy FINDINGS: Lower Chest:  The lung bases are clear. The base of the heart is normal. Organs: The gallbladder is surgically absent. Chronic mild dilatation of the biliary ducts is normal following this procedure. The liver, pancreas and spleen show no significant findings. Tiny cyst in the spleen measuring 4 mm. Kidneys and adrenal glands are normal. GI/Bowel: The stomach, duodenum and small bowel are normal. Nonvisualized appendix with no inflammation at the cecum. Diverticular changes in the sigmoid colon without acute inflammation. Pelvis: The bladder is unremarkable. The uterus is absent. Peritoneum/Retroperitoneum: The aorta tapers normally. No lymph node enlargement. Bones/Soft Tissues: No significant skeletal abnormalities. 1. Diverticulosis with no evidence of acute diverticulitis. 2. No other significant findings in the abdomen or pelvis. Ct Wrist Left Wo Contrast    Result Date: 3/28/2019  EXAMINATION: CT OF THE LEFT WRIST WITHOUT CONTRAST 3/28/2019 8:54 pm TECHNIQUE: CT of the left wrist was performed without the administration of intravenous contrast.  Multiplanar reformatted images are provided for review.  Dose modulation, iterative reconstruction, and/or weight based adjustment of the mA/kV was utilized to reduce the radiation dose to as low as reasonably achievable. COMPARISON: Left wrist radiographs 03/28/2019. HISTORY ORDERING SYSTEM PROVIDED HISTORY: possible radial fracture on Xray TECHNOLOGIST PROVIDED HISTORY: Ordering Physician Provided Reason for Exam: Possible fracture seen on xray - pain to left wrist x2 weeks Initial encounter. FINDINGS: Bones: No fracture or dislocation. Normal carpal alignment is maintained. Soft Tissue:  The visualized musculature is unremarkable. The visualized tendons are grossly intact. No abnormal soft tissue mass or fluid collection. No radiopaque foreign body or soft tissue gas. Joint:  Moderate 1st carpometacarpal degenerative changes. No joint effusion or osseous erosion. No acute osseous abnormality. Xr Chest Portable    Result Date: 3/28/2019  EXAMINATION: SINGLE XRAY VIEW OF THE CHEST 3/28/2019 4:45 pm COMPARISON: 01/06/2018 HISTORY: ORDERING SYSTEM PROVIDED HISTORY: CP TECHNOLOGIST PROVIDED HISTORY: Reason for exam:->CP Ordering Physician Provided Reason for Exam: cp Acuity: Acute Type of Exam: Initial FINDINGS: There are low lung volumes with secondary bronchovascular crowding. No acute focal infiltrate is identified. The heart and mediastinal contours are stable. No pneumothorax. No free air. No acute bony abnormality. No acute abnormality detected. ED COURSE & MEDICAL DECISION MAKING    Patient seen and evaluated. Old records reviewed. Diagnostic testing reviewed and results discussed. This patient was also seen and evaluated by Stephen Chan DO. We thoroughly discussed thehistory, physical exam, diagnostic testing, and emergency department course. Jovanni Vanegas presented to the ED today with above noted complaints. Physical exam reveals tenderness palpation of the distal left forearm but otherwise the physical exam is unremarkable.   Patient does have some old bruising to the right maxillary area. She was seen yesterday in the ED where she had CT imaging of the head, neck, and facial bones and these are without acute findings. Patient was discharged with diagnosis of postconcussion syndrome. Given that she is continuing to have a headache today with the negative findings I do not feel that the testing needs to be repeated if I are headache is likely due to postconcussive syndrome. Patient is neurologically intact and without focal orlateralizing deficits on exam. Patient is currently afebrile, not tachycardic, BP is normotensive. Patient is without signs of meningeal irritation. I am not concerned for infectious cause of the headache at this time. X-ray of the left wrist was obtained and showed a lucency that was concerning for possible fracture, CT of the wrist was recommended and obtained without acute findings. CBC is without evidence for systemic infection as there is no leukocytosis or differential shift. H&H is low at 10.5/31.9 but stable. Platelets within normal limits. CMP is without electrolyte abnormality. No evidence of kidney or liver dysfunction. Troponin is negative. Chest x-ray without acute findings. EKG shows normal sinus rhythm. Pt was given the following medications or treatments in the ED:   Medications   HYDROcodone-acetaminophen (NORCO) 5-325 MG per tablet 1 tablet (1 tablet Oral Given 3/28/19 1954)     I do not feel the patient's left arm pain is due to cardiac ischemia as it is extremely reproducible upon examination. The patient's diagnostic studies while here in the ED were unremarkable. She was given pain medication for her reports of headache. Patient did have improvement in her pain prior to discharge. She'll be discharged with prescription for pain control and advised to follow-up with primary care provider within the next few days for further management related to today's ED visit.     At this point I do not feel the patient requires further work up and it is reasonable to discharge the patient. A discussion was had with the patient regarding diagnosis, diagnostic testing results,treatment/ plan of care, and follow up. All questions were answered. Patient will follow up as directed for further evaluation/treatment. The patient was given strict return precautions as we discussed symptoms that wouldnecessitate return to the ED. Patient will return to ED for new/worsening symptoms. The patient verbalized their understanding and agreement with the above plan. Patient will be started on the following medicationsfrom the ED:  Discharge Medication List as of 3/28/2019 11:05 PM      START taking these medications    Details   HYDROcodone-acetaminophen (NORCO) 5-325 MG per tablet Take 1 tablet by mouth every 6 hours as needed for Pain for up to 3 days. , Disp-20 tablet, [id number]             I estimatethere is LOW risk for SUBARACHNOID HEMORRHAGE, MENINGITIS, INTRACRANIAL HEMORRHAGE, SUBDURAL HEMATOMA, OR STROKE, thus I consider the discharge disposition reasonable. Rock Savage and I have discussed the diagnosis and risks, andwe agree with discharging home to follow-up with their primary doctor. We also discussed returning to the Emergency Department immediately if new or worsening symptoms occur. We have discussed the symptoms which are mostconcerning (e.g., changing or worsening pain, weakness, vomiting, fever) that necessitate immediate return. Clinical Impression    1. Post-concussion headache    2. Contusion of face, subsequent encounter    3. Left wrist pain        Discharge Vital Signs:  Blood pressure 127/63, pulse 72, temperature 98.4 °F (36.9 °C), temperature source Oral, resp. rate 16, weight 220 lb (99.8 kg), SpO2 94 %, not currently breastfeeding. FOLLOW UP  [address] [facility], [place]  [address] [facility].   [address] [facility]    Call in 1 day  For further evaluation    [facility]  ED  [address] [facility] [address]  [phone]  Go to   If symptoms worsen      DISPOSITION  Patient was discharged to home in good condition. Comment: Please note this report has been produced using speech recognition software and may contain errors related to thatsystem including errors in grammar, punctuation, and spelling, as well as words and phrases that may be inappropriate. If there are any questions or concerns please feel free to contact the dictating provider forclarification.        KIMI Mckee - CNP  03/29/19 4504

## 2019-03-28 NOTE — ED PROVIDER NOTES
I independently performed a history and physical on Glenna Farrell. All diagnostic, treatment, and disposition decisions were made by myself in conjunction with the advanced practice provider. For further details of [de-identified] [facility] emergency department encounter, please see advanced practice provider's documentation    This is a 43-year-old female presenting with persistent headache after a fall. Physical Examination: No focal neurological deficits. Right infraorbital area of ecchymosis. No extraocular movement entrapment    Ct Head Wo Contrast    Result Date: 3/27/2019  EXAMINATION: CT OF THE FACE WITHOUT CONTRAST; CT OF THE HEAD WITHOUT CONTRAST  3/27/2019 11:09 am TECHNIQUE: CT of the face was performed without the administration of intravenous contrast. Multiplanar reformatted images are provided for review. Dose modulation, iterative reconstruction, and/or weight based adjustment of the mA/kV was utilized to reduce the radiation dose to as low as reasonably achievable.; CT of the head was performed without the administration of intravenous contrast. Dose modulation, iterative reconstruction, and/or weight based adjustment of the mA/kV was utilized to reduce the radiation dose to as low as reasonably achievable. COMPARISON: CT 10/24/2018 HISTORY: ORDERING SYSTEM PROVIDED HISTORY: fall; ORDERING SYSTEM PROVIDED HISTORY: fall TECHNOLOGIST PROVIDED HISTORY: Has a \"code stroke\" or \"stroke alert\" been called? ->No Ordering Physician Provided Reason for Exam: Fall 2 weeks ago, HA Acuity: Acute Type of Exam: Ongoing FINDINGS: CT HEAD: BRAIN/VENTRICLES: There is no acute intracranial hemorrhage, mass effect or midline shift. No abnormal extra-axial fluid collection. The gray-white differentiation is maintained without evidence of an acute infarct. There is no evidence of hydrocephalus. Moderate periventricular white matter hypoattenuation, likely due to small vessel ischemic disease.  ORBITS: The visualized portion of the orbits demonstrate no acute abnormality. SOFT TISSUES/SKULL:  No acute abnormality of the visualized skull or soft tissues. CT FACIAL BONES: FACIAL BONES: The maxilla, pterygoid plates and zygomatic arches are intact. The mandible is intact. The mandibular condyles are normally situated. The nasal bones and maxillary nasal processes are intact. ORBITS: The globes appear intact. The extraocular muscles, optic nerve sheath complexes and lacrimal glands appear unremarkable. No retrobulbar hematoma or mass is seen. The orbital walls and rims are intact. SINUSES/MASTOIDS: The paranasal sinuses and mastoid air cells are well aerated. No acute fracture is seen. SOFT TISSUES: 1.8 cm lesion in the right parotid gland. No acute intracranial abnormality. No acute traumatic injury of the facial bones. 1.8 cm lesion in right parotid gland which is unchanged from the prior study and may represent a lymph node or salivary gland neoplasm. Consider nonemergent MRI for further evaluation if indicated. Ct Facial Bones Wo Contrast    Result Date: 3/27/2019  EXAMINATION: CT OF THE FACE WITHOUT CONTRAST; CT OF THE HEAD WITHOUT CONTRAST  3/27/2019 11:09 am TECHNIQUE: CT of the face was performed without the administration of intravenous contrast. Multiplanar reformatted images are provided for review. Dose modulation, iterative reconstruction, and/or weight based adjustment of the mA/kV was utilized to reduce the radiation dose to as low as reasonably achievable.; CT of the head was performed without the administration of intravenous contrast. Dose modulation, iterative reconstruction, and/or weight based adjustment of the mA/kV was utilized to reduce the radiation dose to as low as reasonably achievable. COMPARISON: CT 10/24/2018 HISTORY: ORDERING SYSTEM PROVIDED HISTORY: fall; ORDERING SYSTEM PROVIDED HISTORY: fall TECHNOLOGIST PROVIDED HISTORY: Has a \"code stroke\" or \"stroke alert\" been called? ->No to as low as reasonably achievable. COMPARISON: 04/13/2018 CT HISTORY: ORDERING SYSTEM PROVIDED HISTORY: fall Acute neck pain FINDINGS: BONES/ALIGNMENT: There is no evidence of an acute cervical spine fracture. There is normal alignment of the cervical spine. DEGENERATIVE CHANGES: Moderate degenerative disc disease and spondylosis at C3-4 through C6-7. Mild spinal canal stenosis at each of these levels. SOFT TISSUES: There is no prevertebral soft tissue swelling. No acute abnormality of the cervical spine. Ct Abdomen Pelvis W Iv Contrast Additional Contrast? None    Result Date: 3/1/2019  EXAMINATION: CT OF THE ABDOMEN AND PELVIS WITH CONTRAST 3/1/2019 3:15 pm TECHNIQUE: CT of the abdomen and pelvis was performed with the administration of intravenous contrast. Multiplanar reformatted images are provided for review. Dose modulation, iterative reconstruction, and/or weight based adjustment of the mA/kV was utilized to reduce the radiation dose to as low as reasonably achievable. COMPARISON: 04/13/2018 CT HISTORY: ORDERING SYSTEM PROVIDED HISTORY: lower abdominal pain TECHNOLOGIST PROVIDED HISTORY: Additional Contrast?->None Ordering Physician Provided Reason for Exam: abdominal pain off and on x 2 weeks. nausea and a little diarrhea Acuity: Acute Type of Exam: Initial Relevant Medical/Surgical History: HX diabetes, HTN, diverticulitis. GB removed, APPY, hysterectomy FINDINGS: Lower Chest:  The lung bases are clear. The base of the heart is normal. Organs: The gallbladder is surgically absent. Chronic mild dilatation of the biliary ducts is normal following this procedure. The liver, pancreas and spleen show no significant findings. Tiny cyst in the spleen measuring 4 mm. Kidneys and adrenal glands are normal. GI/Bowel: The stomach, duodenum and small bowel are normal. Nonvisualized appendix with no inflammation at the cecum. Diverticular changes in the sigmoid colon without acute inflammation.  Pelvis: The bladder is unremarkable. The uterus is absent. Peritoneum/Retroperitoneum: The aorta tapers normally. No lymph node enlargement. Bones/Soft Tissues: No significant skeletal abnormalities. 1. Diverticulosis with no evidence of acute diverticulitis. 2. No other significant findings in the abdomen or pelvis.             Santhosh Colon DO  03/28/19 [id number]

## 2019-03-29 ENCOUNTER — CARE COORDINATION (OUTPATIENT)
Dept: CARE COORDINATION | Age: 79
End: 2019-03-29

## 2019-03-29 LAB
EKG ATRIAL RATE: 72 BPM
EKG DIAGNOSIS: NORMAL
EKG P AXIS: 48 DEGREES
EKG P-R INTERVAL: 192 MS
EKG Q-T INTERVAL: 432 MS
EKG QRS DURATION: 88 MS
EKG QTC CALCULATION (BAZETT): 473 MS
EKG R AXIS: -10 DEGREES
EKG T AXIS: 25 DEGREES
EKG VENTRICULAR RATE: 72 BPM

## 2019-03-29 PROCEDURE — 93010 ELECTROCARDIOGRAM REPORT: CPT | Performed by: INTERNAL MEDICINE

## 2019-03-29 NOTE — ED NOTES
Discharge instructions reviewed with Pt. Pt verbalizes understanding at this time. Prescriptions/medications reviewed with pt at this time. VS WNL. Pt condition stable at this time. No concerns voiced.       Arvin Mahajan RN  03/28/19 [id number]

## 2019-03-30 DIAGNOSIS — I10 ESSENTIAL HYPERTENSION: ICD-10-CM

## 2019-04-01 ENCOUNTER — CARE COORDINATION (OUTPATIENT)
Dept: CARE COORDINATION | Age: 79
End: 2019-04-01

## 2019-04-01 ENCOUNTER — TELEPHONE (OUTPATIENT)
Dept: FAMILY MEDICINE CLINIC | Age: 79
End: 2019-04-01

## 2019-04-01 DIAGNOSIS — Z91.81 AT RISK FOR FALLS: Primary | ICD-10-CM

## 2019-04-01 RX ORDER — FUROSEMIDE 40 MG/1
TABLET ORAL
Qty: 60 TABLET | Refills: 5 | Status: SHIPPED | OUTPATIENT
Start: 2019-04-01 | End: 2019-09-07

## 2019-04-01 NOTE — CARE COORDINATION
Call to pt to obtain daily BG's and WT's. Left message on machine for pt to return call to report readings. If no return call, will attempt to reach pt again.     [address] [facility] Coordination   X:[phone]  T:[phone] ARNULFO Boggs RN

## 2019-04-03 NOTE — CARE COORDINATION
Call to pt to obtain daily BG's and WT's. Left another message on machine for pt to return call to report readings. If no return call, will attempt to reach pt again in 2 weeks.     [facility] Coordination   H:[phone]  Y:[phone]

## 2019-04-04 ENCOUNTER — CARE COORDINATION (OUTPATIENT)
Dept: CARE COORDINATION | Age: 79
End: 2019-04-04

## 2019-04-04 DIAGNOSIS — E11.40 CONTROLLED TYPE 2 DIABETES MELLITUS WITH DIABETIC NEUROPATHY, WITHOUT LONG-TERM CURRENT USE OF INSULIN (HCC): Primary | ICD-10-CM

## 2019-04-04 NOTE — CARE COORDINATION
Ambulatory Care Coordination Note  4/4/2019  CM Risk Score: 13  Pablo Mortality Risk Score: 27.13    ACC: Billy Bowman, RN    Summary Note: Pt states she is not feeling well today, can't describe what her abdomen feels like. One issue of diarrhea today, no vomiting but feels nauseous. Wondering if her cystitis is starting to flare up. Has appt with Dr. Norma Angeles tomorrow. Has transportation for appt. Pt has not heard from [facility]. Blood sugars are \"around 150 and my weight is staying the same around 237#s\". Plan:  Call Lesastephany Padron to check on orders  Add SN to [facility] orders. Care Coordination Interventions    Program Enrollment:  Complex Care  Referral from Primary Care Provider:  No  Suggested Interventions and Community Resources  Diabetes Education:  Completed (Comment: With [facility])  Disease Specific Clinic:  Completed (Comment: CHF education with [facility])  Home Health Services:  Completed  Medi Set or Pill Pack:  Not Started (Comment: Planned for discharge )  Pharmacist:  Completed  Senior Services:  Completed (Comment: [facility] - declines PASSPORT )  Social Work:  Completed  Other Therapy Services:  Completed (Comment: Neurocognitive Eval with UC )  Other Services:  Declined (Comment: Spiritual Care - Declines for now on 3/14/2018)  Zone Management Tools:  Completed (Comment: CHF, Diabetes [facility] Management mailed to patient 8/1/2017)  Other Services or Interventions:  low Na diet, weight log mailed to patient 8/9/2017         Goals Addressed     None          Prior to Admission medications    Medication Sig Start Date End Date Taking?  Authorizing Provider   furosemide (LASIX) 40 MG tablet TAKE ONE TABLET BY MOUTH TWICE A DAY 4/1/19   Tiffanie Adkins DO   acetaminophen (APAP EXTRA STRENGTH) 500 MG tablet Take 1 tablet by mouth every 6 hours as needed for Pain 3/27/19   KIMI Middleton - CNP   ondansetron (ZOFRAN ODT) 4 MG disintegrating tablet Take 1 tablet by mouth every 8 hours as needed for Nausea 3/27/19   Jay Finney APRN - CNP   oxybutynin (DITROPAN-XL) 10 MG extended release tablet Take 10 mg by mouth daily    Historical Provider, MD   naproxen (NAPROSYN) 500 MG tablet Take 500 mg by mouth 2 times daily (with meals)    Historical Provider, MD   trimethoprim (TRIMPEX) 100 MG tablet  2/8/19   Historical Provider, MD   albuterol sulfate  (90 Base) MCG/ACT inhaler Inhale 2 puffs into the lungs 4 times daily as needed for Wheezing 2/26/19   Raisa Yang MD   meloxicam DANITZA VIRAMONTES [facility]) 15 MG tablet Take 1 tablet by mouth daily as needed for Pain 2/22/19   Bayron Nolan MD   diclofenac (VOLTAREN) 75 MG EC tablet Take 1 tablet by mouth 2 times daily (with meals) 2/13/19   Bayron Nolan MD   ondansetron [facility]) 4 MG tablet Take 1 tablet by mouth 3 times daily as needed for Nausea or Vomiting 2/7/19   WILLIE Hurley   blood glucose test strips (ACCU-CHEK ARABELLA PLUS) strip Test blood sugar BID 1/18/19   Tiffanie Sauer DO   metFORMIN (GLUCOPHAGE-XR) 500 MG extended release tablet Take 2 tablets by mouth 2 times daily (with meals) 1/18/19   Tiffanie Adkins DO   carvedilol (COREG) 25 MG tablet Take 1 tablet by mouth 2 times daily (with meals) 1/18/19   Tiffanie Adkins DO   DULoxetine (CYMBALTA) 60 MG extended release capsule Take 1 capsule by mouth daily 1/18/19   Tiffanie Adkins DO   isosorbide mononitrate (IMDUR) 30 MG extended release tablet TAKE ONE-HALF TABLET BY MOUTH ONE TIME A DAY 1/15/19   Tiffanie Sauer DO   sennosides-docusate sodium (SENOKOT-S) 8.6-50 MG tablet TAKE 2 TABLETS BY MOUTH DAILY 12/27/18   Tiffanie Adkins DO   pramipexole (MIRAPEX) 0.5 MG tablet TAKE ONE TABLET BY MOUTH ONCE NIGHTLY 11/27/18   Tiffanie Sauer DO   pravastatin (PRAVACHOL) 20 MG tablet TAKE ONE TABLET BY MOUTH DAILY 11/26/18   Tiffanie Adkins DO   omeprazole (PRILOSEC) 20 MG delayed release capsule TAKE ONE CAPSULE TWICE A DAY 10/23/18   Raisa Yang MD   clotrimazole-betamethasone (LOTRISONE) 1-0.05 % cream Apply bid to affected area. 10/12/18   Tiffanie Adkins, DO   tiZANidine (ZANAFLEX) 4 MG tablet ONE TID PRN FOR MUSCLE SPASMS 10/12/18   Tiffanie Adkins, DO   nitroGLYCERIN (NITROSTAT) 0.4 MG SL tablet Place 1 tablet under the tongue every 5 minutes as needed for Chest pain 9/27/18   KIMI Samano - CNP   potassium chloride (KLOR-CON M) 20 MEQ extended release tablet Take 1 tablet by mouth daily 8/17/18   Sokomal Adkins, DO   metoclopramide (REGLAN) 10 MG tablet Take 1 tablet by mouth 4 times daily as needed (Nausea and/or headache) 7/3/18   Tiffanie Adkins, DO   calcium carbonate 600 MG TABS tablet Take 1 tablet by mouth 2 times daily 6/25/18   WILLIE Allan   hydrocortisone 2.5 % cream APPLY TOPICALLY TWO TIMES A DAY 2/15/18   Tiffanie Adkins, DO   azelastine (ASTELIN) 0.1 % nasal spray 2 sprays by Nasal route 2 times daily Use in each nostril as directed 1/8/18   Reed Diaz MD   fluticasone (FLONASE) 50 MCG/ACT nasal spray 1 spray by Nasal route daily 1/9/18   Reed Diaz MD   ACCU-CHEK SOFTCLIX LANCETS MISC USE TO TEST BLOOD SUGAR TWO TIMES DAILY 11/15/17   Perez Phillip MD   vitamin E 400 UNIT capsule Take 400 Units by mouth daily    Historical Provider, MD   aspirin 81 MG tablet Take 81 mg by mouth daily.     Historical Provider, MD       Future Appointments   Date Time Provider Ann Torre   4/5/2019  1:00 PM Tiffanie Adkins DO [facility]   7/19/2019  1:00 PM Tiffanie Adkins DO [facility]   12/18/2019  1:00 PM KIMI Salas - CNP [facility]

## 2019-04-05 ENCOUNTER — OFFICE VISIT (OUTPATIENT)
Dept: FAMILY MEDICINE CLINIC | Age: 79
End: 2019-04-05
Payer: MEDICARE

## 2019-04-05 VITALS
RESPIRATION RATE: 16 BRPM | BODY MASS INDEX: 38.94 KG/M2 | SYSTOLIC BLOOD PRESSURE: 134 MMHG | HEART RATE: 78 BPM | OXYGEN SATURATION: 98 % | DIASTOLIC BLOOD PRESSURE: 78 MMHG | TEMPERATURE: 98.7 F | WEIGHT: 234 LBS

## 2019-04-05 DIAGNOSIS — E11.40 CONTROLLED TYPE 2 DIABETES MELLITUS WITH DIABETIC NEUROPATHY, WITHOUT LONG-TERM CURRENT USE OF INSULIN (HCC): ICD-10-CM

## 2019-04-05 DIAGNOSIS — R51.9 NONINTRACTABLE HEADACHE, UNSPECIFIED CHRONICITY PATTERN, UNSPECIFIED HEADACHE TYPE: Primary | ICD-10-CM

## 2019-04-05 DIAGNOSIS — Z91.81 AT RISK FOR FALLS: ICD-10-CM

## 2019-04-05 DIAGNOSIS — M25.532 LEFT WRIST PAIN: ICD-10-CM

## 2019-04-05 LAB — HBA1C MFR BLD: 6.9 %

## 2019-04-05 PROCEDURE — 4040F PNEUMOC VAC/ADMIN/RCVD: CPT | Performed by: FAMILY MEDICINE

## 2019-04-05 PROCEDURE — G8427 DOCREV CUR MEDS BY ELIG CLIN: HCPCS | Performed by: FAMILY MEDICINE

## 2019-04-05 PROCEDURE — G8400 PT W/DXA NO RESULTS DOC: HCPCS | Performed by: FAMILY MEDICINE

## 2019-04-05 PROCEDURE — G8417 CALC BMI ABV UP PARAM F/U: HCPCS | Performed by: FAMILY MEDICINE

## 2019-04-05 PROCEDURE — 1123F ACP DISCUSS/DSCN MKR DOCD: CPT | Performed by: FAMILY MEDICINE

## 2019-04-05 PROCEDURE — 83036 HEMOGLOBIN GLYCOSYLATED A1C: CPT | Performed by: FAMILY MEDICINE

## 2019-04-05 PROCEDURE — 1036F TOBACCO NON-USER: CPT | Performed by: FAMILY MEDICINE

## 2019-04-05 PROCEDURE — G8598 ASA/ANTIPLAT THER USED: HCPCS | Performed by: FAMILY MEDICINE

## 2019-04-05 PROCEDURE — 1090F PRES/ABSN URINE INCON ASSESS: CPT | Performed by: FAMILY MEDICINE

## 2019-04-05 PROCEDURE — 99214 OFFICE O/P EST MOD 30 MIN: CPT | Performed by: FAMILY MEDICINE

## 2019-04-05 NOTE — PROGRESS NOTES
not diaphoretic. Psychiatric: She has a normal mood and affect.        Current Outpatient Medications   Medication Sig Dispense Refill    furosemide (LASIX) 40 MG tablet TAKE ONE TABLET BY MOUTH TWICE A DAY 60 tablet 5    acetaminophen (APAP EXTRA STRENGTH) 500 MG tablet Take 1 tablet by mouth every 6 hours as needed for Pain 120 tablet 0    ondansetron (ZOFRAN ODT) 4 MG disintegrating tablet Take 1 tablet by mouth every 8 hours as needed for Nausea 20 tablet 0    oxybutynin (DITROPAN-XL) 10 MG extended release tablet Take 10 mg by mouth daily      naproxen (NAPROSYN) 500 MG tablet Take 500 mg by mouth 2 times daily (with meals)      trimethoprim (TRIMPEX) 100 MG tablet       albuterol sulfate  (90 Base) MCG/ACT inhaler Inhale 2 puffs into the lungs 4 times daily as needed for Wheezing 3 Inhaler 1    meloxicam (MOBIC) 15 MG tablet Take 1 tablet by mouth daily as needed for Pain 30 tablet 2    diclofenac (VOLTAREN) 75 MG EC tablet Take 1 tablet by mouth 2 times daily (with meals) 180 tablet 0    ondansetron (ZOFRAN) 4 MG tablet Take 1 tablet by mouth 3 times daily as needed for Nausea or Vomiting 30 tablet 0    blood glucose test strips (ACCU-CHEK ARABELLA PLUS) strip Test blood sugar  strip 11    metFORMIN (GLUCOPHAGE-XR) 500 MG extended release tablet Take 2 tablets by mouth 2 times daily (with meals) 120 tablet 5    carvedilol (COREG) 25 MG tablet Take 1 tablet by mouth 2 times daily (with meals) 180 tablet 1    DULoxetine (CYMBALTA) 60 MG extended release capsule Take 1 capsule by mouth daily 90 capsule 1    isosorbide mononitrate (IMDUR) 30 MG extended release tablet TAKE ONE-HALF TABLET BY MOUTH ONE TIME A DAY 45 tablet 3    sennosides-docusate sodium (SENOKOT-S) 8.6-50 MG tablet TAKE 2 TABLETS BY MOUTH DAILY 60 tablet 5    pramipexole (MIRAPEX) 0.5 MG tablet TAKE ONE TABLET BY MOUTH ONCE NIGHTLY 30 tablet 5    pravastatin (PRAVACHOL) 20 MG tablet TAKE ONE TABLET BY MOUTH DAILY 90 tablet 3    omeprazole (PRILOSEC) 20 MG delayed release capsule TAKE ONE CAPSULE TWICE A DAY 60 capsule 5    clotrimazole-betamethasone (LOTRISONE) 1-0.05 % cream Apply bid to affected area. 45 g 3    tiZANidine (ZANAFLEX) 4 MG tablet ONE TID PRN FOR MUSCLE SPASMS 60 tablet 1    nitroGLYCERIN (NITROSTAT) 0.4 MG SL tablet Place 1 tablet under the tongue every 5 minutes as needed for Chest pain 25 tablet 1    potassium chloride (KLOR-CON M) 20 MEQ extended release tablet Take 1 tablet by mouth daily 30 tablet 0    metoclopramide (REGLAN) 10 MG tablet Take 1 tablet by mouth 4 times daily as needed (Nausea and/or headache) 30 tablet 0    calcium carbonate 600 MG TABS tablet Take 1 tablet by mouth 2 times daily 30 tablet 1    hydrocortisone 2.5 % cream APPLY TOPICALLY TWO TIMES A DAY 1 Tube 1    azelastine (ASTELIN) 0.1 % nasal spray 2 sprays by Nasal route 2 times daily Use in each nostril as directed 1 Bottle 0    fluticasone (FLONASE) 50 MCG/ACT nasal spray 1 spray by Nasal route daily 1 Bottle 0    ACCU-CHEK SOFTCLIX LANCETS MISC USE TO TEST BLOOD SUGAR TWO TIMES DAILY 50 each 5    vitamin E 400 UNIT capsule Take 400 Units by mouth daily      aspirin 81 MG tablet Take 81 mg by mouth daily. No current facility-administered medications for this visit. Assessment:    1. Nonintractable headache, unspecified chronicity pattern, unspecified headache type    2. Left wrist pain    3. At risk for falls    4. Controlled type 2 diabetes mellitus with diabetic neuropathy, without long-term current use of insulin [facility]        Plan:    1. Nonintractable headache, unspecified chronicity pattern, unspecified headache type  All imaging was unremarkable. It is improving. Continue NSAID's prn pain. 2. Left wrist pain  Ct of the wrist was normal. Likely a contusion. ROM was normal. Apply ice and use NSAID prn pain. 3. At risk for falls  Already ordered home PT.     4. Controlled type 2 diabetes mellitus

## 2019-04-11 ENCOUNTER — CARE COORDINATION (OUTPATIENT)
Dept: CARE COORDINATION | Age: 79
End: 2019-04-11

## 2019-04-11 NOTE — CARE COORDINATION
Call to [facility] to confirm pt active. SN/PT/OT confirmed. Intake stated they have been trying to get confirmation for Speech and therapy, gave verbal. They are in the home 3/week and very active. Edinson Lawson RN, BSN  Care Coordinator  Cell [phone]  Email [email or web address]

## 2019-04-15 ENCOUNTER — TELEPHONE (OUTPATIENT)
Dept: FAMILY MEDICINE CLINIC | Age: 79
End: 2019-04-15

## 2019-04-15 ENCOUNTER — CARE COORDINATION (OUTPATIENT)
Dept: CARE COORDINATION | Age: 79
End: 2019-04-15

## 2019-04-16 ENCOUNTER — OFFICE VISIT (OUTPATIENT)
Dept: FAMILY MEDICINE CLINIC | Age: 79
End: 2019-04-16
Payer: MEDICARE

## 2019-04-16 VITALS
WEIGHT: 237 LBS | RESPIRATION RATE: 22 BRPM | SYSTOLIC BLOOD PRESSURE: 150 MMHG | BODY MASS INDEX: 39.44 KG/M2 | HEART RATE: 75 BPM | TEMPERATURE: 98.9 F | DIASTOLIC BLOOD PRESSURE: 90 MMHG | OXYGEN SATURATION: 98 %

## 2019-04-16 DIAGNOSIS — R51.9 NONINTRACTABLE HEADACHE, UNSPECIFIED CHRONICITY PATTERN, UNSPECIFIED HEADACHE TYPE: Primary | ICD-10-CM

## 2019-04-16 PROCEDURE — G8598 ASA/ANTIPLAT THER USED: HCPCS | Performed by: FAMILY MEDICINE

## 2019-04-16 PROCEDURE — G8417 CALC BMI ABV UP PARAM F/U: HCPCS | Performed by: FAMILY MEDICINE

## 2019-04-16 PROCEDURE — 99213 OFFICE O/P EST LOW 20 MIN: CPT | Performed by: FAMILY MEDICINE

## 2019-04-16 PROCEDURE — 1090F PRES/ABSN URINE INCON ASSESS: CPT | Performed by: FAMILY MEDICINE

## 2019-04-16 PROCEDURE — G8400 PT W/DXA NO RESULTS DOC: HCPCS | Performed by: FAMILY MEDICINE

## 2019-04-16 PROCEDURE — 1123F ACP DISCUSS/DSCN MKR DOCD: CPT | Performed by: FAMILY MEDICINE

## 2019-04-16 PROCEDURE — 1036F TOBACCO NON-USER: CPT | Performed by: FAMILY MEDICINE

## 2019-04-16 PROCEDURE — G8427 DOCREV CUR MEDS BY ELIG CLIN: HCPCS | Performed by: FAMILY MEDICINE

## 2019-04-16 PROCEDURE — 4040F PNEUMOC VAC/ADMIN/RCVD: CPT | Performed by: FAMILY MEDICINE

## 2019-04-16 ASSESSMENT — ENCOUNTER SYMPTOMS: NAUSEA: 0

## 2019-04-16 NOTE — PROGRESS NOTES
Tracy Primrose is a 69 y.o. female    Chief Complaint   Patient presents with    Headache       HPI:    Headache    This is a recurrent problem. The current episode started in the past 7 days. The problem has been unchanged. The pain is located in the bilateral region. The pain does not radiate. The pain quality is similar to prior headaches. The quality of the pain is described as aching. Pertinent negatives include no nausea, tingling or weakness. Nothing aggravates the symptoms. She has tried acetaminophen for the symptoms. The treatment provided no relief. There is no history of migraine headaches. ROS:    Review of Systems   Gastrointestinal: Negative for nausea. Neurological: Positive for headaches. Negative for tingling and weakness. BP (!) 150/90   Pulse 75   Temp 98.9 °F (37.2 °C) (Oral)   Resp 22   Wt 237 lb (107.5 kg)   SpO2 98%   BMI 39.44 kg/m²     Physical Exam:    Physical Exam   Constitutional: She is oriented to person, place, and time. She appears well-developed and well-nourished. HENT:   Head: Normocephalic. Eyes: Pupils are equal, round, and reactive to light. EOM are normal. No scleral icterus. Neck: Normal range of motion. Neck supple. Cardiovascular: Normal rate and regular rhythm. Murmur heard. Pulmonary/Chest: Effort normal and breath sounds normal.   Neurological: She is alert and oriented to person, place, and time. No cranial nerve deficit. She exhibits normal muscle tone. Psychiatric: She has a normal mood and affect.        Current Outpatient Medications   Medication Sig Dispense Refill    furosemide (LASIX) 40 MG tablet TAKE ONE TABLET BY MOUTH TWICE A DAY 60 tablet 5    acetaminophen (APAP EXTRA STRENGTH) 500 MG tablet Take 1 tablet by mouth every 6 hours as needed for Pain 120 tablet 0    ondansetron (ZOFRAN ODT) 4 MG disintegrating tablet Take 1 tablet by mouth every 8 hours as needed for Nausea 20 tablet 0    oxybutynin (DITROPAN-XL) 10 MG extended release tablet Take 10 mg by mouth daily      naproxen (NAPROSYN) 500 MG tablet Take 500 mg by mouth 2 times daily (with meals)      trimethoprim (TRIMPEX) 100 MG tablet       albuterol sulfate  (90 Base) MCG/ACT inhaler Inhale 2 puffs into the lungs 4 times daily as needed for Wheezing 3 Inhaler 1    meloxicam (MOBIC) 15 MG tablet Take 1 tablet by mouth daily as needed for Pain 30 tablet 2    diclofenac (VOLTAREN) 75 MG EC tablet Take 1 tablet by mouth 2 times daily (with meals) 180 tablet 0    ondansetron (ZOFRAN) 4 MG tablet Take 1 tablet by mouth 3 times daily as needed for Nausea or Vomiting 30 tablet 0    blood glucose test strips (ACCU-CHEK ARABELLA PLUS) strip Test blood sugar  strip 11    metFORMIN (GLUCOPHAGE-XR) 500 MG extended release tablet Take 2 tablets by mouth 2 times daily (with meals) 120 tablet 5    carvedilol (COREG) 25 MG tablet Take 1 tablet by mouth 2 times daily (with meals) 180 tablet 1    DULoxetine (CYMBALTA) 60 MG extended release capsule Take 1 capsule by mouth daily 90 capsule 1    isosorbide mononitrate (IMDUR) 30 MG extended release tablet TAKE ONE-HALF TABLET BY MOUTH ONE TIME A DAY 45 tablet 3    sennosides-docusate sodium (SENOKOT-S) 8.6-50 MG tablet TAKE 2 TABLETS BY MOUTH DAILY 60 tablet 5    pramipexole (MIRAPEX) 0.5 MG tablet TAKE ONE TABLET BY MOUTH ONCE NIGHTLY 30 tablet 5    pravastatin (PRAVACHOL) 20 MG tablet TAKE ONE TABLET BY MOUTH DAILY 90 tablet 3    omeprazole (PRILOSEC) 20 MG delayed release capsule TAKE ONE CAPSULE TWICE A DAY 60 capsule 5    clotrimazole-betamethasone (LOTRISONE) 1-0.05 % cream Apply bid to affected area.  45 g 3    tiZANidine (ZANAFLEX) 4 MG tablet ONE TID PRN FOR MUSCLE SPASMS 60 tablet 1    nitroGLYCERIN (NITROSTAT) 0.4 MG SL tablet Place 1 tablet under the tongue every 5 minutes as needed for Chest pain 25 tablet 1    potassium chloride (KLOR-CON M) 20 MEQ extended release tablet Take 1 tablet by mouth

## 2019-04-17 NOTE — CARE COORDINATION
Call to pt to obtain daily BG's and WT's. Spoke to pt who reported BG's and WT's from 4/10-4/17. Please see readings below or in flowsheets. Instructed pt to contact [address], [address] or PCP office with any needs or concerns, she was agreeable. Informed pt would follow up again in 2 weeks to obtain more readings, she was agreeable to call.     Patient Reported Blood Glucose   Blood Glucose Tracker 4/17/2019 4/16/2019 4/15/2019 4/14/2019 4/13/2019   Before breakfast blood glucose 161 155 171 136 131        Patient Reported Weight   Patient Reported Weights 4/17/2019 4/16/2019 4/15/2019 4/14/2019 4/13/2019   Weight 238 lb 236 lb 236 lb 236 lb 238 lb        Future Appointments   Date Time Provider Ann Torre   4/18/2019  1:15 PM MD SKYLER Witt [facility]   [facility]2019  [facility]   12/18/2019  1:00 PM KIMI Rivas - CNP [facility]         [facility] Coordination   W:[phone]  U:[phone]

## 2019-04-18 ENCOUNTER — OFFICE VISIT (OUTPATIENT)
Dept: ORTHOPEDIC SURGERY | Age: 79
End: 2019-04-18
Payer: MEDICARE

## 2019-04-18 VITALS
SYSTOLIC BLOOD PRESSURE: 136 MMHG | HEART RATE: 77 BPM | HEIGHT: 65 IN | DIASTOLIC BLOOD PRESSURE: 80 MMHG | WEIGHT: 236.99 LBS | BODY MASS INDEX: 39.49 KG/M2

## 2019-04-18 DIAGNOSIS — M19.012 PRIMARY OSTEOARTHRITIS OF LEFT SHOULDER: ICD-10-CM

## 2019-04-18 DIAGNOSIS — M54.12 CERVICAL RADICULOPATHY: ICD-10-CM

## 2019-04-18 DIAGNOSIS — M25.512 LEFT SHOULDER PAIN, UNSPECIFIED CHRONICITY: Primary | ICD-10-CM

## 2019-04-18 PROCEDURE — G8598 ASA/ANTIPLAT THER USED: HCPCS | Performed by: ORTHOPAEDIC SURGERY

## 2019-04-18 PROCEDURE — G8400 PT W/DXA NO RESULTS DOC: HCPCS | Performed by: ORTHOPAEDIC SURGERY

## 2019-04-18 PROCEDURE — G8427 DOCREV CUR MEDS BY ELIG CLIN: HCPCS | Performed by: ORTHOPAEDIC SURGERY

## 2019-04-18 PROCEDURE — 4040F PNEUMOC VAC/ADMIN/RCVD: CPT | Performed by: ORTHOPAEDIC SURGERY

## 2019-04-18 PROCEDURE — G8417 CALC BMI ABV UP PARAM F/U: HCPCS | Performed by: ORTHOPAEDIC SURGERY

## 2019-04-18 PROCEDURE — 99213 OFFICE O/P EST LOW 20 MIN: CPT | Performed by: ORTHOPAEDIC SURGERY

## 2019-04-18 PROCEDURE — 1090F PRES/ABSN URINE INCON ASSESS: CPT | Performed by: ORTHOPAEDIC SURGERY

## 2019-04-18 PROCEDURE — 1123F ACP DISCUSS/DSCN MKR DOCD: CPT | Performed by: ORTHOPAEDIC SURGERY

## 2019-04-18 PROCEDURE — 1036F TOBACCO NON-USER: CPT | Performed by: ORTHOPAEDIC SURGERY

## 2019-04-18 NOTE — PROGRESS NOTES
CHIEF COMPLAINT:    Chief Complaint   Patient presents with    Shoulder Pain     CK LEFT SHOULDER       HISTORY OF PRESENT ILLNESS:                The patient is a 69 y.o. female who presents to clinic for evaluation of continued left shoulder pain. She is an established patient with known, severe osteoarthritis of her glenohumeral joint. We discussed treatment options including physical therapy however, she was unable to begin this. Last week, she began having home physical therapy come to her house.     Past Medical History:   Diagnosis Date    Arthritis     Asthma     Bronchial asthma     Bursitis 12/2011    ACHILLES TENDON LEFT    CHF (congestive heart failure) (HCC)     Constipation     Depression     Diverticulitis     Dizziness     GERD (gastroesophageal reflux disease)     Hearing loss     Hyperlipidemia     Hypertension     Leg edema     Lung disease     Nausea & vomiting     POSTOPERATIVE    PONV (postoperative nausea and vomiting)     WHEN SHE WAS 21    Sleep apnea     CPAP, SLEEP STUDY 6/28 OVERNIGHT AT HOME    Tinnitus     Type II or unspecified type diabetes mellitus without mention of complication, not stated as uncontrolled     Unspecified cerebral artery occlusion with cerebral infarction     mini stroke          The pain assessment was noted & is as follows:  Pain Assessment  Location of Pain: Shoulder  Location Modifiers: Left  Severity of Pain: 10  Quality of Pain: Aching, Dull, Sharp  Duration of Pain: Persistent  Frequency of Pain: Constant  Aggravating Factors: Walking, Stairs  Relieving Factors: Rest  Result of Injury: No  Work-Related Injury: No  Are there other pain locations you wish to document?: No]      Work Status/Functionality:     Past Medical History: Medical history form was reviewed today & can be found in the media tab  Past Medical History:   Diagnosis Date    Arthritis     Asthma     Bronchial asthma     Bursitis 12/2011    ACHILLES TENDON LEFT    CHF (congestive heart failure) (HCC)     Constipation     Depression     Diverticulitis     Dizziness     GERD (gastroesophageal reflux disease)     Hearing loss     Hyperlipidemia     Hypertension     Leg edema     Lung disease     Nausea & vomiting     POSTOPERATIVE    PONV (postoperative nausea and vomiting)     WHEN SHE WAS 21    Sleep apnea     CPAP, SLEEP STUDY 6/28 OVERNIGHT AT HOME    Tinnitus     Type II or unspecified type diabetes mellitus without mention of complication, not stated as uncontrolled     Unspecified cerebral artery occlusion with cerebral infarction     mini stroke      Past Surgical History:     Past Surgical History:   Procedure Laterality Date    ACHILLES TENDON SURGERY  2/2/12    LEFT ACHILLES DEBRIDEMENT, HAGLUNDS AND RETROCALCANEAL BURSA EXCISION WITH FLEXOR HALLUS LONGUS TENDON TRANSFER WITH BLOCK FOR PAIN CONTROL    APPENDECTOMY      CARDIAC CATHETERIZATION  8/21/14    CARPAL TUNNEL RELEASE      both hands    CHOLECYSTECTOMY      COLONOSCOPY      COLONOSCOPY      COLONOSCOPY  2/10/2016    CYSTOSCOPY  10/02/2017    DIAGNOSTIC CARDIAC CATH LAB PROCEDURE      EYE SURGERY      HYSTERECTOMY      HYSTERECTOMY, TOTAL ABDOMINAL      JOINT REPLACEMENT      bilateral knee    KNEE SURGERY      both replaced    OTHER SURGICAL HISTORY  07/09/2018    CYSTOSCOPY, HYDRODISTENTION OF BLADDER WITH INSTILLATION OF    POLYPECTOMY      SHOULDER SURGERY      TOENAIL EXCISION  08/04/2016    right big toe    TONSILLECTOMY      TUBAL LIGATION      UPPER GASTROINTESTINAL ENDOSCOPY  10/29/12    gastritis    UPPER GASTROINTESTINAL ENDOSCOPY  2/10/2016    URETHRAL STRICTURE DILATATION       Current Medications:     Current Outpatient Medications:     furosemide (LASIX) 40 MG tablet, TAKE ONE TABLET BY MOUTH TWICE A DAY, Disp: 60 tablet, Rfl: 5    acetaminophen (APAP EXTRA STRENGTH) 500 MG tablet, Take 1 tablet by mouth every 6 hours as needed for Pain, Disp: 120 tablet, Rfl: 0    ondansetron (ZOFRAN ODT) 4 MG disintegrating tablet, Take 1 tablet by mouth every 8 hours as needed for Nausea, Disp: 20 tablet, Rfl: 0    oxybutynin (DITROPAN-XL) 10 MG extended release tablet, Take 10 mg by mouth daily, Disp: , Rfl:     naproxen (NAPROSYN) 500 MG tablet, Take 500 mg by mouth 2 times daily (with meals), Disp: , Rfl:     trimethoprim (TRIMPEX) 100 MG tablet, , Disp: , Rfl:     albuterol sulfate  (90 Base) MCG/ACT inhaler, Inhale 2 puffs into the lungs 4 times daily as needed for Wheezing, Disp: 3 Inhaler, Rfl: 1    meloxicam (MOBIC) 15 MG tablet, Take 1 tablet by mouth daily as needed for Pain, Disp: 30 tablet, Rfl: 2    diclofenac (VOLTAREN) 75 MG EC tablet, Take 1 tablet by mouth 2 times daily (with meals), Disp: 180 tablet, Rfl: 0    ondansetron (ZOFRAN) 4 MG tablet, Take 1 tablet by mouth 3 times daily as needed for Nausea or Vomiting, Disp: 30 tablet, Rfl: 0    blood glucose test strips (ACCU-CHEK ARABELLA PLUS) strip, Test blood sugar BID, Disp: 200 strip, Rfl: 11    metFORMIN (GLUCOPHAGE-XR) 500 MG extended release tablet, Take 2 tablets by mouth 2 times daily (with meals), Disp: 120 tablet, Rfl: 5    carvedilol (COREG) 25 MG tablet, Take 1 tablet by mouth 2 times daily (with meals), Disp: 180 tablet, Rfl: 1    DULoxetine (CYMBALTA) 60 MG extended release capsule, Take 1 capsule by mouth daily, Disp: 90 capsule, Rfl: 1    isosorbide mononitrate (IMDUR) 30 MG extended release tablet, TAKE ONE-HALF TABLET BY MOUTH ONE TIME A DAY, Disp: 45 tablet, Rfl: 3    sennosides-docusate sodium (SENOKOT-S) 8.6-50 MG tablet, TAKE 2 TABLETS BY MOUTH DAILY, Disp: 60 tablet, Rfl: 5    pramipexole (MIRAPEX) 0.5 MG tablet, TAKE ONE TABLET BY MOUTH ONCE NIGHTLY, Disp: 30 tablet, Rfl: 5    pravastatin (PRAVACHOL) 20 MG tablet, TAKE ONE TABLET BY MOUTH DAILY, Disp: 90 tablet, Rfl: 3    omeprazole (PRILOSEC) 20 MG delayed release capsule, TAKE ONE CAPSULE TWICE A DAY, Disp: 60 capsule, Rfl: 5    clotrimazole-betamethasone (LOTRISONE) 1-0.05 % cream, Apply bid to affected area., Disp: 45 g, Rfl: 3    tiZANidine (ZANAFLEX) 4 MG tablet, ONE TID PRN FOR MUSCLE SPASMS, Disp: 60 tablet, Rfl: 1    nitroGLYCERIN (NITROSTAT) 0.4 MG SL tablet, Place 1 tablet under the tongue every 5 minutes as needed for Chest pain, Disp: 25 tablet, Rfl: 1    potassium chloride (KLOR-CON M) 20 MEQ extended release tablet, Take 1 tablet by mouth daily, Disp: 30 tablet, Rfl: 0    metoclopramide (REGLAN) 10 MG tablet, Take 1 tablet by mouth 4 times daily as needed (Nausea and/or headache), Disp: 30 tablet, Rfl: 0    calcium carbonate 600 MG TABS tablet, Take 1 tablet by mouth 2 times daily, Disp: 30 tablet, Rfl: 1    hydrocortisone 2.5 % cream, APPLY TOPICALLY TWO TIMES A DAY, Disp: 1 Tube, Rfl: 1    azelastine (ASTELIN) 0.1 % nasal spray, 2 sprays by Nasal route 2 times daily Use in each nostril as directed, Disp: 1 Bottle, Rfl: 0    fluticasone (FLONASE) 50 MCG/ACT nasal spray, 1 spray by Nasal route daily, Disp: 1 Bottle, Rfl: 0    ACCU-CHEK SOFTCLIX LANCETS MISC, USE TO TEST BLOOD SUGAR TWO TIMES DAILY, Disp: 50 each, Rfl: 5    vitamin E 400 UNIT capsule, Take 400 Units by mouth daily, Disp: , Rfl:     aspirin 81 MG tablet, Take 81 mg by mouth daily. , Disp: , Rfl:   Allergies:  Latex; Cephalexin; Codeine; Levofloxacin; Pce [erythromycin]; Pcn [penicillins]; Pentazocine; Sumycin [tetracycline hcl]; Atarax [hydroxyzine hcl]; Beef-derived products; Flagyl [metronidazole]; Macrodantin [nitrofurantoin]; Milk-related compounds; Pyridium [phenazopyridine hcl]; Sulfa antibiotics; and Urised [methen-jenifer-meth bl-phen sal]  Social History:    reports that she quit smoking about 44 years ago. Her smoking use included cigarettes. She has a 0.02 pack-year smoking history. She has never used smokeless tobacco. She reports that she does not drink alcohol or use drugs.   Family History:   Family History   Problem Relation Age Reason for exam:     Answer:   Justus Echavarria MD, Spine Surgery (Non Surgical), [facility]     Referral Priority:   Routine     Referral Type:   Eval and Treat     Referral Reason:   Specialty Services Required     Referred to Provider:   Izzy Lucas MD     Requested Specialty:   Orthopedic Surgery     Number of Visits Requested:   1    External Referral To Physical Therapy     Referral Priority:   Routine     Referral Type:   Eval and Treat     Referral Reason:   Specialty Services Required     Requested Specialty:   Physical Therapy     Number of Visits Requested:   1    VA METHYLPREDNISOLONE 40 MG INJ         Assessment / Treatment Plan:     1. Left shoulder osteoarthritis    2. Left-sided cervical radiculopathy    She was given an intra-articular cortisone injection to the left shoulder today as well as an order for physical therapy. She is going to return to see Dr. Nancy Meraz for evaluation and treatment of her neck as I believe some of her symptoms are radicular in nature. I discussed in detail the risk, benefits, and complications of an injection which included but are not limited to infection, skin reactions, hot swollen joints, and anaphylaxis with the patient. The patient verbalized good understanding and gave informed consent for the injection. A sterile 22-gauge spinal needle was inserted posteriorly into the intra-articular space and a mixture of 2 mL of 2% Carbocaine, 2 mL of 0.25% Marcaine, and 2 mL 40mg of Depo-Medrol was injected under sterile technique. The needle was withdrawn and the puncture site sealed with a Band-Aid. Technique: Under sterile conditions a SonEarnest ultrasound unit with a variable frequency (6.0-15.0 MHz) linear transducer was used to localize the placement of a 22-gauge needle into the left intra-articular space. Findings: Successful needle placement for intra-articular injection.   Final images were taken and saved for permanent record. The patient tolerated the injection well. The patient was instructed to call the office immediately if there is any pain, redness, warmth, fever, or chills. I spent 15 minutes face-to-face with the patient and greater than 50% of that time was spent counseling/coordinating care for the above-stated diagnosis and treatment. I have personally performed and/or participated in the history, exam and medical decision making and agree with all pertinent clinical information. I have also reviewed and agree with the past medical, family and social history unless otherwise noted. This dictation was performed with a verbal recognition program (DRAGON) and it was checked for errors. It is possible that there are still dictated errors within this office note. If so, please bring any errors to my attention for an addendum. All efforts were made to ensure that this office note is accurate.           Ronni Mercado MD

## 2019-04-18 NOTE — PROGRESS NOTES
Wilson Plata  [facility]#-[id number]  LOT#- [id number]  EXP:12/2022    4CC LIDOCAINE  TDW#-[id number]  LOT#--[id number]  EXP: 10/2020    2CC DEPO  NDC#-[id number]  LOT#- [id number]  EXP: 12/2020    SITE: LEFT SHOULDER

## 2019-04-23 ENCOUNTER — CARE COORDINATION (OUTPATIENT)
Dept: CARE COORDINATION | Age: 79
End: 2019-04-23

## 2019-04-23 NOTE — CARE COORDINATION
Called patient and left HIPPA compliant message with RN [facility] contact information requesting callback.

## 2019-04-25 NOTE — CARE COORDINATION
Ambulatory Care Coordination Note  4/25/2019  CM Risk Score: 13  Pablo Mortality Risk Score: 22    ACC: Tommy Joaquin, RN    Summary Note: Pt returned RN CC call. Stated she is having \"pain in sides\". Stated the pain is near her \"stomach\". Pt stated she \"just doesn't feel good\". Offered pt an appt. Stated the earliest she can come is Monday. Will have office staff call pt for appt. Someone arrived to pts home and she stated she needed to go. Plan     -Make appt for Monday  -F/U with pt after appt        Care Coordination Interventions    Program Enrollment:  Complex Care  Referral from Primary Care Provider:  No  Suggested Interventions and Community Resources  Diabetes Education:  Completed (Comment: With [facility])  Disease Specific Clinic:  Completed (Comment: CHF education with [facility])  Home Health Services:  Completed  Medi Set or Pill Pack:  Not Started (Comment: Planned for discharge )  Pharmacist:  Completed  Senior Services:  Completed (Comment: [facility] - declines PASSPORT )  Social Work:  Completed  Other Therapy Services:  Completed (Comment: Neurocognitive Eval with [facility] )  Other Services:  Declined (Comment: Spiritual Care - Declines for now on 3/14/2018)  Zone Management Tools:  Completed (Comment: CHF, Diabetes Zone Management mailed to patient 8/1/2017)  Other Services or Interventions:  low Na diet, weight log mailed to patient 8/9/2017         Goals Addressed     None          Prior to Admission medications    Medication Sig Start Date End Date Taking?  Authorizing Provider   furosemide (LASIX) 40 MG tablet TAKE ONE TABLET BY MOUTH TWICE A DAY 4/1/19   Tiffanie Adkins DO   acetaminophen (APAP EXTRA STRENGTH) 500 MG tablet Take 1 tablet by mouth every 6 hours as needed for Pain 3/27/19   KIMI You CNP   ondansetron (ZOFRAN ODT) 4 MG disintegrating tablet Take 1 tablet by mouth every 8 hours as needed for Nausea 3/27/19   KIMI You CNP   oxybutynin (DITROPAN-XL) 10 MG extended tablet ONE TID PRN FOR MUSCLE SPASMS 10/12/18   Tiffanie Adkins, DO   nitroGLYCERIN (NITROSTAT) 0.4 MG SL tablet Place 1 tablet under the tongue every 5 minutes as needed for Chest pain 9/27/18   KIMI Altamirano CNP   potassium chloride (KLOR-CON M) 20 MEQ extended release tablet Take 1 tablet by mouth daily 8/17/18   Tiffanie Adkins, DO   metoclopramide (REGLAN) 10 MG tablet Take 1 tablet by mouth 4 times daily as needed (Nausea and/or headache) 7/3/18   Tiffanie Adkins, DO   calcium carbonate 600 MG TABS tablet Take 1 tablet by mouth 2 times daily 6/25/18   WILLIE Silver   hydrocortisone 2.5 % cream APPLY TOPICALLY TWO TIMES A DAY 2/15/18   Tiffanie Adkins, DO   azelastine (ASTELIN) 0.1 % nasal spray 2 sprays by Nasal route 2 times daily Use in each nostril as directed 1/8/18   Hector Esposito MD   fluticasone (FLONASE) 50 MCG/ACT nasal spray 1 spray by Nasal route daily 1/9/18   Hector Esposito MD   ACCU-CHEK SOFTCLIX LANCETS MISC USE TO TEST BLOOD SUGAR TWO TIMES DAILY 11/15/17   Karri Tobias MD   vitamin E 400 UNIT capsule Take 400 Units by mouth daily    Historical Provider, MD   aspirin 81 MG tablet Take 81 mg by mouth daily.     Historical Provider, MD       Future Appointments   Date Time Provider Ann Torre   5/23/2019  1:45 PM JOHN Blanco MD [facility]   7/19/2019  1:00 PM Tiffanie Adkins DO [facility]   12/18/2019  1:00 PM KIMI Arevalo - CNP [facility]

## 2019-04-29 ENCOUNTER — OFFICE VISIT (OUTPATIENT)
Dept: FAMILY MEDICINE CLINIC | Age: 79
End: 2019-04-29
Payer: MEDICARE

## 2019-04-29 ENCOUNTER — CARE COORDINATION (OUTPATIENT)
Dept: CARE COORDINATION | Age: 79
End: 2019-04-29

## 2019-04-29 VITALS
HEIGHT: 65 IN | HEART RATE: 73 BPM | DIASTOLIC BLOOD PRESSURE: 75 MMHG | BODY MASS INDEX: 38.55 KG/M2 | SYSTOLIC BLOOD PRESSURE: 140 MMHG | WEIGHT: 231.4 LBS | OXYGEN SATURATION: 96 %

## 2019-04-29 DIAGNOSIS — R10.2 SUPRAPUBIC ABDOMINAL PAIN: ICD-10-CM

## 2019-04-29 DIAGNOSIS — R10.9 FLANK PAIN: Primary | ICD-10-CM

## 2019-04-29 LAB
BILIRUBIN, POC: NEGATIVE
BLOOD URINE, POC: NEGATIVE
CLARITY, POC: NORMAL
COLOR, POC: YELLOW
GLUCOSE URINE, POC: NEGATIVE
KETONES, POC: NORMAL
LEUKOCYTE EST, POC: NORMAL
NITRITE, POC: NEGATIVE
PH, POC: 5.5
PROTEIN, POC: 100
SPECIFIC GRAVITY, POC: 10.2
UROBILINOGEN, POC: 0.2

## 2019-04-29 PROCEDURE — 1090F PRES/ABSN URINE INCON ASSESS: CPT | Performed by: PHYSICIAN ASSISTANT

## 2019-04-29 PROCEDURE — 81002 URINALYSIS NONAUTO W/O SCOPE: CPT | Performed by: PHYSICIAN ASSISTANT

## 2019-04-29 PROCEDURE — 1036F TOBACCO NON-USER: CPT | Performed by: PHYSICIAN ASSISTANT

## 2019-04-29 PROCEDURE — G8598 ASA/ANTIPLAT THER USED: HCPCS | Performed by: PHYSICIAN ASSISTANT

## 2019-04-29 PROCEDURE — G8427 DOCREV CUR MEDS BY ELIG CLIN: HCPCS | Performed by: PHYSICIAN ASSISTANT

## 2019-04-29 PROCEDURE — G8417 CALC BMI ABV UP PARAM F/U: HCPCS | Performed by: PHYSICIAN ASSISTANT

## 2019-04-29 PROCEDURE — G8400 PT W/DXA NO RESULTS DOC: HCPCS | Performed by: PHYSICIAN ASSISTANT

## 2019-04-29 PROCEDURE — 1123F ACP DISCUSS/DSCN MKR DOCD: CPT | Performed by: PHYSICIAN ASSISTANT

## 2019-04-29 PROCEDURE — 99213 OFFICE O/P EST LOW 20 MIN: CPT | Performed by: PHYSICIAN ASSISTANT

## 2019-04-29 PROCEDURE — 4040F PNEUMOC VAC/ADMIN/RCVD: CPT | Performed by: PHYSICIAN ASSISTANT

## 2019-04-29 ASSESSMENT — ENCOUNTER SYMPTOMS
COUGH: 0
SHORTNESS OF BREATH: 0
DIARRHEA: 0
NAUSEA: 0
CONSTIPATION: 0
VOMITING: 0
SORE THROAT: 0
RHINORRHEA: 0

## 2019-04-29 NOTE — PATIENT INSTRUCTIONS
Trimethoprim  Oxybutynin   Follow up with urologist (Dr. Jerri Richards) for possible botox  I'll call you urine culture results.

## 2019-04-29 NOTE — PROGRESS NOTES
2019  Gertrudis Altamirano (: 1940)  69 y.o. HPI    Patient presents for evaluation of bilateral lower abdominal pain. Patient reports it has been occurring for the last 3 days intermittently. Worse with certain movements. Does not radiate. Patient with history of interstitial cystitis, is unsure if she is taking trimethoprim and oxybutynin. Patient has not followed up with urology recently. Was supposed to receive botox injections, but did not schedule. Does endorse urinary frequency, denies dysuria. Denies fevers, chills, nausea/vomiting. Patient has history of chronic constipation but takes docusate and last bowel movement was yesterday and normal in size, color, and consistency, denies melena/hematochezia. Has not been taking any medication for the pain. Has had total hysterectomy. Review of Systems   Constitutional: Negative for activity change, chills and fever. HENT: Negative for congestion, ear pain, rhinorrhea and sore throat. Eyes: Negative for visual disturbance. Respiratory: Negative for cough and shortness of breath. Cardiovascular: Negative for chest pain and palpitations. Gastrointestinal: Positive for abdominal pain. Negative for constipation, diarrhea, nausea and vomiting. Genitourinary: Positive for frequency. Negative for difficulty urinating and dysuria. Musculoskeletal: Negative for arthralgias and myalgias. Skin: Negative for rash. Neurological: Negative for dizziness, weakness and numbness. Psychiatric/Behavioral: Negative for sleep disturbance. Allergies, past medical history, family history, and social history reviewed and unchanged from previous encounter.      Current Outpatient Medications   Medication Sig Dispense Refill    furosemide (LASIX) 40 MG tablet TAKE ONE TABLET BY MOUTH TWICE A DAY 60 tablet 5    acetaminophen (APAP EXTRA STRENGTH) 500 MG tablet Take 1 tablet by mouth every 6 hours as needed for Pain 120 tablet 0    ondansetron (ZOFRAN ODT) 4 MG disintegrating tablet Take 1 tablet by mouth every 8 hours as needed for Nausea 20 tablet 0    oxybutynin (DITROPAN-XL) 10 MG extended release tablet Take 10 mg by mouth daily      naproxen (NAPROSYN) 500 MG tablet Take 500 mg by mouth 2 times daily (with meals)      trimethoprim (TRIMPEX) 100 MG tablet       albuterol sulfate  (90 Base) MCG/ACT inhaler Inhale 2 puffs into the lungs 4 times daily as needed for Wheezing 3 Inhaler 1    meloxicam (MOBIC) 15 MG tablet Take 1 tablet by mouth daily as needed for Pain 30 tablet 2    diclofenac (VOLTAREN) 75 MG EC tablet Take 1 tablet by mouth 2 times daily (with meals) 180 tablet 0    ondansetron (ZOFRAN) 4 MG tablet Take 1 tablet by mouth 3 times daily as needed for Nausea or Vomiting 30 tablet 0    blood glucose test strips (ACCU-CHEK ARABELLA PLUS) strip Test blood sugar  strip 11    metFORMIN (GLUCOPHAGE-XR) 500 MG extended release tablet Take 2 tablets by mouth 2 times daily (with meals) 120 tablet 5    carvedilol (COREG) 25 MG tablet Take 1 tablet by mouth 2 times daily (with meals) 180 tablet 1    DULoxetine (CYMBALTA) 60 MG extended release capsule Take 1 capsule by mouth daily 90 capsule 1    isosorbide mononitrate (IMDUR) 30 MG extended release tablet TAKE ONE-HALF TABLET BY MOUTH ONE TIME A DAY 45 tablet 3    sennosides-docusate sodium (SENOKOT-S) 8.6-50 MG tablet TAKE 2 TABLETS BY MOUTH DAILY 60 tablet 5    pramipexole (MIRAPEX) 0.5 MG tablet TAKE ONE TABLET BY MOUTH ONCE NIGHTLY 30 tablet 5    pravastatin (PRAVACHOL) 20 MG tablet TAKE ONE TABLET BY MOUTH DAILY 90 tablet 3    omeprazole (PRILOSEC) 20 MG delayed release capsule TAKE ONE CAPSULE TWICE A DAY 60 capsule 5    clotrimazole-betamethasone (LOTRISONE) 1-0.05 % cream Apply bid to affected area.  45 g 3    tiZANidine (ZANAFLEX) 4 MG tablet ONE TID PRN FOR MUSCLE SPASMS 60 tablet 1    nitroGLYCERIN (NITROSTAT) 0.4 MG SL tablet Place 1 tablet under the tongue every 5 minutes as needed for Chest pain 25 tablet 1    potassium chloride (KLOR-CON M) 20 MEQ extended release tablet Take 1 tablet by mouth daily 30 tablet 0    metoclopramide (REGLAN) 10 MG tablet Take 1 tablet by mouth 4 times daily as needed (Nausea and/or headache) 30 tablet 0    calcium carbonate 600 MG TABS tablet Take 1 tablet by mouth 2 times daily 30 tablet 1    hydrocortisone 2.5 % cream APPLY TOPICALLY TWO TIMES A DAY 1 Tube 1    fluticasone (FLONASE) 50 MCG/ACT nasal spray 1 spray by Nasal route daily 1 Bottle 0    ACCU-CHEK SOFTCLIX LANCETS MISC USE TO TEST BLOOD SUGAR TWO TIMES DAILY 50 each 5    vitamin E 400 UNIT capsule Take 400 Units by mouth daily      aspirin 81 MG tablet Take 81 mg by mouth daily.  azelastine (ASTELIN) 0.1 % nasal spray 2 sprays by Nasal route 2 times daily Use in each nostril as directed 1 Bottle 0     No current facility-administered medications for this visit. Vitals:    04/29/19 1121   BP: (!) 140/75   Site: Right Lower Arm   Position: Sitting   Cuff Size: Large Adult   Pulse: 73   SpO2: 96%   Weight: 231 lb 6.4 oz (105 kg)   Height: 5' 5\" (1.651 m)     Estimated body mass index is 38.51 kg/m² as calculated from the following:    Height as of this encounter: 5' 5\" (1.651 m). Weight as of this encounter: 231 lb 6.4 oz (105 kg). Physical Exam   Constitutional: She is oriented to person, place, and time. She appears well-developed and well-nourished. No distress. HENT:   Head: Normocephalic and atraumatic. Eyes: Pupils are equal, round, and reactive to light. Conjunctivae and EOM are normal.   Neck: Neck supple. Cardiovascular: Normal rate, regular rhythm and normal heart sounds. No murmur heard. Pulmonary/Chest: Effort normal and breath sounds normal. She has no wheezes. Abdominal: Soft. Bowel sounds are normal. There is tenderness (bilateral lower quadrant abdominal pain). Musculoskeletal: She exhibits no edema.    Lymphadenopathy: She has no cervical adenopathy. Neurological: She is alert and oriented to person, place, and time. She has normal reflexes. Skin: Skin is warm and dry. No rash noted. Psychiatric: She has a normal mood and affect. ASSESSMENT and PLAN:  Ashley Frankel was seen today for pain. Diagnoses and all orders for this visit:    Flank pain  Suprapubic abdominal pain  -     POCT Urinalysis no Micro - small leuks  -     Urine Culture  - Etiology unclear, unimpressive UA, will send for culture. Could be IC, recommend checking to see if she is taking her medications. Abd exam with suprapubic and bilateral lower quadrant pain, given absence of diarrhea, low suspicion for diverticulitis. If persists, could consider cbc and cmp; if no infection would recommend follow up with urology for IC. Return if symptoms worsen or fail to improve.

## 2019-04-29 NOTE — CARE COORDINATION
Call to pt to obtain daily BG's and WT's. Left message on machine for pt to return call to report readings. If no return call, will attempt to reach pt again.     [facility] Coordination   D:[phone]  I:[phone]

## 2019-05-01 ENCOUNTER — CARE COORDINATION (OUTPATIENT)
Dept: CARE COORDINATION | Age: 79
End: 2019-05-01

## 2019-05-02 ASSESSMENT — ENCOUNTER SYMPTOMS: ABDOMINAL PAIN: 1

## 2019-05-03 ENCOUNTER — TELEPHONE (OUTPATIENT)
Dept: FAMILY MEDICINE CLINIC | Age: 79
End: 2019-05-03

## 2019-05-03 DIAGNOSIS — N39.0 URINARY TRACT INFECTION WITHOUT HEMATURIA, SITE UNSPECIFIED: Primary | ICD-10-CM

## 2019-05-03 RX ORDER — CIPROFLOXACIN 250 MG/1
250 TABLET, FILM COATED ORAL 2 TIMES DAILY
Qty: 14 TABLET | Refills: 0 | Status: SHIPPED | OUTPATIENT
Start: 2019-05-03 | End: 2019-08-14 | Stop reason: SDUPTHER

## 2019-05-03 NOTE — TELEPHONE ENCOUNTER
She has just mild heart failure. If her weight is now below what is what 3 days ago, that is reassuring. I think it is okay to take Lasix as needed for a 3 pound weight gain or more.

## 2019-05-06 ENCOUNTER — APPOINTMENT (OUTPATIENT)
Dept: CT IMAGING | Age: 79
End: 2019-05-06
Payer: MEDICARE

## 2019-05-06 ENCOUNTER — HOSPITAL ENCOUNTER (EMERGENCY)
Age: 79
Discharge: HOME | End: 2019-05-06
Attending: EMERGENCY MEDICINE
Payer: MEDICARE

## 2019-05-06 VITALS
WEIGHT: 233 LBS | HEIGHT: 65 IN | DIASTOLIC BLOOD PRESSURE: 77 MMHG | HEART RATE: 72 BPM | OXYGEN SATURATION: 97 % | SYSTOLIC BLOOD PRESSURE: 155 MMHG | TEMPERATURE: 97.9 F | RESPIRATION RATE: 16 BRPM | BODY MASS INDEX: 38.82 KG/M2

## 2019-05-06 DIAGNOSIS — R11.2 NON-INTRACTABLE VOMITING WITH NAUSEA, UNSPECIFIED VOMITING TYPE: ICD-10-CM

## 2019-05-06 DIAGNOSIS — R51.9 NONINTRACTABLE HEADACHE, UNSPECIFIED CHRONICITY PATTERN, UNSPECIFIED HEADACHE TYPE: Primary | ICD-10-CM

## 2019-05-06 LAB
A/G RATIO: 1.3 (ref 1.1–2.2)
ALBUMIN SERPL-MCNC: 3.8 G/DL (ref 3.4–5)
ALP BLD-CCNC: 64 U/L (ref 40–129)
ALT SERPL-CCNC: 10 U/L (ref 10–40)
ANION GAP SERPL CALCULATED.3IONS-SCNC: 12 MMOL/L (ref 3–16)
AST SERPL-CCNC: 18 U/L (ref 15–37)
BASOPHILS ABSOLUTE: 0.1 K/UL (ref 0–0.2)
BASOPHILS RELATIVE PERCENT: 0.7 %
BILIRUB SERPL-MCNC: 0.5 MG/DL (ref 0–1)
BILIRUBIN URINE: NEGATIVE
BLOOD, URINE: NEGATIVE
BUN BLDV-MCNC: 19 MG/DL (ref 7–20)
CALCIUM SERPL-MCNC: 9 MG/DL (ref 8.3–10.6)
CHLORIDE BLD-SCNC: 99 MMOL/L (ref 99–110)
CLARITY: CLEAR
CO2: 27 MMOL/L (ref 21–32)
COLOR: ABNORMAL
CREAT SERPL-MCNC: 0.7 MG/DL (ref 0.6–1.2)
EOSINOPHILS ABSOLUTE: 0.2 K/UL (ref 0–0.6)
EOSINOPHILS RELATIVE PERCENT: 2.5 %
EPITHELIAL CELLS, UA: ABNORMAL /HPF
GFR AFRICAN AMERICAN: >60
GFR NON-AFRICAN AMERICAN: >60
GLOBULIN: 3 G/DL
GLUCOSE BLD-MCNC: 119 MG/DL (ref 70–99)
GLUCOSE URINE: 100 MG/DL
HCT VFR BLD CALC: 32.5 % (ref 36–48)
HEMOGLOBIN: 10.7 G/DL (ref 12–16)
KETONES, URINE: NEGATIVE MG/DL
LEUKOCYTE ESTERASE, URINE: NEGATIVE
LYMPHOCYTES ABSOLUTE: 2.5 K/UL (ref 1–5.1)
LYMPHOCYTES RELATIVE PERCENT: 27.3 %
MCH RBC QN AUTO: 30.3 PG (ref 26–34)
MCHC RBC AUTO-ENTMCNC: 32.9 G/DL (ref 31–36)
MCV RBC AUTO: 92.1 FL (ref 80–100)
MICROSCOPIC EXAMINATION: YES
MONOCYTES ABSOLUTE: 0.6 K/UL (ref 0–1.3)
MONOCYTES RELATIVE PERCENT: 6.4 %
MUCUS: ABNORMAL /LPF
NEUTROPHILS ABSOLUTE: 5.8 K/UL (ref 1.7–7.7)
NEUTROPHILS RELATIVE PERCENT: 63.1 %
NITRITE, URINE: NEGATIVE
PDW BLD-RTO: 17.2 % (ref 12.4–15.4)
PH UA: 6 (ref 5–8)
PLATELET # BLD: 245 K/UL (ref 135–450)
PMV BLD AUTO: 10 FL (ref 5–10.5)
POTASSIUM REFLEX MAGNESIUM: 4.4 MMOL/L (ref 3.5–5.1)
PROTEIN UA: ABNORMAL MG/DL
RBC # BLD: 3.53 M/UL (ref 4–5.2)
RBC UA: ABNORMAL /HPF (ref 0–2)
SODIUM BLD-SCNC: 138 MMOL/L (ref 136–145)
SPECIFIC GRAVITY UA: 1.01 (ref 1–1.03)
TOTAL PROTEIN: 6.8 G/DL (ref 6.4–8.2)
URINE TYPE: ABNORMAL
UROBILINOGEN, URINE: 0.2 E.U./DL
WBC # BLD: 9.2 K/UL (ref 4–11)
WBC UA: ABNORMAL /HPF (ref 0–5)

## 2019-05-06 PROCEDURE — 6360000002 HC RX W HCPCS: Performed by: EMERGENCY MEDICINE

## 2019-05-06 PROCEDURE — 80053 COMPREHEN METABOLIC PANEL: CPT

## 2019-05-06 PROCEDURE — 2580000003 HC RX 258: Performed by: EMERGENCY MEDICINE

## 2019-05-06 PROCEDURE — 96374 THER/PROPH/DIAG INJ IV PUSH: CPT

## 2019-05-06 PROCEDURE — 70450 CT HEAD/BRAIN W/O DYE: CPT

## 2019-05-06 PROCEDURE — 96361 HYDRATE IV INFUSION ADD-ON: CPT

## 2019-05-06 PROCEDURE — 81001 URINALYSIS AUTO W/SCOPE: CPT

## 2019-05-06 PROCEDURE — 99284 EMERGENCY DEPT VISIT MOD MDM: CPT

## 2019-05-06 PROCEDURE — 6370000000 HC RX 637 (ALT 250 FOR IP): Performed by: EMERGENCY MEDICINE

## 2019-05-06 PROCEDURE — 85025 COMPLETE CBC W/AUTO DIFF WBC: CPT

## 2019-05-06 RX ORDER — ONDANSETRON 4 MG/1
4 TABLET, ORALLY DISINTEGRATING ORAL EVERY 8 HOURS PRN
Qty: 9 TABLET | Refills: 0 | Status: SHIPPED | OUTPATIENT
Start: 2019-05-06 | End: 2019-05-09

## 2019-05-06 RX ORDER — ONDANSETRON 2 MG/ML
4 INJECTION INTRAMUSCULAR; INTRAVENOUS ONCE
Status: COMPLETED | OUTPATIENT
Start: 2019-05-06 | End: 2019-05-06

## 2019-05-06 RX ORDER — ACETAMINOPHEN 325 MG/1
650 TABLET ORAL ONCE
Status: COMPLETED | OUTPATIENT
Start: 2019-05-06 | End: 2019-05-06

## 2019-05-06 RX ORDER — BUTALBITAL, ACETAMINOPHEN AND CAFFEINE 50; 325; 40 MG/1; MG/1; MG/1
1 TABLET ORAL ONCE
Status: COMPLETED | OUTPATIENT
Start: 2019-05-06 | End: 2019-05-06

## 2019-05-06 RX ORDER — 0.9 % SODIUM CHLORIDE 0.9 %
1000 INTRAVENOUS SOLUTION INTRAVENOUS ONCE
Status: COMPLETED | OUTPATIENT
Start: 2019-05-06 | End: 2019-05-06

## 2019-05-06 RX ORDER — OMEPRAZOLE 20 MG/1
CAPSULE, DELAYED RELEASE ORAL
Qty: 60 CAPSULE | Refills: 5 | Status: SHIPPED | OUTPATIENT
Start: 2019-05-06

## 2019-05-06 RX ADMIN — BUTALBITAL, ACETAMINOPHEN, AND CAFFEINE 1 TABLET: 50; 325; 40 TABLET ORAL at 20:06

## 2019-05-06 RX ADMIN — ONDANSETRON 4 MG: 2 INJECTION INTRAMUSCULAR; INTRAVENOUS at 18:30

## 2019-05-06 RX ADMIN — ACETAMINOPHEN 650 MG: 325 TABLET, FILM COATED ORAL at 18:30

## 2019-05-06 RX ADMIN — SODIUM CHLORIDE 1000 ML: 9 INJECTION, SOLUTION INTRAVENOUS at 18:30

## 2019-05-06 ASSESSMENT — PAIN SCALES - GENERAL
PAINLEVEL_OUTOF10: 8
PAINLEVEL_OUTOF10: 8
PAINLEVEL_OUTOF10: 5

## 2019-05-06 ASSESSMENT — ENCOUNTER SYMPTOMS
ABDOMINAL PAIN: 0
VOMITING: 1
NAUSEA: 1
TROUBLE SWALLOWING: 0
DIARRHEA: 1
COUGH: 0
CHEST TIGHTNESS: 0
RHINORRHEA: 0
SHORTNESS OF BREATH: 0
WHEEZING: 0
SORE THROAT: 0
STRIDOR: 0

## 2019-05-06 ASSESSMENT — PAIN DESCRIPTION - LOCATION: LOCATION: HEAD

## 2019-05-06 NOTE — ED PROVIDER NOTES
[facility]  ED  eMERGENCYdEPARTMENT eNCOUnter      Pt Name: Rosalia Bhakta  MRN: [id number]  Rody 1940  Date of evaluation: 5/6/2019  Johny Ruff MD    CHIEF COMPLAINT       Chief Complaint   Patient presents with    Headache     According to life squad, symptoms for several months. HISTORY OF PRESENT ILLNESS   (Location/Symptom, Timing/Onset,Context/Setting, Quality, Duration, Modifying Factors, Severity)  Note limiting factors. Rosalia Bhakta is a 69 y.o. female with a history of hypertension, CHF, morbid obesity, CKD, the OPD, CAD who presents to the emergency department for headache. Patient reports march 8th, she fell and has had a concussion and ever since has had headaches. Patient reports daily headaches ('all over' -sharp/throb/ache) that normally last for 30 minutes. Patient reports headache started at noon, took a nap that resolved headache, however returned on arrival to ED,states headache is her usual headache.   -patient reports nausea and vomiting that started last night ~5x (nonbloody), last episode was earlier this afternoon. Also reports loose stool that started yesterday afternoon that has continued onto today. HPI    Nursing Notes were reviewed. REVIEW OF SYSTEMS    (2-9 systems for level 4, 10 or more for level 5)     Review of Systems   Constitutional: Negative for activity change, appetite change, diaphoresis and fever. HENT: Negative for congestion, rhinorrhea, sore throat and trouble swallowing. Respiratory: Negative for cough, chest tightness, shortness of breath, wheezing and stridor. Cardiovascular: Negative for chest pain and palpitations. Gastrointestinal: Positive for diarrhea, nausea and vomiting. Negative for abdominal pain. Genitourinary: Negative for dysuria and flank pain. Skin: Negative for rash and wound. Neurological: Positive for headaches. Negative for dizziness, weakness, light-headedness and numbness. Except as noted above the remainder of the review of systems was reviewedand negative.        PAST MEDICAL HISTORY     Past Medical History:   Diagnosis Date    Arthritis     Asthma     Bronchial asthma     Bursitis 12/2011    ACHILLES TENDON LEFT    CHF (congestive heart failure) (HCC)     Constipation     Depression     Diverticulitis     Dizziness     GERD (gastroesophageal reflux disease)     Hearing loss     Hyperlipidemia     Hypertension     Leg edema     Lung disease     Nausea & vomiting     POSTOPERATIVE    PONV (postoperative nausea and vomiting)     WHEN SHE WAS 21    Sleep apnea     CPAP, SLEEP STUDY 6/28 OVERNIGHT AT HOME    Tinnitus     Type II or unspecified type diabetes mellitus without mention of complication, not stated as uncontrolled     Unspecified cerebral artery occlusion with cerebral infarction     mini stroke         SURGICAL HISTORY       Past Surgical History:   Procedure Laterality Date    ACHILLES TENDON SURGERY  2/2/12    LEFT ACHILLES DEBRIDEMENT, HAGLUNDS AND RETROCALCANEAL BURSA EXCISION WITH FLEXOR HALLUS LONGUS TENDON TRANSFER WITH BLOCK FOR PAIN CONTROL    APPENDECTOMY      CARDIAC CATHETERIZATION  8/21/14    CARPAL TUNNEL RELEASE      both hands    CHOLECYSTECTOMY      COLONOSCOPY      COLONOSCOPY      COLONOSCOPY  2/10/2016    CYSTOSCOPY  10/02/2017    DIAGNOSTIC CARDIAC CATH LAB PROCEDURE      EYE SURGERY      HYSTERECTOMY      HYSTERECTOMY, TOTAL ABDOMINAL      JOINT REPLACEMENT      bilateral knee    KNEE SURGERY      both replaced    OTHER SURGICAL HISTORY  07/09/2018    CYSTOSCOPY, HYDRODISTENTION OF BLADDER WITH INSTILLATION OF    POLYPECTOMY      SHOULDER SURGERY      TOENAIL EXCISION  08/04/2016    right big toe    TONSILLECTOMY      TUBAL LIGATION      UPPER GASTROINTESTINAL ENDOSCOPY  10/29/12    gastritis    UPPER GASTROINTESTINAL ENDOSCOPY  2/10/2016    URETHRAL STRICTURE DILATATION           CURRENT MEDICATIONS minutes as needed for Chest pain    OMEPRAZOLE (PRILOSEC) 20 MG DELAYED RELEASE CAPSULE    TAKE ONE CAPSULE TWICE A DAY    ONDANSETRON (ZOFRAN ODT) 4 MG DISINTEGRATING TABLET    Take 1 tablet by mouth every 8 hours as needed for Nausea    ONDANSETRON (ZOFRAN) 4 MG TABLET    Take 1 tablet by mouth 3 times daily as needed for Nausea or Vomiting    OXYBUTYNIN (DITROPAN-XL) 10 MG EXTENDED RELEASE TABLET    Take 10 mg by mouth daily    POTASSIUM CHLORIDE (KLOR-CON M) 20 MEQ EXTENDED RELEASE TABLET    Take 1 tablet by mouth daily    PRAMIPEXOLE (MIRAPEX) 0.5 MG TABLET    TAKE ONE TABLET BY MOUTH ONCE NIGHTLY    PRAVASTATIN (PRAVACHOL) 20 MG TABLET    TAKE ONE TABLET BY MOUTH DAILY    SENNOSIDES-DOCUSATE SODIUM (SENOKOT-S) 8.6-50 MG TABLET    TAKE 2 TABLETS BY MOUTH DAILY    TIZANIDINE (ZANAFLEX) 4 MG TABLET    ONE TID PRN FOR MUSCLE SPASMS    TRIMETHOPRIM (TRIMPEX) 100 MG TABLET        VITAMIN E 400 UNIT CAPSULE    Take 400 Units by mouth daily       ALLERGIES     Latex; Cephalexin; Codeine; Levofloxacin; Pce [erythromycin]; Pcn [penicillins]; Pentazocine; Sumycin [tetracycline hcl]; Atarax [hydroxyzine hcl]; Beef-derived products; Flagyl [metronidazole]; Macrodantin [nitrofurantoin]; Milk-related compounds; Pyridium [phenazopyridine hcl]; Sulfa antibiotics; and Urised [methen-jenifer-meth bl-phen sal]    FAMILY HISTORY       Family History   Problem Relation Age of Onset    Cancer Father         prostate    Heart Disease Mother     Heart Disease Brother     Heart Disease Brother     Heart Disease Sister     Diabetes Sister           SOCIAL HISTORY       Social History     Socioeconomic History    Marital status:       Spouse name: None    Number of children: 3    Years of education: 15    Highest education level: None   Occupational History    Occupation: retired   Social Needs    Financial resource strain: None    Food insecurity:     Worry: None     Inability: None    Transportation needs: Medical: None     Non-medical: None   Tobacco Use    Smoking status: Former Smoker     Packs/day: 0.25     Years: 0.10     Pack years: 0.02     Types: Cigarettes     Last attempt to quit: 1975     Years since quittin.3    Smokeless tobacco: Never Used    Tobacco comment: only smoked for 1 month   Substance and Sexual Activity    Alcohol use: No     Alcohol/week: 0.0 oz    Drug use: No    Sexual activity: Not Currently     Partners: Male   Lifestyle    Physical activity:     Days per week: None     Minutes per session: None    Stress: None   Relationships    Social connections:     Talks on phone: None     Gets together: None     Attends Voodoo service: None     Active member of club or organization: None     Attends meetings of clubs or organizations: None     Relationship status: None    Intimate partner violence:     Fear of current or ex partner: None     Emotionally abused: None     Physically abused: None     Forced sexual activity: None   Other Topics Concern    None   Social History Narrative    None       SCREENINGS   NIH Stroke Scale  Interval: Baseline  Level of Consciousness (1a. ): Alert  LOC Questions (1b. ): Answers both correctly  LOC Commands (1c. ): Obeys both correctly  Best Gaze (2. ): Normal  Visual (3. ): No visual loss  Facial Palsy (4. ): Normal  Motor Arm, Left (5a. ): No drift  Motor Arm, Right (5b. ): No drift  Motor Leg, Left (6a. ): No drift  Motor Leg, Right (6b. ): No drift  Limb Ataxia (7. ): Absent  Sensory (8. ): Normal  Best Language (9. ): No aphasia  Dysarthria (10. ): Normal  Extinction and Inattention (11): No neglect  Total: 0         PHYSICAL EXAM    (up to 7 for level 4, 8 ormore for level 5)     ED Triage Vitals   BP Temp Temp src Pulse Resp SpO2 Height Weight   -- -- -- -- -- -- -- --       Physical Exam   Constitutional: She is oriented to person, place, and time. Vital signs are normal. She appears well-developed and well-nourished.  She is cooperative. Non-toxic appearance. She does not have a sickly appearance. She does not appear ill. No distress. Sitting in bed comfortably, speaking in full sentences, following verbal commands appropriately. Not in acute distress     HENT:   Head: Normocephalic and atraumatic. Mouth/Throat: Oropharynx is clear and moist.   Eyes: Pupils are equal, round, and reactive to light. Conjunctivae and EOM are normal.   Neck: Normal range of motion. Neck supple. Cardiovascular: Normal rate, regular rhythm, normal heart sounds and intact distal pulses. Exam reveals no gallop and no friction rub. No murmur heard. Pulmonary/Chest: Effort normal and breath sounds normal. No respiratory distress. She has no decreased breath sounds. She has no wheezes. She has no rhonchi. She has no rales. Abdominal: Soft. Normal appearance and bowel sounds are normal. She exhibits no distension. There is no tenderness. There is no rebound and no guarding. Musculoskeletal: Normal range of motion. She exhibits no edema, tenderness or deformity. Neurological: She is alert and oriented to person, place, and time. GCS eye subscore is 4. GCS verbal subscore is 5. GCS motor subscore is 6. Skin: Skin is warm and dry. No rash noted. DIAGNOSTIC RESULTS     EKG: All EKG's are interpreted by the Emergency Department Physicianwho either signs or Co-signs this chart in the absence of a cardiologist.      RADIOLOGY:   Non-plain film images such as CT, Ultrasound and MRI are read by the radiologist. Plain radiographic images are visualized and preliminarily interpreted by the emergency physician with the below findings:      Interpretation per the Radiologist below, if available at the time of this note:    CT Head WO Contrast   Final Result   No acute intracranial abnormality.                ED BEDSIDE ULTRASOUND:   Performed by ED Physician - none    LABS:  Labs Reviewed   URINALYSIS - Abnormal; Notable for the following components: Result Value    Glucose, Ur 100 (*)     Protein, UA TRACE (*)     All other components within normal limits    Narrative:     Performed at:  [address], [facility] [employer]   Phone [phone]   CBC WITH AUTO DIFFERENTIAL - Abnormal; Notable for the following components:    RBC 3.53 (*)     Hemoglobin 10.7 (*)     Hematocrit 32.5 (*)     RDW 17.2 (*)     All other components within normal limits    Narrative:     Performed at:  [address], [facility] [employer]   Phone [phone]   COMPREHENSIVE METABOLIC PANEL W/ REFLEX TO MG FOR LOW K - Abnormal; Notable for the following components:    Glucose 119 (*)     All other components within normal limits    Narrative:     Performed at:  [address], [facility] [employer]   Phone [phone]   MICROSCOPIC URINALYSIS - Abnormal; Notable for the following components:    Mucus, UA Rare (*)     RBC, UA 3-5 (*)     All other components within normal limits    Narrative:     Performed at:  [address], [facility] [employer]   Phone [phone]       All other labs were within normal range ornot returned as of this dictation. EMERGENCY DEPARTMENT COURSE and DIFFERENTIAL DIAGNOSIS/MDM:   Vitals:    Vitals:    05/06/19 1616 05/06/19 1745 05/06/19 1907   BP: (!) 156/59 (!) 158/64 (!) 160/84   Pulse: 75 70 74   Resp: 20 20 20   Temp: 97.9 °F (36.6 °C)     TempSrc: Oral     SpO2: 97% 96% 97%   Weight: 233 lb (105.7 kg)     Height: 5' 5\" (1.651 m)           MDM    ED COURSE/MDM    -Edilma Preciado is a 69 y.o. female CDU for persistent headache that started ever since she had a closed head injury several months ago, nausea vomiting started   -Patient seen and evaluated. Oldrecords reviewed. Labs and imaging reviewed and results discussed with patient.  Lab workup at baseline  -UA: Negative nitrite, leukocyte esterase, 3-5 red blood cells and white blood cell. -CT head:  Acute intracranial abnormality  -Patient was given IVF bolus, tylenol and zofran in the ED with good symptomatic relief.   -tolerated po challenge  -Noacute pathology was noted and plan for discharge home with close follow up with PCP was discussed with patient and family. Strict ED return precautions given for new/worsending symptoms. Patient and family in agreement withplan, verbally confirm understanding and have no further questions/concerns. REASSESSMENT      Well appearing, non toxic, alert, oriented speaking in full sentences and hemodynamically stable upon discharge      CRITICAL CARE TIME   Total Critical Care time was 0 minutes, excluding separately reportableprocedures. There was a high probability of clinicallysignificant/life threatening deterioration in the patient's condition which required my urgent intervention. CONSULTS:  None    PROCEDURES:  Unless otherwise noted below, none     Procedures    FINAL IMPRESSION      1. Nonintractable headache, unspecified chronicity pattern, unspecified headache type    2. Non-intractable vomiting with nausea, unspecified vomiting type          DISPOSITION/PLAN   DISPOSITION        PATIENT REFERREDTO:  No follow-up provider specified.     DISCHARGE MEDICATIONS:  New Prescriptions    ONDANSETRON (ZOFRAN ODT) 4 MG DISINTEGRATING TABLET    Take 1 tablet by mouth every 8 hours as needed for Nausea or Vomiting          (Please note that portions of this note were completed with a voice recognition program.  Efforts were made to edit the dictations but occasionally wordsare mis-transcribed.)    Marie Bustos MD (electronically signed)  Attending Emergency Physician            Marie Bustos MD  05/06/19 6520

## 2019-05-07 ENCOUNTER — APPOINTMENT (OUTPATIENT)
Dept: GENERAL RADIOLOGY | Age: 79
End: 2019-05-07
Payer: MEDICARE

## 2019-05-07 ENCOUNTER — HOSPITAL ENCOUNTER (EMERGENCY)
Age: 79
Discharge: HOME | End: 2019-05-07
Attending: EMERGENCY MEDICINE
Payer: MEDICARE

## 2019-05-07 VITALS
HEART RATE: 68 BPM | DIASTOLIC BLOOD PRESSURE: 97 MMHG | BODY MASS INDEX: 38.82 KG/M2 | HEIGHT: 65 IN | TEMPERATURE: 98.5 F | RESPIRATION RATE: 20 BRPM | WEIGHT: 233 LBS | SYSTOLIC BLOOD PRESSURE: 186 MMHG | OXYGEN SATURATION: 98 %
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
## 2019-05-07 DIAGNOSIS — S40.012A CONTUSION OF LEFT SHOULDER, INITIAL ENCOUNTER: ICD-10-CM

## 2019-05-07 PROCEDURE — 99283 EMERGENCY DEPT VISIT LOW MDM: CPT

## 2019-05-07 PROCEDURE — 73562 X-RAY EXAM OF KNEE 3: CPT

## 2019-05-07 PROCEDURE — 73060 X-RAY EXAM OF HUMERUS: CPT

## 2019-05-07 PROCEDURE — 73090 X-RAY EXAM OF FOREARM: CPT

## 2019-05-07 PROCEDURE — 6370000000 HC RX 637 (ALT 250 FOR IP): Performed by: NURSE PRACTITIONER

## 2019-05-07 RX ORDER — ACETAMINOPHEN 325 MG/1
650 TABLET ORAL ONCE
Status: COMPLETED | OUTPATIENT
Start: 2019-05-07 | End: 2019-05-07

## 2019-05-07 RX ADMIN — ACETAMINOPHEN 650 MG: 325 TABLET ORAL at 11:43

## 2019-05-07 ASSESSMENT — PAIN SCALES - GENERAL
PAINLEVEL_OUTOF10: 0
PAINLEVEL_OUTOF10: 0
PAINLEVEL_OUTOF10: 2

## 2019-05-07 NOTE — ED NOTES
Patient sitting on bedside commode. Pt and friend of patient  Given d/c instructions with return verbalization including med for nausea. Pt encouraged to take nausea if needed so she can keep fluids. Pt has appt with PCP for 5/23. Pt to call and see if she can move up. To return here with worsening s/s.  P_t placed in [facility] and taken to car, friend driving pt home.        Taiwo Melgar RN  05/06/19 9005

## 2019-05-07 NOTE — ED PROVIDER NOTES
[facility] Emergency Department    CHIEF COMPLAINT  Fall (fell getting out of bed this morning; complaining of left arm and leg pain)      HISTORY OF PRESENT ILLNESS  Bao Lara is a 69 y.o. female who presents to the ED complaining of falling out of bed this morning hitting her left side. Patient denies any head injury or loss of consciousness. Patient denies any headache, blurred vision, or visual disturbances. Patient reports that she has a toolbox lying beside her bed she uses to elevate her CPAP machine and she struck her left arm on to that. Patient has complaints of left arm and left knee pain. Patient was able to ambulate to caught upon EMS arrival. No chest pain or shortness breath. No numbness or tingling activities. No unilateral weakness. Patient denies taking medications prior to arrival. No neck or back pain. No other complaints, modifying factors or associated symptoms. Nursing notes reviewed.    Past Medical History:   Diagnosis Date    Arthritis     Asthma     Bronchial asthma     Bursitis 12/2011    ACHILLES TENDON LEFT    CHF (congestive heart failure) (HCC)     Constipation     Depression     Diverticulitis     Dizziness     GERD (gastroesophageal reflux disease)     Hearing loss     Hyperlipidemia     Hypertension     Leg edema     Lung disease     Nausea & vomiting     POSTOPERATIVE    PONV (postoperative nausea and vomiting)     WHEN SHE WAS 21    Sleep apnea     CPAP, SLEEP STUDY 6/28 OVERNIGHT AT HOME    Tinnitus     Type II or unspecified type diabetes mellitus without mention of complication, not stated as uncontrolled     Unspecified cerebral artery occlusion with cerebral infarction     mini stroke     Past Surgical History:   Procedure Laterality Date    ACHILLES TENDON SURGERY  2/2/12    LEFT ACHILLES DEBRIDEMENT, HAGLUNDS AND RETROCALCANEAL BURSA EXCISION WITH FLEXOR HALLUS LONGUS TENDON TRANSFER WITH BLOCK FOR PAIN CONTROL  APPENDECTOMY      CARDIAC CATHETERIZATION  14    CARPAL TUNNEL RELEASE      both hands    CHOLECYSTECTOMY      COLONOSCOPY      COLONOSCOPY      COLONOSCOPY  2/10/2016    CYSTOSCOPY  10/02/2017    DIAGNOSTIC CARDIAC CATH LAB PROCEDURE      EYE SURGERY      HYSTERECTOMY      HYSTERECTOMY, TOTAL ABDOMINAL      JOINT REPLACEMENT      bilateral knee    KNEE SURGERY      both replaced    OTHER SURGICAL HISTORY  2018    CYSTOSCOPY, HYDRODISTENTION OF BLADDER WITH INSTILLATION OF    POLYPECTOMY      SHOULDER SURGERY      TOENAIL EXCISION  2016    right big toe    TONSILLECTOMY      TUBAL LIGATION      UPPER GASTROINTESTINAL ENDOSCOPY  10/29/12    gastritis    UPPER GASTROINTESTINAL ENDOSCOPY  2/10/2016    URETHRAL STRICTURE DILATATION       Family History   Problem Relation Age of Onset    Cancer Father         prostate    Heart Disease Mother     Heart Disease Brother     Heart Disease Brother     Heart Disease Sister     Diabetes Sister      Social History     Socioeconomic History    Marital status:       Spouse name: Not on file    Number of children: 3    Years of education: 15    Highest education level: Not on file   Occupational History    Occupation: retired   Social Needs    Financial resource strain: Not on file    Food insecurity:     Worry: Not on file     Inability: Not on file   [employer] needs:     Medical: Not on file     Non-medical: Not on file   Tobacco Use    Smoking status: Former Smoker     Packs/day: 0.25     Years: 0.10     Pack years: 0.02     Types: Cigarettes     Last attempt to quit: 1975     Years since quittin.3    Smokeless tobacco: Never Used    Tobacco comment: only smoked for 1 month   Substance and Sexual Activity    Alcohol use: No     Alcohol/week: 0.0 oz    Drug use: No    Sexual activity: Not Currently     Partners: Male   Lifestyle    Physical activity:     Days per week: Not on file     Minutes per session: Not on file    Stress: Not on file   Relationships    Social connections:     Talks on phone: Not on file     Gets together: Not on file     Attends [employer] service: Not on file     Active member of club or organization: Not on file     Attends meetings of clubs or organizations: Not on file     Relationship status: Not on file    Intimate partner violence:     Fear of current or ex partner: Not on file     Emotionally abused: Not on file     Physically abused: Not on file     Forced sexual activity: Not on file   Other Topics Concern    Not on file   Social History Narrative    Not on file     Current Facility-Administered Medications   Medication Dose Route Frequency Provider Last Rate Last Dose    acetaminophen (TYLENOL) tablet 650 mg  650 mg Oral Once Karyn Courtney APRN - CNP         Current Outpatient Medications   Medication Sig Dispense Refill    omeprazole (PRILOSEC) 20 MG delayed release capsule TAKE ONE CAPSULE TWICE A DAY 60 capsule 5    ondansetron (ZOFRAN ODT) 4 MG disintegrating tablet Take 1 tablet by mouth every 8 hours as needed for Nausea or Vomiting 9 tablet 0    ciprofloxacin (CIPRO) 250 MG tablet Take 1 tablet by mouth 2 times daily for 7 days 14 tablet 0    furosemide (LASIX) 40 MG tablet TAKE ONE TABLET BY MOUTH TWICE A DAY 60 tablet 5    acetaminophen (APAP EXTRA STRENGTH) 500 MG tablet Take 1 tablet by mouth every 6 hours as needed for Pain 120 tablet 0    ondansetron (ZOFRAN ODT) 4 MG disintegrating tablet Take 1 tablet by mouth every 8 hours as needed for Nausea 20 tablet 0    oxybutynin (DITROPAN-XL) 10 MG extended release tablet Take 10 mg by mouth daily      naproxen (NAPROSYN) 500 MG tablet Take 500 mg by mouth 2 times daily (with meals)      trimethoprim (TRIMPEX) 100 MG tablet       albuterol sulfate  (90 Base) MCG/ACT inhaler Inhale 2 puffs into the lungs 4 times daily as needed for Wheezing 3 Inhaler 1    meloxicam (MOBIC) 15 MG tablet Take 1 tablet by mouth daily as needed for Pain 30 tablet 2    diclofenac (VOLTAREN) 75 MG EC tablet Take 1 tablet by mouth 2 times daily (with meals) 180 tablet 0    ondansetron (ZOFRAN) 4 MG tablet Take 1 tablet by mouth 3 times daily as needed for Nausea or Vomiting 30 tablet 0    blood glucose test strips (ACCU-CHEK ARABELLA PLUS) strip Test blood sugar  strip 11    metFORMIN (GLUCOPHAGE-XR) 500 MG extended release tablet Take 2 tablets by mouth 2 times daily (with meals) 120 tablet 5    carvedilol (COREG) 25 MG tablet Take 1 tablet by mouth 2 times daily (with meals) 180 tablet 1    DULoxetine (CYMBALTA) 60 MG extended release capsule Take 1 capsule by mouth daily 90 capsule 1    isosorbide mononitrate (IMDUR) 30 MG extended release tablet TAKE ONE-HALF TABLET BY MOUTH ONE TIME A DAY 45 tablet 3    sennosides-docusate sodium (SENOKOT-S) 8.6-50 MG tablet TAKE 2 TABLETS BY MOUTH DAILY 60 tablet 5    pramipexole (MIRAPEX) 0.5 MG tablet TAKE ONE TABLET BY MOUTH ONCE NIGHTLY 30 tablet 5    pravastatin (PRAVACHOL) 20 MG tablet TAKE ONE TABLET BY MOUTH DAILY 90 tablet 3    clotrimazole-betamethasone (LOTRISONE) 1-0.05 % cream Apply bid to affected area.  45 g 3    tiZANidine (ZANAFLEX) 4 MG tablet ONE TID PRN FOR MUSCLE SPASMS 60 tablet 1    nitroGLYCERIN (NITROSTAT) 0.4 MG SL tablet Place 1 tablet under the tongue every 5 minutes as needed for Chest pain 25 tablet 1    potassium chloride (KLOR-CON M) 20 MEQ extended release tablet Take 1 tablet by mouth daily 30 tablet 0    metoclopramide (REGLAN) 10 MG tablet Take 1 tablet by mouth 4 times daily as needed (Nausea and/or headache) 30 tablet 0    calcium carbonate 600 MG TABS tablet Take 1 tablet by mouth 2 times daily 30 tablet 1    hydrocortisone 2.5 % cream APPLY TOPICALLY TWO TIMES A DAY 1 Tube 1    azelastine (ASTELIN) 0.1 % nasal spray 2 sprays by Nasal route 2 times daily Use in each nostril as directed 1 Bottle 0    fluticasone (FLONASE) 50 MCG/ACT nasal spray 1 spray by Nasal route daily 1 Bottle 0    ACCU-CHEK SOFTCLIX LANCETS MISC USE TO TEST BLOOD SUGAR TWO TIMES DAILY 50 each 5    vitamin E 400 UNIT capsule Take 400 Units by mouth daily      aspirin 81 MG tablet Take 81 mg by mouth daily. Allergies   Allergen Reactions    Latex Rash    Cephalexin Other (See Comments)     SHAKY AND SWEATY    Codeine Nausea Only     STOMACH HURTS    Levofloxacin Nausea Only    Pce [Erythromycin] Nausea Only     STOMACH HURTS    Pcn [Penicillins] Other (See Comments)     SHAKY AND SWEATY    Pentazocine      UNSURE OF REATION    Sumycin [Tetracycline Hcl] Nausea Only    Atarax [Hydroxyzine Hcl] Nausea And Vomiting    Beef-Derived Products Nausea And Vomiting    Flagyl [Metronidazole] Nausea And Vomiting    Macrodantin [Nitrofurantoin] Palpitations and Other (See Comments)     SWEATY    Milk-Related Compounds Nausea And Vomiting    Pyridium [Phenazopyridine Hcl] Palpitations     SWEATY - PT DENIES REACTION    Sulfa Antibiotics Nausea And Vomiting and Nausea Only    Urised [Methen-Lisa-Meth Bl-Phen Sal] Palpitations     SWEATY       REVIEW OF SYSTEMS  10 systems reviewed, pertinent positives per HPI otherwise noted to be negative    PHYSICAL EXAM  BP (!) 149/85   Pulse 87   Temp 98.5 °F (36.9 °C) (Oral)   Resp 16   Ht 5' 5\" (1.651 m)   Wt 233 lb (105.7 kg)   SpO2 93%   BMI 38.77 kg/m²   GENERAL APPEARANCE: Awake and alert. Cooperative. No acute distress. Non Toxic in appearance. Speech is clear and patient answers questions appropriately. HEAD: Normocephalic. Atraumatic. EYES: PERRL. EOM's grossly intact. ENT: Mucous membranes are pink and moist. Nares unobstructed. NECK: Supple. No midline bony tenderness, step-off, or crepitus. HEART: RRR. No murmurs. LUNGS: Respirations unlabored. CTAB. Good air exchange. Speaking comfortably in full sentences. ABDOMEN: Soft. Non-distended. Non-tender. No guarding or rebound.    negative THE LEFT KNEE 5/7/2019 11:17 am; 5/7/2019 11:18 am COMPARISON: None. HISTORY: ORDERING SYSTEM PROVIDED HISTORY: pain, fall TECHNOLOGIST PROVIDED HISTORY: Reason for exam:->pain, fall Ordering Physician Provided Reason for Exam: pain, fall Acuity: Acute Type of Exam: Initial FINDINGS: Left humerus: Anatomic alignment. No fractures. No destructive bony abnormalities. AC joint degenerative changes. Left forearm: Anatomic alignment. No fractures. No destructive bony abnormalities. Left knee: Status post total knee arthroplasty. Anatomic alignment. No fracture. No joint effusion. No hardware complications. No acute abnormalities seen in the left knee, left humerus or left forearm     Xr Knee Left (3 Views)    Result Date: 5/7/2019  EXAMINATION: TWO XRAY VIEWS OF THE LEFT HUMERUS; AP AND LATERAL XRAY VIEWS OF THE LEFT FOREARM; 3 XRAY VIEWS OF THE LEFT KNEE 5/7/2019 11:17 am; 5/7/2019 11:18 am COMPARISON: None. HISTORY: ORDERING SYSTEM PROVIDED HISTORY: pain, fall TECHNOLOGIST PROVIDED HISTORY: Reason for exam:->pain, fall Ordering Physician Provided Reason for Exam: pain, fall Acuity: Acute Type of Exam: Initial FINDINGS: Left humerus: Anatomic alignment. No fractures. No destructive bony abnormalities. AC joint degenerative changes. Left forearm: Anatomic alignment. No fractures. No destructive bony abnormalities. Left knee: Status post total knee arthroplasty. Anatomic alignment. No fracture. No joint effusion. No hardware complications. No acute abnormalities seen in the left knee, left humerus or left forearm       ED COURSE   I have evaluated this patient in collaboration with Dr Olesya Gordon. Vital signs stable. Patient received Tylenol for pain, with good relief. Radiological imaging of the left humerus, left radius/ulna, and left knee were performed and revealed no acute abnormalities per radiologist. Patient was ambulated and tolerated well. Patient denied any pain upon discharge.    A discussion was had with Ms. Princess Cazares regarding ED findings, results, and follow-up. All questions were answered. Patient will follow up with  PCP and [facility] for further evaluation/treatment. Patient will return to ED for new/worsening symptoms. patient comfortable upon discharge. Return precautions were discussed in detail with patient. Patient agreeable with plan of care, treatment, and discharge at this time. Patient instructed to take Tylenol as needed for pain. MDM  I estimate there is LOW risk for COMPARTMENT SYNDROME, DEEP VENOUS THROMBOSIS, SEPTIC ARTHRITIS, TENDON OR NEUROVASCULAR INJURY, thus I consider the discharge disposition reasonable. Edilma Self and I have discussed the diagnosis and risks, and we agree with discharging home to follow-up with their primary doctor or the referral orthopedist. We also discussed returning to the Emergency Department immediately if new or worsening symptoms occur. We have discussed the symptoms which are most concerning (e.g., changing or worsening pain, numbness, weakness) that necessitate immediate return. Final Impression    1. Fall from bed, initial encounter    2. Contusion of left shoulder, initial encounter        Blood pressure (!) 186/97, pulse 68, temperature 98.5 °F (36.9 °C), temperature source Oral, resp. rate 20, height 5' 5\" (1.651 m), weight 233 lb (105.7 kg), SpO2 98 %, not currently breastfeeding. DISPOSITION  Patient was discharged to home in good condition.           KIMI Ramos - CNP  05/07/19 [id number]

## 2019-05-07 NOTE — ED NOTES
Patient states that she feels much better, headache gone, tolerated crackers with apple juice without emesis. Pt feels like she can go home.      Christo Kaur RN  05/06/19 [id number]

## 2019-05-07 NOTE — ED PROVIDER NOTES
ED Course as of May 07 1221   Tue May 07, 2019   1150 XR HUMERUS LEFT (MIN 2 VIEWS) [WL]   1151 XR RADIUS ULNA LEFT (2 VIEWS) [WL]   1151 XR KNEE LEFT (3 VIEWS) [WL]   1151 I have personally performed and/or participated in the history, exam and medical decision making and agree with all pertinent clinical information. I have also reviewed and agree with the past medical, family and social history unless otherwise noted. Pt presents for eval fall out of bed. No prodromal sx. C/o L sided pain locally  no thinners    Nontoxic, nad, ambulatory. Mild ttp L knee, arm.   Pms intact distally    [WL]      ED Course User Index  [WL] DO Jonathan Goldberg DO  05/07/19 1221

## 2019-05-08 ENCOUNTER — TELEPHONE (OUTPATIENT)
Dept: FAMILY MEDICINE CLINIC | Age: 79
End: 2019-05-08

## 2019-05-08 ENCOUNTER — CARE COORDINATION (OUTPATIENT)
Dept: CARE COORDINATION | Age: 79
End: 2019-05-08

## 2019-05-10 LAB
Lab: NORMAL
ORGANISM: ABNORMAL
REPORT: NORMAL
THIS TEST SENT TO: NORMAL
URINE CULTURE, ROUTINE: ABNORMAL
URINE CULTURE, ROUTINE: ABNORMAL

## 2019-05-10 NOTE — CARE COORDINATION
Called patient and left HIPPA compliant message with [facility] contact information requesting callback to f/u on ED visits.

## 2019-05-13 ENCOUNTER — TELEPHONE (OUTPATIENT)
Dept: FAMILY MEDICINE CLINIC | Age: 79
End: 2019-05-13

## 2019-05-13 ENCOUNTER — CARE COORDINATION (OUTPATIENT)
Dept: CARE COORDINATION | Age: 79
End: 2019-05-13

## 2019-05-13 RX ORDER — DICLOFENAC SODIUM 75 MG/1
75 TABLET, DELAYED RELEASE ORAL 2 TIMES DAILY WITH MEALS
Qty: 180 TABLET | Refills: 0 | Status: SHIPPED | OUTPATIENT
Start: 2019-05-13 | End: 2019-07-19

## 2019-05-13 NOTE — TELEPHONE ENCOUNTER
Pt informed. Notes recorded by Heladio Ruiz CMA on 5/7/2019 at 1:31 PM EDT  Letter sent.  ------    Notes recorded by Baldomero Diego MA on 5/6/2019 at 4:25 PM EDT  Continuous ring  ------    Notes recorded by Bhumika Saleh MA on 5/3/2019 at 1:44 PM EDT  [facility] for pt to call back  ------    Notes recorded by WILLIE Driver on 5/3/2019 at 10:47 AM EDT  Please call patient, Script sent in for cipro for UTI. Levaquin listed as an intolerance with nausea, but patient has tolerated cipro well in the past. Do not take on empty stomach. Will need to repeat urine culture after completion of the antibiotic. Sensitivity report reviewed, but given allergies and resistance, requested additional susceptibilities. ------    Notes recorded by WILLIE Driver on 5/1/2019 at 5:18 PM EDT  Urine with a strep b infection. Have spoken with lab, given patient allergy, they will run sensitivity report. Will send in antibiotic when we get results.

## 2019-05-14 ENCOUNTER — TELEPHONE (OUTPATIENT)
Dept: FAMILY MEDICINE CLINIC | Age: 79
End: 2019-05-14

## 2019-05-17 ENCOUNTER — TELEPHONE (OUTPATIENT)
Dept: FAMILY MEDICINE CLINIC | Age: 79
End: 2019-05-17

## 2019-05-17 DIAGNOSIS — G25.81 RLS (RESTLESS LEGS SYNDROME): ICD-10-CM

## 2019-05-17 NOTE — TELEPHONE ENCOUNTER
Requesting orders for increased ot for three times a week for the next three weeks because of a recent fall and continues to show balance issues and safety issues.

## 2019-05-20 RX ORDER — PRAMIPEXOLE DIHYDROCHLORIDE 0.5 MG/1
TABLET ORAL
Qty: 30 TABLET | Refills: 5 | Status: SHIPPED | OUTPATIENT
Start: 2019-05-20

## 2019-05-20 NOTE — TELEPHONE ENCOUNTER
.  Last office visit 4/29/2019     Last written 11/27/18 #30 5 refills    Next office visit scheduled 5/24/2019    Requested Prescriptions     Pending Prescriptions Disp Refills    pramipexole (MIRAPEX) 0.5 MG tablet [Pharmacy Med Name: PRAMIPEXOLE  TAB 0.5MG (T) TABLET] 30 tablet 5     Sig: TAKE ONE TABLET BY MOUTH ONCE NIGHTLY

## 2019-05-23 ENCOUNTER — OFFICE VISIT (OUTPATIENT)
Dept: ORTHOPEDIC SURGERY | Age: 79
End: 2019-05-23
Payer: MEDICARE

## 2019-05-23 VITALS
HEART RATE: 80 BPM | HEIGHT: 65 IN | BODY MASS INDEX: 38.82 KG/M2 | WEIGHT: 233.03 LBS | SYSTOLIC BLOOD PRESSURE: 128 MMHG | DIASTOLIC BLOOD PRESSURE: 82 MMHG

## 2019-05-23 DIAGNOSIS — S16.1XXA STRAIN OF NECK MUSCLE, INITIAL ENCOUNTER: Primary | ICD-10-CM

## 2019-05-23 PROCEDURE — 4040F PNEUMOC VAC/ADMIN/RCVD: CPT | Performed by: PHYSICAL MEDICINE & REHABILITATION

## 2019-05-23 PROCEDURE — 1123F ACP DISCUSS/DSCN MKR DOCD: CPT | Performed by: PHYSICAL MEDICINE & REHABILITATION

## 2019-05-23 PROCEDURE — 1090F PRES/ABSN URINE INCON ASSESS: CPT | Performed by: PHYSICAL MEDICINE & REHABILITATION

## 2019-05-23 PROCEDURE — G8598 ASA/ANTIPLAT THER USED: HCPCS | Performed by: PHYSICAL MEDICINE & REHABILITATION

## 2019-05-23 PROCEDURE — G8417 CALC BMI ABV UP PARAM F/U: HCPCS | Performed by: PHYSICAL MEDICINE & REHABILITATION

## 2019-05-23 PROCEDURE — G8427 DOCREV CUR MEDS BY ELIG CLIN: HCPCS | Performed by: PHYSICAL MEDICINE & REHABILITATION

## 2019-05-23 PROCEDURE — G8400 PT W/DXA NO RESULTS DOC: HCPCS | Performed by: PHYSICAL MEDICINE & REHABILITATION

## 2019-05-23 PROCEDURE — 1036F TOBACCO NON-USER: CPT | Performed by: PHYSICAL MEDICINE & REHABILITATION

## 2019-05-23 PROCEDURE — 99203 OFFICE O/P NEW LOW 30 MIN: CPT | Performed by: PHYSICAL MEDICINE & REHABILITATION

## 2019-05-23 RX ORDER — TIZANIDINE 4 MG/1
4 TABLET ORAL 2 TIMES DAILY PRN
Qty: 60 TABLET | Refills: 1 | Status: SHIPPED | OUTPATIENT
Start: 2019-05-23 | End: 2019-06-22

## 2019-05-23 NOTE — PROGRESS NOTES
New Patient: SPINE    CHIEF COMPLAINT:    Chief Complaint   Patient presents with    Neck Pain     neck pain going down into in the left arm, had a fall back in March, ref from Dr Osman Madrid:                The patient is a 67 y.o. female last saw 2012 for lumbar back injections. She reports a fall today off months ago to bathroom. She had or hadn't had loss of consciousness and concussion. Since that time she's having global headaches and superior headaches. She has intermittent neck pain and tightness. She is seen at the emergency department and had follow-up for she's had CTs of her head and her neck with no evidence of fracture or bleed. Her neck pain is improving slightly. Been taking muscle relaxer. She had a bursa injection in her shoulder some improvement. She has no radiating arm pain. She lives in what sounds like an assisted living due to difficulties with her gait over the last 2 years.   She is working with therapy through the    Past Medical History:   Diagnosis Date    Arthritis     Asthma     Bronchial asthma     Bursitis 12/2011    ACHILLES TENDON LEFT    CHF (congestive heart failure) (HCC)     Constipation     Depression     Diverticulitis     Dizziness     GERD (gastroesophageal reflux disease)     Hearing loss     Hyperlipidemia     Hypertension     Leg edema     Lung disease     Nausea & vomiting     POSTOPERATIVE    PONV (postoperative nausea and vomiting)     WHEN SHE WAS 21    Sleep apnea     CPAP, SLEEP STUDY 6/28 OVERNIGHT AT HOME    Tinnitus     Type II or unspecified type diabetes mellitus without mention of complication, not stated as uncontrolled     Unspecified cerebral artery occlusion with cerebral infarction     mini stroke          Pain Assessment  Location of Pain: Neck  Severity of Pain: 5  Quality of Pain: Aching  Duration of Pain: Persistent  Frequency of Pain: Intermittent  Aggravating Factors: Bending, Stretching, Straightening  Limiting Behavior: Yes  Relieving Factors: Rest  Result of Injury: No  Work-Related Injury: No  Are there other pain locations you wish to document?: No    The pain assessment was noted & reviewed in the medical record today.      Current/Past Treatment:   · Physical Therapy:   · Chiropractic:     · Injection:     Medications:            NSAIDS:             Muscle relaxer:   type tizanidine            Steriods:              Neuropathic medications:              Opioids:            Other:   · Surgery/Consult:    Work Status/Functionality: Independent living    Past Medical History: Medical history form was reviewed today & scanned into the media tab  Past Medical History:   Diagnosis Date    Arthritis     Asthma     Bronchial asthma     Bursitis 12/2011    ACHILLES TENDON LEFT    CHF (congestive heart failure) (HCC)     Constipation     Depression     Diverticulitis     Dizziness     GERD (gastroesophageal reflux disease)     Hearing loss     Hyperlipidemia     Hypertension     Leg edema     Lung disease     Nausea & vomiting     POSTOPERATIVE    PONV (postoperative nausea and vomiting)     WHEN SHE WAS 21    Sleep apnea     CPAP, SLEEP STUDY 6/28 OVERNIGHT AT HOME    Tinnitus     Type II or unspecified type diabetes mellitus without mention of complication, not stated as uncontrolled     Unspecified cerebral artery occlusion with cerebral infarction     mini stroke      Past Surgical History:     Past Surgical History:   Procedure Laterality Date    ACHILLES TENDON SURGERY  2/2/12    LEFT ACHILLES DEBRIDEMENT, HAGLUNDS AND RETROCALCANEAL BURSA EXCISION WITH FLEXOR HALLUS LONGUS TENDON TRANSFER WITH BLOCK FOR PAIN CONTROL    APPENDECTOMY      CARDIAC CATHETERIZATION  8/21/14    CARPAL TUNNEL RELEASE      both hands    CHOLECYSTECTOMY      COLONOSCOPY      COLONOSCOPY      COLONOSCOPY  2/10/2016    CYSTOSCOPY  10/02/2017    DIAGNOSTIC CARDIAC CATH LAB PROCEDURE      EYE SURGERY      HYSTERECTOMY      HYSTERECTOMY, TOTAL ABDOMINAL      JOINT REPLACEMENT      bilateral knee    KNEE SURGERY      both replaced    OTHER SURGICAL HISTORY  07/09/2018    CYSTOSCOPY, HYDRODISTENTION OF BLADDER WITH INSTILLATION OF    POLYPECTOMY      SHOULDER SURGERY      TOENAIL EXCISION  08/04/2016    right big toe    TONSILLECTOMY      TUBAL LIGATION      UPPER GASTROINTESTINAL ENDOSCOPY  10/29/12    gastritis    UPPER GASTROINTESTINAL ENDOSCOPY  2/10/2016    URETHRAL STRICTURE DILATATION       Current Medications:     Current Outpatient Medications:     pramipexole (MIRAPEX) 0.5 MG tablet, TAKE ONE TABLET BY MOUTH ONCE NIGHTLY, Disp: 30 tablet, Rfl: 5    diclofenac (VOLTAREN) 75 MG EC tablet, TAKE 1 TABLET BY MOUTH 2 TIMES DAILY (WITH MEALS), Disp: 180 tablet, Rfl: 0    omeprazole (PRILOSEC) 20 MG delayed release capsule, TAKE ONE CAPSULE TWICE A DAY, Disp: 60 capsule, Rfl: 5    furosemide (LASIX) 40 MG tablet, TAKE ONE TABLET BY MOUTH TWICE A DAY, Disp: 60 tablet, Rfl: 5    acetaminophen (APAP EXTRA STRENGTH) 500 MG tablet, Take 1 tablet by mouth every 6 hours as needed for Pain, Disp: 120 tablet, Rfl: 0    ondansetron (ZOFRAN ODT) 4 MG disintegrating tablet, Take 1 tablet by mouth every 8 hours as needed for Nausea, Disp: 20 tablet, Rfl: 0    oxybutynin (DITROPAN-XL) 10 MG extended release tablet, Take 10 mg by mouth daily, Disp: , Rfl:     naproxen (NAPROSYN) 500 MG tablet, Take 500 mg by mouth 2 times daily (with meals), Disp: , Rfl:     trimethoprim (TRIMPEX) 100 MG tablet, , Disp: , Rfl:     albuterol sulfate  (90 Base) MCG/ACT inhaler, Inhale 2 puffs into the lungs 4 times daily as needed for Wheezing, Disp: 3 Inhaler, Rfl: 1    meloxicam (MOBIC) 15 MG tablet, Take 1 tablet by mouth daily as needed for Pain, Disp: 30 tablet, Rfl: 2    ondansetron (ZOFRAN) 4 MG tablet, Take 1 tablet by mouth 3 times daily as needed for Nausea or Vomiting, Disp: 30 tablet, Rfl: 0   blood glucose test strips (ACCU-CHEK ARABELLA PLUS) strip, Test blood sugar BID, Disp: 200 strip, Rfl: 11    metFORMIN (GLUCOPHAGE-XR) 500 MG extended release tablet, Take 2 tablets by mouth 2 times daily (with meals), Disp: 120 tablet, Rfl: 5    carvedilol (COREG) 25 MG tablet, Take 1 tablet by mouth 2 times daily (with meals), Disp: 180 tablet, Rfl: 1    DULoxetine (CYMBALTA) 60 MG extended release capsule, Take 1 capsule by mouth daily, Disp: 90 capsule, Rfl: 1    isosorbide mononitrate (IMDUR) 30 MG extended release tablet, TAKE ONE-HALF TABLET BY MOUTH ONE TIME A DAY, Disp: 45 tablet, Rfl: 3    sennosides-docusate sodium (SENOKOT-S) 8.6-50 MG tablet, TAKE 2 TABLETS BY MOUTH DAILY, Disp: 60 tablet, Rfl: 5    pravastatin (PRAVACHOL) 20 MG tablet, TAKE ONE TABLET BY MOUTH DAILY, Disp: 90 tablet, Rfl: 3    clotrimazole-betamethasone (LOTRISONE) 1-0.05 % cream, Apply bid to affected area., Disp: 45 g, Rfl: 3    tiZANidine (ZANAFLEX) 4 MG tablet, ONE TID PRN FOR MUSCLE SPASMS, Disp: 60 tablet, Rfl: 1    nitroGLYCERIN (NITROSTAT) 0.4 MG SL tablet, Place 1 tablet under the tongue every 5 minutes as needed for Chest pain, Disp: 25 tablet, Rfl: 1    potassium chloride (KLOR-CON M) 20 MEQ extended release tablet, Take 1 tablet by mouth daily, Disp: 30 tablet, Rfl: 0    metoclopramide (REGLAN) 10 MG tablet, Take 1 tablet by mouth 4 times daily as needed (Nausea and/or headache), Disp: 30 tablet, Rfl: 0    calcium carbonate 600 MG TABS tablet, Take 1 tablet by mouth 2 times daily, Disp: 30 tablet, Rfl: 1    hydrocortisone 2.5 % cream, APPLY TOPICALLY TWO TIMES A DAY, Disp: 1 Tube, Rfl: 1    azelastine (ASTELIN) 0.1 % nasal spray, 2 sprays by Nasal route 2 times daily Use in each nostril as directed, Disp: 1 Bottle, Rfl: 0    fluticasone (FLONASE) 50 MCG/ACT nasal spray, 1 spray by Nasal route daily, Disp: 1 Bottle, Rfl: 0    ACCU-CHEK SOFTCLIX LANCETS MISC, USE TO TEST BLOOD SUGAR TWO TIMES DAILY, Disp: 50 each, Rfl: 5    vitamin E 400 UNIT capsule, Take 400 Units by mouth daily, Disp: , Rfl:     aspirin 81 MG tablet, Take 81 mg by mouth daily. , Disp: , Rfl:   Allergies:  Latex; Cephalexin; Codeine; Levofloxacin; Pce [erythromycin]; Pcn [penicillins]; Pentazocine; Sumycin [tetracycline hcl]; Atarax [hydroxyzine hcl]; Beef-derived products; Flagyl [metronidazole]; Macrodantin [nitrofurantoin]; Milk-related compounds; Pyridium [phenazopyridine hcl]; Sulfa antibiotics; and Urised [methen-jenifer-meth bl-phen sal]  Social History:    reports that she quit smoking about 44 years ago. Her smoking use included cigarettes. She has a 0.02 pack-year smoking history. She has never used smokeless tobacco. She reports that she does not drink alcohol or use drugs. Family History:   Family History   Problem Relation Age of Onset   Pereyra Cancer Father         prostate    Heart Disease Mother     Heart Disease Brother     Heart Disease Brother     Heart Disease Sister     Diabetes Sister        REVIEW OF SYSTEMS: Full ROS noted & scanned   CONSTITUTIONAL: Denies unexplained weight loss, fevers, chills or fatigue  NEUROLOGICAL: Denies unsteady gait or progressive weakness  MUSCULOSKELETAL: Denies joint swelling or redness  PSYCHOLOGICAL: Denies anxiety, depression   SKIN: Denies skin changes, delayed healing, rash, itching   HEMATOLOGIC: Denies easy bleeding or bruising  ENDOCRINE: Denies excessive thirst, urination, heat/cold  RESPIRATORY: Denies current dyspnea, cough  GI: Denies nausea, vomiting, diarrhea   : Denies bowel or bladder issues       PHYSICAL EXAM:    Vitals: Blood pressure 128/82, pulse 80, height 5' 5\" (1.651 m), weight 233 lb 0.4 oz (105.7 kg), not currently breastfeeding. GENERAL EXAM:  · General Apparence: Patient is adequately groomed with no evidence of malnutrition. · Orientation: The patient is oriented to time, place and person.    · Mood & Affect:The patient's mood and affect are appropriate · Vascular: Examination reveals no swelling tenderness in upper or lower extremities. Good capillary refill  · Lymphatic: The lymphatic examination bilaterally reveals all areas to be without enlargement or induration  · Sensation: Sensation is intact without deficit  · Coordination/Balance: Good coordination     CERVICAL EXAMINATION:  · Inspection: Local inspection shows no step-off or bruising. Cervical alignment is normal.     · Palpation: No evidence of tenderness at the midline, and trapezius. Paraspinal tenderness is present. There is no step-off or paraspinal spasm. · Range of Motion: Moderate loss of flexion extension  · Strength: 5/5 bilateral upper extremities   · Special Tests:    ·   Spurling's, L'Hermitte's & Gorman's negative bilaterally. ·   Gray and Impingement tests are negative bilaterally. ·  Cubital and Carpal tunnel Tinel's negative bilaterally. · Skin:There are no rashes, ulcerations or lesions in right & left upper extremities. · Reflexes: Bilaterally triceps, biceps and brachioradialis are 2+. Clonus absent bilaterally at the feet. · Additional Examinations:       · RIGHT UPPER EXTREMITY:  Inspection/examination of the right upper extremity does not show any tenderness, deformity or injury. Range of motion is full. There is no gross instability. There are no rashes, ulcerations or lesions. Strength and tone are normal.  · LEFT UPPER EXTREMITY: Inspection/examination of the left upper extremity does not show any tenderness, deformity or injury. Range of motion is full. There is no gross instability. There are no rashes, ulcerations or lesions.  Strength and tone are normal.      Diagnostic Testing:      I reviewed CT report of the head showing no intracranial bleed    I reviewed CT cervical spine films report showing degenerative changes without acute fracture    Impression:    Acute cervical strain ×2-1/2 month  Headaches and concussion  Underlying gait and balance difficulty, pre-existing    Plan:     Physical therapy program   tizanidine  Follow-up when necessary    JOHN Pope

## 2019-05-24 ENCOUNTER — CARE COORDINATION (OUTPATIENT)
Dept: CARE COORDINATION | Age: 79
End: 2019-05-24

## 2019-05-24 ENCOUNTER — OFFICE VISIT (OUTPATIENT)
Dept: FAMILY MEDICINE CLINIC | Age: 79
End: 2019-05-24
Payer: MEDICARE

## 2019-05-24 VITALS
HEART RATE: 68 BPM | DIASTOLIC BLOOD PRESSURE: 78 MMHG | TEMPERATURE: 97.5 F | RESPIRATION RATE: 18 BRPM | SYSTOLIC BLOOD PRESSURE: 130 MMHG | OXYGEN SATURATION: 98 %

## 2019-05-24 DIAGNOSIS — M54.2 NECK PAIN: ICD-10-CM

## 2019-05-24 DIAGNOSIS — F33.2 SEVERE EPISODE OF RECURRENT MAJOR DEPRESSIVE DISORDER, WITHOUT PSYCHOTIC FEATURES (HCC): Primary | ICD-10-CM

## 2019-05-24 DIAGNOSIS — J40 BRONCHITIS: ICD-10-CM

## 2019-05-24 PROCEDURE — 1036F TOBACCO NON-USER: CPT | Performed by: FAMILY MEDICINE

## 2019-05-24 PROCEDURE — G8400 PT W/DXA NO RESULTS DOC: HCPCS | Performed by: FAMILY MEDICINE

## 2019-05-24 PROCEDURE — 99214 OFFICE O/P EST MOD 30 MIN: CPT | Performed by: FAMILY MEDICINE

## 2019-05-24 PROCEDURE — 4040F PNEUMOC VAC/ADMIN/RCVD: CPT | Performed by: FAMILY MEDICINE

## 2019-05-24 PROCEDURE — 1123F ACP DISCUSS/DSCN MKR DOCD: CPT | Performed by: FAMILY MEDICINE

## 2019-05-24 PROCEDURE — G8417 CALC BMI ABV UP PARAM F/U: HCPCS | Performed by: FAMILY MEDICINE

## 2019-05-24 PROCEDURE — G8598 ASA/ANTIPLAT THER USED: HCPCS | Performed by: FAMILY MEDICINE

## 2019-05-24 PROCEDURE — 1090F PRES/ABSN URINE INCON ASSESS: CPT | Performed by: FAMILY MEDICINE

## 2019-05-24 PROCEDURE — G8427 DOCREV CUR MEDS BY ELIG CLIN: HCPCS | Performed by: FAMILY MEDICINE

## 2019-05-24 RX ORDER — DULOXETIN HYDROCHLORIDE 60 MG/1
120 CAPSULE, DELAYED RELEASE ORAL DAILY
Qty: 60 CAPSULE | Refills: 5 | Status: SHIPPED | OUTPATIENT
Start: 2019-05-24

## 2019-05-24 ASSESSMENT — ENCOUNTER SYMPTOMS
COUGH: 1
WHEEZING: 0

## 2019-05-24 NOTE — PROGRESS NOTES
TAKE 1 TABLET BY MOUTH 2 TIMES DAILY (WITH MEALS) 180 tablet 0    omeprazole (PRILOSEC) 20 MG delayed release capsule TAKE ONE CAPSULE TWICE A DAY 60 capsule 5    furosemide (LASIX) 40 MG tablet TAKE ONE TABLET BY MOUTH TWICE A DAY 60 tablet 5    acetaminophen (APAP EXTRA STRENGTH) 500 MG tablet Take 1 tablet by mouth every 6 hours as needed for Pain 120 tablet 0    ondansetron (ZOFRAN ODT) 4 MG disintegrating tablet Take 1 tablet by mouth every 8 hours as needed for Nausea 20 tablet 0    oxybutynin (DITROPAN-XL) 10 MG extended release tablet Take 10 mg by mouth daily      naproxen (NAPROSYN) 500 MG tablet Take 500 mg by mouth 2 times daily (with meals)      trimethoprim (TRIMPEX) 100 MG tablet       albuterol sulfate  (90 Base) MCG/ACT inhaler Inhale 2 puffs into the lungs 4 times daily as needed for Wheezing 3 Inhaler 1    meloxicam (MOBIC) 15 MG tablet Take 1 tablet by mouth daily as needed for Pain 30 tablet 2    ondansetron (ZOFRAN) 4 MG tablet Take 1 tablet by mouth 3 times daily as needed for Nausea or Vomiting 30 tablet 0    blood glucose test strips (ACCU-CHEK ARABELLA PLUS) strip Test blood sugar  strip 11    metFORMIN (GLUCOPHAGE-XR) 500 MG extended release tablet Take 2 tablets by mouth 2 times daily (with meals) 120 tablet 5    carvedilol (COREG) 25 MG tablet Take 1 tablet by mouth 2 times daily (with meals) 180 tablet 1    isosorbide mononitrate (IMDUR) 30 MG extended release tablet TAKE ONE-HALF TABLET BY MOUTH ONE TIME A DAY 45 tablet 3    sennosides-docusate sodium (SENOKOT-S) 8.6-50 MG tablet TAKE 2 TABLETS BY MOUTH DAILY 60 tablet 5    pravastatin (PRAVACHOL) 20 MG tablet TAKE ONE TABLET BY MOUTH DAILY 90 tablet 3    clotrimazole-betamethasone (LOTRISONE) 1-0.05 % cream Apply bid to affected area.  45 g 3    nitroGLYCERIN (NITROSTAT) 0.4 MG SL tablet Place 1 tablet under the tongue every 5 minutes as needed for Chest pain 25 tablet 1    potassium chloride (KLOR-CON M)

## 2019-05-28 ENCOUNTER — CARE COORDINATION (OUTPATIENT)
Dept: CARE COORDINATION | Age: 79
End: 2019-05-28

## 2019-05-28 NOTE — CARE COORDINATION
Call to pt to obtain daily BG's and WT's. Left message on machine for pt to return call to report readings. If no return call, will attempt to reach pt again.     [address] [facility] Coordination   [phone]  N:[phone]

## 2019-05-31 ENCOUNTER — CARE COORDINATION (OUTPATIENT)
Dept: CARE COORDINATION | Age: 79
End: 2019-05-31

## 2019-06-03 ENCOUNTER — TELEPHONE (OUTPATIENT)
Dept: FAMILY MEDICINE CLINIC | Age: 79
End: 2019-06-03

## 2019-06-04 ENCOUNTER — TELEPHONE (OUTPATIENT)
Dept: FAMILY MEDICINE CLINIC | Age: 79
End: 2019-06-04

## 2019-06-04 ENCOUNTER — HOSPITAL ENCOUNTER (EMERGENCY)
Age: 79
Discharge: HOME | End: 2019-06-05
Payer: MEDICARE

## 2019-06-04 VITALS
OXYGEN SATURATION: 97 % | RESPIRATION RATE: 18 BRPM | BODY MASS INDEX: 38.77 KG/M2 | TEMPERATURE: 97.7 F | DIASTOLIC BLOOD PRESSURE: 97 MMHG | WEIGHT: 233 LBS | HEART RATE: 88 BPM | SYSTOLIC BLOOD PRESSURE: 202 MMHG

## 2019-06-04 DIAGNOSIS — S00.83XA FACIAL CONTUSION, INITIAL ENCOUNTER: ICD-10-CM

## 2019-06-04 DIAGNOSIS — S09.90XA CLOSED HEAD INJURY, INITIAL ENCOUNTER: Primary | ICD-10-CM

## 2019-06-04 DIAGNOSIS — S01.81XA FACIAL LACERATION, INITIAL ENCOUNTER: ICD-10-CM
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
## 2019-06-04 PROCEDURE — 99283 EMERGENCY DEPT VISIT LOW MDM: CPT

## 2019-06-04 PROCEDURE — 4500000023 HC ED LEVEL 3 PROCEDURE

## 2019-06-04 ASSESSMENT — PAIN SCALES - GENERAL: PAINLEVEL_OUTOF10: 8

## 2019-06-04 ASSESSMENT — PAIN DESCRIPTION - PAIN TYPE: TYPE: ACUTE PAIN

## 2019-06-04 ASSESSMENT — PAIN DESCRIPTION - ORIENTATION: ORIENTATION: LEFT

## 2019-06-04 ASSESSMENT — PAIN DESCRIPTION - LOCATION: LOCATION: HEAD;LEG

## 2019-06-05 ENCOUNTER — APPOINTMENT (OUTPATIENT)
Dept: CT IMAGING | Age: 79
End: 2019-06-05
Payer: MEDICARE

## 2019-06-05 PROCEDURE — 70480 CT ORBIT/EAR/FOSSA W/O DYE: CPT

## 2019-06-05 PROCEDURE — 70450 CT HEAD/BRAIN W/O DYE: CPT

## 2019-06-05 ASSESSMENT — ENCOUNTER SYMPTOMS
VOMITING: 0
SHORTNESS OF BREATH: 0
ABDOMINAL PAIN: 0
BACK PAIN: 0
NAUSEA: 0
FACIAL SWELLING: 1

## 2019-06-05 NOTE — ED PROVIDER NOTES
[facility]  ED  EMERGENCY DEPARTMENT ENCOUNTER        Pt Name: Claudette Andrews  MRN: [id number]  [place] 1940  Date of evaluation: 6/4/2019  Provider: Catarina Rudd PA-C  PCP: Antonio Tidwell DO  ED Attending: Luisa Pickering MD    History provided by the patient    CHIEF COMPLAINT:     Chief Complaint   Patient presents with    Fall     caught toe on chair and fell, no LOC, no thinners, hematoma to L eye, lac to back of head       HISTORY OF PRESENT ILLNESS:      Claudette Andrews is a 67 y.o. female who arrives to the ED by [facility]. The patient is brought in secondary to a fall, head injury and facial laceration. She lives alone in a senior living type facility. He typically ambulates with the assistance of a walker. She states she was sitting in a chair eating an ice cream sandwich. She got up to wash her hands when she stumbled and fell. She landed on the floor and struck the left side of her face/head on the floor. She has 2 small lacerations just lateral to her left eye. The patient did not lose consciousness. She did not feel weak, dizzy or lightheaded prior to falling. She describes stumbling. She takes aspirin 81 mg daily but no other anticoagulants. Her tetanus vaccination was updated in 2013. She sustained no other injuries from the fall outside of her head and face. Nursing Notes were reviewed     REVIEW OF SYSTEMS:     Review of Systems   Constitutional: Negative for appetite change, chills and fever. HENT: Positive for facial swelling. Negative for ear discharge. Eyes: Negative for visual disturbance. Respiratory: Negative for shortness of breath. Cardiovascular: Negative for chest pain. Gastrointestinal: Negative for abdominal pain, nausea and vomiting. Genitourinary: Negative. Musculoskeletal: Negative for back pain and neck pain. Skin: Positive for wound. Neurological: Positive for headaches.  Negative for dizziness, weakness and light-headedness. All other systems reviewed and are negative. Exceptas noted above in the ROS, all other systems were reviewed and negative.          PAST MEDICAL HISTORY:     Past Medical History:   Diagnosis Date    Arthritis     Asthma     Bronchial asthma     Bursitis 12/2011    ACHILLES TENDON LEFT    CHF (congestive heart failure) (HCC)     Constipation     Depression     Diverticulitis     Dizziness     GERD (gastroesophageal reflux disease)     Hearing loss     Hyperlipidemia     Hypertension     Leg edema     Lung disease     Nausea & vomiting     POSTOPERATIVE    PONV (postoperative nausea and vomiting)     WHEN SHE WAS 21    Sleep apnea     CPAP, SLEEP STUDY 6/28 OVERNIGHT AT HOME    Tinnitus     Type II or unspecified type diabetes mellitus without mention of complication, not stated as uncontrolled     Unspecified cerebral artery occlusion with cerebral infarction     mini stroke         SURGICAL HISTORY:      Past Surgical History:   Procedure Laterality Date    ACHILLES TENDON SURGERY  2/2/12    LEFT ACHILLES DEBRIDEMENT, HAGLUNDS AND RETROCALCANEAL BURSA EXCISION WITH FLEXOR HALLUS LONGUS TENDON TRANSFER WITH BLOCK FOR PAIN CONTROL    APPENDECTOMY      CARDIAC CATHETERIZATION  8/21/14    CARPAL TUNNEL RELEASE      both hands    CHOLECYSTECTOMY      COLONOSCOPY      COLONOSCOPY      COLONOSCOPY  2/10/2016    CYSTOSCOPY  10/02/2017    DIAGNOSTIC CARDIAC CATH LAB PROCEDURE      EYE SURGERY      HYSTERECTOMY      HYSTERECTOMY, TOTAL ABDOMINAL      JOINT REPLACEMENT      bilateral knee    KNEE SURGERY      both replaced    OTHER SURGICAL HISTORY  07/09/2018    CYSTOSCOPY, HYDRODISTENTION OF BLADDER WITH INSTILLATION OF    POLYPECTOMY      SHOULDER SURGERY      TOENAIL EXCISION  08/04/2016    right big toe    TONSILLECTOMY      TUBAL LIGATION      UPPER GASTROINTESTINAL ENDOSCOPY  10/29/12    gastritis    UPPER GASTROINTESTINAL ENDOSCOPY  2/10/2016    URETHRAL STRICTURE DILATATION           CURRENT MEDICATIONS:       Previous Medications    ACCU-CHEK SOFTCLIX LANCETS MISC    USE TO TEST BLOOD SUGAR TWO TIMES DAILY    ACETAMINOPHEN (APAP EXTRA STRENGTH) 500 MG TABLET    Take 1 tablet by mouth every 6 hours as needed for Pain    ALBUTEROL SULFATE  (90 BASE) MCG/ACT INHALER    Inhale 2 puffs into the lungs 4 times daily as needed for Wheezing    ASPIRIN 81 MG TABLET    Take 81 mg by mouth daily. AZELASTINE (ASTELIN) 0.1 % NASAL SPRAY    2 sprays by Nasal route 2 times daily Use in each nostril as directed    BLOOD GLUCOSE TEST STRIPS (ACCU-CHEK ARABELLA PLUS) STRIP    Test blood sugar BID    CALCIUM CARBONATE 600 MG TABS TABLET    Take 1 tablet by mouth 2 times daily    CARVEDILOL (COREG) 25 MG TABLET    Take 1 tablet by mouth 2 times daily (with meals)    CLOTRIMAZOLE-BETAMETHASONE (LOTRISONE) 1-0.05 % CREAM    Apply bid to affected area.     DICLOFENAC (VOLTAREN) 75 MG EC TABLET    TAKE 1 TABLET BY MOUTH 2 TIMES DAILY (WITH MEALS)    DULOXETINE (CYMBALTA) 60 MG EXTENDED RELEASE CAPSULE    Take 2 capsules by mouth daily    FLUTICASONE (FLONASE) 50 MCG/ACT NASAL SPRAY    1 spray by Nasal route daily    FUROSEMIDE (LASIX) 40 MG TABLET    TAKE ONE TABLET BY MOUTH TWICE A DAY    HYDROCORTISONE 2.5 % CREAM    APPLY TOPICALLY TWO TIMES A DAY    ISOSORBIDE MONONITRATE (IMDUR) 30 MG EXTENDED RELEASE TABLET    TAKE ONE-HALF TABLET BY MOUTH ONE TIME A DAY    MELOXICAM (MOBIC) 15 MG TABLET    Take 1 tablet by mouth daily as needed for Pain    METFORMIN (GLUCOPHAGE-XR) 500 MG EXTENDED RELEASE TABLET    Take 2 tablets by mouth 2 times daily (with meals)    METOCLOPRAMIDE (REGLAN) 10 MG TABLET    Take 1 tablet by mouth 4 times daily as needed (Nausea and/or headache)    NAPROXEN (NAPROSYN) 500 MG TABLET    Take 500 mg by mouth 2 times daily (with meals)    NITROGLYCERIN (NITROSTAT) 0.4 MG SL TABLET    Place 1 tablet under the tongue every 5 minutes as needed for Chest pain OMEPRAZOLE (PRILOSEC) 20 MG DELAYED RELEASE CAPSULE    TAKE ONE CAPSULE TWICE A DAY    ONDANSETRON (ZOFRAN ODT) 4 MG DISINTEGRATING TABLET    Take 1 tablet by mouth every 8 hours as needed for Nausea    ONDANSETRON (ZOFRAN) 4 MG TABLET    Take 1 tablet by mouth 3 times daily as needed for Nausea or Vomiting    OXYBUTYNIN (DITROPAN-XL) 10 MG EXTENDED RELEASE TABLET    Take 10 mg by mouth daily    POTASSIUM CHLORIDE (KLOR-CON M) 20 MEQ EXTENDED RELEASE TABLET    Take 1 tablet by mouth daily    PRAMIPEXOLE (MIRAPEX) 0.5 MG TABLET    TAKE ONE TABLET BY MOUTH ONCE NIGHTLY    PRAVASTATIN (PRAVACHOL) 20 MG TABLET    TAKE ONE TABLET BY MOUTH DAILY    SENNOSIDES-DOCUSATE SODIUM (SENOKOT-S) 8.6-50 MG TABLET    TAKE 2 TABLETS BY MOUTH DAILY    TIZANIDINE (ZANAFLEX) 4 MG TABLET    Take 1 tablet by mouth 2 times daily as needed (PRN)    TRIMETHOPRIM (TRIMPEX) 100 MG TABLET        VITAMIN E 400 UNIT CAPSULE    Take 400 Units by mouth daily         ALLERGIES:    Latex; Cephalexin; Codeine; Levofloxacin; Pce [erythromycin]; Pcn [penicillins]; Pentazocine; Sumycin [tetracycline hcl]; Atarax [hydroxyzine hcl]; Beef-derived products; Flagyl [metronidazole]; Macrodantin [nitrofurantoin]; Milk-related compounds; Pyridium [phenazopyridine hcl]; Sulfa antibiotics; and Urised [methen-jenifer-meth bl-phen sal]    FAMILY HISTORY:       Family History   Problem Relation Age of Onset    Cancer Father         prostate    Heart Disease Mother     Heart Disease Brother     Heart Disease Brother     Heart Disease Sister     Diabetes Sister           SOCIAL HISTORY:       Social History     Socioeconomic History    Marital status:       Spouse name: None    Number of children: 3    Years of education: 15    Highest education level: None   Occupational History    Occupation: retired   Social Needs    Financial resource strain: None    Food insecurity:     Worry: None     Inability: None    Transportation needs:     Medical: None Non-medical: None   Tobacco Use    Smoking status: Former Smoker     Packs/day: 0.25     Years: 0.10     Pack years: 0.02     Types: Cigarettes     Last attempt to quit: 1975     Years since quittin.4    Smokeless tobacco: Never Used    Tobacco comment: only smoked for 1 month   Substance and Sexual Activity    Alcohol use: No     Alcohol/week: 0.0 oz    Drug use: No    Sexual activity: Not Currently     Partners: Male   Lifestyle    Physical activity:     Days per week: None     Minutes per session: None    Stress: None   Relationships    Social connections:     Talks on phone: None     Gets together: None     Attends Taoism service: None     Active member of club or organization: None     Attends meetings of clubs or organizations: None     Relationship status: None    Intimate partner violence:     Fear of current or ex partner: None     Emotionally abused: None     Physically abused: None     Forced sexual activity: None   Other Topics Concern    None   Social History Narrative    None       SCREENINGS:             PHYSICAL EXAM:       ED Triage Vitals   BP Temp Temp Source Pulse Resp SpO2 Height Weight   19 2347 19 2346 19 2346 19 2346 19 2346 19 2346 -- 19 2342   (!) 202/97 97.7 °F (36.5 °C) Oral 88 18 97 %  233 lb (105.7 kg)       Physical Exam    CONSTITUTIONAL: Awake and alert. Cooperative. Well-developed. Well-nourished. Non-toxic. No acute distress. HENT: Normocephalic. Localized swelling, bruising and 2 small lacerations lateral to the left eye to the lateral orbit/zygomatic region. External ears normal, without discharge. TMs clear bilaterally. No hemotympanum. No nasal discharge. Oropharynx clear. Mucous membranes moist.  No obvious dental or intraoral injury. EYES: Conjunctiva non-injected. No scleral icterus. PERRL. EOM's grossly intact. NECK: Supple. Normal ROM.   No pain to palpate over the posterior C-spine. CARDIOVASCULAR: RRR. No Murmer. Intact distal pulses. PULMONARY/CHEST WALL: Effort normal. No tachypnea. Lungs clear to ausculation. ABDOMEN: Normal BS. Soft. Nondistended. No tenderness to palpate. No guarding. /ANORECTAL: Not assessed  MUSKULOSKELETAL: Normal ROM. No acute deformities. No edema. No tenderness to palpate. SKIN: Warm and dry. No rash. 2 small facial lacerations each approximately 4-5 mm in length. NEUROLOGICAL: Alert and oriented x 3. GCS 15. CN II-XII grossly intact. Strength is 5/5 in all extremities and sensation is intact. Normal gait. PSYCHIATRIC: Normal affect        DIAGNOSTICRESULTS:         RADIOLOGY:  All x-ray studies areviewed/reviewed by me. Formal interpretations per the radiologist are as follows:        Ct Head Wo Contrast    Result Date: 6/5/2019  EXAMINATION: CT OF THE HEAD WITHOUT CONTRAST  6/5/2019 12:23 am TECHNIQUE: CT of the head was performed without the administration of intravenous contrast. Dose modulation, iterative reconstruction, and/or weight based adjustment of the mA/kV was utilized to reduce the radiation dose to as low as reasonably achievable. COMPARISON: 05/06/2019 HISTORY: ORDERING SYSTEM PROVIDED HISTORY: fall, head injury TECHNOLOGIST PROVIDED HISTORY: Has a \"code stroke\" or \"stroke alert\" been called? ->No Ordering Physician Provided Reason for Exam: fell, LAC to back of head , no LOC, hit left eye FINDINGS: BRAIN/VENTRICLES: There is no acute intracranial hemorrhage, mass effect or midline shift. No abnormal extra-axial fluid collection. The gray-white differentiation is maintained without evidence of an acute infarct. There is no evidence of hydrocephalus. Central atrophy and moderate chronic white matter disease are similar to the prior study ORBITS: The visualized portion of the orbits demonstrate no acute abnormality. SINUSES: The visualized paranasal sinuses and mastoid air cells demonstrate no acute abnormality.  SOFT TISSUES/SKULL:  No acute abnormality of the visualized skull or soft tissues. No acute intracranial abnormality. Ct Orbits Wo Contrast    Result Date: 6/5/2019  EXAMINATION: CT OF THE ORBITS WITHOUT CONTRAST 6/5/2019 TECHNIQUE: CT of the orbits was performed without the administration of intravenous contrast. Multiplanar reformatted images are provided for review. Dose modulation, iterative reconstruction, and/or weight based adjustment of the mA/kV was utilized to reduce the radiation dose to as low as reasonably achievable. COMPARISON: None. HISTORY: ORDERING SYSTEM PROVIDED HISTORY: fall, head/left lateral orbital injury TECHNOLOGIST PROVIDED HISTORY: Ordering Physician Provided Reason for Exam: fell, LAC to back of head , no LOC, hit left eye FINDINGS: ORBITS: The globes appear intact. The extraocular muscles, optic nerve sheath complexes and lacrimal glands appear unremarkable. No retrobulbar hematoma or mass is seen. SOFT TISSUES: Soft tissue swelling the associated gas of the left lateral periorbital soft tissues is seen. BRAIN: The visualized portion of the intracranial contents appear unremarkable. SINUSES: The visualized paranasal sinuses demonstrate no acute abnormality. BONES: No acute osseous abnormality is seen. No acute abnormality of the orbits. PROCEDURES:     PROCEDURE:  LACERATION REPAIR  Albert Drivers or their surrogate had an opportunity to ask questions, and the risks, benefits, and alternatives were discussed. The wound was prepped and draped to maintain a sterile field. It was copiously irrigated. It was explored to its depth in a bloodless field with no sign of tendon, nerve, or vascular injury. No foreign bodies were identified. The two 0.5 cm wounds were closed with dermabond. There were no complications during the procedure.         CRITICAL CARE TIME:       None      CONSULTS:  None      EMERGENCY DEPARTMENT COURSE and DIFFERENTIAL DIAGNOSIS/MDM:   Vitals: completed with a voice recognition program.  Efforts were made to edit the dictations, but occasionally words are mis-transcribed.)    Power Lucero PA-C (electronicallysigned)              Lisa Kamarama  06/05/19 [id number]

## 2019-06-05 NOTE — ED NOTES
Bed: 11  Expected date:   Expected time:   Means of arrival:   Comments:  UT jeffery Perez RN  06/04/19 [id number]

## 2019-06-05 NOTE — ED NOTES
Report to [facility]. Pt dc'ed from dept in stable condition.      Eduard Hutchinson RN  06/05/19 [id number]

## 2019-06-06 ENCOUNTER — CARE COORDINATION (OUTPATIENT)
Dept: CARE COORDINATION | Age: 79
End: 2019-06-06

## 2019-06-06 NOTE — CARE COORDINATION
Ambulatory Care Coordination  ED Follow up Call    Reason for ED visit:  fall \"I fell\"  Status:     not changed    Did you call your PCP prior to going to the ED? No      Did you receive a discharge instructions from the Emergency Room? No: Pt states she did not receive any discharge from hospital.   Review of Instructions:     Understands what to report/when to return?:  Yes   Understands discharge instructions?:  Yes and reviewed discharge instructions   Following discharge instructions?:  Yes   If not why? n/a    Are there any new complaints of pain? No  New Pain Meds? No    Constipation prophylaxis needed? N/A    If you have a wound is the dressing clean, dry, and intact? N/A  Understands wound care regimen? N/A    Are there any other complaints/concerns that you wish to tell your provider? No.     FU appts/Provider:  [facility] will route to  staff to schedule ED f/u appointment. Future Appointments   Date Time Provider Ann Torre   7/19/2019  1:00 PM DO TASHA Harrell   12/18/2019  1:00 PM KIMI Pa - CNP [facility]           New Medications?:   No      Medication Reconciliation by phone - No  Understands Medications? Medication was not reviewed   Taking Medications? No new medications   Can you swallow your pills? Not Applicable    Any further needs in the home i.e. Equipment? No, pt has walker that she is using    Link to services in community?:  No   Which services:  Pt already has OT established with [facility] and reports that she had a visit today. Diabetes Assessment    Medic Alert ID:  No  Meal Planning:  None   How often do you test your blood sugar?:  Daily (Comment: BS this morning was 135)   Do you have barriers with adherence to non-pharmacologic self-management interventions?  (Nutrition/Exercise/Self-Monitoring):  No   Have you ever had to go to the ED for symptoms of low blood sugar?:  No       No patient-reported

## 2019-06-07 DIAGNOSIS — M19.012 ARTHRITIS OF SHOULDER REGION, LEFT: ICD-10-CM

## 2019-06-07 DIAGNOSIS — M25.512 LEFT SHOULDER PAIN, UNSPECIFIED CHRONICITY: ICD-10-CM

## 2019-06-08 RX ORDER — MELOXICAM 15 MG/1
TABLET ORAL
Qty: 30 TABLET | Refills: 2 | Status: ON HOLD | OUTPATIENT
Start: 2019-06-08 | End: 2019-09-07 | Stop reason: SDUPTHER

## 2019-06-14 ENCOUNTER — CARE COORDINATION (OUTPATIENT)
Dept: CARE COORDINATION | Age: 79
End: 2019-06-14

## 2019-06-17 RX ORDER — CARVEDILOL 25 MG/1
TABLET ORAL
Qty: 180 TABLET | Refills: 1 | Status: SHIPPED | OUTPATIENT
Start: 2019-06-17

## 2019-06-20 ENCOUNTER — OFFICE VISIT (OUTPATIENT)
Dept: FAMILY MEDICINE CLINIC | Age: 79
End: 2019-06-20
Payer: MEDICARE

## 2019-06-20 ENCOUNTER — CARE COORDINATION (OUTPATIENT)
Dept: CARE COORDINATION | Age: 79
End: 2019-06-20

## 2019-06-20 VITALS
DIASTOLIC BLOOD PRESSURE: 90 MMHG | OXYGEN SATURATION: 96 % | TEMPERATURE: 98.8 F | HEART RATE: 82 BPM | SYSTOLIC BLOOD PRESSURE: 148 MMHG

## 2019-06-20 DIAGNOSIS — R30.0 DYSURIA: ICD-10-CM

## 2019-06-20 DIAGNOSIS — N30.10 INTERSTITIAL CYSTITIS: ICD-10-CM

## 2019-06-20 LAB
BILIRUBIN, POC: NORMAL
BLOOD URINE, POC: NORMAL
CLARITY, POC: CLEAR
COLOR, POC: YELLOW
GLUCOSE URINE, POC: NORMAL
KETONES, POC: NORMAL
LEUKOCYTE EST, POC: NORMAL
NITRITE, POC: NORMAL
PH, POC: 6.5
PROTEIN, POC: 100
SPECIFIC GRAVITY, POC: 1.02
UROBILINOGEN, POC: 1

## 2019-06-20 PROCEDURE — G8598 ASA/ANTIPLAT THER USED: HCPCS | Performed by: NURSE PRACTITIONER

## 2019-06-20 PROCEDURE — 1036F TOBACCO NON-USER: CPT | Performed by: NURSE PRACTITIONER

## 2019-06-20 PROCEDURE — 81002 URINALYSIS NONAUTO W/O SCOPE: CPT | Performed by: NURSE PRACTITIONER

## 2019-06-20 PROCEDURE — 1090F PRES/ABSN URINE INCON ASSESS: CPT | Performed by: NURSE PRACTITIONER

## 2019-06-20 PROCEDURE — G8400 PT W/DXA NO RESULTS DOC: HCPCS | Performed by: NURSE PRACTITIONER

## 2019-06-20 PROCEDURE — G8427 DOCREV CUR MEDS BY ELIG CLIN: HCPCS | Performed by: NURSE PRACTITIONER

## 2019-06-20 PROCEDURE — 99213 OFFICE O/P EST LOW 20 MIN: CPT | Performed by: NURSE PRACTITIONER

## 2019-06-20 PROCEDURE — 1123F ACP DISCUSS/DSCN MKR DOCD: CPT | Performed by: NURSE PRACTITIONER

## 2019-06-20 PROCEDURE — G8417 CALC BMI ABV UP PARAM F/U: HCPCS | Performed by: NURSE PRACTITIONER

## 2019-06-20 PROCEDURE — 4040F PNEUMOC VAC/ADMIN/RCVD: CPT | Performed by: NURSE PRACTITIONER

## 2019-06-20 ASSESSMENT — ENCOUNTER SYMPTOMS: DYSPNEA ASSOCIATED WITH: EXERTION

## 2019-06-20 NOTE — CARE COORDINATION
Ambulatory Care Coordination Note  6/20/2019  CM Risk Score: 13  Pablo Mortality Risk Score: 24    ACC: Tangela Nova RN    Summary Note: Met with Glenna prior to her office visit. She is in office today for an acute visit for possible UTI. She states other than that she is \"doing well\". She still has some bruising around her left eye from her fall on 6/4/19. She states that it is \"better\". She has a home care partner in office with her today. Zone tools for DM, COPD, and CHF given and reviewed with patient. Encouraged patient to keep handouts by BS and wt log so she can view often. She reports that her BS today was 139. She states she did check her wt, but does not remember what it was. She states that is doing good overall. She has not been using her CPAP machine d/t being out of distilled water. She reports that she usually buys water at [employer], but has not been able to get to the store to purchase. She denies any needs/concerns at this time. ACC contact information given and encouraged to call office with questions/concerns. Plan:  F/u on water for CPAP  F/u on zone tools  Continue to support patient in self management of chronic conditions.        Care Coordination Interventions    Program Enrollment:  Complex Care  Referral from Primary Care Provider:  No  Suggested Interventions and Community Resources  Diabetes Education:  Completed (Comment: With [address])  Disease Specific Clinic:  Completed (Comment: CHF education with [address])  Home Health Services:  Completed  Medi Set or Pill Pack:  Not Started (Comment: Planned for discharge )  Pharmacist:  Completed  Senior Services:  Completed (Comment: TATY - declines PASSPORT )  Social Work:  Completed  Other Therapy Services:  Completed (Comment: Neurocognitive Eval with UC )  Other Services:  Declined (Comment: Spiritual Care - Declines for now on 3/14/2018)  Zone Management Tools:  Completed (Comment: COPD, CHF, and DM Zone Management tools reviewed 6/20/19 and copies provided)  Other Services or Interventions:  low Na diet, weight log mailed to patient 8/9/2017         Goals Addressed                 This Visit's Progress     Patient Stated (pt-stated)   No change     I will call about Cpap unit   Barriers: none  Plan for overcoming my barriers: N/A  Confidence: none given/10  Anticipated Goal Completion Date: 11/15/2018    11/12/18  Pt to call today to get CPAP unit fixed    6/20/19: Pt states that she doesn't have any water for her CPAP machine. She states that she just needs to buy some distilled water and hasn't had the chance to. Reports that she will. Prior to Admission medications    Medication Sig Start Date End Date Taking?  Authorizing Provider   carvedilol (COREG) 25 MG tablet TAKE ONE TABLET BY MOUTH TWICE A DAY WITH FOOD 6/17/19   Tiffanie Adkins DO   meloxicam (MOBIC) 15 MG tablet TAKE ONE TABLET BY MOUTH ONCE DAILY AS NEEDED 6/8/19   WILLIE Vilchis   DULoxetine (CYMBALTA) 60 MG extended release capsule Take 2 capsules by mouth daily 5/24/19   Tiffanie Adkins DO   tiZANidine (ZANAFLEX) 4 MG tablet Take 1 tablet by mouth 2 times daily as needed (PRN) 5/23/19 6/22/19  F Giselle Pedroza MD   pramipexole (MIRAPEX) 0.5 MG tablet TAKE ONE TABLET BY MOUTH ONCE NIGHTLY 5/20/19   Tiffanie Adkins DO   diclofenac (VOLTAREN) 75 MG EC tablet TAKE 1 TABLET BY MOUTH 2 TIMES DAILY (WITH MEALS) 5/13/19   Madeleine Sprague MD   omeprazole (PRILOSEC) 20 MG delayed release capsule TAKE ONE CAPSULE TWICE A DAY 5/6/19   Angeline Finney MD   furosemide (LASIX) 40 MG tablet TAKE ONE TABLET BY MOUTH TWICE A DAY 4/1/19   Tiffanie Adkins DO   acetaminophen (APAP EXTRA STRENGTH) 500 MG tablet Take 1 tablet by mouth every 6 hours as needed for Pain 3/27/19   Taylor Monroe, APRN - CNP   ondansetron (ZOFRAN ODT) 4 MG disintegrating tablet Take 1 tablet by mouth every 8 hours as needed for Nausea 3/27/19   ucjair Agustinor, APRN - CNP   oxybutynin (DITROPAN-XL) 10 MG extended release tablet Take 10 mg by mouth daily    Historical Provider, MD   naproxen (NAPROSYN) 500 MG tablet Take 500 mg by mouth 2 times daily (with meals)    Historical Provider, MD   trimethoprim (TRIMPEX) 100 MG tablet  2/8/19   Historical Provider, MD   albuterol sulfate  (90 Base) MCG/ACT inhaler Inhale 2 puffs into the lungs 4 times daily as needed for Wheezing 2/26/19   Adele Camarena MD   ondansetron (ZOFRAN) 4 MG tablet Take 1 tablet by mouth 3 times daily as needed for Nausea or Vomiting 2/7/19   WILLIE Pedraza   blood glucose test strips (ACCU-CHEK ARABELLA PLUS) strip Test blood sugar BID 1/18/19   Tiffanie Adkins DO   metFORMIN (GLUCOPHAGE-XR) 500 MG extended release tablet Take 2 tablets by mouth 2 times daily (with meals) 1/18/19   Tiffanie Adkins DO   isosorbide mononitrate (IMDUR) 30 MG extended release tablet TAKE ONE-HALF TABLET BY MOUTH ONE TIME A DAY 1/15/19   Tiffanie Torres DO   sennosides-docusate sodium (SENOKOT-S) 8.6-50 MG tablet TAKE 2 TABLETS BY MOUTH DAILY 12/27/18   Tiffanie Torres DO   pravastatin (PRAVACHOL) 20 MG tablet TAKE ONE TABLET BY MOUTH DAILY 11/26/18   Tiffanie Adkins DO   clotrimazole-betamethasone (LOTRISONE) 1-0.05 % cream Apply bid to affected area.  10/12/18   Tiffanie Adkins DO   nitroGLYCERIN (NITROSTAT) 0.4 MG SL tablet Place 1 tablet under the tongue every 5 minutes as needed for Chest pain 9/27/18   Socrates Neighbours, APRN - CNP   potassium chloride (KLOR-CON M) 20 MEQ extended release tablet Take 1 tablet by mouth daily 8/17/18   Tiffanie Adkins DO   metoclopramide (REGLAN) 10 MG tablet Take 1 tablet by mouth 4 times daily as needed (Nausea and/or headache) 7/3/18   Tiffanie Adkins DO   calcium carbonate 600 MG TABS tablet Take 1 tablet by mouth 2 times daily 6/25/18   WILLIE Pedraza   hydrocortisone 2.5 % cream APPLY TOPICALLY TWO TIMES A DAY 2/15/18   Tiffanie Adkins DO   azelastine (ASTELIN) 0.1 % nasal spray 2 sprays by Nasal route 2 times daily Use in each nostril as directed 1/8/18   Annia Pineda MD   fluticasone (FLONASE) 50 MCG/ACT nasal spray 1 spray by Nasal route daily 1/9/18   Annia Pineda MD   ACCU-CHEK SOFTCLIX LANCETS MISC USE TO TEST BLOOD SUGAR TWO TIMES DAILY 11/15/17   Shelli Mena MD   vitamin E 400 UNIT capsule Take 400 Units by mouth daily    Historical Provider, MD   aspirin 81 MG tablet Take 81 mg by mouth daily. Historical Provider, MD       Future Appointments   Date Time Provider Ann Torre   6/20/2019  3:30 PM [facility]   7/22/2019 10:30 AM Tiffanie Adkins DO [facility]   12/18/2019  1:00 PM KIMI Gong CNP [facility]     ,   Diabetes Assessment    Medic Alert ID:  No  Meal Planning:  None   How often do you test your blood sugar?:  Daily   Do you have barriers with adherence to non-pharmacologic self-management interventions?  (Nutrition/Exercise/Self-Monitoring):  No   Have you ever had to go to the ED for symptoms of low blood sugar?:  No       No patient-reported symptoms   Do you have hyperglycemia symptoms?:  No   Do you have hypoglycemia symptoms?:  No   Last Blood Sugar Value:  139   Blood Sugar Monitoring Regimen:  Before Meals   Blood Sugar Trends:  No Change      ,   Congestive Heart Failure Assessment    Are you currently restricting fluids?:  No Restriction  Do you understand a low sodium diet?:  Yes  Do you understand how to read food labels?:  Yes  How many restaurant meals do you eat per week?:  5 or more, 1-2  Do you salt your food before tasting it?:  No     No patient-reported symptoms      Symptoms:      Symptom course:  stable  Patient-reported weight (lb):   (Comment: Pt reports that she writes wt down in a notebook, she states she did weigh today, but doesn't remember what it was)  Weight trend:  stable     ,   COPD Assessment    Does the patient understand envrionmental exposure?:  Yes  Is the patient able to verbalize Rescue vs. Long Acting medications?:  Yes  Does the patient have a nebulizer?:  No  Does the patient use a space with inhaled medications?:  No     No patient-reported symptoms         Symptoms:      Symptom course:  stable  Breathlessness:  exertion  Change in chronic cough?:  No/At Baseline  Change in sputum?:  No/At Baseline      and   General Assessment    Do you have any symptoms that are causing concern?:  Yes  Reported Symptoms:  Other (Comment: pt is at office today for an acute visit for possible UTI)

## 2019-06-20 NOTE — PATIENT INSTRUCTIONS
Medication Dispense Information     TRIMETHOPRIM 100 MG TABLET   Dispensed: 6/14/2019 12:00 AM   Unit strength: 100 MG   Days supply: 30   Quantity: 30 tablet   Pharmacy: [address],[facility] [id number]   [facility]   Phone: [phone]   Fax: [phone]   Authorizing provider: MD [facility]   [facility] [address] [place] [address]   Phone: [phone]   Fax: [phone]   Received from: Deduplicated Record (Claim History)   Brand or Generic:  Generic

## 2019-06-20 NOTE — PROGRESS NOTES
Kenisha Douglas 67 y.o. female    Chief Complaint   Patient presents with    Dysuria     x 3 day       HPI     \"bladder infection\",  Dysuria, 3-4 days, no fever, flank pain, no hematuria. +hx overactive bladder, intercysticial cystitis sees dr. Peter Lal, botox inj were discussed. After med reviewed, on Trimethoprim 100mg continuously- pt is not aware she is on antibiotics. Had gyn exam on 4/25/2019- reviewed. Pastmedical, surgical, family and social history were reviewed and updated with the patient.       Current Outpatient Medications:     carvedilol (COREG) 25 MG tablet, TAKE ONE TABLET BY MOUTH TWICE A DAY WITH FOOD, Disp: 180 tablet, Rfl: 1    meloxicam (MOBIC) 15 MG tablet, TAKE ONE TABLET BY MOUTH ONCE DAILY AS NEEDED, Disp: 30 tablet, Rfl: 2    DULoxetine (CYMBALTA) 60 MG extended release capsule, Take 2 capsules by mouth daily, Disp: 60 capsule, Rfl: 5    tiZANidine (ZANAFLEX) 4 MG tablet, Take 1 tablet by mouth 2 times daily as needed (PRN), Disp: 60 tablet, Rfl: 1    pramipexole (MIRAPEX) 0.5 MG tablet, TAKE ONE TABLET BY MOUTH ONCE NIGHTLY, Disp: 30 tablet, Rfl: 5    diclofenac (VOLTAREN) 75 MG EC tablet, TAKE 1 TABLET BY MOUTH 2 TIMES DAILY (WITH MEALS), Disp: 180 tablet, Rfl: 0    omeprazole (PRILOSEC) 20 MG delayed release capsule, TAKE ONE CAPSULE TWICE A DAY, Disp: 60 capsule, Rfl: 5    furosemide (LASIX) 40 MG tablet, TAKE ONE TABLET BY MOUTH TWICE A DAY, Disp: 60 tablet, Rfl: 5    acetaminophen (APAP EXTRA STRENGTH) 500 MG tablet, Take 1 tablet by mouth every 6 hours as needed for Pain, Disp: 120 tablet, Rfl: 0    ondansetron (ZOFRAN ODT) 4 MG disintegrating tablet, Take 1 tablet by mouth every 8 hours as needed for Nausea, Disp: 20 tablet, Rfl: 0    oxybutynin (DITROPAN-XL) 10 MG extended release tablet, Take 10 mg by mouth daily, Disp: , Rfl:     naproxen (NAPROSYN) 500 MG tablet, Take 500 mg by mouth 2 times daily (with meals), Disp: , Rfl:     trimethoprim (TRIMPEX) 100 MG tablet, , Disp: , Rfl:     albuterol sulfate  (90 Base) MCG/ACT inhaler, Inhale 2 puffs into the lungs 4 times daily as needed for Wheezing, Disp: 3 Inhaler, Rfl: 1    ondansetron (ZOFRAN) 4 MG tablet, Take 1 tablet by mouth 3 times daily as needed for Nausea or Vomiting, Disp: 30 tablet, Rfl: 0    metFORMIN (GLUCOPHAGE-XR) 500 MG extended release tablet, Take 2 tablets by mouth 2 times daily (with meals), Disp: 120 tablet, Rfl: 5    isosorbide mononitrate (IMDUR) 30 MG extended release tablet, TAKE ONE-HALF TABLET BY MOUTH ONE TIME A DAY, Disp: 45 tablet, Rfl: 3    sennosides-docusate sodium (SENOKOT-S) 8.6-50 MG tablet, TAKE 2 TABLETS BY MOUTH DAILY, Disp: 60 tablet, Rfl: 5    pravastatin (PRAVACHOL) 20 MG tablet, TAKE ONE TABLET BY MOUTH DAILY, Disp: 90 tablet, Rfl: 3    clotrimazole-betamethasone (LOTRISONE) 1-0.05 % cream, Apply bid to affected area., Disp: 45 g, Rfl: 3    nitroGLYCERIN (NITROSTAT) 0.4 MG SL tablet, Place 1 tablet under the tongue every 5 minutes as needed for Chest pain, Disp: 25 tablet, Rfl: 1    potassium chloride (KLOR-CON M) 20 MEQ extended release tablet, Take 1 tablet by mouth daily, Disp: 30 tablet, Rfl: 0    metoclopramide (REGLAN) 10 MG tablet, Take 1 tablet by mouth 4 times daily as needed (Nausea and/or headache), Disp: 30 tablet, Rfl: 0    calcium carbonate 600 MG TABS tablet, Take 1 tablet by mouth 2 times daily, Disp: 30 tablet, Rfl: 1    hydrocortisone 2.5 % cream, APPLY TOPICALLY TWO TIMES A DAY, Disp: 1 Tube, Rfl: 1    azelastine (ASTELIN) 0.1 % nasal spray, 2 sprays by Nasal route 2 times daily Use in each nostril as directed, Disp: 1 Bottle, Rfl: 0    fluticasone (FLONASE) 50 MCG/ACT nasal spray, 1 spray by Nasal route daily, Disp: 1 Bottle, Rfl: 0    vitamin E 400 UNIT capsule, Take 400 Units by mouth daily, Disp: , Rfl:     aspirin 81 MG tablet, Take 81 mg by mouth daily. , Disp: , Rfl:     blood glucose test strips (ACCU-CHEK ARABELLA PLUS) strip,

## 2019-06-21 ASSESSMENT — ENCOUNTER SYMPTOMS
NAUSEA: 0
ABDOMINAL PAIN: 0
VOMITING: 0

## 2019-06-22 LAB — URINE CULTURE, ROUTINE: NORMAL

## 2019-06-26 ENCOUNTER — CARE COORDINATION (OUTPATIENT)
Dept: CARE COORDINATION | Age: 79
End: 2019-06-26

## 2019-06-26 ASSESSMENT — ENCOUNTER SYMPTOMS: DYSPNEA ASSOCIATED WITH: EXERTION

## 2019-06-26 NOTE — CARE COORDINATION
Ambulatory Care Coordination Note  6/26/2019  CM Risk Score: 13  Pablo Mortality Risk Score: 24    ACC: Katy Joe, LEONOR    Summary Note: Outreach call made to Yi. She reports \"I'm about the same\". States she did check BS and wt today and wrote in log, but she does not have the log near her to give me any readings. She did get the distilled water for her CPAP and has been sleeping with the CPAP. She denies any needs at this time. Encouraged to call with questions/concerns. Plan:  Have pt bring BS and Wt log to office visit on 7/22/19          Care Coordination Interventions    Program Enrollment:  Complex Care  Referral from Primary Care Provider:  No  Suggested Interventions and Community Resources  Diabetes Education:  Completed (Comment: With [address])  Disease Specific Clinic:  Completed (Comment: CHF education with [address])  Home Health Services:  Completed  Medi Set or Pill Pack:  Not Started (Comment: Planned for discharge )  Pharmacist:  Completed  Senior Services:  Completed (Comment: TATY - declines PASSPORT )  Social Work:  Completed  Other Therapy Services:  Completed (Comment: Neurocognitive Eval with UC )  Other Services:  Declined (Comment: Spiritual Care - Declines for now on 3/14/2018)  Zone Management Tools:  Completed (Comment: COPD, CHF, and DM Zone Management tools reviewed 6/20/19 and copies provided)  Other Services or Interventions:  low Na diet, weight log mailed to patient 8/9/2017         Goals Addressed                 This Visit's Progress     Patient Stated (pt-stated)   Improving     I will call about Cpap unit   Barriers: none  Plan for overcoming my barriers: N/A  Confidence: none given/10  Anticipated Goal Completion Date: 11/15/2018    11/12/18  Pt to call today to get CPAP unit fixed    6/20/19: Pt states that she doesn't have any water for her CPAP machine. She states that she just needs to buy some distilled water and hasn't had the chance to. Reports that she will. 6/26/19: Pt reports that she did get distilled water and has been using CPAP machine. Prior to Admission medications    Medication Sig Start Date End Date Taking?  Authorizing Provider   carvedilol (COREG) 25 MG tablet TAKE ONE TABLET BY MOUTH TWICE A DAY WITH FOOD 6/17/19   Tiffanie Adkins DO   meloxicam (MOBIC) 15 MG tablet TAKE ONE TABLET BY MOUTH ONCE DAILY AS NEEDED 6/8/19   WILLIE Mejia   DULoxetine (CYMBALTA) 60 MG extended release capsule Take 2 capsules by mouth daily 5/24/19   Tiffanie Adkins DO   pramipexole (MIRAPEX) 0.5 MG tablet TAKE ONE TABLET BY MOUTH ONCE NIGHTLY 5/20/19   Tiffanie Adkins DO   diclofenac (VOLTAREN) 75 MG EC tablet TAKE 1 TABLET BY MOUTH 2 TIMES DAILY (WITH MEALS) 5/13/19   Kenyon Luque MD   omeprazole (PRILOSEC) 20 MG delayed release capsule TAKE ONE CAPSULE TWICE A DAY 5/6/19   Curly Hyman MD   furosemide (LASIX) 40 MG tablet TAKE ONE TABLET BY MOUTH TWICE A DAY 4/1/19   Tiffanie Hoang DO   acetaminophen (APAP EXTRA STRENGTH) 500 MG tablet Take 1 tablet by mouth every 6 hours as needed for Pain 3/27/19   KIMI Kitchen CNP   ondansetron (ZOFRAN ODT) 4 MG disintegrating tablet Take 1 tablet by mouth every 8 hours as needed for Nausea 3/27/19   KIMI Kitchen CNP   oxybutynin (DITROPAN-XL) 10 MG extended release tablet Take 10 mg by mouth daily    Historical Provider, MD   naproxen (NAPROSYN) 500 MG tablet Take 500 mg by mouth 2 times daily (with meals)    Historical Provider, MD   trimethoprim (TRIMPEX) 100 MG tablet  2/8/19   Historical Provider, MD   albuterol sulfate  (90 Base) MCG/ACT inhaler Inhale 2 puffs into the lungs 4 times daily as needed for Wheezing 2/26/19   Curly Hyman MD   ondansetron (ZOFRAN) 4 MG tablet Take 1 tablet by mouth 3 times daily as needed for Nausea or Vomiting 2/7/19   WILLIE Allan   blood glucose test strips (ACCU-CHEK ARABELLA PLUS) strip Test blood sugar BID 1/18/19   Jorge A Muhammad, DO   metFORMIN (GLUCOPHAGE-XR) 500 MG extended release tablet Take 2 tablets by mouth 2 times daily (with meals) 1/18/19   Tiffanie Adkins DO   isosorbide mononitrate (IMDUR) 30 MG extended release tablet TAKE ONE-HALF TABLET BY MOUTH ONE TIME A DAY 1/15/19   Tiffanie Byrd DO   sennosides-docusate sodium (SENOKOT-S) 8.6-50 MG tablet TAKE 2 TABLETS BY MOUTH DAILY 12/27/18   Tiffanie Byrd DO   pravastatin (PRAVACHOL) 20 MG tablet TAKE ONE TABLET BY MOUTH DAILY 11/26/18   Tiffanie Adkins, DO   clotrimazole-betamethasone (LOTRISONE) 1-0.05 % cream Apply bid to affected area. 10/12/18   Tiffanie Adkins, DO   nitroGLYCERIN (NITROSTAT) 0.4 MG SL tablet Place 1 tablet under the tongue every 5 minutes as needed for Chest pain 9/27/18   KIMI Craft - CNP   potassium chloride (KLOR-CON M) 20 MEQ extended release tablet Take 1 tablet by mouth daily 8/17/18   Tiffanie Adkins DO   metoclopramide (REGLAN) 10 MG tablet Take 1 tablet by mouth 4 times daily as needed (Nausea and/or headache) 7/3/18   Tiffanie Adkins DO   calcium carbonate 600 MG TABS tablet Take 1 tablet by mouth 2 times daily 6/25/18   WILLIE Scott   hydrocortisone 2.5 % cream APPLY TOPICALLY TWO TIMES A DAY 2/15/18   Tiffanie Adkins DO   azelastine (ASTELIN) 0.1 % nasal spray 2 sprays by Nasal route 2 times daily Use in each nostril as directed 1/8/18   Gato Guzman MD   fluticasone (FLONASE) 50 MCG/ACT nasal spray 1 spray by Nasal route daily 1/9/18   Gato Guzman MD   ACCU-CHEK SOFTCLIX LANCETS MISC USE TO TEST BLOOD SUGAR TWO TIMES DAILY 11/15/17   Johana Broussard MD   vitamin E 400 UNIT capsule Take 400 Units by mouth daily    Historical Provider, MD   aspirin 81 MG tablet Take 81 mg by mouth daily.     Historical Provider, MD       Future Appointments   Date Time Provider Ann Torre   7/22/2019 10:30 AM DO TASHA Harrell   12/18/2019  1:00 PM KIMI Porter CNP [facility]     ,   Diabetes Assessment Medic Alert ID:  No  Meal Planning:  None   How often do you test your blood sugar?:  Daily   Do you have barriers with adherence to non-pharmacologic self-management interventions?  (Nutrition/Exercise/Self-Monitoring):  No   Have you ever had to go to the ED for symptoms of low blood sugar?:  No       No patient-reported symptoms   Do you have hyperglycemia symptoms?:  No   Do you have hypoglycemia symptoms?:  No   Blood Sugar Monitoring Regimen:  Once a Day      ,   Congestive Heart Failure Assessment    Are you currently restricting fluids?:  No Restriction  Do you understand a low sodium diet?:  Yes  Do you understand how to read food labels?:  Yes  How many restaurant meals do you eat per week?:  5 or more, 1-2  Do you salt your food before tasting it?:  No     No patient-reported symptoms      Symptoms:   CHF associated leg swelling: Neg, CHF associated shortness of breath: Neg      Symptom course:  stable     ,   COPD Assessment    Does the patient understand envrionmental exposure?:  Yes  Is the patient able to verbalize Rescue vs. Long Acting medications?:  Yes  Does the patient have a nebulizer?:  No  Does the patient use a space with inhaled medications?:  No     No patient-reported symptoms         Symptoms:      Breathlessness:  exertion  Change in chronic cough?:  No/At Baseline  Change in sputum?:  No/At Baseline      and   General Assessment    Do you have any symptoms that are causing concern?:  No

## 2019-07-01 ENCOUNTER — TELEPHONE (OUTPATIENT)
Dept: FAMILY MEDICINE CLINIC | Age: 79
End: 2019-07-01

## 2019-07-01 RX ORDER — ASPIRIN 81 MG
TABLET, DELAYED RELEASE (ENTERIC COATED) ORAL
Qty: 60 TABLET | Refills: 5 | Status: ON HOLD | OUTPATIENT
Start: 2019-07-01 | End: 2021-09-07 | Stop reason: HOSPADM

## 2019-07-01 NOTE — TELEPHONE ENCOUNTER
Dentist would like to RX clindamycin for patient for a possible abscessed tooth. Asking if this is okay with you with all of patient's allergies or what other AB you would recommend.

## 2019-07-01 NOTE — TELEPHONE ENCOUNTER
.  Last office visit 6/20/19    Last written 12/27/18 #60 5 refills    Next office visit scheduled 7/22/19    Requested Prescriptions     Pending Prescriptions Disp Refills    SENNA PLUS 8.6-50 MG per tablet [Pharmacy Med Name: Romana Diener TAB 8.6-50 MG ** 8.6-50MG TABLET] 60 tablet 5     Sig: TAKE 2 TABLETS BY MOUTH DAILY

## 2019-07-08 DIAGNOSIS — E11.9 CONTROLLED TYPE 2 DIABETES MELLITUS WITHOUT COMPLICATION, WITHOUT LONG-TERM CURRENT USE OF INSULIN (HCC): ICD-10-CM

## 2019-07-08 RX ORDER — METFORMIN HYDROCHLORIDE 500 MG/1
TABLET, EXTENDED RELEASE ORAL
Qty: 120 TABLET | Refills: 5 | Status: ON HOLD | OUTPATIENT
Start: 2019-07-08 | End: 2020-12-07 | Stop reason: SDUPTHER

## 2019-07-10 ENCOUNTER — CARE COORDINATION (OUTPATIENT)
Dept: CARE COORDINATION | Age: 79
End: 2019-07-10

## 2019-07-10 NOTE — CARE COORDINATION
Call to pt to obtain daily BG's and WT's. Spoke to pt who reports that she is at an appointment. Informed pt would call back another time. Will attempt to reach pt again this week.      [facility] Coordination   J:[phone]  C:[phone]

## 2019-07-11 ENCOUNTER — HOSPITAL ENCOUNTER (EMERGENCY)
Age: 79
Discharge: HOME | End: 2019-07-11
Attending: EMERGENCY MEDICINE
Payer: MEDICARE

## 2019-07-11 ENCOUNTER — APPOINTMENT (OUTPATIENT)
Dept: GENERAL RADIOLOGY | Age: 79
End: 2019-07-11
Payer: MEDICARE

## 2019-07-11 VITALS
RESPIRATION RATE: 16 BRPM | DIASTOLIC BLOOD PRESSURE: 89 MMHG | TEMPERATURE: 97.8 F | SYSTOLIC BLOOD PRESSURE: 182 MMHG | OXYGEN SATURATION: 93 % | BODY MASS INDEX: 34.95 KG/M2 | WEIGHT: 210 LBS | HEART RATE: 87 BPM

## 2019-07-11 DIAGNOSIS — R11.2 NAUSEA AND VOMITING, INTRACTABILITY OF VOMITING NOT SPECIFIED, UNSPECIFIED VOMITING TYPE: Primary | ICD-10-CM

## 2019-07-11 LAB
A/G RATIO: 1.3 (ref 1.1–2.2)
ALBUMIN SERPL-MCNC: 3.9 G/DL (ref 3.4–5)
ALP BLD-CCNC: 66 U/L (ref 40–129)
ALT SERPL-CCNC: 13 U/L (ref 10–40)
ANION GAP SERPL CALCULATED.3IONS-SCNC: 11 MMOL/L (ref 3–16)
AST SERPL-CCNC: 22 U/L (ref 15–37)
BASOPHILS ABSOLUTE: 0.1 K/UL (ref 0–0.2)
BASOPHILS RELATIVE PERCENT: 1.7 %
BILIRUB SERPL-MCNC: 0.5 MG/DL (ref 0–1)
BUN BLDV-MCNC: 12 MG/DL (ref 7–20)
CALCIUM SERPL-MCNC: 9.4 MG/DL (ref 8.3–10.6)
CHLORIDE BLD-SCNC: 101 MMOL/L (ref 99–110)
CO2: 29 MMOL/L (ref 21–32)
CREAT SERPL-MCNC: 0.7 MG/DL (ref 0.6–1.2)
EOSINOPHILS ABSOLUTE: 0.2 K/UL (ref 0–0.6)
EOSINOPHILS RELATIVE PERCENT: 3.3 %
GFR AFRICAN AMERICAN: >60
GFR NON-AFRICAN AMERICAN: >60
GLOBULIN: 3.1 G/DL
GLUCOSE BLD-MCNC: 102 MG/DL (ref 70–99)
GLUCOSE BLD-MCNC: 111 MG/DL (ref 70–99)
HCT VFR BLD CALC: 31.1 % (ref 36–48)
HEMOGLOBIN: 10.3 G/DL (ref 12–16)
LIPASE: 15 U/L (ref 13–60)
LYMPHOCYTES ABSOLUTE: 2.2 K/UL (ref 1–5.1)
LYMPHOCYTES RELATIVE PERCENT: 34.7 %
MCH RBC QN AUTO: 30.8 PG (ref 26–34)
MCHC RBC AUTO-ENTMCNC: 33 G/DL (ref 31–36)
MCV RBC AUTO: 93.3 FL (ref 80–100)
MONOCYTES ABSOLUTE: 0.5 K/UL (ref 0–1.3)
MONOCYTES RELATIVE PERCENT: 7.8 %
NEUTROPHILS ABSOLUTE: 3.3 K/UL (ref 1.7–7.7)
NEUTROPHILS RELATIVE PERCENT: 52.5 %
PDW BLD-RTO: 16.6 % (ref 12.4–15.4)
PERFORMED ON: ABNORMAL
PLATELET # BLD: 266 K/UL (ref 135–450)
PMV BLD AUTO: 8.9 FL (ref 5–10.5)
POTASSIUM SERPL-SCNC: 4.5 MMOL/L (ref 3.5–5.1)
RBC # BLD: 3.33 M/UL (ref 4–5.2)
SODIUM BLD-SCNC: 141 MMOL/L (ref 136–145)
TOTAL PROTEIN: 7 G/DL (ref 6.4–8.2)
TROPONIN: <0.01 NG/ML
WBC # BLD: 6.2 K/UL (ref 4–11)

## 2019-07-11 PROCEDURE — 84484 ASSAY OF TROPONIN QUANT: CPT

## 2019-07-11 PROCEDURE — 93005 ELECTROCARDIOGRAM TRACING: CPT | Performed by: PHYSICIAN ASSISTANT

## 2019-07-11 PROCEDURE — 71046 X-RAY EXAM CHEST 2 VIEWS: CPT

## 2019-07-11 PROCEDURE — 99284 EMERGENCY DEPT VISIT MOD MDM: CPT

## 2019-07-11 PROCEDURE — 83690 ASSAY OF LIPASE: CPT

## 2019-07-11 PROCEDURE — 80053 COMPREHEN METABOLIC PANEL: CPT

## 2019-07-11 PROCEDURE — 85025 COMPLETE CBC W/AUTO DIFF WBC: CPT

## 2019-07-11 RX ORDER — ONDANSETRON 4 MG/1
4-8 TABLET, ORALLY DISINTEGRATING ORAL EVERY 8 HOURS PRN
Qty: 12 TABLET | Refills: 0 | Status: SHIPPED | OUTPATIENT
Start: 2019-07-11 | End: 2019-09-07

## 2019-07-12 ENCOUNTER — CARE COORDINATION (OUTPATIENT)
Dept: CARE COORDINATION | Age: 79
End: 2019-07-12

## 2019-07-12 LAB
EKG ATRIAL RATE: 80 BPM
EKG DIAGNOSIS: NORMAL
EKG P AXIS: 47 DEGREES
EKG P-R INTERVAL: 180 MS
EKG Q-T INTERVAL: 408 MS
EKG QRS DURATION: 90 MS
EKG QTC CALCULATION (BAZETT): 470 MS
EKG R AXIS: -10 DEGREES
EKG T AXIS: 16 DEGREES
EKG VENTRICULAR RATE: 80 BPM

## 2019-07-12 PROCEDURE — 93010 ELECTROCARDIOGRAM REPORT: CPT | Performed by: INTERNAL MEDICINE

## 2019-07-12 NOTE — ED PROVIDER NOTES
Discharge Medication List as of 7/11/2019  9:38 PM      CONTINUE these medications which have NOT CHANGED    Details   metFORMIN (GLUCOPHAGE-XR) 500 MG extended release tablet TAKE TWO TABLETS TWICE A DAY WITH MEALS, Disp-120 tablet, R-5Normal      SENNA PLUS 8.6-50 MG per tablet TAKE 2 TABLETS BY MOUTH DAILY, Disp-60 tablet, R-5Normal      carvedilol (COREG) 25 MG tablet TAKE ONE TABLET BY MOUTH TWICE A DAY WITH FOOD, Disp-180 tablet, R-1Normal      meloxicam (MOBIC) 15 MG tablet TAKE ONE TABLET BY MOUTH ONCE DAILY AS NEEDED, Disp-30 tablet, R-2Normal      DULoxetine (CYMBALTA) 60 MG extended release capsule Take 2 capsules by mouth daily, Disp-60 capsule, R-5Normal      pramipexole (MIRAPEX) 0.5 MG tablet TAKE ONE TABLET BY MOUTH ONCE NIGHTLY, Disp-30 tablet, R-5Normal      diclofenac (VOLTAREN) 75 MG EC tablet TAKE 1 TABLET BY MOUTH 2 TIMES DAILY (WITH MEALS), Disp-180 tablet, R-0Normal      omeprazole (PRILOSEC) 20 MG delayed release capsule TAKE ONE CAPSULE TWICE A DAY, Disp-60 capsule, R-5Normal      furosemide (LASIX) 40 MG tablet TAKE ONE TABLET BY MOUTH TWICE A DAY, Disp-60 tablet, R-5Normal      acetaminophen (APAP EXTRA STRENGTH) 500 MG tablet Take 1 tablet by mouth every 6 hours as needed for Pain, Disp-120 tablet, R-0Print      oxybutynin (DITROPAN-XL) 10 MG extended release tablet Take 10 mg by mouth dailyHistorical Med      naproxen (NAPROSYN) 500 MG tablet Take 500 mg by mouth 2 times daily (with meals)Historical Med      trimethoprim (TRIMPEX) 100 MG tablet Historical Med      albuterol sulfate  (90 Base) MCG/ACT inhaler Inhale 2 puffs into the lungs 4 times daily as needed for Wheezing, Disp-3 Inhaler, R-1Normal      ondansetron (ZOFRAN) 4 MG tablet Take 1 tablet by mouth 3 times daily as needed for Nausea or Vomiting, Disp-30 tablet, R-0Normal      blood glucose test strips (ACCU-CHEK ARABELLA PLUS) strip Disp-200 strip, R-11, NormalTest blood sugar BID      isosorbide mononitrate (IMDUR) 30 MG extended release tablet TAKE ONE-HALF TABLET BY MOUTH ONE TIME A DAY, Disp-45 tablet, R-3Normal      pravastatin (PRAVACHOL) 20 MG tablet TAKE ONE TABLET BY MOUTH DAILY, Disp-90 tablet, R-3Normal      clotrimazole-betamethasone (LOTRISONE) 1-0.05 % cream Apply bid to affected area., Disp-45 g, R-3, Normal      nitroGLYCERIN (NITROSTAT) 0.4 MG SL tablet Place 1 tablet under the tongue every 5 minutes as needed for Chest pain, Disp-25 tablet, R-1Normal      potassium chloride (KLOR-CON M) 20 MEQ extended release tablet Take 1 tablet by mouth daily, Disp-30 tablet, R-0Normal      metoclopramide (REGLAN) 10 MG tablet Take 1 tablet by mouth 4 times daily as needed (Nausea and/or headache), Disp-30 tablet, R-0Normal      calcium carbonate 600 MG TABS tablet Take 1 tablet by mouth 2 times daily, Disp-30 tablet, R-1Normal      hydrocortisone 2.5 % cream APPLY TOPICALLY TWO TIMES A DAY, Disp-1 Tube, R-1, Normal      azelastine (ASTELIN) 0.1 % nasal spray 2 sprays by Nasal route 2 times daily Use in each nostril as directed, Disp-1 Bottle, R-0Print      fluticasone (FLONASE) 50 MCG/ACT nasal spray 1 spray by Nasal route daily, Disp-1 Bottle, R-0Print      ACCU-CHEK SOFTCLIX LANCETS MISC Disp-50 each, R-5, NormalUSE TO TEST BLOOD SUGAR TWO TIMES DAILY      vitamin E 400 UNIT capsule Take 400 Units by mouth daily      aspirin 81 MG tablet Take 81 mg by mouth daily. ALLERGIES     Latex; Cephalexin; Codeine; Levofloxacin; Pce [erythromycin]; Pcn [penicillins]; Pentazocine; Sumycin [tetracycline hcl]; Atarax [hydroxyzine hcl]; Beef-derived products; Flagyl [metronidazole];  Macrodantin [nitrofurantoin]; Milk-related compounds; Pyridium [phenazopyridine hcl]; Sulfa antibiotics; and Urised [methen-jeniefr-meth bl-phen sal]    FAMILYHISTORY       Family History   Problem Relation Age of Onset    Cancer Father         prostate    Heart Disease Mother     Heart Disease Brother     Heart Disease Brother     Heart Disease Sister     Diabetes Sister           SOCIAL HISTORY       Social History     Socioeconomic History    Marital status:       Spouse name: None    Number of children: 3    Years of education: 15    Highest education level: None   Occupational History    Occupation: retired   Social Needs    Financial resource strain: None    Food insecurity:     Worry: None     Inability: None    Transportation needs:     Medical: None     Non-medical: None   Tobacco Use    Smoking status: Former Smoker     Packs/day: 0.25     Years: 0.10     Pack years: 0.02     Types: Cigarettes     Last attempt to quit: 1975     Years since quittin.5    Smokeless tobacco: Never Used    Tobacco comment: only smoked for 1 month   Substance and Sexual Activity    Alcohol use: No     Alcohol/week: 0.0 oz    Drug use: No    Sexual activity: Not Currently     Partners: Male   Lifestyle    Physical activity:     Days per week: None     Minutes per session: None    Stress: None   Relationships    Social connections:     Talks on phone: None     Gets together: None     Attends Hindu service: None     Active member of club or organization: None     Attends meetings of clubs or organizations: None     Relationship status: None    Intimate partner violence:     Fear of current or ex partner: None     Emotionally abused: None     Physically abused: None     Forced sexual activity: None   Other Topics Concern    None   Social History Narrative    None       SCREENINGS    [place] Coma Scale  Eye Opening: Spontaneous  Best Verbal Response: Oriented  Best Motor Response: Obeys commands  [place] Coma Scale Score: 15        PHYSICAL EXAM    (up to 7 for level 4, 8 or more for level 5)     ED Triage Vitals   BP Temp Temp Source Pulse Resp SpO2 Height Weight   19 1558 19 1558 19 1558 19 1558 19 1558 19 1558 -- 19 1555   (!) 184/99 97.8 °F (36.6 °C) Oral 78 18 94 %  210 lb (95.3 kg)       Physical Exam   Constitutional: She is oriented to person, place, and time. She appears well-developed and well-nourished. HENT:   Head: Normocephalic and atraumatic. Right Ear: External ear normal.   Left Ear: External ear normal.   Eyes: Conjunctivae are normal. Right eye exhibits no discharge. Left eye exhibits no discharge. No scleral icterus. Neck: Normal range of motion. Cardiovascular: Normal rate, regular rhythm and normal heart sounds. Pulmonary/Chest: Effort normal and breath sounds normal. She exhibits no tenderness. Abdominal: Soft. Bowel sounds are normal. There is no tenderness. Musculoskeletal: Normal range of motion. Neurological: She is alert and oriented to person, place, and time. Skin: Skin is warm and dry. Psychiatric: She has a normal mood and affect. Her behavior is normal. Judgment and thought content normal.   Nursing note and vitals reviewed.       DIAGNOSTIC RESULTS   LABS:    Labs Reviewed   CBC WITH AUTO DIFFERENTIAL - Abnormal; Notable for the following components:       Result Value    RBC 3.33 (*)     Hemoglobin 10.3 (*)     Hematocrit 31.1 (*)     RDW 16.6 (*)     All other components within normal limits    Narrative:     Performed at:  [place] [address] [facility]   Phone [phone]   COMPREHENSIVE METABOLIC PANEL - Abnormal; Notable for the following components:    Glucose 111 (*)     All other components within normal limits    Narrative:     Performed at:  [place] [address] [facility]   Phone [phone]   POCT GLUCOSE - Abnormal; Notable for the following components:    POC Glucose 102 (*)     All other components within normal limits    Narrative:     Performed at:  [place] [address] [address]   Phone [phone]   LIPASE    Narrative:     Performed at:  [facility] -

## 2019-07-19 ENCOUNTER — TELEPHONE (OUTPATIENT)
Dept: FAMILY MEDICINE CLINIC | Age: 79
End: 2019-07-19

## 2019-07-19 ENCOUNTER — OFFICE VISIT (OUTPATIENT)
Dept: FAMILY MEDICINE CLINIC | Age: 79
End: 2019-07-19
Payer: MEDICARE

## 2019-07-19 VITALS
TEMPERATURE: 97.8 F | HEIGHT: 65 IN | SYSTOLIC BLOOD PRESSURE: 138 MMHG | DIASTOLIC BLOOD PRESSURE: 84 MMHG | OXYGEN SATURATION: 97 % | WEIGHT: 223.2 LBS | HEART RATE: 69 BPM | RESPIRATION RATE: 18 BRPM | BODY MASS INDEX: 37.19 KG/M2

## 2019-07-19 DIAGNOSIS — M19.90 ARTHRITIS: Chronic | ICD-10-CM

## 2019-07-19 DIAGNOSIS — R41.3 MEMORY LOSS: ICD-10-CM

## 2019-07-19 DIAGNOSIS — E11.40 CONTROLLED TYPE 2 DIABETES MELLITUS WITH DIABETIC NEUROPATHY, WITHOUT LONG-TERM CURRENT USE OF INSULIN (HCC): ICD-10-CM

## 2019-07-19 DIAGNOSIS — I10 ESSENTIAL HYPERTENSION: Chronic | ICD-10-CM

## 2019-07-19 DIAGNOSIS — I35.0 AORTIC STENOSIS, MILD: ICD-10-CM

## 2019-07-19 DIAGNOSIS — E11.9 CONTROLLED TYPE 2 DIABETES MELLITUS WITHOUT COMPLICATION, WITHOUT LONG-TERM CURRENT USE OF INSULIN (HCC): Primary | ICD-10-CM

## 2019-07-19 PROBLEM — J20.9 ACUTE BRONCHITIS: Status: RESOLVED | Noted: 2018-01-04 | Resolved: 2019-07-19

## 2019-07-19 PROBLEM — R05.3 PERSISTENT COUGH: Status: RESOLVED | Noted: 2018-01-04 | Resolved: 2019-07-19

## 2019-07-19 PROBLEM — E66.01 OBESITY, CLASS III, BMI 40-49.9 (MORBID OBESITY) (HCC): Status: RESOLVED | Noted: 2017-04-26 | Resolved: 2019-07-19

## 2019-07-19 LAB — HBA1C MFR BLD: 7.4 %

## 2019-07-19 PROCEDURE — 1090F PRES/ABSN URINE INCON ASSESS: CPT | Performed by: NURSE PRACTITIONER

## 2019-07-19 PROCEDURE — 83036 HEMOGLOBIN GLYCOSYLATED A1C: CPT | Performed by: NURSE PRACTITIONER

## 2019-07-19 PROCEDURE — G8400 PT W/DXA NO RESULTS DOC: HCPCS | Performed by: NURSE PRACTITIONER

## 2019-07-19 PROCEDURE — G8427 DOCREV CUR MEDS BY ELIG CLIN: HCPCS | Performed by: NURSE PRACTITIONER

## 2019-07-19 PROCEDURE — 1123F ACP DISCUSS/DSCN MKR DOCD: CPT | Performed by: NURSE PRACTITIONER

## 2019-07-19 PROCEDURE — 1036F TOBACCO NON-USER: CPT | Performed by: NURSE PRACTITIONER

## 2019-07-19 PROCEDURE — 4040F PNEUMOC VAC/ADMIN/RCVD: CPT | Performed by: NURSE PRACTITIONER

## 2019-07-19 PROCEDURE — 99213 OFFICE O/P EST LOW 20 MIN: CPT | Performed by: NURSE PRACTITIONER

## 2019-07-19 PROCEDURE — G8417 CALC BMI ABV UP PARAM F/U: HCPCS | Performed by: NURSE PRACTITIONER

## 2019-07-19 PROCEDURE — G8598 ASA/ANTIPLAT THER USED: HCPCS | Performed by: NURSE PRACTITIONER

## 2019-07-19 RX ORDER — DONEPEZIL HYDROCHLORIDE 5 MG/1
5 TABLET, FILM COATED ORAL NIGHTLY
Qty: 90 TABLET | Refills: 1 | Status: SHIPPED | OUTPATIENT
Start: 2019-07-19

## 2019-07-19 ASSESSMENT — ENCOUNTER SYMPTOMS
BACK PAIN: 0
NAUSEA: 0
CONSTIPATION: 0
CHEST TIGHTNESS: 0

## 2019-07-19 NOTE — TELEPHONE ENCOUNTER
Please have the patient schedule an office visit. If she develops dizziness, slurred speech or unilateral weakness before her appointment she needs to be seen in the ED.

## 2019-07-19 NOTE — PROGRESS NOTES
Subjective:      Patient ID: Adriana Chandra is a 67 y.o. female. HPI     For follow up DM, HTN. Vague historian, searching for words at times. Using lists to help remember. Sister is taking namenda for memory loss. Asking to take this medication. Feels she has problems remembering. Family has noted as well. Had a tooth removed last week. Was having tooth ache prior. Feeling better. Review of Systems   Constitutional: Negative for appetite change, chills and unexpected weight change. Respiratory: Negative for chest tightness. Cardiovascular: Negative for chest pain and palpitations. Gastrointestinal: Negative for constipation and nausea. Musculoskeletal: Negative for arthralgias, back pain and gait problem. Neurological: Negative for dizziness and headaches. Psychiatric/Behavioral: Negative. Objective:   Physical Exam   Constitutional: She appears well-developed and well-nourished. No distress. HENT:   Head: Normocephalic and atraumatic. Neck: Normal range of motion. Neck supple. No thyromegaly present. Cardiovascular: Normal rate and regular rhythm. Murmur heard. Systolic murmur is present with a grade of 3/6. Pulmonary/Chest: Effort normal and breath sounds normal. No respiratory distress. Abdominal: Soft. Bowel sounds are normal. She exhibits no distension. Musculoskeletal: She exhibits no edema. Ambulatory with use of rolling walker. Limited ROM generalized. Lymphadenopathy:     She has no cervical adenopathy. Neurological: She is alert. Speech clear, sentences somewhat broken while searching for works. Skin: Skin is warm. No erythema. Psychiatric: She has a normal mood and affect.  Her behavior is normal.     Current Outpatient Medications   Medication Sig Dispense Refill    ondansetron (ZOFRAN ODT) 4 MG disintegrating tablet Take 1-2 tablets by mouth every 8 hours as needed for Nausea or Vomiting 12 tablet 0    metFORMIN (GLUCOPHAGE-XR) 500 MG

## 2019-07-22 ENCOUNTER — TELEPHONE (OUTPATIENT)
Dept: FAMILY MEDICINE CLINIC | Age: 79
End: 2019-07-22

## 2019-07-22 RX ORDER — DICLOFENAC SODIUM 75 MG/1
75 TABLET, DELAYED RELEASE ORAL 2 TIMES DAILY WITH MEALS
Qty: 180 TABLET | Refills: 0 | Status: ON HOLD | OUTPATIENT
Start: 2019-07-22 | End: 2019-09-11 | Stop reason: HOSPADM

## 2019-07-24 ENCOUNTER — CARE COORDINATION (OUTPATIENT)
Dept: FAMILY MEDICINE CLINIC | Age: 79
End: 2019-07-24

## 2019-07-29 ENCOUNTER — TELEPHONE (OUTPATIENT)
Dept: FAMILY MEDICINE CLINIC | Age: 79
End: 2019-07-29

## 2019-07-29 NOTE — TELEPHONE ENCOUNTER
Please send the pictures. Infections are treated with antibiotics, not topical steroids like hydrocortisone. Eczema/itching issues can be treated with OTC hydrocortisone 1%. Would be nice to do an E-visit for rash by uploading pictures, if possible.

## 2019-07-31 ENCOUNTER — CARE COORDINATION (OUTPATIENT)
Dept: FAMILY MEDICINE CLINIC | Age: 79
End: 2019-07-31

## 2019-08-01 ENCOUNTER — TELEPHONE (OUTPATIENT)
Dept: FAMILY MEDICINE CLINIC | Age: 79
End: 2019-08-01

## 2019-08-07 ENCOUNTER — TELEPHONE (OUTPATIENT)
Dept: FAMILY MEDICINE CLINIC | Age: 79
End: 2019-08-07

## 2019-08-07 NOTE — TELEPHONE ENCOUNTER
Linette Flores from [facility] called to follow up on request for UA. Gave her 's response. Also called Margoth Singleton and left a vm letting her know.

## 2019-08-13 ENCOUNTER — HOSPITAL ENCOUNTER (OUTPATIENT)
Age: 79
Setting detail: SPECIMEN
Discharge: HOME | End: 2019-08-13
Payer: MEDICARE

## 2019-08-13 ENCOUNTER — TELEPHONE (OUTPATIENT)
Dept: FAMILY MEDICINE CLINIC | Age: 79
End: 2019-08-13

## 2019-08-13 LAB
BACTERIA: ABNORMAL /HPF
BILIRUBIN URINE: NEGATIVE
BLOOD, URINE: ABNORMAL
CLARITY: ABNORMAL
COLOR: YELLOW
EPITHELIAL CELLS, UA: ABNORMAL /HPF
GLUCOSE URINE: NEGATIVE MG/DL
KETONES, URINE: NEGATIVE MG/DL
LEUKOCYTE ESTERASE, URINE: ABNORMAL
MICROSCOPIC EXAMINATION: YES
NITRITE, URINE: POSITIVE
PH UA: 6 (ref 5–8)
PROTEIN UA: NEGATIVE MG/DL
RBC UA: ABNORMAL /HPF (ref 0–2)
SPECIFIC GRAVITY UA: 1.01 (ref 1–1.03)
URINE TYPE: ABNORMAL
UROBILINOGEN, URINE: 0.2 E.U./DL
WBC UA: ABNORMAL /HPF (ref 0–5)

## 2019-08-13 PROCEDURE — 81001 URINALYSIS AUTO W/SCOPE: CPT

## 2019-08-14 ENCOUNTER — CARE COORDINATION (OUTPATIENT)
Dept: FAMILY MEDICINE CLINIC | Age: 79
End: 2019-08-14

## 2019-08-14 ENCOUNTER — OFFICE VISIT (OUTPATIENT)
Dept: FAMILY MEDICINE CLINIC | Age: 79
End: 2019-08-14
Payer: MEDICARE

## 2019-08-14 VITALS
DIASTOLIC BLOOD PRESSURE: 82 MMHG | OXYGEN SATURATION: 95 % | SYSTOLIC BLOOD PRESSURE: 124 MMHG | HEIGHT: 65 IN | TEMPERATURE: 98.4 F | WEIGHT: 226 LBS | RESPIRATION RATE: 18 BRPM | BODY MASS INDEX: 37.65 KG/M2

## 2019-08-14 DIAGNOSIS — N39.0 URINARY TRACT INFECTION WITHOUT HEMATURIA, SITE UNSPECIFIED: ICD-10-CM

## 2019-08-14 DIAGNOSIS — R07.9 CHEST PAIN, UNSPECIFIED TYPE: ICD-10-CM

## 2019-08-14 DIAGNOSIS — R35.0 URINARY FREQUENCY: ICD-10-CM

## 2019-08-14 DIAGNOSIS — R30.0 DYSURIA: Primary | ICD-10-CM

## 2019-08-14 PROCEDURE — 99214 OFFICE O/P EST MOD 30 MIN: CPT | Performed by: NURSE PRACTITIONER

## 2019-08-14 PROCEDURE — G8427 DOCREV CUR MEDS BY ELIG CLIN: HCPCS | Performed by: NURSE PRACTITIONER

## 2019-08-14 PROCEDURE — 1036F TOBACCO NON-USER: CPT | Performed by: NURSE PRACTITIONER

## 2019-08-14 PROCEDURE — 93000 ELECTROCARDIOGRAM COMPLETE: CPT | Performed by: NURSE PRACTITIONER

## 2019-08-14 PROCEDURE — 4040F PNEUMOC VAC/ADMIN/RCVD: CPT | Performed by: NURSE PRACTITIONER

## 2019-08-14 PROCEDURE — G8598 ASA/ANTIPLAT THER USED: HCPCS | Performed by: NURSE PRACTITIONER

## 2019-08-14 PROCEDURE — 1090F PRES/ABSN URINE INCON ASSESS: CPT | Performed by: NURSE PRACTITIONER

## 2019-08-14 PROCEDURE — 1123F ACP DISCUSS/DSCN MKR DOCD: CPT | Performed by: NURSE PRACTITIONER

## 2019-08-14 PROCEDURE — G8400 PT W/DXA NO RESULTS DOC: HCPCS | Performed by: NURSE PRACTITIONER

## 2019-08-14 PROCEDURE — G8417 CALC BMI ABV UP PARAM F/U: HCPCS | Performed by: NURSE PRACTITIONER

## 2019-08-14 RX ORDER — CIPROFLOXACIN 250 MG/1
250 TABLET, FILM COATED ORAL 2 TIMES DAILY
Qty: 14 TABLET | Refills: 0 | Status: SHIPPED | OUTPATIENT
Start: 2019-08-14 | End: 2019-09-06 | Stop reason: SDUPTHER

## 2019-08-14 ASSESSMENT — ENCOUNTER SYMPTOMS
ABDOMINAL PAIN: 1
NAUSEA: 0
BACK PAIN: 0
CHEST TIGHTNESS: 0
VOMITING: 0
CONSTIPATION: 0

## 2019-08-14 NOTE — CARE COORDINATION
Met with Glenna briefly after her appointment. She reports that the pill box delivered her antibiotics today and she will start her course of treatment this evening. Juan M Salazar, care companion with Glenna at visit. Plan:  F/u on UTI symptoms and antibiotics.
Daily Assessment

## 2019-08-15 ENCOUNTER — TELEPHONE (OUTPATIENT)
Dept: FAMILY MEDICINE CLINIC | Age: 79
End: 2019-08-15

## 2019-08-15 RX ORDER — ONDANSETRON 4 MG/1
4 TABLET, FILM COATED ORAL DAILY PRN
Qty: 20 TABLET | Refills: 0 | Status: SHIPPED | OUTPATIENT
Start: 2019-08-15 | End: 2019-08-27 | Stop reason: SDUPTHER

## 2019-08-15 NOTE — TELEPHONE ENCOUNTER
The pt physical therapist Javier Gonzalez called regarding the pt new antibiotic medication, it's making her vomit so \"if appropriate\" can the provider send something to help with her vomiting? [facility]

## 2019-08-16 ENCOUNTER — CARE COORDINATION (OUTPATIENT)
Dept: FAMILY MEDICINE CLINIC | Age: 79
End: 2019-08-16

## 2019-08-19 ENCOUNTER — CARE COORDINATION (OUTPATIENT)
Dept: CARE COORDINATION | Age: 79
End: 2019-08-19

## 2019-08-21 DIAGNOSIS — E11.40 CONTROLLED TYPE 2 DIABETES MELLITUS WITH DIABETIC NEUROPATHY, WITHOUT LONG-TERM CURRENT USE OF INSULIN (HCC): ICD-10-CM

## 2019-08-23 ENCOUNTER — TELEPHONE (OUTPATIENT)
Dept: FAMILY MEDICINE CLINIC | Age: 79
End: 2019-08-23

## 2019-08-23 ENCOUNTER — CARE COORDINATION (OUTPATIENT)
Dept: CARE COORDINATION | Age: 79
End: 2019-08-23

## 2019-08-23 ASSESSMENT — ENCOUNTER SYMPTOMS: DYSPNEA ASSOCIATED WITH: EXERTION

## 2019-08-23 NOTE — CARE COORDINATION
machine. She states that she just needs to buy some distilled water and hasn't had the chance to. Reports that she will. 6/26/19: Pt reports that she did get distilled water and has been using CPAP machine.  Self Monitoring        Daily Weights - I will weight myself as directed - Daily and write down weights  I will notify my provider of any increase in weight by 3 or more pounds in 2 days OR 5 or more pounds in a week. Barriers: forgets  Plan for overcoming my barriers: will keep a log by daily medications to help remember. Confidence: 4/10  Anticipated Goal Completion Date: 10/30/19                Prior to Admission medications    Medication Sig Start Date End Date Taking?  Authorizing Provider   blood glucose test strips (ACCU-CHEK ARABELLA PLUS) strip TEST BLOOD SUGAR TWICE A DAY 8/21/19   Tiffanie Adkins DO   ondansetron (ZOFRAN) 4 MG tablet Take 1 tablet by mouth daily as needed for Nausea or Vomiting 8/15/19   WILLIE Francois   diclofenac (VOLTAREN) 75 MG EC tablet TAKE 1 TABLET BY MOUTH 2 TIMES DAILY (WITH MEALS) 7/22/19   Jim Land MD   donepezil (ARICEPT) 5 MG tablet Take 1 tablet by mouth nightly 7/19/19   KIMI Macario CNP   ondansetron (ZOFRAN ODT) 4 MG disintegrating tablet Take 1-2 tablets by mouth every 8 hours as needed for Nausea or Vomiting 7/11/19   Kia Warner PA-C   metFORMIN (GLUCOPHAGE-XR) 500 MG extended release tablet TAKE TWO TABLETS TWICE A DAY WITH MEALS 7/8/19   Tiffanie Adkins DO   SENNA PLUS 8.6-50 MG per tablet TAKE 2 TABLETS BY MOUTH DAILY 7/1/19   Tiffanie Adkins DO   carvedilol (COREG) 25 MG tablet TAKE ONE TABLET BY MOUTH TWICE A DAY WITH FOOD 6/17/19   Tiffanie Adkins DO   meloxicam (MOBIC) 15 MG tablet TAKE ONE TABLET BY MOUTH ONCE DAILY AS NEEDED 6/8/19   WILLIE Ruggiero   DULoxetine (CYMBALTA) 60 MG extended release capsule Take 2 capsules by mouth daily 5/24/19   Tiffanie Adkins DO   pramipexole (MIRAPEX) 0.5 MG tablet have barriers with adherence to non-pharmacologic self-management interventions?  (Nutrition/Exercise/Self-Monitoring):  No   Have you ever had to go to the ED for symptoms of low blood sugar?:  No       No patient-reported symptoms   Do you have hyperglycemia symptoms?:  No   Do you have hypoglycemia symptoms?:  No   Last Blood Sugar Value:  216   Blood Sugar Monitoring Regimen:  Once a Day      ,   Congestive Heart Failure Assessment    Are you currently restricting fluids?:  No Restriction  Do you understand a low sodium diet?:  Yes  Do you understand how to read food labels?:  Yes  How many restaurant meals do you eat per week?:  5 or more, 1-2  Do you salt your food before tasting it?:  No     No patient-reported symptoms      Symptoms:      Symptom course:  stable  Weight trend:  stable     ,   COPD Assessment    Does the patient understand envrionmental exposure?:  Yes  Is the patient able to verbalize Rescue vs. Long Acting medications?:  Yes  Does the patient have a nebulizer?:  No  Does the patient use a space with inhaled medications?:  No     No patient-reported symptoms         Symptoms:      Symptom course:  stable  Breathlessness:  exertion  Change in chronic cough?:  No/At Baseline  Change in sputum?:  No/At Baseline      and   General Assessment    Do you have any symptoms that are causing concern?:  No

## 2019-08-27 ENCOUNTER — OFFICE VISIT (OUTPATIENT)
Dept: FAMILY MEDICINE CLINIC | Age: 79
End: 2019-08-27
Payer: MEDICARE

## 2019-08-27 VITALS
DIASTOLIC BLOOD PRESSURE: 82 MMHG | OXYGEN SATURATION: 96 % | HEART RATE: 94 BPM | TEMPERATURE: 98.7 F | SYSTOLIC BLOOD PRESSURE: 130 MMHG

## 2019-08-27 DIAGNOSIS — R19.7 DIARRHEA OF PRESUMED INFECTIOUS ORIGIN: Primary | ICD-10-CM

## 2019-08-27 DIAGNOSIS — R11.0 NAUSEA: ICD-10-CM

## 2019-08-27 LAB
BASOPHILS ABSOLUTE: 0.1 K/UL (ref 0–0.2)
BASOPHILS RELATIVE PERCENT: 1.6 %
EOSINOPHILS ABSOLUTE: 0.2 K/UL (ref 0–0.6)
EOSINOPHILS RELATIVE PERCENT: 2.5 %
HCT VFR BLD CALC: 33 % (ref 36–48)
HEMOGLOBIN: 10.9 G/DL (ref 12–16)
LYMPHOCYTES ABSOLUTE: 2.1 K/UL (ref 1–5.1)
LYMPHOCYTES RELATIVE PERCENT: 27 %
MCH RBC QN AUTO: 30.2 PG (ref 26–34)
MCHC RBC AUTO-ENTMCNC: 32.9 G/DL (ref 31–36)
MCV RBC AUTO: 91.8 FL (ref 80–100)
MONOCYTES ABSOLUTE: 0.5 K/UL (ref 0–1.3)
MONOCYTES RELATIVE PERCENT: 6.2 %
NEUTROPHILS ABSOLUTE: 4.9 K/UL (ref 1.7–7.7)
NEUTROPHILS RELATIVE PERCENT: 62.7 %
PDW BLD-RTO: 16.1 % (ref 12.4–15.4)
PLATELET # BLD: 271 K/UL (ref 135–450)
PMV BLD AUTO: 9.8 FL (ref 5–10.5)
RBC # BLD: 3.59 M/UL (ref 4–5.2)
WBC # BLD: 7.8 K/UL (ref 4–11)

## 2019-08-27 PROCEDURE — G8427 DOCREV CUR MEDS BY ELIG CLIN: HCPCS | Performed by: FAMILY MEDICINE

## 2019-08-27 PROCEDURE — 4040F PNEUMOC VAC/ADMIN/RCVD: CPT | Performed by: FAMILY MEDICINE

## 2019-08-27 PROCEDURE — G8400 PT W/DXA NO RESULTS DOC: HCPCS | Performed by: FAMILY MEDICINE

## 2019-08-27 PROCEDURE — 99213 OFFICE O/P EST LOW 20 MIN: CPT | Performed by: FAMILY MEDICINE

## 2019-08-27 PROCEDURE — 1123F ACP DISCUSS/DSCN MKR DOCD: CPT | Performed by: FAMILY MEDICINE

## 2019-08-27 PROCEDURE — G8598 ASA/ANTIPLAT THER USED: HCPCS | Performed by: FAMILY MEDICINE

## 2019-08-27 PROCEDURE — G8417 CALC BMI ABV UP PARAM F/U: HCPCS | Performed by: FAMILY MEDICINE

## 2019-08-27 PROCEDURE — 1090F PRES/ABSN URINE INCON ASSESS: CPT | Performed by: FAMILY MEDICINE

## 2019-08-27 PROCEDURE — 1036F TOBACCO NON-USER: CPT | Performed by: FAMILY MEDICINE

## 2019-08-27 RX ORDER — ONDANSETRON 4 MG/1
4 TABLET, FILM COATED ORAL DAILY PRN
Qty: 30 TABLET | Refills: 5 | Status: SHIPPED | OUTPATIENT
Start: 2019-08-27 | End: 2019-09-07

## 2019-08-27 ASSESSMENT — ENCOUNTER SYMPTOMS: DIARRHEA: 1

## 2019-08-27 NOTE — PROGRESS NOTES
Luigi Ferreira is a 67 y.o. female    Chief Complaint   Patient presents with    Diarrhea    Nausea       HPI:    Diarrhea    This is a new problem. The current episode started 1 to 4 weeks ago. The problem occurs 2 to 4 times per day. The stool consistency is described as watery. Associated symptoms include chills. Pertinent negatives include no fever. Associated symptoms comments: Abdominal cramps. Nothing aggravates the symptoms. Risk factors include recent antibiotic use. She has tried increased fluids for the symptoms. The treatment provided no relief. Having nausea as well that is improved with Zofran use. ROS:    Review of Systems   Constitutional: Positive for chills. Negative for fever. Gastrointestinal: Positive for diarrhea. /82   Pulse 94   Temp 98.7 °F (37.1 °C) (Oral)   SpO2 96%     Physical Exam:    Physical Exam   Constitutional: She appears well-developed. No distress. HENT:   Head: Normocephalic. Skin: She is not diaphoretic. Psychiatric: She has a normal mood and affect.  Her behavior is normal.       Current Outpatient Medications   Medication Sig Dispense Refill    ondansetron (ZOFRAN) 4 MG tablet Take 1 tablet by mouth daily as needed for Nausea or Vomiting 30 tablet 5    blood glucose test strips (ACCU-CHEK ARABELLA PLUS) strip TEST BLOOD SUGAR TWICE A  strip 11    diclofenac (VOLTAREN) 75 MG EC tablet TAKE 1 TABLET BY MOUTH 2 TIMES DAILY (WITH MEALS) 180 tablet 0    donepezil (ARICEPT) 5 MG tablet Take 1 tablet by mouth nightly 90 tablet 1    ondansetron (ZOFRAN ODT) 4 MG disintegrating tablet Take 1-2 tablets by mouth every 8 hours as needed for Nausea or Vomiting 12 tablet 0    metFORMIN (GLUCOPHAGE-XR) 500 MG extended release tablet TAKE TWO TABLETS TWICE A DAY WITH MEALS 120 tablet 5    SENNA PLUS 8.6-50 MG per tablet TAKE 2 TABLETS BY MOUTH DAILY 60 tablet 5    carvedilol (COREG) 25 MG tablet TAKE ONE TABLET BY MOUTH TWICE A DAY WITH FOOD 180 other pathogens. Increased fluid intake recommended. She will go to the ER for any worsening symptoms. Contact precautions discussed. - C DIFF TOXIN/ANTIGEN; Future  - GI Bacterial Pathogens By PCR; Future  - CBC Auto Differential  - Comprehensive Metabolic Panel    2. Nausea  I will refill Zofran. - ondansetron (ZOFRAN) 4 MG tablet; Take 1 tablet by mouth daily as needed for Nausea or Vomiting  Dispense: 30 tablet; Refill: 5    Patient to return to clinic if symptoms worsen or fail to improve.

## 2019-08-28 ENCOUNTER — HOSPITAL ENCOUNTER (OUTPATIENT)
Age: 79
Setting detail: SPECIMEN
Discharge: HOME | End: 2019-08-28
Payer: MEDICARE

## 2019-08-28 DIAGNOSIS — R19.7 DIARRHEA OF PRESUMED INFECTIOUS ORIGIN: ICD-10-CM

## 2019-08-28 LAB
A/G RATIO: 2.1 (ref 1.1–2.2)
ALBUMIN SERPL-MCNC: 4.4 G/DL (ref 3.4–5)
ALP BLD-CCNC: 69 U/L (ref 40–129)
ALT SERPL-CCNC: 9 U/L (ref 10–40)
ANION GAP SERPL CALCULATED.3IONS-SCNC: 14 MMOL/L (ref 3–16)
AST SERPL-CCNC: 13 U/L (ref 15–37)
BILIRUB SERPL-MCNC: 0.4 MG/DL (ref 0–1)
BUN BLDV-MCNC: 18 MG/DL (ref 7–20)
C DIFF TOXIN/ANTIGEN: NORMAL
CALCIUM SERPL-MCNC: 9.9 MG/DL (ref 8.3–10.6)
CHLORIDE BLD-SCNC: 100 MMOL/L (ref 99–110)
CO2: 28 MMOL/L (ref 21–32)
CREAT SERPL-MCNC: 1.2 MG/DL (ref 0.6–1.2)
GFR AFRICAN AMERICAN: 53
GFR NON-AFRICAN AMERICAN: 43
GLOBULIN: 2.1 G/DL
GLUCOSE BLD-MCNC: 142 MG/DL (ref 70–99)
POTASSIUM SERPL-SCNC: 4.8 MMOL/L (ref 3.5–5.1)
SODIUM BLD-SCNC: 142 MMOL/L (ref 136–145)
TOTAL PROTEIN: 6.5 G/DL (ref 6.4–8.2)

## 2019-08-28 PROCEDURE — 87505 NFCT AGENT DETECTION GI: CPT

## 2019-08-28 PROCEDURE — 87449 NOS EACH ORGANISM AG IA: CPT

## 2019-08-28 PROCEDURE — 87046 STOOL CULTR AEROBIC BACT EA: CPT

## 2019-08-28 PROCEDURE — 87324 CLOSTRIDIUM AG IA: CPT

## 2019-08-29 LAB — GI BACTERIAL PATHOGENS BY PCR: NORMAL

## 2019-08-30 ENCOUNTER — TELEPHONE (OUTPATIENT)
Dept: FAMILY MEDICINE CLINIC | Age: 79
End: 2019-08-30

## 2019-08-30 NOTE — TELEPHONE ENCOUNTER
[facility] [place] is requesting one of their social workers see patient. They are requesting that an order for  be faxed to them at [phone].

## 2019-09-03 ENCOUNTER — CARE COORDINATION (OUTPATIENT)
Dept: CARE COORDINATION | Age: 79
End: 2019-09-03

## 2019-09-05 ENCOUNTER — HOSPITAL ENCOUNTER (OUTPATIENT)
Age: 79
Setting detail: SPECIMEN
Discharge: HOME | DRG: 689 | End: 2019-09-05
Payer: MEDICARE

## 2019-09-05 ENCOUNTER — CARE COORDINATION (OUTPATIENT)
Dept: CARE COORDINATION | Age: 79
End: 2019-09-05

## 2019-09-05 ENCOUNTER — TELEPHONE (OUTPATIENT)
Dept: FAMILY MEDICINE CLINIC | Age: 79
End: 2019-09-05

## 2019-09-05 LAB
BACTERIA: ABNORMAL /HPF
BILIRUBIN URINE: NEGATIVE
BLOOD, URINE: ABNORMAL
CLARITY: ABNORMAL
COLOR: YELLOW
GLUCOSE URINE: NEGATIVE MG/DL
KETONES, URINE: NEGATIVE MG/DL
LEUKOCYTE ESTERASE, URINE: ABNORMAL
MICROSCOPIC EXAMINATION: YES
NITRITE, URINE: NEGATIVE
PH UA: 6.5 (ref 5–8)
PROTEIN UA: 100 MG/DL
RBC UA: ABNORMAL /HPF (ref 0–2)
SPECIFIC GRAVITY UA: 1.02 (ref 1–1.03)
URINE TYPE: ABNORMAL
UROBILINOGEN, URINE: 0.2 E.U./DL
WBC UA: ABNORMAL /HPF (ref 0–5)

## 2019-09-05 PROCEDURE — 81001 URINALYSIS AUTO W/SCOPE: CPT

## 2019-09-05 PROCEDURE — 87077 CULTURE AEROBIC IDENTIFY: CPT

## 2019-09-05 PROCEDURE — 87086 URINE CULTURE/COLONY COUNT: CPT

## 2019-09-05 PROCEDURE — 87186 SC STD MICRODIL/AGAR DIL: CPT

## 2019-09-05 NOTE — CARE COORDINATION
Discussed [facility]'s request for verbal order for UA with culture, increased nurse visits, and . Dr Wilde Grams verbal order for all. Also notified him of appointment with urology next week. Discussed patient's plan of care and that she might be a good candidate for visiting physicians. Will discuss visiting physicians on pt's next outreach.

## 2019-09-05 NOTE — CARE COORDINATION
Called [facility] and got her an appointment scheduled for 9/10/19 at 2:10. They report that her last appointment was in December.

## 2019-09-06 ENCOUNTER — CARE COORDINATION (OUTPATIENT)
Dept: CARE COORDINATION | Age: 79
End: 2019-09-06

## 2019-09-06 ENCOUNTER — TELEPHONE (OUTPATIENT)
Dept: FAMILY MEDICINE CLINIC | Age: 79
End: 2019-09-06

## 2019-09-06 DIAGNOSIS — N39.0 URINARY TRACT INFECTION WITHOUT HEMATURIA, SITE UNSPECIFIED: ICD-10-CM

## 2019-09-06 RX ORDER — CIPROFLOXACIN 250 MG/1
250 TABLET, FILM COATED ORAL 2 TIMES DAILY
Qty: 14 TABLET | Refills: 0 | Status: ON HOLD | OUTPATIENT
Start: 2019-09-06 | End: 2019-09-11 | Stop reason: HOSPADM

## 2019-09-06 NOTE — TELEPHONE ENCOUNTER
Home care calling to let us know that us results are back, please call her if we are sending in medication

## 2019-09-07 ENCOUNTER — HOSPITAL ENCOUNTER (INPATIENT)
Age: 79
LOS: 4 days | Discharge: NURSING, REHAB OR LONG-TERM CARE | DRG: 689 | End: 2019-09-11
Attending: EMERGENCY MEDICINE | Admitting: INTERNAL MEDICINE
Payer: MEDICARE

## 2019-09-07 ENCOUNTER — APPOINTMENT (OUTPATIENT)
Dept: GENERAL RADIOLOGY | Age: 79
DRG: 689 | End: 2019-09-07
Payer: MEDICARE

## 2019-09-07 ENCOUNTER — APPOINTMENT (OUTPATIENT)
Dept: CT IMAGING | Age: 79
DRG: 689 | End: 2019-09-07
Payer: MEDICARE

## 2019-09-07 DIAGNOSIS — R53.1 GENERALIZED WEAKNESS: Primary | ICD-10-CM

## 2019-09-07 DIAGNOSIS — M19.012 ARTHRITIS OF SHOULDER REGION, LEFT: ICD-10-CM

## 2019-09-07 DIAGNOSIS — E78.2 MIXED HYPERLIPIDEMIA: ICD-10-CM

## 2019-09-07 DIAGNOSIS — R41.0 CONFUSION: ICD-10-CM

## 2019-09-07 DIAGNOSIS — M25.512 LEFT SHOULDER PAIN, UNSPECIFIED CHRONICITY: ICD-10-CM

## 2019-09-07 DIAGNOSIS — N39.0 URINARY TRACT INFECTION WITHOUT HEMATURIA, SITE UNSPECIFIED: ICD-10-CM

## 2019-09-07 PROBLEM — G93.40 ACUTE ENCEPHALOPATHY: Status: ACTIVE | Noted: 2019-09-07

## 2019-09-07 LAB
A/G RATIO: 1.5 (ref 1.1–2.2)
ALBUMIN SERPL-MCNC: 4 G/DL (ref 3.4–5)
ALP BLD-CCNC: 68 U/L (ref 40–129)
ALT SERPL-CCNC: 18 U/L (ref 10–40)
ANION GAP SERPL CALCULATED.3IONS-SCNC: 10 MMOL/L (ref 3–16)
AST SERPL-CCNC: 20 U/L (ref 15–37)
BACTERIA: ABNORMAL /HPF
BASOPHILS ABSOLUTE: 0.1 K/UL (ref 0–0.2)
BASOPHILS RELATIVE PERCENT: 1.4 %
BILIRUB SERPL-MCNC: 0.4 MG/DL (ref 0–1)
BILIRUBIN URINE: NEGATIVE
BLOOD, URINE: ABNORMAL
BUN BLDV-MCNC: 14 MG/DL (ref 7–20)
CALCIUM SERPL-MCNC: 9.5 MG/DL (ref 8.3–10.6)
CHLORIDE BLD-SCNC: 105 MMOL/L (ref 99–110)
CLARITY: ABNORMAL
CO2: 27 MMOL/L (ref 21–32)
COLOR: YELLOW
CREAT SERPL-MCNC: 0.8 MG/DL (ref 0.6–1.2)
EKG ATRIAL RATE: 77 BPM
EKG DIAGNOSIS: NORMAL
EKG P AXIS: 61 DEGREES
EKG P-R INTERVAL: 176 MS
EKG Q-T INTERVAL: 408 MS
EKG QRS DURATION: 80 MS
EKG QTC CALCULATION (BAZETT): 461 MS
EKG R AXIS: -15 DEGREES
EKG T AXIS: 44 DEGREES
EKG VENTRICULAR RATE: 77 BPM
EOSINOPHILS ABSOLUTE: 0.3 K/UL (ref 0–0.6)
EOSINOPHILS RELATIVE PERCENT: 4 %
GFR AFRICAN AMERICAN: >60
GFR NON-AFRICAN AMERICAN: >60
GLOBULIN: 2.6 G/DL
GLUCOSE BLD-MCNC: 142 MG/DL (ref 70–99)
GLUCOSE BLD-MCNC: 166 MG/DL (ref 70–99)
GLUCOSE BLD-MCNC: 169 MG/DL (ref 70–99)
GLUCOSE URINE: NEGATIVE MG/DL
HCT VFR BLD CALC: 31.9 % (ref 36–48)
HEMOGLOBIN: 10.4 G/DL (ref 12–16)
KETONES, URINE: NEGATIVE MG/DL
LACTIC ACID: 1.8 MMOL/L (ref 0.4–2)
LEUKOCYTE ESTERASE, URINE: ABNORMAL
LIPASE: 30 U/L (ref 13–60)
LYMPHOCYTES ABSOLUTE: 1.8 K/UL (ref 1–5.1)
LYMPHOCYTES RELATIVE PERCENT: 26.9 %
MCH RBC QN AUTO: 30.4 PG (ref 26–34)
MCHC RBC AUTO-ENTMCNC: 32.7 G/DL (ref 31–36)
MCV RBC AUTO: 92.9 FL (ref 80–100)
MICROSCOPIC EXAMINATION: YES
MONOCYTES ABSOLUTE: 0.5 K/UL (ref 0–1.3)
MONOCYTES RELATIVE PERCENT: 7.4 %
NEUTROPHILS ABSOLUTE: 4 K/UL (ref 1.7–7.7)
NEUTROPHILS RELATIVE PERCENT: 60.3 %
NITRITE, URINE: NEGATIVE
ORGANISM: ABNORMAL
PDW BLD-RTO: 16 % (ref 12.4–15.4)
PERFORMED ON: ABNORMAL
PERFORMED ON: ABNORMAL
PH UA: 6 (ref 5–8)
PLATELET # BLD: 241 K/UL (ref 135–450)
PMV BLD AUTO: 8.8 FL (ref 5–10.5)
POTASSIUM REFLEX MAGNESIUM: 4 MMOL/L (ref 3.5–5.1)
PRO-BNP: 1776 PG/ML (ref 0–449)
PROTEIN UA: 100 MG/DL
RBC # BLD: 3.43 M/UL (ref 4–5.2)
RBC UA: ABNORMAL /HPF (ref 0–2)
SODIUM BLD-SCNC: 142 MMOL/L (ref 136–145)
SPECIFIC GRAVITY UA: >=1.03 (ref 1–1.03)
TOTAL PROTEIN: 6.6 G/DL (ref 6.4–8.2)
TROPONIN: <0.01 NG/ML
TSH SERPL DL<=0.05 MIU/L-ACNC: 0.78 UIU/ML (ref 0.27–4.2)
URINE CULTURE, ROUTINE: ABNORMAL
URINE REFLEX TO CULTURE: YES
URINE TYPE: ABNORMAL
UROBILINOGEN, URINE: 0.2 E.U./DL
WBC # BLD: 6.6 K/UL (ref 4–11)
WBC UA: ABNORMAL /HPF (ref 0–5)

## 2019-09-07 PROCEDURE — 99285 EMERGENCY DEPT VISIT HI MDM: CPT

## 2019-09-07 PROCEDURE — 87086 URINE CULTURE/COLONY COUNT: CPT

## 2019-09-07 PROCEDURE — 94761 N-INVAS EAR/PLS OXIMETRY MLT: CPT

## 2019-09-07 PROCEDURE — 84484 ASSAY OF TROPONIN QUANT: CPT

## 2019-09-07 PROCEDURE — 71045 X-RAY EXAM CHEST 1 VIEW: CPT

## 2019-09-07 PROCEDURE — 84436 ASSAY OF TOTAL THYROXINE: CPT

## 2019-09-07 PROCEDURE — 6370000000 HC RX 637 (ALT 250 FOR IP): Performed by: INTERNAL MEDICINE

## 2019-09-07 PROCEDURE — 96361 HYDRATE IV INFUSION ADD-ON: CPT

## 2019-09-07 PROCEDURE — 1200000000 HC SEMI PRIVATE

## 2019-09-07 PROCEDURE — 83690 ASSAY OF LIPASE: CPT

## 2019-09-07 PROCEDURE — 87186 SC STD MICRODIL/AGAR DIL: CPT

## 2019-09-07 PROCEDURE — 83036 HEMOGLOBIN GLYCOSYLATED A1C: CPT

## 2019-09-07 PROCEDURE — 87077 CULTURE AEROBIC IDENTIFY: CPT

## 2019-09-07 PROCEDURE — 85025 COMPLETE CBC W/AUTO DIFF WBC: CPT

## 2019-09-07 PROCEDURE — 83605 ASSAY OF LACTIC ACID: CPT

## 2019-09-07 PROCEDURE — 2580000003 HC RX 258: Performed by: EMERGENCY MEDICINE

## 2019-09-07 PROCEDURE — 6360000004 HC RX CONTRAST MEDICATION: Performed by: EMERGENCY MEDICINE

## 2019-09-07 PROCEDURE — 93010 ELECTROCARDIOGRAM REPORT: CPT | Performed by: INTERNAL MEDICINE

## 2019-09-07 PROCEDURE — 6360000002 HC RX W HCPCS: Performed by: INTERNAL MEDICINE

## 2019-09-07 PROCEDURE — 2700000000 HC OXYGEN THERAPY PER DAY

## 2019-09-07 PROCEDURE — 80053 COMPREHEN METABOLIC PANEL: CPT

## 2019-09-07 PROCEDURE — 87040 BLOOD CULTURE FOR BACTERIA: CPT

## 2019-09-07 PROCEDURE — 83880 ASSAY OF NATRIURETIC PEPTIDE: CPT

## 2019-09-07 PROCEDURE — 51701 INSERT BLADDER CATHETER: CPT

## 2019-09-07 PROCEDURE — 93005 ELECTROCARDIOGRAM TRACING: CPT | Performed by: EMERGENCY MEDICINE

## 2019-09-07 PROCEDURE — 84443 ASSAY THYROID STIM HORMONE: CPT

## 2019-09-07 PROCEDURE — 74177 CT ABD & PELVIS W/CONTRAST: CPT

## 2019-09-07 PROCEDURE — 96374 THER/PROPH/DIAG INJ IV PUSH: CPT

## 2019-09-07 PROCEDURE — 96375 TX/PRO/DX INJ NEW DRUG ADDON: CPT

## 2019-09-07 PROCEDURE — 2580000003 HC RX 258: Performed by: INTERNAL MEDICINE

## 2019-09-07 PROCEDURE — 36415 COLL VENOUS BLD VENIPUNCTURE: CPT

## 2019-09-07 PROCEDURE — 81001 URINALYSIS AUTO W/SCOPE: CPT

## 2019-09-07 PROCEDURE — 6360000002 HC RX W HCPCS: Performed by: EMERGENCY MEDICINE

## 2019-09-07 RX ORDER — ONDANSETRON 2 MG/ML
4 INJECTION INTRAMUSCULAR; INTRAVENOUS ONCE
Status: COMPLETED | OUTPATIENT
Start: 2019-09-07 | End: 2019-09-07

## 2019-09-07 RX ORDER — NAPROXEN 500 MG/1
500 TABLET ORAL PRN
Status: ON HOLD | COMMUNITY
End: 2019-09-11 | Stop reason: HOSPADM

## 2019-09-07 RX ORDER — POTASSIUM CHLORIDE 20 MEQ/1
40 TABLET, EXTENDED RELEASE ORAL PRN
Status: DISCONTINUED | OUTPATIENT
Start: 2019-09-07 | End: 2019-09-11 | Stop reason: HOSPADM

## 2019-09-07 RX ORDER — VITAMIN E 268 MG
400 CAPSULE ORAL DAILY
Status: DISCONTINUED | OUTPATIENT
Start: 2019-09-07 | End: 2019-09-11 | Stop reason: HOSPADM

## 2019-09-07 RX ORDER — ASPIRIN 81 MG/1
81 TABLET ORAL DAILY
Status: DISCONTINUED | OUTPATIENT
Start: 2019-09-07 | End: 2019-09-11 | Stop reason: HOSPADM

## 2019-09-07 RX ORDER — SODIUM CHLORIDE 0.9 % (FLUSH) 0.9 %
10 SYRINGE (ML) INJECTION EVERY 12 HOURS SCHEDULED
Status: DISCONTINUED | OUTPATIENT
Start: 2019-09-07 | End: 2019-09-11 | Stop reason: HOSPADM

## 2019-09-07 RX ORDER — ALBUTEROL SULFATE 90 UG/1
2 AEROSOL, METERED RESPIRATORY (INHALATION) 4 TIMES DAILY PRN
Status: DISCONTINUED | OUTPATIENT
Start: 2019-09-07 | End: 2019-09-07

## 2019-09-07 RX ORDER — DULOXETIN HYDROCHLORIDE 60 MG/1
120 CAPSULE, DELAYED RELEASE ORAL DAILY
Status: DISCONTINUED | OUTPATIENT
Start: 2019-09-07 | End: 2019-09-11 | Stop reason: HOSPADM

## 2019-09-07 RX ORDER — DEXTROSE MONOHYDRATE 25 G/50ML
12.5 INJECTION, SOLUTION INTRAVENOUS PRN
Status: DISCONTINUED | OUTPATIENT
Start: 2019-09-07 | End: 2019-09-11 | Stop reason: HOSPADM

## 2019-09-07 RX ORDER — POTASSIUM CHLORIDE 7.45 MG/ML
10 INJECTION INTRAVENOUS PRN
Status: DISCONTINUED | OUTPATIENT
Start: 2019-09-07 | End: 2019-09-11 | Stop reason: HOSPADM

## 2019-09-07 RX ORDER — PANTOPRAZOLE SODIUM 40 MG/1
40 TABLET, DELAYED RELEASE ORAL
Status: DISCONTINUED | OUTPATIENT
Start: 2019-09-08 | End: 2019-09-11 | Stop reason: HOSPADM

## 2019-09-07 RX ORDER — MAGNESIUM SULFATE 1 G/100ML
1 INJECTION INTRAVENOUS PRN
Status: DISCONTINUED | OUTPATIENT
Start: 2019-09-07 | End: 2019-09-11 | Stop reason: HOSPADM

## 2019-09-07 RX ORDER — CARVEDILOL 3.12 MG/1
3.12 TABLET ORAL 2 TIMES DAILY WITH MEALS
Status: DISCONTINUED | OUTPATIENT
Start: 2019-09-07 | End: 2019-09-10

## 2019-09-07 RX ORDER — 0.9 % SODIUM CHLORIDE 0.9 %
1000 INTRAVENOUS SOLUTION INTRAVENOUS ONCE
Status: COMPLETED | OUTPATIENT
Start: 2019-09-07 | End: 2019-09-07

## 2019-09-07 RX ORDER — NICOTINE POLACRILEX 4 MG
15 LOZENGE BUCCAL PRN
Status: DISCONTINUED | OUTPATIENT
Start: 2019-09-07 | End: 2019-09-11 | Stop reason: HOSPADM

## 2019-09-07 RX ORDER — DONEPEZIL HYDROCHLORIDE 5 MG/1
5 TABLET, FILM COATED ORAL NIGHTLY
Status: DISCONTINUED | OUTPATIENT
Start: 2019-09-07 | End: 2019-09-11 | Stop reason: HOSPADM

## 2019-09-07 RX ORDER — 0.9 % SODIUM CHLORIDE 0.9 %
1000 INTRAVENOUS SOLUTION INTRAVENOUS ONCE
Status: DISCONTINUED | OUTPATIENT
Start: 2019-09-07 | End: 2019-09-07

## 2019-09-07 RX ORDER — CALCIUM CARBONATE 200(500)MG
500 TABLET,CHEWABLE ORAL 2 TIMES DAILY
Status: DISCONTINUED | OUTPATIENT
Start: 2019-09-07 | End: 2019-09-11 | Stop reason: HOSPADM

## 2019-09-07 RX ORDER — ISOSORBIDE MONONITRATE 30 MG/1
30 TABLET, EXTENDED RELEASE ORAL DAILY
Status: DISCONTINUED | OUTPATIENT
Start: 2019-09-07 | End: 2019-09-11 | Stop reason: HOSPADM

## 2019-09-07 RX ORDER — DEXTROSE MONOHYDRATE 50 MG/ML
100 INJECTION, SOLUTION INTRAVENOUS PRN
Status: DISCONTINUED | OUTPATIENT
Start: 2019-09-07 | End: 2019-09-11 | Stop reason: HOSPADM

## 2019-09-07 RX ORDER — OXYBUTYNIN CHLORIDE 5 MG/1
10 TABLET, EXTENDED RELEASE ORAL DAILY
Status: DISCONTINUED | OUTPATIENT
Start: 2019-09-07 | End: 2019-09-11 | Stop reason: HOSPADM

## 2019-09-07 RX ORDER — SODIUM CHLORIDE 0.9 % (FLUSH) 0.9 %
10 SYRINGE (ML) INJECTION PRN
Status: DISCONTINUED | OUTPATIENT
Start: 2019-09-07 | End: 2019-09-11 | Stop reason: HOSPADM

## 2019-09-07 RX ORDER — METOCLOPRAMIDE 10 MG/1
10 TABLET ORAL PRN
Status: ON HOLD | COMMUNITY
End: 2019-09-11 | Stop reason: HOSPADM

## 2019-09-07 RX ORDER — SENNA AND DOCUSATE SODIUM 50; 8.6 MG/1; MG/1
1 TABLET, FILM COATED ORAL 2 TIMES DAILY
Status: DISCONTINUED | OUTPATIENT
Start: 2019-09-07 | End: 2019-09-11 | Stop reason: HOSPADM

## 2019-09-07 RX ORDER — ONDANSETRON 2 MG/ML
4 INJECTION INTRAMUSCULAR; INTRAVENOUS EVERY 6 HOURS PRN
Status: DISCONTINUED | OUTPATIENT
Start: 2019-09-07 | End: 2019-09-11 | Stop reason: HOSPADM

## 2019-09-07 RX ORDER — ALBUTEROL SULFATE 90 UG/1
2 AEROSOL, METERED RESPIRATORY (INHALATION) 4 TIMES DAILY PRN
Status: DISCONTINUED | OUTPATIENT
Start: 2019-09-07 | End: 2019-09-11 | Stop reason: HOSPADM

## 2019-09-07 RX ORDER — SODIUM CHLORIDE 9 MG/ML
INJECTION, SOLUTION INTRAVENOUS CONTINUOUS
Status: DISCONTINUED | OUTPATIENT
Start: 2019-09-07 | End: 2019-09-10

## 2019-09-07 RX ORDER — ACETAMINOPHEN 325 MG/1
650 TABLET ORAL EVERY 4 HOURS PRN
Status: DISCONTINUED | OUTPATIENT
Start: 2019-09-07 | End: 2019-09-11 | Stop reason: HOSPADM

## 2019-09-07 RX ADMIN — ENOXAPARIN SODIUM 40 MG: 40 INJECTION SUBCUTANEOUS at 18:20

## 2019-09-07 RX ADMIN — Medication 10 ML: at 21:07

## 2019-09-07 RX ADMIN — CARVEDILOL 3.12 MG: 3.12 TABLET, FILM COATED ORAL at 18:19

## 2019-09-07 RX ADMIN — VITAMIN E CAP 400 UNIT 400 UNITS: 400 CAP at 18:19

## 2019-09-07 RX ADMIN — Medication 81 MG: at 18:19

## 2019-09-07 RX ADMIN — ISOSORBIDE MONONITRATE 30 MG: 30 TABLET ORAL at 18:19

## 2019-09-07 RX ADMIN — SODIUM CHLORIDE 1000 ML: 9 INJECTION, SOLUTION INTRAVENOUS at 11:35

## 2019-09-07 RX ADMIN — MEROPENEM 1 G: 1 INJECTION, POWDER, FOR SOLUTION INTRAVENOUS at 23:53

## 2019-09-07 RX ADMIN — SENNOSIDES AND DOCUSATE SODIUM 1 TABLET: 8.6; 5 TABLET ORAL at 21:06

## 2019-09-07 RX ADMIN — DONEPEZIL HYDROCHLORIDE 5 MG: 5 TABLET, FILM COATED ORAL at 21:06

## 2019-09-07 RX ADMIN — IOPAMIDOL 75 ML: 755 INJECTION, SOLUTION INTRAVENOUS at 13:04

## 2019-09-07 RX ADMIN — MEROPENEM 1 G: 1 INJECTION, POWDER, FOR SOLUTION INTRAVENOUS at 15:54

## 2019-09-07 RX ADMIN — SODIUM CHLORIDE: 9 INJECTION, SOLUTION INTRAVENOUS at 18:40

## 2019-09-07 RX ADMIN — OXYBUTYNIN CHLORIDE 10 MG: 5 TABLET, EXTENDED RELEASE ORAL at 18:19

## 2019-09-07 RX ADMIN — ONDANSETRON 4 MG: 2 INJECTION INTRAMUSCULAR; INTRAVENOUS at 11:34

## 2019-09-07 RX ADMIN — INSULIN LISPRO 1 UNITS: 100 INJECTION, SOLUTION INTRAVENOUS; SUBCUTANEOUS at 21:11

## 2019-09-07 RX ADMIN — DULOXETINE HYDROCHLORIDE 120 MG: 60 CAPSULE, DELAYED RELEASE ORAL at 18:20

## 2019-09-07 RX ADMIN — CALCIUM CARBONATE 500 MG: 500 TABLET, CHEWABLE ORAL at 21:03

## 2019-09-07 ASSESSMENT — PAIN SCALES - GENERAL
PAINLEVEL_OUTOF10: 0
PAINLEVEL_OUTOF10: 0

## 2019-09-07 NOTE — ED NOTES
Pt placed on 2liters of O2 nasal cannula due to pt dropped to 89% while sleeping     Odalis Navarro RN  09/07/19 1600

## 2019-09-07 NOTE — ED PROVIDER NOTES
Triage Chief Complaint:   Fatigue (pt brought in by EMS due to having weakness with N/V for a week. Pt been on Cipro and thinks it is causing her to be sick. Pt on cipro for UTI)    [place]:  Alba Guerra is a 67 y.o. female with history of aortic stenosis that presents with weakness, nausea and GI upset. Patient was seen by her physician in mid August with urinary symptoms. Was diagnosed with urinary tract infection and was started on Cipro. Subsequently she developed diarrhea. Was seen back in the office on August 27. She was tested for C. difficile and it was negative. Since then she has been feeling increasingly weak. Diarrhea stopped but she has not had a bowel movement for 5 days. She does not feel she can take care of herself. Home health nurse has noticed some waxing and waning confusion. There is a family history of dementia. She will drink some water for the home health nurse but has had poor oral intake otherwise. Unclear if she still taking the Cipro. She does not remember when she started it herself. States she has not actually vomited but has been very nauseated. She lives independently but states that she is been afraid to cook because she is afraid she is going to burn her hand on a burner. He seems mildly confused. No other specific complaints given. Nursing staff state that they talked with her daughter on the phone who is concerned that she does not seem to be acting herself. On review of records I see that she had an outpatient urine culture 2 days ago which is growing out E. coli. Patient is allergic to cephalosporins which cause her to be weak and shaky, penicillins that cause the same problem and there is a allergy listed for Levaquin. ROS:  CONSTITUTIONAL: Denies fever, chills, sweats, weight loss. Generalized weakness, nausea and poor appetite. EYES: No visual disturbance.   No drainage, swelling, or pain  ENT: No ear pain or drainage, nasal congestion or bleeding, Comments)     SWEATY    Milk-Related Compounds Nausea And Vomiting    Pyridium [Phenazopyridine Hcl] Palpitations     SWEATY - PT DENIES REACTION    Sulfa Antibiotics Nausea And Vomiting and Nausea Only    Urised [Methen-Lisa-Meth Bl-Phen Sal] Palpitations     SWEATY       Nursing Notes Reviewed    Physical Exam:  ED Triage Vitals [09/07/19 1117]   Enc Vitals Group      BP (!) 168/90      Pulse 80      Resp (!) 158      Temp 97.9 °F (36.6 °C)      Temp Source Oral      SpO2 93 %      Weight 222 lb (100.7 kg)      Height       Head Circumference       Peak Flow       Pain Score       Pain Loc       Pain Edu? Excl. in [address]? My pulse ox interpretation is - normal    GENERAL: Obese female awake and alert and in mild distress. Mildly confused demeanor but able to answer most questions. Vital signs: Reviewed as documented  HEAD: Normocephalic, atraumatic  EYES: No unusual swelling. Conjunctiva pink. No discharge and no lesions. Pupils equally round and reactive to light, extraocular movement is intact  ENT: Ears are without any discharge or swelling. Nose is clear. No facial swelling. Mucous membranes moist, no oral lesions, and pharynx is clear without edema or exudates. Voice quality is clear. No drooling. NECK: Supple, no mass, nontender to palpation. No JVD noted. PULMONARY: No chest wall tenderness or evidence of trauma. Nonlabored. Breath sounds are clear and equal bilaterally without wheezing, rales, or rhonchi. CARDIOVASCULAR: Regular rate and rhythm with grade 3/6 systolic murmur best heard at the right upper sternal border. Pulses are 2+ and equal bilaterally with normal capillary refill. No edema. GASTROINTESTINAL: Abdomen is soft and nontender throughout with active bowel sounds. No organomegaly noted. No CVA tenderness. MUSCULOSKELETAL: Back is nontender throughout. Extremities without swelling or discoloration.   Nontender throughout with full range of motion and

## 2019-09-07 NOTE — ED NOTES
Perfect serve message to Dr. Leisa Landers @ [facility]  09/07/19 7678    Dr Leisa Landers returned the call to Dr. Gera Gilbert  09/07/19 [phone]

## 2019-09-08 LAB
ANION GAP SERPL CALCULATED.3IONS-SCNC: 6 MMOL/L (ref 3–16)
BASOPHILS ABSOLUTE: 0.1 K/UL (ref 0–0.2)
BASOPHILS RELATIVE PERCENT: 0.9 %
BUN BLDV-MCNC: 13 MG/DL (ref 7–20)
CALCIUM SERPL-MCNC: 9.2 MG/DL (ref 8.3–10.6)
CHLORIDE BLD-SCNC: 106 MMOL/L (ref 99–110)
CO2: 28 MMOL/L (ref 21–32)
CREAT SERPL-MCNC: 0.7 MG/DL (ref 0.6–1.2)
EOSINOPHILS ABSOLUTE: 0.3 K/UL (ref 0–0.6)
EOSINOPHILS RELATIVE PERCENT: 4 %
ESTIMATED AVERAGE GLUCOSE: 171.4 MG/DL
FERRITIN: 26.9 NG/ML (ref 15–150)
GFR AFRICAN AMERICAN: >60
GFR NON-AFRICAN AMERICAN: >60
GLUCOSE BLD-MCNC: 132 MG/DL (ref 70–99)
GLUCOSE BLD-MCNC: 155 MG/DL (ref 70–99)
GLUCOSE BLD-MCNC: 156 MG/DL (ref 70–99)
GLUCOSE BLD-MCNC: 161 MG/DL (ref 70–99)
GLUCOSE BLD-MCNC: 173 MG/DL (ref 70–99)
HBA1C MFR BLD: 7.6 %
HCT VFR BLD CALC: 28.4 % (ref 36–48)
HEMOGLOBIN: 9.1 G/DL (ref 12–16)
IRON SATURATION: 13 % (ref 15–50)
IRON: 38 UG/DL (ref 37–145)
LYMPHOCYTES ABSOLUTE: 1.9 K/UL (ref 1–5.1)
LYMPHOCYTES RELATIVE PERCENT: 24.4 %
MCH RBC QN AUTO: 30.5 PG (ref 26–34)
MCHC RBC AUTO-ENTMCNC: 32.2 G/DL (ref 31–36)
MCV RBC AUTO: 94.9 FL (ref 80–100)
MONOCYTES ABSOLUTE: 0.5 K/UL (ref 0–1.3)
MONOCYTES RELATIVE PERCENT: 6.8 %
NEUTROPHILS ABSOLUTE: 4.9 K/UL (ref 1.7–7.7)
NEUTROPHILS RELATIVE PERCENT: 63.9 %
PDW BLD-RTO: 16.3 % (ref 12.4–15.4)
PERFORMED ON: ABNORMAL
PLATELET # BLD: 214 K/UL (ref 135–450)
PMV BLD AUTO: 9.7 FL (ref 5–10.5)
POTASSIUM REFLEX MAGNESIUM: 4 MMOL/L (ref 3.5–5.1)
RBC # BLD: 2.99 M/UL (ref 4–5.2)
SODIUM BLD-SCNC: 140 MMOL/L (ref 136–145)
T4 TOTAL: 8.2 UG/DL (ref 4.5–10.9)
TOTAL IRON BINDING CAPACITY: 301 UG/DL (ref 260–445)
WBC # BLD: 7.7 K/UL (ref 4–11)

## 2019-09-08 PROCEDURE — 97166 OT EVAL MOD COMPLEX 45 MIN: CPT

## 2019-09-08 PROCEDURE — 83540 ASSAY OF IRON: CPT

## 2019-09-08 PROCEDURE — 36415 COLL VENOUS BLD VENIPUNCTURE: CPT

## 2019-09-08 PROCEDURE — 2700000000 HC OXYGEN THERAPY PER DAY

## 2019-09-08 PROCEDURE — 82728 ASSAY OF FERRITIN: CPT

## 2019-09-08 PROCEDURE — 6360000002 HC RX W HCPCS: Performed by: NURSE PRACTITIONER

## 2019-09-08 PROCEDURE — 6370000000 HC RX 637 (ALT 250 FOR IP): Performed by: INTERNAL MEDICINE

## 2019-09-08 PROCEDURE — 1200000000 HC SEMI PRIVATE

## 2019-09-08 PROCEDURE — 80048 BASIC METABOLIC PNL TOTAL CA: CPT

## 2019-09-08 PROCEDURE — 2580000003 HC RX 258: Performed by: INTERNAL MEDICINE

## 2019-09-08 PROCEDURE — 85025 COMPLETE CBC W/AUTO DIFF WBC: CPT

## 2019-09-08 PROCEDURE — 97530 THERAPEUTIC ACTIVITIES: CPT

## 2019-09-08 PROCEDURE — 97162 PT EVAL MOD COMPLEX 30 MIN: CPT

## 2019-09-08 PROCEDURE — 83550 IRON BINDING TEST: CPT

## 2019-09-08 PROCEDURE — 94761 N-INVAS EAR/PLS OXIMETRY MLT: CPT

## 2019-09-08 PROCEDURE — 97535 SELF CARE MNGMENT TRAINING: CPT

## 2019-09-08 PROCEDURE — 6360000002 HC RX W HCPCS: Performed by: INTERNAL MEDICINE

## 2019-09-08 PROCEDURE — 99222 1ST HOSP IP/OBS MODERATE 55: CPT | Performed by: NURSE PRACTITIONER

## 2019-09-08 RX ORDER — HYDRALAZINE HYDROCHLORIDE 20 MG/ML
10 INJECTION INTRAMUSCULAR; INTRAVENOUS EVERY 6 HOURS PRN
Status: DISCONTINUED | OUTPATIENT
Start: 2019-09-08 | End: 2019-09-11 | Stop reason: HOSPADM

## 2019-09-08 RX ADMIN — CALCIUM CARBONATE 500 MG: 500 TABLET, CHEWABLE ORAL at 22:15

## 2019-09-08 RX ADMIN — INSULIN LISPRO 1 UNITS: 100 INJECTION, SOLUTION INTRAVENOUS; SUBCUTANEOUS at 17:06

## 2019-09-08 RX ADMIN — SENNOSIDES AND DOCUSATE SODIUM 1 TABLET: 8.6; 5 TABLET ORAL at 09:01

## 2019-09-08 RX ADMIN — PANTOPRAZOLE SODIUM 40 MG: 40 TABLET, DELAYED RELEASE ORAL at 06:37

## 2019-09-08 RX ADMIN — MEROPENEM 1 G: 1 INJECTION, POWDER, FOR SOLUTION INTRAVENOUS at 09:00

## 2019-09-08 RX ADMIN — ACETAMINOPHEN 650 MG: 325 TABLET ORAL at 14:07

## 2019-09-08 RX ADMIN — OXYBUTYNIN CHLORIDE 10 MG: 5 TABLET, EXTENDED RELEASE ORAL at 09:01

## 2019-09-08 RX ADMIN — MEROPENEM 1 G: 1 INJECTION, POWDER, FOR SOLUTION INTRAVENOUS at 16:08

## 2019-09-08 RX ADMIN — DULOXETINE HYDROCHLORIDE 120 MG: 60 CAPSULE, DELAYED RELEASE ORAL at 09:01

## 2019-09-08 RX ADMIN — VITAMIN E CAP 400 UNIT 400 UNITS: 400 CAP at 09:11

## 2019-09-08 RX ADMIN — INSULIN LISPRO 1 UNITS: 100 INJECTION, SOLUTION INTRAVENOUS; SUBCUTANEOUS at 09:05

## 2019-09-08 RX ADMIN — INSULIN LISPRO 1 UNITS: 100 INJECTION, SOLUTION INTRAVENOUS; SUBCUTANEOUS at 11:57

## 2019-09-08 RX ADMIN — HYDRALAZINE HYDROCHLORIDE 10 MG: 20 INJECTION INTRAMUSCULAR; INTRAVENOUS at 14:27

## 2019-09-08 RX ADMIN — CARVEDILOL 3.12 MG: 3.12 TABLET, FILM COATED ORAL at 09:01

## 2019-09-08 RX ADMIN — SENNOSIDES AND DOCUSATE SODIUM 1 TABLET: 8.6; 5 TABLET ORAL at 22:15

## 2019-09-08 RX ADMIN — Medication 10 ML: at 09:12

## 2019-09-08 RX ADMIN — Medication 81 MG: at 09:01

## 2019-09-08 RX ADMIN — ISOSORBIDE MONONITRATE 30 MG: 30 TABLET ORAL at 09:00

## 2019-09-08 RX ADMIN — ACETAMINOPHEN 650 MG: 325 TABLET ORAL at 18:19

## 2019-09-08 RX ADMIN — CARVEDILOL 3.12 MG: 3.12 TABLET, FILM COATED ORAL at 17:04

## 2019-09-08 RX ADMIN — ACETAMINOPHEN 650 MG: 325 TABLET ORAL at 03:18

## 2019-09-08 RX ADMIN — ENOXAPARIN SODIUM 40 MG: 40 INJECTION SUBCUTANEOUS at 09:00

## 2019-09-08 RX ADMIN — DONEPEZIL HYDROCHLORIDE 5 MG: 5 TABLET, FILM COATED ORAL at 22:15

## 2019-09-08 RX ADMIN — CALCIUM CARBONATE 500 MG: 500 TABLET, CHEWABLE ORAL at 09:01

## 2019-09-08 RX ADMIN — HYDRALAZINE HYDROCHLORIDE 10 MG: 20 INJECTION INTRAMUSCULAR; INTRAVENOUS at 22:26

## 2019-09-08 RX ADMIN — SODIUM CHLORIDE: 9 INJECTION, SOLUTION INTRAVENOUS at 14:02

## 2019-09-08 ASSESSMENT — PAIN DESCRIPTION - LOCATION
LOCATION: BACK;HEAD;LEG;NECK
LOCATION: BACK
LOCATION: BACK;HEAD;LEG;NECK
LOCATION: HEAD

## 2019-09-08 ASSESSMENT — PAIN DESCRIPTION - PROGRESSION
CLINICAL_PROGRESSION: GRADUALLY WORSENING
CLINICAL_PROGRESSION: NOT CHANGED
CLINICAL_PROGRESSION: GRADUALLY IMPROVING

## 2019-09-08 ASSESSMENT — PAIN SCALES - GENERAL
PAINLEVEL_OUTOF10: 3
PAINLEVEL_OUTOF10: 0
PAINLEVEL_OUTOF10: 1
PAINLEVEL_OUTOF10: 4
PAINLEVEL_OUTOF10: 4
PAINLEVEL_OUTOF10: 8
PAINLEVEL_OUTOF10: 6

## 2019-09-08 ASSESSMENT — PAIN DESCRIPTION - ORIENTATION: ORIENTATION: LOWER

## 2019-09-08 ASSESSMENT — PAIN DESCRIPTION - FREQUENCY
FREQUENCY: INTERMITTENT
FREQUENCY: INTERMITTENT

## 2019-09-08 ASSESSMENT — PAIN DESCRIPTION - ONSET
ONSET: GRADUAL
ONSET: GRADUAL

## 2019-09-08 ASSESSMENT — PAIN DESCRIPTION - PAIN TYPE
TYPE: ACUTE PAIN
TYPE: ACUTE PAIN
TYPE: ACUTE PAIN;CHRONIC PAIN
TYPE: ACUTE PAIN

## 2019-09-08 ASSESSMENT — PAIN DESCRIPTION - DESCRIPTORS
DESCRIPTORS: ACHING;DISCOMFORT
DESCRIPTORS: THROBBING
DESCRIPTORS: ACHING

## 2019-09-08 NOTE — PROGRESS NOTES
mobility, transfers, progressive gait training, balance training, and family/patient education. Signature  Sachi Nelson, PT #[id number]    If patient discharges from this facility prior to next visit, this note will serve as the Discharge Summary.

## 2019-09-08 NOTE — PROGRESS NOTES
bed mobility, transfers, dressing, bathing, family/patient education with adaptive equipment, breathing technique instruction.      Jami Esteban, ANAHI, OTR/L   [id number]           If patient discharges from this facility prior to next visit, this note will serve as the Discharge Summary

## 2019-09-08 NOTE — PROGRESS NOTES
Pt with generalized discomfort. Relief noted with tylenol. Pt incont so used purewick. Pt very happy with this product. Pt denies needing anything at this time.

## 2019-09-08 NOTE — H&P
(ARICEPT) 5 MG tablet Take 1 tablet by mouth nightly 7/19/19  Yes KIMI Moreno CNP   metFORMIN (GLUCOPHAGE-XR) 500 MG extended release tablet TAKE TWO TABLETS TWICE A DAY WITH MEALS 7/8/19  Yes Tiffanie Adkins DO   SENNA PLUS 8.6-50 MG per tablet TAKE 2 TABLETS BY MOUTH DAILY  Patient taking differently: nightly  7/1/19  Yes Tiffanie Adkins DO   carvedilol (COREG) 25 MG tablet TAKE ONE TABLET BY MOUTH TWICE A DAY WITH FOOD 6/17/19  Yes Tiffanie Adkins DO   meloxicam (MOBIC) 15 MG tablet TAKE ONE TABLET BY MOUTH ONCE DAILY AS NEEDED 6/8/19  Yes WILLIE Connelly   DULoxetine (CYMBALTA) 60 MG extended release capsule Take 2 capsules by mouth daily 5/24/19  Yes Tiffanie Adkins DO   pramipexole (MIRAPEX) 0.5 MG tablet TAKE ONE TABLET BY MOUTH ONCE NIGHTLY 5/20/19  Yes Tiffanie Adkins DO   omeprazole (PRILOSEC) 20 MG delayed release capsule TAKE ONE CAPSULE TWICE A DAY 5/6/19  Yes Leni Mcconnell MD   oxybutynin (DITROPAN-XL) 10 MG extended release tablet Take 10 mg by mouth daily   Yes Wisam Haque MD   albuterol sulfate  (90 Base) MCG/ACT inhaler Inhale 2 puffs into the lungs 4 times daily as needed for Wheezing 2/26/19  Yes Lein Mcconnell MD   isosorbide mononitrate (IMDUR) 30 MG extended release tablet TAKE ONE-HALF TABLET BY MOUTH ONE TIME A DAY 1/15/19  Yes Galindo Riddle DO   pravastatin (PRAVACHOL) 20 MG tablet TAKE ONE TABLET BY MOUTH DAILY  Patient taking differently: nightly TAKE ONE TABLET BY MOUTH DAILY 11/26/18  Yes Tiffanie Adkins DO   clotrimazole-betamethasone (LOTRISONE) 1-0.05 % cream Apply bid to affected area.   Patient taking differently: As needed 10/12/18  Yes Galindo Riddle DO   nitroGLYCERIN (NITROSTAT) 0.4 MG SL tablet Place 1 tablet under the tongue every 5 minutes as needed for Chest pain 9/27/18  Yes KIMI Cyr CNP   calcium carbonate 600 MG TABS tablet Take 1 tablet by mouth 2 times daily 6/25/18  Yes WILLIE Ramirez   ACCU-CHEK

## 2019-09-08 NOTE — PROGRESS NOTES
Smoking History, Greater than 15 pack year = 2    Social History  Social History     Tobacco Use    Smoking status: Former Smoker     Packs/day: 0.25     Years: 0.10     Pack years: 0.02     Types: Cigarettes     Last attempt to quit: 1975     Years since quittin.7    Smokeless tobacco: Never Used    Tobacco comment: only smoked for 1 month   Substance Use Topics    Alcohol use: No     Alcohol/week: 0.0 standard drinks    Drug use: No       Recent Surgical History: None = 0  Past Surgical History  Past Surgical History:   Procedure Laterality Date    ABDOMEN SURGERY      ACHILLES TENDON SURGERY  12    LEFT ACHILLES DEBRIDEMENT, HAGLUNDS AND RETROCALCANEAL BURSA EXCISION WITH FLEXOR HALLUS LONGUS TENDON TRANSFER WITH BLOCK FOR PAIN CONTROL    APPENDECTOMY      CARDIAC CATHETERIZATION  14    CARPAL TUNNEL RELEASE      both hands    CHOLECYSTECTOMY      COLONOSCOPY      COLONOSCOPY      COLONOSCOPY  2/10/2016    CYSTOSCOPY  10/02/2017    DIAGNOSTIC CARDIAC CATH LAB PROCEDURE      ENDOSCOPY, COLON, DIAGNOSTIC      EYE SURGERY      HYSTERECTOMY      HYSTERECTOMY, TOTAL ABDOMINAL      JOINT REPLACEMENT      bilateral knee    KNEE SURGERY      both replaced    OTHER SURGICAL HISTORY  2018    CYSTOSCOPY, HYDRODISTENTION OF BLADDER WITH INSTILLATION OF    POLYPECTOMY      SHOULDER SURGERY      TOENAIL EXCISION  2016    right big toe    TONSILLECTOMY      TUBAL LIGATION      UPPER GASTROINTESTINAL ENDOSCOPY  10/29/12    gastritis    UPPER GASTROINTESTINAL ENDOSCOPY  2/10/2016    URETHRAL STRICTURE DILATATION         Level of Consciousness: Alert, Oriented, and Cooperative = 0    Level of Activity: Mostly sedentary, minimal walking = 2    Respiratory Pattern: Regular Pattern; RR 8-20 = 0    Breath Sounds: Clear = 0    Sputum   ,  ,    Cough: Strong, spontaneous, non-productive = 0    Vital Signs   BP (!) 182/81   Pulse 74   Temp 96.9 °F (36.1 °C) (Oral)   Resp 18

## 2019-09-08 NOTE — PROGRESS NOTES
Shift assessment completed: Alert and Oriented x4. Vitals are stable. Respiratory status is regular, clear, SpO2 is 97% on 1.5L via NC. Pain is 0/10. Pt denies any other complications at this time. SR up 2/4. Bed in lowest position. Call light within reach. Bed alarm is on. Will monitor.     09/08/19 0855   Vital Signs   Temp 97.1 °F (36.2 °C)   Temp Source Oral   Pulse 77   Heart Rate Source Monitor   Resp 18   BP (!) 191/82   BP Location Right lower arm   BP Upper/Lower Lower   Patient Position Semi fowlers   Level of Consciousness 0   MEWS Score 1   Pain Assessment   Pain Assessment 0-10   Pain Level 0   Patient's Stated Pain Goal No pain   Response to Pain Intervention Patient Satisfied   Oxygen Therapy   SpO2 97 %   O2 Device Nasal cannula   O2 Flow Rate (L/min) 1.5 L/min

## 2019-09-08 NOTE — PROGRESS NOTES
Pt alert and oriented, slight confusion at times to situation. Pt medicated for pain 4/10 in lower back. Repositioned with pillow support. Medicated with PRN tylenol 2 tabs PO given. Call light within reach. Bed alarm is on. Will monitor.       09/08/19 1819   Vital Signs   Patient Currently in Pain Yes   Pain Assessment   Pain Assessment 0-10   Pain Level 4   Patient's Stated Pain Goal 2   Pain Type Acute pain;Chronic pain   Pain Location Back   Pain Orientation Lower   Pain Descriptors Aching;Discomfort   Pain Frequency Intermittent   Pain Onset Gradual   Clinical Progression Gradually worsening

## 2019-09-09 LAB
GLUCOSE BLD-MCNC: 157 MG/DL (ref 70–99)
GLUCOSE BLD-MCNC: 157 MG/DL (ref 70–99)
GLUCOSE BLD-MCNC: 215 MG/DL (ref 70–99)
GLUCOSE BLD-MCNC: 231 MG/DL (ref 70–99)
ORGANISM: ABNORMAL
PERFORMED ON: ABNORMAL
URINE CULTURE, ROUTINE: ABNORMAL

## 2019-09-09 PROCEDURE — 6360000002 HC RX W HCPCS: Performed by: INTERNAL MEDICINE

## 2019-09-09 PROCEDURE — 6370000000 HC RX 637 (ALT 250 FOR IP): Performed by: INTERNAL MEDICINE

## 2019-09-09 PROCEDURE — 2700000000 HC OXYGEN THERAPY PER DAY

## 2019-09-09 PROCEDURE — 1200000000 HC SEMI PRIVATE

## 2019-09-09 PROCEDURE — 94761 N-INVAS EAR/PLS OXIMETRY MLT: CPT

## 2019-09-09 PROCEDURE — 6360000002 HC RX W HCPCS: Performed by: NURSE PRACTITIONER

## 2019-09-09 PROCEDURE — 2580000003 HC RX 258: Performed by: INTERNAL MEDICINE

## 2019-09-09 PROCEDURE — 99232 SBSQ HOSP IP/OBS MODERATE 35: CPT | Performed by: INTERNAL MEDICINE

## 2019-09-09 RX ORDER — MELOXICAM 15 MG/1
TABLET ORAL
Qty: 30 TABLET | Refills: 2 | Status: SHIPPED | OUTPATIENT
Start: 2019-09-09 | End: 2019-09-11 | Stop reason: HOSPADM

## 2019-09-09 RX ORDER — CLONIDINE HYDROCHLORIDE 0.1 MG/1
0.1 TABLET ORAL 3 TIMES DAILY
Status: DISCONTINUED | OUTPATIENT
Start: 2019-09-09 | End: 2019-09-11 | Stop reason: HOSPADM

## 2019-09-09 RX ADMIN — HYDRALAZINE HYDROCHLORIDE 10 MG: 20 INJECTION INTRAMUSCULAR; INTRAVENOUS at 04:38

## 2019-09-09 RX ADMIN — Medication 81 MG: at 09:31

## 2019-09-09 RX ADMIN — VITAMIN E CAP 400 UNIT 400 UNITS: 400 CAP at 09:31

## 2019-09-09 RX ADMIN — CLONIDINE HYDROCHLORIDE 0.1 MG: 0.1 TABLET ORAL at 03:14

## 2019-09-09 RX ADMIN — CARVEDILOL 3.12 MG: 3.12 TABLET, FILM COATED ORAL at 08:00

## 2019-09-09 RX ADMIN — CARVEDILOL 3.12 MG: 3.12 TABLET, FILM COATED ORAL at 17:37

## 2019-09-09 RX ADMIN — CLONIDINE HYDROCHLORIDE 0.1 MG: 0.1 TABLET ORAL at 09:31

## 2019-09-09 RX ADMIN — CALCIUM CARBONATE 500 MG: 500 TABLET, CHEWABLE ORAL at 09:31

## 2019-09-09 RX ADMIN — CLONIDINE HYDROCHLORIDE 0.1 MG: 0.1 TABLET ORAL at 14:10

## 2019-09-09 RX ADMIN — MEROPENEM 1 G: 1 INJECTION, POWDER, FOR SOLUTION INTRAVENOUS at 00:12

## 2019-09-09 RX ADMIN — INSULIN LISPRO 1 UNITS: 100 INJECTION, SOLUTION INTRAVENOUS; SUBCUTANEOUS at 09:37

## 2019-09-09 RX ADMIN — SODIUM CHLORIDE: 9 INJECTION, SOLUTION INTRAVENOUS at 22:08

## 2019-09-09 RX ADMIN — CALCIUM CARBONATE 500 MG: 500 TABLET, CHEWABLE ORAL at 22:08

## 2019-09-09 RX ADMIN — ACETAMINOPHEN 650 MG: 325 TABLET ORAL at 02:45

## 2019-09-09 RX ADMIN — SENNOSIDES AND DOCUSATE SODIUM 1 TABLET: 8.6; 5 TABLET ORAL at 22:07

## 2019-09-09 RX ADMIN — INSULIN LISPRO 2 UNITS: 100 INJECTION, SOLUTION INTRAVENOUS; SUBCUTANEOUS at 13:12

## 2019-09-09 RX ADMIN — PANTOPRAZOLE SODIUM 40 MG: 40 TABLET, DELAYED RELEASE ORAL at 06:58

## 2019-09-09 RX ADMIN — MEROPENEM 1 G: 1 INJECTION, POWDER, FOR SOLUTION INTRAVENOUS at 08:00

## 2019-09-09 RX ADMIN — ISOSORBIDE MONONITRATE 30 MG: 30 TABLET ORAL at 09:31

## 2019-09-09 RX ADMIN — OXYBUTYNIN CHLORIDE 10 MG: 5 TABLET, EXTENDED RELEASE ORAL at 09:31

## 2019-09-09 RX ADMIN — SENNOSIDES AND DOCUSATE SODIUM 1 TABLET: 8.6; 5 TABLET ORAL at 09:32

## 2019-09-09 RX ADMIN — SODIUM CHLORIDE: 9 INJECTION, SOLUTION INTRAVENOUS at 03:37

## 2019-09-09 RX ADMIN — MEROPENEM 1 G: 1 INJECTION, POWDER, FOR SOLUTION INTRAVENOUS at 15:45

## 2019-09-09 RX ADMIN — CLONIDINE HYDROCHLORIDE 0.1 MG: 0.1 TABLET ORAL at 22:07

## 2019-09-09 RX ADMIN — ENOXAPARIN SODIUM 40 MG: 40 INJECTION SUBCUTANEOUS at 09:32

## 2019-09-09 RX ADMIN — DONEPEZIL HYDROCHLORIDE 5 MG: 5 TABLET, FILM COATED ORAL at 22:07

## 2019-09-09 RX ADMIN — INSULIN LISPRO 1 UNITS: 100 INJECTION, SOLUTION INTRAVENOUS; SUBCUTANEOUS at 17:38

## 2019-09-09 RX ADMIN — DULOXETINE HYDROCHLORIDE 120 MG: 60 CAPSULE, DELAYED RELEASE ORAL at 09:31

## 2019-09-09 RX ADMIN — MAGNESIUM HYDROXIDE 30 ML: 400 SUSPENSION ORAL at 07:01

## 2019-09-09 RX ADMIN — INSULIN LISPRO 1 UNITS: 100 INJECTION, SOLUTION INTRAVENOUS; SUBCUTANEOUS at 22:15

## 2019-09-09 ASSESSMENT — PAIN SCALES - GENERAL: PAINLEVEL_OUTOF10: 8

## 2019-09-09 NOTE — PROGRESS NOTES
Nutrition Assessment    Type and Reason for Visit: Initial, Positive Nutrition Screen(+ screen for MST = 3)    Nutrition Recommendations:   1. Continue CCC-4 diet order. 2. Added Ensure Clear with breakfast and dinner meals d/t patient report of lack of appetite/poor po intake x \"a few months\" PTA. 3. Monitor appetite, meal intake, and acceptance/intake of ONS. 4. Monitor for in-patient weight changes. 5. Monitor nutrition-related labs and bowel function/regimen. Nutrition Assessment: patient was nutritionally compromised upon admission AEB patient report of poor appetite/po intake x \"a few months, I guess\" and generalized weakness, however, she is improving from a nutritional standpoint AEB meal intake of 51-75% and feeling better overall; will continue CCC-4 diet order and add Ensure Clear with breakfast and dinner meals    Malnutrition Assessment:  · Malnutrition Status: At risk for malnutrition  · Context: Acute illness or injury  · Findings of the 6 clinical characteristics of malnutrition (Minimum of 2 out of 6 clinical characteristics is required to make the diagnosis of moderate or severe Protein Calorie Malnutrition based on AND/ASPEN Guidelines):  1. Energy Intake-Less than or equal to 75% of estimated energy requirement, Greater than or equal to 3 months    2. Weight Loss-No significant weight loss, in 6 months  3. Fat Loss-No significant subcutaneous fat loss,    4. Muscle Loss-No significant muscle mass loss,    5. Fluid Accumulation-No significant fluid accumulation,    6.  Strength-Not measured    Nutrition Risk Level:  Moderate    Nutrient Needs:  · Estimated Daily Total Kcal: 1530 - 1836 kcals based on 15-18 kcals/kg/CBW  · Estimated Daily Protein (g): 114 - 125 g protein based on 2.0-2.2 g/kg/IBW  · Estimated Daily Total Fluid (ml/day): 1500 - 1800 ml     Nutrition Diagnosis:   · Problem: Inadequate oral intake  · Etiology: related to Insufficient energy/nutrient consumption,

## 2019-09-09 NOTE — PROGRESS NOTES
Progress Note    Admit Date:  9/7/2019    Subjective:  Ms. Jose M Brasher stable. No distress . Objective:   BP (!) 149/79   Pulse 92   Temp 97.5 °F (36.4 °C) (Oral)   Resp 16   Ht 5' 5\" (1.651 m)   Wt 225 lb 12.8 oz (102.4 kg)   SpO2 95%   BMI 37.58 kg/m²       Intake/Output Summary (Last 24 hours) at 9/9/2019 1629  Last data filed at 9/9/2019 1347  Gross per 24 hour   Intake 1293 ml   Output --   Net 1293 ml         Physical Exam:  General:  Awake, alert, NAD  Skin:  Warm and dry  Neck:  JVD absent. Neck supple  Chest:  Clear to auscultation, respiration easy. No wheezes, rales or rhonchi. Cardiovascular:  RRR ,S1S2 normal  Abdomen:  Soft, non tender, non distended, BS +  Extremities:  No edema. Intact peripheral pulses.  Brisk cap refill, < 2 secs  Neuro: non focal      Medications:   Scheduled Meds:   cloNIDine  0.1 mg Oral TID    aspirin  81 mg Oral Daily    vitamin E  400 Units Oral Daily    calcium carbonate  500 mg Oral BID    isosorbide mononitrate  30 mg Oral Daily    oxybutynin  10 mg Oral Daily    DULoxetine  120 mg Oral Daily    carvedilol  3.125 mg Oral BID WC    sennosides-docusate sodium  1 tablet Oral BID    donepezil  5 mg Oral Nightly    pantoprazole  40 mg Oral QAM AC    sodium chloride flush  10 mL Intravenous 2 times per day    enoxaparin  40 mg Subcutaneous Daily    insulin lispro  0-6 Units Subcutaneous TID WC    insulin lispro  0-3 Units Subcutaneous Nightly    meropenem (MERREM) 1 g IVPB (mini-bag)  1 g Intravenous Q8H       Continuous Infusions:   sodium chloride 75 mL/hr at 09/09/19 0337    dextrose         Data:  CBC:   Recent Labs     09/07/19  1137 09/08/19  0601   WBC 6.6 7.7   RBC 3.43* 2.99*   HGB 10.4* 9.1*   HCT 31.9* 28.4*   MCV 92.9 94.9   RDW 16.0* 16.3*    214     BMP:   Recent Labs     09/07/19  1137 09/08/19  0602    140   K 4.0 4.0    106   CO2 27 28   BUN 14 13   CREATININE 0.8 0.7     BNP: No results for input(s): BNP in the last

## 2019-09-10 ENCOUNTER — APPOINTMENT (OUTPATIENT)
Dept: CT IMAGING | Age: 79
DRG: 689 | End: 2019-09-10
Payer: MEDICARE

## 2019-09-10 LAB
ANION GAP SERPL CALCULATED.3IONS-SCNC: 6 MMOL/L (ref 3–16)
BUN BLDV-MCNC: 15 MG/DL (ref 7–20)
CALCIUM SERPL-MCNC: 9.3 MG/DL (ref 8.3–10.6)
CHLORIDE BLD-SCNC: 103 MMOL/L (ref 99–110)
CO2: 29 MMOL/L (ref 21–32)
CREAT SERPL-MCNC: 0.8 MG/DL (ref 0.6–1.2)
GFR AFRICAN AMERICAN: >60
GFR NON-AFRICAN AMERICAN: >60
GLUCOSE BLD-MCNC: 156 MG/DL (ref 70–99)
GLUCOSE BLD-MCNC: 161 MG/DL (ref 70–99)
GLUCOSE BLD-MCNC: 182 MG/DL (ref 70–99)
GLUCOSE BLD-MCNC: 182 MG/DL (ref 70–99)
GLUCOSE BLD-MCNC: 202 MG/DL (ref 70–99)
HCT VFR BLD CALC: 27 % (ref 36–48)
HEMOGLOBIN: 9 G/DL (ref 12–16)
MAGNESIUM: 2.3 MG/DL (ref 1.8–2.4)
MCH RBC QN AUTO: 30.7 PG (ref 26–34)
MCHC RBC AUTO-ENTMCNC: 33.3 G/DL (ref 31–36)
MCV RBC AUTO: 92.3 FL (ref 80–100)
PDW BLD-RTO: 16.2 % (ref 12.4–15.4)
PERFORMED ON: ABNORMAL
PHOSPHORUS: 3.2 MG/DL (ref 2.5–4.9)
PLATELET # BLD: 212 K/UL (ref 135–450)
PMV BLD AUTO: 8.3 FL (ref 5–10.5)
POTASSIUM SERPL-SCNC: 4.4 MMOL/L (ref 3.5–5.1)
RBC # BLD: 2.93 M/UL (ref 4–5.2)
SODIUM BLD-SCNC: 138 MMOL/L (ref 136–145)
WBC # BLD: 6.6 K/UL (ref 4–11)

## 2019-09-10 PROCEDURE — 74170 CT ABD WO CNTRST FLWD CNTRST: CPT

## 2019-09-10 PROCEDURE — 6360000004 HC RX CONTRAST MEDICATION: Performed by: INTERNAL MEDICINE

## 2019-09-10 PROCEDURE — 36415 COLL VENOUS BLD VENIPUNCTURE: CPT

## 2019-09-10 PROCEDURE — 6360000002 HC RX W HCPCS: Performed by: INTERNAL MEDICINE

## 2019-09-10 PROCEDURE — 97535 SELF CARE MNGMENT TRAINING: CPT

## 2019-09-10 PROCEDURE — 6370000000 HC RX 637 (ALT 250 FOR IP): Performed by: INTERNAL MEDICINE

## 2019-09-10 PROCEDURE — 80048 BASIC METABOLIC PNL TOTAL CA: CPT

## 2019-09-10 PROCEDURE — 84100 ASSAY OF PHOSPHORUS: CPT

## 2019-09-10 PROCEDURE — 94761 N-INVAS EAR/PLS OXIMETRY MLT: CPT

## 2019-09-10 PROCEDURE — 2580000003 HC RX 258: Performed by: INTERNAL MEDICINE

## 2019-09-10 PROCEDURE — 85027 COMPLETE CBC AUTOMATED: CPT

## 2019-09-10 PROCEDURE — 97530 THERAPEUTIC ACTIVITIES: CPT

## 2019-09-10 PROCEDURE — 2700000000 HC OXYGEN THERAPY PER DAY

## 2019-09-10 PROCEDURE — 83735 ASSAY OF MAGNESIUM: CPT

## 2019-09-10 PROCEDURE — 1200000000 HC SEMI PRIVATE

## 2019-09-10 PROCEDURE — 99232 SBSQ HOSP IP/OBS MODERATE 35: CPT | Performed by: INTERNAL MEDICINE

## 2019-09-10 RX ORDER — CARVEDILOL 25 MG/1
25 TABLET ORAL 2 TIMES DAILY WITH MEALS
Status: DISCONTINUED | OUTPATIENT
Start: 2019-09-10 | End: 2019-09-11 | Stop reason: HOSPADM

## 2019-09-10 RX ORDER — CARVEDILOL 6.25 MG/1
6.25 TABLET ORAL 2 TIMES DAILY WITH MEALS
Status: DISCONTINUED | OUTPATIENT
Start: 2019-09-10 | End: 2019-09-10

## 2019-09-10 RX ADMIN — CLONIDINE HYDROCHLORIDE 0.1 MG: 0.1 TABLET ORAL at 15:37

## 2019-09-10 RX ADMIN — MEROPENEM 1 G: 1 INJECTION, POWDER, FOR SOLUTION INTRAVENOUS at 00:12

## 2019-09-10 RX ADMIN — MEROPENEM 1 G: 1 INJECTION, POWDER, FOR SOLUTION INTRAVENOUS at 08:14

## 2019-09-10 RX ADMIN — INSULIN LISPRO 1 UNITS: 100 INJECTION, SOLUTION INTRAVENOUS; SUBCUTANEOUS at 08:28

## 2019-09-10 RX ADMIN — IOPAMIDOL 75 ML: 755 INJECTION, SOLUTION INTRAVENOUS at 10:40

## 2019-09-10 RX ADMIN — CARVEDILOL 25 MG: 25 TABLET, FILM COATED ORAL at 16:59

## 2019-09-10 RX ADMIN — CARVEDILOL 3.12 MG: 3.12 TABLET, FILM COATED ORAL at 08:16

## 2019-09-10 RX ADMIN — VITAMIN E CAP 400 UNIT 400 UNITS: 400 CAP at 08:16

## 2019-09-10 RX ADMIN — CALCIUM CARBONATE 500 MG: 500 TABLET, CHEWABLE ORAL at 08:16

## 2019-09-10 RX ADMIN — INSULIN LISPRO 1 UNITS: 100 INJECTION, SOLUTION INTRAVENOUS; SUBCUTANEOUS at 21:17

## 2019-09-10 RX ADMIN — DONEPEZIL HYDROCHLORIDE 5 MG: 5 TABLET, FILM COATED ORAL at 20:36

## 2019-09-10 RX ADMIN — INSULIN LISPRO 1 UNITS: 100 INJECTION, SOLUTION INTRAVENOUS; SUBCUTANEOUS at 16:59

## 2019-09-10 RX ADMIN — CARVEDILOL 25 MG: 25 TABLET, FILM COATED ORAL at 11:47

## 2019-09-10 RX ADMIN — SENNOSIDES AND DOCUSATE SODIUM 1 TABLET: 8.6; 5 TABLET ORAL at 08:16

## 2019-09-10 RX ADMIN — CALCIUM CARBONATE 500 MG: 500 TABLET, CHEWABLE ORAL at 20:35

## 2019-09-10 RX ADMIN — OXYBUTYNIN CHLORIDE 10 MG: 5 TABLET, EXTENDED RELEASE ORAL at 08:16

## 2019-09-10 RX ADMIN — Medication 81 MG: at 08:16

## 2019-09-10 RX ADMIN — Medication 10 ML: at 20:37

## 2019-09-10 RX ADMIN — PANTOPRAZOLE SODIUM 40 MG: 40 TABLET, DELAYED RELEASE ORAL at 08:16

## 2019-09-10 RX ADMIN — CLONIDINE HYDROCHLORIDE 0.1 MG: 0.1 TABLET ORAL at 08:16

## 2019-09-10 RX ADMIN — CLONIDINE HYDROCHLORIDE 0.1 MG: 0.1 TABLET ORAL at 20:36

## 2019-09-10 RX ADMIN — INSULIN LISPRO 1 UNITS: 100 INJECTION, SOLUTION INTRAVENOUS; SUBCUTANEOUS at 11:47

## 2019-09-10 RX ADMIN — ISOSORBIDE MONONITRATE 30 MG: 30 TABLET ORAL at 08:16

## 2019-09-10 RX ADMIN — DULOXETINE HYDROCHLORIDE 120 MG: 60 CAPSULE, DELAYED RELEASE ORAL at 08:16

## 2019-09-10 RX ADMIN — MAGNESIUM HYDROXIDE 30 ML: 400 SUSPENSION ORAL at 20:36

## 2019-09-10 RX ADMIN — SENNOSIDES AND DOCUSATE SODIUM 1 TABLET: 8.6; 5 TABLET ORAL at 20:36

## 2019-09-10 RX ADMIN — ENOXAPARIN SODIUM 40 MG: 40 INJECTION SUBCUTANEOUS at 08:16

## 2019-09-10 RX ADMIN — MEROPENEM 1 G: 1 INJECTION, POWDER, FOR SOLUTION INTRAVENOUS at 23:43

## 2019-09-10 RX ADMIN — MEROPENEM 1 G: 1 INJECTION, POWDER, FOR SOLUTION INTRAVENOUS at 15:37

## 2019-09-10 NOTE — PLAN OF CARE
Problem: Risk for Impaired Skin Integrity  Goal: Tissue integrity - skin and mucous membranes  Description  Structural intactness and normal physiological function of skin and  mucous membranes. Outcome: Ongoing     Problem: SAFETY  Goal: Free from accidental physical injury  Outcome: Ongoing  Goal: Free from intentional harm  Outcome: Ongoing     Problem: DAILY CARE  Goal: Daily care needs are met  Outcome: Ongoing     Problem: PAIN  Goal: Patient's pain/discomfort is manageable  Outcome: Ongoing     Problem: SKIN INTEGRITY  Goal: Skin integrity is maintained or improved  Outcome: Ongoing     Problem: KNOWLEDGE DEFICIT  Goal: Patient/S.O. demonstrates understanding of disease process, treatment plan, medications, and discharge instructions.   Outcome: Ongoing     Problem: DISCHARGE BARRIERS  Goal: Patient's continuum of care needs are met  Outcome: Ongoing     Problem: Sensory:  Goal: General experience of comfort will improve  Description  General experience of comfort will improve  Outcome: Ongoing     Problem: Urinary Elimination:  Goal: Signs and symptoms of infection will decrease  Description  Signs and symptoms of infection will decrease  Outcome: Ongoing  Goal: Ability to reestablish a normal urinary elimination pattern will improve - after catheter removal  Description  Ability to reestablish a normal urinary elimination pattern will improve  Outcome: Ongoing  Goal: Complications related to the disease process, condition or treatment will be avoided or minimized  Description  Complications related to the disease process, condition or treatment will be avoided or minimized  Outcome: Ongoing     Problem: OXYGENATION/RESPIRATORY FUNCTION  Goal: Patient will maintain patent airway  Outcome: Ongoing  Goal: Patient will achieve/maintain normal respiratory rate/effort  Description  Respiratory rate and effort will be within normal limits for the patient  Outcome: Ongoing     Problem: Pain:  Goal: Pain level
Problem: SAFETY  Goal: Free from accidental physical injury  9/8/2019 1513 by Marysol Kaiser RN  Outcome: Ongoing  Goal: Free from intentional harm  9/8/2019 1513 by Marysol Kaiser RN  Outcome: Ongoing     Problem: DAILY CARE  Goal: Daily care needs are met  9/8/2019 1513 by Marysol Kaiser RN  Outcome: Ongoing     Problem: KNOWLEDGE DEFICIT  Goal: Patient/S.O. demonstrates understanding of disease process, treatment plan, medications, and discharge instructions.   9/8/2019 1513 by Marysol Kaiser RN  Outcome: Ongoing     Problem: DISCHARGE BARRIERS  Goal: Patient's continuum of care needs are met  9/8/2019 1513 by Marysol Kaiser RN  Outcome: Ongoing     Problem: Sensory:  Goal: General experience of comfort will improve  Description  General experience of comfort will improve  9/8/2019 1513 by Marysol Kaiser RN  Outcome: Ongoing     Problem: Urinary Elimination:  Goal: Signs and symptoms of infection will decrease  Description  Signs and symptoms of infection will decrease  9/8/2019 1513 by Marysol Kaiser RN  Outcome: Ongoing  Goal: Ability to reestablish a normal urinary elimination pattern will improve - after catheter removal  Description  Ability to reestablish a normal urinary elimination pattern will improve  9/8/2019 1513 by Marysol Kaiser RN  Outcome: Ongoing
will decrease  Description  Pain level will decrease  Outcome: Ongoing  Goal: Control of acute pain  Description  Control of acute pain  Outcome: Ongoing     Problem: Nutrition  Goal: Optimal nutrition therapy  9/9/2019 1620 by Pan Gonzalez RN  Outcome: Ongoing  9/9/2019 1119 by Mahesh Han RD, LD  Outcome: Ongoing  Goal: Understanding of nutritional guidelines  9/9/2019 1620 by Pan Gonzalez RN  Outcome: Ongoing  9/9/2019 1119 by Mahesh Han RD, LD  Outcome: Ongoing

## 2019-09-10 NOTE — PROGRESS NOTES
Upper Body Bathing:  Supervision  3). Lower Body Bathing:  Min A  4). Upper Body Dressing: Supervision  5). Lower Body Dressing: Min A  6).  Pt to lisa UE exs x 15 reps       Plan: cont with [facility], OTR/L #[id number]      If patient discharges from this facility prior to next visit, this note will serve as the Discharge Summary

## 2019-09-11 VITALS
DIASTOLIC BLOOD PRESSURE: 72 MMHG | HEART RATE: 73 BPM | HEIGHT: 65 IN | SYSTOLIC BLOOD PRESSURE: 152 MMHG | WEIGHT: 243.2 LBS | TEMPERATURE: 97.4 F | BODY MASS INDEX: 40.52 KG/M2 | RESPIRATION RATE: 18 BRPM | OXYGEN SATURATION: 95 %

## 2019-09-11 LAB
GLUCOSE BLD-MCNC: 143 MG/DL (ref 70–99)
GLUCOSE BLD-MCNC: 148 MG/DL (ref 70–99)
GLUCOSE BLD-MCNC: 333 MG/DL (ref 70–99)
PERFORMED ON: ABNORMAL

## 2019-09-11 PROCEDURE — 6370000000 HC RX 637 (ALT 250 FOR IP): Performed by: INTERNAL MEDICINE

## 2019-09-11 PROCEDURE — 97530 THERAPEUTIC ACTIVITIES: CPT

## 2019-09-11 PROCEDURE — 99238 HOSP IP/OBS DSCHRG MGMT 30/<: CPT | Performed by: INTERNAL MEDICINE

## 2019-09-11 PROCEDURE — 97535 SELF CARE MNGMENT TRAINING: CPT

## 2019-09-11 PROCEDURE — 2580000003 HC RX 258: Performed by: INTERNAL MEDICINE

## 2019-09-11 PROCEDURE — 6360000002 HC RX W HCPCS: Performed by: INTERNAL MEDICINE

## 2019-09-11 RX ORDER — CLONIDINE HYDROCHLORIDE 0.1 MG/1
0.1 TABLET ORAL 3 TIMES DAILY
DISCHARGE
Start: 2019-09-11

## 2019-09-11 RX ORDER — PRAVASTATIN SODIUM 20 MG
20 TABLET ORAL NIGHTLY
DISCHARGE
Start: 2019-09-11

## 2019-09-11 RX ADMIN — INSULIN LISPRO 1 UNITS: 100 INJECTION, SOLUTION INTRAVENOUS; SUBCUTANEOUS at 08:05

## 2019-09-11 RX ADMIN — Medication 10 ML: at 08:04

## 2019-09-11 RX ADMIN — CARVEDILOL 25 MG: 25 TABLET, FILM COATED ORAL at 16:34

## 2019-09-11 RX ADMIN — ISOSORBIDE MONONITRATE 30 MG: 30 TABLET ORAL at 08:03

## 2019-09-11 RX ADMIN — Medication 81 MG: at 08:03

## 2019-09-11 RX ADMIN — DULOXETINE HYDROCHLORIDE 120 MG: 60 CAPSULE, DELAYED RELEASE ORAL at 08:03

## 2019-09-11 RX ADMIN — MEROPENEM 1 G: 1 INJECTION, POWDER, FOR SOLUTION INTRAVENOUS at 08:04

## 2019-09-11 RX ADMIN — VITAMIN E CAP 400 UNIT 400 UNITS: 400 CAP at 08:03

## 2019-09-11 RX ADMIN — SENNOSIDES AND DOCUSATE SODIUM 1 TABLET: 8.6; 5 TABLET ORAL at 08:04

## 2019-09-11 RX ADMIN — CARVEDILOL 25 MG: 25 TABLET, FILM COATED ORAL at 08:04

## 2019-09-11 RX ADMIN — CALCIUM CARBONATE 500 MG: 500 TABLET, CHEWABLE ORAL at 08:04

## 2019-09-11 RX ADMIN — OXYBUTYNIN CHLORIDE 10 MG: 5 TABLET, EXTENDED RELEASE ORAL at 08:03

## 2019-09-11 RX ADMIN — CLONIDINE HYDROCHLORIDE 0.1 MG: 0.1 TABLET ORAL at 08:04

## 2019-09-11 RX ADMIN — CLONIDINE HYDROCHLORIDE 0.1 MG: 0.1 TABLET ORAL at 13:36

## 2019-09-11 RX ADMIN — MEROPENEM 1 G: 1 INJECTION, POWDER, FOR SOLUTION INTRAVENOUS at 16:34

## 2019-09-11 RX ADMIN — ENOXAPARIN SODIUM 40 MG: 40 INJECTION SUBCUTANEOUS at 08:04

## 2019-09-11 RX ADMIN — PANTOPRAZOLE SODIUM 40 MG: 40 TABLET, DELAYED RELEASE ORAL at 05:44

## 2019-09-11 RX ADMIN — INSULIN LISPRO 1 UNITS: 100 INJECTION, SOLUTION INTRAVENOUS; SUBCUTANEOUS at 16:43

## 2019-09-11 RX ADMIN — INSULIN LISPRO 4 UNITS: 100 INJECTION, SOLUTION INTRAVENOUS; SUBCUTANEOUS at 11:56

## 2019-09-11 ASSESSMENT — PAIN SCALES - GENERAL
PAINLEVEL_OUTOF10: 0
PAINLEVEL_OUTOF10: 0

## 2019-09-11 NOTE — DISCHARGE INSTR - COC
OTHER SURGICAL HISTORY  07/09/2018    CYSTOSCOPY, HYDRODISTENTION OF BLADDER WITH INSTILLATION OF    POLYPECTOMY      SHOULDER SURGERY      TOENAIL EXCISION  08/04/2016    right big toe    TONSILLECTOMY      TUBAL LIGATION      UPPER GASTROINTESTINAL ENDOSCOPY  10/29/12    gastritis    UPPER GASTROINTESTINAL ENDOSCOPY  2/10/2016    URETHRAL STRICTURE DILATATION         Immunization History:   Immunization History   Administered Date(s) Administered    Influenza Vaccine, unspecified formulation 09/21/2015    Pneumococcal Conjugate 13-valent ([id number]) 09/21/2015    Pneumococcal Polysaccharide ([id number]) 05/25/2013    Tdap (Boostrix, Adacel) 09/13/2013       Active Problems:  Patient Active Problem List   Diagnosis Code    Hypertension I10    Arthritis M19.90    Constipation K59.00    Leg edema R60.0    Dizziness R42    Premature atrial beats I49.1    Mixed hyperlipidemia E78.2    SOB (shortness of breath) R06.02    CHF (congestive heart failure) ([facility]) B20.0    Uncomplicated asthma Z75.341    Essential hypertension I10    CKD (chronic kidney disease) stage 3, GFR 30-59 ml/min ([facility]) N18.3    Tremor, essential G25.0    Hypersomnia G47.10    Obstructive sleep apnea G47.33    Persistent disorder of initiating or maintaining sleep G47.00    Restless legs syndrome (RLS) G25.81    Sensory hearing loss, bilateral H90.3    Anxiety and depression F41.9, F32.9    Peroneal tendon injury S86.309A    Aortic stenosis, mild I35.0    Facial asymmetry Q67.0    CAD (coronary artery disease) I25.10    Controlled type 2 diabetes mellitus with diabetic neuropathy, without long-term current use of insulin ([facility]) E11.40    Interstitial cystitis N30.10    COPD (chronic obstructive pulmonary disease) ([facility]) J44.9    Gastroesophageal reflux disease K21.9    Lumbar radiculopathy M54.16    Right hip pain X64.344    Metabolic encephalopathy M67.34    Restrictive lung disease J98.4    Acute confusion

## 2019-09-11 NOTE — PROGRESS NOTES
time     Therapeutic Exercise Corina deferred for focus on functional mobility (disregard section)  Ankle pumps:  Quad sets:   Glut sets:   Heel slides:   SLR:   Hip abd/add:   LAQ:   Marching:      Balance  Static Sitting:                     Fair +             Tolerance: pt sat EOB x15 minutes, pt demo'd forward lean and forward flexed posture with cues for upright posture  Dynamic Sitting:     Fair +  Static Standing:      Fair              Tolerance: able to stand for 2 minutes at [employer] Standing:          Fair     Patient Education      Role of PT, POC, Discharge recommendations, safety awareness, transfer techniques and calling for assist with mobility.      Positioning Needs       Pt reclined in chair, call light and needs in reach, alarm set and pillows placed for support/comfort.     ROM Measurements N/A  Knee Flexion:  Knee Extension:      Activity Tolerance   Pt completed therapy session with No nausea, dizziness, pain or SOB noted w/activity. SpO2:  HR:  BP:     Other  None.      Assessment :  Pt able to perform transfers with use of RW and 2 person assist. Pt continues to demo decreased sitting balance and decreased activity tolerance. Will continue to progress mobility as pt tolerates. Goals (all goals ongoing unless otherwise indicated)  To be met in 3 visits:  1). Independent with LE Ex x 10 reps     To be met in 6 visits:  1).  Supine to/from sit: Mod A with appropriate BP response  2).  Sit to/from stand: tolerate assessment GOAL MET 9/11: updated goal. Sit-stand with min A  3).  Bed to chair:tolerate asssessment GOAL MET 9/11: updated goal.  Bed to chair with min A  4).  Gait: Ambulate: tolerate asssesment GOAL MET 9/11: updated goal.  Gait: 20' with RW and min A  5).   Tolerate B LE exercises 3 sets of 10-15 reps     Plan   Continue with plan of care.     Signature: Alesha Petersen, PT [id number]     If patient discharges from this facility prior to next visit, this note will serve as the

## 2019-09-11 NOTE — PROGRESS NOTES
Occupational Therapy Daily Treatment Note    Unit: [facility]  Date:  9/11/2019  Patient Name:    Marlyn Brothers  Admitting diagnosis:  Acute encephalopathy [G93.40]  Admit Date:  9/7/2019  Precautions/Restrictions:  fall risk, IV and bed/chair alarm      Discharge Recommendations: SNF  DME needs for discharge: defer to facility       Therapy recommendations for staff:   Assist of 2 (minimal assist) with use of STEDY for all transfers to/from BSC/chair    AM-PAC Score: 15  Home Health S4 Level: NA       Treatment Time:  7021-2102  Treatment number:  3    Total Treatment Time:   43 minutes      Subjective:  Patient supine in bed upon therapist arrival. Agreeable to OOB. Pain   No  Rating: NA  Location:  Pain Medicine Status: Denies need      Bed Mobility:   Supine to Sit:  Mod A  Sit to Supine:  Not Tested  Rolling:           Not Tested  Scooting: Mod A    Transfer Training:   Sit to stand:   Min A of 2   Stand to sit:  Min A of 2  Bed to Chair:  Mod A of 2 and with use of RW  Bed to [facility]:   Not Tested  Standard toilet:   Not Tested    Activity Tolerance   Pt completed therapy session with No nausea, dizziness, pain or SOB noted w/activity    ADL Training:   Upper body dressing:  Min A  Upper body bathing: Mod A  Lower body dressing: Total A   Lower body bathing:  Max A  Toileting: Max A for perihygiene, reports no awareness of when she needs to urinate  Grooming/Hygiene:  Not Tested    Therapeutic Exercise:   N/A    Patient Education:   Role of OT  Recommendations for DC    Positioning Needs:   Reclined in chair with call light and needs in reach. Alarm Set    Family Present:  No    Assessment: Patient with continued improvement, required intermittent MIN A and cues to maintain sitting balance during ADLs (leaning forward this date). Patient will needs rehab at DC to improve activity tolerance. GOALS-continue all  To be met in 3 Visits:  1). Supine to Sit: Mod A       To be met in 5 Visits:  1).  Bed to [facility]: Will tolerate assessment when appropriate   2). Upper Body Bathing:  Supervision  3). Lower Body Bathing:  Min A  4). Upper Body Dressing: Supervision  5). Lower Body Dressing: Min A  6).  Pt to lisa UE exs x 15 reps       Plan: cont with [address], OTR/L [id number]        If patient discharges from this facility prior to next visit, this note will serve as the Discharge Summary

## 2019-09-12 ENCOUNTER — CARE COORDINATION (OUTPATIENT)
Dept: CASE MANAGEMENT | Age: 79
End: 2019-09-12

## 2019-09-12 LAB
BLOOD CULTURE, ROUTINE: NORMAL
CULTURE, BLOOD 2: NORMAL

## 2019-09-12 NOTE — CARE COORDINATION
[facility] Call    2019    Patient: Joshua Gómez Patient : 1940   MRN: [id number]  Reason for Admission: acute cystitis  Discharge Date: 19 RARS: Readmission Risk Score: [address] discharged to  [facility] 2019. Writer placed call to SNF and spoke with Roldan. Notified her that [facility] will follow while at SNF and reviewed [facility] Predict Outcome:     Anticipated Length of Stay in Days 14.0-16.3 (15.1 average)  Therapy: 572 Minutes per Week  Projected non-skilled caregiver needs post SNF 3.5 Hours/Day    She will forward message to their [address].         Future Appointments   Date Time Provider [place]   10/15/2019  1:00 PM DO TASHA Saba  [facility]   2019  1:00 PM KIMI Manzo - CNP [facility]       Eli Emmanuel RN

## 2019-09-13 NOTE — DISCHARGE SUMMARY
additional instructions     pravastatin 20 MG tablet  Commonly known as:  PRAVACHOL  Take 1 tablet by mouth nightly TAKE ONE TABLET BY MOUTH DAILY  What changed:    · how much to take  · how to take this  · when to take this     SENNA PLUS 8.6-50 MG per tablet  Generic drug:  senna-docusate  TAKE 2 TABLETS BY MOUTH DAILY  What changed:  See the new instructions.         CONTINUE taking these medications    albuterol sulfate  (90 Base) MCG/ACT inhaler  Inhale 2 puffs into the lungs 4 times daily as needed for Wheezing     aspirin 81 MG tablet     blood glucose test strips strip  Commonly known as:  ASCENSIA AUTODISC VI;ONE TOUCH ULTRA TEST VI  TEST BLOOD SUGAR TWICE A DAY     calcium carbonate 600 MG Tabs tablet  Take 1 tablet by mouth 2 times daily     carvedilol 25 MG tablet  Commonly known as:  COREG  TAKE ONE TABLET BY MOUTH TWICE A DAY WITH FOOD     donepezil 5 MG tablet  Commonly known as:  ARICEPT  Take 1 tablet by mouth nightly     DULoxetine 60 MG extended release capsule  Commonly known as:  CYMBALTA  Take 2 capsules by mouth daily     isosorbide mononitrate 30 MG extended release tablet  Commonly known as:  IMDUR  TAKE ONE-HALF TABLET BY MOUTH ONE TIME A DAY     metFORMIN 500 MG extended release tablet  Commonly known as:  GLUCOPHAGE-XR  TAKE TWO TABLETS TWICE A DAY WITH MEALS     nitroGLYCERIN 0.4 MG SL tablet  Commonly known as:  NITROSTAT  Place 1 tablet under the tongue every 5 minutes as needed for Chest pain     omeprazole 20 MG delayed release capsule  Commonly known as:  PRILOSEC  TAKE ONE CAPSULE TWICE A DAY     oxybutynin 10 MG extended release tablet  Commonly known as:  DITROPAN-XL     pramipexole 0.5 MG tablet  Commonly known as:  MIRAPEX  TAKE ONE TABLET BY MOUTH ONCE NIGHTLY     vitamin E 400 UNIT capsule        STOP taking these medications    ACCU-CHEK SOFTCLIX LANCETS Misc     ciprofloxacin 250 MG tablet  Commonly known as:  CIPRO     diclofenac 75 MG EC tablet  Commonly known as:

## 2019-09-24 ENCOUNTER — CARE COORDINATION (OUTPATIENT)
Dept: CARE COORDINATION | Age: 79
End: 2019-09-24

## 2019-11-05 ENCOUNTER — APPOINTMENT (OUTPATIENT)
Dept: CT IMAGING | Age: 79
End: 2019-11-05
Payer: MEDICARE

## 2019-11-05 ENCOUNTER — HOSPITAL ENCOUNTER (EMERGENCY)
Age: 79
Discharge: NURSING, REHAB OR LONG-TERM CARE | End: 2019-11-06
Attending: EMERGENCY MEDICINE
Payer: MEDICARE

## 2019-11-05 DIAGNOSIS — I10 ASYMPTOMATIC HYPERTENSION: Primary | ICD-10-CM

## 2019-11-05 DIAGNOSIS — N30.00 ACUTE CYSTITIS WITHOUT HEMATURIA: ICD-10-CM

## 2019-11-05 LAB
A/G RATIO: 1.7 (ref 1.1–2.2)
ALBUMIN SERPL-MCNC: 4 G/DL (ref 3.4–5)
ALP BLD-CCNC: 83 U/L (ref 40–129)
ALT SERPL-CCNC: 8 U/L (ref 10–40)
ANION GAP SERPL CALCULATED.3IONS-SCNC: 13 MMOL/L (ref 3–16)
AST SERPL-CCNC: 8 U/L (ref 15–37)
BASOPHILS ABSOLUTE: 0.1 K/UL (ref 0–0.2)
BASOPHILS RELATIVE PERCENT: 0.8 %
BILIRUB SERPL-MCNC: 0.4 MG/DL (ref 0–1)
BILIRUBIN URINE: NEGATIVE
BLOOD, URINE: ABNORMAL
BUN BLDV-MCNC: 12 MG/DL (ref 7–20)
CALCIUM SERPL-MCNC: 9.3 MG/DL (ref 8.3–10.6)
CHLORIDE BLD-SCNC: 98 MMOL/L (ref 99–110)
CLARITY: ABNORMAL
CO2: 28 MMOL/L (ref 21–32)
COLOR: YELLOW
COMMENT UA: ABNORMAL
CREAT SERPL-MCNC: 0.6 MG/DL (ref 0.6–1.2)
EOSINOPHILS ABSOLUTE: 0.3 K/UL (ref 0–0.6)
EOSINOPHILS RELATIVE PERCENT: 4.1 %
GFR AFRICAN AMERICAN: >60
GFR NON-AFRICAN AMERICAN: >60
GLOBULIN: 2.3 G/DL
GLUCOSE BLD-MCNC: 202 MG/DL (ref 70–99)
GLUCOSE URINE: NEGATIVE MG/DL
HCT VFR BLD CALC: 29.8 % (ref 36–48)
HEMOGLOBIN: 9.5 G/DL (ref 12–16)
KETONES, URINE: NEGATIVE MG/DL
LEUKOCYTE ESTERASE, URINE: ABNORMAL
LYMPHOCYTES ABSOLUTE: 1.8 K/UL (ref 1–5.1)
LYMPHOCYTES RELATIVE PERCENT: 23.6 %
MCH RBC QN AUTO: 28.5 PG (ref 26–34)
MCHC RBC AUTO-ENTMCNC: 31.9 G/DL (ref 31–36)
MCV RBC AUTO: 89.3 FL (ref 80–100)
MICROSCOPIC EXAMINATION: YES
MONOCYTES ABSOLUTE: 0.6 K/UL (ref 0–1.3)
MONOCYTES RELATIVE PERCENT: 7.3 %
NEUTROPHILS ABSOLUTE: 5 K/UL (ref 1.7–7.7)
NEUTROPHILS RELATIVE PERCENT: 64.2 %
NITRITE, URINE: NEGATIVE
PDW BLD-RTO: 17 % (ref 12.4–15.4)
PH UA: 6 (ref 5–8)
PLATELET # BLD: 210 K/UL (ref 135–450)
PMV BLD AUTO: 8.9 FL (ref 5–10.5)
POTASSIUM REFLEX MAGNESIUM: 4.1 MMOL/L (ref 3.5–5.1)
PROTEIN UA: 100 MG/DL
RBC # BLD: 3.34 M/UL (ref 4–5.2)
RBC UA: ABNORMAL /HPF (ref 0–2)
SODIUM BLD-SCNC: 139 MMOL/L (ref 136–145)
SPECIFIC GRAVITY UA: >=1.03 (ref 1–1.03)
TOTAL PROTEIN: 6.3 G/DL (ref 6.4–8.2)
TROPONIN: <0.01 NG/ML
URINE REFLEX TO CULTURE: YES
URINE TYPE: ABNORMAL
UROBILINOGEN, URINE: 0.2 E.U./DL
WBC # BLD: 7.7 K/UL (ref 4–11)
WBC UA: >100 /HPF (ref 0–5)

## 2019-11-05 PROCEDURE — 70450 CT HEAD/BRAIN W/O DYE: CPT

## 2019-11-05 PROCEDURE — 93005 ELECTROCARDIOGRAM TRACING: CPT | Performed by: EMERGENCY MEDICINE

## 2019-11-05 PROCEDURE — 6370000000 HC RX 637 (ALT 250 FOR IP): Performed by: EMERGENCY MEDICINE

## 2019-11-05 PROCEDURE — 84484 ASSAY OF TROPONIN QUANT: CPT

## 2019-11-05 PROCEDURE — 87086 URINE CULTURE/COLONY COUNT: CPT

## 2019-11-05 PROCEDURE — 51701 INSERT BLADDER CATHETER: CPT

## 2019-11-05 PROCEDURE — 99285 EMERGENCY DEPT VISIT HI MDM: CPT

## 2019-11-05 PROCEDURE — 80053 COMPREHEN METABOLIC PANEL: CPT

## 2019-11-05 PROCEDURE — 85025 COMPLETE CBC W/AUTO DIFF WBC: CPT

## 2019-11-05 PROCEDURE — 81001 URINALYSIS AUTO W/SCOPE: CPT

## 2019-11-05 RX ORDER — NITROFURANTOIN 25; 75 MG/1; MG/1
100 CAPSULE ORAL 2 TIMES DAILY
Qty: 20 CAPSULE | Refills: 0 | Status: SHIPPED | OUTPATIENT
Start: 2019-11-05 | End: 2019-11-15

## 2019-11-05 RX ORDER — CLONIDINE HYDROCHLORIDE 0.1 MG/1
0.1 TABLET ORAL ONCE
Status: COMPLETED | OUTPATIENT
Start: 2019-11-05 | End: 2019-11-05

## 2019-11-05 RX ORDER — BUSPIRONE HYDROCHLORIDE 5 MG/1
10 TABLET ORAL DAILY
Status: ON HOLD | COMMUNITY
Start: 2019-10-16 | End: 2021-10-20 | Stop reason: HOSPADM

## 2019-11-05 RX ORDER — FUROSEMIDE 40 MG/1
20 TABLET ORAL DAILY
Status: ON HOLD | COMMUNITY
Start: 2019-10-16 | End: 2020-10-24 | Stop reason: HOSPADM

## 2019-11-05 RX ORDER — CARVEDILOL 25 MG/1
25 TABLET ORAL ONCE
Status: COMPLETED | OUTPATIENT
Start: 2019-11-05 | End: 2019-11-05

## 2019-11-05 RX ADMIN — CARVEDILOL 25 MG: 25 TABLET, FILM COATED ORAL at 20:52

## 2019-11-05 RX ADMIN — CLONIDINE HYDROCHLORIDE 0.1 MG: 0.1 TABLET ORAL at 20:53

## 2019-11-06 VITALS
WEIGHT: 288 LBS | SYSTOLIC BLOOD PRESSURE: 192 MMHG | BODY MASS INDEX: 47.98 KG/M2 | RESPIRATION RATE: 18 BRPM | TEMPERATURE: 98 F | OXYGEN SATURATION: 93 % | HEIGHT: 65 IN | HEART RATE: 70 BPM | DIASTOLIC BLOOD PRESSURE: 87 MMHG

## 2019-11-06 LAB
EKG ATRIAL RATE: 71 BPM
EKG DIAGNOSIS: NORMAL
EKG P AXIS: 56 DEGREES
EKG P-R INTERVAL: 184 MS
EKG Q-T INTERVAL: 424 MS
EKG QRS DURATION: 92 MS
EKG QTC CALCULATION (BAZETT): 460 MS
EKG R AXIS: -10 DEGREES
EKG T AXIS: 23 DEGREES
EKG VENTRICULAR RATE: 71 BPM

## 2019-11-06 PROCEDURE — 93010 ELECTROCARDIOGRAM REPORT: CPT | Performed by: INTERNAL MEDICINE

## 2019-11-07 LAB — URINE CULTURE, ROUTINE: NORMAL

## 2019-12-18 ENCOUNTER — TELEPHONE (OUTPATIENT)
Dept: PULMONOLOGY | Age: 79
End: 2019-12-18

## 2020-03-02 ENCOUNTER — TELEPHONE (OUTPATIENT)
Dept: PULMONOLOGY | Age: 80
End: 2020-03-02

## 2020-03-02 NOTE — TELEPHONE ENCOUNTER
[facility]  calling to cancel appt for pt's one yr F/U ( asking if it was necessary, as transportation is difficult to schedule). Explained may need orders for supplies. And to ensure things are going well. Nurse stated will call back to r/s after she sets up transportation. 01/02/19    Assessment:       · Severe GRACE. BIPAP 14/8 cmH2O. Optimal compliance and efficacy on review today. · Obesity  · HTN- BP elevated in office  · Asthma and cough followed by Dr. Eli Jones  · Sleep onset and maintenance insomnia- poor sleep hygiene, leg pain likely contributing-improving  · Fatigue and tiredness during the day-improving         Plan:       · Continue BIPAP 14/8 cm H2O  · Mask fitting in office with RT  · Order heated tubing  · Advised to use BiPAP 6-8 hrs at night and during naps. · Replacement of mask, tubing, head straps every 3-6 months or sooner if damaged. · Sleep hygiene  · Cognitive behavioral therapy was again discussed with patient including stimulus control and sleep restriction   · Avoid sedatives, alcohol and caffeinated drinks at bed time. · No driving motorized vehicles or operating heavy machinery while fatigue, drowsy or sleepy. · Weight loss is also recommended as a long-term intervention. · Complications of GRACE if not treated were discussed with patient patient to include systemic hypertension, pulmonary hypertension, cardiovascular morbidities, car accidents and all cause mortality.   · Patient education handout provided regarding sleep tips and CPAP cleaning recommendations   · Follow up with PCP for blood pressure  · Continue to follow up with Dr. Eli Jones as recommended        Follow up 1 year, sooner if needed

## 2020-04-01 ENCOUNTER — TELEPHONE (OUTPATIENT)
Dept: PULMONOLOGY | Age: 80
End: 2020-04-01

## 2020-04-06 ENCOUNTER — TELEPHONE (OUTPATIENT)
Dept: PULMONOLOGY | Age: 80
End: 2020-04-06

## 2020-04-09 ENCOUNTER — VIRTUAL VISIT (OUTPATIENT)
Dept: PULMONOLOGY | Age: 80
End: 2020-04-09
Payer: MEDICARE

## 2020-04-09 PROCEDURE — 4040F PNEUMOC VAC/ADMIN/RCVD: CPT | Performed by: NURSE PRACTITIONER

## 2020-04-09 PROCEDURE — 1090F PRES/ABSN URINE INCON ASSESS: CPT | Performed by: NURSE PRACTITIONER

## 2020-04-09 PROCEDURE — G8427 DOCREV CUR MEDS BY ELIG CLIN: HCPCS | Performed by: NURSE PRACTITIONER

## 2020-04-09 PROCEDURE — 1123F ACP DISCUSS/DSCN MKR DOCD: CPT | Performed by: NURSE PRACTITIONER

## 2020-04-09 PROCEDURE — 99213 OFFICE O/P EST LOW 20 MIN: CPT | Performed by: NURSE PRACTITIONER

## 2020-04-09 PROCEDURE — G8400 PT W/DXA NO RESULTS DOC: HCPCS | Performed by: NURSE PRACTITIONER

## 2020-04-09 ASSESSMENT — SLEEP AND FATIGUE QUESTIONNAIRES
ESS TOTAL SCORE: 11
HOW LIKELY ARE YOU TO NOD OFF OR FALL ASLEEP WHILE LYING DOWN TO REST IN THE AFTERNOON WHEN CIRCUMSTANCES PERMIT: 3
HOW LIKELY ARE YOU TO NOD OFF OR FALL ASLEEP WHILE WATCHING TV: 2
HOW LIKELY ARE YOU TO NOD OFF OR FALL ASLEEP IN A CAR, WHILE STOPPED FOR A FEW MINUTES IN TRAFFIC: 0
HOW LIKELY ARE YOU TO NOD OFF OR FALL ASLEEP WHILE SITTING AND READING: 0
HOW LIKELY ARE YOU TO NOD OFF OR FALL ASLEEP WHEN YOU ARE A PASSENGER IN A CAR FOR AN HOUR WITHOUT A BREAK: 0
HOW LIKELY ARE YOU TO NOD OFF OR FALL ASLEEP WHILE SITTING QUIETLY AFTER LUNCH WITHOUT ALCOHOL: 3
HOW LIKELY ARE YOU TO NOD OFF OR FALL ASLEEP WHILE SITTING INACTIVE IN A PUBLIC PLACE: 3
HOW LIKELY ARE YOU TO NOD OFF OR FALL ASLEEP WHILE SITTING AND TALKING TO SOMEONE: 0

## 2020-04-09 NOTE — PATIENT INSTRUCTIONS
.Please keep all of your future appointments scheduled by [address], [facility] office. Out of respect for other patients and providers, you may be asked to reschedule your appointment if you arrive later than your scheduled appointment time. Appointments cancelled less than 24hrs in advance will be considered a no show. Patients with three missed appointments within 1 year or four missed appointments within 2 years can be dismissed from the practice. You may receive a survey regarding the care you received during your visit. Your input is valuable to us. We encourage you to complete and return your survey. We hope you will choose us in the future for your healthcare needs. Remember to bring your sleep machine, cord and mask to each appointment    You should have a follow up appointment scheduled with a sleep medicine provider within 12 months. Routine parts, masks, tubing and filters should all be obtainable from your DME (equipment) provider. Any problems related to mask fit, comfort or functioning of equipment should also be addressed directly with your DME provider. If you are unable to resolve problems, then please notify our office and schedule an appointment to be seen sooner than the usual follow up appointment. For all patients who are evaluated for sleep disorders, never drive or operate motorized equipment while sleepy, fatigued or groggy. Please bring in all of your sleep equipment to all of your appointments, including machine, mask, cords and hoses. Here are some tips to to getting better sleep  1- Avoid napping during the day: This will ensure you are tired at bedtime. If you have to take a nap, sleep less than one hour, before 3 pm.   2- Exercise regularly, but not right before bed: but the timing of the workout is important. Exercising in the morning or early afternoon will not interfere with sleep.   Exercising within two hours before bedtime can decrease

## 2020-04-09 NOTE — PROGRESS NOTES
CHIEF COMPLAINT:  Sleep apnea follow up    Consulting provider: Yamileth Lane DO      Toni Singh is a 67 y.o. female  With nurse at nursing home for televideo appointment via video and audio doxy. me virtual visit for GRACE follow up. Patient states she is having mask fitting issues. Patient states she is using BiPAP 8 hrs/night. States she cannot sleep without BiPAP. Using humidifier. No known snoring on BiPAP. The pressure is well tolerated. The mask is somewhat comfortable-dreamwear full face. +mask leak. No significant daytime sleepiness. No driving. No dry nose or throat. Some fatigue. Bedtime is 830-9 pm and rise time is 8 am. Sleep onset is few minutes. Wakes up 2-3 times at night total. Incontinent. It takes few minutes to fall back a sleep. Sometimes naps during the day. No headache in am. No weight gain. 2-3 caffienated beverages during the day. No alcohol. ESS is 1. Previous 1/2/19  Toni Singh is a 67 y.o. female in office for GRACE follow up. At previous visits she had been using O2 at night however it was stopped at some point (also was not recommended on last titration at that time). Patient had multiple ONPO which showed low O2 despite multiple CPAP pressure adjustments so patient had new titration to evaluate pressure change vs need for O2 at night. PAP titration was reviewed by me and noted below. It recommended BIPAP and did not show need for O2 at night. Results were dicussed with patient and multiple good questions were answered. Patient is using BiPAP 8 hrs/night. She is doing well with new BIPAP states she is less tired in the day since starting BIPAP. Using humidifier- is getting some condensation in the tubing. No snoring on BiPAP. The pressure is well tolerated. The mask is comfortable- dreamwear full face. Some mask leak. No significant daytime sleepiness- has improved since using BIPAP. No driving. No dry nose or throat. No fatigue.  Bedtime is 11 pm and rise Rfl: 1    metFORMIN (GLUCOPHAGE-XR) 500 MG extended release tablet, TAKE TWO TABLETS TWICE A DAY WITH MEALS, Disp: 120 tablet, Rfl: 5    SENNA PLUS 8.6-50 MG per tablet, TAKE 2 TABLETS BY MOUTH DAILY (Patient taking differently: nightly ), Disp: 60 tablet, Rfl: 5    carvedilol (COREG) 25 MG tablet, TAKE ONE TABLET BY MOUTH TWICE A DAY WITH FOOD, Disp: 180 tablet, Rfl: 1    DULoxetine (CYMBALTA) 60 MG extended release capsule, Take 2 capsules by mouth daily, Disp: 60 capsule, Rfl: 5    pramipexole (MIRAPEX) 0.5 MG tablet, TAKE ONE TABLET BY MOUTH ONCE NIGHTLY, Disp: 30 tablet, Rfl: 5    omeprazole (PRILOSEC) 20 MG delayed release capsule, TAKE ONE CAPSULE TWICE A DAY, Disp: 60 capsule, Rfl: 5    oxybutynin (DITROPAN-XL) 10 MG extended release tablet, Take 10 mg by mouth daily, Disp: , Rfl:     albuterol sulfate  (90 Base) MCG/ACT inhaler, Inhale 2 puffs into the lungs 4 times daily as needed for Wheezing, Disp: 3 Inhaler, Rfl: 1    isosorbide mononitrate (IMDUR) 30 MG extended release tablet, TAKE ONE-HALF TABLET BY MOUTH ONE TIME A DAY, Disp: 45 tablet, Rfl: 3    clotrimazole-betamethasone (LOTRISONE) 1-0.05 % cream, Apply bid to affected area. (Patient taking differently: As needed), Disp: 45 g, Rfl: 3    nitroGLYCERIN (NITROSTAT) 0.4 MG SL tablet, Place 1 tablet under the tongue every 5 minutes as needed for Chest pain, Disp: 25 tablet, Rfl: 1    calcium carbonate 600 MG TABS tablet, Take 1 tablet by mouth 2 times daily, Disp: 30 tablet, Rfl: 1    vitamin E 400 UNIT capsule, Take 400 Units by mouth daily, Disp: , Rfl:     aspirin 81 MG tablet, Take 81 mg by mouth daily. , Disp: , Rfl:       Objective:   PHYSICAL EXAM:    not currently breastfeeding. Exam:  Gen: No acute distress, does not appear to be in pain. Resp:No visualized signs of difficulty breathing or respiratory distress, speaking in full sentences  Neuro: Awake. Alert. Psych: Oriented x 3. No anxiety.          DATA:   PSG contributing-improving  · Fatigue and tiredness during the day-improving        Plan:      · Continue BIPAP 14/8 cm H2O  · Mask fitting by RT in facility or with DME when able. · Heated tubing  · Distilled water in humidifier patient's states nursing home is using drinking water  · Advised to use BiPAP 6-8 hrs at night and during naps. · Replacement of mask, tubing, head straps every 3-6 months or sooner if damaged. · Sleep hygiene  · Cognitive behavioral therapy was again discussed with patient including stimulus control and sleep restriction   · Avoid sedatives, alcohol and caffeinated drinks at bed time. · No driving motorized vehicles or operating heavy machinery while fatigue, drowsy or sleepy. · Weight loss is also recommended as a long-term intervention. · Complications of GRACE if not treated were discussed with patient patient to include systemic hypertension, pulmonary hypertension, cardiovascular morbidities, car accidents and all cause mortality. · Patient educationregarding sleep tips and CPAP cleaning recommendations   · Continue to follow up with Dr. Eli Jones as recommended      Follow up 1 year, sooner if needed    Consent for telehealth visit was obtained and is noted in chart    THIS VISIT [address] [facility]. [place]

## 2020-08-10 NOTE — PROGRESS NOTES
Unspecified cerebral artery occlusion with cerebral infarction     mini stroke     Past Surgical History:   Procedure Laterality Date    ABDOMEN SURGERY      ACHILLES TENDON SURGERY  2/2/12    LEFT ACHILLES DEBRIDEMENT, HAGLUNDS AND RETROCALCANEAL BURSA EXCISION WITH FLEXOR HALLUS LONGUS TENDON TRANSFER WITH BLOCK FOR PAIN CONTROL    APPENDECTOMY      CARDIAC CATHETERIZATION  8/21/14    CARPAL TUNNEL RELEASE      both hands    CHOLECYSTECTOMY      COLONOSCOPY      COLONOSCOPY      COLONOSCOPY  2/10/2016    CYSTOSCOPY  10/02/2017    DIAGNOSTIC CARDIAC CATH LAB PROCEDURE      ENDOSCOPY, COLON, DIAGNOSTIC      EYE SURGERY      HYSTERECTOMY      HYSTERECTOMY, TOTAL ABDOMINAL      JOINT REPLACEMENT      bilateral knee    KNEE SURGERY      both replaced    OTHER SURGICAL HISTORY  07/09/2018    CYSTOSCOPY, HYDRODISTENTION OF BLADDER WITH INSTILLATION OF    POLYPECTOMY      SHOULDER SURGERY      TOENAIL EXCISION  08/04/2016    right big toe    TONSILLECTOMY      TUBAL LIGATION      UPPER GASTROINTESTINAL ENDOSCOPY  10/29/12    gastritis    UPPER GASTROINTESTINAL ENDOSCOPY  2/10/2016    URETHRAL STRICTURE DILATATION         Objective:   BP (!) 140/66   Pulse 72 Comment: irregular  SpO2 98%     Wt Readings from Last 3 Encounters:   11/05/19 288 lb (130.6 kg)   09/11/19 243 lb 3.2 oz (110.3 kg)   08/14/19 226 lb (102.5 kg)       Physical Exam:  General: No Respiratory distress, appears well developed and well nourished. Eyes:  Sclera nonicteric  Nose/Sinuses:  negative findings: nose shows no deformity, asymmetry, or inflammation, nasal mucosa normal, septum midline with no perforation or bleeding  Back:  no pain to palpation  Joint:  no active joint inflammation  Musculoskeletal:  negative  Skin:  Warm and dry  Neck:  Negative for JVD and R Carotid Bruits.    Chest:  Clear to auscultation, respiration easy  Cardiovascular:  72 irreg, S1S2 normal, 3/6 systolic murmur in aortic area as well as left  calcium carbonate 600 MG TABS tablet Take 1 tablet by mouth 2 times daily 30 tablet 1    vitamin E 400 UNIT capsule Take 400 Units by mouth daily      aspirin 81 MG tablet Take 81 mg by mouth daily.  donepezil (ARICEPT) 5 MG tablet Take 1 tablet by mouth nightly (Patient not taking: Reported on 8/11/2020) 90 tablet 1    oxybutynin (DITROPAN-XL) 10 MG extended release tablet Take 10 mg by mouth daily      clotrimazole-betamethasone (LOTRISONE) 1-0.05 % cream Apply bid to affected area. (Patient not taking: Reported on 8/11/2020) 45 g 3     No current facility-administered medications for this visit. Lab Data:  CBC: No results for input(s): WBC, HGB, HCT, MCV, PLT in the last 72 hours. BMP:   No results for input(s): NA, K, CL, CO2, PHOS, BUN, CREATININE in the last 72 hours. Invalid input(s): CA  LIVER PROFILE: No results for input(s): AST, ALT, LIPASE, BILIDIR, BILITOT, ALKPHOS in the last 72 hours. Invalid input(s): AMYLASE,  ALB  LIPID:   No components found for: CHLPL  Lab Results   Component Value Date    TRIG 124 07/01/2017    TRIG 177 (H) 08/18/2015    TRIG 94 02/06/2015     Lab Results   Component Value Date    HDL 54 07/01/2017    HDL 40 08/18/2015    HDL 46 02/06/2015     Lab Results   Component Value Date    LDLCALC 100 (H) 07/01/2017    LDLCALC 85 08/18/2015    LDLCALC 98 02/06/2015     Lab Results   Component Value Date    LABVLDL 25 07/01/2017    LABVLDL 35 08/18/2015    LABVLDL 19 02/06/2015     PT/INR: No results for input(s): PROTIME, INR in the last 72 hours. A1C:   Lab Results   Component Value Date    LABA1C 7.6 09/07/2019     BNP:  No results for input(s): BNP in the last 72 hours. IMAGING:   EKG 8.11.20  NSR occasional premature atrial contractions  Carotid Doppler 12/7/16  1. There is no gross plaque or stenosis in the internal carotid arteries  bilaterally. 2. The vertebral arteries are patent with antegrade flow bilaterally.     3. Changes in lab criteria have down-graded the severity of disease  bilaterally compared to previous exam on 3/5/2014. Echo doppler of heart 0.47.46   Normal systolic function with an estimated ejection fraction of 55%.   Mild concentric left ventricular hypertrophy.   Diastolic filling parameters suggests grade I diastolic dysfunction.   Mitral annular calcification is present. Mild mitral regurgitation.   The aortic valve is thickened/calcified with decreased leaflet mobility c/w   mild aortic stenosis.   Mild aortic regurgitation.   Systolic pulmonary artery pressure (SPAP) is normal and estimated at 26 mmHg   (RA pressure 3 mmHg).    EK2019 NSR 71 bpm  EKG 8.9.15 normal sinus rhythm. Computer read it wrong, no flutter present on my review. ECHO 2018  Summary   Technically difficult examination. The pt coughed throughout the study. Normal left ventricle size. Mild concentric left ventricular hypertrophy. Normal systolic function with an estimated ejection fraction of 65-70%. No regional wall motion abnormalities are seen. Grade I diastolic dysfunction with normal filing pressure. There is a late peaking gradient recorded across the LV outflow tract   consistent with dynamic obstruction with valsalva a peak gradient of 37 mmHg   was noted. Systolic pulmonary artery pressure (SPAP) is normal and estimated at 24 mmHg   (RA pressure 3 mmHg). Mitral annular calcification is present. 14 ECHO   Summary  1. Normal left ventricle size, borderline concentric left ventricular  hypertrophy and systolic function with an estimated ejection fraction of  60-65%. 2. There is a late peaking gradient recorded across the LV outflow tract  with valsalva consistent with dynamic obstruction with a peak   gradient  of 30mmHg. 3. Mild calcification of the posterior leaflet of the mitral valve. Mild  mitral regurgitation. 4. The aortic valve is thickened/calcified with decreased leaflet   mobility. Mild aortic stenosis. Mild aortic valve regurgitation. 48 hour Holter Monitor 01/09/2013: The rhythm was sinus premature atrial beats no symptoms. CATH 8/21/14   Mild coronary artery disease with preserved left ventricular  function. There is evidence for volume overload and slight decompensation. RECOMMENDATIONS: At this point is aggressive risk factor modification along  with aggressive diuresis. MD JOSE Moe/[id number]  DD: 08/21/2014 14:20     Stress test:  9/7/2012  1. Normal left ventricular ejection fraction and wall motion. EF 79%. 2. No large areas of reversibility to suggest ischemia. Assessment:  Encounter Diagnoses   Name Primary?  Chest pain, unspecified type     Aortic valve stenosis, etiology of cardiac valve disease unspecified Yes       1. Hypertension controlled  2. CHF (congestive heart failure)diastolic compensated  3. 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 Non obst CAD        Plan:  1. Echocardiogram call [phone] central scheduling  2. EKG today in office  3. Continue current plan of meds will investigate outflow obstruction  She is on beta  blockes now. 4. Follow up in 6 months  5. I ordered complete blood test.    This note was scribed in the presence of April Monae MD by Jessy Carvalho RN.     I, Dr. April Monae, personally performed the services described in this documentation, as scribed by the above signed scribe in my presence. It is both accurate and complete to my knowledge. I agree with the details independently gathered by the clinical support staff, while the remaining scribed note accurately describes my personal service to the patient.       Briana Santana MD 8/11/2020 3:51 PM

## 2020-08-11 ENCOUNTER — OFFICE VISIT (OUTPATIENT)
Dept: CARDIOLOGY CLINIC | Age: 80
End: 2020-08-11
Payer: COMMERCIAL

## 2020-08-11 VITALS — HEART RATE: 72 BPM | DIASTOLIC BLOOD PRESSURE: 66 MMHG | SYSTOLIC BLOOD PRESSURE: 140 MMHG | OXYGEN SATURATION: 98 %

## 2020-08-11 PROCEDURE — 99214 OFFICE O/P EST MOD 30 MIN: CPT | Performed by: INTERNAL MEDICINE

## 2020-08-11 PROCEDURE — 93000 ELECTROCARDIOGRAM COMPLETE: CPT | Performed by: INTERNAL MEDICINE

## 2020-08-11 RX ORDER — LISINOPRIL 40 MG/1
40 TABLET ORAL DAILY
Status: ON HOLD | COMMUNITY
End: 2021-10-19

## 2020-08-11 NOTE — PATIENT INSTRUCTIONS
Plan:  1. Echocardiogram call [phone] central scheduling  2. EKG today in office  3. Continue current plan  4.  Follow up in 6 months

## 2020-08-11 NOTE — LETTER
tongue every 5 minutes as needed for Chest pain 25 tablet 1    calcium carbonate 600 MG TABS tablet Take 1 tablet by mouth 2 times daily 30 tablet 1    vitamin E 400 UNIT capsule Take 400 Units by mouth daily      aspirin 81 MG tablet Take 81 mg by mouth daily.  donepezil (ARICEPT) 5 MG tablet Take 1 tablet by mouth nightly (Patient not taking: Reported on 8/11/2020) 90 tablet 1    oxybutynin (DITROPAN-XL) 10 MG extended release tablet Take 10 mg by mouth daily      clotrimazole-betamethasone (LOTRISONE) 1-0.05 % cream Apply bid to affected area. (Patient not taking: Reported on 8/11/2020) 45 g 3     No current facility-administered medications for this visit. Lab Data:  CBC: No results for input(s): WBC, HGB, HCT, MCV, PLT in the last 72 hours. BMP:   No results for input(s): NA, K, CL, CO2, PHOS, BUN, CREATININE in the last 72 hours. Invalid input(s): CA  LIVER PROFILE: No results for input(s): AST, ALT, LIPASE, BILIDIR, BILITOT, ALKPHOS in the last 72 hours. Invalid input(s): AMYLASE,  ALB  LIPID:   No components found for: CHLPL  Lab Results   Component Value Date    TRIG 124 07/01/2017    TRIG 177 (H) 08/18/2015    TRIG 94 02/06/2015     Lab Results   Component Value Date    HDL 54 07/01/2017    HDL 40 08/18/2015    HDL 46 02/06/2015     Lab Results   Component Value Date    LDLCALC 100 (H) 07/01/2017    LDLCALC 85 08/18/2015    LDLCALC 98 02/06/2015     Lab Results   Component Value Date    LABVLDL 25 07/01/2017    LABVLDL 35 08/18/2015    LABVLDL 19 02/06/2015     PT/INR: No results for input(s): PROTIME, INR in the last 72 hours. A1C:   Lab Results   Component Value Date    LABA1C 7.6 09/07/2019     BNP:  No results for input(s): BNP in the last 72 hours. IMAGING:   EKG 8.11.20  NSR occasional premature atrial contractions  Carotid Doppler 12/7/16  1. There is no gross plaque or stenosis in the internal carotid arteries  bilaterally. 2. The vertebral arteries are patent with antegrade flow bilaterally. 3. Changes in lab criteria have down-graded the severity of disease  bilaterally compared to previous exam on 3/5/2014. Echo doppler of heart 0.70.28   Normal systolic function with an estimated ejection fraction of 55%.   Mild concentric left ventricular hypertrophy.   Diastolic filling parameters suggests grade I diastolic dysfunction.   Mitral annular calcification is present. Mild mitral regurgitation.   The aortic valve is thickened/calcified with decreased leaflet mobility c/w   mild aortic stenosis.   Mild aortic regurgitation.   Systolic pulmonary artery pressure (SPAP) is normal and estimated at 26 mmHg   (RA pressure 3 mmHg).    EK2019 NSR 71 bpm  EKG 8.9.15 normal sinus rhythm. Computer read it wrong, no flutter present on my review. ECHO 2018  Summary   Technically difficult examination. The pt coughed throughout the study. Normal left ventricle size. Mild concentric left ventricular hypertrophy. Normal systolic function with an estimated ejection fraction of 65-70%. No regional wall motion abnormalities are seen. Grade I diastolic dysfunction with normal filing pressure. There is a late peaking gradient recorded across the LV outflow tract   consistent with dynamic obstruction with valsalva a peak gradient of 37 mmHg   was noted. Systolic pulmonary artery pressure (SPAP) is normal and estimated at 24 mmHg   (RA pressure 3 mmHg). Mitral annular calcification is present. 14 ECHO   Summary  1. Normal left ventricle size, borderline concentric left ventricular  hypertrophy and systolic function with an estimated ejection fraction of  60-65%. 2. There is a late peaking gradient recorded across the LV outflow tract  with valsalva consistent with dynamic obstruction with a peak   gradient  of 30mmHg. 3. Mild calcification of the posterior leaflet of the mitral valve. Mild  mitral regurgitation. 4. The aortic valve is thickened/calcified with decreased leaflet   mobility. Mild aortic stenosis. Mild aortic valve regurgitation. 48 hour Holter Monitor 01/09/2013: The rhythm was sinus premature atrial beats no symptoms. CATH 8/21/14   Mild coronary artery disease with preserved left ventricular  function. There is evidence for volume overload and slight decompensation. RECOMMENDATIONS: At this point is aggressive risk factor modification along  with aggressive diuresis. Nelida Bowen MD  [id number]  DD: 08/21/2014 14:20     Stress test:  9/7/2012  1. Normal left ventricular ejection fraction and wall motion. EF 79%. 2. No large areas of reversibility to suggest ischemia. Assessment:  Encounter Diagnoses   Name Primary?  Chest pain, unspecified type     Aortic valve stenosis, etiology of cardiac valve disease unspecified Yes       1. Hypertension controlled  2. CHF (congestive heart failure)diastolic compensated  3. 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 Non obst CAD        Plan:  1. Echocardiogram call [phone] central scheduling  2. EKG today in office  3. Continue current plan of meds will investigate outflow obstruction  She is on beta  blockes now. 4. Follow up in 6 months  5. I ordered complete blood test.    This note was scribed in the presence of April Monae MD by Jessy Carvalho RN.     I, Dr. April Monae, personally performed the services described in this documentation, as scribed by the above signed scribe in my presence. It is both accurate and complete to my knowledge. I agree with the details independently gathered by the clinical support staff, while the remaining scribed note accurately describes my personal service to the patient.       Briana Santana MD 8/11/2020 3:51 PM

## 2020-08-25 ENCOUNTER — HOSPITAL ENCOUNTER (OUTPATIENT)
Dept: CARDIOLOGY | Age: 80
Discharge: HOME | End: 2020-08-25
Payer: COMMERCIAL

## 2020-08-25 LAB
LV EF: 65 %
LVEF MODALITY: NORMAL

## 2020-08-25 PROCEDURE — 93306 TTE W/DOPPLER COMPLETE: CPT

## 2020-08-26 ENCOUNTER — TELEPHONE (OUTPATIENT)
Dept: CARDIOLOGY CLINIC | Age: 80
End: 2020-08-26

## 2020-08-26 NOTE — TELEPHONE ENCOUNTER
----- Message from Briana Santana MD sent at 8/26/2020  1:35 PM EDT -----  Echocardiogram is stable continue current medications. Pt resides in [facility] spoke to daughter notified of test results, message given.  Patient verbalized understanding Leny Chase RN

## 2020-10-12 ENCOUNTER — CARE COORDINATION (OUTPATIENT)
Dept: CASE MANAGEMENT | Age: 80
End: 2020-10-12

## 2020-10-12 NOTE — CARE COORDINATION
Patient discharged to [facility] [place] at intermediate LOC 10/10/2020.     Ju Vance, CODEYN     [address] [facility] / [facility]

## 2020-10-20 ENCOUNTER — APPOINTMENT (OUTPATIENT)
Dept: CT IMAGING | Age: 80
DRG: 690 | End: 2020-10-20
Payer: COMMERCIAL

## 2020-10-20 ENCOUNTER — HOSPITAL ENCOUNTER (INPATIENT)
Age: 80
LOS: 3 days | Discharge: NURSING, REHAB OR LONG-TERM CARE | DRG: 690 | End: 2020-10-24
Attending: EMERGENCY MEDICINE | Admitting: INTERNAL MEDICINE
Payer: COMMERCIAL

## 2020-10-20 LAB
A/G RATIO: 1.3 (ref 1.1–2.2)
ALBUMIN SERPL-MCNC: 3.5 G/DL (ref 3.4–5)
ALP BLD-CCNC: 88 U/L (ref 40–129)
ALT SERPL-CCNC: 8 U/L (ref 10–40)
ANION GAP SERPL CALCULATED.3IONS-SCNC: 9 MMOL/L (ref 3–16)
AST SERPL-CCNC: 10 U/L (ref 15–37)
BASOPHILS ABSOLUTE: 0.1 K/UL (ref 0–0.2)
BASOPHILS RELATIVE PERCENT: 1.4 %
BILIRUB SERPL-MCNC: <0.2 MG/DL (ref 0–1)
BILIRUBIN URINE: NEGATIVE
BLOOD, URINE: ABNORMAL
BUN BLDV-MCNC: 21 MG/DL (ref 7–20)
CALCIUM SERPL-MCNC: 9.2 MG/DL (ref 8.3–10.6)
CHLORIDE BLD-SCNC: 99 MMOL/L (ref 99–110)
CLARITY: ABNORMAL
CO2: 27 MMOL/L (ref 21–32)
COLOR: YELLOW
COMMENT UA: ABNORMAL
CREAT SERPL-MCNC: 1.7 MG/DL (ref 0.6–1.2)
EOSINOPHILS ABSOLUTE: 0.2 K/UL (ref 0–0.6)
EOSINOPHILS RELATIVE PERCENT: 2.7 %
GFR AFRICAN AMERICAN: 35
GFR NON-AFRICAN AMERICAN: 29
GLOBULIN: 2.8 G/DL
GLUCOSE BLD-MCNC: 245 MG/DL (ref 70–99)
GLUCOSE URINE: NEGATIVE MG/DL
HCT VFR BLD CALC: 28.4 % (ref 36–48)
HEMOGLOBIN: 9 G/DL (ref 12–16)
KETONES, URINE: NEGATIVE MG/DL
LACTIC ACID, SEPSIS: 1.7 MMOL/L (ref 0.4–1.9)
LEUKOCYTE ESTERASE, URINE: ABNORMAL
LIPASE: 58 U/L (ref 13–60)
LYMPHOCYTES ABSOLUTE: 2 K/UL (ref 1–5.1)
LYMPHOCYTES RELATIVE PERCENT: 23.8 %
MCH RBC QN AUTO: 30.6 PG (ref 26–34)
MCHC RBC AUTO-ENTMCNC: 31.9 G/DL (ref 31–36)
MCV RBC AUTO: 96 FL (ref 80–100)
MICROSCOPIC EXAMINATION: YES
MONOCYTES ABSOLUTE: 0.8 K/UL (ref 0–1.3)
MONOCYTES RELATIVE PERCENT: 10 %
NEUTROPHILS ABSOLUTE: 5.2 K/UL (ref 1.7–7.7)
NEUTROPHILS RELATIVE PERCENT: 62.1 %
NITRITE, URINE: NEGATIVE
PDW BLD-RTO: 17.8 % (ref 12.4–15.4)
PH UA: 6.5 (ref 5–8)
PLATELET # BLD: 369 K/UL (ref 135–450)
PMV BLD AUTO: 8.2 FL (ref 5–10.5)
POTASSIUM SERPL-SCNC: 4.9 MMOL/L (ref 3.5–5.1)
PROTEIN UA: 100 MG/DL
RBC # BLD: 2.95 M/UL (ref 4–5.2)
RBC UA: ABNORMAL /HPF (ref 0–4)
SARS-COV-2, NAAT: NOT DETECTED
SODIUM BLD-SCNC: 135 MMOL/L (ref 136–145)
SPECIFIC GRAVITY UA: 1.01 (ref 1–1.03)
TOTAL PROTEIN: 6.3 G/DL (ref 6.4–8.2)
URINE TYPE: ABNORMAL
UROBILINOGEN, URINE: 0.2 E.U./DL
WBC # BLD: 8.4 K/UL (ref 4–11)
WBC UA: >100 /HPF (ref 0–5)

## 2020-10-20 PROCEDURE — 87186 SC STD MICRODIL/AGAR DIL: CPT

## 2020-10-20 PROCEDURE — U0002 COVID-19 LAB TEST NON-CDC: HCPCS

## 2020-10-20 PROCEDURE — 80053 COMPREHEN METABOLIC PANEL: CPT

## 2020-10-20 PROCEDURE — 74176 CT ABD & PELVIS W/O CONTRAST: CPT

## 2020-10-20 PROCEDURE — 81001 URINALYSIS AUTO W/SCOPE: CPT

## 2020-10-20 PROCEDURE — 84100 ASSAY OF PHOSPHORUS: CPT

## 2020-10-20 PROCEDURE — 84443 ASSAY THYROID STIM HORMONE: CPT

## 2020-10-20 PROCEDURE — 83690 ASSAY OF LIPASE: CPT

## 2020-10-20 PROCEDURE — 99285 EMERGENCY DEPT VISIT HI MDM: CPT

## 2020-10-20 PROCEDURE — 83735 ASSAY OF MAGNESIUM: CPT

## 2020-10-20 PROCEDURE — 87086 URINE CULTURE/COLONY COUNT: CPT

## 2020-10-20 PROCEDURE — 85025 COMPLETE CBC W/AUTO DIFF WBC: CPT

## 2020-10-20 PROCEDURE — 96361 HYDRATE IV INFUSION ADD-ON: CPT

## 2020-10-20 PROCEDURE — 2580000003 HC RX 258: Performed by: NURSE PRACTITIONER

## 2020-10-20 PROCEDURE — 87077 CULTURE AEROBIC IDENTIFY: CPT

## 2020-10-20 PROCEDURE — 83605 ASSAY OF LACTIC ACID: CPT

## 2020-10-20 RX ORDER — SODIUM CHLORIDE 9 MG/ML
INJECTION, SOLUTION INTRAVENOUS CONTINUOUS
Status: DISCONTINUED | OUTPATIENT
Start: 2020-10-20 | End: 2020-10-22

## 2020-10-20 RX ADMIN — SODIUM CHLORIDE: 9 INJECTION, SOLUTION INTRAVENOUS at 22:05

## 2020-10-20 ASSESSMENT — PAIN SCALES - GENERAL: PAINLEVEL_OUTOF10: 8

## 2020-10-20 ASSESSMENT — PAIN DESCRIPTION - LOCATION: LOCATION: ABDOMEN

## 2020-10-21 PROBLEM — N17.9 AKI (ACUTE KIDNEY INJURY) (HCC): Status: ACTIVE | Noted: 2020-10-21

## 2020-10-21 LAB
ANION GAP SERPL CALCULATED.3IONS-SCNC: 12 MMOL/L (ref 3–16)
BUN BLDV-MCNC: 20 MG/DL (ref 7–20)
CALCIUM SERPL-MCNC: 9.1 MG/DL (ref 8.3–10.6)
CHLORIDE BLD-SCNC: 100 MMOL/L (ref 99–110)
CO2: 22 MMOL/L (ref 21–32)
CREAT SERPL-MCNC: 1.5 MG/DL (ref 0.6–1.2)
ESTIMATED AVERAGE GLUCOSE: 185.8 MG/DL
GFR AFRICAN AMERICAN: 40
GFR NON-AFRICAN AMERICAN: 33
GLUCOSE BLD-MCNC: 156 MG/DL (ref 70–99)
GLUCOSE BLD-MCNC: 188 MG/DL (ref 70–99)
GLUCOSE BLD-MCNC: 211 MG/DL (ref 70–99)
GLUCOSE BLD-MCNC: 250 MG/DL (ref 70–99)
GLUCOSE BLD-MCNC: 278 MG/DL (ref 70–99)
GLUCOSE BLD-MCNC: 323 MG/DL (ref 70–99)
HBA1C MFR BLD: 8.1 %
MAGNESIUM: 2.2 MG/DL (ref 1.8–2.4)
PERFORMED ON: ABNORMAL
PHOSPHORUS: 4.2 MG/DL (ref 2.5–4.9)
POTASSIUM SERPL-SCNC: 5 MMOL/L (ref 3.5–5.1)
SODIUM BLD-SCNC: 134 MMOL/L (ref 136–145)
TOTAL CK: 14 U/L (ref 26–192)
TROPONIN: <0.01 NG/ML
TROPONIN: <0.01 NG/ML
TSH REFLEX: 0.64 UIU/ML (ref 0.27–4.2)

## 2020-10-21 PROCEDURE — 97162 PT EVAL MOD COMPLEX 30 MIN: CPT

## 2020-10-21 PROCEDURE — 94150 VITAL CAPACITY TEST: CPT

## 2020-10-21 PROCEDURE — 6360000002 HC RX W HCPCS: Performed by: INTERNAL MEDICINE

## 2020-10-21 PROCEDURE — 6360000002 HC RX W HCPCS: Performed by: NURSE PRACTITIONER

## 2020-10-21 PROCEDURE — 97530 THERAPEUTIC ACTIVITIES: CPT

## 2020-10-21 PROCEDURE — 36415 COLL VENOUS BLD VENIPUNCTURE: CPT

## 2020-10-21 PROCEDURE — 83036 HEMOGLOBIN GLYCOSYLATED A1C: CPT

## 2020-10-21 PROCEDURE — 82550 ASSAY OF CK (CPK): CPT

## 2020-10-21 PROCEDURE — 84484 ASSAY OF TROPONIN QUANT: CPT

## 2020-10-21 PROCEDURE — 6370000000 HC RX 637 (ALT 250 FOR IP): Performed by: INTERNAL MEDICINE

## 2020-10-21 PROCEDURE — 96361 HYDRATE IV INFUSION ADD-ON: CPT

## 2020-10-21 PROCEDURE — 2580000003 HC RX 258: Performed by: HOSPITALIST

## 2020-10-21 PROCEDURE — 2580000003 HC RX 258: Performed by: INTERNAL MEDICINE

## 2020-10-21 PROCEDURE — 96372 THER/PROPH/DIAG INJ SC/IM: CPT

## 2020-10-21 PROCEDURE — 2580000003 HC RX 258: Performed by: NURSE PRACTITIONER

## 2020-10-21 PROCEDURE — 80048 BASIC METABOLIC PNL TOTAL CA: CPT

## 2020-10-21 PROCEDURE — 6370000000 HC RX 637 (ALT 250 FOR IP): Performed by: HOSPITALIST

## 2020-10-21 PROCEDURE — 1200000000 HC SEMI PRIVATE

## 2020-10-21 PROCEDURE — 97166 OT EVAL MOD COMPLEX 45 MIN: CPT

## 2020-10-21 PROCEDURE — 96376 TX/PRO/DX INJ SAME DRUG ADON: CPT

## 2020-10-21 PROCEDURE — 96365 THER/PROPH/DIAG IV INF INIT: CPT

## 2020-10-21 RX ORDER — ACETAMINOPHEN 650 MG/1
650 SUPPOSITORY RECTAL EVERY 6 HOURS PRN
Status: DISCONTINUED | OUTPATIENT
Start: 2020-10-21 | End: 2020-10-24 | Stop reason: HOSPADM

## 2020-10-21 RX ORDER — ALBUTEROL SULFATE 2.5 MG/3ML
2.5 SOLUTION RESPIRATORY (INHALATION) EVERY 4 HOURS PRN
Status: DISCONTINUED | OUTPATIENT
Start: 2020-10-21 | End: 2020-10-24 | Stop reason: HOSPADM

## 2020-10-21 RX ORDER — INSULIN GLARGINE 100 [IU]/ML
10 INJECTION, SOLUTION SUBCUTANEOUS NIGHTLY
Status: DISCONTINUED | OUTPATIENT
Start: 2020-10-21 | End: 2020-10-24 | Stop reason: HOSPADM

## 2020-10-21 RX ORDER — DEXTROSE MONOHYDRATE 50 MG/ML
100 INJECTION, SOLUTION INTRAVENOUS PRN
Status: DISCONTINUED | OUTPATIENT
Start: 2020-10-21 | End: 2020-10-21 | Stop reason: SDUPTHER

## 2020-10-21 RX ORDER — POTASSIUM CHLORIDE 7.45 MG/ML
10 INJECTION INTRAVENOUS PRN
Status: DISCONTINUED | OUTPATIENT
Start: 2020-10-21 | End: 2020-10-24 | Stop reason: HOSPADM

## 2020-10-21 RX ORDER — SODIUM CHLORIDE 0.9 % (FLUSH) 0.9 %
10 SYRINGE (ML) INJECTION EVERY 12 HOURS SCHEDULED
Status: DISCONTINUED | OUTPATIENT
Start: 2020-10-21 | End: 2020-10-24 | Stop reason: HOSPADM

## 2020-10-21 RX ORDER — MAGNESIUM SULFATE IN WATER 40 MG/ML
2 INJECTION, SOLUTION INTRAVENOUS PRN
Status: DISCONTINUED | OUTPATIENT
Start: 2020-10-21 | End: 2020-10-24 | Stop reason: HOSPADM

## 2020-10-21 RX ORDER — OXYBUTYNIN CHLORIDE 5 MG/1
10 TABLET, EXTENDED RELEASE ORAL DAILY
Status: DISCONTINUED | OUTPATIENT
Start: 2020-10-21 | End: 2020-10-24 | Stop reason: HOSPADM

## 2020-10-21 RX ORDER — ISOSORBIDE MONONITRATE 30 MG/1
30 TABLET, EXTENDED RELEASE ORAL DAILY
Status: DISCONTINUED | OUTPATIENT
Start: 2020-10-21 | End: 2020-10-24 | Stop reason: HOSPADM

## 2020-10-21 RX ORDER — BUSPIRONE HYDROCHLORIDE 5 MG/1
5 TABLET ORAL 2 TIMES DAILY
Status: DISCONTINUED | OUTPATIENT
Start: 2020-10-21 | End: 2020-10-24 | Stop reason: HOSPADM

## 2020-10-21 RX ORDER — SODIUM CHLORIDE 0.9 % (FLUSH) 0.9 %
10 SYRINGE (ML) INJECTION PRN
Status: DISCONTINUED | OUTPATIENT
Start: 2020-10-21 | End: 2020-10-24 | Stop reason: HOSPADM

## 2020-10-21 RX ORDER — DEXTROSE MONOHYDRATE 50 MG/ML
100 INJECTION, SOLUTION INTRAVENOUS PRN
Status: DISCONTINUED | OUTPATIENT
Start: 2020-10-21 | End: 2020-10-24 | Stop reason: HOSPADM

## 2020-10-21 RX ORDER — ACETAMINOPHEN 325 MG/1
650 TABLET ORAL EVERY 6 HOURS PRN
Status: DISCONTINUED | OUTPATIENT
Start: 2020-10-21 | End: 2020-10-24 | Stop reason: HOSPADM

## 2020-10-21 RX ORDER — PRAMIPEXOLE DIHYDROCHLORIDE 0.25 MG/1
0.5 TABLET ORAL NIGHTLY
Status: DISCONTINUED | OUTPATIENT
Start: 2020-10-21 | End: 2020-10-24 | Stop reason: HOSPADM

## 2020-10-21 RX ORDER — FAMOTIDINE 20 MG/1
20 TABLET, FILM COATED ORAL DAILY PRN
Status: DISCONTINUED | OUTPATIENT
Start: 2020-10-21 | End: 2020-10-24 | Stop reason: HOSPADM

## 2020-10-21 RX ORDER — PROMETHAZINE HYDROCHLORIDE 25 MG/1
12.5 TABLET ORAL EVERY 6 HOURS PRN
Status: DISCONTINUED | OUTPATIENT
Start: 2020-10-21 | End: 2020-10-24 | Stop reason: HOSPADM

## 2020-10-21 RX ORDER — DEXTROSE MONOHYDRATE 25 G/50ML
12.5 INJECTION, SOLUTION INTRAVENOUS PRN
Status: DISCONTINUED | OUTPATIENT
Start: 2020-10-21 | End: 2020-10-24 | Stop reason: HOSPADM

## 2020-10-21 RX ORDER — ASPIRIN 81 MG/1
81 TABLET ORAL DAILY
Status: DISCONTINUED | OUTPATIENT
Start: 2020-10-21 | End: 2020-10-24 | Stop reason: HOSPADM

## 2020-10-21 RX ORDER — CLONIDINE HYDROCHLORIDE 0.1 MG/1
0.1 TABLET ORAL 3 TIMES DAILY
Status: DISCONTINUED | OUTPATIENT
Start: 2020-10-21 | End: 2020-10-24 | Stop reason: HOSPADM

## 2020-10-21 RX ORDER — PANTOPRAZOLE SODIUM 20 MG/1
20 TABLET, DELAYED RELEASE ORAL
Status: DISCONTINUED | OUTPATIENT
Start: 2020-10-21 | End: 2020-10-24 | Stop reason: HOSPADM

## 2020-10-21 RX ORDER — DEXTROSE MONOHYDRATE 25 G/50ML
12.5 INJECTION, SOLUTION INTRAVENOUS PRN
Status: DISCONTINUED | OUTPATIENT
Start: 2020-10-21 | End: 2020-10-21 | Stop reason: SDUPTHER

## 2020-10-21 RX ORDER — ALBUTEROL SULFATE 2.5 MG/3ML
2.5 SOLUTION RESPIRATORY (INHALATION) EVERY 6 HOURS PRN
Status: DISCONTINUED | OUTPATIENT
Start: 2020-10-21 | End: 2020-10-21

## 2020-10-21 RX ORDER — ONDANSETRON 2 MG/ML
4 INJECTION INTRAMUSCULAR; INTRAVENOUS EVERY 6 HOURS PRN
Status: DISCONTINUED | OUTPATIENT
Start: 2020-10-21 | End: 2020-10-24 | Stop reason: HOSPADM

## 2020-10-21 RX ORDER — CARVEDILOL 25 MG/1
25 TABLET ORAL 2 TIMES DAILY
Status: DISCONTINUED | OUTPATIENT
Start: 2020-10-21 | End: 2020-10-24 | Stop reason: HOSPADM

## 2020-10-21 RX ORDER — PRAVASTATIN SODIUM 20 MG
20 TABLET ORAL NIGHTLY
Status: DISCONTINUED | OUTPATIENT
Start: 2020-10-21 | End: 2020-10-24 | Stop reason: HOSPADM

## 2020-10-21 RX ORDER — NICOTINE POLACRILEX 4 MG
15 LOZENGE BUCCAL PRN
Status: DISCONTINUED | OUTPATIENT
Start: 2020-10-21 | End: 2020-10-24 | Stop reason: HOSPADM

## 2020-10-21 RX ORDER — NICOTINE POLACRILEX 4 MG
15 LOZENGE BUCCAL PRN
Status: DISCONTINUED | OUTPATIENT
Start: 2020-10-21 | End: 2020-10-21 | Stop reason: SDUPTHER

## 2020-10-21 RX ADMIN — INSULIN LISPRO 4 UNITS: 100 INJECTION, SOLUTION INTRAVENOUS; SUBCUTANEOUS at 13:17

## 2020-10-21 RX ADMIN — INSULIN GLARGINE 10 UNITS: 100 INJECTION, SOLUTION SUBCUTANEOUS at 21:11

## 2020-10-21 RX ADMIN — OXYBUTYNIN CHLORIDE 10 MG: 5 TABLET, EXTENDED RELEASE ORAL at 08:40

## 2020-10-21 RX ADMIN — BUSPIRONE HYDROCHLORIDE 5 MG: 5 TABLET ORAL at 08:40

## 2020-10-21 RX ADMIN — CLONIDINE HYDROCHLORIDE 0.1 MG: 0.1 TABLET ORAL at 15:13

## 2020-10-21 RX ADMIN — INSULIN LISPRO 2 UNITS: 100 INJECTION, SOLUTION INTRAVENOUS; SUBCUTANEOUS at 21:10

## 2020-10-21 RX ADMIN — CLONIDINE HYDROCHLORIDE 0.1 MG: 0.1 TABLET ORAL at 08:40

## 2020-10-21 RX ADMIN — ISOSORBIDE MONONITRATE 30 MG: 30 TABLET, EXTENDED RELEASE ORAL at 08:40

## 2020-10-21 RX ADMIN — CARVEDILOL 25 MG: 25 TABLET, FILM COATED ORAL at 20:52

## 2020-10-21 RX ADMIN — CEFTRIAXONE SODIUM 1 G: 1 INJECTION, POWDER, FOR SOLUTION INTRAMUSCULAR; INTRAVENOUS at 08:41

## 2020-10-21 RX ADMIN — PRAMIPEXOLE DIHYDROCHLORIDE 0.5 MG: 0.25 TABLET ORAL at 20:53

## 2020-10-21 RX ADMIN — BUSPIRONE HYDROCHLORIDE 5 MG: 5 TABLET ORAL at 20:53

## 2020-10-21 RX ADMIN — CEFTRIAXONE SODIUM 1 G: 1 INJECTION, POWDER, FOR SOLUTION INTRAMUSCULAR; INTRAVENOUS at 00:29

## 2020-10-21 RX ADMIN — ENOXAPARIN SODIUM 40 MG: 40 INJECTION SUBCUTANEOUS at 08:41

## 2020-10-21 RX ADMIN — PANTOPRAZOLE SODIUM 20 MG: 20 TABLET, DELAYED RELEASE ORAL at 06:27

## 2020-10-21 RX ADMIN — SODIUM CHLORIDE: 9 INJECTION, SOLUTION INTRAVENOUS at 20:56

## 2020-10-21 RX ADMIN — CLONIDINE HYDROCHLORIDE 0.1 MG: 0.1 TABLET ORAL at 20:52

## 2020-10-21 RX ADMIN — CARVEDILOL 25 MG: 25 TABLET, FILM COATED ORAL at 08:40

## 2020-10-21 RX ADMIN — PRAVASTATIN SODIUM 20 MG: 20 TABLET ORAL at 20:53

## 2020-10-21 RX ADMIN — INSULIN LISPRO 1 UNITS: 100 INJECTION, SOLUTION INTRAVENOUS; SUBCUTANEOUS at 06:53

## 2020-10-21 RX ADMIN — ASPIRIN 81 MG: 81 TABLET ORAL at 08:40

## 2020-10-21 RX ADMIN — INSULIN LISPRO 1 UNITS: 100 INJECTION, SOLUTION INTRAVENOUS; SUBCUTANEOUS at 08:43

## 2020-10-21 ASSESSMENT — PAIN SCALES - GENERAL
PAINLEVEL_OUTOF10: 6
PAINLEVEL_OUTOF10: 0
PAINLEVEL_OUTOF10: 0

## 2020-10-21 ASSESSMENT — ENCOUNTER SYMPTOMS: TACHYPNEA: 1

## 2020-10-21 ASSESSMENT — PAIN DESCRIPTION - LOCATION: LOCATION: ABDOMEN

## 2020-10-21 ASSESSMENT — PAIN DESCRIPTION - PAIN TYPE: TYPE: ACUTE PAIN

## 2020-10-21 NOTE — ED PROVIDER NOTES
I independently performed a history and physical on Glenna Farrell. All diagnostic, treatment, and disposition decisions were made by myself in conjunction with the advanced practice provider. Briefly, this is a 79 y.o. female here for abdominal pain and constipation. .    On exam, nontoxic-appearing adult female in no acute distress. No pallor or icterus. Right-sided lower abdominal pain. She has a grade 3 systolic murmur. Lung sounds and abdominal sounds normal.    EKG  The Ekg interpreted by me in the absence of a cardiologist shows:    No significant change from prior EKG dated         Screenings   [place] Coma Scale  Eye Opening: Spontaneous  Best Verbal Response: Oriented  Best Motor Response: Obeys commands  [place] Coma Scale Score: 15        Critical Time  None       MDM  Female abdominal pain constipation. CT scan no acute process besides findings of possible cystitis which does correlate findings. She also has UBALDO in her labs. Given IV fluids and antibiotics here in the emergency room and she will be admitted to the hospital for further care. The admitting provider has been notified. Patient Referrals:  No follow-up provider specified. Discharge Medications:  New Prescriptions    No medications on file       FINAL IMPRESSION  1. UBALDO (acute kidney injury) ([facility] [address]    2. Acute cystitis without hematuria    3. Generalized abdominal pain        Blood pressure (!) 159/73, pulse 80, temperature 98.8 °F (37.1 °C), temperature source Oral, resp. rate 20, height 5' 5\" (1.651 m), weight 288 lb (130.6 kg), SpO2 95 %, not currently breastfeeding. For further details of [de-identified] [facility] emergency department encounter, please see documentation by advanced practice provider.       Vonda Mc MD  10/21/20 0946

## 2020-10-21 NOTE — PROGRESS NOTES
Occupational Therapy   Occupational Therapy Initial Assessment/Treatment  Date: 10/21/2020   Patient Name: Niya Stephen  MRN: [id number]     : 1940    Date of Service: 10/21/2020    Discharge Recommendations:  ECF with OT       Assessment   Performance deficits / Impairments: Decreased functional mobility ; Decreased endurance;Decreased ADL status; Decreased balance;Decreased strength;Decreased cognition  After evaluation, pt found to be presenting with the above mentioned occupational performance deficits which are affecting participation in daily living skills. Patient is [facility], uses standing lift with Ax1 from staff, OOB to w/c daily and has [address] for ADLs from staff. Today patient stood with maxA of 2 for EOB to Mak cabral for transfer bed to chair. Patient totalA for LE ADLs, set-up for feeding, beverage mgmt. Pt states she did have therapy a couples times a week and this writer would like this to continues once d/c back to facility. Pt would benefit from continued skilled occupational therapy to address ADLs, functional mobility, and safety while in acute care. Prognosis: Fair  Decision Making: Medium Complexity  OT Education: Orientation;OT Role;Plan of Care;Equipment;ADL Adaptive Strategies;Transfer Training  Patient Education: Disease specific:  REQUIRES OT FOLLOW UP: Yes  Activity Tolerance  Activity Tolerance: Patient Tolerated treatment well  Safety Devices  Safety Devices in place: Yes  Type of devices: Left in chair;Call light within reach;Nurse notified; Chair alarm in place;Gait belt           Patient Diagnosis(es): The primary encounter diagnosis was UBALDO (acute kidney injury) ([facility] [address] Diagnoses of Acute cystitis without hematuria and Generalized abdominal pain were also pertinent to this visit.      has a past medical history of Asthma, Bronchial asthma, Bursitis, CHF (congestive heart failure) ([facility] [address] Constipation, COPD (chronic obstructive pulmonary disease) ([facility] [address] Dementia ([facility] [address] Depression, Diverticulitis, Dizziness, GERD (gastroesophageal reflux disease), Hearing loss, History of blood transfusion, Hyperlipidemia, Hypertension, Leg edema, Lung disease, Osteoarthritis, Sleep apnea, Tinnitus, Type II or unspecified type diabetes mellitus without mention of complication, not stated as uncontrolled, and Unspecified cerebral artery occlusion with cerebral infarction. has a past surgical history that includes knee surgery; Carpal tunnel release; Cholecystectomy; Tubal ligation; Hysterectomy; shoulder surgery; joint replacement; Colonoscopy; polypectomy; Colonoscopy; Achilles tendon surgery (2/2/12); eye surgery; Upper gastrointestinal endoscopy (10/29/12); Tonsillectomy; Appendectomy; Urethra dilation; Colonoscopy (2/10/2016); Upper gastrointestinal endoscopy (2/10/2016); toenail excision (08/04/2016); Diagnostic Cardiac Cath Lab Procedure; Cystocopy (10/02/2017); other surgical history (07/09/2018); Hysterectomy, total abdominal; Cardiac catheterization (8/21/14); Abdomen surgery; and Endoscopy, colon, diagnostic. Restrictions  Restrictions/Precautions  Restrictions/Precautions: Fall Risk, Up as Tolerated  Position Activity Restriction  Other position/activity restrictions: purewick external catheter, telemetry.     Subjective   General  Chart Reviewed: Yes  Patient assessed for rehabilitation services?: Yes  Family / Caregiver Present: No  Referring Practitioner: Leticia Puente DO 10/21/2020  Diagnosis: Acute on chronic abdominal pain secondary to urinary tract infection  Subjective  Subjective: Pt resting in bed upon entry, pleasant and agreeable to OT/PT evaluations, \"I'd love to get up and sit in the chair, I'm thirsty\"  Patient Currently in Pain: Denies  Vital Signs  Patient Currently in Pain: Denies  Social/Functional History  Social/Functional History  Type of Home: Facility([facility])  Receives Help From: Personal care attendant(Therapy comes a couple times a week)  ADL Assistance: Needs assistance  Bath: Maximum assistance  Dressing: Maximum assistance  Grooming: Stand by assistance  Feeding: Supervision  Toileting: Needs assistance  Homemaking Responsibilities: No  Ambulation Assistance: Needs assistance(mostly non-ambulatory unless therapy is working with patient)  Transfer Assistance: Needs assistance(stander lift, Ax1 from staff)  Active : No  Occupation: Retired  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  Leisure & Hobbies: reading and watching TV  Additional Comments: Pt states she gets up to a w/c every day via [address] by nursing       Objective   Vision: Impaired  Hearing: Exceptions to [facility]  Hearing Exceptions: Hard of hearing/hearing concerns;Bilateral hearing aid(hearing aides are at home)    Orientation  Orientation Level: Oriented to person     Balance  Sitting Balance: Stand by assistance  Standing Balance: Dependent/Total(max of 2 with Christa Labrum stedy)     ADL  Feeding: Beverage management;Setup  UE Dressing: Maximum assistance  LE Dressing: Dependent/Total  Toileting: Dependent/Total(purwick)     Tone RUE  RUE Tone: Normotonic  Tone LUE  LUE Tone: Normotonic  Coordination  Movements Are Fluid And Coordinated: Yes     Bed mobility  Supine to Sit: Maximum assistance;2 Person assistance(to R with HOB elevated)  Sit to Supine: Unable to assess(up to chair at end of session)  Scootin Person assistance;Maximal assistance(to EOB)  Transfers  Sit to stand: Maximum assistance;2 Person assistance(up to Imani Purpura)  Stand to sit: Maximum assistance;2 Person assistance  Transfer Comments: Bed to chair with Christa Labrum stedy lift     Cognition  Overall Cognitive Status: Exceptions(Patient is [place] w/o hearing aids in, this may affect her ability to follow commands if she can not hear you speak.)  Arousal/Alertness: Delayed responses to stimuli  Following Commands: Follows one step commands with repetition; Follows one step commands with increased time  Attention Span: Attends with cues to redirect  Memory: Decreased short term memory  Insights: Decreased awareness of deficits  Initiation: Requires cues for some  Sequencing: Requires cues for some         BUE AROM:WFL  BUE strength: 3+ to 4-/5 grossly         Plan   Plan  Times per week: 3-5x's a week while in acute care    AM-PAC Score        AM-PAC Inpatient Daily Activity Raw Score: 11 (10/21/20 1132)  AM-PAC Inpatient ADL T-Scale Score : 29.04 (10/21/20 1132)  ADL Inpatient CMS 0-100% Score: 70.42 (10/21/20 1132)  ADL Inpatient CMS G-Code Modifier : CL (10/21/20 1132)    Goals  Short term goals  Time Frame for Short term goals: 1 week  unless otherwise specified 10/28  Short term goal 1: Pt will complete oral care with set-up  Short term goal 2: Pt will complete functional transfers with modA of 1 with Haydee Goldberg  Short term goal 3: Pt will complete 12-15 reps of BUE AROM exercises to increase strength for ADLs/transfers by 10/26  Patient Goals   Patient goals : \"to feel better\"       Therapy Time   Individual Concurrent Group Co-treatment   Time In 0955         Time Out 1020         Minutes 25         Timed Code Treatment Minutes: 15 Minutes       Walter Sheridan OTR/L  If pt is unable to be seen after this session, please let this note serve as discharge summary. Please see case management note for discharge disposition. Thank you.

## 2020-10-21 NOTE — PROGRESS NOTES
4 Eyes Skin Assessment     The patient is being assess for   Admission    I agree that 2 RN's have performed a thorough Head to Toe Skin Assessment on the patient. ALL assessment sites listed below have been assessed. Areas assessed by both nurses:   [x]   Head, Face, and Ears   [x]   Shoulders, Back, and Chest, Abdomen  [x]   Arms, Elbows, and Hands   [x]   Coccyx, Sacrum, and Ischium  [x]   Legs, Feet, and Heels        Generalized bruising, redness on bottom    **SHARE this note so that the co-signing nurse is able to place an eSignature**    Co-signer eSignature: Electronically signed by Duane Booth RN on 10/21/20 at 5:35 AM EDT    Does the Patient have Skin Breakdown?   No          Joshua Prevention initiated:  Yes   Wound Care Orders initiated:  No      [facility] nurse consulted for Pressure Injury (Stage 3,4, Unstageable, DTI, NWPT, Complex wounds)and New or Established Ostomies:  No      Primary Nurse eSignature: Electronically signed by Yoni Chadwick RN on 10/21/20 at 5:23 AM EDT

## 2020-10-21 NOTE — ED NOTES
Writer updated patient's medication list provided by patient report and [facility] report. Patient cannot verify complete daily medications due to being a poor historian.        Jermaine Campos RN  10/20/20 1066

## 2020-10-21 NOTE — ED NOTES
PureWick applied to patient at this time and connected to suction. Patient instructed we need a UA for dispo. Patient stated \"I already went today\" will continue to monitor.      Howard Welsh RN  10/20/20 6720

## 2020-10-21 NOTE — ED NOTES

## 2020-10-21 NOTE — ED PROVIDER NOTES
Laterality Date    ABDOMEN SURGERY      ACHILLES TENDON SURGERY  2/2/12    LEFT ACHILLES DEBRIDEMENT, HAGLUNDS AND RETROCALCANEAL BURSA EXCISION WITH FLEXOR HALLUS LONGUS TENDON TRANSFER WITH BLOCK FOR PAIN CONTROL    APPENDECTOMY      CARDIAC CATHETERIZATION  8/21/14    CARPAL TUNNEL RELEASE      both hands    CHOLECYSTECTOMY      COLONOSCOPY      COLONOSCOPY      COLONOSCOPY  2/10/2016    CYSTOSCOPY  10/02/2017    DIAGNOSTIC CARDIAC CATH LAB PROCEDURE      ENDOSCOPY, COLON, DIAGNOSTIC      EYE SURGERY      HYSTERECTOMY      HYSTERECTOMY, TOTAL ABDOMINAL      JOINT REPLACEMENT      bilateral knee    KNEE SURGERY      both replaced    OTHER SURGICAL HISTORY  07/09/2018    CYSTOSCOPY, HYDRODISTENTION OF BLADDER WITH INSTILLATION OF    POLYPECTOMY      SHOULDER SURGERY      TOENAIL EXCISION  08/04/2016    right big toe    TONSILLECTOMY      TUBAL LIGATION      UPPER GASTROINTESTINAL ENDOSCOPY  10/29/12    gastritis    UPPER GASTROINTESTINAL ENDOSCOPY  2/10/2016    URETHRAL STRICTURE DILATATION         CURRENT MEDICATIONS    Current Outpatient Rx   Medication Sig Dispense Refill    lisinopril (PRINIVIL;ZESTRIL) 40 MG tablet Take 40 mg by mouth daily      busPIRone (BUSPAR) 5 MG tablet       furosemide (LASIX) 40 MG tablet Take 20 mg by mouth daily       insulin lispro (HUMALOG) 100 UNIT/ML injection vial Inject 0-3 Units into the skin nightly 1 vial 3    insulin lispro (HUMALOG) 100 UNIT/ML injection vial Inject 0-6 Units into the skin 3 times daily (with meals) 1 vial 3    pravastatin (PRAVACHOL) 20 MG tablet Take 1 tablet by mouth nightly TAKE ONE TABLET BY MOUTH DAILY      cloNIDine (CATAPRES) 0.1 MG tablet Take 1 tablet by mouth 3 times daily (Patient taking differently: Take 0.2 mg by mouth 3 times daily )      blood glucose test strips (ACCU-CHEK ARABELLA PLUS) strip TEST BLOOD SUGAR TWICE A  strip 11    donepezil (ARICEPT) 5 MG tablet Take 1 tablet by mouth nightly (Patient not taking: Reported on 8/11/2020) 90 tablet 1    metFORMIN (GLUCOPHAGE-XR) 500 MG extended release tablet TAKE TWO TABLETS TWICE A DAY WITH MEALS 120 tablet 5    SENNA PLUS 8.6-50 MG per tablet TAKE 2 TABLETS BY MOUTH DAILY (Patient taking differently: nightly ) 60 tablet 5    carvedilol (COREG) 25 MG tablet TAKE ONE TABLET BY MOUTH TWICE A DAY WITH FOOD 180 tablet 1    DULoxetine (CYMBALTA) 60 MG extended release capsule Take 2 capsules by mouth daily 60 capsule 5    pramipexole (MIRAPEX) 0.5 MG tablet TAKE ONE TABLET BY MOUTH ONCE NIGHTLY 30 tablet 5    omeprazole (PRILOSEC) 20 MG delayed release capsule TAKE ONE CAPSULE TWICE A DAY 60 capsule 5    oxybutynin (DITROPAN-XL) 10 MG extended release tablet Take 10 mg by mouth daily      albuterol sulfate  (90 Base) MCG/ACT inhaler Inhale 2 puffs into the lungs 4 times daily as needed for Wheezing 3 Inhaler 1    isosorbide mononitrate (IMDUR) 30 MG extended release tablet TAKE ONE-HALF TABLET BY MOUTH ONE TIME A DAY 45 tablet 3    clotrimazole-betamethasone (LOTRISONE) 1-0.05 % cream Apply bid to affected area. (Patient not taking: Reported on 8/11/2020) 45 g 3    nitroGLYCERIN (NITROSTAT) 0.4 MG SL tablet Place 1 tablet under the tongue every 5 minutes as needed for Chest pain 25 tablet 1    calcium carbonate 600 MG TABS tablet Take 1 tablet by mouth 2 times daily 30 tablet 1    vitamin E 400 UNIT capsule Take 400 Units by mouth daily      aspirin 81 MG tablet Take 81 mg by mouth daily.          ALLERGIES    Allergies   Allergen Reactions    Latex Rash    Cephalexin Other (See Comments)     SHAKY AND SWEATY    Codeine Nausea Only     STOMACH HURTS    Levofloxacin Nausea Only    Pce [Erythromycin] Nausea Only     STOMACH HURTS    Pcn [Penicillins] Other (See Comments)     SHAKY AND SWEATY    Pentazocine      UNSURE OF REATION    Sumycin [Tetracycline Hcl] Nausea Only    Atarax [Hydroxyzine Hcl] Nausea And Vomiting    Beef-Derived Products Emotionally abused: None     Physically abused: None     Forced sexual activity: None   Other Topics Concern    None   Social History Narrative    None       REVIEW OFSYSTEMS    10systems reviewed, pertinent positives per HPI otherwise noted to be negative. PHYSICAL EXAM  Physical Exam  Vitals:    10/20/20 2232   BP: (!) 159/71   Pulse: 80   Resp: 22   Temp:    SpO2: 97%     GENERAL: Patient is elderly morbidly obese. Awake andalert. Cooperative. Resting in bed. No apparent distress. HEENT:  Normocephalic, atraumatic. Conjunctiva appear normal. Sclera is non-icteric. External ears are normal.    NECK: Supple with normal ROM. Tracheamidline  LUNGS: Equal and symmetric chest rise. Breathing is unlabored. Speaking comfortably in fullsentences. Lungs are clear bilaterally to auscultation. Without wheezing, rales, or rhonchi. CADIOVASCULAR:  Regular rate and rhythm. Normal S1-S2 sounds. No murmurs, rubs, or gallops. GI: Soft, generalized abdominal tenderness to palpation, nondistended with positive bowel sounds. No rebound tenderness, guarding or rigidity. Negative Kent's sign. Negative Rovsing's sign. No CVAT to palpation. No masses or hepatosplenomegaly to palpation. Rectal exam: external hemorrhoids noted, sphincter tone normal, no stool within the rectal vault. MUSCULOSKELETAL:  No gross deformities or trauma noted. Moving all extremities equally and appropriately. Normal ROM. SKIN: Warm/dry. Skin is intact. No rashes or lesions noted. PSYCHIATRIC: Mood and affect appropriate. Speech is clear and articulate. NEUROLOGIC: Alert and oriented. No focal motor or sensory deficits.      LABS   Results for orders placed or performed during the hospital encounter of 10/20/20   CBC Auto Differential   Result Value Ref Range    WBC 8.4 4.0 - 11.0 K/uL    RBC 2.95 (L) 4.00 - 5.20 M/uL    Hemoglobin 9.0 (L) 12.0 - 16.0 g/dL    Hematocrit 28.4 (L) 36.0 - 48.0 %    MCV 96.0 80.0 - 100.0 fL    MCH 30.6 26.0 - 34.0 pg    MCHC 31.9 31.0 - 36.0 g/dL    RDW 17.8 (H) 12.4 - 15.4 %    Platelets 285 139 - 716 K/uL    MPV 8.2 5.0 - 10.5 fL    Neutrophils % 62.1 %    Lymphocytes % 23.8 %    Monocytes % 10.0 %    Eosinophils % 2.7 %    Basophils % 1.4 %    Neutrophils Absolute 5.2 1.7 - 7.7 K/uL    Lymphocytes Absolute 2.0 1.0 - 5.1 K/uL    Monocytes Absolute 0.8 0.0 - 1.3 K/uL    Eosinophils Absolute 0.2 0.0 - 0.6 K/uL    Basophils Absolute 0.1 0.0 - 0.2 K/uL   Comprehensive Metabolic Panel   Result Value Ref Range    Sodium 135 (L) 136 - 145 mmol/L    Potassium 4.9 3.5 - 5.1 mmol/L    Chloride 99 99 - 110 mmol/L    CO2 27 21 - 32 mmol/L    Anion Gap 9 3 - 16    Glucose 245 (H) 70 - 99 mg/dL    BUN 21 (H) 7 - 20 mg/dL    CREATININE 1.7 (H) 0.6 - 1.2 mg/dL    GFR Non-African American 29 (A) >60    GFR  35 (A) >60    Calcium 9.2 8.3 - 10.6 mg/dL    Total Protein 6.3 (L) 6.4 - 8.2 g/dL    Alb 3.5 3.4 - 5.0 g/dL    Albumin/Globulin Ratio 1.3 1.1 - 2.2    Total Bilirubin <0.2 0.0 - 1.0 mg/dL    Alkaline Phosphatase 88 40 - 129 U/L    ALT 8 (L) 10 - 40 U/L    AST 10 (L) 15 - 37 U/L    Globulin 2.8 g/dL   Lipase   Result Value Ref Range    Lipase 58.0 13.0 - 60.0 U/L   Urinalysis   Result Value Ref Range    Color, UA Yellow Straw/Yellow    Clarity, UA CLOUDY (A) Clear    Glucose, Ur Negative Negative mg/dL    Bilirubin Urine Negative Negative    Ketones, Urine Negative Negative mg/dL    Specific Gravity, UA 1.015 1.005 - 1.030    Blood, Urine SMALL (A) Negative    pH, UA 6.5 5.0 - 8.0    Protein,  (A) Negative mg/dL    Urobilinogen, Urine 0.2 <2.0 E.U./dL    Nitrite, Urine Negative Negative    Leukocyte Esterase, Urine LARGE (A) Negative    Microscopic Examination YES     Urine Type NotGiven    Lactate, Sepsis   Result Value Ref Range    Lactic Acid, Sepsis 1.7 0.4 - 1.9 mmol/L   COVID-19   Result Value Ref Range    SARS-CoV-2, NAAT Not Detected Not Detected   Microscopic Urinalysis   Result Value Ref Range COURSE/MDM  Patient seen and evaluated. Old records reviewed. Diagnostic testing reviewed and results discussed. I have seen and evaluated this patient with supervising physician: Viridiana Gee MD. We thoroughly discussed the history, physical exam, diagnostic testing, emergency department course, plan and disposition. Marcus Plata presented to the ED today with above noted complaints. Physical exam reveals generalized abdominal tenderness to palpation. While in the ED the patient is afebrile and hemodynamically stable. She is well saturated on room air. Rapid Covid swab negative. No evidence of systemic infection. Stable anemia. No significant electrolyte abnormality. BUN and creatinine are elevated at 21/1.7. This is consistent with acute kidney injury when compared to prior results. There is no evidence for transaminitis. Lipase is normal.  Lactic acid level is normal at 1.7. Urinalysis is consistent with infection, this will be sent for culture and patient will be given Rocephin here in the ED. CT abdomen and pelvis obtained and shows possible acute cystitis. Mild fullness of both renal collecting systems without evidence of an obstructing stone. Diverticulosis without evidence of diverticulitis or colitis. Pt was given the following medications or treatments in the ED:   Medications   0.9 % sodium chloride infusion ( Intravenous Stopped 10/20/20 5752)   cefTRIAXone (ROCEPHIN) 1 g IVPB in 50 mL D5W minibag (has no administration in time range)     I spoke with Dr. Robby Hines. We thoroughly discussed the history, physical exam, laboratory and imaging studies, as well as, emergency department course. Based upon that discussion, we've decided to admit Marcus Plata for further observation and evaluation of [de-identified] M Farrell's abdominal pain.   As I have deemed necessary from their history, physical and studies, I have considered and evaluated Marcus Plata for the following diagnoses:  ACUTE APPENDICITIS, BOWEL OBSTRUCTION, CHOLECYSTITIS, DIVERTICULITIS, INCARCERATED HERNIA, PANCREATITIS, or PERFORATED BOWEL or ULCER. The total critical care time spent while evaluating and treating this patient was 41 minutes. This excludes time spent doing separately billable procedures. This includes time at the bedside, data interpretation, medication management, obtaining critical history from collateral sources if the patient is unable to provide it directly, and physician consultation. Specifics of interventions taken and potentially life-threatening diagnostic considerations are listed above in the medical decision making. CLINICAL IMPRESSION    1. UBALDO (acute kidney injury) ([facility] [address]    2. Acute cystitis without hematuria    3. Generalized abdominal pain       DISPOSITION  Admitted. Comment: Pleasenote this report has been produced using speech recognition software and may contain errors related to that system including errors in grammar, punctuation, and spelling, as well as words and phrases that may beinappropriate. If there are any questions or concerns please feel free to contact the dictating provider for clarification.         KIMI Burns - CNP  10/21/20 2030

## 2020-10-21 NOTE — H&P
MG tablet Take 40 mg by mouth daily    Historical Provider, MD   busPIRone (BUSPAR) 5 MG tablet  10/16/19   Historical Provider, MD   furosemide (LASIX) 40 MG tablet Take 20 mg by mouth daily  10/16/19   Historical Provider, MD   insulin lispro (HUMALOG) 100 UNIT/ML injection vial Inject 0-3 Units into the skin nightly 9/11/19   Manuelito Austin MD   insulin lispro (HUMALOG) 100 UNIT/ML injection vial Inject 0-6 Units into the skin 3 times daily (with meals) 9/11/19   Manuelito Austin MD   pravastatin (PRAVACHOL) 20 MG tablet Take 1 tablet by mouth nightly TAKE ONE TABLET BY MOUTH DAILY 9/11/19   Manuelito Austin MD   cloNIDine (CATAPRES) 0.1 MG tablet Take 1 tablet by mouth 3 times daily  Patient taking differently: Take 0.2 mg by mouth 3 times daily  9/11/19   Manuelito Austin MD   blood glucose test strips (ACCU-CHEK ARABELLA PLUS) strip TEST BLOOD SUGAR TWICE A DAY 8/21/19   Tiffanie Adkins DO   donepezil (ARICEPT) 5 MG tablet Take 1 tablet by mouth nightly  Patient not taking: Reported on 8/11/2020 7/19/19   KIMI Rivera - CNP   metFORMIN (GLUCOPHAGE-XR) 500 MG extended release tablet TAKE TWO TABLETS TWICE A DAY WITH MEALS 7/8/19   Tiffanie Adkins DO   SENNA PLUS 8.6-50 MG per tablet TAKE 2 TABLETS BY MOUTH DAILY  Patient taking differently: nightly  7/1/19   Tiffanie Adkins DO   carvedilol (COREG) 25 MG tablet TAKE ONE TABLET BY MOUTH TWICE A DAY WITH FOOD 6/17/19   Tiffanie Adkins DO   DULoxetine (CYMBALTA) 60 MG extended release capsule Take 2 capsules by mouth daily 5/24/19   Tiffanie Adkins DO   pramipexole (MIRAPEX) 0.5 MG tablet TAKE ONE TABLET BY MOUTH ONCE NIGHTLY 5/20/19   Tiffanie Adkins DO   omeprazole (PRILOSEC) 20 MG delayed release capsule TAKE ONE CAPSULE TWICE A DAY 5/6/19   Walker Goldsmith MD   oxybutynin (DITROPAN-XL) 10 MG extended release tablet Take 10 mg by mouth daily    Historical Provider, MD   albuterol sulfate  (90 Base) MCG/ACT inhaler Inhale 2 puffs into the lungs 4 times daily as needed for Wheezing 2/26/19   Valerie Mejias MD   isosorbide mononitrate (IMDUR) 30 MG extended release tablet TAKE ONE-HALF TABLET BY MOUTH ONE TIME A DAY 1/15/19   Tiffanie Adkins DO   clotrimazole-betamethasone (LOTRISONE) 1-0.05 % cream Apply bid to affected area. Patient not taking: Reported on 8/11/2020 10/12/18   Tiffanie Adkins DO   nitroGLYCERIN (NITROSTAT) 0.4 MG SL tablet Place 1 tablet under the tongue every 5 minutes as needed for Chest pain 9/27/18   Adonaychantel Demetri, APRN - CNP   calcium carbonate 600 MG TABS tablet Take 1 tablet by mouth 2 times daily 6/25/18   WILLIE Martinez   vitamin E 400 UNIT capsule Take 400 Units by mouth daily    Historical Provider, MD   aspirin 81 MG tablet Take 81 mg by mouth daily. Historical Provider, MD       Allergies:  Latex; Cephalexin; Codeine; Levofloxacin; Pce [erythromycin]; Pcn [penicillins]; Pentazocine; Sumycin [tetracycline hcl]; Atarax [hydroxyzine hcl]; Beef-derived products; Flagyl [metronidazole]; Macrodantin [nitrofurantoin]; Milk-related compounds; Pyridium [phenazopyridine hcl]; Sulfa antibiotics; and Urised [methen-jenifer-meth bl-phen sal]    Social History:          TOBACCO:   reports that she quit smoking about 45 years ago. Her smoking use included cigarettes. She has a 0.03 pack-year smoking history. She has never used smokeless tobacco.  ETOH:   reports no history of alcohol use. E-Cigarettes Vaping or Juuling     Questions Responses    Vaping Use Never User    Start Date     Does device contain nicotine?  Never    Quit Date     Vaping Type             Family History:      Reviewed in detail         Problem Relation Age of Onset    Cancer Father         prostate    Heart Disease Mother     Heart Disease Brother     Heart Disease Brother     Heart Disease Sister     Diabetes Sister     Dementia Sister        REVIEW OF SYSTEMS:     Constitutional:  No Fever, No Chills, No Night Sweats  ENT/Mouth:  No Nasal Congestion,  No Hoarseness, No new mouth lesion  Eyes:  No Eye Pain, No Redness, No Discharge  Cardiovascular:  No Chest Pain, No Orthopnea, No Palpitations  Respiratory:  No Cough, No Sputum, No Dyspnea  Gastrointestinal:  No Nausea, No Vomiting, No Diarrhea, positive abdominal pain  Genitourinary: No Urinary Frequency, No Hematuria, No Urinary pain  Musculoskeletal:  No worsening Arthralgias, No worsening Myalgias  Skin:  No new Skin Lesions, No new skin rash  Neuro:  No new weakness, No new numbness. Psych:  No suicial ideation, No Violence ideation    PHYSICAL EXAM PERFORMED:    BP (!) 159/73   Pulse 80   Temp 98.8 °F (37.1 °C) (Oral)   Resp 20   Ht 5' 5\" (1.651 m)   Wt 288 lb (130.6 kg)   SpO2 95%   BMI 47.93 kg/m²     General appearance:  mild acute distress, appears older than stated age  HEENT:   atraumatic, sclera anicteric, Conjunctivae clear. Neck: Supple,Trachea midline, no goiter  Respiratory:  Normal respiratory effort. Clear to auscultation, bilaterally without Rales/Wheezes/Rhonchi. Cardiovascular:  Regular rate and rhythm without murmurs, capillary refill 2 seconds  Abdomen: Soft, non-tender, non-distended with normal bowel sounds. Musculoskeletal:  No clubbing, cyanosis or edema bilaterally. Skin: turgor normal.  No new rashes or lesions. Neurologic: Alert and oriented x4, no new focal sensory/motor deficits. Labs:     Recent Labs     10/20/20  2109   WBC 8.4   HGB 9.0*   HCT 28.4*        Recent Labs     10/20/20  2109   *   K 4.9   CL 99   CO2 27   BUN 21*   CREATININE 1.7*   CALCIUM 9.2     Recent Labs     10/20/20  2109   AST 10*   ALT 8*   BILITOT <0.2   ALKPHOS 88     No results for input(s): INR in the last 72 hours. No results for input(s): Nikkie Felix in the last 72 hours.     Urinalysis:      Lab Results   Component Value Date    NITRU Negative 10/20/2020    WBCUA >100 10/20/2020    BACTERIA 3+ 09/07/2019    RBCUA see below 10/20/2020    BLOODU SMALL 10/20/2020

## 2020-10-21 NOTE — CARE COORDINATION
CASE MANAGEMENT INITIAL ASSESSMENT      Reviewed chart and completed assessment via telephone with: Shauna Garcia, pt daughter and POA  Explained Case Management role/services. Primary contact information:Swati [address] [place] [address]:  Who do you trust or have selected to make healthcare decisions for you? Name:   Shauna Garcia  Phone  Number: [phone]  Can this person be reached and be able to respond quickly, such as within a few minutes or hours? Yes  Who would be your back-up decision maker? Name Alejandro Pisano, daughter  Phone Tobi Mendoza date/status:10/20/2020  [address]   Is this a Readmission?:  No      Insurance:Warren SHEN   Precert required for SNF: No       3 night stay required: No    Living arrangements, Adls, care needs, prior to admission:Pt lives LTC @ [facility] at baseline, needs assitance w/ADL's    Transportation:Will need transport     [facility] at home:  Walker__Cane__RTS__ BSC__Shower Chair__  02__ HHN__ CPAP__  BiPap__  Hospital Bed__ W/C_x__ Other__________    Services in the home and/or outpatient, prior to [address] w/[place]        PT/OT recs:not initiated     [address] Notification (HEN):not initted    Barriers to discharge:none    Plan/comments:Pt will return to [place] [address] when stable;  [employer] @ [facility], [phone]. Pt will need rapid Covid w/in 3 days of return;  Pt has Medicaid and will not need precert.   Rogerio Escobar RN       [facility] on chart for MD signature

## 2020-10-21 NOTE — PROGRESS NOTES
RESPIRATORY THERAPY ASSESSMENT    Name:  [id number] [facility] Record Number:  [id number]  Age: 79 y.o. Gender: female  : 1940  Today's Date:  10/21/2020  Room:  30/0530-01    Assessment     Is the patient being admitted for a COPD or Asthma exacerbation? No   (If yes the patient will be seen every 4 hours for the first 24 hours and then reassessed)    Patient Admission Diagnosis      Allergies  Allergies   Allergen Reactions    Latex Rash    Cephalexin Other (See Comments)     SHAKY AND SWEATY    Codeine Nausea Only     STOMACH HURTS    Levofloxacin Nausea Only    Pce [Erythromycin] Nausea Only     STOMACH HURTS    Pcn [Penicillins] Other (See Comments)     SHAKY AND SWEATY    Pentazocine      UNSURE OF REATION    Sumycin [Tetracycline Hcl] Nausea Only    Atarax [Hydroxyzine Hcl] Nausea And Vomiting    Beef-Derived Products Nausea And Vomiting    Flagyl [Metronidazole] Nausea And Vomiting    Macrodantin [Nitrofurantoin] Palpitations and Other (See Comments)     SWEATY    Milk-Related Compounds Nausea And Vomiting    Pyridium [Phenazopyridine Hcl] Palpitations     SWEATY - PT DENIES REACTION    Sulfa Antibiotics Nausea And Vomiting and Nausea Only    Urised [Methen-Lisa-Meth Bl-Phen Sal] Palpitations     SWEATY       Minimum Predicted Vital Capacity:     855          Actual Vital Capacity:      750              Pulmonary History:COPD and CHF/Pulmonary Edema, asthma  Home Oxygen Therapy:  room air  Home Respiratory Therapy:Albuterol prn per chart, pt unsure  Current Respiratory Therapy:  Albuterol prn  Treatment Type: IS       Respiratory Severity Index(RSI)   Patients with orders for inhalation medications, oxygen, or any therapeutic treatment modality will be placed on Respiratory Protocol. They will be assessed with the first treatment and at least every 72 hours thereafter. The following severity scale will be used to determine frequency of treatment intervention.     Smoking mechanical ventilation. 6. Inhalation medication orders will be delivered and/or substituted as outlined below. Aerosolized Medications Ordering and Administration Guidelines:    1. All Medications will be ordered by a physician, and their frequency and/or modality will be adjusted as defined by the patients Respiratory Severity Index (RSI) score. 2. If the patient does not have documented COPD, consider discontinuing anticholinergics when RSI is less than 9.  3. If the bronchospasm worsens (increased RSI), then the bronchodilator frequency can be increased to a maximum of every 4 hours. If greater than every 4 hours is required, the physician will be contacted. 4. If the bronchospasm improves, the frequency of the bronchodilator can be decreased, based on the patient's RSI, but not less than home treatment regimen frequency. 5. Bronchodilator(s) will be discontinued if patient has a RSI less than 9 and has received no scheduled or as needed treatment for 72  Hrs. Patients Ordered on a Mucolytic Agent:    1. Must always be administered with a bronchodilator. 2. Discontinue if patient experiences worsened bronchospasm, or secretions have lessened to the point that the patient is able to clear them with a cough. Anti-inflammatory and Combination Medications:    1. If the patient lacks prior history of lung disease, is not using inhaled anti-inflammatory medication at home, and lacks wheezing by examination or by history for at least 24 hours, contact physician for possible discontinuation.

## 2020-10-21 NOTE — PROGRESS NOTES
Physical Therapy    Facility/Department: [place] [address] - MED SURG/ORTHO  Initial Assessment    NAME: Corrina Alvarez  : 1940  MRN: [id number]    Date of Service: 10/21/2020    Discharge Recommendations:  ECF with PT   PT Equipment Recommendations  Equipment Needed: No    Assessment   Body structures, Functions, Activity limitations: Decreased functional mobility ; Decreased ROM; Decreased strength;Decreased safe awareness;Decreased cognition;Decreased endurance;Decreased balance;Decreased posture  Assessment: Pt referred for PT evaluation during current hospital stay with dx of UBALDO. Pt currently functioning slightly below her baseline, requiring maxA x 2 for bed mobility and transfers with Rosalina-Stedy. Pt typically requires assist x 1 from nursing home staff for bed>w/c transfers with use of standing lift. PT plans to see pt for strengthening and mobility training in acute setting. Recommend pt return to ECF at D/C with \"Part B\" PT services. Treatment Diagnosis: Generalized weakness  Specific instructions for Next Treatment: Progress ther ex and mobility as tolerated  Prognosis: Fair  Decision Making: Medium Complexity  PT Education: Goals; General Safety;PT Role;Plan of Care;Orientation;Disease Specific Education; Functional Mobility Training;Precautions;Transfer Training;Pressure Relief;Energy Conservation  Patient Education: Disease-specific education: Pt educated on compensatory transfer strategies with Rosalina-Stedy in light of current weakness. Pt verbalizes understanding but requires reinforcement. REQUIRES PT FOLLOW UP: Yes  Activity Tolerance: Patient Tolerated treatment well;Patient limited by fatigue       Patient Diagnosis(es): The primary encounter diagnosis was UBALDO (acute kidney injury) ([facility] [facility] Diagnoses of Acute cystitis without hematuria and Generalized abdominal pain were also pertinent to this visit.      has a past medical history of Asthma, Bronchial asthma, Bursitis, CHF (congestive heart failure) ([facility] [address] Constipation, COPD (chronic obstructive pulmonary disease) ([facility] [address] Dementia ([facility] [address] Depression, Diverticulitis, Dizziness, GERD (gastroesophageal reflux disease), Hearing loss, History of blood transfusion, Hyperlipidemia, Hypertension, Leg edema, Lung disease, Osteoarthritis, Sleep apnea, Tinnitus, Type II or unspecified type diabetes mellitus without mention of complication, not stated as uncontrolled, and Unspecified cerebral artery occlusion with cerebral infarction. has a past surgical history that includes knee surgery; Carpal tunnel release; Cholecystectomy; Tubal ligation; Hysterectomy; shoulder surgery; joint replacement; Colonoscopy; polypectomy; Colonoscopy; Achilles tendon surgery (2/2/12); eye surgery; Upper gastrointestinal endoscopy (10/29/12); Tonsillectomy; Appendectomy; Urethra dilation; Colonoscopy (2/10/2016); Upper gastrointestinal endoscopy (2/10/2016); toenail excision (08/04/2016); Diagnostic Cardiac Cath Lab Procedure; Cystocopy (10/02/2017); other surgical history (07/09/2018); Hysterectomy, total abdominal; Cardiac catheterization (8/21/14); Abdomen surgery; and Endoscopy, colon, diagnostic.     Restrictions  Restrictions/Precautions  Restrictions/Precautions: Fall Risk, Up as Tolerated, General Precautions  Position Activity Restriction  Other position/activity restrictions: High fall risk per nursing assessment, Purewick external female catheter, telemetry, IV lines  Vision/Hearing  Vision: Impaired  Vision Exceptions: Wears glasses for reading  Hearing: Exceptions to [facility]  Hearing Exceptions: Hard of hearing/hearing concerns;Bilateral hearing aid(hearing aids currently at pt's facility)     Subjective  General  Chart Reviewed: Yes  Patient assessed for rehabilitation services?: Yes  Family / Caregiver Present: No  Referring Practitioner: Dr. Cynthia Mclean  Referral Date : 10/21/20  Diagnosis: UBALDO  Follows Commands: Within Functional Limits  General Comment  Comments: Pt resting in bed upon entry of therapy staff  Subjective  Subjective: Pt agreeable to work with PT/OT this morning with min encouragement needed initially to awaken pt. Agreeable to get up to the chair. \"I'd love to get out of bed! \"  Pain Screening  Patient Currently in Pain: Denies  Intervention List: Patient able to continue with treatment    Orientation  Orientation  Overall Orientation Status: Impaired  Orientation Level: Oriented to person     Social/Functional History  Social/Functional History  Type of Home: Facility[facility])  Receives Help From: Personal care attendant(Therapy comes a couple times a week)  ADL Assistance: Needs assistance   Bath: Maximum assistance (from LT staff)   Dressing: Maximum assistance (from LT staff)   Grooming: Stand by assistance (setup)   Feeding: Supervision (setup)  Toileting: Needs assistance (from LT staff)  Homemaking Responsibilities: No  Ambulation Assistance: Needs assistance(mostly non-ambulatory unless therapy is working with patient)  Transfer Assistance: Needs assistance(using stander lift, Ax1 from staff)  Active : No  Occupation: Retired  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     Objective  Observation/Palpation  Posture: Poor  Observation: Pt with forward flexion at trunk, rounded shoulders, forward head, difficulty keeping self upright when in Rosalina-Stedy    RLE General AROM: Decreased ~50% throughout due to weakness  LLE General AROM: Decreased ~50% throughout due to weakness  Strength RLE: Grossly 3-/5 throughout  Strength LLE: Grossly 3-/5 throughout     Bed mobility  Supine to Sit: 2 Person assistance(maxA x 2 moving toward R side, HOB elevated ~30 degrees)  Scootin Person assistance(maxA x 2 to scoot forward to EOB)     Transfers  Sit to Stand: 2 Person Assistance(maxA x 2 using Rosalina-Stedy)  Stand to sit: 2 Person Assistance(maxA x 2 using Rosalina-Stedy)  Bed to Chair: 2 Person Assistance(maxA x 2 using Rosalina-Stedy)     Ambulation  Ambulation?: No(pt non-ambulatory at baseline)     Balance  Posture: Poor  Sitting - Static: Fair;+  Sitting - Dynamic: Fair;-  Standing - Static: Poor  Standing - Dynamic: Poor  Comments: Pt with forward flexion at trunk and difficulty keeping hips/knees in adequate extension while standing at [employer]. Plan   Times per week: 3-5x/week in acute care  Times per day: Daily  Specific instructions for Next Treatment: Progress ther ex and mobility as tolerated  Current Treatment Recommendations: Strengthening, ROM, Balance Training, Endurance Training, Functional Mobility Training, Transfer Training, Safety Education & Training, Patient/Caregiver Education & Training, Home Exercise Program, Equipment Evaluation, Education, & procurement, Positioning  Safety Devices: All fall risk precautions in place, Left in chair, Call light within reach, Chair alarm in place, Nurse notified, Gait belt, Patient at risk for falls    AM-PAC Score  AM-PAC Inpatient Mobility without Stair Climbing Raw Score : 8 (10/21/20 [facility])  AM-PAC Inpatient without Stair Climbing T-Scale Score : 30.65 (10/21/20 [facility])  Mobility Inpatient CMS 0-100% Score: 80.91 (10/21/20 [facility])  Mobility Inpatient without Stair CMS G-Code Modifier : CM (10/21/20 [facility])    Goals  Short term goals  Time Frame for Short term goals: 1 week, 10/28/20 (unless otherwise specified)  Short term goal 1: Pt will transfer supine <-> sit with modA x 1-2  Short term goal 2: Pt will tolerate sitting EOB x 10 minutes with supervision  Short term goal 3: Pt will transfer sit <-> stand and bed>chair using Rosalina-Stedy with modA x 1-2  Short term goal 4: By 10/24/20: Pt will tolerate 12-15 reps BLE exercise for ROM/strengthening, balance, and endurance  Patient Goals   Patient goals :  \"To get up to the chair\"       Therapy Time Individual Concurrent Group Co-treatment   Time In 0955         Time Out 1020         Minutes 25         Timed Code Treatment Minutes: [address], [facility] [place], [place] #[id number]    If pt is unable to be seen after this session, please let this note serve as discharge summary. Please see case management note for discharge disposition. Thank you.

## 2020-10-21 NOTE — ED NOTES
Bed: 17  Expected date:   Expected time:   Means of arrival:   Comments:  Yelena Franco [facility]  10/20/20 2103

## 2020-10-22 LAB
A/G RATIO: 1.2 (ref 1.1–2.2)
ALBUMIN SERPL-MCNC: 3.3 G/DL (ref 3.4–5)
ALP BLD-CCNC: 81 U/L (ref 40–129)
ALT SERPL-CCNC: 7 U/L (ref 10–40)
ANION GAP SERPL CALCULATED.3IONS-SCNC: 8 MMOL/L (ref 3–16)
AST SERPL-CCNC: 8 U/L (ref 15–37)
BASOPHILS ABSOLUTE: 0.1 K/UL (ref 0–0.2)
BASOPHILS RELATIVE PERCENT: 1 %
BILIRUB SERPL-MCNC: 0.3 MG/DL (ref 0–1)
BUN BLDV-MCNC: 18 MG/DL (ref 7–20)
CALCIUM SERPL-MCNC: 8.8 MG/DL (ref 8.3–10.6)
CHLORIDE BLD-SCNC: 103 MMOL/L (ref 99–110)
CO2: 25 MMOL/L (ref 21–32)
CREAT SERPL-MCNC: 1.3 MG/DL (ref 0.6–1.2)
EOSINOPHILS ABSOLUTE: 0.3 K/UL (ref 0–0.6)
EOSINOPHILS RELATIVE PERCENT: 3.5 %
GFR AFRICAN AMERICAN: 48
GFR NON-AFRICAN AMERICAN: 39
GLOBULIN: 2.7 G/DL
GLUCOSE BLD-MCNC: 160 MG/DL (ref 70–99)
GLUCOSE BLD-MCNC: 195 MG/DL (ref 70–99)
GLUCOSE BLD-MCNC: 204 MG/DL (ref 70–99)
GLUCOSE BLD-MCNC: 229 MG/DL (ref 70–99)
GLUCOSE BLD-MCNC: 300 MG/DL (ref 70–99)
HCT VFR BLD CALC: 25.7 % (ref 36–48)
HEMOGLOBIN: 8.3 G/DL (ref 12–16)
LYMPHOCYTES ABSOLUTE: 1.9 K/UL (ref 1–5.1)
LYMPHOCYTES RELATIVE PERCENT: 25.6 %
MCH RBC QN AUTO: 31.1 PG (ref 26–34)
MCHC RBC AUTO-ENTMCNC: 32.4 G/DL (ref 31–36)
MCV RBC AUTO: 96 FL (ref 80–100)
MONOCYTES ABSOLUTE: 0.8 K/UL (ref 0–1.3)
MONOCYTES RELATIVE PERCENT: 10.9 %
NEUTROPHILS ABSOLUTE: 4.4 K/UL (ref 1.7–7.7)
NEUTROPHILS RELATIVE PERCENT: 59 %
ORGANISM: ABNORMAL
PDW BLD-RTO: 17.4 % (ref 12.4–15.4)
PERFORMED ON: ABNORMAL
PLATELET # BLD: 324 K/UL (ref 135–450)
PMV BLD AUTO: 8.7 FL (ref 5–10.5)
POTASSIUM REFLEX MAGNESIUM: 4.5 MMOL/L (ref 3.5–5.1)
RBC # BLD: 2.68 M/UL (ref 4–5.2)
SODIUM BLD-SCNC: 136 MMOL/L (ref 136–145)
TOTAL PROTEIN: 6 G/DL (ref 6.4–8.2)
URINE CULTURE, ROUTINE: ABNORMAL
WBC # BLD: 7.4 K/UL (ref 4–11)

## 2020-10-22 PROCEDURE — 80053 COMPREHEN METABOLIC PANEL: CPT

## 2020-10-22 PROCEDURE — 96372 THER/PROPH/DIAG INJ SC/IM: CPT

## 2020-10-22 PROCEDURE — 6370000000 HC RX 637 (ALT 250 FOR IP): Performed by: HOSPITALIST

## 2020-10-22 PROCEDURE — 85025 COMPLETE CBC W/AUTO DIFF WBC: CPT

## 2020-10-22 PROCEDURE — 97530 THERAPEUTIC ACTIVITIES: CPT

## 2020-10-22 PROCEDURE — 96366 THER/PROPH/DIAG IV INF ADDON: CPT

## 2020-10-22 PROCEDURE — 2580000003 HC RX 258: Performed by: INTERNAL MEDICINE

## 2020-10-22 PROCEDURE — 6370000000 HC RX 637 (ALT 250 FOR IP): Performed by: INTERNAL MEDICINE

## 2020-10-22 PROCEDURE — 6370000000 HC RX 637 (ALT 250 FOR IP): Performed by: NURSE PRACTITIONER

## 2020-10-22 PROCEDURE — 6360000002 HC RX W HCPCS: Performed by: INTERNAL MEDICINE

## 2020-10-22 PROCEDURE — 97110 THERAPEUTIC EXERCISES: CPT

## 2020-10-22 PROCEDURE — 36415 COLL VENOUS BLD VENIPUNCTURE: CPT

## 2020-10-22 PROCEDURE — 96361 HYDRATE IV INFUSION ADD-ON: CPT

## 2020-10-22 PROCEDURE — 97535 SELF CARE MNGMENT TRAINING: CPT

## 2020-10-22 PROCEDURE — 1200000000 HC SEMI PRIVATE

## 2020-10-22 RX ORDER — LORAZEPAM 0.5 MG/1
0.5 TABLET ORAL ONCE
Status: COMPLETED | OUTPATIENT
Start: 2020-10-22 | End: 2020-10-22

## 2020-10-22 RX ORDER — LEVOFLOXACIN 500 MG/1
250 TABLET, FILM COATED ORAL DAILY
Status: DISCONTINUED | OUTPATIENT
Start: 2020-10-22 | End: 2020-10-23

## 2020-10-22 RX ADMIN — BUSPIRONE HYDROCHLORIDE 5 MG: 5 TABLET ORAL at 21:17

## 2020-10-22 RX ADMIN — INSULIN LISPRO 1 UNITS: 100 INJECTION, SOLUTION INTRAVENOUS; SUBCUTANEOUS at 18:09

## 2020-10-22 RX ADMIN — CLONIDINE HYDROCHLORIDE 0.1 MG: 0.1 TABLET ORAL at 09:32

## 2020-10-22 RX ADMIN — CARVEDILOL 25 MG: 25 TABLET, FILM COATED ORAL at 09:32

## 2020-10-22 RX ADMIN — ENOXAPARIN SODIUM 40 MG: 40 INJECTION SUBCUTANEOUS at 09:32

## 2020-10-22 RX ADMIN — INSULIN LISPRO 4 UNITS: 100 INJECTION, SOLUTION INTRAVENOUS; SUBCUTANEOUS at 18:09

## 2020-10-22 RX ADMIN — ASPIRIN 81 MG: 81 TABLET ORAL at 09:32

## 2020-10-22 RX ADMIN — CLONIDINE HYDROCHLORIDE 0.1 MG: 0.1 TABLET ORAL at 21:17

## 2020-10-22 RX ADMIN — BISACODYL 5 MG: 5 TABLET, COATED ORAL at 21:53

## 2020-10-22 RX ADMIN — BUSPIRONE HYDROCHLORIDE 5 MG: 5 TABLET ORAL at 09:32

## 2020-10-22 RX ADMIN — OXYBUTYNIN CHLORIDE 10 MG: 5 TABLET, EXTENDED RELEASE ORAL at 09:32

## 2020-10-22 RX ADMIN — INSULIN LISPRO 2 UNITS: 100 INJECTION, SOLUTION INTRAVENOUS; SUBCUTANEOUS at 09:33

## 2020-10-22 RX ADMIN — PRAVASTATIN SODIUM 20 MG: 20 TABLET ORAL at 21:17

## 2020-10-22 RX ADMIN — CARVEDILOL 25 MG: 25 TABLET, FILM COATED ORAL at 21:17

## 2020-10-22 RX ADMIN — CEFTRIAXONE SODIUM 1 G: 1 INJECTION, POWDER, FOR SOLUTION INTRAMUSCULAR; INTRAVENOUS at 09:32

## 2020-10-22 RX ADMIN — LEVOFLOXACIN 250 MG: 500 TABLET, FILM COATED ORAL at 14:52

## 2020-10-22 RX ADMIN — INSULIN GLARGINE 10 UNITS: 100 INJECTION, SOLUTION SUBCUTANEOUS at 21:31

## 2020-10-22 RX ADMIN — LORAZEPAM 0.5 MG: 0.5 TABLET ORAL at 22:23

## 2020-10-22 RX ADMIN — PRAMIPEXOLE DIHYDROCHLORIDE 0.5 MG: 0.25 TABLET ORAL at 21:17

## 2020-10-22 RX ADMIN — PANTOPRAZOLE SODIUM 20 MG: 20 TABLET, DELAYED RELEASE ORAL at 06:06

## 2020-10-22 RX ADMIN — ISOSORBIDE MONONITRATE 30 MG: 30 TABLET, EXTENDED RELEASE ORAL at 09:32

## 2020-10-22 RX ADMIN — SODIUM CHLORIDE, PRESERVATIVE FREE 10 ML: 5 INJECTION INTRAVENOUS at 21:18

## 2020-10-22 RX ADMIN — INSULIN LISPRO 1 UNITS: 100 INJECTION, SOLUTION INTRAVENOUS; SUBCUTANEOUS at 21:31

## 2020-10-22 RX ADMIN — INSULIN LISPRO 4 UNITS: 100 INJECTION, SOLUTION INTRAVENOUS; SUBCUTANEOUS at 12:09

## 2020-10-22 RX ADMIN — CLONIDINE HYDROCHLORIDE 0.1 MG: 0.1 TABLET ORAL at 14:51

## 2020-10-22 ASSESSMENT — PAIN SCALES - GENERAL
PAINLEVEL_OUTOF10: 0
PAINLEVEL_OUTOF10: 0

## 2020-10-22 NOTE — PROGRESS NOTES
murmurs, rubs or gallops. Abdomen: Soft, non-tender, non-distended with normal bowel sounds. Musculoskeletal: No clubbing, cyanosis or edema bilaterally. Full range of motion without deformity. Skin: Skin color, texture, turgor normal.  No rashes or lesions. Neurologic:  Neurovascularly intact without any focal sensory/motor deficits. Cranial nerves: II-XII intact, grossly non-focal.  Psychiatric: Alert and oriented, thought content appropriate, normal insight  Capillary Refill: Brisk,< 3 seconds   Peripheral Pulses: +2 palpable, equal bilaterally       Labs:   Recent Labs     10/20/20  2109 10/22/20  0737   WBC 8.4 7.4   HGB 9.0* 8.3*   HCT 28.4* 25.7*    324     Recent Labs     10/20/20  2109 10/21/20  1317 10/22/20  0737   * 134* 136   K 4.9 5.0 4.5   CL 99 100 103   CO2 27 22 25   BUN 21* 20 18   CREATININE 1.7* 1.5* 1.3*   CALCIUM 9.2 9.1 8.8   PHOS 4.2  --   --      Recent Labs     10/20/20  2109 10/22/20  0737   AST 10* 8*   ALT 8* 7*   BILITOT <0.2 0.3   ALKPHOS 88 81     No results for input(s): INR in the last 72 hours. Recent Labs     10/21/20  0447 10/21/20  0748 10/21/20  1317   CKTOTAL 14*  --   --    TROPONINI  --  <0.01 <0.01       Assessment/Plan:      -UTI due to Pseudomonas aeruginosa--sensitivities pending  -acute Abdominal pain due to UTI-resolved  -Acute kidney injury/prerenal--creatinine improving from 1.7-1.3 this morning  -Diabetes mellitus type 2, uncontrolled-hemoglobin A1c 8.1  -Essential hypertension-stable-on Imdur, Coreg, amlodipine  -Chronic diastolic heart failure-compensated  -Mnfnvmkmyxyffe-lyadsk-lu pravastatin  -Anxiety disorder-stable-buspirone  -Obesity BMI 38  -Chronic srjxqv-gbxoiw-lizogw-up outpatient  -Dementia  -Obstructive sleep apnea  -Neurogenic bladder-on oxybutynin  -GERD-On Protonix    Plan  Changed IV Rocephin to levofloxacin. . Follow-up on culture and sensitivity  Continue to monitor renal function  Optimize glycemic control with basal bolus insulin-oral hypoglycemics on hold  IV fluids discontinued  Continue to monitor renal function and avoid nephrotoxic medications  Discharge back to skilled nursing facility in a.m. once culture data is available    DVT Prophylaxis: Lovenox  Diet: DIET CARB CONTROL;  Code Status: Full Code        Salima Delgado MD

## 2020-10-22 NOTE — PROGRESS NOTES
Physician Progress Note      Telly Blake  [facility] #:                  [id number]  :                       1940  ADMIT DATE:       10/20/2020 9:03 PM  DISCH DATE:  RESPONDING  PROVIDER #:        Natalia Lui MD          QUERY TEXT:    Pt admitted with UTI and UBALDO. Pt noted to have \"CHF\". If possible, please   document in progress notes and discharge summary the acuity and type of CHF. The medical record reflects the following:  Risk Factors: History of CHF,UBALDO and hx of CKD3  Clinical Indicators: H & P notes CHF, Takes 40mg Lasix at home hx of ckd stage   3  Treatment: I&Os, daily weights, serial labs, resumed Imdur, supportive care. Options provided:  -- Chronic Diastolic CHF/HFpEF  -- Chronic Systolic and Diastolic CHF  -- Other - I will add my own diagnosis  -- Disagree - Not applicable / Not valid  -- Disagree - Clinically unable to determine / Unknown  -- Refer to Clinical Documentation Reviewer    PROVIDER RESPONSE TEXT:    This patient has chronic diastolic CHF/HFpEF.     Query created by: Aliya Garrett on 10/22/2020 11:05 AM      Electronically signed by:  Natalia Lui MD 10/22/2020 2:54 PM

## 2020-10-22 NOTE — CARE COORDINATION
Pt followed by IM w/PT/OT, living LTC @ the [facility]. Family plans to have pt return when medically stable. Pt may need rapid Covid when medically stable.   Anali Perez RN

## 2020-10-22 NOTE — PROGRESS NOTES
stedy)  Bed mobility  Supine to Sit: Maximum assistance;2 Person assistance(to R with HOB elevated and use of bed rails)  Sit to Supine: Unable to assess(up to chair at end of session)  Scooting: Maximal assistance(to EOB)     Transfers  Sit to stand: Maximum assistance;2 Person assistance(from heightened bed up to Veronica Plum stedy)  Stand to sit: Maximum assistance;2 Person assistance  Transfer Comments: Bed to chair with Veronica Plum stedy lift      Cognition  Arousal/Alertness: Appropriate responses to stimuli  Following Commands: Follows one step commands with repetition(patient is [place])  Initiation: Requires cues for some         Plan   Plan  Times per week: 3-5x's a week while in acute care    AM-PAC Score        AM-PAC Inpatient Daily Activity Raw Score: 11 (10/22/20 0935)  AM-PAC Inpatient ADL T-Scale Score : 29.04 (10/22/20 0935)  ADL Inpatient CMS 0-100% Score: 70.42 (10/22/20 0935)  ADL Inpatient CMS G-Code Modifier : CL (10/22/20 0935)    Goals  Short term goals  Time Frame for Short term goals: 1 week  unless otherwise specified 10/28  Short term goal 1: Pt will complete oral care with set-up-STG met 10/22  Short term goal 2: Pt will complete functional transfers with modA of 1 with Veronica Plum STEDY-ongoing  Short term goal 3: Pt will complete 12-15 reps of BUE AROM exercises to increase strength for ADLs/transfers by 10/26-ongoing  Patient Goals   Patient goals : \"to feel better\"       Therapy Time   Individual Concurrent Group Co-treatment   Time In 0845         Time Out 0923         Minutes 38         Timed Code Treatment Minutes: [facility], OTR/L  If pt is unable to be seen after this session, please let this note serve as discharge summary. Please see case management note for discharge disposition. Thank you.

## 2020-10-22 NOTE — PROGRESS NOTES
Physical Therapy  Facility/Department: [facility] C5 - MED SURG/ORTHO  Daily Treatment Note  NAME: Yesika Chan  : 1940  MRN: [id number]    Date of Service: 10/22/2020    Discharge Recommendations:  ECF with PT   PT Equipment Recommendations  Equipment Needed: No    Assessment   Body structures, Functions, Activity limitations: Decreased functional mobility ; Decreased ROM; Decreased strength;Decreased safe awareness;Decreased cognition;Decreased endurance;Decreased balance;Decreased posture  Assessment: Pt requiring maxA x 2 for bed mobility and transfers with Rosalina-Stedy. Pt typically requires assist x 1 from nursing home staff for bed>w/c transfers with use of standing lift. PT plans to see pt for strengthening and mobility training in acute setting. Recommend pt return to ECF at D/C with \"Part B\" PT services. Treatment Diagnosis: Generalized weakness  Prognosis: Fair  Decision Making: Medium Complexity  REQUIRES PT FOLLOW UP: Yes  Activity Tolerance  Activity Tolerance: Patient Tolerated treatment well;Patient limited by fatigue     Patient Diagnosis(es): The primary encounter diagnosis was UBALDO (acute kidney injury) ([facility] [address] Diagnoses of Acute cystitis without hematuria and Generalized abdominal pain were also pertinent to this visit. has a past medical history of Asthma, Bronchial asthma, Bursitis, CHF (congestive heart failure) ([address] Constipation, COPD (chronic obstructive pulmonary disease) ([address] Dementia ([address] Depression, Diverticulitis, Dizziness, GERD (gastroesophageal reflux disease), Hearing loss, History of blood transfusion, Hyperlipidemia, Hypertension, Leg edema, Lung disease, Osteoarthritis, Sleep apnea, Tinnitus, Type II or unspecified type diabetes mellitus without mention of complication, not stated as uncontrolled, and Unspecified cerebral artery occlusion with cerebral infarction. has a past surgical history that includes knee surgery; Carpal tunnel release; Cholecystectomy;  Tubal ligation; Hysterectomy; shoulder surgery; joint replacement; Colonoscopy; polypectomy; Colonoscopy; Achilles tendon surgery (2/2/12); eye surgery; Upper gastrointestinal endoscopy (10/29/12); Tonsillectomy; Appendectomy; Urethra dilation; Colonoscopy (2/10/2016); Upper gastrointestinal endoscopy (2/10/2016); toenail excision (08/04/2016); Diagnostic Cardiac Cath Lab Procedure; Cystocopy (10/02/2017); other surgical history (07/09/2018); Hysterectomy, total abdominal; Cardiac catheterization (8/21/14); Abdomen surgery; and Endoscopy, colon, diagnostic. Restrictions  Restrictions/Precautions  Restrictions/Precautions: Fall Risk, Up as Tolerated, General Precautions  Position Activity Restriction  Other position/activity restrictions: High fall risk per nursing assessment, Purewick external female catheter, telemetry, IV lines  Subjective   General  Chart Reviewed: Yes  Family / Caregiver Present: No  Referring Practitioner: Dr. Duran Hernandez  Subjective  Subjective: Pt agreeable to work with PT/Agreeable to get up to the chair. \"I'd love to get out of bed! \"  General Comment  Comments: Pt resting in bed upon entry of therapy staff  Pain Screening  Patient Currently in Pain: Denies  Vital Signs  Patient Currently in Pain: Denies       Orientation  Orientation  Overall Orientation Status: Impaired  Orientation Level: Disoriented to time;Oriented to person;Oriented to place;Oriented to situation  Cognition      Objective   Bed mobility  Supine to Sit: Maximum assistance;2 Person assistance  Transfers  Sit to Stand: 2 Person Assistance;Maximum Assistance  Stand to sit: 2 Person Assistance;Maximum Assistance  Bed to Chair: Dependent/Total(angy cabral lift)  Ambulation  Ambulation?: No     Balance  Comments: Sitting Balance: Stand by assistance Standing Balance: Dependent/Total(max A of 2 with Mason cabral)  Exercises  Heelslides: 10 B  Hip Flexion: 10 B  Hip Abduction: 10 B  Knee Long Arc Quad: 10 B  Knee Short Arc Quad: 10 B  Ankle Pumps: 10 B         Comment: Pt able to sit EOB for 5 min sba         AM-PAC Score     AM-PAC Inpatient Mobility without Stair Climbing Raw Score : 8 (10/22/20 1153)  AM-PAC Inpatient without Stair Climbing T-Scale Score : 30.65 (10/22/20 1153)  Mobility Inpatient CMS 0-100% Score: 80.91 (10/22/20 1153)  Mobility Inpatient without Stair CMS G-Code Modifier : CM (10/22/20 1153)       Goals  Short term goals  Time Frame for Short term goals: 1 week, 10/28/20 (unless otherwise specified)  Short term goal 1: Pt will transfer supine <-> sit with modA x 1-2  --10/22 max 2  Short term goal 2: Pt will tolerate sitting EOB x 10 minutes with supervision  --10/22 5 min sba  Short term goal 3: Pt will transfer sit <-> stand and bed>chair using Rosalina-Stedy with modA x 1-2  --10/22 Fern Russian stedy max Ax2  Short term goal 4: By 10/24/20: Pt will tolerate 12-15 reps BLE exercise for ROM/strengthening, balance, and endurance  --10/22 on-going  Patient Goals   Patient goals : \"To get up to the chair\"    Plan    Plan  Times per week: 3-5x/week in acute care  Times per day: Daily  Specific instructions for Next Treatment: Progress ther ex and mobility as tolerated  Current Treatment Recommendations: Strengthening, ROM, Balance Training, Endurance Training, Functional Mobility Training, Transfer Training, Safety Education & Training, Patient/Caregiver Education & Training, Home Exercise Program, Equipment Evaluation, Education, & procurement, Positioning  Safety Devices  Type of devices:  All fall risk precautions in place, Left in chair, Call light within reach, Chair alarm in place, Nurse notified, Gait belt, Patient at risk for falls     Therapy Time   Individual Concurrent Group Co-treatment   Time In 0845         Time Out 0908         Minutes 23         Timed Code Treatment Minutes: [address]

## 2020-10-23 LAB
A/G RATIO: 1.2 (ref 1.1–2.2)
ALBUMIN SERPL-MCNC: 3 G/DL (ref 3.4–5)
ALP BLD-CCNC: 73 U/L (ref 40–129)
ALT SERPL-CCNC: 6 U/L (ref 10–40)
ANION GAP SERPL CALCULATED.3IONS-SCNC: 10 MMOL/L (ref 3–16)
ANION GAP SERPL CALCULATED.3IONS-SCNC: 11 MMOL/L (ref 3–16)
AST SERPL-CCNC: 7 U/L (ref 15–37)
BASOPHILS ABSOLUTE: 0.1 K/UL (ref 0–0.2)
BASOPHILS RELATIVE PERCENT: 0.9 %
BILIRUB SERPL-MCNC: <0.2 MG/DL (ref 0–1)
BUN BLDV-MCNC: 22 MG/DL (ref 7–20)
BUN BLDV-MCNC: 23 MG/DL (ref 7–20)
CALCIUM SERPL-MCNC: 8.8 MG/DL (ref 8.3–10.6)
CALCIUM SERPL-MCNC: 9.2 MG/DL (ref 8.3–10.6)
CHLORIDE BLD-SCNC: 101 MMOL/L (ref 99–110)
CHLORIDE BLD-SCNC: 102 MMOL/L (ref 99–110)
CO2: 23 MMOL/L (ref 21–32)
CO2: 25 MMOL/L (ref 21–32)
CREAT SERPL-MCNC: 1.5 MG/DL (ref 0.6–1.2)
CREAT SERPL-MCNC: 1.6 MG/DL (ref 0.6–1.2)
EOSINOPHILS ABSOLUTE: 0.3 K/UL (ref 0–0.6)
EOSINOPHILS RELATIVE PERCENT: 4.3 %
GFR AFRICAN AMERICAN: 38
GFR AFRICAN AMERICAN: 40
GFR NON-AFRICAN AMERICAN: 31
GFR NON-AFRICAN AMERICAN: 33
GLOBULIN: 2.6 G/DL
GLUCOSE BLD-MCNC: 103 MG/DL (ref 70–99)
GLUCOSE BLD-MCNC: 143 MG/DL (ref 70–99)
GLUCOSE BLD-MCNC: 200 MG/DL (ref 70–99)
GLUCOSE BLD-MCNC: 204 MG/DL (ref 70–99)
GLUCOSE BLD-MCNC: 209 MG/DL (ref 70–99)
GLUCOSE BLD-MCNC: 220 MG/DL (ref 70–99)
HCT VFR BLD CALC: 23 % (ref 36–48)
HEMOGLOBIN: 7.5 G/DL (ref 12–16)
LYMPHOCYTES ABSOLUTE: 2 K/UL (ref 1–5.1)
LYMPHOCYTES RELATIVE PERCENT: 31.4 %
MCH RBC QN AUTO: 31.3 PG (ref 26–34)
MCHC RBC AUTO-ENTMCNC: 32.8 G/DL (ref 31–36)
MCV RBC AUTO: 95.3 FL (ref 80–100)
MONOCYTES ABSOLUTE: 0.5 K/UL (ref 0–1.3)
MONOCYTES RELATIVE PERCENT: 7.7 %
NEUTROPHILS ABSOLUTE: 3.5 K/UL (ref 1.7–7.7)
NEUTROPHILS RELATIVE PERCENT: 55.7 %
PDW BLD-RTO: 17.4 % (ref 12.4–15.4)
PERFORMED ON: ABNORMAL
PLATELET # BLD: 282 K/UL (ref 135–450)
PMV BLD AUTO: 8.6 FL (ref 5–10.5)
POTASSIUM REFLEX MAGNESIUM: 4.4 MMOL/L (ref 3.5–5.1)
POTASSIUM SERPL-SCNC: 4.5 MMOL/L (ref 3.5–5.1)
RBC # BLD: 2.41 M/UL (ref 4–5.2)
SARS-COV-2, NAAT: NOT DETECTED
SODIUM BLD-SCNC: 135 MMOL/L (ref 136–145)
SODIUM BLD-SCNC: 137 MMOL/L (ref 136–145)
TOTAL PROTEIN: 5.6 G/DL (ref 6.4–8.2)
WBC # BLD: 6.2 K/UL (ref 4–11)

## 2020-10-23 PROCEDURE — 96372 THER/PROPH/DIAG INJ SC/IM: CPT

## 2020-10-23 PROCEDURE — 6370000000 HC RX 637 (ALT 250 FOR IP): Performed by: INTERNAL MEDICINE

## 2020-10-23 PROCEDURE — 6360000002 HC RX W HCPCS: Performed by: INTERNAL MEDICINE

## 2020-10-23 PROCEDURE — 2580000003 HC RX 258: Performed by: HOSPITALIST

## 2020-10-23 PROCEDURE — 85025 COMPLETE CBC W/AUTO DIFF WBC: CPT

## 2020-10-23 PROCEDURE — 36415 COLL VENOUS BLD VENIPUNCTURE: CPT

## 2020-10-23 PROCEDURE — U0002 COVID-19 LAB TEST NON-CDC: HCPCS

## 2020-10-23 PROCEDURE — 6370000000 HC RX 637 (ALT 250 FOR IP): Performed by: NURSE PRACTITIONER

## 2020-10-23 PROCEDURE — 2580000003 HC RX 258: Performed by: INTERNAL MEDICINE

## 2020-10-23 PROCEDURE — 80053 COMPREHEN METABOLIC PANEL: CPT

## 2020-10-23 PROCEDURE — 1200000000 HC SEMI PRIVATE

## 2020-10-23 PROCEDURE — 6370000000 HC RX 637 (ALT 250 FOR IP): Performed by: HOSPITALIST

## 2020-10-23 RX ORDER — SODIUM CHLORIDE 9 MG/ML
INJECTION, SOLUTION INTRAVENOUS CONTINUOUS
Status: DISCONTINUED | OUTPATIENT
Start: 2020-10-23 | End: 2020-10-24 | Stop reason: HOSPADM

## 2020-10-23 RX ORDER — CIPROFLOXACIN 500 MG/1
250 TABLET, FILM COATED ORAL EVERY 12 HOURS SCHEDULED
Status: DISCONTINUED | OUTPATIENT
Start: 2020-10-24 | End: 2020-10-24 | Stop reason: HOSPADM

## 2020-10-23 RX ADMIN — LEVOFLOXACIN 250 MG: 500 TABLET, FILM COATED ORAL at 09:31

## 2020-10-23 RX ADMIN — PRAMIPEXOLE DIHYDROCHLORIDE 0.5 MG: 0.25 TABLET ORAL at 22:05

## 2020-10-23 RX ADMIN — ACETAMINOPHEN 650 MG: 325 TABLET ORAL at 01:23

## 2020-10-23 RX ADMIN — INSULIN LISPRO 2 UNITS: 100 INJECTION, SOLUTION INTRAVENOUS; SUBCUTANEOUS at 09:34

## 2020-10-23 RX ADMIN — INSULIN LISPRO 4 UNITS: 100 INJECTION, SOLUTION INTRAVENOUS; SUBCUTANEOUS at 16:41

## 2020-10-23 RX ADMIN — ENOXAPARIN SODIUM 40 MG: 40 INJECTION SUBCUTANEOUS at 09:32

## 2020-10-23 RX ADMIN — PANTOPRAZOLE SODIUM 20 MG: 20 TABLET, DELAYED RELEASE ORAL at 06:22

## 2020-10-23 RX ADMIN — BUSPIRONE HYDROCHLORIDE 5 MG: 5 TABLET ORAL at 09:32

## 2020-10-23 RX ADMIN — CLONIDINE HYDROCHLORIDE 0.1 MG: 0.1 TABLET ORAL at 22:37

## 2020-10-23 RX ADMIN — ASPIRIN 81 MG: 81 TABLET ORAL at 09:32

## 2020-10-23 RX ADMIN — INSULIN LISPRO 1 UNITS: 100 INJECTION, SOLUTION INTRAVENOUS; SUBCUTANEOUS at 22:04

## 2020-10-23 RX ADMIN — INSULIN LISPRO 4 UNITS: 100 INJECTION, SOLUTION INTRAVENOUS; SUBCUTANEOUS at 09:34

## 2020-10-23 RX ADMIN — SODIUM CHLORIDE, PRESERVATIVE FREE 10 ML: 5 INJECTION INTRAVENOUS at 22:05

## 2020-10-23 RX ADMIN — CLONIDINE HYDROCHLORIDE 0.1 MG: 0.1 TABLET ORAL at 09:32

## 2020-10-23 RX ADMIN — BUSPIRONE HYDROCHLORIDE 5 MG: 5 TABLET ORAL at 22:04

## 2020-10-23 RX ADMIN — SODIUM CHLORIDE, PRESERVATIVE FREE 10 ML: 5 INJECTION INTRAVENOUS at 09:34

## 2020-10-23 RX ADMIN — ACETAMINOPHEN 650 MG: 325 TABLET ORAL at 14:41

## 2020-10-23 RX ADMIN — PRAVASTATIN SODIUM 20 MG: 20 TABLET ORAL at 22:05

## 2020-10-23 RX ADMIN — ISOSORBIDE MONONITRATE 30 MG: 30 TABLET, EXTENDED RELEASE ORAL at 09:31

## 2020-10-23 RX ADMIN — INSULIN LISPRO 4 UNITS: 100 INJECTION, SOLUTION INTRAVENOUS; SUBCUTANEOUS at 11:58

## 2020-10-23 RX ADMIN — INSULIN LISPRO 1 UNITS: 100 INJECTION, SOLUTION INTRAVENOUS; SUBCUTANEOUS at 16:41

## 2020-10-23 RX ADMIN — OXYBUTYNIN CHLORIDE 10 MG: 5 TABLET, EXTENDED RELEASE ORAL at 09:31

## 2020-10-23 RX ADMIN — ACETAMINOPHEN 650 MG: 325 TABLET ORAL at 22:04

## 2020-10-23 RX ADMIN — CARVEDILOL 25 MG: 25 TABLET, FILM COATED ORAL at 09:32

## 2020-10-23 RX ADMIN — BISACODYL 5 MG: 5 TABLET, COATED ORAL at 22:04

## 2020-10-23 RX ADMIN — SODIUM CHLORIDE: 9 INJECTION, SOLUTION INTRAVENOUS at 14:38

## 2020-10-23 RX ADMIN — CARVEDILOL 25 MG: 25 TABLET, FILM COATED ORAL at 22:05

## 2020-10-23 RX ADMIN — INSULIN GLARGINE 10 UNITS: 100 INJECTION, SOLUTION SUBCUTANEOUS at 22:37

## 2020-10-23 RX ADMIN — INSULIN LISPRO 2 UNITS: 100 INJECTION, SOLUTION INTRAVENOUS; SUBCUTANEOUS at 11:58

## 2020-10-23 ASSESSMENT — PAIN DESCRIPTION - ORIENTATION: ORIENTATION: MID

## 2020-10-23 ASSESSMENT — PAIN SCALES - GENERAL
PAINLEVEL_OUTOF10: 3
PAINLEVEL_OUTOF10: 3
PAINLEVEL_OUTOF10: 2
PAINLEVEL_OUTOF10: 3
PAINLEVEL_OUTOF10: 0
PAINLEVEL_OUTOF10: 0

## 2020-10-23 ASSESSMENT — PAIN DESCRIPTION - LOCATION: LOCATION: BUTTOCKS

## 2020-10-23 ASSESSMENT — PAIN DESCRIPTION - PAIN TYPE: TYPE: ACUTE PAIN

## 2020-10-23 NOTE — CARE COORDINATION
Pt retaining urine w/increased BUN and Cr. Pt will likely d/c tomorrow;  Cancelled ; informed facility.   Shavon Lovell RN

## 2020-10-23 NOTE — PLAN OF CARE
Bed in lowest position, wheels locked, 2/4 side rails up, nonskid footwear on. Bed/ chair check alarm in place, call light within reach. Pt instructed to call out when needing assistance. Pt stated understanding. Nurse will continue to monitor.         Problem: Falls - Risk of:  Goal: Will remain free from falls  Description: Will remain free from falls  Outcome: Ongoing

## 2020-10-23 NOTE — DISCHARGE INSTR - COC
Continuity of Care Form    Patient Name: Bobby Zhu   :  1940  MRN:  [id number]    Admit date:  10/20/2020  Discharge date:  ***    Code Status Order: Full Code   Advance Directives:   Advance Care Flowsheet Documentation       Date/Time Healthcare Directive Type of Healthcare Directive Copy in [address] Agent's Name Healthcare Agent's Phone Number    10/21/20 9325  Yes, patient has an advance directive for healthcare treatment  Living will;Durable power of  for health care  No, copy requested from family  --  [place] Daughter  --            Admitting Physician:  Lizy Pennington DO  PCP: Thai Saul    Discharging Nurse: [facility] Unit/Room#: 2690/1154-02  Discharging Unit Phone Number: ***    Emergency Contact:   Extended Emergency Contact Information  Primary Emergency Contact: Carmen Hill [address] Phone: [phone]  Mobile Phone: [phone]  Relation: Child  Secondary Emergency Contact: Ce Licea Phone: [phone]  Mobile Phone: [phone]  Relation: Child    Past Surgical History:  Past Surgical History:   Procedure Laterality Date    ABDOMEN SURGERY      ACHILLES TENDON SURGERY  12    LEFT ACHILLES DEBRIDEMENT, HAGLUNDS AND RETROCALCANEAL BURSA EXCISION WITH FLEXOR HALLUS LONGUS TENDON TRANSFER WITH BLOCK FOR PAIN CONTROL    APPENDECTOMY      CARDIAC CATHETERIZATION  14    CARPAL TUNNEL RELEASE      both hands    CHOLECYSTECTOMY      COLONOSCOPY      COLONOSCOPY      COLONOSCOPY  2/10/2016    CYSTOSCOPY  10/02/2017    DIAGNOSTIC CARDIAC CATH LAB PROCEDURE      ENDOSCOPY, COLON, DIAGNOSTIC      EYE SURGERY      HYSTERECTOMY      HYSTERECTOMY, TOTAL ABDOMINAL      JOINT REPLACEMENT      bilateral knee    KNEE SURGERY      both replaced    OTHER SURGICAL HISTORY  2018    CYSTOSCOPY, HYDRODISTENTION OF BLADDER WITH INSTILLATION OF    POLYPECTOMY      SHOULDER F33.2    Arthritis of shoulder region, left M19.012    Acute encephalopathy G93.40    Generalized weakness R53.1    Acute cystitis without hematuria N30.00    Anemia D64.9    UBALDO (acute kidney injury) ([facility] [address] N17.9       Isolation/Infection:   Isolation            No Isolation          Patient Infection Status       Infection Onset Added Last Indicated Last Indicated By Review Planned Expiration Resolved Resolved By    None active    Resolved    COVID-19 Rule Out 10/20/20 10/20/20 10/20/20 COVID-19 (Ordered)   10/20/20 Rule-Out Test Resulted            Nurse Assessment:  Last Vital Signs: BP (!) 151/74   Pulse 68   Temp 97.7 °F (36.5 °C) (Oral)   Resp 16   Ht 5' 5\" (1.651 m)   Wt 235 lb 10.8 oz (106.9 kg)   SpO2 96%   BMI 39.22 kg/m²     Last documented pain score (0-10 scale): Pain Level: 0  Last Weight:   Wt Readings from Last 1 Encounters:   10/23/20 235 lb 10.8 oz (106.9 kg)     Mental Status:  oriented    IV Access:  - None    Nursing Mobility/ADLs:  Walking   Dependent  Transfer  Dependent  Bathing  Dependent  Dressing  Dependent  Toileting  Dependent  Feeding  Assisted  Med Admin  Assisted  Med Delivery   whole    Wound Care Documentation and Therapy:        Elimination:  Continence:   · Bowel: No  · Bladder: No  Urinary Catheter: None   Colostomy/Ileostomy/Ileal Conduit: No       Date of Last BM: ? Intake/Output Summary (Last 24 hours) at 10/23/2020 1101  Last data filed at 10/23/2020 0911  Gross per 24 hour   Intake 240 ml   Output 1500 ml   Net -1260 ml     I/O last 3 completed shifts: In: 360 [P.O.:360]  Out: 1100 [Urine:1100]    Safety Concerns:      At Risk for Falls    Impairments/Disabilities:      Hearing    Nutrition Therapy:  Current Nutrition Therapy:   - Oral Diet:  Carb Control 5 carbs/meal (2000kcals/day)    Routes of Feeding: Oral  Liquids: No Restrictions  Daily Fluid Restriction: no  Last Modified Barium Swallow with Video (Video Swallowing Test): not done    Treatments at the Time of Hospital Discharge:   Respiratory Treatments: ***  Oxygen Therapy:  is not on home oxygen therapy. Ventilator:    - No ventilator support    Rehab Therapies: Physical Therapy and Occupational Therapy  Weight Bearing Status/Restrictions: No weight bearing restirctions  Other Medical Equipment (for information only, NOT a DME order):  maximove  Other Treatments: ***    Patient's personal belongings (please select all that are sent with patient):  None    RN SIGNATURE:  Electronically signed by Ronnie Hernández RN on 10/24/20 at 2:06 PM EDT    CASE MANAGEMENT/SOCIAL WORK SECTION    Inpatient Status Date: 10/21/20    Readmission Risk Assessment Score:  Readmission Risk              Risk of Unplanned Readmission:        21           Discharging to Facility/ [address]   [phone]   Fax:  [phone]        / signature: Electronically signed by Naseem Rider RN on 10/23/20 at 11:03 AM EDT  Electronically signed by Mariaa Lozano RN on 10/24/2020 at 10:39 AM    PHYSICIAN SECTION    Prognosis: Fair    Condition at Discharge: Stable    Rehab Potential (if transferring to Rehab): Fair    Recommended Labs or Other Treatments After Discharge: void trial in 1 week, BMP in 1 week     Physician Certification: I certify the above information and transfer of Iliana Prakash  is necessary for the continuing treatment of the diagnosis listed and that she requires [facility] for greater 30 days.      Update Admission H&P: No change in H&P    PHYSICIAN SIGNATURE:  Electronically signed by Edwige Villarreal MD on 10/24/20 at 10:25 AM EDT

## 2020-10-23 NOTE — CARE COORDINATION
CASE MANAGEMENT DISCHARGE SUMMARY      Discharge to: [facility]    Precertification completed: [address] Exemption Notification (HENS) completed: n/a        Transportation: ambulance     Agency used:[facility]   time:1800   Ambulance form completed: Yes    Confirmed discharge plan with:     Patient: yes     Family, name and contact number: Swati   Facility/Agency, name:  [facility] faxed   Phone number for report to facility: [phone]     RN, name: Ashlee Maki RN    Note: Discharging nurse to complete [facility], reconcile [facility], and place final copy with patient's discharge packet. RN to ensure that written prescriptions for  Level II medications are sent with patient to the facility as per protocol.     Tate Naidu RN

## 2020-10-23 NOTE — PROGRESS NOTES
Secure message sent to Dr. Santos Jimenez:    Chencho Mcclain RM: [phone] Patient is incontinent of bladder. I tried getting her to urinate prior to scan, she states that she did but I don't believe that she did.  Bladder scan shows 587 ml of urine      MD states to place mendez

## 2020-10-23 NOTE — PROGRESS NOTES
Hospitalist Progress Note      PCP: Cornelia Peters    Date of Admission: 10/20/2020        Subjective:   Patient feels well today. Does not have any abdominal pain. No fevers or chills    Medications:  Reviewed    Infusion Medications    sodium chloride      dextrose       Scheduled Medications    [START ON 10/24/2020] ciprofloxacin  250 mg Oral 2 times per day    insulin lispro  4 Units Subcutaneous TID WC    aspirin  81 mg Oral Daily    busPIRone  5 mg Oral BID    carvedilol  25 mg Oral BID    cloNIDine  0.1 mg Oral TID    isosorbide mononitrate  30 mg Oral Daily    pantoprazole  20 mg Oral QAM AC    oxybutynin  10 mg Oral Daily    pramipexole  0.5 mg Oral Nightly    pravastatin  20 mg Oral Nightly    sodium chloride flush  10 mL Intravenous 2 times per day    enoxaparin  40 mg Subcutaneous Daily    insulin glargine  10 Units Subcutaneous Nightly    insulin lispro  0-6 Units Subcutaneous TID WC    insulin lispro  0-3 Units Subcutaneous Nightly     PRN Meds: bisacodyl, sodium chloride flush, potassium chloride, magnesium sulfate, acetaminophen **OR** acetaminophen, promethazine **OR** ondansetron, famotidine, albuterol, glucose, dextrose, glucagon (rDNA), dextrose      Intake/Output Summary (Last 24 hours) at 10/23/2020 1427  Last data filed at 10/23/2020 0911  Gross per 24 hour   Intake --   Output 1000 ml   Net -1000 ml       Exam:    /75   Pulse 66   Temp 97.4 °F (36.3 °C) (Oral)   Resp 16   Ht 5' 5\" (1.651 m)   Wt 235 lb 10.8 oz (106.9 kg)   SpO2 96%   BMI 39.22 kg/m²     General appearance: No apparent distress, appears stated age and cooperative. HEENT: Pupils equal, round, and reactive to light. Conjunctivae/corneas clear. Neck: Supple, with full range of motion. No jugular venous distention. Trachea midline. Respiratory:  Normal respiratory effort. Clear to auscultation, bilaterally without Rales/Wheezes/Rhonchi.   Cardiovascular: Regular rate and rhythm with normal S1/S2 without murmurs, rubs or gallops. Abdomen: Soft, non-tender, non-distended with normal bowel sounds. Musculoskeletal: No clubbing, cyanosis or edema bilaterally. Full range of motion without deformity. Skin: Skin color, texture, turgor normal.  No rashes or lesions. Neurologic:  Neurovascularly intact without any focal sensory/motor deficits. Cranial nerves: II-XII intact, grossly non-focal.  Psychiatric: Alert and oriented, thought content appropriate, normal insight  Capillary Refill: Brisk,< 3 seconds   Peripheral Pulses: +2 palpable, equal bilaterally       Labs:   Recent Labs     10/20/20  2109 10/22/20  0737 10/23/20  0736   WBC 8.4 7.4 6.2   HGB 9.0* 8.3* 7.5*   HCT 28.4* 25.7* 23.0*    324 282     Recent Labs     10/20/20  2109 10/21/20  1317 10/22/20  0737 10/23/20  0736   * 134* 136 135*   K 4.9 5.0 4.5 4.4   CL 99 100 103 101   CO2 27 22 25 23   BUN 21* 20 18 22*   CREATININE 1.7* 1.5* 1.3* 1.6*   CALCIUM 9.2 9.1 8.8 8.8   PHOS 4.2  --   --   --      Recent Labs     10/20/20  2109 10/22/20  0737 10/23/20  0736   AST 10* 8* 7*   ALT 8* 7* 6*   BILITOT <0.2 0.3 <0.2   ALKPHOS 88 81 73     No results for input(s): INR in the last 72 hours. Recent Labs     10/21/20  0447 10/21/20  0748 10/21/20  1317   CKTOTAL 14*  --   --    TROPONINI  --  <0.01 <0.01       Assessment/Plan:      -UTI due to Pseudomonas aeruginosa--  -acute abdominal pain due to UTI-resolved  -Acute kidney injury associated urine retention. . Creatinine initially decreased from 1.7-1.3 but has increased to 1.6 today. .   -Acute urinary retention associated with neurogenic bladder,ladder scan 537ml  -Diabetes mellitus type 2, uncontrolled-hemoglobin A1c 8.1  -Essential hypertension-stable-on Imdur, Coreg, amlodipine  -Chronic diastolic heart failure-compensated  -Bgcxplgjmisufh-vrtodo-jo pravastatin  -Anxiety disorder-stable-buspirone  -Obesity BMI 38  -Chronic zympcf-yswsus-jxzduk-up outpatient  -Dementia  -Obstructive sleep apnea  -Neurogenic bladder-on oxybutynin  -GERD-On Protonix    Plan  Antibiotics changed to ciprofloxacin for Pseudomonas  Insert Grajeda for acute urine retention  Repeat BMP in am  Discussed with patient's daughter RN and ,  Anticipate discharge in a.m. once creatinine improves    DVT Prophylaxis: Lovenox  Diet: DIET CARB CONTROL;  Code Status: Full Code        Radha Winchester MD

## 2020-10-24 VITALS
TEMPERATURE: 98 F | RESPIRATION RATE: 16 BRPM | WEIGHT: 235.67 LBS | HEIGHT: 65 IN | HEART RATE: 68 BPM | SYSTOLIC BLOOD PRESSURE: 156 MMHG | BODY MASS INDEX: 39.27 KG/M2 | DIASTOLIC BLOOD PRESSURE: 74 MMHG | OXYGEN SATURATION: 94 %

## 2020-10-24 LAB
ANION GAP SERPL CALCULATED.3IONS-SCNC: 9 MMOL/L (ref 3–16)
BASOPHILS ABSOLUTE: 0 K/UL (ref 0–0.2)
BASOPHILS RELATIVE PERCENT: 0.9 %
BUN BLDV-MCNC: 21 MG/DL (ref 7–20)
CALCIUM SERPL-MCNC: 8.6 MG/DL (ref 8.3–10.6)
CHLORIDE BLD-SCNC: 105 MMOL/L (ref 99–110)
CO2: 24 MMOL/L (ref 21–32)
CREAT SERPL-MCNC: 1.3 MG/DL (ref 0.6–1.2)
EOSINOPHILS ABSOLUTE: 0.2 K/UL (ref 0–0.6)
EOSINOPHILS RELATIVE PERCENT: 4.1 %
GFR AFRICAN AMERICAN: 48
GFR NON-AFRICAN AMERICAN: 39
GLUCOSE BLD-MCNC: 170 MG/DL (ref 70–99)
GLUCOSE BLD-MCNC: 172 MG/DL (ref 70–99)
GLUCOSE BLD-MCNC: 251 MG/DL (ref 70–99)
HCT VFR BLD CALC: 22.6 % (ref 36–48)
HEMOGLOBIN: 7.4 G/DL (ref 12–16)
LYMPHOCYTES ABSOLUTE: 2.1 K/UL (ref 1–5.1)
LYMPHOCYTES RELATIVE PERCENT: 38.2 %
MCH RBC QN AUTO: 31.7 PG (ref 26–34)
MCHC RBC AUTO-ENTMCNC: 32.8 G/DL (ref 31–36)
MCV RBC AUTO: 96.8 FL (ref 80–100)
MONOCYTES ABSOLUTE: 0.3 K/UL (ref 0–1.3)
MONOCYTES RELATIVE PERCENT: 6.2 %
NEUTROPHILS ABSOLUTE: 2.8 K/UL (ref 1.7–7.7)
NEUTROPHILS RELATIVE PERCENT: 50.6 %
PDW BLD-RTO: 17.6 % (ref 12.4–15.4)
PERFORMED ON: ABNORMAL
PERFORMED ON: ABNORMAL
PLATELET # BLD: 260 K/UL (ref 135–450)
PMV BLD AUTO: 8.5 FL (ref 5–10.5)
POTASSIUM SERPL-SCNC: 4.1 MMOL/L (ref 3.5–5.1)
RBC # BLD: 2.34 M/UL (ref 4–5.2)
SODIUM BLD-SCNC: 138 MMOL/L (ref 136–145)
WBC # BLD: 5.6 K/UL (ref 4–11)

## 2020-10-24 PROCEDURE — 96372 THER/PROPH/DIAG INJ SC/IM: CPT

## 2020-10-24 PROCEDURE — 6360000002 HC RX W HCPCS: Performed by: INTERNAL MEDICINE

## 2020-10-24 PROCEDURE — 2580000003 HC RX 258: Performed by: HOSPITALIST

## 2020-10-24 PROCEDURE — 85025 COMPLETE CBC W/AUTO DIFF WBC: CPT

## 2020-10-24 PROCEDURE — 36415 COLL VENOUS BLD VENIPUNCTURE: CPT

## 2020-10-24 PROCEDURE — 6370000000 HC RX 637 (ALT 250 FOR IP): Performed by: INTERNAL MEDICINE

## 2020-10-24 PROCEDURE — 80048 BASIC METABOLIC PNL TOTAL CA: CPT

## 2020-10-24 PROCEDURE — 6370000000 HC RX 637 (ALT 250 FOR IP): Performed by: HOSPITALIST

## 2020-10-24 RX ORDER — CIPROFLOXACIN 250 MG/1
500 TABLET, FILM COATED ORAL 2 TIMES DAILY
Qty: 20 TABLET | Refills: 0
Start: 2020-10-24 | End: 2020-10-29

## 2020-10-24 RX ADMIN — INSULIN LISPRO 1 UNITS: 100 INJECTION, SOLUTION INTRAVENOUS; SUBCUTANEOUS at 09:26

## 2020-10-24 RX ADMIN — BUSPIRONE HYDROCHLORIDE 5 MG: 5 TABLET ORAL at 09:27

## 2020-10-24 RX ADMIN — INSULIN LISPRO 4 UNITS: 100 INJECTION, SOLUTION INTRAVENOUS; SUBCUTANEOUS at 12:55

## 2020-10-24 RX ADMIN — INSULIN LISPRO 3 UNITS: 100 INJECTION, SOLUTION INTRAVENOUS; SUBCUTANEOUS at 12:55

## 2020-10-24 RX ADMIN — CLONIDINE HYDROCHLORIDE 0.1 MG: 0.1 TABLET ORAL at 09:30

## 2020-10-24 RX ADMIN — OXYBUTYNIN CHLORIDE 10 MG: 5 TABLET, EXTENDED RELEASE ORAL at 09:27

## 2020-10-24 RX ADMIN — CARVEDILOL 25 MG: 25 TABLET, FILM COATED ORAL at 09:28

## 2020-10-24 RX ADMIN — INSULIN LISPRO 4 UNITS: 100 INJECTION, SOLUTION INTRAVENOUS; SUBCUTANEOUS at 09:27

## 2020-10-24 RX ADMIN — CIPROFLOXACIN 250 MG: 500 TABLET, FILM COATED ORAL at 09:27

## 2020-10-24 RX ADMIN — ENOXAPARIN SODIUM 40 MG: 40 INJECTION SUBCUTANEOUS at 09:27

## 2020-10-24 RX ADMIN — SODIUM CHLORIDE: 9 INJECTION, SOLUTION INTRAVENOUS at 06:41

## 2020-10-24 RX ADMIN — ISOSORBIDE MONONITRATE 30 MG: 30 TABLET, EXTENDED RELEASE ORAL at 09:27

## 2020-10-24 RX ADMIN — PANTOPRAZOLE SODIUM 20 MG: 20 TABLET, DELAYED RELEASE ORAL at 06:40

## 2020-10-24 RX ADMIN — ASPIRIN 81 MG: 81 TABLET ORAL at 09:27

## 2020-10-24 ASSESSMENT — PAIN SCALES - GENERAL: PAINLEVEL_OUTOF10: 0

## 2020-10-24 NOTE — DISCHARGE SUMMARY
Hospital Medicine Discharge Summary    Patient ID: Alonso Cuadra      Patient's PCP: Victor M Worthy    Admit Date: 10/20/2020     Discharge Date:   10/24/2020    Admitting Physician: Bebeto Navarrete DO     Discharge Physician: Dionte Sawant MD     Discharge Diagnoses: Active Hospital Problems    Diagnosis    UBALDO (acute kidney injury) ([facility] [address] [N17.9]       The patient was seen and examined on day of discharge and this discharge summary is in conjunction with any daily progress note from day of discharge. Hospital Course:     79 y.o. female who presented to [facility] with past medical history of asthma, depression, GERD, hypertension, hyperlipidemia, osteoarthritis, sleep apnea on CPAP, type 2 diabetes presented to the ED for abdominal pain who is from [facility]     Patient is a poor historian and called facility for more information  was told that patient have complained of abdominal pain for a while. Today patient complained of worsening abdominal pain and called the on call doctor and abd xray done with no findings but requested to the come to the hospital. patient has been complaining of abdominal pain urinary urgency for a while and has frequent UTIs due to her bladder dysfunction has even underwent Botox injections and has followed up with urology for a while. Patient continues to have changes in urine  Currently patient has no current complaints of fever chills chest pain shortness of breath or dysuria. Patient reports abdominal pain is usually when she tries to use the bathroom or when she presses on the abdomen. CODE STATUS discussed with the patient is was a DNR CCA. Nursing home records is full code, I called the daughter who is POA and when they talked about it in the past she was a FULL code.  Given her mentation is not at baseline per daughter requested to be a full code and she will talk to the patient and clarify if this is a new change but wanting the patient to be at baseline and talk to family.        Assessment/Plan:        -UTI due to Pseudomonas aeruginosa--  -acute abdominal pain due to UTI-resolved  -Acute kidney injury associated urine retention. . improved   -Acute urinary retention associated with neurogenic bladder,ladder scan 537ml  -Diabetes mellitus type 2, uncontrolled-hemoglobin A1c 8.1  -Essential hypertension-stable-on Imdur, Coreg, amlodipine  -Chronic diastolic heart failure-compensated  -Rhxlonktcdslcd-efgivw-yd pravastatin  -Anxiety disorder-stable-buspirone  -Obesity BMI 38  -Chronic kztrjr-pzzweq-jxyafo-up outpatient  -Dementia  -Obstructive sleep apnea  -Neurogenic bladder-on oxybutynin  -GERD-On Protonix     Plan  Antibiotics changed to ciprofloxacin for Pseudomonas  Insert Grajeda for acute urine retention  Cont cipro for pseudomonas uti  Attempt void trial after completion of abx   Rpt bmp in 1 week at [facility]                Physical Exam Performed:     BP (!) 156/74   Pulse 68   Temp 98 °F (36.7 °C) (Oral)   Resp 16   Ht 5' 5\" (1.651 m)   Wt 235 lb 10.8 oz (106.9 kg)   SpO2 94%   BMI 39.22 kg/m²       General appearance:  No apparent distress, appears stated age and cooperative. HEENT:  Normal cephalic, atraumatic without obvious deformity. Pupils equal, round, and reactive to light. Extra ocular muscles intact. Conjunctivae/corneas clear. Neck: Supple, with full range of motion. No jugular venous distention. Trachea midline. Respiratory:  Normal respiratory effort. Clear to auscultation, bilaterally without Rales/Wheezes/Rhonchi. Cardiovascular:  Regular rate and rhythm with normal S1/S2 without murmurs, rubs or gallops. Abdomen: Soft, non-tender, non-distended with normal bowel sounds. Musculoskeletal:  No clubbing, cyanosis or edema bilaterally. Full range of motion without deformity. Skin: Skin color, texture, turgor normal.  No rashes or lesions.   Neurologic:  Neurovascularly intact without any focal sensory/motor deficits. Cranial nerves: II-XII intact, grossly non-focal.  Psychiatric:  Alert and oriented, thought content appropriate, normal insight  Capillary Refill: Brisk,< 3 seconds   Peripheral Pulses: +2 palpable, equal bilaterally       Labs: For convenience and continuity at follow-up the following most recent labs are provided:      CBC:    Lab Results   Component Value Date    WBC 5.6 10/24/2020    HGB 7.4 10/24/2020    HCT 22.6 10/24/2020     10/24/2020       Renal:    Lab Results   Component Value Date     10/24/2020    K 4.1 10/24/2020    K 4.4 10/23/2020     10/24/2020    CO2 24 10/24/2020    BUN 21 10/24/2020    CREATININE 1.3 10/24/2020    CALCIUM 8.6 10/24/2020    PHOS 4.2 10/20/2020         Significant Diagnostic Studies    Radiology:   CT ABDOMEN PELVIS WO CONTRAST Additional Contrast? None   Final Result   Possible acute cystitis. Correlate with urinalysis. Mild fullness of both renal collecting systems, without evidence of an   obstructing stone. Diverticulosis coli, without evidence of diverticulitis or colitis.                 Consults:     IP CONSULT TO HOSPITALIST    Disposition: ecf      Condition at Discharge: Stable    Discharge Instructions/Follow-up:  pcp    Code Status:  Full Code   Activity: activity as tolerated    Diet: regular diet      Discharge Medications:     Current Discharge Medication List           Details   ciprofloxacin (CIPRO) 250 MG tablet Take 2 tablets by mouth 2 times daily for 5 days  Qty: 20 tablet, Refills: 0              Details   lisinopril (PRINIVIL;ZESTRIL) 40 MG tablet Take 40 mg by mouth daily      busPIRone (BUSPAR) 5 MG tablet       !! insulin lispro (HUMALOG) 100 UNIT/ML injection vial Inject 0-3 Units into the skin nightly  Qty: 1 vial, Refills: 3      !! insulin lispro (HUMALOG) 100 UNIT/ML injection vial Inject 0-6 Units into the skin 3 times daily (with meals)  Qty: 1 vial, Refills: 3      pravastatin (PRAVACHOL) 20 MG tablet Take 1 tablet by mouth nightly TAKE ONE TABLET BY MOUTH DAILY    Associated Diagnoses: Mixed hyperlipidemia      cloNIDine (CATAPRES) 0.1 MG tablet Take 1 tablet by mouth 3 times daily      blood glucose test strips (ACCU-CHEK ARABELLA PLUS) strip TEST BLOOD SUGAR TWICE A DAY  Qty: 200 strip, Refills: 11    Associated Diagnoses: Controlled type 2 diabetes mellitus with diabetic neuropathy, without long-term current use of insulin ([facility])      donepezil (ARICEPT) 5 MG tablet Take 1 tablet by mouth nightly  Qty: 90 tablet, Refills: 1    Associated Diagnoses: Memory loss      metFORMIN (GLUCOPHAGE-XR) 500 MG extended release tablet TAKE TWO TABLETS TWICE A DAY WITH MEALS  Qty: 120 tablet, Refills: 5    Associated Diagnoses: Controlled type 2 diabetes mellitus without complication, without long-term current use of insulin ([facility])      SENNA PLUS 8.6-50 MG per tablet TAKE 2 TABLETS BY MOUTH DAILY  Qty: 60 tablet, Refills: 5      carvedilol (COREG) 25 MG tablet TAKE ONE TABLET BY MOUTH TWICE A DAY WITH FOOD  Qty: 180 tablet, Refills: 1      DULoxetine (CYMBALTA) 60 MG extended release capsule Take 2 capsules by mouth daily  Qty: 60 capsule, Refills: 5    Associated Diagnoses: Severe episode of recurrent major depressive disorder, without psychotic features ([facility])      pramipexole (MIRAPEX) 0.5 MG tablet TAKE ONE TABLET BY MOUTH ONCE NIGHTLY  Qty: 30 tablet, Refills: 5    Associated Diagnoses: RLS (restless legs syndrome)      omeprazole (PRILOSEC) 20 MG delayed release capsule TAKE ONE CAPSULE TWICE A DAY  Qty: 60 capsule, Refills: 5      oxybutynin (DITROPAN-XL) 10 MG extended release tablet Take 10 mg by mouth daily      albuterol sulfate  (90 Base) MCG/ACT inhaler Inhale 2 puffs into the lungs 4 times daily as needed for Wheezing  Qty: 3 Inhaler, Refills: 1      isosorbide mononitrate (IMDUR) 30 MG extended release tablet TAKE ONE-HALF TABLET BY MOUTH ONE TIME A DAY  Qty: 45 tablet, Refills: 3    Associated Diagnoses: Controlled type 2 diabetes mellitus without complication, without long-term current use of insulin ([facility])      clotrimazole-betamethasone (LOTRISONE) 1-0.05 % cream Apply bid to affected area. Qty: 45 g, Refills: 3    Associated Diagnoses: Yeast infection of the skin      nitroGLYCERIN (NITROSTAT) 0.4 MG SL tablet Place 1 tablet under the tongue every 5 minutes as needed for Chest pain  Qty: 25 tablet, Refills: 1    Associated Diagnoses: Coronary artery disease involving native coronary artery of native heart without angina pectoris      calcium carbonate 600 MG TABS tablet Take 1 tablet by mouth 2 times daily  Qty: 30 tablet, Refills: 1      vitamin E 400 UNIT capsule Take 400 Units by mouth daily      aspirin 81 MG tablet Take 81 mg by mouth daily. !! - Potential duplicate medications found. Please discuss with provider. Time Spent on discharge is more than 30 minutes in the examination, evaluation, counseling and review of medications and discharge plan. Signed:    Nancy Wesley MD   10/24/2020      Thank you Eric Westbrook for the opportunity to be involved in this patient's care. If you have any questions or concerns please feel free to contact me at [phone].

## 2020-10-24 NOTE — PROGRESS NOTES
Report called to Sammie Agarwal, nurse at [facility], at this time.  Pt to facility at 1435

## 2020-10-24 NOTE — CARE COORDINATION
CASE MANAGEMENT DISCHARGE SUMMARY      Discharge to: skilled return to LTC @ [address] completed: [facility] Exemption Notification (HENS) completed: N/A    New Durable Medical Equipment ordered/agency: in facility    Transportation:    Family/car: no   Medical Transport explained to pt/family. Pt/family voice no agency preference. Agency used: [facility] up time: 1430   Ambulance form completed: Yes    Confirmed discharge plan with:     Patient: yes per RN     Family, name and contact number: dtr and ISHA Longoria    Facility/Agency, name: [facility] faxed   Phone number for report to facility: [phone]     RN, name: Calvin Correia RN    Rapid covid neg 10/23    Note: Discharging nurse to complete SHELBY, reconcile AVS, and place final copy with patient's discharge packet. RN to ensure that written prescriptions for  Level II medications are sent with patient to the facility as per protocol.

## 2020-11-03 PROBLEM — I10 HYPERTENSION: Status: RESOLVED | Noted: 2020-11-03 | Resolved: 2020-11-03

## 2020-12-03 ENCOUNTER — APPOINTMENT (OUTPATIENT)
Dept: GENERAL RADIOLOGY | Age: 80
DRG: 287 | End: 2020-12-03
Payer: COMMERCIAL

## 2020-12-03 ENCOUNTER — HOSPITAL ENCOUNTER (INPATIENT)
Age: 80
LOS: 5 days | Discharge: NURSING, REHAB OR LONG-TERM CARE | DRG: 287 | End: 2020-12-08
Attending: EMERGENCY MEDICINE | Admitting: INTERNAL MEDICINE
Payer: COMMERCIAL

## 2020-12-03 PROBLEM — R07.9 CHEST PAIN: Status: ACTIVE | Noted: 2020-12-03

## 2020-12-03 LAB
A/G RATIO: 1.3 (ref 1.1–2.2)
ALBUMIN SERPL-MCNC: 3.8 G/DL (ref 3.4–5)
ALP BLD-CCNC: 82 U/L (ref 40–129)
ALT SERPL-CCNC: 8 U/L (ref 10–40)
ANION GAP SERPL CALCULATED.3IONS-SCNC: 9 MMOL/L (ref 3–16)
APTT: 22.6 SEC (ref 24.2–36.2)
AST SERPL-CCNC: 21 U/L (ref 15–37)
BASOPHILS ABSOLUTE: 0.1 K/UL (ref 0–0.2)
BASOPHILS RELATIVE PERCENT: 1.4 %
BILIRUB SERPL-MCNC: 0.3 MG/DL (ref 0–1)
BUN BLDV-MCNC: 16 MG/DL (ref 7–20)
CALCIUM SERPL-MCNC: 9.9 MG/DL (ref 8.3–10.6)
CHLORIDE BLD-SCNC: 102 MMOL/L (ref 99–110)
CO2: 26 MMOL/L (ref 21–32)
CREAT SERPL-MCNC: 1.3 MG/DL (ref 0.6–1.2)
EOSINOPHILS ABSOLUTE: 0.3 K/UL (ref 0–0.6)
EOSINOPHILS RELATIVE PERCENT: 3.2 %
GFR AFRICAN AMERICAN: 48
GFR NON-AFRICAN AMERICAN: 39
GLOBULIN: 3 G/DL
GLUCOSE BLD-MCNC: 162 MG/DL (ref 70–99)
HCT VFR BLD CALC: 27.9 % (ref 36–48)
HEMOGLOBIN: 9.1 G/DL (ref 12–16)
INR BLD: 1.64 (ref 0.86–1.14)
LYMPHOCYTES ABSOLUTE: 2.2 K/UL (ref 1–5.1)
LYMPHOCYTES RELATIVE PERCENT: 26.7 %
MCH RBC QN AUTO: 31 PG (ref 26–34)
MCHC RBC AUTO-ENTMCNC: 32.6 G/DL (ref 31–36)
MCV RBC AUTO: 95.1 FL (ref 80–100)
MONOCYTES ABSOLUTE: 0.6 K/UL (ref 0–1.3)
MONOCYTES RELATIVE PERCENT: 6.8 %
NEUTROPHILS ABSOLUTE: 5.1 K/UL (ref 1.7–7.7)
NEUTROPHILS RELATIVE PERCENT: 61.9 %
PDW BLD-RTO: 17 % (ref 12.4–15.4)
PLATELET # BLD: 261 K/UL (ref 135–450)
PMV BLD AUTO: 9.2 FL (ref 5–10.5)
POTASSIUM SERPL-SCNC: 4.3 MMOL/L (ref 3.5–5.1)
PRO-BNP: 3692 PG/ML (ref 0–449)
PROTHROMBIN TIME: 18.8 SEC (ref 10–13.2)
RBC # BLD: 2.93 M/UL (ref 4–5.2)
SARS-COV-2, NAAT: NOT DETECTED
SODIUM BLD-SCNC: 137 MMOL/L (ref 136–145)
TOTAL PROTEIN: 6.8 G/DL (ref 6.4–8.2)
TROPONIN: <0.01 NG/ML
WBC # BLD: 8.3 K/UL (ref 4–11)

## 2020-12-03 PROCEDURE — 85025 COMPLETE CBC W/AUTO DIFF WBC: CPT

## 2020-12-03 PROCEDURE — 80053 COMPREHEN METABOLIC PANEL: CPT

## 2020-12-03 PROCEDURE — 71045 X-RAY EXAM CHEST 1 VIEW: CPT

## 2020-12-03 PROCEDURE — G0378 HOSPITAL OBSERVATION PER HR: HCPCS

## 2020-12-03 PROCEDURE — 84484 ASSAY OF TROPONIN QUANT: CPT

## 2020-12-03 PROCEDURE — 99284 EMERGENCY DEPT VISIT MOD MDM: CPT

## 2020-12-03 PROCEDURE — 6360000002 HC RX W HCPCS: Performed by: EMERGENCY MEDICINE

## 2020-12-03 PROCEDURE — 1200000000 HC SEMI PRIVATE

## 2020-12-03 PROCEDURE — 85610 PROTHROMBIN TIME: CPT

## 2020-12-03 PROCEDURE — 93005 ELECTROCARDIOGRAM TRACING: CPT | Performed by: EMERGENCY MEDICINE

## 2020-12-03 PROCEDURE — 83880 ASSAY OF NATRIURETIC PEPTIDE: CPT

## 2020-12-03 PROCEDURE — 36415 COLL VENOUS BLD VENIPUNCTURE: CPT

## 2020-12-03 PROCEDURE — U0002 COVID-19 LAB TEST NON-CDC: HCPCS

## 2020-12-03 PROCEDURE — 85730 THROMBOPLASTIN TIME PARTIAL: CPT

## 2020-12-03 PROCEDURE — 6370000000 HC RX 637 (ALT 250 FOR IP): Performed by: EMERGENCY MEDICINE

## 2020-12-03 RX ORDER — ASPIRIN 325 MG
325 TABLET ORAL ONCE
Status: COMPLETED | OUTPATIENT
Start: 2020-12-03 | End: 2020-12-03

## 2020-12-03 RX ORDER — FUROSEMIDE 10 MG/ML
20 INJECTION INTRAMUSCULAR; INTRAVENOUS ONCE
Status: COMPLETED | OUTPATIENT
Start: 2020-12-03 | End: 2020-12-03

## 2020-12-03 RX ADMIN — FUROSEMIDE 20 MG: 10 INJECTION, SOLUTION INTRAMUSCULAR; INTRAVENOUS at 21:18

## 2020-12-03 RX ADMIN — ASPIRIN 325 MG ORAL TABLET 325 MG: 325 PILL ORAL at 20:48

## 2020-12-04 LAB
EKG ATRIAL RATE: 68 BPM
EKG DIAGNOSIS: NORMAL
EKG P AXIS: 52 DEGREES
EKG P-R INTERVAL: 186 MS
EKG Q-T INTERVAL: 452 MS
EKG QRS DURATION: 94 MS
EKG QTC CALCULATION (BAZETT): 480 MS
EKG R AXIS: -24 DEGREES
EKG T AXIS: 16 DEGREES
EKG VENTRICULAR RATE: 68 BPM
GLUCOSE BLD-MCNC: 123 MG/DL (ref 70–99)
GLUCOSE BLD-MCNC: 125 MG/DL (ref 70–99)
GLUCOSE BLD-MCNC: 135 MG/DL (ref 70–99)
GLUCOSE BLD-MCNC: 144 MG/DL (ref 70–99)
GLUCOSE BLD-MCNC: 146 MG/DL (ref 70–99)
GLUCOSE BLD-MCNC: 231 MG/DL (ref 70–99)
PERFORMED ON: ABNORMAL
TROPONIN: <0.01 NG/ML
TROPONIN: <0.01 NG/ML

## 2020-12-04 PROCEDURE — G0378 HOSPITAL OBSERVATION PER HR: HCPCS

## 2020-12-04 PROCEDURE — 1200000000 HC SEMI PRIVATE

## 2020-12-04 PROCEDURE — 2700000000 HC OXYGEN THERAPY PER DAY

## 2020-12-04 PROCEDURE — 6370000000 HC RX 637 (ALT 250 FOR IP): Performed by: INTERNAL MEDICINE

## 2020-12-04 PROCEDURE — 93010 ELECTROCARDIOGRAM REPORT: CPT | Performed by: INTERNAL MEDICINE

## 2020-12-04 PROCEDURE — 2580000003 HC RX 258: Performed by: INTERNAL MEDICINE

## 2020-12-04 PROCEDURE — 94761 N-INVAS EAR/PLS OXIMETRY MLT: CPT

## 2020-12-04 PROCEDURE — 6360000002 HC RX W HCPCS: Performed by: INTERNAL MEDICINE

## 2020-12-04 PROCEDURE — 36415 COLL VENOUS BLD VENIPUNCTURE: CPT

## 2020-12-04 PROCEDURE — 99223 1ST HOSP IP/OBS HIGH 75: CPT | Performed by: INTERNAL MEDICINE

## 2020-12-04 PROCEDURE — 84484 ASSAY OF TROPONIN QUANT: CPT

## 2020-12-04 RX ORDER — METFORMIN HYDROCHLORIDE 500 MG/1
500 TABLET, EXTENDED RELEASE ORAL 2 TIMES DAILY WITH MEALS
Status: DISCONTINUED | OUTPATIENT
Start: 2020-12-04 | End: 2020-12-05

## 2020-12-04 RX ORDER — NITROGLYCERIN 0.4 MG/1
0.4 TABLET SUBLINGUAL EVERY 5 MIN PRN
Status: DISCONTINUED | OUTPATIENT
Start: 2020-12-04 | End: 2020-12-08 | Stop reason: HOSPADM

## 2020-12-04 RX ORDER — SODIUM CHLORIDE 0.9 % (FLUSH) 0.9 %
10 SYRINGE (ML) INJECTION EVERY 12 HOURS SCHEDULED
Status: DISCONTINUED | OUTPATIENT
Start: 2020-12-04 | End: 2020-12-08 | Stop reason: HOSPADM

## 2020-12-04 RX ORDER — SENNA AND DOCUSATE SODIUM 50; 8.6 MG/1; MG/1
1 TABLET, FILM COATED ORAL NIGHTLY
Status: DISCONTINUED | OUTPATIENT
Start: 2020-12-05 | End: 2020-12-08 | Stop reason: HOSPADM

## 2020-12-04 RX ORDER — CALCIUM CARBONATE 500(1250)
1 TABLET ORAL 2 TIMES DAILY
Status: DISCONTINUED | OUTPATIENT
Start: 2020-12-04 | End: 2020-12-08 | Stop reason: HOSPADM

## 2020-12-04 RX ORDER — PROMETHAZINE HYDROCHLORIDE 25 MG/1
12.5 TABLET ORAL EVERY 6 HOURS PRN
Status: DISCONTINUED | OUTPATIENT
Start: 2020-12-04 | End: 2020-12-08 | Stop reason: HOSPADM

## 2020-12-04 RX ORDER — LISINOPRIL 20 MG/1
40 TABLET ORAL DAILY
Status: DISCONTINUED | OUTPATIENT
Start: 2020-12-04 | End: 2020-12-06

## 2020-12-04 RX ORDER — PANTOPRAZOLE SODIUM 40 MG/1
40 TABLET, DELAYED RELEASE ORAL
Status: DISCONTINUED | OUTPATIENT
Start: 2020-12-04 | End: 2020-12-08 | Stop reason: HOSPADM

## 2020-12-04 RX ORDER — SODIUM CHLORIDE 0.9 % (FLUSH) 0.9 %
10 SYRINGE (ML) INJECTION PRN
Status: DISCONTINUED | OUTPATIENT
Start: 2020-12-04 | End: 2020-12-08 | Stop reason: HOSPADM

## 2020-12-04 RX ORDER — VITAMIN E 268 MG
400 CAPSULE ORAL DAILY
Status: DISCONTINUED | OUTPATIENT
Start: 2020-12-04 | End: 2020-12-08 | Stop reason: HOSPADM

## 2020-12-04 RX ORDER — POLYETHYLENE GLYCOL 3350 17 G/17G
17 POWDER, FOR SOLUTION ORAL DAILY PRN
Status: DISCONTINUED | OUTPATIENT
Start: 2020-12-04 | End: 2020-12-08 | Stop reason: HOSPADM

## 2020-12-04 RX ORDER — DEXTROSE MONOHYDRATE 25 G/50ML
12.5 INJECTION, SOLUTION INTRAVENOUS PRN
Status: DISCONTINUED | OUTPATIENT
Start: 2020-12-04 | End: 2020-12-08 | Stop reason: HOSPADM

## 2020-12-04 RX ORDER — ACETAMINOPHEN 325 MG/1
650 TABLET ORAL EVERY 6 HOURS PRN
Status: DISCONTINUED | OUTPATIENT
Start: 2020-12-04 | End: 2020-12-08 | Stop reason: HOSPADM

## 2020-12-04 RX ORDER — NICOTINE POLACRILEX 4 MG
15 LOZENGE BUCCAL PRN
Status: DISCONTINUED | OUTPATIENT
Start: 2020-12-04 | End: 2020-12-08 | Stop reason: HOSPADM

## 2020-12-04 RX ORDER — ALBUTEROL SULFATE 2.5 MG/3ML
2.5 SOLUTION RESPIRATORY (INHALATION) EVERY 4 HOURS PRN
Status: DISCONTINUED | OUTPATIENT
Start: 2020-12-04 | End: 2020-12-08 | Stop reason: HOSPADM

## 2020-12-04 RX ORDER — ACETAMINOPHEN 650 MG/1
650 SUPPOSITORY RECTAL EVERY 6 HOURS PRN
Status: DISCONTINUED | OUTPATIENT
Start: 2020-12-04 | End: 2020-12-08 | Stop reason: HOSPADM

## 2020-12-04 RX ORDER — ISOSORBIDE MONONITRATE 30 MG/1
15 TABLET, EXTENDED RELEASE ORAL DAILY
Status: DISCONTINUED | OUTPATIENT
Start: 2020-12-04 | End: 2020-12-06

## 2020-12-04 RX ORDER — ASPIRIN 81 MG/1
81 TABLET, CHEWABLE ORAL DAILY
Status: DISCONTINUED | OUTPATIENT
Start: 2020-12-04 | End: 2020-12-08 | Stop reason: HOSPADM

## 2020-12-04 RX ORDER — OXYBUTYNIN CHLORIDE 5 MG/1
10 TABLET, EXTENDED RELEASE ORAL DAILY
Status: DISCONTINUED | OUTPATIENT
Start: 2020-12-04 | End: 2020-12-08 | Stop reason: HOSPADM

## 2020-12-04 RX ORDER — DEXTROSE MONOHYDRATE 50 MG/ML
100 INJECTION, SOLUTION INTRAVENOUS PRN
Status: DISCONTINUED | OUTPATIENT
Start: 2020-12-04 | End: 2020-12-08 | Stop reason: HOSPADM

## 2020-12-04 RX ORDER — ONDANSETRON 2 MG/ML
4 INJECTION INTRAMUSCULAR; INTRAVENOUS EVERY 6 HOURS PRN
Status: DISCONTINUED | OUTPATIENT
Start: 2020-12-04 | End: 2020-12-08 | Stop reason: HOSPADM

## 2020-12-04 RX ORDER — PRAMIPEXOLE DIHYDROCHLORIDE 0.25 MG/1
0.5 TABLET ORAL NIGHTLY
Status: DISCONTINUED | OUTPATIENT
Start: 2020-12-04 | End: 2020-12-08 | Stop reason: HOSPADM

## 2020-12-04 RX ORDER — PRAVASTATIN SODIUM 20 MG
20 TABLET ORAL NIGHTLY
Status: DISCONTINUED | OUTPATIENT
Start: 2020-12-04 | End: 2020-12-08 | Stop reason: HOSPADM

## 2020-12-04 RX ORDER — CLONIDINE HYDROCHLORIDE 0.1 MG/1
0.1 TABLET ORAL 3 TIMES DAILY
Status: DISCONTINUED | OUTPATIENT
Start: 2020-12-04 | End: 2020-12-08 | Stop reason: HOSPADM

## 2020-12-04 RX ORDER — DULOXETIN HYDROCHLORIDE 60 MG/1
120 CAPSULE, DELAYED RELEASE ORAL DAILY
Status: DISCONTINUED | OUTPATIENT
Start: 2020-12-04 | End: 2020-12-08 | Stop reason: HOSPADM

## 2020-12-04 RX ORDER — CARVEDILOL 25 MG/1
25 TABLET ORAL 2 TIMES DAILY WITH MEALS
Status: DISCONTINUED | OUTPATIENT
Start: 2020-12-04 | End: 2020-12-08 | Stop reason: HOSPADM

## 2020-12-04 RX ADMIN — CARVEDILOL 25 MG: 25 TABLET, FILM COATED ORAL at 17:50

## 2020-12-04 RX ADMIN — PRAMIPEXOLE DIHYDROCHLORIDE 0.5 MG: 0.25 TABLET ORAL at 22:14

## 2020-12-04 RX ADMIN — CLONIDINE HYDROCHLORIDE 0.1 MG: 0.1 TABLET ORAL at 09:03

## 2020-12-04 RX ADMIN — CALCIUM 500 MG: 500 TABLET ORAL at 09:03

## 2020-12-04 RX ADMIN — Medication 10 ML: at 09:04

## 2020-12-04 RX ADMIN — CLONIDINE HYDROCHLORIDE 0.1 MG: 0.1 TABLET ORAL at 13:47

## 2020-12-04 RX ADMIN — CLONIDINE HYDROCHLORIDE 0.1 MG: 0.1 TABLET ORAL at 22:14

## 2020-12-04 RX ADMIN — METFORMIN HYDROCHLORIDE 500 MG: 500 TABLET, EXTENDED RELEASE ORAL at 17:50

## 2020-12-04 RX ADMIN — DULOXETINE HYDROCHLORIDE 120 MG: 60 CAPSULE, DELAYED RELEASE ORAL at 09:02

## 2020-12-04 RX ADMIN — PANTOPRAZOLE SODIUM 40 MG: 40 TABLET, DELAYED RELEASE ORAL at 09:06

## 2020-12-04 RX ADMIN — CALCIUM 500 MG: 500 TABLET ORAL at 22:14

## 2020-12-04 RX ADMIN — ENOXAPARIN SODIUM 40 MG: 40 INJECTION SUBCUTANEOUS at 09:03

## 2020-12-04 RX ADMIN — PRAVASTATIN SODIUM 20 MG: 20 TABLET ORAL at 01:00

## 2020-12-04 RX ADMIN — INSULIN LISPRO 2 UNITS: 100 INJECTION, SOLUTION INTRAVENOUS; SUBCUTANEOUS at 12:46

## 2020-12-04 RX ADMIN — Medication 10 ML: at 22:14

## 2020-12-04 RX ADMIN — CARVEDILOL 25 MG: 25 TABLET, FILM COATED ORAL at 09:03

## 2020-12-04 RX ADMIN — OXYBUTYNIN CHLORIDE 10 MG: 5 TABLET, EXTENDED RELEASE ORAL at 09:02

## 2020-12-04 RX ADMIN — Medication 10 ML: at 01:01

## 2020-12-04 RX ADMIN — ISOSORBIDE MONONITRATE 15 MG: 30 TABLET, EXTENDED RELEASE ORAL at 09:02

## 2020-12-04 RX ADMIN — PRAVASTATIN SODIUM 20 MG: 20 TABLET ORAL at 22:14

## 2020-12-04 RX ADMIN — Medication 400 UNITS: at 09:06

## 2020-12-04 RX ADMIN — PRAMIPEXOLE DIHYDROCHLORIDE 0.5 MG: 0.25 TABLET ORAL at 00:58

## 2020-12-04 RX ADMIN — LISINOPRIL 40 MG: 20 TABLET ORAL at 09:03

## 2020-12-04 RX ADMIN — CALCIUM 500 MG: 500 TABLET ORAL at 00:58

## 2020-12-04 RX ADMIN — METFORMIN HYDROCHLORIDE 500 MG: 500 TABLET, EXTENDED RELEASE ORAL at 09:03

## 2020-12-04 RX ADMIN — ASPIRIN 81 MG CHEWABLE TABLET 81 MG: 81 TABLET CHEWABLE at 09:03

## 2020-12-04 ASSESSMENT — PAIN SCALES - GENERAL
PAINLEVEL_OUTOF10: 0
PAINLEVEL_OUTOF10: 7

## 2020-12-04 NOTE — PROGRESS NOTES
Hospitalist Progress Note      PCP: Eric Westbrook    Date of Admission: 12/3/2020    Chief Complaint: Chest pain    Hospital Course: Admitted with chest pain. Currently pain-free. Troponin negative. Known to have coronary artery disease in the past.  Cardiology consulted, not yet seen. Subjective: No current chest pain, no shortness of breath, no nausea or vomiting. Medications:  Reviewed    Infusion Medications    dextrose       Scheduled Medications    aspirin  81 mg Oral Daily    calcium elemental  1 tablet Oral BID    carvedilol  25 mg Oral BID WC    cloNIDine  0.1 mg Oral TID    DULoxetine  120 mg Oral Daily    isosorbide mononitrate  15 mg Oral Daily    lisinopril  40 mg Oral Daily    metFORMIN  500 mg Oral BID WC    pantoprazole  40 mg Oral QAM AC    oxybutynin  10 mg Oral Daily    pramipexole  0.5 mg Oral Nightly    pravastatin  20 mg Oral Nightly    vitamin E  400 Units Oral Daily    [START ON 12/5/2020] sennosides-docusate sodium  1 tablet Oral Nightly    insulin lispro  0-6 Units Subcutaneous TID WC    insulin lispro  0-3 Units Subcutaneous Nightly    sodium chloride flush  10 mL Intravenous 2 times per day    enoxaparin  40 mg Subcutaneous Daily     PRN Meds: albuterol, glucose, dextrose, glucagon (rDNA), dextrose, sodium chloride flush, acetaminophen **OR** acetaminophen, polyethylene glycol, promethazine **OR** ondansetron, nitroGLYCERIN      Intake/Output Summary (Last 24 hours) at 12/4/2020 1338  Last data filed at 12/4/2020 0944  Gross per 24 hour   Intake 120 ml   Output 2575 ml   Net -2455 ml       Physical Exam Performed:    /73   Pulse 68   Temp 98.6 °F (37 °C) (Oral)   Resp 18   Ht 5' 5\" (1.651 m)   Wt 201 lb 11.5 oz (91.5 kg)   SpO2 95%   BMI 33.57 kg/m²     General appearance: No apparent distress, appears stated age and cooperative. HEENT: Pupils equal, round, and reactive to light. Conjunctivae/corneas clear.   Neck: Supple, with full range of motion. No jugular venous distention. Trachea midline. Respiratory:  Normal respiratory effort. Clear to auscultation, bilaterally without Rales/Wheezes/Rhonchi. Cardiovascular: Regular rate and rhythm with normal S1/S2 without murmurs, rubs or gallops. Abdomen: Soft, non-tender, non-distended with normal bowel sounds. Musculoskeletal: No clubbing, cyanosis or edema bilaterally. Full range of motion without deformity. Skin: Skin color, texture, turgor normal.  No rashes or lesions. Neurologic:  Neurovascularly intact without any focal sensory/motor deficits. Cranial nerves: II-XII intact, grossly non-focal.  Psychiatric: Alert and oriented, thought content appropriate, normal insight  Capillary Refill: Brisk,< 3 seconds   Peripheral Pulses: +2 palpable, equal bilaterally       Labs:   Recent Labs     12/03/20 1942   WBC 8.3   HGB 9.1*   HCT 27.9*        Recent Labs     12/03/20 1942      K 4.3      CO2 26   BUN 16   CREATININE 1.3*   CALCIUM 9.9     Recent Labs     12/03/20 1942   AST 21   ALT 8*   BILITOT 0.3   ALKPHOS 82     Recent Labs     12/03/20 1942   INR 1.64*     Recent Labs     12/03/20  1942 12/04/20  0128 12/04/20  0649   TROPONINI <0.01 <0.01 <0.01       Urinalysis:      Lab Results   Component Value Date    NITRU Negative 10/20/2020    WBCUA >100 10/20/2020    BACTERIA 3+ 09/07/2019    RBCUA see below 10/20/2020    BLOODU SMALL 10/20/2020    SPECGRAV 1.015 10/20/2020    GLUCOSEU Negative 10/20/2020    GLUCOSEU NEGATIVE 03/17/2011       Radiology:  XR CHEST PORTABLE   Final Result   No acute cardiopulmonary disease. Stable enlargement of the cardiac silhouette. Assessment/Plan:    Active Hospital Problems    Diagnosis    Chest pain [R07.9]    Obesity (BMI 30-39. 9) [E66.9]    Essential hypertension [I10]    Mixed hyperlipidemia [E78.2]     PLAN:    Chest pain  Troponin negative  EKG unremarkable  Known to have past history of coronary artery disease  No stress test ordered from last night. Cardiology consult requested at this time    Chronic diastolic congestive heart failure  Continue lisinopril and Coreg  Appears normovolemic    Hypertension  Blood pressure is controlled. Continue same medications. Dyslipidemia  Continue statin. Diabetes mellitus type 2  Continue Metformin, add sliding scale insulin.     Obesity  Body mass index is 33.57 kg/m². Complicating assessment and treatment, placing patient at high risk for multiple co-morbidities as well as early death and contributing to the patient's presentation.  Counseled on weight loss.     Discussed with patient, questions answered       DVT Prophylaxis: Lovenox  Diet: DIET CARB CONTROL;  Code Status: Full Code    PT/OT Eval Status: Not needed for now    Dispo -observation stay pending cardiology evaluation    Radha Cruz MD

## 2020-12-04 NOTE — PROGRESS NOTES
RESPIRATORY THERAPY ASSESSMENT    Name:  [id number] [facility] Record Number:  [id number]  Age: 79 y.o. Gender: female  : 1940  Today's Date:  2020  Room:  [facility]/0218-02    Assessment     Is the patient being admitted for a COPD or Asthma exacerbation? No   (If yes the patient will be seen every 4 hours for the first 24 hours and then reassessed)    Patient Admission Diagnosis      Allergies  Allergies   Allergen Reactions    Latex Rash    Cephalexin Other (See Comments)     SHAKY AND SWEATY    Codeine Nausea Only     STOMACH HURTS    Levofloxacin Nausea Only    Pce [Erythromycin] Nausea Only     STOMACH HURTS    Pcn [Penicillins] Other (See Comments)     SHAKY AND SWEATY    Pentazocine      UNSURE OF REATION    Sumycin [Tetracycline Hcl] Nausea Only    Atarax [Hydroxyzine Hcl] Nausea And Vomiting    Beef-Derived Products Nausea And Vomiting    Flagyl [Metronidazole] Nausea And Vomiting    Macrodantin [Nitrofurantoin] Palpitations and Other (See Comments)     SWEATY    Pyridium [Phenazopyridine Hcl] Palpitations     SWEATY - PT DENIES REACTION    Sulfa Antibiotics Nausea And Vomiting and Nausea Only    Urised [Methen-Lisa-Meth Bl-Phen Sal] Palpitations     SWEATY       Minimum Predicted Vital Capacity:     N/A          Actual Vital Capacity:      N/A              Pulmonary History:COPD, Asthma and CHF/Pulmonary Edema  Home Oxygen Therapy:  room air  Home Respiratory Therapy:Albuterol   Current Respiratory Therapy:  Albuterol HHN PRN          Respiratory Severity Index(RSI)   Patients with orders for inhalation medications, oxygen, or any therapeutic treatment modality will be placed on Respiratory Protocol. They will be assessed with the first treatment and at least every 72 hours thereafter. The following severity scale will be used to determine frequency of treatment intervention.     Smoking History: Smoking History Less than 1ppd or less than 15 pack year = 1    Social History  Social History     Tobacco Use    Smoking status: Former Smoker     Packs/day: 0.25     Years: 0.10     Pack years: 0.02     Types: Cigarettes     Last attempt to quit: 1975     Years since quittin.9    Smokeless tobacco: Never Used    Tobacco comment: only smoked for 1 month   Substance Use Topics    Alcohol use: No     Alcohol/week: 0.0 standard drinks    Drug use: No       Recent Surgical History: None = 0  Past Surgical History  Past Surgical History:   Procedure Laterality Date    ABDOMEN SURGERY      ACHILLES TENDON SURGERY  12    LEFT ACHILLES DEBRIDEMENT, HAGLUNDS AND RETROCALCANEAL BURSA EXCISION WITH FLEXOR HALLUS LONGUS TENDON TRANSFER WITH BLOCK FOR PAIN CONTROL    APPENDECTOMY      CARDIAC CATHETERIZATION  14    CARPAL TUNNEL RELEASE      both hands    CHOLECYSTECTOMY      COLONOSCOPY      COLONOSCOPY      COLONOSCOPY  2/10/2016    CYSTOSCOPY  10/02/2017    DIAGNOSTIC CARDIAC CATH LAB PROCEDURE      ENDOSCOPY, COLON, DIAGNOSTIC      EYE SURGERY      HYSTERECTOMY      HYSTERECTOMY, TOTAL ABDOMINAL      JOINT REPLACEMENT      bilateral knee    KNEE SURGERY      both replaced    OTHER SURGICAL HISTORY  2018    CYSTOSCOPY, HYDRODISTENTION OF BLADDER WITH INSTILLATION OF    POLYPECTOMY      SHOULDER SURGERY      TOENAIL EXCISION  2016    right big toe    TONSILLECTOMY      TUBAL LIGATION      UPPER GASTROINTESTINAL ENDOSCOPY  10/29/12    gastritis    UPPER GASTROINTESTINAL ENDOSCOPY  2/10/2016    URETHRAL STRICTURE DILATATION         Level of Consciousness: Alert, Follows Commands but Disoriented = 1    Level of Activity: Mostly sedentary, minimal walking = 2    Respiratory Pattern: Regular Pattern; RR 8-20 = 0    Breath Sounds: Diminshed bilaterally and/or crackles = 2    Sputum   ,  ,    Cough: Strong, spontaneous, non-productive = 0    Vital Signs   BP (!) 183/68   Pulse 63   Temp 98 °F (36.7 °C) (Oral)   Resp 18   Ht 5' 5\" (1.651

## 2020-12-04 NOTE — ED NOTES

## 2020-12-04 NOTE — PROGRESS NOTES
4 Eyes Skin Assessment     The patient is being assess for   Admission    I agree that 2 RN's have performed a thorough Head to Toe Skin Assessment on the patient. ALL assessment sites listed below have been assessed. Areas assessed by both nurses:   [x]   Head, Face, and Ears   [x]   Shoulders, Back, and Chest, Abdomen  [x]   Arms, Elbows, and Hands   [x]   Coccyx, Sacrum, and Ischium  [x]   Legs, Feet, and Heels            **SHARE this note so that the co-signing nurse is able to place an eSignature**    Co-signer eSignature:     Does the Patient have Skin Breakdown?   No          Joshua Prevention initiated:  No   Wound Care Orders initiated:  No      WOC nurse consulted for Pressure Injury (Stage 3,4, Unstageable, DTI, NWPT, Complex wounds)and New or Established Ostomies:  No      Primary Nurse eSignature: Jessica Pressley RN

## 2020-12-04 NOTE — ED PROVIDER NOTES
[facility]  ED  EMERGENCY DEPARTMENT ENCOUNTER      Pt Name: Chris Moise  MRN: [id number]  Betzaidagfjames 1940  Date of evaluation: 12/3/2020  Provider: Otoniel Davis MD    CHIEF COMPLAINT       Chief Complaint   Patient presents with    Chest Pain     right sided pain has been going on for \"a while\", CP started hurting earlier but CP has subsided. Hx of dementia. HISTORY OF PRESENT ILLNESS   (Location/Symptom, Timing/Onset, Context/Setting, Quality, Duration, Modifying Factors, Severity)  Note limiting factors. Chris Moise is a 79 y.o. female with past medical history of hypertension, hyperlipidemia, diabetes, chronic kidney disease, aortic stenosis and known coronary artery disease here with chest pain    Patient states that for the last 1 to 2 days she has been having a substernal pressure-like chest pain. She has had some mild shortness of breath and cough. No fevers or chills. No runny nose or nasal congestion. Denies abdominal pain, vomiting but did have some recent diarrhea. No lower extremity swelling, redness or pain. Pain is mild at present. HPI    Nursing Notes were reviewed. REVIEW OF SYSTEMS    (2-9 systems for level 4, 10 or more for level 5)     Review of Systems    Please see HPI for pertinent positive and negative review of system findings. A full 10 system ROS was performed and otherwise negative.         PAST MEDICAL HISTORY     Past Medical History:   Diagnosis Date    Asthma     Bronchial asthma     Bursitis 12/2011    ACHILLES TENDON LEFT    [facility]     Constipation     COPD [facility]     Dementia ([facility] 09/11/2019    unspecificied dementia without behavioral disturbance    Depression     Diverticulitis     Dizziness     GERD (gastroesophageal reflux disease)     Hearing loss     History of blood transfusion     Hyperlipidemia     Hypertension     Leg edema     Lung disease     Osteoarthritis     Sleep apnea     CPAP, SLEEP STUDY 6/28 OVERNIGHT AT HOME    Tinnitus     Type II or unspecified type diabetes mellitus without mention of complication, not stated as uncontrolled     Unspecified cerebral artery occlusion with cerebral infarction     mini stroke         SURGICAL HISTORY       Past Surgical History:   Procedure Laterality Date    ABDOMEN SURGERY      ACHILLES TENDON SURGERY  2/2/12    LEFT ACHILLES DEBRIDEMENT, HAGLUNDS AND RETROCALCANEAL BURSA EXCISION WITH FLEXOR HALLUS LONGUS TENDON TRANSFER WITH BLOCK FOR PAIN CONTROL    APPENDECTOMY      CARDIAC CATHETERIZATION  8/21/14    CARPAL TUNNEL RELEASE      both hands    CHOLECYSTECTOMY      COLONOSCOPY      COLONOSCOPY      COLONOSCOPY  2/10/2016    CYSTOSCOPY  10/02/2017    DIAGNOSTIC CARDIAC CATH LAB PROCEDURE      ENDOSCOPY, COLON, DIAGNOSTIC      EYE SURGERY      HYSTERECTOMY      HYSTERECTOMY, TOTAL ABDOMINAL      JOINT REPLACEMENT      bilateral knee    KNEE SURGERY      both replaced    OTHER SURGICAL HISTORY  07/09/2018    CYSTOSCOPY, HYDRODISTENTION OF BLADDER WITH INSTILLATION OF    POLYPECTOMY      SHOULDER SURGERY      TOENAIL EXCISION  08/04/2016    right big toe    TONSILLECTOMY      TUBAL LIGATION      UPPER GASTROINTESTINAL ENDOSCOPY  10/29/12    gastritis    UPPER GASTROINTESTINAL ENDOSCOPY  2/10/2016    URETHRAL STRICTURE DILATATION           CURRENT MEDICATIONS       Previous Medications    ALBUTEROL SULFATE  (90 BASE) MCG/ACT INHALER    Inhale 2 puffs into the lungs 4 times daily as needed for Wheezing    ASPIRIN 81 MG TABLET    Take 81 mg by mouth daily.     BLOOD GLUCOSE TEST STRIPS (ACCU-CHEK ARABELLA PLUS) STRIP    TEST BLOOD SUGAR TWICE A DAY    BUSPIRONE (BUSPAR) 5 MG TABLET    10 mg     CALCIUM CARBONATE 600 MG TABS TABLET    Take 1 tablet by mouth 2 times daily    CARVEDILOL (COREG) 25 MG TABLET    TAKE ONE TABLET BY MOUTH TWICE A DAY WITH FOOD    CLONIDINE (CATAPRES) 0.1 MG TABLET    Take 1 tablet by mouth 3 times daily    CLOTRIMAZOLE-BETAMETHASONE (LOTRISONE) 1-0.05 % CREAM    Apply bid to affected area. DONEPEZIL (ARICEPT) 5 MG TABLET    Take 1 tablet by mouth nightly    DULOXETINE (CYMBALTA) 60 MG EXTENDED RELEASE CAPSULE    Take 2 capsules by mouth daily    INSULIN LISPRO (HUMALOG) 100 UNIT/ML INJECTION VIAL    Inject 0-3 Units into the skin nightly    INSULIN LISPRO (HUMALOG) 100 UNIT/ML INJECTION VIAL    Inject 0-6 Units into the skin 3 times daily (with meals)    ISOSORBIDE MONONITRATE (IMDUR) 30 MG EXTENDED RELEASE TABLET    TAKE ONE-HALF TABLET BY MOUTH ONE TIME A DAY    LISINOPRIL (PRINIVIL;ZESTRIL) 40 MG TABLET    Take 40 mg by mouth daily    METFORMIN (GLUCOPHAGE-XR) 500 MG EXTENDED RELEASE TABLET    TAKE TWO TABLETS TWICE A DAY WITH MEALS    NITROGLYCERIN (NITROSTAT) 0.4 MG SL TABLET    Place 1 tablet under the tongue every 5 minutes as needed for Chest pain    OMEPRAZOLE (PRILOSEC) 20 MG DELAYED RELEASE CAPSULE    TAKE ONE CAPSULE TWICE A DAY    OXYBUTYNIN (DITROPAN-XL) 10 MG EXTENDED RELEASE TABLET    Take 10 mg by mouth daily    PRAMIPEXOLE (MIRAPEX) 0.5 MG TABLET    TAKE ONE TABLET BY MOUTH ONCE NIGHTLY    PRAVASTATIN (PRAVACHOL) 20 MG TABLET    Take 1 tablet by mouth nightly TAKE ONE TABLET BY MOUTH DAILY    SENNA PLUS 8.6-50 MG PER TABLET    TAKE 2 TABLETS BY MOUTH DAILY    VITAMIN E 400 UNIT CAPSULE    Take 400 Units by mouth daily       ALLERGIES     Latex; Cephalexin; Codeine; Levofloxacin; Pce [erythromycin]; Pcn [penicillins]; Pentazocine; Sumycin [tetracycline hcl]; Atarax [hydroxyzine hcl]; Beef-derived products; Flagyl [metronidazole]; Macrodantin [nitrofurantoin];  Pyridium [phenazopyridine hcl]; Sulfa antibiotics; and Merdis Malcolm bl-phen maryjane]    FAMILY HISTORY       Family History   Problem Relation Age of Onset    Cancer Father         prostate    Heart Disease Mother     Heart Disease Brother     Heart Disease Brother     Heart Disease Sister     Diabetes Sister     Dementia Sister           SOCIAL HISTORY       Social History     Socioeconomic History    Marital status:       Spouse name: Not on file    Number of children: 3    Years of education: 15    Highest education level: Not on file   Occupational History    Occupation: retired   Social Needs    Financial resource strain: Not on file    Food insecurity     Worry: Not on file     Inability: Not on file   [employer] needs     Medical: Not on file     Non-medical: Not on file   Tobacco Use    Smoking status: Former Smoker     Packs/day: 0.25     Years: 0.10     Pack years: 0.02     Types: Cigarettes     Last attempt to quit: 1975     Years since quittin.9    Smokeless tobacco: Never Used    Tobacco comment: only smoked for 1 month   Substance and Sexual Activity    Alcohol use: No     Alcohol/week: 0.0 standard drinks    Drug use: No    Sexual activity: Not Currently     Partners: Male   Lifestyle    Physical activity     Days per week: Not on file     Minutes per session: Not on file    Stress: Not on file   Relationships    Social connections     Talks on phone: Not on file     Gets together: Not on file     Attends Jew service: Not on file     Active member of club or organization: Not on file     Attends meetings of clubs or organizations: Not on file     Relationship status: Not on file    Intimate partner violence     Fear of current or ex partner: Not on file     Emotionally abused: Not on file     Physically abused: Not on file     Forced sexual activity: Not on file   Other Topics Concern    Not on file   Social History Narrative    Not on file       SCREENINGS               PHYSICAL EXAM    (up to 7 for level 4, 8 or more for level 5)     ED Triage Vitals [20 1915]   BP Temp Temp Source Pulse Resp SpO2 Height Weight   (!) 171/57 97.9 °F (36.6 °C) Oral 66 20 97 % 5' 5\" (1.651 m) 185 lb (83.9 kg) Physical Exam    General appearance:  Cooperative. No acute distress. Skin:  Warm. Dry. Eye:  Extraocular movements intact. Ears, nose, mouth and throat:  Oral mucosa moist,  Neck:  Trachea midline. Heart:  Regular rate and rhythm. Loud systolic ejection murmur heard at the right upper sternal border  Perfusion:  intact  Respiratory:  Lungs clear to auscultation bilaterally. Respirations nonlabored. Abdominal:   Non distended. Nontender  Neurological:  Alert and oriented x 3.   Moves all extremities spontaneously  Musculoskeletal:   Normal ROM, no deformities          Psychiatric:  Normal mood      DIAGNOSTIC RESULTS       Labs Reviewed   COMPREHENSIVE METABOLIC PANEL - Abnormal; Notable for the following components:       Result Value    Glucose 162 (*)     CREATININE 1.3 (*)     GFR Non- 39 (*)     GFR  48 (*)     ALT 8 (*)     All other components within normal limits    Narrative:     Performed at:  [place] [address] [address]   Phone [phone]   CBC WITH AUTO DIFFERENTIAL - Abnormal; Notable for the following components:    RBC 2.93 (*)     Hemoglobin 9.1 (*)     Hematocrit 27.9 (*)     RDW 17.0 (*)     All other components within normal limits    Narrative:     Performed at:  [place] [address] [address]   Phone [phone] - Abnormal; Notable for the following components:    Protime 18.8 (*)     INR 1.64 (*)     All other components within normal limits    Narrative:     Performed at:  [place] [address] [address]   Phone [phone]   APTT - Abnormal; Notable for the following components:    aPTT 22.6 (*)     All other components within normal limits    Narrative:     Performed at:  [address], [facility] Phone [phone] - Abnormal; Notable for the following components:    Pro-BNP 3,692 (*)     All other components within normal limits    Narrative:     Performed at:  [facility]  [address],  [place], [address]   Phone [phone]   TROPONIN    Narrative:     Performed at:  [facility]  [address],  [place], [address]   Phone [phone]       Interpretation per the Radiologist below, if obtained/available at the time of this note:    XR CHEST PORTABLE   Final Result   No acute cardiopulmonary disease. Stable enlargement of the cardiac silhouette. All other labs/imaging were within normal range or not returned as of this dictation. EMERGENCY DEPARTMENT COURSE and DIFFERENTIAL DIAGNOSIS/MDM:   Vitals:    Vitals:    12/03/20 1915   BP: (!) 171/57   Pulse: 66   Resp: 20   Temp: 97.9 °F (36.6 °C)   TempSrc: Oral   SpO2: 97%   Weight: 185 lb (83.9 kg)   Height: 5' 5\" (1.651 m)       EKG: Sinus rhythm rate of 68 bpm with left ventricular hypertrophy. No ST elevation. ST segment flattening in lead III. Compared to prior EKG from 11/5/2019 no significant change. Patient presents emergency department today complaining of chest pain. Documented history of coronary artery disease. Significant murmur on exam consistent with her known aortic stenosis. Hypertensive here otherwise vital signs stable. Troponin negative. EKG without new acute changes. BNP slightly elevated. Chest x-ray with cardiomegaly. Patient given Lasix for likely some mild fluid overload. Low concern for pneumonia.   Patient will be tested for COVID-19 given her complaints of some mild diarrhea cough and the fact that she lives in a nursing home but otherwise will be admitted for further cardiac work-up    MDM    CONSULTS     IP CONSULT TO HOSPITALIST    Critical Care:   None    REASSESSMENT PROCEDURE     Unless otherwise noted below, none     Procedures      FINAL IMPRESSION      1. Chest pain, unspecified type            DISPOSITION/PLAN   DISPOSITION Admitted 12/03/2020 09:05:31 PM        PATIENT REFERRED TO:  No follow-up provider specified. DISCHARGE MEDICATIONS:  New Prescriptions    No medications on file     Controlled Substances Monitoring:     No flowsheet data found.     (Please note that portions of this note were completed with a voice recognition program.  Efforts were made to edit the dictations but occasionally words are mis-transcribed.)    Emperatriz Paige MD (electronically signed)  Attending Emergency Physician            Magen Cardona MD  12/03/20 3011

## 2020-12-04 NOTE — PROGRESS NOTES
Assessment completed, see doc flow sheet, in bed resting, call light within reach, without distress, vital signs stable, denies pain or needs at present time, will continue to monitor and treat. Jimbo Levy

## 2020-12-04 NOTE — PROGRESS NOTES
PT states she does not stand\" PT states they use a lift to get her to the chair\" RN unable to obtain standing weight

## 2020-12-04 NOTE — H&P
MG extended release tablet TAKE TWO TABLETS TWICE A DAY WITH MEALS 7/8/19   Tiffanie Adkins DO   SENNA PLUS 8.6-50 MG per tablet TAKE 2 TABLETS BY MOUTH DAILY  Patient taking differently: nightly  7/1/19   Tiffanie Adkins DO   carvedilol (COREG) 25 MG tablet TAKE ONE TABLET BY MOUTH TWICE A DAY WITH FOOD 6/17/19   Tiffanie Adkins DO   DULoxetine (CYMBALTA) 60 MG extended release capsule Take 2 capsules by mouth daily 5/24/19   Tiffanie Adkins DO   pramipexole (MIRAPEX) 0.5 MG tablet TAKE ONE TABLET BY MOUTH ONCE NIGHTLY 5/20/19   Tiffanie Adkins DO   omeprazole (PRILOSEC) 20 MG delayed release capsule TAKE ONE CAPSULE TWICE A DAY 5/6/19   Kraig Fay MD   oxybutynin (DITROPAN-XL) 10 MG extended release tablet Take 10 mg by mouth daily    Historical Provider, MD   albuterol sulfate  (90 Base) MCG/ACT inhaler Inhale 2 puffs into the lungs 4 times daily as needed for Wheezing 2/26/19   Kraig Fay MD   isosorbide mononitrate (IMDUR) 30 MG extended release tablet TAKE ONE-HALF TABLET BY MOUTH ONE TIME A DAY 1/15/19   Tiffanie Adkins DO   clotrimazole-betamethasone (LOTRISONE) 1-0.05 % cream Apply bid to affected area. Patient not taking: Reported on 8/11/2020 10/12/18   Tiffanie Adkins DO   nitroGLYCERIN (NITROSTAT) 0.4 MG SL tablet Place 1 tablet under the tongue every 5 minutes as needed for Chest pain 9/27/18   Zaira Arms, APRN - CNP   calcium carbonate 600 MG TABS tablet Take 1 tablet by mouth 2 times daily 6/25/18   WILLIE Lew   vitamin E 400 UNIT capsule Take 400 Units by mouth daily    Historical Provider, MD   aspirin 81 MG tablet Take 81 mg by mouth daily. Historical Provider, MD       Allergies:  Latex; Cephalexin; Codeine; Levofloxacin; Pce [erythromycin]; Pcn [penicillins]; Pentazocine; Sumycin [tetracycline hcl]; Atarax [hydroxyzine hcl]; Beef-derived products; Flagyl [metronidazole]; Macrodantin [nitrofurantoin];  Pyridium [phenazopyridine hcl]; Sulfa antibiotics; and Urised [methen-jenifer-meth bl-phen sal]    Social History:      The patient currently lives in nursing home    TOBACCO:   reports that she quit smoking about 45 years ago. Her smoking use included cigarettes. She has a 0.03 pack-year smoking history. She has never used smokeless tobacco.  ETOH:   reports no history of alcohol use. E-Cigarettes Vaping or Juuling     Questions Responses    Vaping Use Never User    Start Date     Does device contain nicotine? Never    Quit Date     Vaping Type             Family History:    Positive as follows:        Problem Relation Age of Onset    Cancer Father         prostate    Heart Disease Mother     Heart Disease Brother     Heart Disease Brother     Heart Disease Sister     Diabetes Sister     Dementia Sister        REVIEW OF SYSTEMS:   Pertinent positives as noted in the HPI. All other systems reviewed and negative. PHYSICAL EXAM PERFORMED:    BP (!) 183/68   Pulse 63   Temp 98 °F (36.7 °C) (Oral)   Resp 18   Ht 5' 5\" (1.651 m)   Wt 201 lb 11.5 oz (91.5 kg)   SpO2 95%   BMI 33.57 kg/m²     General appearance:  No apparent distress, appears stated age and cooperative. HEENT:  Normal cephalic, atraumatic without obvious deformity. Pupils equal, round, and reactive to light. Extra ocular muscles intact. Conjunctivae/corneas clear. Neck: Supple, with full range of motion. No jugular venous distention. Trachea midline. Respiratory:  Normal respiratory effort. Clear to auscultation, bilaterally without Rales/Wheezes/Rhonchi. Cardiovascular:  Regular rate and rhythm with normal B9/C5 with systolic murmur +  Abdomen: Soft, non-tender, non-distended with normal bowel sounds. Musculoskeletal:  No clubbing, cyanosis or edema bilaterally. Full range of motion without deformity. Skin: Skin color, texture, turgor normal.  No rashes or lesions. Neurologic:  Neurovascularly intact without any focal sensory/motor deficits.  Cranial nerves: II-XII intact, grossly non-focal.  Psychiatric:  Alert and oriented, thought content appropriate, normal insight  Capillary Refill: Brisk,< 3 seconds   Peripheral Pulses: +2 palpable, equal bilaterally       Labs:     Recent Labs     12/03/20 1942   WBC 8.3   HGB 9.1*   HCT 27.9*        Recent Labs     12/03/20 1942      K 4.3      CO2 26   BUN 16   CREATININE 1.3*   CALCIUM 9.9     Recent Labs     12/03/20 1942   AST 21   ALT 8*   BILITOT 0.3   ALKPHOS 82     Recent Labs     12/03/20 1942   INR 1.64*     Recent Labs     12/03/20 1942 12/04/20  0128   TROPONINI <0.01 <0.01       Urinalysis:      Lab Results   Component Value Date    NITRU Negative 10/20/2020    WBCUA >100 10/20/2020    BACTERIA 3+ 09/07/2019    RBCUA see below 10/20/2020    BLOODU SMALL 10/20/2020    SPECGRAV 1.015 10/20/2020    GLUCOSEU Negative 10/20/2020    [address] NEGATIVE 03/17/2011       Radiology:     CXR: I have reviewed the CXR with the following interpretation: No acute disease  EKG:  I have reviewed the EKG with the following interpretation: NSR, LVH, no acute ischemic changes    XR CHEST PORTABLE   Final Result   No acute cardiopulmonary disease. Stable enlargement of the cardiac silhouette. Cath 2014  Mild CAD  CORONARY ANGIOGRAPHY:    1. Demonstrates a large dominant left circulation. The left main is normal;  it is short; bifurcates into the LAD and circumflex. 2.  The circumflex coronary artery is a large vessel, it gives off a left PDA  and a large obtuse marginal branch. It has mild luminal irregularities. 3.  The LAD is a moderate size vessel; it gives off a moderate size diagonal  branch. The mid LAD has mild atherosclerosis of about 30%. There is heavy  extensive calcification across the proximal and mid LAD. 4.  The first diagonal is a moderate size vessel that has mild disease. 5.  The right coronary artery is a nondominant vessel and has mild diffuse  Disease.     August 2020 echo  Summary Technically difficult examination due to body habitus, patient immobility. Left ventricular systolic function is hyperdynamic with an estimated   ejection fraction of >= 65%. The left ventricle is normal in size with mild septal hypertrophy. Prominent sigmoid septum (1.8 cm) without evidence of LVOT obstruction. Spectral doppler suggests apical cavity obliteration. Indeterminate diastolic function. The right atrium is mildly enlarged. Mild mitral stenosis. Moderate to severe aortic stenosis. Mild aortic regurgitation. Systolic pulmonary artery pressure (SPAP) is normal and estimated at 33 mmHg   (right atrial pressure 8 mmHg). ASSESSMENT:PLAN:    Chest pain  With typical features  Initial EKG and troponin negative  Rule out ACS with serial cardiac enzymes  Cath in 2014 showed mild CAD, hold off stress test for now. Chronic diastolic CHF  BNP elevated 3692, but no evidence of pulmonary edema on CXR  Continue Coreg and lisinopril    Essential hypertension -controlled, resume home medication regimen    Mixed hyperlipidemia -resume statin    Obesity (BMI 30-39. 9)   Complicating assessment and treatment, placing patient at high risk for multiple co-morbidities as well as early death and contributing to the patient's presentation. Counseled on weight loss. DM type II-controlled, resume Metformin, low-dose SSI    DVT Prophylaxis: Lovenox  Diet: DIET CARB CONTROL;  Code Status: Full Code    PT/OT Eval Status: Ambulatory    Dispo -observation       Carmen Ibarra MD    Thank you Sherry Lorenzana for the opportunity to be involved in this patient's care. If you have any questions or concerns please feel free to contact me at [phone].

## 2020-12-04 NOTE — CONSULTS
[address] [facility]  [phone]      Attending Physician: Rosalia Swann MD  Reason for Consultation/Chief Complaint: Chest pain    Subjective   History of Present Illness:  Anabell Hernandez is a 79 y.o. patient who presented to the hospital with complaints of chest pain over the last few days, patient is a resident of a nursing facility, she was brought to the hospital yesterday with complaints of chest pain. Serial troponin levels been drawn and found to be negative. Patient says she feels about the same today. Chronically, she says she has poor functional status, gets around with a wheelchair, she says she has not had as much physical therapy as she has had in the past and feels weaker than usual.    Patient has a history of hypertension and aortic stenosis, has been following in our office with Dr. Melinda Zarate, has been seeing him since 2013 with last office visit being in August 2020. At that office visit, it was noted that she does have occasional chest pain, she is also noted weight loss and difficulty moving around. It was noted that she does have dementia. Overall, she was felt to be stable regard to aortic stenosis, felt to be moderate to severe based on last echocardiogram from 2020, also stable from hypertension, and nonobstructive CAD on cardiac catheterization from 2014. Past Medical History:   has a past medical history of Asthma, Bronchial asthma, Bursitis, CHF (congestive heart failure) ([address] Constipation, COPD (chronic obstructive pulmonary disease) ([address] Dementia ([address] Depression, Diverticulitis, Dizziness, GERD (gastroesophageal reflux disease), Hearing loss, History of blood transfusion, Hyperlipidemia, Hypertension, Leg edema, Lung disease, Osteoarthritis, Sleep apnea, Tinnitus, Type II or unspecified type diabetes mellitus without mention of complication, not stated as uncontrolled, and Unspecified cerebral artery occlusion with cerebral infarction.     Surgical History:   has a past surgical history that includes knee surgery; Carpal tunnel release; Cholecystectomy; Tubal ligation; Hysterectomy; shoulder surgery; joint replacement; Colonoscopy; polypectomy; Colonoscopy; Achilles tendon surgery (2/2/12); eye surgery; Upper gastrointestinal endoscopy (10/29/12); Tonsillectomy; Appendectomy; Urethra dilation; Colonoscopy (2/10/2016); Upper gastrointestinal endoscopy (2/10/2016); toenail excision (08/04/2016); Diagnostic Cardiac Cath Lab Procedure; Cystocopy (10/02/2017); other surgical history (07/09/2018); Hysterectomy, total abdominal; Cardiac catheterization (8/21/14); Abdomen surgery; and Endoscopy, colon, diagnostic. Social History:   reports that she quit smoking about 45 years ago. Her smoking use included cigarettes. She has a 0.03 pack-year smoking history. She has never used smokeless tobacco. She reports that she does not drink alcohol or use drugs. Family History:  family history includes Cancer in her father; Dementia in her sister; Diabetes in her sister; Heart Disease in her brother, brother, mother, and sister. Home Medications:  Were reviewed and are listed in nursing record and/or below  Prior to Admission medications    Medication Sig Start Date End Date Taking?  Authorizing Provider   lisinopril (PRINIVIL;ZESTRIL) 40 MG tablet Take 40 mg by mouth daily    Historical Provider, MD   busPIRone (BUSPAR) 5 MG tablet 10 mg  10/16/19   Historical Provider, MD   insulin lispro (HUMALOG) 100 UNIT/ML injection vial Inject 0-3 Units into the skin nightly 9/11/19   Lucy Merritt MD   insulin lispro (HUMALOG) 100 UNIT/ML injection vial Inject 0-6 Units into the skin 3 times daily (with meals) 9/11/19   Lucy Merritt MD   pravastatin (PRAVACHOL) 20 MG tablet Take 1 tablet by mouth nightly TAKE ONE TABLET BY MOUTH DAILY 9/11/19   Lucy Merritt MD   cloNIDine (CATAPRES) 0.1 MG tablet Take 1 tablet by mouth 3 times daily  Patient taking differently: Take tablet Take 81 mg by mouth daily. Historical Provider, MD        CURRENT Medications:  albuterol (PROVENTIL) nebulizer solution 2.5 mg, Q4H PRN  aspirin chewable tablet 81 mg, Daily  calcium elemental (OSCAL) tablet 500 mg, BID  carvedilol (COREG) tablet 25 mg, BID WC  cloNIDine (CATAPRES) tablet 0.1 mg, TID  DULoxetine (CYMBALTA) extended release capsule 120 mg, Daily  isosorbide mononitrate (IMDUR) extended release tablet 15 mg, Daily  lisinopril (PRINIVIL;ZESTRIL) tablet 40 mg, Daily  metFORMIN (GLUCOPHAGE-XR) extended release tablet 500 mg, BID WC  pantoprazole (PROTONIX) tablet 40 mg, QAM AC  oxybutynin (DITROPAN-XL) extended release tablet 10 mg, Daily  pramipexole (MIRAPEX) tablet 0.5 mg, Nightly  pravastatin (PRAVACHOL) tablet 20 mg, Nightly  vitamin E capsule 400 Units, Daily  [START ON 12/5/2020] sennosides-docusate sodium (SENOKOT-S) 8.6-50 MG tablet 1 tablet, Nightly  glucose (GLUTOSE) 40 % oral gel 15 g, PRN  dextrose 50 % IV solution, PRN  glucagon (rDNA) injection 1 mg, PRN  dextrose 5 % solution, PRN  insulin lispro (HUMALOG) injection vial 0-6 Units, TID WC  insulin lispro (HUMALOG) injection vial 0-3 Units, Nightly  sodium chloride flush 0.9 % injection 10 mL, 2 times per day  sodium chloride flush 0.9 % injection 10 mL, PRN  acetaminophen (TYLENOL) tablet 650 mg, Q6H PRN    Or  acetaminophen (TYLENOL) suppository 650 mg, Q6H PRN  polyethylene glycol (GLYCOLAX) packet 17 g, Daily PRN  promethazine (PHENERGAN) tablet 12.5 mg, Q6H PRN    Or  ondansetron (ZOFRAN) injection 4 mg, Q6H PRN  enoxaparin (LOVENOX) injection 40 mg, Daily  nitroGLYCERIN (NITROSTAT) SL tablet 0.4 mg, Q5 Min PRN  perflutren lipid microspheres (DEFINITY) injection 1.65 mg, ONCE PRN  regadenoson (LEXISCAN) injection 0.4 mg, ONCE PRN        Allergies:  Latex; Cephalexin; Codeine; Levofloxacin; Pce [erythromycin]; Pcn [penicillins]; Pentazocine; Sumycin [tetracycline hcl]; Atarax [hydroxyzine hcl]; Beef-derived products;  Flagyl [metronidazole]; Macrodantin [nitrofurantoin]; Pyridium [phenazopyridine hcl]; Sulfa antibiotics; and Urised [methen-jenifer-meth bl-phen sal]     Review of Systems:   A 14 point review of symptoms completed. Pertinent positives identified in the HPI, all other review of symptoms negative as below.       Objective   PHYSICAL EXAM:    Vitals:    12/04/20 1202   BP: 118/73   Pulse: 68   Resp: 18   Temp: 98.6 °F (37 °C)   SpO2: 95%    Weight: 201 lb 11.5 oz (91.5 kg)         General Appearance:  Alert, cooperative, no distress, appears stated age   Head:  Normocephalic, without obvious abnormality, atraumatic   Eyes:  PERRL, conjunctiva/corneas clear   Nose: Nares normal, no drainage or sinus tenderness   Throat: Lips, mucosa, and tongue normal   Neck: Supple, symmetrical, trachea midline, no adenopathy, thyroid: not enlarged, symmetric, no tenderness/mass/nodules, no carotid bruit or JVD   Lungs:   Clear to auscultation bilaterally, respirations unlabored   Chest Wall:  No deformity or tenderness   Heart:  Regular rate and rhythm, S1, S2 normal, no murmur, rub or gallop   Abdomen:   Soft, non-tender, bowel sounds active all four quadrants,  no masses, no organomegaly   Extremities: Extremities normal, atraumatic, no cyanosis or edema   Pulses: 2+ and symmetric   Skin: Skin color, texture, turgor normal, no rashes or lesions   Pysch: Normal mood and affect   Neurologic: Normal gross motor and sensory exam.         Labs   CBC:   Lab Results   Component Value Date    WBC 8.3 12/03/2020    RBC 2.93 12/03/2020    HGB 9.1 12/03/2020    HCT 27.9 12/03/2020    MCV 95.1 12/03/2020    RDW 17.0 12/03/2020     12/03/2020     CMP:  Lab Results   Component Value Date     12/03/2020    K 4.3 12/03/2020    K 4.4 10/23/2020     12/03/2020    CO2 26 12/03/2020    BUN 16 12/03/2020    CREATININE 1.3 12/03/2020    GFRAA 48 12/03/2020    GFRAA >60 06/04/2013    AGRATIO 1.3 12/03/2020    LABGLOM 39 12/03/2020    GLUCOSE 162 12/03/2020    PROT 6.8 12/03/2020    PROT 6.4 01/22/2013    CALCIUM 9.9 12/03/2020    BILITOT 0.3 12/03/2020    ALKPHOS 82 12/03/2020    AST 21 12/03/2020    ALT 8 12/03/2020     PT/INR:  No results found for: PTINR  HgBA1c:  Lab Results   Component Value Date    LABA1C 8.1 10/21/2020     Lab Results   Component Value Date    CKTOTAL 14 (L) 10/21/2020    TROPONINI <0.01 12/04/2020         Cardiac Data     Last EKG:     Echo:    8/2020    Technically difficult examination due to body habitus, patient immobility. Left ventricular systolic function is hyperdynamic with an estimated   ejection fraction of >= 65%. The left ventricle is normal in size with mild septal hypertrophy. Prominent sigmoid septum (1.8 cm) without evidence of LVOT obstruction. Spectral doppler suggests apical cavity obliteration. Indeterminate diastolic function. The right atrium is mildly enlarged. Mild mitral stenosis. Moderate to severe aortic stenosis. Mild aortic regurgitation. Systolic pulmonary artery pressure (SPAP) is normal and estimated at 33 mmHg   (right atrial pressure 8 mmHg). Stress Test:    2012    Impression-    1. Normal left ventricular ejection fraction and wall motion. 2. No large areas of reversibility to suggest ischemia.                    Cath:    2014    CATH 8/21/14   Mild coronary artery disease with preserved left ventricular  function. There is evidence for volume overload and slight decompensation.       Studies:     cxr       Impression    No acute cardiopulmonary disease.         Stable enlargement of the cardiac silhouette. I have reviewed labs and imaging/xray/diagnostic testing in this note.     Assessment and Plan         Patient Active Problem List   Diagnosis    Arthritis    Constipation    Leg edema    Dizziness    Premature atrial beats    Mixed hyperlipidemia    SOB (shortness of breath)    CHF (congestive heart failure) [facility]    Uncomplicated asthma   

## 2020-12-05 ENCOUNTER — APPOINTMENT (OUTPATIENT)
Dept: NUCLEAR MEDICINE | Age: 80
DRG: 287 | End: 2020-12-05
Payer: COMMERCIAL

## 2020-12-05 LAB
ANION GAP SERPL CALCULATED.3IONS-SCNC: 8 MMOL/L (ref 3–16)
BASOPHILS ABSOLUTE: 0.1 K/UL (ref 0–0.2)
BASOPHILS RELATIVE PERCENT: 1.4 %
BUN BLDV-MCNC: 14 MG/DL (ref 7–20)
CALCIUM SERPL-MCNC: 9.7 MG/DL (ref 8.3–10.6)
CHLORIDE BLD-SCNC: 102 MMOL/L (ref 99–110)
CO2: 30 MMOL/L (ref 21–32)
CREAT SERPL-MCNC: 1.3 MG/DL (ref 0.6–1.2)
EOSINOPHILS ABSOLUTE: 0.2 K/UL (ref 0–0.6)
EOSINOPHILS RELATIVE PERCENT: 3.5 %
GFR AFRICAN AMERICAN: 48
GFR NON-AFRICAN AMERICAN: 39
GLUCOSE BLD-MCNC: 119 MG/DL (ref 70–99)
GLUCOSE BLD-MCNC: 158 MG/DL (ref 70–99)
GLUCOSE BLD-MCNC: 169 MG/DL (ref 70–99)
GLUCOSE BLD-MCNC: 210 MG/DL (ref 70–99)
HCT VFR BLD CALC: 26.4 % (ref 36–48)
HEMOGLOBIN: 8.5 G/DL (ref 12–16)
LV EF: 62 %
LVEF MODALITY: NORMAL
LYMPHOCYTES ABSOLUTE: 1.5 K/UL (ref 1–5.1)
LYMPHOCYTES RELATIVE PERCENT: 22.5 %
MCH RBC QN AUTO: 30.4 PG (ref 26–34)
MCHC RBC AUTO-ENTMCNC: 32.2 G/DL (ref 31–36)
MCV RBC AUTO: 94.2 FL (ref 80–100)
MONOCYTES ABSOLUTE: 0.5 K/UL (ref 0–1.3)
MONOCYTES RELATIVE PERCENT: 7.1 %
NEUTROPHILS ABSOLUTE: 4.3 K/UL (ref 1.7–7.7)
NEUTROPHILS RELATIVE PERCENT: 65.5 %
PDW BLD-RTO: 17 % (ref 12.4–15.4)
PERFORMED ON: ABNORMAL
PLATELET # BLD: 234 K/UL (ref 135–450)
PMV BLD AUTO: 9.1 FL (ref 5–10.5)
POTASSIUM SERPL-SCNC: 3.9 MMOL/L (ref 3.5–5.1)
RBC # BLD: 2.81 M/UL (ref 4–5.2)
SODIUM BLD-SCNC: 140 MMOL/L (ref 136–145)
WBC # BLD: 6.6 K/UL (ref 4–11)

## 2020-12-05 PROCEDURE — 80048 BASIC METABOLIC PNL TOTAL CA: CPT

## 2020-12-05 PROCEDURE — G0378 HOSPITAL OBSERVATION PER HR: HCPCS

## 2020-12-05 PROCEDURE — 85025 COMPLETE CBC W/AUTO DIFF WBC: CPT

## 2020-12-05 PROCEDURE — 6360000002 HC RX W HCPCS: Performed by: INTERNAL MEDICINE

## 2020-12-05 PROCEDURE — 93308 TTE F-UP OR LMTD: CPT

## 2020-12-05 PROCEDURE — 3430000000 HC RX DIAGNOSTIC RADIOPHARMACEUTICAL: Performed by: INTERNAL MEDICINE

## 2020-12-05 PROCEDURE — A9502 TC99M TETROFOSMIN: HCPCS | Performed by: INTERNAL MEDICINE

## 2020-12-05 PROCEDURE — 93017 CV STRESS TEST TRACING ONLY: CPT

## 2020-12-05 PROCEDURE — 94761 N-INVAS EAR/PLS OXIMETRY MLT: CPT

## 2020-12-05 PROCEDURE — 2580000003 HC RX 258: Performed by: INTERNAL MEDICINE

## 2020-12-05 PROCEDURE — 78452 HT MUSCLE IMAGE SPECT MULT: CPT

## 2020-12-05 PROCEDURE — 1200000000 HC SEMI PRIVATE

## 2020-12-05 PROCEDURE — 2700000000 HC OXYGEN THERAPY PER DAY

## 2020-12-05 PROCEDURE — 99232 SBSQ HOSP IP/OBS MODERATE 35: CPT | Performed by: NURSE PRACTITIONER

## 2020-12-05 PROCEDURE — 36415 COLL VENOUS BLD VENIPUNCTURE: CPT

## 2020-12-05 PROCEDURE — 6370000000 HC RX 637 (ALT 250 FOR IP): Performed by: INTERNAL MEDICINE

## 2020-12-05 RX ORDER — AMINOPHYLLINE DIHYDRATE 25 MG/ML
100 INJECTION, SOLUTION INTRAVENOUS ONCE
Status: COMPLETED | OUTPATIENT
Start: 2020-12-05 | End: 2020-12-05

## 2020-12-05 RX ADMIN — DULOXETINE HYDROCHLORIDE 120 MG: 60 CAPSULE, DELAYED RELEASE ORAL at 11:09

## 2020-12-05 RX ADMIN — AMINOPHYLLINE 100 MG: 25 INJECTION, SOLUTION INTRAVENOUS at 09:34

## 2020-12-05 RX ADMIN — METFORMIN HYDROCHLORIDE 500 MG: 500 TABLET, EXTENDED RELEASE ORAL at 11:09

## 2020-12-05 RX ADMIN — TETROFOSMIN 31.6 MILLICURIE: 1.38 INJECTION, POWDER, LYOPHILIZED, FOR SOLUTION INTRAVENOUS at 09:30

## 2020-12-05 RX ADMIN — CARVEDILOL 25 MG: 25 TABLET, FILM COATED ORAL at 16:50

## 2020-12-05 RX ADMIN — REGADENOSON 0.4 MG: 0.08 INJECTION, SOLUTION INTRAVENOUS at 09:30

## 2020-12-05 RX ADMIN — ISOSORBIDE MONONITRATE 15 MG: 30 TABLET, EXTENDED RELEASE ORAL at 10:10

## 2020-12-05 RX ADMIN — Medication 10 ML: at 10:13

## 2020-12-05 RX ADMIN — CLONIDINE HYDROCHLORIDE 0.1 MG: 0.1 TABLET ORAL at 10:10

## 2020-12-05 RX ADMIN — ACETAMINOPHEN 650 MG: 325 TABLET ORAL at 21:56

## 2020-12-05 RX ADMIN — TETROFOSMIN 11.8 MILLICURIE: 1.38 INJECTION, POWDER, LYOPHILIZED, FOR SOLUTION INTRAVENOUS at 07:50

## 2020-12-05 RX ADMIN — CALCIUM 500 MG: 500 TABLET ORAL at 11:10

## 2020-12-05 RX ADMIN — CARVEDILOL 25 MG: 25 TABLET, FILM COATED ORAL at 11:09

## 2020-12-05 RX ADMIN — CALCIUM 500 MG: 500 TABLET ORAL at 21:52

## 2020-12-05 RX ADMIN — DOCUSATE SODIUM 50MG AND SENNOSIDES 8.6MG 1 TABLET: 8.6; 5 TABLET, FILM COATED ORAL at 21:52

## 2020-12-05 RX ADMIN — CLONIDINE HYDROCHLORIDE 0.1 MG: 0.1 TABLET ORAL at 21:52

## 2020-12-05 RX ADMIN — PRAMIPEXOLE DIHYDROCHLORIDE 0.5 MG: 0.25 TABLET ORAL at 21:52

## 2020-12-05 RX ADMIN — LISINOPRIL 40 MG: 20 TABLET ORAL at 10:10

## 2020-12-05 RX ADMIN — OXYBUTYNIN CHLORIDE 10 MG: 5 TABLET, EXTENDED RELEASE ORAL at 11:13

## 2020-12-05 RX ADMIN — PRAVASTATIN SODIUM 20 MG: 20 TABLET ORAL at 21:52

## 2020-12-05 RX ADMIN — Medication 10 ML: at 21:57

## 2020-12-05 RX ADMIN — ASPIRIN 81 MG CHEWABLE TABLET 81 MG: 81 TABLET CHEWABLE at 11:10

## 2020-12-05 RX ADMIN — Medication 400 UNITS: at 11:10

## 2020-12-05 ASSESSMENT — PAIN SCALES - GENERAL
PAINLEVEL_OUTOF10: 8
PAINLEVEL_OUTOF10: 0

## 2020-12-05 ASSESSMENT — PAIN DESCRIPTION - DESCRIPTORS: DESCRIPTORS: ACHING

## 2020-12-05 ASSESSMENT — PAIN DESCRIPTION - LOCATION: LOCATION: BACK

## 2020-12-05 NOTE — PROGRESS NOTES
round, and reactive to light. Conjunctivae/corneas clear. Neck: Supple, with full range of motion. No jugular venous distention. Trachea midline. Respiratory:  Normal respiratory effort. Clear to auscultation, bilaterally without Rales/Wheezes/Rhonchi. Cardiovascular: Regular rate and rhythm with normal S1/S2 without murmurs, rubs or gallops. Abdomen: Soft, non-tender, non-distended with normal bowel sounds. Musculoskeletal: No clubbing, cyanosis or edema bilaterally. Full range of motion without deformity. Skin: Skin color, texture, turgor normal.  No rashes or lesions. Neurologic:  Neurovascularly intact without any focal sensory/motor deficits. Cranial nerves: II-XII intact, grossly non-focal.  Psychiatric: Alert and oriented, thought content appropriate, normal insight  Capillary Refill: Brisk,< 3 seconds   Peripheral Pulses: +2 palpable, equal bilaterally     I examined the patient today (12/05/20). Physical exam similar to yesterday (12/4). Labs:   Recent Labs     12/03/20 1942 12/05/20  1104   WBC 8.3 6.6   HGB 9.1* 8.5*   HCT 27.9* 26.4*    234     Recent Labs     12/03/20 1942 12/05/20  1104    140   K 4.3 3.9    102   CO2 26 30   BUN 16 14   CREATININE 1.3* 1.3*   CALCIUM 9.9 9.7     Recent Labs     12/03/20 1942   AST 21   ALT 8*   BILITOT 0.3   ALKPHOS 82     Recent Labs     12/03/20 1942   INR 1.64*     Recent Labs     12/03/20  1942 12/04/20  0128 12/04/20  0649   TROPONINI <0.01 <0.01 <0.01       Urinalysis:      Lab Results   Component Value Date    NITRU Negative 10/20/2020    WBCUA >100 10/20/2020    BACTERIA 3+ 09/07/2019    RBCUA see below 10/20/2020    BLOODU SMALL 10/20/2020    SPECGRAV 1.015 10/20/2020    GLUCOSEU Negative 10/20/2020    GLUCOSEU NEGATIVE 03/17/2011       Radiology:  NM Cardiac Stress Test Nuclear Imaging   Final Result      XR CHEST PORTABLE   Final Result   No acute cardiopulmonary disease. Stable enlargement of the cardiac silhouette. Assessment/Plan:    Active Hospital Problems    Diagnosis    Chest pain [R07.9]    Obesity (BMI 30-39. 9) [E66.9]    Essential hypertension [I10]    Mixed hyperlipidemia [E78.2]     PLAN:    Chest pain  No ACS, but patient has active coronary artery disease according to cardiac stress test results  Awaiting cardiology input  No discharge today    Chronic diastolic congestive heart failure  Continue lisinopril and Coreg  Appears normovolemic    Hypertension  Blood pressure is controlled at rest.  Severely hypotensive response noted during cardiac stress test  Continue same medications for now. Dyslipidemia  Continue statin. Diabetes mellitus type 2  Hold Metformin in anticipation of IV contrast exposure. Continue sliding scale insulin.     Obesity  Body mass index is 33.57 kg/m². Complicating assessment and treatment, placing patient at high risk for multiple co-morbidities as well as early death and contributing to the patient's presentation. Counseled on weight loss.     Discussed with patient, questions answered       DVT Prophylaxis: Lovenox  Diet: Diet NPO Effective Now  Code Status: Full Code    PT/OT Eval Status: Not needed for now    Dispo -cannot discharge yet.   Abnormal cardiac stress test.    Kelly Farnsworth MD

## 2020-12-05 NOTE — PROGRESS NOTES
[address]   Daily Progress Note      Admit Date:  12/3/2020    CC: chest pain     Subjective: patient denies any acute events overnight. Stress test pending. HPI:   Ms. Arnaldo Headley was admitted due to chest pain. Patient has a history of hypertension and aortic stenosis, has been following in our office with Dr. Alexa Carrasquillo, has been seeing him since 2013 with last office visit being in August 2020. At that office visit, it was noted that she does have occasional chest pain, she is also noted weight loss and difficulty moving around. It was noted that she does have dementia. Overall, she was felt to be stable regard to aortic stenosis, felt to be moderate to severe based on last echocardiogram from 2020, also stable from hypertension, and nonobstructive CAD on cardiac catheterization from 2014.       The patient was seen and examined. Notes, labs and recent testing reviewed. There were not complications over night. The patient is being seen for angina.       Objective:     BP (!) 164/73   Pulse 64   Temp 97.9 °F (36.6 °C) (Axillary)   Resp 16   Ht 5' 5\" (1.651 m)   Wt 201 lb 11.5 oz (91.5 kg)   SpO2 96%   BMI 33.57 kg/m²      Intake/Output Summary (Last 24 hours) at 12/5/2020 0810  Last data filed at 12/5/2020 7675  Gross per 24 hour   Intake 360 ml   Output 700 ml   Net -340 ml       Physical Exam:  General:  Awake, alert, NAD  Skin:  Warm and dry  Neck:  JVD<8, no bruit  Chest:  Clear to auscultation, no wheezes/rhonchi/rales  Cardiovascular:  RRR S1S2, +RAN  Abdomen:  Soft, nontender, +bowel sounds  Extremities:  no edema    Medications:    aspirin  81 mg Oral Daily    calcium elemental  1 tablet Oral BID    carvedilol  25 mg Oral BID WC    cloNIDine  0.1 mg Oral TID    DULoxetine  120 mg Oral Daily    isosorbide mononitrate  15 mg Oral Daily    lisinopril  40 mg Oral Daily    metFORMIN  500 mg Oral BID WC    pantoprazole  40 mg Oral QAM AC    oxybutynin  10 mg Oral Daily    pramipexole  0.5 mg Oral Nightly    pravastatin  20 mg Oral Nightly    vitamin E  400 Units Oral Daily    sennosides-docusate sodium  1 tablet Oral Nightly    insulin lispro  0-6 Units Subcutaneous TID WC    insulin lispro  0-3 Units Subcutaneous Nightly    sodium chloride flush  10 mL Intravenous 2 times per day    enoxaparin  40 mg Subcutaneous Daily      dextrose       albuterol, glucose, dextrose, glucagon (rDNA), dextrose, sodium chloride flush, acetaminophen **OR** acetaminophen, polyethylene glycol, promethazine **OR** ondansetron, nitroGLYCERIN, perflutren lipid microspheres, regadenoson    Lab Data:  CBC:   Recent Labs     20   WBC 8.3   HGB 9.1*        BMP:    Recent Labs     20      K 4.3   CO2 26   BUN 16   CREATININE 1.3*     LIVR:   Recent Labs     20   AST 21   ALT 8*     PT/INR:   Recent Labs     20   PROT 6.8   INR 1.64*     APTT:   Recent Labs     20   APTT 22.6*     BNP:  No results for input(s): BNP in the last 72 hours.   CARDIAC ENZYMES:  Recent Labs     20  1942 20  0128 20  0649   TROPONINI <0.01 <0.01 <0.01     FASTING LIPID PANEL:  Lab Results   Component Value Date    CHOL 179 2017    HDL 54 2017    TRIG 124 2017       Diagnostics:    EK/3/2020 sinus rhythm     2020     Technically difficult examination due to body habitus, patient immobility.   Left ventricular systolic function is hyperdynamic with an estimated   ejection fraction of >= 65%.   The left ventricle is normal in size with mild septal hypertrophy.   Prominent sigmoid septum (1.8 cm) without evidence of LVOT obstruction.   Spectral doppler suggests apical cavity obliteration.   Indeterminate diastolic function.   The right atrium is mildly enlarged.   Mild mitral stenosis.   Moderate to severe aortic stenosis.   Mild aortic regurgitation.   Systolic pulmonary artery pressure (SPAP) is normal and estimated at 33 mmHg   (right atrial pressure 8 mmHg).    Stress Test:     2012     Impression-    1. Normal left ventricular ejection fraction and wall motion. 2. No large areas of reversibility to suggest ischemia.                       Cath:     2014     CATH 8/21/14   Mild coronary artery disease with preserved left ventricular  function. There is evidence for volume overload and slight decompensation.        Studies:      cxr         Impression    No acute cardiopulmonary disease.         Stable enlargement of the cardiac silhouette. Patient Active Problem List   Diagnosis    Arthritis    Constipation    Leg edema    Dizziness    Premature atrial beats    Mixed hyperlipidemia    SOB (shortness of breath)    [facility]congest[facility] heart failure) [facility])    Uncomplicated asthma    Essential hypertension    CKD (chronic kidney disease) stage 3, GFR 30-59 ml/min    Tremor, essential    Hypersomnia    Severe obstructive sleep apnea    Persistent disorder of initiating or maintaining sleep    Restless legs syndrome (RLS)    Sensory hearing loss, bilateral    Anxiety and depression    Peroneal tendon injury    Aortic stenosis, mild    Facial asymmetry    CAD (coronary artery disease)    Controlled type 2 diabetes mellitus with diabetic neuropathy, without long-term current use of insulin ([facility])    Interstitial cystitis    COPD (chronic obstructive pulmonary disease) ([facility])    Gastroesophageal reflux disease    Lumbar radiculopathy    Right hip pain    Metabolic encephalopathy    Restrictive lung disease    Acute confusion    Obesity (BMI 30-39. 9)    Severe episode of recurrent major depressive disorder, without psychotic features ([address]    Arthritis of shoulder region, left    Acute encephalopathy    Generalized weakness    Acute cystitis without hematuria    Anemia    UBALDO (acute kidney injury) ([address]    Chest pain     Cardiac medications: ASA 81 mg daily, Coreg 25 mg BID, Clonidine 0.1 mg TID, Lisinopril 40 mg daily, and Pravastatin 20 mg daily     Assessment/plan:   ~ Chest pain     ECHO and stress schedule today, depending on results patient may be a candidate for cath/ TAVR      Last ECHO showed mod/ severe AS     ~ Hypertension - elevated      Continue current medications    ~ Hyperlipidemia- stable continue Pravastatin       Dispo:      Please see orders. Discussed with patient and nursing.        The patient is on a beta-blocker  The patient is on an ace-i/ARB  The patient is on a statin  The patient is not on antiplatelet therapy  The patient does not have history of AF, and is not on anticoagulation    Signed:  Haris Moon, [address]  [phone]

## 2020-12-05 NOTE — PROGRESS NOTES
A Lexiscan stress test was completed on this patient as ordered. Aminophylline 100mg ivp given for c/o chest heaviness and nausea. Symptoms resolved. The patient tolerated the procedure well. Awaiting stress imaging at this time.

## 2020-12-05 NOTE — PLAN OF CARE
Problem: HEMODYNAMIC STATUS  Goal: Patient has stable vital signs and fluid balance  Outcome: Ongoing       Patient's EF (Ejection Fraction) is greater than 40%    Heart Failure Medications:  Diuretics[de-identified] None    (One of the following REQUIRED for EF <40%/SYSTOLIC FAILURE but MAY be used in EF% >40%/DIASTOLIC FAILURE)        ACE[de-identified] Lisinopril        ARB[de-identified] None         ARNI[de-identified] None    (Beta Blockers)  NON- Evidenced Based Beta Blocker (for EF% >40%/DIASTOLIC FAILURE): None    Evidenced Based Beta Blocker::(REQUIRED for EF% <40%/SYSTOLIC FAILURE) Carvedilol- Coreg  . .................................................................................................................................................. Patient's weights and intake/output reviewed: Yes    Patient's Last Weight: 201 lbs obtained by bed scale. Umer upon admission. Intake/Output Summary (Last 24 hours) at 12/5/2020 0439  Last data filed at 12/4/2020 1732  Gross per 24 hour   Intake 360 ml   Output 1400 ml   Net -1040 ml       Comorbidities Reviewed Yes    Patient has a past medical history of Asthma, Bronchial asthma, Bursitis, CHF (congestive heart failure) ([address] Constipation, COPD (chronic obstructive pulmonary disease) ([address] Dementia ([address] Depression, Diverticulitis, Dizziness, GERD (gastroesophageal reflux disease), Hearing loss, History of blood transfusion, Hyperlipidemia, Hypertension, Leg edema, Lung disease, Osteoarthritis, Sleep apnea, Tinnitus, Type II or unspecified type diabetes mellitus without mention of complication, not stated as uncontrolled, and Unspecified cerebral artery occlusion with cerebral infarction. >>For CHF and Comorbidity documentation on Education Time and Topics, please see Education Tab    Progressive Mobility Assessment:  What is this patient's Current Level of Mobility?: Ambulatory- with Assistance  How was this patient Mobilized today?: Edge of Bed                 With Whom?  Nurse and PCA Level of Difficulty/Assistance: 2x Assist     Pt resting in bed at this time on  1.5 L O2. Pt with complaints of shortness of breath. Pt with pitting lower extremity edema. Patient and/or Family's stated Goal of Care this Admission: reduce shortness of breath, increase activity tolerance, better understand heart failure and disease management, be more comfortable, and reduce lower extremity edema prior to discharge    Diabetes education provided today:    Carbs: good carbs and bad carbs, importance of carb counting, incorporation of protein with each meal to reduce Glycemic index, importance of portions, Carb/insulin ratio.      Marixa Christensen

## 2020-12-06 PROBLEM — I20.0 UNSTABLE ANGINA (HCC): Status: ACTIVE | Noted: 2020-12-06

## 2020-12-06 LAB
ANION GAP SERPL CALCULATED.3IONS-SCNC: 8 MMOL/L (ref 3–16)
BUN BLDV-MCNC: 17 MG/DL (ref 7–20)
CALCIUM SERPL-MCNC: 9.3 MG/DL (ref 8.3–10.6)
CHLORIDE BLD-SCNC: 101 MMOL/L (ref 99–110)
CO2: 29 MMOL/L (ref 21–32)
CREAT SERPL-MCNC: 1.3 MG/DL (ref 0.6–1.2)
GFR AFRICAN AMERICAN: 48
GFR NON-AFRICAN AMERICAN: 39
GLUCOSE BLD-MCNC: 133 MG/DL (ref 70–99)
GLUCOSE BLD-MCNC: 149 MG/DL (ref 70–99)
GLUCOSE BLD-MCNC: 156 MG/DL (ref 70–99)
GLUCOSE BLD-MCNC: 171 MG/DL (ref 70–99)
GLUCOSE BLD-MCNC: 241 MG/DL (ref 70–99)
PERFORMED ON: ABNORMAL
POTASSIUM SERPL-SCNC: 3.8 MMOL/L (ref 3.5–5.1)
SODIUM BLD-SCNC: 138 MMOL/L (ref 136–145)

## 2020-12-06 PROCEDURE — G0378 HOSPITAL OBSERVATION PER HR: HCPCS

## 2020-12-06 PROCEDURE — 1200000000 HC SEMI PRIVATE

## 2020-12-06 PROCEDURE — 36415 COLL VENOUS BLD VENIPUNCTURE: CPT

## 2020-12-06 PROCEDURE — 2700000000 HC OXYGEN THERAPY PER DAY

## 2020-12-06 PROCEDURE — 94761 N-INVAS EAR/PLS OXIMETRY MLT: CPT

## 2020-12-06 PROCEDURE — 6360000002 HC RX W HCPCS: Performed by: INTERNAL MEDICINE

## 2020-12-06 PROCEDURE — 80048 BASIC METABOLIC PNL TOTAL CA: CPT

## 2020-12-06 PROCEDURE — 99232 SBSQ HOSP IP/OBS MODERATE 35: CPT | Performed by: NURSE PRACTITIONER

## 2020-12-06 PROCEDURE — 6370000000 HC RX 637 (ALT 250 FOR IP): Performed by: INTERNAL MEDICINE

## 2020-12-06 PROCEDURE — 2580000003 HC RX 258: Performed by: INTERNAL MEDICINE

## 2020-12-06 RX ORDER — ISOSORBIDE MONONITRATE 60 MG/1
60 TABLET, EXTENDED RELEASE ORAL DAILY
Status: DISCONTINUED | OUTPATIENT
Start: 2020-12-07 | End: 2020-12-08 | Stop reason: HOSPADM

## 2020-12-06 RX ADMIN — CLONIDINE HYDROCHLORIDE 0.1 MG: 0.1 TABLET ORAL at 21:05

## 2020-12-06 RX ADMIN — ISOSORBIDE MONONITRATE 15 MG: 30 TABLET, EXTENDED RELEASE ORAL at 10:14

## 2020-12-06 RX ADMIN — CALCIUM 500 MG: 500 TABLET ORAL at 10:14

## 2020-12-06 RX ADMIN — CLONIDINE HYDROCHLORIDE 0.1 MG: 0.1 TABLET ORAL at 10:13

## 2020-12-06 RX ADMIN — PANTOPRAZOLE SODIUM 40 MG: 40 TABLET, DELAYED RELEASE ORAL at 10:13

## 2020-12-06 RX ADMIN — INSULIN LISPRO 1 UNITS: 100 INJECTION, SOLUTION INTRAVENOUS; SUBCUTANEOUS at 21:05

## 2020-12-06 RX ADMIN — OXYBUTYNIN CHLORIDE 10 MG: 5 TABLET, EXTENDED RELEASE ORAL at 10:14

## 2020-12-06 RX ADMIN — DOCUSATE SODIUM 50MG AND SENNOSIDES 8.6MG 1 TABLET: 8.6; 5 TABLET, FILM COATED ORAL at 21:05

## 2020-12-06 RX ADMIN — Medication 400 UNITS: at 10:14

## 2020-12-06 RX ADMIN — CARVEDILOL 25 MG: 25 TABLET, FILM COATED ORAL at 16:38

## 2020-12-06 RX ADMIN — ENOXAPARIN SODIUM 40 MG: 40 INJECTION SUBCUTANEOUS at 10:15

## 2020-12-06 RX ADMIN — ASPIRIN 81 MG CHEWABLE TABLET 81 MG: 81 TABLET CHEWABLE at 10:14

## 2020-12-06 RX ADMIN — DULOXETINE HYDROCHLORIDE 120 MG: 60 CAPSULE, DELAYED RELEASE ORAL at 10:13

## 2020-12-06 RX ADMIN — PRAVASTATIN SODIUM 20 MG: 20 TABLET ORAL at 21:05

## 2020-12-06 RX ADMIN — CALCIUM 500 MG: 500 TABLET ORAL at 21:05

## 2020-12-06 RX ADMIN — INSULIN LISPRO 1 UNITS: 100 INJECTION, SOLUTION INTRAVENOUS; SUBCUTANEOUS at 10:15

## 2020-12-06 RX ADMIN — Medication 10 ML: at 21:05

## 2020-12-06 RX ADMIN — CARVEDILOL 25 MG: 25 TABLET, FILM COATED ORAL at 10:14

## 2020-12-06 RX ADMIN — PRAMIPEXOLE DIHYDROCHLORIDE 0.5 MG: 0.25 TABLET ORAL at 21:05

## 2020-12-06 RX ADMIN — CLONIDINE HYDROCHLORIDE 0.1 MG: 0.1 TABLET ORAL at 15:07

## 2020-12-06 RX ADMIN — LISINOPRIL 40 MG: 20 TABLET ORAL at 10:13

## 2020-12-06 RX ADMIN — Medication 10 ML: at 10:15

## 2020-12-06 RX ADMIN — INSULIN LISPRO 2 UNITS: 100 INJECTION, SOLUTION INTRAVENOUS; SUBCUTANEOUS at 13:17

## 2020-12-06 ASSESSMENT — PAIN SCALES - GENERAL
PAINLEVEL_OUTOF10: 0
PAINLEVEL_OUTOF10: 0

## 2020-12-06 NOTE — PROGRESS NOTES
Hospitalist Progress Note      PCP: Rowena Ahumada    Date of Admission: 12/3/2020    Chief Complaint: Chest pain    Hospital Course: Admitted with chest pain. Troponin negative. Known to have coronary artery disease in the past.   Cardiac stress test resulted abnormal with high risk. Cardiology following. No active chest pain. Also noted aortic stenosis. Appears moderate according to echo results. Subjective: No current chest pain, no shortness of breath, no nausea or vomiting.   No acute issues past 2 days      Medications:  Reviewed    Infusion Medications    dextrose       Scheduled Medications    aspirin  81 mg Oral Daily    calcium elemental  1 tablet Oral BID    carvedilol  25 mg Oral BID WC    cloNIDine  0.1 mg Oral TID    DULoxetine  120 mg Oral Daily    isosorbide mononitrate  15 mg Oral Daily    lisinopril  40 mg Oral Daily    pantoprazole  40 mg Oral QAM AC    oxybutynin  10 mg Oral Daily    pramipexole  0.5 mg Oral Nightly    pravastatin  20 mg Oral Nightly    vitamin E  400 Units Oral Daily    sennosides-docusate sodium  1 tablet Oral Nightly    insulin lispro  0-6 Units Subcutaneous TID     insulin lispro  0-3 Units Subcutaneous Nightly    sodium chloride flush  10 mL Intravenous 2 times per day    enoxaparin  40 mg Subcutaneous Daily     PRN Meds: albuterol, glucose, dextrose, glucagon (rDNA), dextrose, sodium chloride flush, acetaminophen **OR** acetaminophen, polyethylene glycol, promethazine **OR** ondansetron, nitroGLYCERIN, perflutren lipid microspheres      Intake/Output Summary (Last 24 hours) at 12/6/2020 1011  Last data filed at 12/6/2020 0542  Gross per 24 hour   Intake 300 ml   Output 150 ml   Net 150 ml       Physical Exam Performed:    BP (!) 180/78   Pulse 56   Temp 97.6 °F (36.4 °C) (Oral)   Resp 16   Ht 5' 5\" (1.651 m)   Wt 203 lb 1.6 oz (92.1 kg)   SpO2 92%   BMI 33.80 kg/m²     General appearance: No apparent distress, appears stated age and cooperative. HEENT: Pupils equal, round, and reactive to light. Conjunctivae/corneas clear. Neck: Supple, with full range of motion. No jugular venous distention. Trachea midline. Respiratory:  Normal respiratory effort. Clear to auscultation, bilaterally without Rales/Wheezes/Rhonchi. Cardiovascular: Regular rate and rhythm with normal S1/S2 without murmurs, rubs or gallops. Abdomen: Soft, non-tender, non-distended with normal bowel sounds. Musculoskeletal: No clubbing, cyanosis or edema bilaterally. Full range of motion without deformity. Skin: Skin color, texture, turgor normal.  No rashes or lesions. Neurologic:  Neurovascularly intact without any focal sensory/motor deficits. Cranial nerves: II-XII intact, grossly non-focal.  Psychiatric: Alert and oriented, thought content appropriate, normal insight  Capillary Refill: Brisk,< 3 seconds   Peripheral Pulses: +2 palpable, equal bilaterally     I examined the patient today (12/06/20). Physical exam similar to yesterday (12/5).     Labs:   Recent Labs     12/03/20 1942 12/05/20  1104   WBC 8.3 6.6   HGB 9.1* 8.5*   HCT 27.9* 26.4*    234     Recent Labs     12/03/20  1942 12/05/20  1104 12/06/20  0529    140 138   K 4.3 3.9 3.8    102 101   CO2 26 30 29   BUN 16 14 17   CREATININE 1.3* 1.3* 1.3*   CALCIUM 9.9 9.7 9.3     Recent Labs     12/03/20 1942   AST 21   ALT 8*   BILITOT 0.3   ALKPHOS 82     Recent Labs     12/03/20 1942   INR 1.64*     Recent Labs     12/03/20  1942 12/04/20  0128 12/04/20  0649   TROPONINI <0.01 <0.01 <0.01       Urinalysis:      Lab Results   Component Value Date    NITRU Negative 10/20/2020    WBCUA >100 10/20/2020    BACTERIA 3+ 09/07/2019    RBCUA see below 10/20/2020    BLOODU SMALL 10/20/2020    SPECGRAV 1.015 10/20/2020    GLUCOSEU Negative 10/20/2020    GLUCOSEU NEGATIVE 03/17/2011       Radiology:  NM Cardiac Stress Test Nuclear Imaging   Final Result      XR CHEST PORTABLE   Final Result   No acute cardiopulmonary disease. Stable enlargement of the cardiac silhouette. Assessment/Plan:    Active Hospital Problems    Diagnosis    Chest pain [R07.9]    Obesity (BMI 30-39. 9) [E66.9]    Essential hypertension [I10]    Mixed hyperlipidemia [E78.2]     PLAN:    Chest pain  No ACS, but patient has active coronary artery disease according to cardiac stress test results  Cardiac cath next week. Being followed by cardiology    Chronic diastolic congestive heart failure  Continue lisinopril and Coreg  Appears normovolemic    Aortic stenosis  Moderate. Probably will not need surgical intervention. Defer to cardiology. Hypertension  Blood pressure is controlled at rest.  Severely hypertensive response noted during cardiac stress test  Continue same medications for now. Dyslipidemia  Continue statin. Diabetes mellitus type 2  Holding Metformin in anticipation of IV contrast exposure. Continue sliding scale insulin. Acceptable blood sugar     Obesity  Body mass index is 33.8 kg/m². Complicating assessment and treatment, placing patient at high risk for multiple co-morbidities as well as early death and contributing to the patient's presentation. Counseled on weight loss.          DVT Prophylaxis: Lovenox  Diet: DIET CARB CONTROL;  Code Status: Full Code    PT/OT Eval Status: Not needed for now    Dispo -cannot discharge yet. Awaiting outcome of cardiac cath.     Brijesh Herrera MD

## 2020-12-06 NOTE — PROGRESS NOTES
[address]   Daily Progress Note      Admit Date:  12/3/2020    CC: chest pain     Subjective: patient denies any acute events overnight. + stress test - will schedule for cardiac angiogram, Echo showed moderate AS              ( 8/2020 ECHO showed moderate to severe AS)     HPI:   Ms. Sriram Tabor was admitted due to chest pain. Patient has a history of hypertension and aortic stenosis, has been following in our office with Dr. Dona Ruiz, has been seeing him since 2013 with last office visit being in August 2020. At that office visit, it was noted that she does have occasional chest pain, she is also noted weight loss and difficulty moving around. It was noted that she does have dementia. Overall, she was felt to be stable regard to aortic stenosis, felt to be moderate to severe based on last echocardiogram from 2020, also stable from hypertension, and nonobstructive CAD on cardiac catheterization from 2014.       The patient was seen and examined. Notes, labs and recent testing reviewed. There were not complications over night. The patient is being seen for angina.       Objective:     BP (!) 159/72   Pulse 65   Temp 98.9 °F (37.2 °C) (Oral)   Resp 17   Ht 5' 5\" (1.651 m)   Wt 203 lb 1.6 oz (92.1 kg)   SpO2 96%   BMI 33.80 kg/m²      Intake/Output Summary (Last 24 hours) at 12/6/2020 0800  Last data filed at 12/6/2020 0542  Gross per 24 hour   Intake 300 ml   Output 150 ml   Net 150 ml       Physical Exam:  General:  Awake, alert, NAD  Skin:  Warm and dry  Neck:  JVD<8, no bruit  Chest:  Clear to auscultation, no wheezes/rhonchi/rales  Cardiovascular:  RRR S1S2, + RAN  Abdomen:  Soft, nontender, +bowel sounds  Extremities:  no edema    Medications:    aspirin  81 mg Oral Daily    calcium elemental  1 tablet Oral BID    carvedilol  25 mg Oral BID WC    cloNIDine  0.1 mg Oral TID    DULoxetine  120 mg Oral Daily    isosorbide mononitrate  15 mg Oral Daily    lisinopril  40 mg Oral Daily  pantoprazole  40 mg Oral QAM AC    oxybutynin  10 mg Oral Daily    pramipexole  0.5 mg Oral Nightly    pravastatin  20 mg Oral Nightly    vitamin E  400 Units Oral Daily    sennosides-docusate sodium  1 tablet Oral Nightly    insulin lispro  0-6 Units Subcutaneous TID WC    insulin lispro  0-3 Units Subcutaneous Nightly    sodium chloride flush  10 mL Intravenous 2 times per day    enoxaparin  40 mg Subcutaneous Daily      dextrose       albuterol, glucose, dextrose, glucagon (rDNA), dextrose, sodium chloride flush, acetaminophen **OR** acetaminophen, polyethylene glycol, promethazine **OR** ondansetron, nitroGLYCERIN, perflutren lipid microspheres    Lab Data:  CBC:   Recent Labs     20  1104   WBC 8.3 6.6   HGB 9.1* 8.5*    234     BMP:    Recent Labs     20  1104 20  0529    140 138   K 4.3 3.9 3.8   CO2 26 30 29   BUN 16 14 17   CREATININE 1.3* 1.3* 1.3*     LIVR:   Recent Labs     20   AST 21   ALT 8*     PT/INR:   Recent Labs     20   PROT 6.8   INR 1.64*     APTT:   Recent Labs     20   APTT 22.6*     BNP:  No results for input(s): BNP in the last 72 hours. CARDIAC ENZYMES:  Recent Labs     20  1942 20  0128 20  0649   TROPONINI <0.01 <0.01 <0.01     FASTING LIPID PANEL:  Lab Results   Component Value Date    CHOL 179 2017    HDL 54 2017    TRIG 124 2017       Diagnostics:    EK/3/2020 sinus rhythm       ECHO: 2020   Technically difficult examination due to body habitus, patient immobility. Limited exam for LVEF and aortic stenosis. Normal left ventricle size, wall thickness, and systolic function with a   visually estimated ejection fraction of 55%. Prominent sigmoid septum (1.5 cm) without evidence of LVOT obstruction. No regional wall motion abnormalities are seen. Grade II diastolic dysfunction with elevated LV filling pressures.    The left atrium appears mildly enlarged. Moderate aortic stenosis. Compared with the previous exam on 08/25/2020 (EF >65%), left ventricular   function is now normal with no significant change in aortic valve velocities   or pressure gradients. Stress test: 12/5/2020  Summary     Abnormal study. There is a moderate sized, moderate intensity, reversible     defect of the inferolateral wall which is consistent with ischemia.     Normal LV size and systolic function.     Left ventricular ejection fraction of 62 %.     Borderline elevated TID ratio of 1.29.     Overall findings represent an intermediate risk scan.     Patient noted to be markedly hypertensive both at rest and during     pharmacologic stress. 8/2020     Technically difficult examination due to body habitus, patient immobility.   Left ventricular systolic function is hyperdynamic with an estimated   ejection fraction of >= 65%.   The left ventricle is normal in size with mild septal hypertrophy.   Prominent sigmoid septum (1.8 cm) without evidence of LVOT obstruction.   Spectral doppler suggests apical cavity obliteration.   Indeterminate diastolic function.   The right atrium is mildly enlarged.   Mild mitral stenosis.   Moderate to severe aortic stenosis.   Mild aortic regurgitation.   Systolic pulmonary artery pressure (SPAP) is normal and estimated at 33 mmHg   (right atrial pressure 8 mmHg).    Stress Test:     2012     Impression-    1. Normal left ventricular ejection fraction and wall motion. 2. No large areas of reversibility to suggest ischemia.                       CATH 8/21/14   Mild coronary artery disease with preserved left ventricular  function. There is evidence for volume overload and slight decompensation.        Studies:      cxr         Impression    No acute cardiopulmonary disease.         Stable enlargement of the cardiac silhouette.           Patient Active Problem List   Diagnosis    Arthritis    Constipation    is on an ace-i/ARB  The patient is on a statin  The patient is not on antiplatelet therapy  The patient does not have history of AF, and is not on anticoagulation    Signed:  Jose Armando Hardwick, [address] [facility]  [phone]

## 2020-12-07 ENCOUNTER — APPOINTMENT (OUTPATIENT)
Dept: CARDIAC CATH/INVASIVE PROCEDURES | Age: 80
DRG: 287 | End: 2020-12-07
Payer: COMMERCIAL

## 2020-12-07 LAB
GLUCOSE BLD-MCNC: 129 MG/DL (ref 70–99)
GLUCOSE BLD-MCNC: 142 MG/DL (ref 70–99)
GLUCOSE BLD-MCNC: 178 MG/DL (ref 70–99)
PERFORMED ON: ABNORMAL

## 2020-12-07 PROCEDURE — 1200000000 HC SEMI PRIVATE

## 2020-12-07 PROCEDURE — 6370000000 HC RX 637 (ALT 250 FOR IP): Performed by: NURSE PRACTITIONER

## 2020-12-07 PROCEDURE — B2111ZZ FLUOROSCOPY OF MULTIPLE CORONARY ARTERIES USING LOW OSMOLAR CONTRAST: ICD-10-PCS | Performed by: INTERNAL MEDICINE

## 2020-12-07 PROCEDURE — 2709999900 HC NON-CHARGEABLE SUPPLY

## 2020-12-07 PROCEDURE — 93458 L HRT ARTERY/VENTRICLE ANGIO: CPT

## 2020-12-07 PROCEDURE — 2700000000 HC OXYGEN THERAPY PER DAY

## 2020-12-07 PROCEDURE — 4A023N7 MEASUREMENT OF CARDIAC SAMPLING AND PRESSURE, LEFT HEART, PERCUTANEOUS APPROACH: ICD-10-PCS | Performed by: INTERNAL MEDICINE

## 2020-12-07 PROCEDURE — C1894 INTRO/SHEATH, NON-LASER: HCPCS

## 2020-12-07 PROCEDURE — 2500000003 HC RX 250 WO HCPCS

## 2020-12-07 PROCEDURE — 6360000002 HC RX W HCPCS

## 2020-12-07 PROCEDURE — 94761 N-INVAS EAR/PLS OXIMETRY MLT: CPT

## 2020-12-07 PROCEDURE — B2151ZZ FLUOROSCOPY OF LEFT HEART USING LOW OSMOLAR CONTRAST: ICD-10-PCS | Performed by: INTERNAL MEDICINE

## 2020-12-07 PROCEDURE — 6370000000 HC RX 637 (ALT 250 FOR IP): Performed by: INTERNAL MEDICINE

## 2020-12-07 PROCEDURE — 99233 SBSQ HOSP IP/OBS HIGH 50: CPT | Performed by: INTERNAL MEDICINE

## 2020-12-07 PROCEDURE — 85347 COAGULATION TIME ACTIVATED: CPT

## 2020-12-07 PROCEDURE — C1769 GUIDE WIRE: HCPCS

## 2020-12-07 PROCEDURE — 93458 L HRT ARTERY/VENTRICLE ANGIO: CPT | Performed by: INTERNAL MEDICINE

## 2020-12-07 RX ORDER — FENTANYL CITRATE 50 UG/ML
INJECTION, SOLUTION INTRAMUSCULAR; INTRAVENOUS
Status: COMPLETED | OUTPATIENT
Start: 2020-12-07 | End: 2020-12-07

## 2020-12-07 RX ORDER — ACETAMINOPHEN 325 MG/1
650 TABLET ORAL EVERY 4 HOURS PRN
Status: DISCONTINUED | OUTPATIENT
Start: 2020-12-07 | End: 2020-12-08 | Stop reason: HOSPADM

## 2020-12-07 RX ORDER — SODIUM CHLORIDE 0.9 % (FLUSH) 0.9 %
10 SYRINGE (ML) INJECTION PRN
Status: DISCONTINUED | OUTPATIENT
Start: 2020-12-07 | End: 2020-12-08 | Stop reason: HOSPADM

## 2020-12-07 RX ORDER — MIDAZOLAM HYDROCHLORIDE 5 MG/ML
INJECTION INTRAMUSCULAR; INTRAVENOUS
Status: COMPLETED | OUTPATIENT
Start: 2020-12-07 | End: 2020-12-07

## 2020-12-07 RX ORDER — SODIUM CHLORIDE 9 MG/ML
INJECTION, SOLUTION INTRAVENOUS CONTINUOUS
Status: DISCONTINUED | OUTPATIENT
Start: 2020-12-07 | End: 2020-12-08 | Stop reason: HOSPADM

## 2020-12-07 RX ORDER — SODIUM CHLORIDE 0.9 % (FLUSH) 0.9 %
10 SYRINGE (ML) INJECTION EVERY 12 HOURS SCHEDULED
Status: DISCONTINUED | OUTPATIENT
Start: 2020-12-07 | End: 2020-12-08 | Stop reason: HOSPADM

## 2020-12-07 RX ORDER — METFORMIN HYDROCHLORIDE 500 MG/1
500 TABLET, EXTENDED RELEASE ORAL
Qty: 120 TABLET | Refills: 5 | Status: ON HOLD | OUTPATIENT
Start: 2020-12-09 | End: 2021-09-07 | Stop reason: HOSPADM

## 2020-12-07 RX ADMIN — DOCUSATE SODIUM 50MG AND SENNOSIDES 8.6MG 1 TABLET: 8.6; 5 TABLET, FILM COATED ORAL at 21:12

## 2020-12-07 RX ADMIN — ASPIRIN 81 MG CHEWABLE TABLET 81 MG: 81 TABLET CHEWABLE at 10:30

## 2020-12-07 RX ADMIN — ISOSORBIDE MONONITRATE 60 MG: 60 TABLET, EXTENDED RELEASE ORAL at 10:30

## 2020-12-07 RX ADMIN — DULOXETINE HYDROCHLORIDE 120 MG: 60 CAPSULE, DELAYED RELEASE ORAL at 21:18

## 2020-12-07 RX ADMIN — CLONIDINE HYDROCHLORIDE 0.1 MG: 0.1 TABLET ORAL at 21:11

## 2020-12-07 RX ADMIN — CLONIDINE HYDROCHLORIDE 0.1 MG: 0.1 TABLET ORAL at 10:30

## 2020-12-07 RX ADMIN — MIDAZOLAM HYDROCHLORIDE 1 MG: 5 INJECTION INTRAMUSCULAR; INTRAVENOUS at 13:37

## 2020-12-07 RX ADMIN — MIDAZOLAM HYDROCHLORIDE 1 MG: 5 INJECTION INTRAMUSCULAR; INTRAVENOUS at 13:18

## 2020-12-07 RX ADMIN — INSULIN LISPRO 1 UNITS: 100 INJECTION, SOLUTION INTRAVENOUS; SUBCUTANEOUS at 21:22

## 2020-12-07 RX ADMIN — CARVEDILOL 25 MG: 25 TABLET, FILM COATED ORAL at 10:30

## 2020-12-07 RX ADMIN — PANTOPRAZOLE SODIUM 40 MG: 40 TABLET, DELAYED RELEASE ORAL at 06:13

## 2020-12-07 RX ADMIN — CARVEDILOL 25 MG: 25 TABLET, FILM COATED ORAL at 21:18

## 2020-12-07 RX ADMIN — OXYBUTYNIN CHLORIDE 10 MG: 5 TABLET, EXTENDED RELEASE ORAL at 21:17

## 2020-12-07 RX ADMIN — FENTANYL CITRATE 50 MCG: 50 INJECTION, SOLUTION INTRAMUSCULAR; INTRAVENOUS at 13:18

## 2020-12-07 RX ADMIN — FENTANYL CITRATE 25 MCG: 50 INJECTION, SOLUTION INTRAMUSCULAR; INTRAVENOUS at 13:36

## 2020-12-07 RX ADMIN — PRAMIPEXOLE DIHYDROCHLORIDE 0.5 MG: 0.25 TABLET ORAL at 21:12

## 2020-12-07 RX ADMIN — PRAVASTATIN SODIUM 20 MG: 20 TABLET ORAL at 21:11

## 2020-12-07 RX ADMIN — CALCIUM 500 MG: 500 TABLET ORAL at 21:12

## 2020-12-07 ASSESSMENT — PAIN SCALES - GENERAL
PAINLEVEL_OUTOF10: 0

## 2020-12-07 NOTE — CARE COORDINATION
CASE MANAGEMENT DISCHARGE SUMMARY      Discharge to: [address] completed: THE [facility] Exemption Notification (HENS) completed: NA from facility     [id number] ordered/agency:     Transportation: [facility]    Family/car:   Medical Transport explained to pt/family. Pt/family voice no agency preference. Agency used:   time: 11:30 pm - Was earliest and ONLY  time available today from 6 Ambulance agencies    Ambulance form completed: Yes    Confirmed discharge plan with:     Patient: yes via phone      Facility/Agency, name:  [facility] faxed   Phone number for report to facility: [phone]     RN, name: Olga Lidia Nolan RN     Note: Discharging nurse to complete [facility], reconcile [facility], and place final copy with patient's discharge packet. RN to ensure that written prescriptions for  Level II medications are sent with patient to the facility as per protocol.
CM called [facility] in admissions @ [address] [phone] to notify that pt has DC order and ambulance transport set up for 11:30pm. [facility] aware. She is also aware that pt last negative covid test from 12/3/20 and verbalized that no further testing is needed to return.      Brenton Aguilar RN
Head atraumatic, normal cephalic shape.

## 2020-12-07 NOTE — PROGRESS NOTES
[address] Daily Progress Note      Admit Date:  12/3/2020    Subjective:  Ms. Jermaine Jean is seen today for cardiology follow up, this is my first visit this admission with her  She c/o heavy chest pressure intermittently several days, worst on day of admission that will not resolve with nitro x3. No associated symptoms. Prior history of non obst  CAD and mod AS which is stable per echo this admission. Denies shortness of breath, edema, dizziness, palpitations and syncope. She is ruled out for MI and stress test shows inferolateral wall ischemia  She has no pain today. History of Present Illness: initially seen on 12. 4.20 by my associate Dr Michelle Sierra as follows:  Leeroy Cohen is a 79 y.o. patient who presented to the hospital with complaints of chest pain over the last few days, patient is a resident of a nursing facility, she was brought to the hospital yesterday with complaints of chest pain. Serial troponin levels been drawn and found to be negative. Patient says she feels about the same today. Chronically, she says she has poor functional status, gets around with a wheelchair, she says she has not had as much physical therapy as she has had in the past and feels weaker than usual.     Patient has a history of hypertension and aortic stenosis, has been following in our office with Dr. Woody Edward, has been seeing him since 2013 with last office visit being in August 2020. At that office visit, it was noted that she does have occasional chest pain, she is also noted weight loss and difficulty moving around. It was noted that she does have dementia.   Overall, she was felt to be stable regard to aortic stenosis, felt to be moderate to severe based on last echocardiogram from 2020, also stable from hypertension, and nonobstructive CAD on cardiac catheterization from 2014.     ROS:  12 point ROS negative in all areas as listed below except as in [place]  Constitutional, EENT, pulmonary, GI, , Musculoskeletal, skin, neurological, hematological, endocrine, Psychiatric    Past Medical History:   Diagnosis Date    Asthma     Bronchial asthma     Bursitis 12/2011    ACHILLES TENDON LEFT    CHF [facility]congestive heart failure) [facility]     Constipation     COPD (chronic obstructive pulmonary disease) [facility]     Dementia ([facility] 09/11/2019    unspecificied dementia without behavioral disturbance    Depression     Diverticulitis     Dizziness     GERD (gastroesophageal reflux disease)     Hearing loss     History of blood transfusion     Hyperlipidemia     Hypertension     Leg edema     Lung disease     Osteoarthritis     Sleep apnea     CPAP, SLEEP STUDY 6/28 OVERNIGHT AT HOME    Tinnitus     Type II or unspecified type diabetes mellitus without mention of complication, not stated as uncontrolled     Unspecified cerebral artery occlusion with cerebral infarction     mini stroke     Past Surgical History:   Procedure Laterality Date    ABDOMEN SURGERY      ACHILLES TENDON SURGERY  2/2/12    LEFT ACHILLES DEBRIDEMENT, HAGLUNDS AND RETROCALCANEAL BURSA EXCISION WITH FLEXOR HALLUS LONGUS TENDON TRANSFER WITH BLOCK FOR PAIN CONTROL    APPENDECTOMY      CARDIAC CATHETERIZATION  8/21/14    CARPAL TUNNEL RELEASE      both hands    CHOLECYSTECTOMY      COLONOSCOPY      COLONOSCOPY      COLONOSCOPY  2/10/2016    CYSTOSCOPY  10/02/2017    DIAGNOSTIC CARDIAC CATH LAB PROCEDURE      ENDOSCOPY, COLON, DIAGNOSTIC      EYE SURGERY      HYSTERECTOMY      HYSTERECTOMY, TOTAL ABDOMINAL      JOINT REPLACEMENT      bilateral knee    KNEE SURGERY      both replaced    OTHER SURGICAL HISTORY  07/09/2018    CYSTOSCOPY, HYDRODISTENTION OF BLADDER WITH INSTILLATION OF    POLYPECTOMY      SHOULDER SURGERY      TOENAIL EXCISION  08/04/2016    right big toe    TONSILLECTOMY      TUBAL LIGATION      UPPER GASTROINTESTINAL ENDOSCOPY  10/29/12    gastritis    UPPER GASTROINTESTINAL ENDOSCOPY  2/10/2016    URETHRAL STRICTURE nitroGLYCERIN, perflutren lipid microspheres    Lab Data:  CBC:   Recent Labs     12/05/20  1104   WBC 6.6   HGB 8.5*   HCT 26.4*   MCV 94.2        BMP:   Recent Labs     12/05/20  1104 12/06/20  0529    138   K 3.9 3.8    101   CO2 30 29   BUN 14 17   CREATININE 1.3* 1.3*     LIVER PROFILE: No results for input(s): AST, ALT, LIPASE, BILIDIR, BILITOT, ALKPHOS in the last 72 hours. Invalid input(s): AMYLASE,  ALB  PT/INR: No results for input(s): PROTIME, INR in the last 72 hours. APTT: No results for input(s): APTT in the last 72 hours. BNP:  No results for input(s): BNP in the last 72 hours. IMAGING:   Echo 12. 5.20   Summary   Technically difficult examination due to body habitus, patient immobility. Limited exam for LVEF and aortic stenosis. Normal left ventricle size, wall thickness, and systolic function with a   visually estimated ejection fraction of 55%. Prominent sigmoid septum (1.5 cm) without evidence of LVOT obstruction. No regional wall motion abnormalities are seen. Grade II diastolic dysfunction with elevated LV filling pressures. The left atrium appears mildly enlarged. Moderate aortic stenosis. Compared with the previous exam on 08/25/2020 (EF >65%), left ventricular   function is now normal with no significant change in aortic valve velocities   or pressure gradients. Aortic Valve   Aortic valve area could not be measured by planimetry, but calculated at   1.38 cm2 via the continuity equation with a maximum velocity of 3.53 m/s, a   maximum pressure gradient of 50 mmHg and a mean pressure gradient of 30   mmHg, consistent with moderate aortic stenosis. Peak velocity obtained from the apical 5-chamber window. There is mild aortic valve regurgitation. nuclear stress test 12. 5.20      Summary     Abnormal study.  There is a moderate sized, moderate intensity, reversible     defect of the inferolateral wall which is consistent with ischemia.     Normal LV size and systolic function.     Left ventricular ejection fraction of 62 %.     Borderline elevated TID ratio of 1.29.     Overall findings represent an intermediate risk scan.     Patient noted to be markedly hypertensive both at rest and during     pharmacologic stress.            chest xray 12. 3.20     No acute cardiopulmonary disease.         Stable enlargement of the cardiac silhouette.                     Assessment:  Unstable angina inspite of optimum medical therapy  Abnormal stress test  Moderate aortic stenosis  Patient Active Problem List    Diagnosis Date Noted    Arthritis      Priority: Medium     Class: Present on Admission    Unstable angina ([facility] [facility] 12/06/2020    Chest pain 12/03/2020    UBALDO (acute kidney injury) ([facility] [address] [facility] 10/21/2020    Generalized weakness     Acute cystitis without hematuria     Anemia     Acute encephalopathy 09/07/2019    Arthritis of shoulder region, left 01/07/2019    Severe episode of recurrent major depressive disorder, without psychotic features ([address] 10/12/2018    Obesity (BMI 30-39.9) 05/16/2018    Restrictive lung disease 04/14/2018    Acute confusion     Metabolic encephalopathy 54/13/6631    Lumbar radiculopathy 03/29/2018    Right hip pain 03/29/2018    Gastroesophageal reflux disease     COPD (chronic obstructive pulmonary disease) ([facility] [facility] 01/04/2018    Interstitial cystitis 09/29/2017    Controlled type 2 diabetes mellitus with diabetic neuropathy, without long-term current use of insulin ([address] 08/18/2015    CAD (coronary artery disease) 08/28/2014    Facial asymmetry 08/20/2014    Aortic stenosis, mild 02/27/2014    Peroneal tendon injury 01/15/2014    Anxiety and depression 12/13/2013    Tremor, essential 09/03/2013    SOB (shortness of breath) 05/23/2013    CHF (congestive heart failure) ([facility] [address] [facility] 57/50/4094    Uncomplicated asthma 18/87/4942    Essential hypertension 05/23/2013    CKD (chronic kidney disease) stage 3, GFR 30-59 ml/min 05/23/2013    Mixed hyperlipidemia 04/02/2013    Premature atrial beats 02/04/2013    Constipation     Leg edema     Dizziness     Restless legs syndrome (RLS) 12/12/2011    Hypersomnia 05/19/2011    Severe obstructive sleep apnea 05/19/2011    Persistent disorder of initiating or maintaining sleep 05/19/2011    Sensory hearing loss, bilateral 09/29/2008       Plan:  1. Asa  2. Statin  3. Beta blockers  4. Nitrates  5. Based on these findings I recommend left heart cath for definitive evaluation of coronary arteries. Risks, benefits, expectations, and alternative treatments were discussed. Questions appropriately answered. Lashaeann-marie Abhi agrees to proceed and verbalized understanding.       · Discussed with the attending and RN treatment plan    Rebekah Morales MD 12/7/2020 9:22 AM

## 2020-12-07 NOTE — PROGRESS NOTES
Hospitalist Progress Note      PCP: Yue Hull    Date of Admission: 12/3/2020    Chief Complaint: Chest pain    Hospital Course: Admitted with chest pain. Currently pain-free. Troponin negative. Known to have coronary artery disease in the past.   Cardiac stress test resulted abnormal with high risk. Cardiology following. Subjective: No current chest pain, no shortness of breath, no nausea or vomiting. No acute issues from yesterday. Medications:  Reviewed    Infusion Medications    dextrose       Scheduled Medications    isosorbide mononitrate  60 mg Oral Daily    aspirin  81 mg Oral Daily    calcium elemental  1 tablet Oral BID    carvedilol  25 mg Oral BID WC    cloNIDine  0.1 mg Oral TID    DULoxetine  120 mg Oral Daily    pantoprazole  40 mg Oral QAM AC    oxybutynin  10 mg Oral Daily    pramipexole  0.5 mg Oral Nightly    pravastatin  20 mg Oral Nightly    vitamin E  400 Units Oral Daily    sennosides-docusate sodium  1 tablet Oral Nightly    insulin lispro  0-6 Units Subcutaneous TID WC    insulin lispro  0-3 Units Subcutaneous Nightly    sodium chloride flush  10 mL Intravenous 2 times per day    enoxaparin  40 mg Subcutaneous Daily     PRN Meds: albuterol, glucose, dextrose, glucagon (rDNA), dextrose, sodium chloride flush, acetaminophen **OR** acetaminophen, polyethylene glycol, promethazine **OR** ondansetron, nitroGLYCERIN, perflutren lipid microspheres      Intake/Output Summary (Last 24 hours) at 12/7/2020 0918  Last data filed at 12/7/2020 0557  Gross per 24 hour   Intake 240 ml   Output 1300 ml   Net -1060 ml       Physical Exam Performed:    BP (!) 191/73   Pulse 67   Temp 98.2 °F (36.8 °C) (Oral)   Resp 16   Ht 5' 5\" (1.651 m)   Wt 192 lb 14.4 oz (87.5 kg)   SpO2 96%   BMI 32.10 kg/m²     General appearance: No apparent distress, appears stated age and cooperative. HEENT: Pupils equal, round, and reactive to light.  Conjunctivae/corneas clear.  Neck: Supple, with full range of motion. No jugular venous distention. Trachea midline. Respiratory:  Normal respiratory effort. Clear to auscultation, bilaterally without Rales/Wheezes/Rhonchi. Cardiovascular: Regular rate and rhythm with normal S1/S2 without murmurs, rubs or gallops. Abdomen: Soft, non-tender, non-distended with normal bowel sounds. Musculoskeletal: No clubbing, cyanosis or edema bilaterally. Full range of motion without deformity. Skin: Skin color, texture, turgor normal.  No rashes or lesions. Neurologic:  Neurovascularly intact without any focal sensory/motor deficits. Cranial nerves: II-XII intact, grossly non-focal.  Psychiatric: Alert and oriented, thought content appropriate, normal insight  Capillary Refill: Brisk,< 3 seconds   Peripheral Pulses: +2 palpable, equal bilaterally     I examined the patient today (12/07/20). Physical exam similar to yesterday (12/4). Labs:   Recent Labs     12/05/20  1104   WBC 6.6   HGB 8.5*   HCT 26.4*        Recent Labs     12/05/20  1104 12/06/20  0529    138   K 3.9 3.8    101   CO2 30 29   BUN 14 17   CREATININE 1.3* 1.3*   CALCIUM 9.7 9.3     No results for input(s): AST, ALT, BILIDIR, BILITOT, ALKPHOS in the last 72 hours. No results for input(s): INR in the last 72 hours. No results for input(s): Verlena Tylor in the last 72 hours. Urinalysis:      Lab Results   Component Value Date    NITRU Negative 10/20/2020    WBCUA >100 10/20/2020    BACTERIA 3+ 09/07/2019    RBCUA see below 10/20/2020    BLOODU SMALL 10/20/2020    SPECGRAV 1.015 10/20/2020    GLUCOSEU Negative 10/20/2020    GLUCOSEU NEGATIVE 03/17/2011       Radiology:  NM Cardiac Stress Test Nuclear Imaging   Final Result      XR CHEST PORTABLE   Final Result   No acute cardiopulmonary disease. Stable enlargement of the cardiac silhouette.                  Assessment/Plan:    Active Hospital Problems    Diagnosis    Unstable angina ([address] [I20.0]    Chest pain [R07.9]    Obesity (BMI 30-39. 9) [E66.9]    Essential hypertension [I10]    Mixed hyperlipidemia [E78.2]     PLAN:    Chest pain  No ACS, but patient has active coronary artery disease according to cardiac stress test results  Awaiting cardiology input  No discharge today    Chronic diastolic congestive heart failure  Continue lisinopril and Coreg  Appears normovolemic    Hypertension  Blood pressure is controlled at rest.  Severely hypotensive response noted during cardiac stress test  Continue same medications for now. Dyslipidemia  Continue statin. Diabetes mellitus type 2  Hold Metformin in anticipation of IV contrast exposure. Continue sliding scale insulin.     Obesity  Body mass index is 32.1 kg/m². Complicating assessment and treatment, placing patient at high risk for multiple co-morbidities as well as early death and contributing to the patient's presentation. Counseled on weight loss.     Discussed with patient, questions answered       DVT Prophylaxis: Lovenox  Diet: DIET CARB CONTROL;  Code Status: Full Code    PT/OT Eval Status: Not needed for now    Dispo -cannot discharge yet.   Abnormal cardiac stress test.    Eliot Phelps, APRN - CNP

## 2020-12-07 NOTE — PROGRESS NOTES
Received call from [facility] who states it is ok for pt to have an angiogram. Updated Swati on pt plan of care including pt in cath lab now getting angiogram. Swati verbalized understanding of teaching and states she would like call from doctor later today with update on pt status and plan of care.

## 2020-12-07 NOTE — PROCEDURES
CARDIAC CATHETERIZATION REPORT     Procedure Date:  2020  Patient Name: Leeroy Cohen  MRN: [id number] : 1940      INDICATION     Aortic stenosis  Progressive angina  Intermediate risk abnormal stress test    PROCEDURES PERFORMED     Left heart catheterization  Coronary angiogam  Coronary cath            PROCEDURE DESCRIPTION   Risks/benefits/alternatives/outcomes were discussed with patient and/or family and informed consent was obtained. Using the [facility] scale, the patient's right radial artery was found to be a level B. Patient was prepped draped in the usual sterile fashion. Local anaesthetic was applied over puncture site. A right heart catheterization was planned but it was difficult to obtain IV access in the brachial vein and this was deferred using a front wall technique, a 4/5 Spanish Terumo sheath was inserted into right radial artery. Verapamil, nitroglycerin nicardipine were administered through the sheath. Heparin was administered. Diagnostic 4 Guyanese pigtail, JL 3.5 and JR4 and AR-2 catheters were used for diagnostic angiograms. 4 Guyanese catheters were utilized as there was radial spasm which was treated with vasodilators but 5 Guyanese catheters would not deliver easily for coronary cannulation. At the conclusion of the procedure, a TR band was placed over the puncture site and hemostasis was obtained. There were no immediate complications. I supervised sedation with versed 2 mg/fentanyl 75 mcg during the procedure. 150 cc contrast was utilized. <20cc EBL. FINDINGS         Left heart catheterization    LVEDP  16   GRADIENT ACROSS AORTIC VALVE  peak to peak gradient 35 mm, this is consistent with moderate to severe aortic stenosis         CORONARY ARTERIES    LM Large vessel, less than 10% [id number] stenosis       LAD Calcified, proximal-mid 60 to 70% stenosis, this is at a medium sized first diagonal branch which has proximal-mid 40 to 50% stenosis. Lorean Snare

## 2020-12-07 NOTE — PROGRESS NOTES
Brief Pre-Op Note/Sedation Assessment      Bishop Simon  1940  Cath Pool Rm/NONE      [id number]  11:07 AM    Planned Procedure: Cardiac Catheterization Procedure    Post Procedure Plan: Return to same level of care    Consent: I have discussed with the patient and/or the patient representative the indication, alternatives, and the possible risks and/or complications of the planned procedure and the anesthesia methods. The patient and/or patient representative appear to understand and agree to proceed. DISCUSSION OF CARDIAC CATHETERIZATION PROCEDURES: The procedures, indications, risks and alternatives have been discussed with the patient and, as appropriate, with the patient's guardian . Risks discussed included, but are not limited to, bleeding, development of blood clots/emboli, damage to blood vessels, renal failure, malignant cardiac arrhythmias, stroke, heart attack, emergent coronary bypass surgery, death, dye allergy. The patient (and guardian as appropriate) expressed understanding of the aforementioned and wished to proceed. Chief Complaint: Chest Pain/Pressure      Indications for Cath Procedure: Worsening Angina  Anginal Classification within 2 weeks:  CCS IV - Inability to perform any activity without angina or angina at rest, i.e., severe limitation  NYHA Heart Failure Class within 2 weeks: No symptoms  Is Cath Lab Visit Valve-related?: Aortic Stenosis; Stenosis Severity: moderate to severe  Surgical Risk: N/A  Functional Type: N/A    Anti- Anginal Meds within 2 weeks:   Yes: Beta Blockers, Aspirin and Non-Statin (Any)    Stress or Imaging Studies Performed (within 6 months):  Stress Test with SPECT Result: Positive:  inferolateral Risk/Extent of Ischemia:  Intermediate     Vital Signs:  BP (!) 191/73   Pulse 67   Temp 98.2 °F (36.8 °C) (Oral)   Resp 16   Ht 5' 5\" (1.651 m)   Wt 192 lb 14.4 oz (87.5 kg)   SpO2 96%   BMI 32.10 kg/m²     Allergies:   Allergies   Allergen Reactions    Latex Rash    Cephalexin Other (See Comments)     SHAKY AND SWEATY    Codeine Nausea Only     STOMACH HURTS    Levofloxacin Nausea Only    Pce [Erythromycin] Nausea Only     STOMACH HURTS    Pcn [Penicillins] Other (See Comments)     SHAKY AND SWEATY    Pentazocine      UNSURE OF REATION    Sumycin [Tetracycline Hcl] Nausea Only    Atarax [Hydroxyzine Hcl] Nausea And Vomiting    Beef-Derived Products Nausea And Vomiting    Flagyl [Metronidazole] Nausea And Vomiting    Macrodantin [Nitrofurantoin] Palpitations and Other (See Comments)     SWEATY    Pyridium [Phenazopyridine Hcl] Palpitations     SWEATY - PT DENIES REACTION    Sulfa Antibiotics Nausea And Vomiting and Nausea Only    Urised [Methen-Lisa-Meth Bl-Phen Sal] Palpitations     SWEATY       Past Medical History:  Past Medical History:   Diagnosis Date    Asthma     Bronchial asthma     Bursitis 12/2011    ACHILLES TENDON LEFT    [facility]est[facility] failure) [facility]     Constipation     COPD [facility]chronic obstructive pulmonary disease) ([facility]     Dementia ([facility] [address] 09/11/2019    unspecificied dementia without behavioral disturbance    Depression     Diverticulitis     Dizziness     GERD (gastroesophageal reflux disease)     Hearing loss     History of blood transfusion     Hyperlipidemia     Hypertension     Leg edema     Lung disease     Osteoarthritis     Sleep apnea     CPAP, SLEEP STUDY 6/28 OVERNIGHT AT HOME    Tinnitus     Type II or unspecified type diabetes mellitus without mention of complication, not stated as uncontrolled     Unspecified cerebral artery occlusion with cerebral infarction     mini stroke         Surgical History:  Past Surgical History:   Procedure Laterality Date    ABDOMEN SURGERY      ACHILLES TENDON SURGERY  2/2/12    LEFT ACHILLES DEBRIDEMENT, HAGLUNDS AND RETROCALCANEAL BURSA EXCISION WITH FLEXOR HALLUS LONGUS TENDON TRANSFER WITH BLOCK FOR PAIN CONTROL    APPENDECTOMY      CARDIAC CATHETERIZATION  8/21/14    CARPAL TUNNEL RELEASE      both hands    CHOLECYSTECTOMY      COLONOSCOPY      COLONOSCOPY      COLONOSCOPY  2/10/2016    CYSTOSCOPY  10/02/2017    DIAGNOSTIC CARDIAC CATH LAB PROCEDURE      ENDOSCOPY, COLON, DIAGNOSTIC      EYE SURGERY      HYSTERECTOMY      HYSTERECTOMY, TOTAL ABDOMINAL      JOINT REPLACEMENT      bilateral knee    KNEE SURGERY      both replaced    OTHER SURGICAL HISTORY  07/09/2018    CYSTOSCOPY, HYDRODISTENTION OF BLADDER WITH INSTILLATION OF    POLYPECTOMY      SHOULDER SURGERY      TOENAIL EXCISION  08/04/2016    right big toe    TONSILLECTOMY      TUBAL LIGATION      UPPER GASTROINTESTINAL ENDOSCOPY  10/29/12    gastritis    UPPER GASTROINTESTINAL ENDOSCOPY  2/10/2016    URETHRAL STRICTURE DILATATION           Medications:  Current Facility-Administered Medications   Medication Dose Route Frequency Provider Last Rate Last Dose    0.9 % sodium chloride infusion   Intravenous Continuous Fabiola Grimes MD        isosorbide mononitrate (IMDUR) extended release tablet 60 mg  60 mg Oral Daily Shanikajohn Hernandez APRN - CNP   60 mg at 12/07/20 1030    albuterol (PROVENTIL) nebulizer solution 2.5 mg  2.5 mg Nebulization Q4H PRN Reshma Neil MD        aspirin chewable tablet 81 mg  81 mg Oral Daily Reshma Neil MD   81 mg at 12/07/20 1030    calcium elemental (OSCAL) tablet 500 mg  1 tablet Oral BID Reshma Neil MD   500 mg at 12/06/20 2105    carvedilol (COREG) tablet 25 mg  25 mg Oral BID WC Reshma Neil MD   25 mg at 12/07/20 1030    cloNIDine (CATAPRES) tablet 0.1 mg  0.1 mg Oral TID Reshma Neil MD   0.1 mg at 12/07/20 1030    DULoxetine (CYMBALTA) extended release capsule 120 mg  120 mg Oral Daily Reshma Neil MD   120 mg at 12/06/20 1013    pantoprazole (PROTONIX) tablet 40 mg  40 mg Oral QAM AC Reshma Neil MD   40 mg at 12/07/20 0613    oxybutynin (DITROPAN-XL) extended release tablet 10 mg  10 mg Oral Daily Sia Mcclain MD   10 mg at 12/06/20 1014    pramipexole (MIRAPEX) tablet 0.5 mg  0.5 mg Oral Nightly Sia Mcclain MD   0.5 mg at 12/06/20 2105    pravastatin (PRAVACHOL) tablet 20 mg  20 mg Oral Nightly Sia Mcclain MD   20 mg at 12/06/20 2105    vitamin E capsule 400 Units  400 Units Oral Daily Sia Mcclain MD   400 Units at 12/06/20 1014    sennosides-docusate sodium (SENOKOT-S) 8.6-50 MG tablet 1 tablet  1 tablet Oral Nightly Sia Mcclain MD   1 tablet at 12/06/20 2105    glucose (GLUTOSE) 40 % oral gel 15 g  15 g Oral PRN Sia Mcclain MD        dextrose 50 % IV solution  12.5 g Intravenous PRN Sia Mcclain MD        glucagon (rDNA) injection 1 mg  1 mg Intramuscular PRN Sia Mcclain MD        dextrose 5 % solution  100 mL/hr Intravenous PRN Sia Mcclain MD        insulin lispro (HUMALOG) injection vial 0-6 Units  0-6 Units Subcutaneous TID WC Sia Mcclain MD   2 Units at 12/06/20 1317    insulin lispro (HUMALOG) injection vial 0-3 Units  0-3 Units Subcutaneous Nightly Sia Mcclain MD   1 Units at 12/06/20 2105    sodium chloride flush 0.9 % injection 10 mL  10 mL Intravenous 2 times per day Sia Mcclain MD   10 mL at 12/06/20 2105    sodium chloride flush 0.9 % injection 10 mL  10 mL Intravenous PRN Sia Mcclain MD        acetaminophen (TYLENOL) tablet 650 mg  650 mg Oral Q6H PRN Sia Mcclain MD   650 mg at 12/05/20 2156    Or    acetaminophen (TYLENOL) suppository 650 mg  650 mg Rectal Q6H PRN Sia Mcclain MD        polyethylene glycol (GLYCOLAX) packet 17 g  17 g Oral Daily PRN Sia Mcclain MD        promethazine (PHENERGAN) tablet 12.5 mg  12.5 mg Oral Q6H PRN Sia Mcclain MD        Or    ondansetron (ZOFRAN) injection 4 mg  4 mg Intravenous Q6H PRN Sia Mcclain MD        enoxaparin (LOVENOX) injection 40 mg  40 mg Subcutaneous Daily Mariusz Villareal MD   40 mg at 12/06/20 1015    nitroGLYCERIN (NITROSTAT) SL tablet 0.4 mg  0.4 mg Sublingual Q5 Min PRN Mariusz Villareal MD        perflutren lipid microspheres (DEFINITY) injection 1.65 mg  1.5 mL Intravenous ONCE PRN Tamera Alonso MD               Pre-Sedation:    Pre-Sedation Documentation and Exam:  I have personally completed a history, physical exam & review of systems for this patient (see notes). Prior History of Anesthesia Complications:   none    Modified Mallampati:  III (soft palate, base of uvula visible)    ASA Classification:  Class 3 - A patient with severe systemic disease that limits activity but is not incapacitating      José Luis Scale: Activity:  2 - Able to move 4 extremities voluntarily on command  Respiration:  2 - Able to breathe deeply and cough freely  Circulation:  2 - BP+/- 20mmHg of normal  Consciousness:  2 - Fully awake  Oxygen Saturation (color):  2 - Able to maintain oxygen saturation >92% on room air    Sedation/Anesthesia Plan:  Guard the patient's safety and welfare. Minimize physical discomfort and pain. Minimize negative psychological responses to treatment by providing sedation and analgesia and maximize the potential amnesia. Patient to meet pre-procedure discharge plan.     Medication Planned:  midazolam intravenously and fentanyl intravenously    Patient is an appropriate candidate for plan of sedation: yes      Electronically signed by Tamera Alonso MD on 12/7/2020 at 11:07 AM

## 2020-12-07 NOTE — DISCHARGE INSTR - COC
Continuity of Care Form    Patient Name: Lindsay Cornelius   :    MRN:  [id number]    Admit date:  12/3/2020  Discharge date:  20    Code Status Order: Full Code   Advance Directives:     Admitting Physician:  Adali Avendaño MD  PCP: Verena Philip    Discharging Nurse: [facility] Unit/Room#: 0218/0218-02  Discharging Unit Phone Number: [phone]    Emergency Contact:   Extended Emergency Contact Information  Primary Emergency Contact: Antony Overton [address] Phone: [phone]  Mobile Phone: [phone]  Relation: Child  Secondary Emergency Contact: Ce Liceahiyovani Leong Phone: [phone]  Mobile Phone: [phone]  Relation: Child    Past Surgical History:  Past Surgical History:   Procedure Laterality Date    ABDOMEN SURGERY      ACHILLES TENDON SURGERY  12    LEFT ACHILLES DEBRIDEMENT, HAGLUNDS AND RETROCALCANEAL BURSA EXCISION WITH FLEXOR HALLUS LONGUS TENDON TRANSFER WITH BLOCK FOR PAIN CONTROL    APPENDECTOMY      CARDIAC CATHETERIZATION  14    CARDIAC CATHETERIZATION  2020    Non Obs CAD, medical management    CARPAL TUNNEL RELEASE      both hands    CHOLECYSTECTOMY      COLONOSCOPY      COLONOSCOPY      COLONOSCOPY  2/10/2016    CYSTOSCOPY  10/02/2017    DIAGNOSTIC CARDIAC CATH LAB PROCEDURE      ENDOSCOPY, COLON, DIAGNOSTIC      EYE SURGERY      HYSTERECTOMY      HYSTERECTOMY, TOTAL ABDOMINAL      JOINT REPLACEMENT      bilateral knee    KNEE SURGERY      both replaced    OTHER SURGICAL HISTORY  2018    CYSTOSCOPY, HYDRODISTENTION OF BLADDER WITH INSTILLATION OF    POLYPECTOMY      SHOULDER SURGERY      TOENAIL EXCISION  2016    right big toe    TONSILLECTOMY      TUBAL LIGATION      UPPER GASTROINTESTINAL ENDOSCOPY  10/29/12    gastritis    UPPER GASTROINTESTINAL ENDOSCOPY  2/10/2016    URETHRAL STRICTURE DILATATION         Immunization History:   Immunization History   Administered Date(s) Administered    Influenza Vaccine, unspecified formulation 09/21/2015    Pneumococcal Conjugate 13-valent ([id number]) 09/21/2015    Pneumococcal Polysaccharide ([id number]) 05/25/2013    Tdap (Boostrix, Adacel) 09/13/2013       Active Problems:  Patient Active Problem List   Diagnosis Code    Arthritis M19.90    Constipation K59.00    Leg edema R60.0    Dizziness R42    Premature atrial beats I49.1    Mixed hyperlipidemia E78.2    SOB (shortness of breath) R06.02    CHF (congestive heart failure) ([facility]) N73.7    Uncomplicated asthma H56.526    Essential hypertension I10    CKD (chronic kidney disease) stage 3, GFR 30-59 ml/min N18.30    Tremor, essential G25.0    Hypersomnia G47.10    Severe obstructive sleep apnea G47.33    Persistent disorder of initiating or maintaining sleep G47.00    Restless legs syndrome (RLS) G25.81    Sensory hearing loss, bilateral H90.3    Anxiety and depression F41.9, F32.9    Peroneal tendon injury S86.309A    Aortic stenosis, mild I35.0    Facial asymmetry Q67.0    CAD (coronary artery disease) I25.10    Controlled type 2 diabetes mellitus with diabetic neuropathy, without long-term current use of insulin ([facility]) E11.40    Interstitial cystitis N30.10    COPD (chronic obstructive pulmonary disease) ([facility]) J44.9    Gastroesophageal reflux disease K21.9    Lumbar radiculopathy M54.16    Right hip pain L00.124    Metabolic encephalopathy Y93.01    Restrictive lung disease J98.4    Acute confusion R41.0    Obesity (BMI 30-39. 9) E66.9    Severe episode of recurrent major depressive disorder, without psychotic features ([facility] [address] F33.2    Arthritis of shoulder region, left M19.012    Acute encephalopathy G93.40    Generalized weakness R53.1    Acute cystitis without hematuria N30.00    Anemia D64.9    UBALDO (acute kidney injury) ([facility] [address] N17.9    Chest pain R07.9    Unstable angina ([facility]) I20.0       Isolation/Infection:   Isolation          No Isolation        Patient Infection Status     Infection Onset Added Last Indicated Last Indicated By Review Planned Expiration Resolved Resolved By    None active    Resolved    COVID-19 Rule Out 12/03/20 12/03/20 12/03/20 COVID-19 (Ordered)   12/03/20 Rule-Out Test Resulted    COVID-19 Rule Out 10/23/20 10/23/20 10/23/20 COVID-19 (Ordered)   10/23/20 Rule-Out Test Resulted    COVID-19 Rule Out 10/20/20 10/20/20 10/20/20 COVID-19 (Ordered)   10/20/20 Rule-Out Test Resulted          Nurse Assessment:  Last Vital Signs: /60   Pulse 61   Temp 97.4 °F (36.3 °C) (Oral)   Resp 16   Ht 5' 5\" (1.651 m)   Wt 192 lb 14.4 oz (87.5 kg)   SpO2 96%   BMI 32.10 kg/m²     Last documented pain score (0-10 scale): Pain Level: 4  Last Weight:   Wt Readings from Last 1 Encounters:   12/07/20 192 lb 14.4 oz (87.5 kg)     Mental Status:  oriented, alert, coherent, logical, thought processes intact and able to concentrate and follow conversation    IV Access:  - None    Nursing Mobility/ADLs:  Walking   Dependent  Transfer  Dependent  Bathing  Dependent  Dressing  Dependent  Toileting  Dependent  Feeding  Assisted  Med Admin  Assisted  Med Delivery   whole and prefers mixed with applesauce    Wound Care Documentation and Therapy:        Elimination:  Continence:   · Bowel: Yes  · Bladder: Yes  Urinary Catheter: None   Colostomy/Ileostomy/Ileal Conduit: No       Date of Last BM: 12/4/20    Intake/Output Summary (Last 24 hours) at 12/7/2020 1611  Last data filed at 12/7/2020 1515  Gross per 24 hour   Intake 240 ml   Output 1375 ml   Net -1135 ml     I/O last 3 completed shifts: In: 240 [P.O.:240]  Out: [address] [EDMJD:1510]    Safety Concerns:      At Risk for Falls    Impairments/Disabilities:      None    Nutrition Therapy:  Current Nutrition Therapy:   - Oral Diet:  Carb Control 4 carbs/meal (1800kcals/day)    Routes of Feeding: Oral  Liquids: No Restrictions  Daily Fluid Restriction: no  Last Modified Barium Swallow with Video

## 2020-12-08 VITALS
SYSTOLIC BLOOD PRESSURE: 168 MMHG | BODY MASS INDEX: 32.14 KG/M2 | OXYGEN SATURATION: 96 % | TEMPERATURE: 98.1 F | WEIGHT: 192.9 LBS | HEART RATE: 69 BPM | DIASTOLIC BLOOD PRESSURE: 72 MMHG | HEIGHT: 65 IN | RESPIRATION RATE: 16 BRPM

## 2020-12-08 LAB — POC ACT LR: 236 SEC

## 2020-12-08 NOTE — DISCHARGE SUMMARY
hours) at 12/8/2020 0802  Last data filed at 12/7/2020 2059  Gross per 24 hour   Intake 240 ml   Output 75 ml   Net 165 ml       Physical Exam Performed:    BP (!) 168/72   Pulse 69   Temp 98.1 °F (36.7 °C) (Oral)   Resp 16   Ht 5' 5\" (1.651 m)   Wt 192 lb 14.4 oz (87.5 kg)   SpO2 96%   BMI 32.10 kg/m²     General appearance: No apparent distress, appears stated age and cooperative. HEENT: Pupils equal, round, and reactive to light. Conjunctivae/corneas clear. Neck: Supple, with full range of motion. No jugular venous distention. Trachea midline. Respiratory:  Normal respiratory effort. Clear to auscultation, bilaterally without Rales/Wheezes/Rhonchi. Cardiovascular: Regular rate and rhythm with normal S1/S2 without murmurs, rubs or gallops. Abdomen: Soft, non-tender, non-distended with normal bowel sounds. Musculoskeletal: No clubbing, cyanosis or edema bilaterally. Full range of motion without deformity. Skin: Skin color, texture, turgor normal.  No rashes or lesions. Neurologic:  Neurovascularly intact without any focal sensory/motor deficits. Cranial nerves: II-XII intact, grossly non-focal.  Psychiatric: Alert and oriented, thought content appropriate, normal insight  Capillary Refill: Brisk,< 3 seconds   Peripheral Pulses: +2 palpable, equal bilaterally     I examined the patient today (12/08/20). Physical exam similar to yesterday (12/4). Labs:   Recent Labs     12/05/20  1104   WBC 6.6   HGB 8.5*   HCT 26.4*        Recent Labs     12/05/20  1104 12/06/20  0529    138   K 3.9 3.8    101   CO2 30 29   BUN 14 17   CREATININE 1.3* 1.3*   CALCIUM 9.7 9.3     No results for input(s): AST, ALT, BILIDIR, BILITOT, ALKPHOS in the last 72 hours. No results for input(s): INR in the last 72 hours. No results for input(s): Laura Che in the last 72 hours.     Urinalysis:      Lab Results   Component Value Date    NITRU Negative 10/20/2020    WBCUA >100 10/20/2020    BACTERIA 3+ 09/07/2019    RBCUA see below 10/20/2020    BLOODU SMALL 10/20/2020    SPECGRAV 1.015 10/20/2020    GLUCOSEU Negative 10/20/2020    GLUCOSEU NEGATIVE 03/17/2011       Radiology:  NM Cardiac Stress Test Nuclear Imaging   Final Result      XR CHEST PORTABLE   Final Result   No acute cardiopulmonary disease. Stable enlargement of the cardiac silhouette. Assessment/Plan:    Active Hospital Problems    Diagnosis    Unstable angina ([facility] [address] [I20.0]    Chest pain [R07.9]    Obesity (BMI 30-39. 9) [E66.9]    Essential hypertension [I10]    Mixed hyperlipidemia [E78.2]     PLAN:    D/C Home  F/U with cards  Future Appointments   Date Time Provider Ann Torre   4/13/2021  [facility]     Condition: Stable  DVT Prophylaxis: Lovenox  Diet: cardiac diet  Code Status: Full code      KIMI Marte CNP

## 2020-12-08 NOTE — PROGRESS NOTES
Per Gama Larson RN pt to go to [facility] at 11:30 pm and Family updated. Pt discharge paperwork completed and returning to facility she came from. IV removed with zero complications dressing placed. TR band removed with no bleeding, redness, or hematoma. Bruising is present at the site. Will call report when EMS arrives.  Still awaiting arrival @11:45pm

## 2020-12-08 NOTE — PROGRESS NOTES
Pt left w ambulance via stretcher at this time.  Report called to Mary Prince RN at [facility]

## 2020-12-08 NOTE — DISCHARGE INSTR - ACTIVITY
Discharge Instructions for Angiograms Performed Through the Arm:    · Avoid bending wrist/arm for 24 hours  · Remove dressing after 24 hours and leave open to air. · Apply band-aid as needed and change every 12 hours. · No driving for 24 hours. · Avoid soaking arm in water for 3 days. You may shower. Use soap and water to cleanse site. · No heavy lifting (more than 3-5 pounds) for 7 days. · If bleeding occurs apply pressure to puncture site and call 911. · May start Cardiac Rehab when cleared by MD.       When to Call your Doctor    · Angina or chest pain (a feeling of pain, pressure, burning in chest, neck, throat,           jaw, arms or shoulders). · Increase in pain, swelling, redness, bleeding, or drainage from puncture site. (If bleeding occurs apply pressure for 5-10 minutes and call 911.)  · Symptoms of shortness of breath, increased weakness/fatigue, or dizziness. · Signs of infection: Fever over 100 degrees farenheit, redness to site, yellow drainage at site, increase in pain and swelling. · Report any severe pain, coldness, or a bluish color in the arm that was affected.

## 2021-02-05 ENCOUNTER — HOSPITAL ENCOUNTER (EMERGENCY)
Age: 81
Discharge: NURSING, REHAB OR LONG-TERM CARE | End: 2021-02-06
Attending: EMERGENCY MEDICINE
Payer: COMMERCIAL

## 2021-02-05 VITALS
SYSTOLIC BLOOD PRESSURE: 135 MMHG | HEART RATE: 69 BPM | RESPIRATION RATE: 20 BRPM | DIASTOLIC BLOOD PRESSURE: 59 MMHG | OXYGEN SATURATION: 92 % | TEMPERATURE: 99.5 F

## 2021-02-05 DIAGNOSIS — R41.82 ALTERED MENTAL STATUS, UNSPECIFIED ALTERED MENTAL STATUS TYPE: Primary | ICD-10-CM

## 2021-02-05 DIAGNOSIS — N30.01 ACUTE CYSTITIS WITH HEMATURIA: ICD-10-CM

## 2021-02-05 LAB
A/G RATIO: 0.8 (ref 1.1–2.2)
ALBUMIN SERPL-MCNC: 3 G/DL (ref 3.4–5)
ALP BLD-CCNC: 150 U/L (ref 40–129)
ALT SERPL-CCNC: 7 U/L (ref 10–40)
ANION GAP SERPL CALCULATED.3IONS-SCNC: 11 MMOL/L (ref 3–16)
AST SERPL-CCNC: 19 U/L (ref 15–37)
BACTERIA: ABNORMAL /HPF
BASOPHILS ABSOLUTE: 0.1 K/UL (ref 0–0.2)
BASOPHILS RELATIVE PERCENT: 1.4 %
BILIRUB SERPL-MCNC: 0.5 MG/DL (ref 0–1)
BILIRUBIN URINE: ABNORMAL
BLOOD, URINE: ABNORMAL
BUN BLDV-MCNC: 32 MG/DL (ref 7–20)
CALCIUM SERPL-MCNC: 9.3 MG/DL (ref 8.3–10.6)
CHLORIDE BLD-SCNC: 101 MMOL/L (ref 99–110)
CLARITY: ABNORMAL
CO2: 24 MMOL/L (ref 21–32)
COLOR: ABNORMAL
COMMENT UA: ABNORMAL
CREAT SERPL-MCNC: 1.8 MG/DL (ref 0.6–1.2)
EOSINOPHILS ABSOLUTE: 0 K/UL (ref 0–0.6)
EOSINOPHILS RELATIVE PERCENT: 0.3 %
GFR AFRICAN AMERICAN: 33
GFR NON-AFRICAN AMERICAN: 27
GLOBULIN: 3.9 G/DL
GLUCOSE BLD-MCNC: 209 MG/DL (ref 70–99)
GLUCOSE URINE: NEGATIVE MG/DL
HCT VFR BLD CALC: 27.4 % (ref 36–48)
HEMOGLOBIN: 8.7 G/DL (ref 12–16)
KETONES, URINE: NEGATIVE MG/DL
LEUKOCYTE ESTERASE, URINE: ABNORMAL
LYMPHOCYTES ABSOLUTE: 2.4 K/UL (ref 1–5.1)
LYMPHOCYTES RELATIVE PERCENT: 26.6 %
MCH RBC QN AUTO: 29.8 PG (ref 26–34)
MCHC RBC AUTO-ENTMCNC: 31.8 G/DL (ref 31–36)
MCV RBC AUTO: 93.5 FL (ref 80–100)
MICROSCOPIC EXAMINATION: YES
MONOCYTES ABSOLUTE: 0.4 K/UL (ref 0–1.3)
MONOCYTES RELATIVE PERCENT: 4 %
NEUTROPHILS ABSOLUTE: 6.2 K/UL (ref 1.7–7.7)
NEUTROPHILS RELATIVE PERCENT: 67.7 %
NITRITE, URINE: NEGATIVE
PDW BLD-RTO: 17.7 % (ref 12.4–15.4)
PH UA: 8.5 (ref 5–8)
PLATELET # BLD: 326 K/UL (ref 135–450)
PMV BLD AUTO: 8.6 FL (ref 5–10.5)
POTASSIUM REFLEX MAGNESIUM: 4.8 MMOL/L (ref 3.5–5.1)
PROTEIN UA: 100 MG/DL
RBC # BLD: 2.94 M/UL (ref 4–5.2)
RBC UA: ABNORMAL /HPF (ref 0–4)
SODIUM BLD-SCNC: 136 MMOL/L (ref 136–145)
SPECIFIC GRAVITY UA: 1.02 (ref 1–1.03)
TOTAL PROTEIN: 6.9 G/DL (ref 6.4–8.2)
URINE REFLEX TO CULTURE: YES
URINE TYPE: ABNORMAL
UROBILINOGEN, URINE: 0.2 E.U./DL
WBC # BLD: 9.2 K/UL (ref 4–11)
WBC UA: >100 /HPF (ref 0–5)

## 2021-02-05 PROCEDURE — 6360000002 HC RX W HCPCS: Performed by: EMERGENCY MEDICINE

## 2021-02-05 PROCEDURE — 87077 CULTURE AEROBIC IDENTIFY: CPT

## 2021-02-05 PROCEDURE — 85025 COMPLETE CBC W/AUTO DIFF WBC: CPT

## 2021-02-05 PROCEDURE — 96365 THER/PROPH/DIAG IV INF INIT: CPT

## 2021-02-05 PROCEDURE — 81001 URINALYSIS AUTO W/SCOPE: CPT

## 2021-02-05 PROCEDURE — 87186 SC STD MICRODIL/AGAR DIL: CPT

## 2021-02-05 PROCEDURE — 6370000000 HC RX 637 (ALT 250 FOR IP): Performed by: EMERGENCY MEDICINE

## 2021-02-05 PROCEDURE — 87086 URINE CULTURE/COLONY COUNT: CPT

## 2021-02-05 PROCEDURE — 99284 EMERGENCY DEPT VISIT MOD MDM: CPT

## 2021-02-05 PROCEDURE — 2580000003 HC RX 258: Performed by: EMERGENCY MEDICINE

## 2021-02-05 PROCEDURE — 80053 COMPREHEN METABOLIC PANEL: CPT

## 2021-02-05 RX ORDER — ACETAMINOPHEN 325 MG/1
650 TABLET ORAL ONCE
Status: COMPLETED | OUTPATIENT
Start: 2021-02-05 | End: 2021-02-05

## 2021-02-05 RX ORDER — CEPHALEXIN 500 MG/1
500 CAPSULE ORAL 2 TIMES DAILY
Qty: 28 CAPSULE | Refills: 0 | Status: SHIPPED | OUTPATIENT
Start: 2021-02-05 | End: 2021-02-19

## 2021-02-05 RX ORDER — 0.9 % SODIUM CHLORIDE 0.9 %
500 INTRAVENOUS SOLUTION INTRAVENOUS ONCE
Status: COMPLETED | OUTPATIENT
Start: 2021-02-05 | End: 2021-02-05

## 2021-02-05 RX ADMIN — SODIUM CHLORIDE 500 ML: 9 INJECTION, SOLUTION INTRAVENOUS at 14:02

## 2021-02-05 RX ADMIN — ACETAMINOPHEN 650 MG: 325 TABLET ORAL at 14:01

## 2021-02-05 RX ADMIN — CEFTRIAXONE SODIUM 1000 MG: 1 INJECTION, POWDER, FOR SOLUTION INTRAMUSCULAR; INTRAVENOUS at 16:57

## 2021-02-05 ASSESSMENT — ENCOUNTER SYMPTOMS
BACK PAIN: 0
CONSTIPATION: 0
COUGH: 0
VOMITING: 0
SORE THROAT: 0
DIARRHEA: 0
ABDOMINAL PAIN: 1
SHORTNESS OF BREATH: 0
NAUSEA: 0

## 2021-02-05 ASSESSMENT — PAIN SCALES - GENERAL: PAINLEVEL_OUTOF10: 10

## 2021-02-05 NOTE — ED NOTES
Bed: 01  Expected date:   Expected time:   Means of arrival:   Comments:   Christy Toure RN  02/05/21 9823

## 2021-02-05 NOTE — ED NOTES

## 2021-02-05 NOTE — ED PROVIDER NOTES
[facility]  ED  EMERGENCY DEPARTMENT ENCOUNTER      Pt Name: Raoul Medina  MRN: [id number]  Armstrongfjames 1940  Date of evaluation: 2/5/2021  Provider: Justin Worthington MD    CHIEF COMPLAINT       Chief Complaint   Patient presents with    Altered Mental Status     Patient sent in from [facility] for [address] for lethargy and complaints of abdominal pain. EMS reports that facility states that patients o2 was 76% on RA. EMS states that patient has wital signs within normal limits for them during transportation. HISTORY OF PRESENT ILLNESS   (Location/Symptom, Timing/Onset, Context/Setting, Quality, Duration, Modifying Factors, Severity)  Note limiting factors. Raoul Medina is a 79 y.o. female who presents to the emergency department     Patient is a 80-year-old female currently on hospice care with DNR comfort care orders with history of hypertension, hyperlipidemia, COPD, CHF who presents with altered mental status and abdominal pain from her facility. Patient was observed today by a new nurse practitioner and a nurse was not necessarily familiar with her they do know that patient has a history of dementia and will have good days and bad days. Patient was thought to be more lethargic today than typical.  They took some vitals and noted her oxygen level and heart rate were both very low. EMS was called and when they arrived they took patient's vitals and they were appropriate. They stated that there was no objective signs of hypoxemia whatsoever. Patient has been reporting some abdominal pain though it is unclear how long this has been going on for. EMS did note that they took patient's temperature and it was 100.2 °F.  Patient is currently oriented to person and place which is her baseline. She denies any pain but does report discomfort with urination and some indigestion. The history is provided by the patient. Nursing Notes were reviewed.     REVIEW OF SYSTEMS    (2-9 systems for level 4, 10 or more for level 5)     Review of Systems   Constitutional: Negative for chills and fever. HENT: Negative for congestion and sore throat. Eyes: Negative for visual disturbance. Respiratory: Negative for cough and shortness of breath. Cardiovascular: Negative for chest pain. Gastrointestinal: Positive for abdominal pain. Negative for constipation, diarrhea, nausea and vomiting. Genitourinary: Positive for dysuria. Negative for hematuria. Musculoskeletal: Negative for back pain and neck pain. Skin: Negative for pallor and rash. Neurological: Negative for light-headedness and headaches. All other systems reviewed and are negative. Except as noted above the remainder of the review of systems was reviewed and negative.        PAST MEDICAL HISTORY     Past Medical History:   Diagnosis Date    Asthma     Bronchial asthma     Bursitis 12/2011    ACHILLES TENDON LEFT    Chest pain 12/2020    CH[facility]congestive heart failure) [facility]     Constipation     COPD (chronic obstructive pulmonary disease) ([facility]     [facility]    unspecificied dementia without behavioral disturbance    Depression     Diverticulitis     Dizziness     GERD (gastroesophageal reflux disease)     Hearing loss     History of blood transfusion     Hyperlipidemia     Hypertension     Leg edema     Lung disease     Osteoarthritis     Sleep apnea     CPAP, SLEEP STUDY 6/28 OVERNIGHT AT HOME    Tinnitus     Type II or unspecified type diabetes mellitus without mention of complication, not stated as uncontrolled     Unspecified cerebral artery occlusion with cerebral infarction     mini stroke         SURGICAL HISTORY       Past Surgical History:   Procedure Laterality Date    ABDOMEN SURGERY      ACHILLES TENDON SURGERY  2/2/12    LEFT ACHILLES DEBRIDEMENT, HAGLUNDS AND RETROCALCANEAL BURSA EXCISION WITH FLEXOR HALLUS LONGUS TENDON TRANSFER WITH BLOCK FOR PAIN CONTROL    (with meals)    ISOSORBIDE MONONITRATE (IMDUR) 30 MG EXTENDED RELEASE TABLET    TAKE ONE-HALF TABLET BY MOUTH ONE TIME A DAY    LISINOPRIL (PRINIVIL;ZESTRIL) 40 MG TABLET    Take 40 mg by mouth daily    METFORMIN (GLUCOPHAGE-XR) 500 MG EXTENDED RELEASE TABLET    Take 1 tablet by mouth daily (with breakfast)    NITROGLYCERIN (NITROSTAT) 0.4 MG SL TABLET    Place 1 tablet under the tongue every 5 minutes as needed for Chest pain    OMEPRAZOLE (PRILOSEC) 20 MG DELAYED RELEASE CAPSULE    TAKE ONE CAPSULE TWICE A DAY    OXYBUTYNIN (DITROPAN-XL) 10 MG EXTENDED RELEASE TABLET    Take 10 mg by mouth daily    PRAMIPEXOLE (MIRAPEX) 0.5 MG TABLET    TAKE ONE TABLET BY MOUTH ONCE NIGHTLY    PRAVASTATIN (PRAVACHOL) 20 MG TABLET    Take 1 tablet by mouth nightly TAKE ONE TABLET BY MOUTH DAILY    SENNA PLUS 8.6-50 MG PER TABLET    TAKE 2 TABLETS BY MOUTH DAILY    VITAMIN E 400 UNIT CAPSULE    Take 400 Units by mouth daily       ALLERGIES     Latex, Cephalexin, Codeine, Levofloxacin, Pce [erythromycin], Pcn [penicillins], Pentazocine, Sumycin [tetracycline hcl], Atarax [hydroxyzine hcl], Beef-derived products, Flagyl [metronidazole], Macrodantin [nitrofurantoin], Pyridium [phenazopyridine hcl], Sulfa antibiotics, and Urised [methen-jenifer-meth bl-phen sal]    FAMILY HISTORY       Family History   Problem Relation Age of Onset    Cancer Father         prostate    Heart Disease Mother     Heart Disease Brother     Heart Disease Brother     Heart Disease Sister     Diabetes Sister     Dementia Sister           SOCIAL HISTORY       Social History     Socioeconomic History    Marital status:       Spouse name: None    Number of children: 3    Years of education: 15    Highest education level: None   Occupational History    Occupation: retired   Social Needs    Financial resource strain: None    Food insecurity     Worry: None     Inability: None    Transportation needs     Medical: None     Non-medical: None Tobacco Use    Smoking status: Former Smoker     Packs/day: 0.25     Years: 0.10     Pack years: 0.02     Types: Cigarettes     Quit date: 1975     Years since quittin.1    Smokeless tobacco: Never Used    Tobacco comment: only smoked for 1 month   Substance and Sexual Activity    Alcohol use: No     Alcohol/week: 0.0 standard drinks    Drug use: No    Sexual activity: Not Currently     Partners: Male   Lifestyle    Physical activity     Days per week: None     Minutes per session: None    Stress: None   Relationships    Social connections     Talks on phone: None     Gets together: None     Attends [employer] service: None     Active member of club or organization: None     Attends meetings of clubs or organizations: None     Relationship status: None    Intimate partner violence     Fear of current or ex partner: None     Emotionally abused: None     Physically abused: None     Forced sexual activity: None   Other Topics Concern    None   Social History Narrative    None       SCREENINGS    Stokes Coma Scale  Eye Opening: Spontaneous  Best Verbal Response: Oriented  Best Motor Response: Obeys commands  Stokes Coma Scale Score: 15          PHYSICAL EXAM    (up to 7 for level 4, 8 or more for level 5)     ED Triage Vitals   BP Temp Temp src Pulse Resp SpO2 Height Weight   -- -- -- -- -- -- -- --       Physical Exam  Vitals signs and nursing note reviewed. Constitutional:       General: She is not in acute distress. Appearance: Normal appearance. HENT:      Head: Normocephalic and atraumatic. Nose: Nose normal. No congestion. Mouth/Throat:      Mouth: Mucous membranes are moist.   Eyes:      Conjunctiva/sclera: Conjunctivae normal.   Neck:      Musculoskeletal: Normal range of motion and neck supple. Cardiovascular:      Rate and Rhythm: Normal rate and regular rhythm. Pulses: Normal pulses. Heart sounds: Normal heart sounds.    Pulmonary:      Effort: Pulmonary effort is normal. No respiratory distress. Breath sounds: Normal breath sounds. Abdominal:      General: There is no distension. Palpations: Abdomen is soft. Tenderness: There is no abdominal tenderness. Musculoskeletal: Normal range of motion. General: No swelling or deformity. Skin:     General: Skin is warm and dry. Neurological:      General: No focal deficit present. Mental Status: She is alert. Mental status is at baseline.          DIAGNOSTIC RESULTS     EKG: All EKG's are interpreted by the Emergency Department Physician who either signs or Co-signs this chart in the absence of a cardiologist.        RADIOLOGY:     Interpretation per the Radiologist below, if available at the time of this note:    No orders to display         LABS:  Labs Reviewed   CBC WITH AUTO DIFFERENTIAL - Abnormal; Notable for the following components:       Result Value    RBC 2.94 (*)     Hemoglobin 8.7 (*)     Hematocrit 27.4 (*)     RDW 17.7 (*)     All other components within normal limits    Narrative:     Performed at:  [address],  [place], [address] [place]   Phone [phone]   COMPREHENSIVE METABOLIC PANEL W/ REFLEX TO MG FOR LOW K - Abnormal; Notable for the following components:    Glucose 209 (*)     BUN 32 (*)     CREATININE 1.8 (*)     GFR Non- 27 (*)     GFR  33 (*)     Albumin 3.0 (*)     Albumin/Globulin Ratio 0.8 (*)     Alkaline Phosphatase 150 (*)     ALT 7 (*)     All other components within normal limits    Narrative:     Performed at:  [address],  [place], [address] [place]   Phone [phone]   URINE RT REFLEX TO CULTURE - Abnormal; Notable for the following components:    Color, UA GREEN (*)     Clarity, UA TURBID (*)     Bilirubin Urine SMALL (*)     Blood, Urine SMALL (*)     pH, UA 8.5 (*)     Protein,  (*)     Leukocyte Esterase, Urine MODERATE (*)     All other components within normal limits    Narrative:     Performed at:  [facility]  [address],  [place], [place]   Phone [phone]   MICROSCOPIC URINALYSIS - Abnormal; Notable for the following components:    WBC, UA >100 (*)     RBC, UA see below (*)     Bacteria, UA 4+ (*)     All other components within normal limits    Narrative:     Performed at:  [facility]  [address],  [place], [address] [place]   Phone [phone]   CULTURE, URINE       All other labs were within normal range or not returned as of this dictation. EMERGENCY DEPARTMENT COURSE and DIFFERENTIAL DIAGNOSIS/MDM:   Vitals:    Vitals:    02/05/21 1341 02/05/21 1526 02/05/21 1702   BP: 108/64 (!) 118/95 (!) 113/52   Pulse: 81 75 72   Resp: 18 16 20   Temp: 99.5 °F (37.5 °C)     TempSrc: Oral     SpO2: 94% 95% 97%       Patient evaluated and previous record reviewed. Patient presents with concern for worsening mental status and concern for abnormal vital signs. Vital signs stable and within normal limits. Physical exam as documented above. Lab work-up notable for white blood cell count within normal limits, creatinine 1.8 with baseline of 1.3, UA consistent with UTI. Spoke with patient's daughter who stated that patient has had issues with terrible UTIs in the past.  As patient does not appear currently septic we will go ahead and treat her with an outpatient course of antibiotics but will advise of low threshold to return. As patient is DNR comfort care believe that this is in line with her wishes. Daughter is amenable with this plan. Patient discharged home with antibiotic. Strict return precautions advised. CONSULTS:  None    PROCEDURES:  Unless otherwise noted below, none     Procedures      FINAL IMPRESSION      1. Altered mental status, unspecified altered mental status type    2.  Acute cystitis with hematuria DISPOSITION/PLAN   DISPOSITION Decision To Discharge 02/05/2021 05:19:37 PM      PATIENT REFERRED TO:  Noelia Aschoff  [address]  [phone]    Schedule an appointment as soon as possible for a visit         DISCHARGE MEDICATIONS:  New Prescriptions    CEPHALEXIN (KEFLEX) 500 MG CAPSULE    Take 1 capsule by mouth 2 times daily for 14 days     Controlled Substances Monitoring:     No flowsheet data found.     (Please note that portions of this note were completed with a voice recognition program.  Efforts were made to edit the dictations but occasionally words are mis-transcribed.)    Tan Wolff MD (electronically signed)  Attending Emergency Physician            Amanda Coleman MD  02/05/21 4376

## 2021-02-05 NOTE — ED NOTES
Complete linen change completed and esvin care performed. Patient has stage 2 pressure ulcer on coccyx, sacrum mepilex applied to area. New depends placed on patient.       Megan Schreiber RN  02/05/21 8545

## 2021-02-05 NOTE — ED NOTES
Spoke to patients daughter and provided an update on plan of care.       Aurdey Mandujano, RN  02/05/21 0337

## 2021-02-06 NOTE — ED NOTES
Pt was incont of urine. Pt attends changed at this time. IV removed for transport.        Hossein Hunt RN  02/06/21 1734

## 2021-02-06 NOTE — ED NOTES
Pt ok to d/c to [facility]. Kayla and megan given d/c instructions. Facility verbalized understating including Rx and follow up care. Pt transported via squad.  0 s/s of distress at time of d/c.        Hossein Hunt RN  02/06/21 0339

## 2021-02-06 NOTE — ED NOTES
Report given to TATYOO at this time. Information given regarding d/c, Rx and follow up.        Elvira Walls RN  02/06/21 0956

## 2021-02-06 NOTE — ED NOTES
Pt incont of urine. Pure Wick in place. Pt pad and bed linens changed at this time. esvin care completed. Mepilex heart in place.        Michele Naik RN  02/05/21 2059

## 2021-02-07 LAB
ORGANISM: ABNORMAL
ORGANISM: ABNORMAL
URINE CULTURE, ROUTINE: ABNORMAL
URINE CULTURE, ROUTINE: ABNORMAL

## 2021-02-26 ENCOUNTER — HOSPITAL ENCOUNTER (INPATIENT)
Age: 81
LOS: 4 days | Discharge: NURSING, REHAB OR LONG-TERM CARE | DRG: 689 | End: 2021-03-03
Attending: EMERGENCY MEDICINE | Admitting: INTERNAL MEDICINE
Payer: COMMERCIAL

## 2021-02-26 DIAGNOSIS — N39.0 URINARY TRACT INFECTION WITH HEMATURIA, SITE UNSPECIFIED: Primary | ICD-10-CM

## 2021-02-26 DIAGNOSIS — R41.82 ALTERED MENTAL STATUS, UNSPECIFIED ALTERED MENTAL STATUS TYPE: ICD-10-CM

## 2021-02-26 DIAGNOSIS — R31.9 URINARY TRACT INFECTION WITH HEMATURIA, SITE UNSPECIFIED: Primary | ICD-10-CM

## 2021-02-26 PROCEDURE — 80053 COMPREHEN METABOLIC PANEL: CPT

## 2021-02-26 PROCEDURE — 99284 EMERGENCY DEPT VISIT MOD MDM: CPT

## 2021-02-26 PROCEDURE — 96365 THER/PROPH/DIAG IV INF INIT: CPT

## 2021-02-26 PROCEDURE — 83605 ASSAY OF LACTIC ACID: CPT

## 2021-02-26 PROCEDURE — 85025 COMPLETE CBC W/AUTO DIFF WBC: CPT

## 2021-02-27 LAB
A/G RATIO: 1 (ref 1.1–2.2)
ALBUMIN SERPL-MCNC: 3.1 G/DL (ref 3.4–5)
ALP BLD-CCNC: 76 U/L (ref 40–129)
ALT SERPL-CCNC: 7 U/L (ref 10–40)
ANION GAP SERPL CALCULATED.3IONS-SCNC: 11 MMOL/L (ref 3–16)
ANION GAP SERPL CALCULATED.3IONS-SCNC: 7 MMOL/L (ref 3–16)
AST SERPL-CCNC: 26 U/L (ref 15–37)
BASOPHILS ABSOLUTE: 0.1 K/UL (ref 0–0.2)
BASOPHILS ABSOLUTE: 0.1 K/UL (ref 0–0.2)
BASOPHILS RELATIVE PERCENT: 0.8 %
BASOPHILS RELATIVE PERCENT: 1.3 %
BILIRUB SERPL-MCNC: 0.3 MG/DL (ref 0–1)
BILIRUBIN URINE: NEGATIVE
BLOOD, URINE: ABNORMAL
BUN BLDV-MCNC: 20 MG/DL (ref 7–20)
BUN BLDV-MCNC: 24 MG/DL (ref 7–20)
CALCIUM SERPL-MCNC: 9 MG/DL (ref 8.3–10.6)
CALCIUM SERPL-MCNC: 9.2 MG/DL (ref 8.3–10.6)
CHLORIDE BLD-SCNC: 103 MMOL/L (ref 99–110)
CHLORIDE BLD-SCNC: 106 MMOL/L (ref 99–110)
CLARITY: ABNORMAL
CO2: 21 MMOL/L (ref 21–32)
CO2: 23 MMOL/L (ref 21–32)
COLOR: YELLOW
COMMENT UA: ABNORMAL
CREAT SERPL-MCNC: 1.7 MG/DL (ref 0.6–1.2)
CREAT SERPL-MCNC: 1.9 MG/DL (ref 0.6–1.2)
EOSINOPHILS ABSOLUTE: 0 K/UL (ref 0–0.6)
EOSINOPHILS ABSOLUTE: 0.1 K/UL (ref 0–0.6)
EOSINOPHILS RELATIVE PERCENT: 0.1 %
EOSINOPHILS RELATIVE PERCENT: 1 %
GFR AFRICAN AMERICAN: 31
GFR AFRICAN AMERICAN: 35
GFR NON-AFRICAN AMERICAN: 25
GFR NON-AFRICAN AMERICAN: 29
GLOBULIN: 3 G/DL
GLUCOSE BLD-MCNC: 145 MG/DL (ref 70–99)
GLUCOSE BLD-MCNC: 153 MG/DL (ref 70–99)
GLUCOSE BLD-MCNC: 157 MG/DL (ref 70–99)
GLUCOSE BLD-MCNC: 169 MG/DL (ref 70–99)
GLUCOSE BLD-MCNC: 175 MG/DL (ref 70–99)
GLUCOSE BLD-MCNC: 194 MG/DL (ref 70–99)
GLUCOSE URINE: NEGATIVE MG/DL
HCT VFR BLD CALC: 23.6 % (ref 36–48)
HCT VFR BLD CALC: 25.1 % (ref 36–48)
HEMOGLOBIN: 7.8 G/DL (ref 12–16)
HEMOGLOBIN: 8.2 G/DL (ref 12–16)
KETONES, URINE: NEGATIVE MG/DL
LACTIC ACID: 1.4 MMOL/L (ref 0.4–2)
LEUKOCYTE ESTERASE, URINE: ABNORMAL
LYMPHOCYTES ABSOLUTE: 1.5 K/UL (ref 1–5.1)
LYMPHOCYTES ABSOLUTE: 1.7 K/UL (ref 1–5.1)
LYMPHOCYTES RELATIVE PERCENT: 14.9 %
LYMPHOCYTES RELATIVE PERCENT: 18.4 %
MCH RBC QN AUTO: 32 PG (ref 26–34)
MCH RBC QN AUTO: 32.2 PG (ref 26–34)
MCHC RBC AUTO-ENTMCNC: 32.8 G/DL (ref 31–36)
MCHC RBC AUTO-ENTMCNC: 32.8 G/DL (ref 31–36)
MCV RBC AUTO: 97.6 FL (ref 80–100)
MCV RBC AUTO: 98.2 FL (ref 80–100)
MICROSCOPIC EXAMINATION: YES
MONOCYTES ABSOLUTE: 0.4 K/UL (ref 0–1.3)
MONOCYTES ABSOLUTE: 0.6 K/UL (ref 0–1.3)
MONOCYTES RELATIVE PERCENT: 4.4 %
MONOCYTES RELATIVE PERCENT: 5.9 %
NEUTROPHILS ABSOLUTE: 6.8 K/UL (ref 1.7–7.7)
NEUTROPHILS ABSOLUTE: 8 K/UL (ref 1.7–7.7)
NEUTROPHILS RELATIVE PERCENT: 74.9 %
NEUTROPHILS RELATIVE PERCENT: 78.3 %
NITRITE, URINE: POSITIVE
PDW BLD-RTO: 19 % (ref 12.4–15.4)
PDW BLD-RTO: 19.6 % (ref 12.4–15.4)
PERFORMED ON: ABNORMAL
PH UA: 6.5 (ref 5–8)
PLATELET # BLD: 198 K/UL (ref 135–450)
PLATELET # BLD: 226 K/UL (ref 135–450)
PMV BLD AUTO: 9.1 FL (ref 5–10.5)
PMV BLD AUTO: 9.6 FL (ref 5–10.5)
POTASSIUM REFLEX MAGNESIUM: 3.9 MMOL/L (ref 3.5–5.1)
POTASSIUM REFLEX MAGNESIUM: 4.8 MMOL/L (ref 3.5–5.1)
PROTEIN UA: >=300 MG/DL
RBC # BLD: 2.41 M/UL (ref 4–5.2)
RBC # BLD: 2.57 M/UL (ref 4–5.2)
RBC UA: >100 /HPF (ref 0–4)
SODIUM BLD-SCNC: 133 MMOL/L (ref 136–145)
SODIUM BLD-SCNC: 138 MMOL/L (ref 136–145)
SPECIFIC GRAVITY UA: 1.02 (ref 1–1.03)
TOTAL PROTEIN: 6.1 G/DL (ref 6.4–8.2)
URINE REFLEX TO CULTURE: YES
URINE TYPE: ABNORMAL
UROBILINOGEN, URINE: 0.2 E.U./DL
WBC # BLD: 10.1 K/UL (ref 4–11)
WBC # BLD: 9.1 K/UL (ref 4–11)
WBC UA: >100 /HPF (ref 0–5)

## 2021-02-27 PROCEDURE — 87086 URINE CULTURE/COLONY COUNT: CPT

## 2021-02-27 PROCEDURE — 2580000003 HC RX 258: Performed by: EMERGENCY MEDICINE

## 2021-02-27 PROCEDURE — 1200000000 HC SEMI PRIVATE

## 2021-02-27 PROCEDURE — 36415 COLL VENOUS BLD VENIPUNCTURE: CPT

## 2021-02-27 PROCEDURE — 85025 COMPLETE CBC W/AUTO DIFF WBC: CPT

## 2021-02-27 PROCEDURE — 6370000000 HC RX 637 (ALT 250 FOR IP): Performed by: EMERGENCY MEDICINE

## 2021-02-27 PROCEDURE — 6370000000 HC RX 637 (ALT 250 FOR IP): Performed by: NURSE PRACTITIONER

## 2021-02-27 PROCEDURE — 2580000003 HC RX 258: Performed by: NURSE PRACTITIONER

## 2021-02-27 PROCEDURE — 6360000002 HC RX W HCPCS: Performed by: EMERGENCY MEDICINE

## 2021-02-27 PROCEDURE — 80048 BASIC METABOLIC PNL TOTAL CA: CPT

## 2021-02-27 PROCEDURE — 87040 BLOOD CULTURE FOR BACTERIA: CPT

## 2021-02-27 PROCEDURE — 81001 URINALYSIS AUTO W/SCOPE: CPT

## 2021-02-27 PROCEDURE — 87186 SC STD MICRODIL/AGAR DIL: CPT

## 2021-02-27 PROCEDURE — 6360000002 HC RX W HCPCS: Performed by: NURSE PRACTITIONER

## 2021-02-27 PROCEDURE — 87088 URINE BACTERIA CULTURE: CPT

## 2021-02-27 RX ORDER — ASPIRIN 81 MG/1
81 TABLET, CHEWABLE ORAL DAILY
Status: DISCONTINUED | OUTPATIENT
Start: 2021-02-27 | End: 2021-03-03 | Stop reason: HOSPADM

## 2021-02-27 RX ORDER — SODIUM CHLORIDE 9 MG/ML
INJECTION, SOLUTION INTRAVENOUS CONTINUOUS
Status: ACTIVE | OUTPATIENT
Start: 2021-02-27 | End: 2021-02-27

## 2021-02-27 RX ORDER — DULOXETIN HYDROCHLORIDE 60 MG/1
120 CAPSULE, DELAYED RELEASE ORAL DAILY
Status: DISCONTINUED | OUTPATIENT
Start: 2021-02-27 | End: 2021-03-03 | Stop reason: HOSPADM

## 2021-02-27 RX ORDER — OXYBUTYNIN CHLORIDE 5 MG/1
10 TABLET, EXTENDED RELEASE ORAL DAILY
Status: DISCONTINUED | OUTPATIENT
Start: 2021-02-27 | End: 2021-03-03 | Stop reason: HOSPADM

## 2021-02-27 RX ORDER — ISOSORBIDE MONONITRATE 30 MG/1
30 TABLET, EXTENDED RELEASE ORAL DAILY
Status: DISCONTINUED | OUTPATIENT
Start: 2021-02-27 | End: 2021-03-03 | Stop reason: HOSPADM

## 2021-02-27 RX ORDER — PROMETHAZINE HYDROCHLORIDE 25 MG/1
12.5 TABLET ORAL EVERY 6 HOURS PRN
Status: DISCONTINUED | OUTPATIENT
Start: 2021-02-27 | End: 2021-03-03 | Stop reason: HOSPADM

## 2021-02-27 RX ORDER — LISINOPRIL 20 MG/1
40 TABLET ORAL DAILY
Status: DISCONTINUED | OUTPATIENT
Start: 2021-02-27 | End: 2021-03-03 | Stop reason: HOSPADM

## 2021-02-27 RX ORDER — SODIUM CHLORIDE 0.9 % (FLUSH) 0.9 %
10 SYRINGE (ML) INJECTION EVERY 12 HOURS SCHEDULED
Status: DISCONTINUED | OUTPATIENT
Start: 2021-02-27 | End: 2021-03-03 | Stop reason: HOSPADM

## 2021-02-27 RX ORDER — CARVEDILOL 25 MG/1
25 TABLET ORAL 2 TIMES DAILY
Status: DISCONTINUED | OUTPATIENT
Start: 2021-02-27 | End: 2021-03-03 | Stop reason: HOSPADM

## 2021-02-27 RX ORDER — ACETAMINOPHEN 325 MG/1
650 TABLET ORAL EVERY 6 HOURS PRN
Status: DISCONTINUED | OUTPATIENT
Start: 2021-02-27 | End: 2021-03-03 | Stop reason: HOSPADM

## 2021-02-27 RX ORDER — PRAVASTATIN SODIUM 20 MG
20 TABLET ORAL NIGHTLY
Status: DISCONTINUED | OUTPATIENT
Start: 2021-02-27 | End: 2021-03-03 | Stop reason: HOSPADM

## 2021-02-27 RX ORDER — HEPARIN SODIUM 5000 [USP'U]/ML
5000 INJECTION, SOLUTION INTRAVENOUS; SUBCUTANEOUS EVERY 8 HOURS SCHEDULED
Status: DISCONTINUED | OUTPATIENT
Start: 2021-02-27 | End: 2021-03-03 | Stop reason: HOSPADM

## 2021-02-27 RX ORDER — DONEPEZIL HYDROCHLORIDE 5 MG/1
5 TABLET, FILM COATED ORAL NIGHTLY
Status: DISCONTINUED | OUTPATIENT
Start: 2021-02-27 | End: 2021-03-03 | Stop reason: HOSPADM

## 2021-02-27 RX ORDER — PRAMIPEXOLE DIHYDROCHLORIDE 0.25 MG/1
0.5 TABLET ORAL 3 TIMES DAILY
Status: DISCONTINUED | OUTPATIENT
Start: 2021-02-27 | End: 2021-03-03 | Stop reason: HOSPADM

## 2021-02-27 RX ORDER — SODIUM CHLORIDE 0.9 % (FLUSH) 0.9 %
10 SYRINGE (ML) INJECTION PRN
Status: DISCONTINUED | OUTPATIENT
Start: 2021-02-27 | End: 2021-03-03 | Stop reason: HOSPADM

## 2021-02-27 RX ORDER — ONDANSETRON 2 MG/ML
4 INJECTION INTRAMUSCULAR; INTRAVENOUS EVERY 6 HOURS PRN
Status: DISCONTINUED | OUTPATIENT
Start: 2021-02-27 | End: 2021-03-03 | Stop reason: HOSPADM

## 2021-02-27 RX ORDER — ACETAMINOPHEN 650 MG/1
650 SUPPOSITORY RECTAL EVERY 6 HOURS PRN
Status: DISCONTINUED | OUTPATIENT
Start: 2021-02-27 | End: 2021-03-03 | Stop reason: HOSPADM

## 2021-02-27 RX ORDER — SODIUM CHLORIDE, SODIUM LACTATE, POTASSIUM CHLORIDE, AND CALCIUM CHLORIDE .6; .31; .03; .02 G/100ML; G/100ML; G/100ML; G/100ML
1000 INJECTION, SOLUTION INTRAVENOUS ONCE
Status: COMPLETED | OUTPATIENT
Start: 2021-02-27 | End: 2021-02-27

## 2021-02-27 RX ORDER — ALBUTEROL SULFATE 90 UG/1
2 AEROSOL, METERED RESPIRATORY (INHALATION) 4 TIMES DAILY PRN
Status: DISCONTINUED | OUTPATIENT
Start: 2021-02-27 | End: 2021-03-03 | Stop reason: HOSPADM

## 2021-02-27 RX ORDER — DEXTROSE MONOHYDRATE 50 MG/ML
100 INJECTION, SOLUTION INTRAVENOUS PRN
Status: DISCONTINUED | OUTPATIENT
Start: 2021-02-27 | End: 2021-03-03 | Stop reason: HOSPADM

## 2021-02-27 RX ORDER — ACETAMINOPHEN 650 MG/1
650 SUPPOSITORY RECTAL ONCE
Status: COMPLETED | OUTPATIENT
Start: 2021-02-27 | End: 2021-02-27

## 2021-02-27 RX ORDER — ACETAMINOPHEN 500 MG
1000 TABLET ORAL ONCE
Status: DISCONTINUED | OUTPATIENT
Start: 2021-02-27 | End: 2021-02-27

## 2021-02-27 RX ORDER — CLONIDINE HYDROCHLORIDE 0.1 MG/1
0.1 TABLET ORAL 3 TIMES DAILY
Status: DISCONTINUED | OUTPATIENT
Start: 2021-02-27 | End: 2021-03-03 | Stop reason: HOSPADM

## 2021-02-27 RX ORDER — DEXTROSE MONOHYDRATE 25 G/50ML
12.5 INJECTION, SOLUTION INTRAVENOUS PRN
Status: DISCONTINUED | OUTPATIENT
Start: 2021-02-27 | End: 2021-03-03 | Stop reason: HOSPADM

## 2021-02-27 RX ORDER — NICOTINE POLACRILEX 4 MG
15 LOZENGE BUCCAL PRN
Status: DISCONTINUED | OUTPATIENT
Start: 2021-02-27 | End: 2021-03-03 | Stop reason: HOSPADM

## 2021-02-27 RX ORDER — POLYETHYLENE GLYCOL 3350 17 G/17G
17 POWDER, FOR SOLUTION ORAL DAILY PRN
Status: DISCONTINUED | OUTPATIENT
Start: 2021-02-27 | End: 2021-03-03 | Stop reason: HOSPADM

## 2021-02-27 RX ADMIN — INSULIN LISPRO 1 UNITS: 100 INJECTION, SOLUTION INTRAVENOUS; SUBCUTANEOUS at 20:51

## 2021-02-27 RX ADMIN — PRAMIPEXOLE DIHYDROCHLORIDE 0.5 MG: 0.25 TABLET ORAL at 20:58

## 2021-02-27 RX ADMIN — INSULIN LISPRO 2 UNITS: 100 INJECTION, SOLUTION INTRAVENOUS; SUBCUTANEOUS at 17:12

## 2021-02-27 RX ADMIN — INSULIN LISPRO 2 UNITS: 100 INJECTION, SOLUTION INTRAVENOUS; SUBCUTANEOUS at 13:15

## 2021-02-27 RX ADMIN — LISINOPRIL 40 MG: 20 TABLET ORAL at 09:28

## 2021-02-27 RX ADMIN — HEPARIN SODIUM 5000 UNITS: 5000 INJECTION INTRAVENOUS; SUBCUTANEOUS at 20:51

## 2021-02-27 RX ADMIN — CARVEDILOL 25 MG: 25 TABLET, FILM COATED ORAL at 20:57

## 2021-02-27 RX ADMIN — OXYBUTYNIN CHLORIDE 10 MG: 5 TABLET, EXTENDED RELEASE ORAL at 09:27

## 2021-02-27 RX ADMIN — PRAMIPEXOLE DIHYDROCHLORIDE 0.5 MG: 0.25 TABLET ORAL at 09:27

## 2021-02-27 RX ADMIN — SODIUM CHLORIDE: 9 INJECTION, SOLUTION INTRAVENOUS at 06:54

## 2021-02-27 RX ADMIN — ISOSORBIDE MONONITRATE 30 MG: 30 TABLET, EXTENDED RELEASE ORAL at 09:27

## 2021-02-27 RX ADMIN — CEFEPIME HYDROCHLORIDE 2000 MG: 2 INJECTION, POWDER, FOR SOLUTION INTRAVENOUS at 01:25

## 2021-02-27 RX ADMIN — ASPIRIN 81 MG 81 MG: 81 TABLET ORAL at 09:27

## 2021-02-27 RX ADMIN — ACETAMINOPHEN 650 MG: 650 SUPPOSITORY RECTAL at 01:56

## 2021-02-27 RX ADMIN — PRAMIPEXOLE DIHYDROCHLORIDE 0.5 MG: 0.25 TABLET ORAL at 13:13

## 2021-02-27 RX ADMIN — DULOXETINE HYDROCHLORIDE 120 MG: 60 CAPSULE, DELAYED RELEASE ORAL at 09:27

## 2021-02-27 RX ADMIN — PRAVASTATIN SODIUM 20 MG: 20 TABLET ORAL at 20:57

## 2021-02-27 RX ADMIN — ACETAMINOPHEN 650 MG: 325 TABLET ORAL at 13:12

## 2021-02-27 RX ADMIN — HEPARIN SODIUM 5000 UNITS: 5000 INJECTION INTRAVENOUS; SUBCUTANEOUS at 13:14

## 2021-02-27 RX ADMIN — CLONIDINE HYDROCHLORIDE 0.1 MG: 0.1 TABLET ORAL at 09:27

## 2021-02-27 RX ADMIN — DONEPEZIL HYDROCHLORIDE 5 MG: 5 TABLET, FILM COATED ORAL at 20:57

## 2021-02-27 RX ADMIN — CLONIDINE HYDROCHLORIDE 0.1 MG: 0.1 TABLET ORAL at 20:58

## 2021-02-27 RX ADMIN — SODIUM CHLORIDE, POTASSIUM CHLORIDE, SODIUM LACTATE AND CALCIUM CHLORIDE 1000 ML: 600; 310; 30; 20 INJECTION, SOLUTION INTRAVENOUS at 01:25

## 2021-02-27 RX ADMIN — CARVEDILOL 25 MG: 25 TABLET, FILM COATED ORAL at 09:28

## 2021-02-27 RX ADMIN — HEPARIN SODIUM 5000 UNITS: 5000 INJECTION INTRAVENOUS; SUBCUTANEOUS at 04:28

## 2021-02-27 RX ADMIN — CEFEPIME 1000 MG: 1 INJECTION, POWDER, FOR SOLUTION INTRAMUSCULAR; INTRAVENOUS at 13:11

## 2021-02-27 RX ADMIN — SODIUM CHLORIDE, PRESERVATIVE FREE 10 ML: 5 INJECTION INTRAVENOUS at 09:28

## 2021-02-27 ASSESSMENT — PAIN SCALES - GENERAL: PAINLEVEL_OUTOF10: 5

## 2021-02-27 NOTE — ED NOTES
Fall risk screening completed. Fall risk bracelet applied to patient. Non-skid socks provided and placed on patient. The fall risk is indicated using  dome light . Based on score, a bed alarm was indicated and applied. The call light is within the patient's reach, and instructions for use were provided. The bed is in the lowest position with wheels locked. The patient has been advised to notify staff, using the call light, if there is a need to get up or use restroom. The patient verbalized understanding of safety precautions and how to contact staff for assistance.        Jaylen Cedeño RN  02/27/21 8961

## 2021-02-27 NOTE — ED NOTES
Bed: 15  Expected date:   Expected time:   Means of arrival:   Comments:  Boni Villarreal RN  02/26/21 9419

## 2021-02-27 NOTE — PROGRESS NOTES
Pt alert and confused. VSS. Admission and assessment complete and charted. Pt follows commands but is not very talkative. Pt does say, \"Ow! \", \"Please\", and shakes her head yes or no in response to questions. BS active. RUE with edema r/t IV infiltration. Buttocks has a healing pressure injury and some moisture associated excoriation. Pt incontinent so pure wick placed. Spoke with jeet Maria, and was able to complete admission. She states pt has been wheelchair bound and the nursing home uses equipment to lift her from the bed to the wheelchair. Dgt states pt gets like this when she has a UTI and she has multiple UTIs. Pt ST on the monitor. Heels red but trey- mepilex applied. Pt resting in bed with bed check in place for safety.

## 2021-02-27 NOTE — ED NOTES
Updated pts Daughter, Florence Charles, via telephone. Pt is non verbal at this time and is laying in bed continuously grinding her teeth. Pt is able to follow staff with her eyes, follow commands such as squeeze hands, wiggle toes and smile. Per pts Daughter, pt is normally a very animated, talkative person and states that for pt to not be able to talk is not normal for her. Informed Dr. Jyoti Cho of pts Daughter's concerns. Dr. Jyoti Cho states he will get in touch with [facility] for a better report.      Mela Somers, RN  02/27/21 112 Geraldine Hernandes RN  02/27/21 9476

## 2021-02-27 NOTE — ED NOTES
Pt lost PIV access. Three RNs unsuccessful with placing new PIV. Dr. Starla Jang notified.      Melaniebrady Morel, RN  02/27/21 0207

## 2021-02-27 NOTE — PROGRESS NOTES
RESPIRATORY THERAPY ASSESSMENT    Name:  [id number] [facility] Record Number:  [id number]  Age: 79 y.o. Gender: female  : 1940  Today's Date:  2021  Room:  [address] [place] [address]    Assessment     Is the patient being admitted for a COPD or Asthma exacerbation? No   (If yes the patient will be seen every 4 hours for the first 24 hours and then reassessed)    Patient Admission Diagnosis      Allergies  Allergies   Allergen Reactions    Latex Rash    Cephalexin Other (See Comments)     SHAKY AND SWEATY    Codeine Nausea Only     STOMACH HURTS    Levofloxacin Nausea Only    Pce [Erythromycin] Nausea Only     STOMACH HURTS    Pcn [Penicillins] Other (See Comments)     SHAKY AND SWEATY    Pentazocine      UNSURE OF REATION    Sumycin [Tetracycline Hcl] Nausea Only    Atarax [Hydroxyzine Hcl] Nausea And Vomiting    Beef-Derived Products Nausea And Vomiting    Flagyl [Metronidazole] Nausea And Vomiting    Macrodantin [Nitrofurantoin] Palpitations and Other (See Comments)     SWEATY    Pyridium [Phenazopyridine Hcl] Palpitations     SWEATY - PT DENIES REACTION    Sulfa Antibiotics Nausea And Vomiting and Nausea Only    Urised [Methen-Lisa-Meth Bl-Phen Sal] Palpitations     SWEATY       Minimum Predicted Vital Capacity:     n/a          Actual Vital Capacity:      Not assessed, pt sleeping              Pulmonary History:Asthma and CHF/Pulmonary Edema  Home Oxygen Therapy:  room air  Home Respiratory Therapy:Albuterol   Current Respiratory Therapy:  Albuterol PRN          Respiratory Severity Index(RSI)   Patients with orders for inhalation medications, oxygen, or any therapeutic treatment modality will be placed on Respiratory Protocol. They will be assessed with the first treatment and at least every 72 hours thereafter. The following severity scale will be used to determine frequency of treatment intervention.     Smoking History: Smoking History Less than 1ppd or less than 15 pack year = 1 Social History  Social History     Tobacco Use    Smoking status: Former Smoker     Packs/day: 0.25     Years: 0.10     Pack years: 0.02     Types: Cigarettes     Quit date: 1975     Years since quittin.1    Smokeless tobacco: Never Used    Tobacco comment: only smoked for 1 month   Substance Use Topics    Alcohol use: No     Alcohol/week: 0.0 standard drinks    Drug use: No       Recent Surgical History: None = 0  Past Surgical History  Past Surgical History:   Procedure Laterality Date    ABDOMEN SURGERY      ACHILLES TENDON SURGERY  12    LEFT ACHILLES DEBRIDEMENT, HAGLUNDS AND RETROCALCANEAL BURSA EXCISION WITH FLEXOR HALLUS LONGUS TENDON TRANSFER WITH BLOCK FOR PAIN CONTROL    APPENDECTOMY      CARDIAC CATHETERIZATION  14    CARDIAC CATHETERIZATION  2020    Non Obs CAD, medical management    CARPAL TUNNEL RELEASE      both hands    CHOLECYSTECTOMY      COLONOSCOPY      COLONOSCOPY      COLONOSCOPY  2/10/2016    CYSTOSCOPY  10/02/2017    DIAGNOSTIC CARDIAC CATH LAB PROCEDURE      ENDOSCOPY, COLON, DIAGNOSTIC      EYE SURGERY      HYSTERECTOMY      HYSTERECTOMY, TOTAL ABDOMINAL      JOINT REPLACEMENT      bilateral knee    KNEE SURGERY      both replaced    OTHER SURGICAL HISTORY  2018    CYSTOSCOPY, HYDRODISTENTION OF BLADDER WITH INSTILLATION OF    POLYPECTOMY      SHOULDER SURGERY      TOENAIL EXCISION  2016    right big toe    TONSILLECTOMY      TUBAL LIGATION      UPPER GASTROINTESTINAL ENDOSCOPY  10/29/12    gastritis    UPPER GASTROINTESTINAL ENDOSCOPY  2/10/2016    URETHRAL STRICTURE DILATATION         Level of Consciousness: Alert, Follows Commands but Disoriented = 1    Level of Activity: Walking with assistance = 1    Respiratory Pattern: Regular Pattern; RR 8-20 = 0    Breath Sounds: Clear = 0    Sputum   ,  ,    Cough: Strong, spontaneous, non-productive = 0    Vital Signs /79   Pulse 102   Temp 98.5 °F (36.9 °C) (Oral)   Resp 18   Ht 5' 5\" (1.651 m)   Wt 186 lb 4.6 oz (84.5 kg)   SpO2 96%   BMI 31.00 kg/m²   SPO2 (COPD values may differ): Greater than or equal to 92% on room air = 0    Peak Flow (asthma only): not applicable = 0    RSI: 0-4 = See once and convert to home regimen or discontinue        Plan       Goals: medication delivery, mobilize retained secretions, volume expansion and improve oxygenation    Patient/caregiver was educated on the proper method of use for Respiratory Care Devices:  No:       Level of patient/caregiver understanding able to:   ? Verbalize understanding   ? Demonstrate understanding       ? Teach back        ? Needs reinforcement       ? No available caregiver               ? Other:     Response to education:       Is patient being placed on Home Treatment Regimen? Yes     Does the patient have everything they need prior to discharge? NA     Comments: Chart reviewed, continue as ordered. Plan of Care: Albuterol PRN    Electronically signed by Lisset Chandler RCP on 2/27/2021 at 4:59 AM    Respiratory Protocol Guidelines     1. Assessment and treatment by Respiratory Therapy will be initiated for medication and therapeutic interventions upon initiation of aerosolized medication. 2. Physician will be contacted for respiratory rate (RR) greater than 35 breaths per minute. Therapy will be held for heart rate (HR) greater than 140 beats per minute, pending direction from physician. 3. Bronchodilators will be administered via Metered Dose Inhaler (MDI) with spacer when the following criteria are met:  a. Alert and cooperative     b. HR < 140 bpm  c. RR < 30 bpm                d. Can demonstrate a 23 second inspiratory hold  4. Bronchodilators will be administered via Hand Held Nebulizer TONIO [facility] to patients when ANY of the following criteria are met  a. Incognizant or uncooperative          b.  Patients treated with HHN at Home c. Unable to demonstrate proper use of MDI with spacer     d. RR > 30 bpm   5. Bronchodilators will be delivered via Metered Dose Inhaler (MDI), HHN, Aerogen to intubated patients on mechanical ventilation. 6. Inhalation medication orders will be delivered and/or substituted as outlined below. Aerosolized Medications Ordering and Administration Guidelines:    1. All Medications will be ordered by a physician, and their frequency and/or modality will be adjusted as defined by the patients Respiratory Severity Index (RSI) score. 2. If the patient does not have documented COPD, consider discontinuing anticholinergics when RSI is less than 9.  3. If the bronchospasm worsens (increased RSI), then the bronchodilator frequency can be increased to a maximum of every 4 hours. If greater than every 4 hours is required, the physician will be contacted. 4. If the bronchospasm improves, the frequency of the bronchodilator can be decreased, based on the patient's RSI, but not less than home treatment regimen frequency. 5. Bronchodilator(s) will be discontinued if patient has a RSI less than 9 and has received no scheduled or as needed treatment for 72  Hrs. Patients Ordered on a Mucolytic Agent:    1. Must always be administered with a bronchodilator. 2. Discontinue if patient experiences worsened bronchospasm, or secretions have lessened to the point that the patient is able to clear them with a cough. Anti-inflammatory and Combination Medications:    1. If the patient lacks prior history of lung disease, is not using inhaled anti-inflammatory medication at home, and lacks wheezing by examination or by history for at least 24 hours, contact physician for possible discontinuation.

## 2021-02-27 NOTE — H&P
Hospital Medicine History & Physical      PCP: Deborah Givens    Date of Admission: 2/26/2021    Date of Service: Pt seen/examined on 2/27/2021 and Admitted to Inpatient with expected LOS greater than two midnights due to medical therapy. Chief Complaint:  Altered mental status    History Of Present Illness:      79 y.o. female, HTN, HLD, obesity, DM, CHF and CKD, [facility] with altered mental status. The patient is a poor historian d/t h/o AMS, history obtained from the review of EMR. The patient is a resident of [address] [place] [address] in [facility]. She has been experiencing a fever with hematuria intermittently for the last week. The patient symptoms have been left untreated and now the patient has a new onset of altered mental status. She is typically alert and oriented x4. In the emergency room the patient's urinalysis was positive for greater than 100 WBCs and positive nitrites. A urine culture has been ordered as well. The patient was given cefepime. She will be admitted for further evaluation and management. The patient denied any other associated symptoms as well as any aggravating and/or alleviating factors. At the time of this assessment, the patient was resting comfortably in bed. She currently denies any chest pain, back pain, abdominal pain, shortness of breath, numbness, tingling, N/V/C/D, fever and/or chills.      Past Medical History:          Diagnosis Date    Asthma     Bronchial asthma     Bursitis 12/2011    ACHILLES TENDON LEFT    Chest pain 12/2020    [facility]est[facility]     Constipation     COPD [facility]chronic obstructive pulmonary disease) ([facility]     [facility]2019    unspecificied dementia without behavioral disturbance    Depression     Diverticulitis     Dizziness     GERD (gastroesophageal reflux disease)     Hearing loss     History of blood transfusion     Hyperlipidemia     Hypertension     Leg edema  Lung disease     Osteoarthritis     Sleep apnea     CPAP, SLEEP STUDY 6/28 OVERNIGHT AT HOME    Tinnitus     Type II or unspecified type diabetes mellitus without mention of complication, not stated as uncontrolled     Unspecified cerebral artery occlusion with cerebral infarction     mini stroke     Past Surgical History:          Procedure Laterality Date    ABDOMEN SURGERY      ACHILLES TENDON SURGERY  2/2/12    LEFT ACHILLES DEBRIDEMENT, HAGLUNDS AND RETROCALCANEAL BURSA EXCISION WITH FLEXOR HALLUS LONGUS TENDON TRANSFER WITH BLOCK FOR PAIN CONTROL    APPENDECTOMY      CARDIAC CATHETERIZATION  8/21/14    CARDIAC CATHETERIZATION  12/07/2020    Non Obs CAD, medical management    CARPAL TUNNEL RELEASE      both hands    CHOLECYSTECTOMY      COLONOSCOPY      COLONOSCOPY      COLONOSCOPY  2/10/2016    CYSTOSCOPY  10/02/2017    DIAGNOSTIC CARDIAC CATH LAB PROCEDURE      ENDOSCOPY, COLON, DIAGNOSTIC      EYE SURGERY      HYSTERECTOMY      HYSTERECTOMY, TOTAL ABDOMINAL      JOINT REPLACEMENT      bilateral knee    KNEE SURGERY      both replaced    OTHER SURGICAL HISTORY  07/09/2018    CYSTOSCOPY, HYDRODISTENTION OF BLADDER WITH INSTILLATION OF    POLYPECTOMY      SHOULDER SURGERY      TOENAIL EXCISION  08/04/2016    right big toe    TONSILLECTOMY      TUBAL LIGATION      UPPER GASTROINTESTINAL ENDOSCOPY  10/29/12    gastritis    UPPER GASTROINTESTINAL ENDOSCOPY  2/10/2016    URETHRAL STRICTURE DILATATION       Medications Prior to Admission:      Prior to Admission medications    Medication Sig Start Date End Date Taking?  Authorizing Provider   metFORMIN (GLUCOPHAGE-XR) 500 MG extended release tablet Take 1 tablet by mouth daily (with breakfast) 13/0/14   Joshua Kerr APRN - CNP   lisinopril (PRINIVIL;ZESTRIL) 40 MG tablet Take 40 mg by mouth daily    Historical Provider, MD   busPIRone (BUSPAR) 5 MG tablet 10 mg  10/16/19   Historical Provider, MD Patient not taking: Reported on 8/11/2020 10/12/18   Tiffanie Adkins DO   nitroGLYCERIN (NITROSTAT) 0.4 MG SL tablet Place 1 tablet under the tongue every 5 minutes as needed for Chest pain 9/27/18   KIMI Richard CNP   calcium carbonate 600 MG TABS tablet Take 1 tablet by mouth 2 times daily 6/25/18   WILLIE Gallagher   vitamin E 400 UNIT capsule Take 400 Units by mouth daily    Historical Provider, MD   aspirin 81 MG tablet Take 81 mg by mouth daily. Historical Provider, MD     Allergies:  Latex, Cephalexin, Codeine, Levofloxacin, Pce [erythromycin], Pcn [penicillins], Pentazocine, Sumycin [tetracycline hcl], Atarax [hydroxyzine hcl], Beef-derived products, Flagyl [metronidazole], Macrodantin [nitrofurantoin], Pyridium [phenazopyridine hcl], Sulfa antibiotics, and Urised [methen-jenifer-meth bl-phen sal]    Social History:      The patient currently lives at [address]   reports that she quit smoking about 46 years ago. Her smoking use included cigarettes. She has a 0.03 pack-year smoking history. She has never used smokeless tobacco.  ETOH:   reports no history of alcohol use. E-Cigarettes/Vaping Use     Questions Responses    E-Cigarette/Vaping Use Never User    Start Date     Passive Exposure     Quit Date     Counseling Given     Comments         Family History:      Reviewed in detail. Positive as follows:        Problem Relation Age of Onset    Cancer Father         prostate    Heart Disease Mother     Heart Disease Brother     Heart Disease Brother     Heart Disease Sister     Diabetes Sister     Dementia Sister      REVIEW OF SYSTEMS:   Pertinent positives as noted in the HPI. All other systems reviewed and negative.     PHYSICAL EXAM PERFORMED:    BP (!) 123/50   Pulse 105   Temp 99.8 °F (37.7 °C) (Oral)   Resp 15   Ht 5' 5\" (1.651 m)   Wt 192 lb 14.4 oz (87.5 kg)   SpO2 95%   BMI 32.10 kg/m² General appearance:  Pleasant, obese, elderly female in no apparent distress, appears stated age and cooperative. HEENT: Pupils equal, round, and reactive to light. Extra ocular muscles intact. Conjunctivae/corneas clear. Neck: Supple, with full range of motion. No jugular venous distention. Trachea midline. Respiratory:  Normal respiratory effort. Clear to auscultation, bilaterally without Rales/Wheezes/Rhonchi. Cardiovascular:  Regular rate and rhythm with normal S1/S2 without murmurs, rubs or gallops. Abdomen: Soft, obese, round non-tender, non-distended with normal bowel sounds. Musculoskeletal:  No clubbing, cyanosis or edema bilaterally. Full range of motion without deformity. Skin: Skin color, texture, turgor normal.  No significant rashes or lesions. Neurologic:  Neurovascularly intact.  Cranial nerves: II-XII intact, grossly non-focal.  Psychiatric:  Alert and oriented to self only, thought content appropriate, normal insight  Capillary Refill: Brisk,< 3 seconds   Peripheral Pulses: +2 palpable, equal bilaterally     Labs:     Recent Labs     02/26/21  2358   WBC 9.1   HGB 8.2*   HCT 25.1*        Recent Labs     02/26/21  2358   *   K 4.8      CO2 23   BUN 20   CREATININE 1.7*   CALCIUM 9.2     Recent Labs     02/26/21  2358   AST 26   ALT 7*   BILITOT 0.3   ALKPHOS 76     Urinalysis:      Lab Results   Component Value Date    NITRU POSITIVE 02/27/2021    WBCUA >100 02/27/2021    BACTERIA 4+ 02/05/2021    RBCUA >100 02/27/2021    BLOODU LARGE 02/27/2021    SPECGRAV 1.020 02/27/2021    GLUCOSEU Negative 02/27/2021    GLUCOSEU NEGATIVE 03/17/2011     Radiology:     CXR: I have reviewed the CXR with the following interpretation: N/A    EKG:  I have reviewed the EKG with the following interpretation: N/A    No orders to display     ASSESSMENT:    AMANDA/Keyona Jones 1106 Problems    Diagnosis Date Noted    Acute cystitis [N30.00]     Acute encephalopathy [G93.40] 09/07/2019  Obesity [E66.9] 05/16/2018    CHF (congestive heart failure) ([facility] [I50.9] 05/23/2013    Essential hypertension [I10] 05/23/2013    CKD (chronic kidney disease) stage 3, GFR 30-59 ml/min [N18.30] 05/23/2013     PLAN:    Acute encephalopathy likely 2/2 acute cystitis with hematuria  -UA positive > 100 WBC and positive nitrites  -urine culture ordered in ED  -blood cultures ordered in ED  -cefepime initiated in ED, continued 2/27/2021  -1 L LR given in ED  -continue aricept  -tele monitoring  -neuro checks  -MIVF    Essential HTN  -continue coreg, clonidine and lisinopril    HLD  -continue pravastatin    Obesity  With Body mass index is 32.1 kg/m². Complicating assessment and treatment. Placing patient at risk for multiple co-morbidities as well as early death and contributing to the patient's presentation. Counseled on weight loss    CHF, stable  -does not appear to be in acute exacerbation at this time  -strict I&O  -daily weights    DM2, uncontrolled  -  -hemglobin a1c 8.1 on 10/21/2020  -hold home metformin  -mdssi  -poct ac/hs  -hypoglycemia protocol  -carb control diet    UBALDO superimposed on CKD stage III, cr 1.7 on admission  -baseline cr appears to be 1.3  -monitor with IVF  -bmp in am    Chronic normocytic anemia, 8.2/25.1 on admission  -no s/s of bleeding at this time  -cbc in am    DVT Prophylaxis: heparin    Diet: No diet orders on file     Code Status: Prior    PT/OT Eval Status: no indication for need at this time    Dispo - 2-3 days pending clinical improvement     Sachi Rubi, APRJOHANNA - CNP    Thank you Lj Sanchez for the opportunity to be involved in this patient's care.  If you have any questions or concerns please feel free to contact me at [phone].  -----------------------------Anticipated Dr. Kelsi gutierrez-----------------------------------

## 2021-02-27 NOTE — PROGRESS NOTES
Spoke with [facility]. Pt is active with them. Updated on POC. Passed this on to will GALVEZ, Luis Armando Josue.

## 2021-02-27 NOTE — ED NOTES
Straight cathed pt per MD order. Pt tolerated well. Urine sample collected and sent to lab via tube station. Pt incont of urine prior to straight cath. Pt cleaned with bath wipes, new depend placed, repositioned pt in bed. Pure wick catheter placed at this time.      Gurinder Hernandez RN  02/27/21 [id number]

## 2021-02-27 NOTE — ED NOTES
Bedside report given to LEONOR Anne. Pt in route to C3 via stretcher at this time in stable condition. All belongings sent with pt. Care transferred.      Sarah Bowden RN  02/27/21 [id number]

## 2021-02-27 NOTE — ED PROVIDER NOTES
Emergency Physician Note    Chief Complaint  Altered Mental Status (Patient comes into ED via [facility] from [facility] with c/o fever, hematuria x1 week that has been left untreated and a new onset of altered mental status. Patient is normally alert and oriented x4. )       History of Present Illness  Chava Mars is a 79 y.o. female who presents to the ED for altered mental status. Nursing home staff reports that the patient was doing fairly well but has had some intermittent hematuria recently. This evening she became altered and nonverbal though awake and following commands. They state her temperature was 101.1 Fahrenheit when they checked and her blood sugar was normal.  They called EMS for this reason. History and review of systems limited by patient's mental status. I have reviewed the following from the nursing documentation:      Prior to Admission medications    Medication Sig Start Date End Date Taking?  Authorizing Provider   metFORMIN (GLUCOPHAGE-XR) 500 MG extended release tablet Take 1 tablet by mouth daily (with breakfast) 53/6/94   iLnda Pittman APRN - CNP   lisinopril (PRINIVIL;ZESTRIL) 40 MG tablet Take 40 mg by mouth daily    Historical Provider, MD   busPIRone (BUSPAR) 5 MG tablet 10 mg  10/16/19   Historical Provider, MD   insulin lispro (HUMALOG) 100 UNIT/ML injection vial Inject 0-3 Units into the skin nightly 9/11/19   Richard Etienne MD   insulin lispro (HUMALOG) 100 UNIT/ML injection vial Inject 0-6 Units into the skin 3 times daily (with meals) 9/11/19   Richard Etienne MD   pravastatin (PRAVACHOL) 20 MG tablet Take 1 tablet by mouth nightly TAKE ONE TABLET BY MOUTH DAILY 9/11/19   Richard Etienne MD   cloNIDine (CATAPRES) 0.1 MG tablet Take 1 tablet by mouth 3 times daily  Patient taking differently: Take 0.1 mg by mouth 3 times daily For HTN 9/11/19   Richard Etienne MD blood glucose test strips (ACCU-CHEK ARABELLA PLUS) strip TEST BLOOD SUGAR TWICE A DAY 8/21/19   Tiffanie Adkins DO   donepezil (ARICEPT) 5 MG tablet Take 1 tablet by mouth nightly  Patient not taking: Reported on 8/11/2020 7/19/19   KIMI Gould - CNP   SENNA PLUS 8.6-50 MG per tablet TAKE 2 TABLETS BY MOUTH DAILY  Patient taking differently: nightly  7/1/19   Tiffanie Adkins DO   carvedilol (COREG) 25 MG tablet TAKE ONE TABLET BY MOUTH TWICE A DAY WITH FOOD 6/17/19   Tiffanie Adkins, DO   DULoxetine (CYMBALTA) 60 MG extended release capsule Take 2 capsules by mouth daily 5/24/19   Tiffanie Adkins, DO   pramipexole (MIRAPEX) 0.5 MG tablet TAKE ONE TABLET BY MOUTH ONCE NIGHTLY 5/20/19   Tiffanie Adkins, DO   omeprazole (PRILOSEC) 20 MG delayed release capsule TAKE ONE CAPSULE TWICE A DAY 5/6/19   Haim Louise MD   oxybutynin (DITROPAN-XL) 10 MG extended release tablet Take 10 mg by mouth daily    Historical Provider, MD   albuterol sulfate  (90 Base) MCG/ACT inhaler Inhale 2 puffs into the lungs 4 times daily as needed for Wheezing 2/26/19   Haim Louise MD   isosorbide mononitrate (IMDUR) 30 MG extended release tablet TAKE ONE-HALF TABLET BY MOUTH ONE TIME A DAY 1/15/19   Tiffanie Adkins DO   clotrimazole-betamethasone (LOTRISONE) 1-0.05 % cream Apply bid to affected area. Patient not taking: Reported on 8/11/2020 10/12/18   Tiffanie Adkins DO   nitroGLYCERIN (NITROSTAT) 0.4 MG SL tablet Place 1 tablet under the tongue every 5 minutes as needed for Chest pain 9/27/18   KIMI Haynes - CNP   calcium carbonate 600 MG TABS tablet Take 1 tablet by mouth 2 times daily 6/25/18   WILLIE Adkins   vitamin E 400 UNIT capsule Take 400 Units by mouth daily    Historical Provider, MD   aspirin 81 MG tablet Take 81 mg by mouth daily.     Historical Provider, MD       Allergies as of 02/26/2021 - Review Complete 02/26/2021   Allergen Reaction Noted    Latex Rash 11/22/2010  Cephalexin Other (See Comments) 10/15/2010    Codeine Nausea Only 10/15/2010    Levofloxacin Nausea Only 01/04/2018    Pce [erythromycin] Nausea Only 10/15/2010    Pcn [penicillins] Other (See Comments) 10/15/2010    Pentazocine  10/15/2010    Sumycin [tetracycline hcl] Nausea Only 10/15/2010    Atarax [hydroxyzine hcl] Nausea And Vomiting 10/15/2010    Beef-derived products Nausea And Vomiting 10/15/2010    Flagyl [metronidazole] Nausea And Vomiting 10/15/2010    Macrodantin [nitrofurantoin] Palpitations and Other (See Comments) 10/15/2010    Pyridium [phenazopyridine hcl] Palpitations 10/15/2010    Sulfa antibiotics Nausea And Vomiting and Nausea Only 10/15/2010    Urised [methen-jenifer-meth bl-phen sal] Palpitations 10/15/2010       Past Medical History:   Diagnosis Date    Asthma     Bronchial asthma     Bursitis 12/2011    ACHILLES TENDON LEFT    Chest pain 12/2020    CH[facility]     Constipation     COPD (chronic obstructive pulmonary disease) [facility]     Dementia ([facility] [address] 09/11/2019    unspecificied dementia without behavioral disturbance    Depression     Diverticulitis     Dizziness     GERD (gastroesophageal reflux disease)     Hearing loss     History of blood transfusion     Hyperlipidemia     Hypertension     Leg edema     Lung disease     Osteoarthritis     Sleep apnea     CPAP, SLEEP STUDY 6/28 OVERNIGHT AT HOME    Tinnitus     Type II or unspecified type diabetes mellitus without mention of complication, not stated as uncontrolled     Unspecified cerebral artery occlusion with cerebral infarction     mini stroke        Surgical History:   Past Surgical History:   Procedure Laterality Date    ABDOMEN SURGERY      ACHILLES TENDON SURGERY  2/2/12    LEFT ACHILLES DEBRIDEMENT, HAGLUNDS AND RETROCALCANEAL BURSA EXCISION WITH FLEXOR HALLUS LONGUS TENDON TRANSFER WITH BLOCK FOR PAIN CONTROL    APPENDECTOMY      CARDIAC CATHETERIZATION  8/21/14  CARDIAC CATHETERIZATION  2020    Non Obs CAD, medical management    CARPAL TUNNEL RELEASE      both hands    CHOLECYSTECTOMY      COLONOSCOPY      COLONOSCOPY      COLONOSCOPY  2/10/2016    CYSTOSCOPY  10/02/2017    DIAGNOSTIC CARDIAC CATH LAB PROCEDURE      ENDOSCOPY, COLON, DIAGNOSTIC      EYE SURGERY      HYSTERECTOMY      HYSTERECTOMY, TOTAL ABDOMINAL      JOINT REPLACEMENT      bilateral knee    KNEE SURGERY      both replaced    OTHER SURGICAL HISTORY  2018    CYSTOSCOPY, HYDRODISTENTION OF BLADDER WITH INSTILLATION OF    POLYPECTOMY      SHOULDER SURGERY      TOENAIL EXCISION  2016    right big toe    TONSILLECTOMY      TUBAL LIGATION      UPPER GASTROINTESTINAL ENDOSCOPY  10/29/12    gastritis    UPPER GASTROINTESTINAL ENDOSCOPY  2/10/2016    URETHRAL STRICTURE DILATATION          Family History:    Family History   Problem Relation Age of Onset    Cancer Father         prostate    Heart Disease Mother     Heart Disease Brother     Heart Disease Brother     Heart Disease Sister     Diabetes Sister     Dementia Sister        Social History     Socioeconomic History    Marital status:       Spouse name: Not on file    Number of children: 3    Years of education: 15    Highest education level: Not on file   Occupational History    Occupation: retired   Social Needs    Financial resource strain: Not on file    Food insecurity     Worry: Not on file     Inability: Not on file   [employer] needs     Medical: Not on file     Non-medical: Not on file   Tobacco Use    Smoking status: Former Smoker     Packs/day: 0.25     Years: 0.10     Pack years: 0.02     Types: Cigarettes     Quit date: 1975     Years since quittin.1    Smokeless tobacco: Never Used    Tobacco comment: only smoked for 1 month   Substance and Sexual Activity    Alcohol use: No     Alcohol/week: 0.0 standard drinks    Drug use: No    Sexual activity: Not Currently CBC WITH AUTO DIFFERENTIAL - Abnormal; Notable for the following components:       Result Value    RBC 2.57 (*)     Hemoglobin 8.2 (*)     Hematocrit 25.1 (*)     RDW 19.0 (*)     All other components within normal limits    Narrative:     Performed at:  [place] [address] [employer]   Phone [phone]   COMPREHENSIVE METABOLIC PANEL W/ REFLEX TO MG FOR LOW K - Abnormal; Notable for the following components:    Sodium 133 (*)     Glucose 157 (*)     CREATININE 1.7 (*)     GFR Non- 29 (*)     GFR  35 (*)     Total Protein 6.1 (*)     Albumin 3.1 (*)     Albumin/Globulin Ratio 1.0 (*)     ALT 7 (*)     All other components within normal limits    Narrative:     Performed at:  [place] [address] [employer]   Phone [phone]   URINE RT REFLEX TO CULTURE - Abnormal; Notable for the following components:    Clarity, UA SL CLOUDY (*)     Blood, Urine LARGE (*)     Protein, UA >=300 (*)     Nitrite, Urine POSITIVE (*)     Leukocyte Esterase, Urine MODERATE (*)     All other components within normal limits    Narrative:     Performed at:  [place] [address] [employer]   Phone [phone]   MICROSCOPIC URINALYSIS - Abnormal; Notable for the following components:    WBC, UA >100 (*)     RBC, UA >100 (*)     All other components within normal limits    Narrative:     Performed at:  [place] [address] [employer]   Phone [phone]   CULTURE, BLOOD 1   CULTURE, BLOOD 2   CULTURE, URINE   LACTIC ACID, PLASMA    Narrative:     Performed at:  [place] [address] [employer]   Phone [phone] In my opinion the patient's delirium is most consistent with her UTI. I do not believe she is currently having a CVA. The total Critical Care time is 40 minutes which excludes separately billable procedures. The critical care was concerning treatment of UTI and delirium with IV antibiotics and fluids and Tylenol. .  This time is exclusive of any time documented by any other providers. I spoke with Dr. Melissa Michaels. We thoroughly discussed the history, physical exam, laboratory and imaging studies, as well as, emergency department course. Based upon that discussion, we've decided to admit Bairon Gibbons for further observation and evaluation of [de-identified] M Jami's mental status change. As I have deemed necessary from their history, physical and studies, I have considered and evaluated Bairon Gibbons for the following diagnoses:  DIABETES, INTRACRANIAL HEMORRHAGE, MENINGITIS, SEPSIS SUBARACHNOID HEMORRHAGE, SUBDURAL HEMATOMA, & STROKE. FINAL IMPRESSION  1. Urinary tract infection with hematuria, site unspecified    2. Altered mental status, unspecified altered mental status type        Vitals:  Blood pressure (!) 143/69, pulse 102, temperature 99.8 °F (37.7 °C), temperature source Oral, resp. rate 20, height 5' 5\" (1.651 m), weight 192 lb 14.4 oz (87.5 kg), SpO2 96 %, not currently breastfeeding. Disposition  Pt is in stable condition upon Admit to med/surg floor. This chart was generated using the [address] [place] [address] dictation system. I created this record but it may contain dictation errors.           Julian Hernandez MD  02/27/21 3719

## 2021-02-27 NOTE — PROGRESS NOTES
Pt alert but nonverbal. VSS. Shift assessment completed. Pt turned Q 2 hrs with pillow support, heels off of bed. Pt ate some breakfast but needs assist. Call light within reach, bed in lowest position, wheels locked. Bed alarm on and audible.

## 2021-02-28 LAB
GLUCOSE BLD-MCNC: 127 MG/DL (ref 70–99)
GLUCOSE BLD-MCNC: 140 MG/DL (ref 70–99)
GLUCOSE BLD-MCNC: 167 MG/DL (ref 70–99)
GLUCOSE BLD-MCNC: 206 MG/DL (ref 70–99)
PERFORMED ON: ABNORMAL

## 2021-02-28 PROCEDURE — 1200000000 HC SEMI PRIVATE

## 2021-02-28 PROCEDURE — 6360000002 HC RX W HCPCS: Performed by: NURSE PRACTITIONER

## 2021-02-28 PROCEDURE — 51701 INSERT BLADDER CATHETER: CPT

## 2021-02-28 PROCEDURE — 6370000000 HC RX 637 (ALT 250 FOR IP): Performed by: NURSE PRACTITIONER

## 2021-02-28 PROCEDURE — 2580000003 HC RX 258: Performed by: NURSE PRACTITIONER

## 2021-02-28 PROCEDURE — 51798 US URINE CAPACITY MEASURE: CPT

## 2021-02-28 PROCEDURE — P9047 ALBUMIN (HUMAN), 25%, 50ML: HCPCS | Performed by: NURSE PRACTITIONER

## 2021-02-28 PROCEDURE — 6370000000 HC RX 637 (ALT 250 FOR IP): Performed by: INTERNAL MEDICINE

## 2021-02-28 RX ORDER — ALBUMIN (HUMAN) 12.5 G/50ML
25 SOLUTION INTRAVENOUS ONCE
Status: COMPLETED | OUTPATIENT
Start: 2021-02-28 | End: 2021-02-28

## 2021-02-28 RX ORDER — CEFUROXIME AXETIL 250 MG/1
250 TABLET ORAL EVERY 12 HOURS SCHEDULED
Status: DISCONTINUED | OUTPATIENT
Start: 2021-02-28 | End: 2021-03-03 | Stop reason: HOSPADM

## 2021-02-28 RX ADMIN — CLONIDINE HYDROCHLORIDE 0.1 MG: 0.1 TABLET ORAL at 21:01

## 2021-02-28 RX ADMIN — CEFEPIME 1000 MG: 1 INJECTION, POWDER, FOR SOLUTION INTRAMUSCULAR; INTRAVENOUS at 13:24

## 2021-02-28 RX ADMIN — INSULIN LISPRO 1 UNITS: 100 INJECTION, SOLUTION INTRAVENOUS; SUBCUTANEOUS at 21:07

## 2021-02-28 RX ADMIN — OXYBUTYNIN CHLORIDE 10 MG: 5 TABLET, EXTENDED RELEASE ORAL at 08:14

## 2021-02-28 RX ADMIN — SODIUM CHLORIDE, PRESERVATIVE FREE 10 ML: 5 INJECTION INTRAVENOUS at 08:14

## 2021-02-28 RX ADMIN — HEPARIN SODIUM 5000 UNITS: 5000 INJECTION INTRAVENOUS; SUBCUTANEOUS at 13:24

## 2021-02-28 RX ADMIN — ALBUMIN (HUMAN) 25 G: 0.25 INJECTION, SOLUTION INTRAVENOUS at 00:29

## 2021-02-28 RX ADMIN — ISOSORBIDE MONONITRATE 30 MG: 30 TABLET, EXTENDED RELEASE ORAL at 08:14

## 2021-02-28 RX ADMIN — HEPARIN SODIUM 5000 UNITS: 5000 INJECTION INTRAVENOUS; SUBCUTANEOUS at 05:48

## 2021-02-28 RX ADMIN — DULOXETINE HYDROCHLORIDE 120 MG: 60 CAPSULE, DELAYED RELEASE ORAL at 08:14

## 2021-02-28 RX ADMIN — INSULIN LISPRO 4 UNITS: 100 INJECTION, SOLUTION INTRAVENOUS; SUBCUTANEOUS at 13:24

## 2021-02-28 RX ADMIN — CARVEDILOL 25 MG: 25 TABLET, FILM COATED ORAL at 21:01

## 2021-02-28 RX ADMIN — CLONIDINE HYDROCHLORIDE 0.1 MG: 0.1 TABLET ORAL at 13:25

## 2021-02-28 RX ADMIN — ACETAMINOPHEN 650 MG: 325 TABLET ORAL at 08:13

## 2021-02-28 RX ADMIN — PRAVASTATIN SODIUM 20 MG: 20 TABLET ORAL at 21:01

## 2021-02-28 RX ADMIN — ASPIRIN 81 MG 81 MG: 81 TABLET ORAL at 08:14

## 2021-02-28 RX ADMIN — PRAMIPEXOLE DIHYDROCHLORIDE 0.5 MG: 0.25 TABLET ORAL at 21:01

## 2021-02-28 RX ADMIN — CEFEPIME 1000 MG: 1 INJECTION, POWDER, FOR SOLUTION INTRAMUSCULAR; INTRAVENOUS at 01:34

## 2021-02-28 RX ADMIN — PRAMIPEXOLE DIHYDROCHLORIDE 0.5 MG: 0.25 TABLET ORAL at 13:25

## 2021-02-28 RX ADMIN — CLONIDINE HYDROCHLORIDE 0.1 MG: 0.1 TABLET ORAL at 08:14

## 2021-02-28 RX ADMIN — CARVEDILOL 25 MG: 25 TABLET, FILM COATED ORAL at 08:14

## 2021-02-28 RX ADMIN — PRAMIPEXOLE DIHYDROCHLORIDE 0.5 MG: 0.25 TABLET ORAL at 08:13

## 2021-02-28 RX ADMIN — CEFUROXIME AXETIL 250 MG: 250 TABLET ORAL at 21:01

## 2021-02-28 RX ADMIN — LISINOPRIL 40 MG: 20 TABLET ORAL at 08:14

## 2021-02-28 RX ADMIN — DONEPEZIL HYDROCHLORIDE 5 MG: 5 TABLET, FILM COATED ORAL at 21:01

## 2021-02-28 RX ADMIN — HEPARIN SODIUM 5000 UNITS: 5000 INJECTION INTRAVENOUS; SUBCUTANEOUS at 21:08

## 2021-02-28 ASSESSMENT — PAIN DESCRIPTION - LOCATION: LOCATION: HIP

## 2021-02-28 ASSESSMENT — PAIN SCALES - GENERAL
PAINLEVEL_OUTOF10: 5
PAINLEVEL_OUTOF10: 5

## 2021-02-28 NOTE — PROGRESS NOTES
Pt completely alert this morning, but confused to where she is. VSS. Shift assessment completed. Pt ate breakfast independently. Tylenol given for hip pain. Turned Q 2hrs with pillow support. Denies other needs. Call light within reach, bed in lowest position, wheels locked. Bed alarm on and audible.

## 2021-02-28 NOTE — PROGRESS NOTES
Hospitalist Progress Note      PCP: Darrius Clifton    Date of Admission: 2/26/2021    Chief Complaint: Confusion    Hospital Course: Hospice patient, transferred to hospital under unclear circumstances for mental status changes. Being treated for UTI. Subjective: Lethargic, arousable, no chest pain, shortness of breath, nausea or vomiting. Medications:  Reviewed    Infusion Medications    dextrose       Scheduled Medications    aspirin  81 mg Oral Daily    carvedilol  25 mg Oral BID    cloNIDine  0.1 mg Oral TID    donepezil  5 mg Oral Nightly    DULoxetine  120 mg Oral Daily    lisinopril  40 mg Oral Daily    isosorbide mononitrate  30 mg Oral Daily    oxybutynin  10 mg Oral Daily    pramipexole  0.5 mg Oral TID    pravastatin  20 mg Oral Nightly    sodium chloride flush  10 mL Intravenous 2 times per day    insulin lispro  0-12 Units Subcutaneous TID WC    insulin lispro  0-6 Units Subcutaneous Nightly    heparin (porcine)  5,000 Units Subcutaneous 3 times per day    cefepime  1,000 mg Intravenous Q12H     PRN Meds: albuterol sulfate HFA, sodium chloride flush, promethazine **OR** ondansetron, acetaminophen **OR** acetaminophen, polyethylene glycol, glucose, dextrose, glucagon (rDNA), dextrose      Intake/Output Summary (Last 24 hours) at 2/28/2021 1435  Last data filed at 2/28/2021 1210  Gross per 24 hour   Intake 200 ml   Output 692 ml   Net -492 ml       Physical Exam Performed:    /79   Pulse 79   Temp 98.3 °F (36.8 °C) (Oral)   Resp 18   Ht 5' 5\" (1.651 m)   Wt 186 lb 4.6 oz (84.5 kg)   SpO2 96%   BMI 31.00 kg/m²     General appearance: No apparent distress, appears stated age  HEENT: Pupils equal, round, and reactive to light. Conjunctivae/corneas clear. Neck: Supple, with full range of motion. No jugular venous distention. Trachea midline. Respiratory:  Normal respiratory effort. Clear to auscultation, bilaterally without Rales/Wheezes/Rhonchi.

## 2021-02-28 NOTE — PROGRESS NOTES
Pt alert, confused. VSS, room air. Assessment completed as charted. Pt with redness/excoriation to sacrum, zinc cream applied and pt repositioned for comfort. Saud Riggins in place for incontinence. Resting in bed, bed locked and in lowest position. Bedside table and call light within reach. Bed alarm on and audible.

## 2021-02-28 NOTE — PROGRESS NOTES
Pt with only 150ml urine noted overnight. Bladder scanned with 362ml noted. NP made aware with order for straight cath. Pt straight cathed per order, with 300ml urine obtained. Bladder scan with only 42ml residual noted. Pt tolerated well.

## 2021-02-28 NOTE — PROGRESS NOTES
Perfect serve sent to MARANDA Ram    Pt here with AMS/acute cystitis. BP 82/46, all other VSS. Pt confused (dementia) but alert, asymptomatic. Stopped IV fluids today, hx CHF. New orders or just monitor? Thanks! See new orders.

## 2021-03-01 ENCOUNTER — APPOINTMENT (OUTPATIENT)
Dept: CT IMAGING | Age: 81
DRG: 689 | End: 2021-03-01
Payer: COMMERCIAL

## 2021-03-01 LAB
A/G RATIO: 1.2 (ref 1.1–2.2)
ABO/RH: NORMAL
ALBUMIN SERPL-MCNC: 3 G/DL (ref 3.4–5)
ALP BLD-CCNC: 67 U/L (ref 40–129)
ALT SERPL-CCNC: <5 U/L (ref 10–40)
ANION GAP SERPL CALCULATED.3IONS-SCNC: 8 MMOL/L (ref 3–16)
ANTIBODY SCREEN: NORMAL
AST SERPL-CCNC: 9 U/L (ref 15–37)
BASOPHILS ABSOLUTE: 0 K/UL (ref 0–0.2)
BASOPHILS RELATIVE PERCENT: 1 %
BILIRUB SERPL-MCNC: 0.3 MG/DL (ref 0–1)
BLOOD BANK DISPENSE STATUS: NORMAL
BLOOD BANK PRODUCT CODE: NORMAL
BPU ID: NORMAL
BUN BLDV-MCNC: 30 MG/DL (ref 7–20)
CALCIUM SERPL-MCNC: 9.1 MG/DL (ref 8.3–10.6)
CHLORIDE BLD-SCNC: 106 MMOL/L (ref 99–110)
CO2: 23 MMOL/L (ref 21–32)
CREAT SERPL-MCNC: 1.8 MG/DL (ref 0.6–1.2)
DESCRIPTION BLOOD BANK: NORMAL
EOSINOPHILS ABSOLUTE: 0.2 K/UL (ref 0–0.6)
EOSINOPHILS RELATIVE PERCENT: 4.7 %
GFR AFRICAN AMERICAN: 33
GFR NON-AFRICAN AMERICAN: 27
GLOBULIN: 2.5 G/DL
GLUCOSE BLD-MCNC: 103 MG/DL (ref 70–99)
GLUCOSE BLD-MCNC: 136 MG/DL (ref 70–99)
GLUCOSE BLD-MCNC: 140 MG/DL (ref 70–99)
GLUCOSE BLD-MCNC: 155 MG/DL (ref 70–99)
GLUCOSE BLD-MCNC: 170 MG/DL (ref 70–99)
HCT VFR BLD CALC: 19.8 % (ref 36–48)
HEMOGLOBIN: 6.6 G/DL (ref 12–16)
LYMPHOCYTES ABSOLUTE: 2 K/UL (ref 1–5.1)
LYMPHOCYTES RELATIVE PERCENT: 41.4 %
MCH RBC QN AUTO: 32.3 PG (ref 26–34)
MCHC RBC AUTO-ENTMCNC: 33.2 G/DL (ref 31–36)
MCV RBC AUTO: 97.3 FL (ref 80–100)
MONOCYTES ABSOLUTE: 0.3 K/UL (ref 0–1.3)
MONOCYTES RELATIVE PERCENT: 5.7 %
NEUTROPHILS ABSOLUTE: 2.3 K/UL (ref 1.7–7.7)
NEUTROPHILS RELATIVE PERCENT: 47.2 %
ORGANISM: ABNORMAL
PDW BLD-RTO: 19 % (ref 12.4–15.4)
PERFORMED ON: ABNORMAL
PLATELET # BLD: 176 K/UL (ref 135–450)
PMV BLD AUTO: 8.9 FL (ref 5–10.5)
POTASSIUM SERPL-SCNC: 4.3 MMOL/L (ref 3.5–5.1)
RBC # BLD: 2.04 M/UL (ref 4–5.2)
SODIUM BLD-SCNC: 137 MMOL/L (ref 136–145)
TOTAL PROTEIN: 5.5 G/DL (ref 6.4–8.2)
URINE CULTURE, ROUTINE: ABNORMAL
WBC # BLD: 4.8 K/UL (ref 4–11)

## 2021-03-01 PROCEDURE — 6370000000 HC RX 637 (ALT 250 FOR IP): Performed by: INTERNAL MEDICINE

## 2021-03-01 PROCEDURE — 36415 COLL VENOUS BLD VENIPUNCTURE: CPT

## 2021-03-01 PROCEDURE — 86850 RBC ANTIBODY SCREEN: CPT

## 2021-03-01 PROCEDURE — 86901 BLOOD TYPING SEROLOGIC RH(D): CPT

## 2021-03-01 PROCEDURE — 80053 COMPREHEN METABOLIC PANEL: CPT

## 2021-03-01 PROCEDURE — 6370000000 HC RX 637 (ALT 250 FOR IP): Performed by: NURSE PRACTITIONER

## 2021-03-01 PROCEDURE — 74176 CT ABD & PELVIS W/O CONTRAST: CPT

## 2021-03-01 PROCEDURE — 36430 TRANSFUSION BLD/BLD COMPNT: CPT

## 2021-03-01 PROCEDURE — 6360000002 HC RX W HCPCS: Performed by: NURSE PRACTITIONER

## 2021-03-01 PROCEDURE — 85025 COMPLETE CBC W/AUTO DIFF WBC: CPT

## 2021-03-01 PROCEDURE — 1200000000 HC SEMI PRIVATE

## 2021-03-01 PROCEDURE — 86900 BLOOD TYPING SEROLOGIC ABO: CPT

## 2021-03-01 PROCEDURE — 2580000003 HC RX 258: Performed by: INTERNAL MEDICINE

## 2021-03-01 PROCEDURE — 2580000003 HC RX 258: Performed by: NURSE PRACTITIONER

## 2021-03-01 PROCEDURE — 86923 COMPATIBILITY TEST ELECTRIC: CPT

## 2021-03-01 PROCEDURE — P9016 RBC LEUKOCYTES REDUCED: HCPCS

## 2021-03-01 RX ORDER — SODIUM CHLORIDE 9 MG/ML
INJECTION, SOLUTION INTRAVENOUS PRN
Status: COMPLETED | OUTPATIENT
Start: 2021-03-01 | End: 2021-03-01

## 2021-03-01 RX ADMIN — CEFUROXIME AXETIL 250 MG: 250 TABLET ORAL at 12:12

## 2021-03-01 RX ADMIN — HEPARIN SODIUM 5000 UNITS: 5000 INJECTION INTRAVENOUS; SUBCUTANEOUS at 15:24

## 2021-03-01 RX ADMIN — INSULIN LISPRO 2 UNITS: 100 INJECTION, SOLUTION INTRAVENOUS; SUBCUTANEOUS at 17:05

## 2021-03-01 RX ADMIN — HEPARIN SODIUM 5000 UNITS: 5000 INJECTION INTRAVENOUS; SUBCUTANEOUS at 06:04

## 2021-03-01 RX ADMIN — INSULIN LISPRO 2 UNITS: 100 INJECTION, SOLUTION INTRAVENOUS; SUBCUTANEOUS at 12:48

## 2021-03-01 RX ADMIN — ACETAMINOPHEN 650 MG: 325 TABLET ORAL at 08:42

## 2021-03-01 RX ADMIN — HEPARIN SODIUM 5000 UNITS: 5000 INJECTION INTRAVENOUS; SUBCUTANEOUS at 20:36

## 2021-03-01 RX ADMIN — ASPIRIN 81 MG 81 MG: 81 TABLET ORAL at 12:12

## 2021-03-01 RX ADMIN — PRAVASTATIN SODIUM 20 MG: 20 TABLET ORAL at 20:35

## 2021-03-01 RX ADMIN — DULOXETINE HYDROCHLORIDE 120 MG: 60 CAPSULE, DELAYED RELEASE ORAL at 12:12

## 2021-03-01 RX ADMIN — CLONIDINE HYDROCHLORIDE 0.1 MG: 0.1 TABLET ORAL at 20:34

## 2021-03-01 RX ADMIN — OXYBUTYNIN CHLORIDE 10 MG: 5 TABLET, EXTENDED RELEASE ORAL at 12:12

## 2021-03-01 RX ADMIN — CARVEDILOL 25 MG: 25 TABLET, FILM COATED ORAL at 12:13

## 2021-03-01 RX ADMIN — CARVEDILOL 25 MG: 25 TABLET, FILM COATED ORAL at 20:34

## 2021-03-01 RX ADMIN — SODIUM CHLORIDE, PRESERVATIVE FREE 10 ML: 5 INJECTION INTRAVENOUS at 08:45

## 2021-03-01 RX ADMIN — PRAMIPEXOLE DIHYDROCHLORIDE 0.5 MG: 0.25 TABLET ORAL at 15:23

## 2021-03-01 RX ADMIN — SODIUM CHLORIDE: 9 INJECTION, SOLUTION INTRAVENOUS at 12:20

## 2021-03-01 RX ADMIN — DONEPEZIL HYDROCHLORIDE 5 MG: 5 TABLET, FILM COATED ORAL at 20:34

## 2021-03-01 RX ADMIN — LISINOPRIL 40 MG: 20 TABLET ORAL at 12:15

## 2021-03-01 RX ADMIN — INSULIN LISPRO 1 UNITS: 100 INJECTION, SOLUTION INTRAVENOUS; SUBCUTANEOUS at 20:35

## 2021-03-01 RX ADMIN — PRAMIPEXOLE DIHYDROCHLORIDE 0.5 MG: 0.25 TABLET ORAL at 20:34

## 2021-03-01 RX ADMIN — CEFUROXIME AXETIL 250 MG: 250 TABLET ORAL at 23:46

## 2021-03-01 RX ADMIN — CLONIDINE HYDROCHLORIDE 0.1 MG: 0.1 TABLET ORAL at 12:12

## 2021-03-01 RX ADMIN — SODIUM CHLORIDE, PRESERVATIVE FREE 10 ML: 5 INJECTION INTRAVENOUS at 20:44

## 2021-03-01 RX ADMIN — ISOSORBIDE MONONITRATE 30 MG: 30 TABLET, EXTENDED RELEASE ORAL at 12:12

## 2021-03-01 ASSESSMENT — PAIN SCALES - GENERAL: PAINLEVEL_OUTOF10: 8

## 2021-03-01 ASSESSMENT — PAIN DESCRIPTION - LOCATION: LOCATION: HIP

## 2021-03-01 ASSESSMENT — PAIN DESCRIPTION - PAIN TYPE: TYPE: CHRONIC PAIN

## 2021-03-01 NOTE — PROGRESS NOTES
Hospitalist Progress Note      PCP: Kevin Figueredo    Date of Admission: 2/26/2021    Chief Complaint: AMS    Hospital Course:   79 y.o. female, HTN, HLD, obesity, DM, CHF and CKD, who presented to [facility] with altered mental status. The patient is a poor historian d/t h/o AMS, history obtained from the review of EMR. The patient is a resident of [address] [place] [address] in [facility]. She has been experiencing a fever with hematuria intermittently for the last week. The patient symptoms have been left untreated and now the patient has a new onset of altered mental status. She is typically alert and oriented x4. In the emergency room the patient's urinalysis was positive for greater than 100 WBCs and positive nitrites. A urine culture has been ordered as well. The patient was given cefepime. She will be admitted for further evaluation and management. The patient denied any other associated symptoms as well as any aggravating and/or alleviating factors. At the time of this assessment, the patient was resting comfortably in bed. She currently denies any chest pain, back pain, abdominal pain, shortness of breath, numbness, tingling, N/V/C/D, fever and/or chills. Subjective: mental status appears stable. 1u pRBCs ordered today. Noted hematuria being worked up by urology.        Medications:  Reviewed    Infusion Medications    dextrose       Scheduled Medications    cefUROXime  250 mg Oral 2 times per day    aspirin  81 mg Oral Daily    carvedilol  25 mg Oral BID    cloNIDine  0.1 mg Oral TID    donepezil  5 mg Oral Nightly    DULoxetine  120 mg Oral Daily    lisinopril  40 mg Oral Daily    isosorbide mononitrate  30 mg Oral Daily    oxybutynin  10 mg Oral Daily    pramipexole  0.5 mg Oral TID    pravastatin  20 mg Oral Nightly    sodium chloride flush  10 mL Intravenous 2 times per day    insulin lispro  0-12 Units Subcutaneous TID WC  insulin lispro  0-6 Units Subcutaneous Nightly    heparin (porcine)  5,000 Units Subcutaneous 3 times per day     PRN Meds: albuterol sulfate HFA, sodium chloride flush, promethazine **OR** ondansetron, acetaminophen **OR** acetaminophen, polyethylene glycol, glucose, dextrose, glucagon (rDNA), dextrose      Intake/Output Summary (Last 24 hours) at 3/1/2021 1339  Last data filed at 3/1/2021 0912  Gross per 24 hour   Intake 840 ml   Output 860 ml   Net -20 ml       Physical Exam Performed:    /67   Pulse 66   Temp 98 °F (36.7 °C) (Oral)   Resp 20   Ht 5' 5\" (1.651 m)   Wt 186 lb 4.6 oz (84.5 kg)   SpO2 99%   BMI 31.00 kg/m²     General appearance:  Pleasant, obese, elderly female in no apparent distress, appears stated age and cooperative. HEENT: Pupils equal, round, and reactive to light. Extra ocular muscles intact. Conjunctivae/corneas clear. Neck: Supple, with full range of motion. No jugular venous distention. Trachea midline. Respiratory:  Normal respiratory effort. Clear to auscultation, bilaterally without Rales/Wheezes/Rhonchi. Cardiovascular:  Regular rate and rhythm with normal S1/S2 without murmurs, rubs or gallops. Abdomen: Soft, obese, round non-tender, non-distended with normal bowel sounds. Musculoskeletal:  No clubbing, cyanosis or edema bilaterally. Full range of motion without deformity. Skin: Skin color, texture, turgor normal.  No significant rashes or lesions. Neurologic:  Neurovascularly intact.  Cranial nerves: II-XII intact, grossly non-focal.  Psychiatric:  Alert and oriented to self only, thought content appropriate, normal insight  Capillary Refill: Brisk,< 3 seconds   Peripheral Pulses: +2 palpable, equal bilaterally     Labs:   Recent Labs     02/26/21 2358 02/27/21  0553 03/01/21  0502   WBC 9.1 10.1 4.8   HGB 8.2* 7.8* 6.6*   HCT 25.1* 23.6* 19.8*    198 176     Recent Labs     02/26/21 2358 02/27/21  0552 03/01/21  0502   * 138 137 K 4.8 3.9 4.3    106 106   CO2 23 21 23   BUN 20 24* 30*   CREATININE 1.7* 1.9* 1.8*   CALCIUM 9.2 9.0 9.1     Recent Labs     02/26/21  2358 03/01/21  0502   AST 26 9*   ALT 7* <5*   BILITOT 0.3 0.3   ALKPHOS 76 67     No results for input(s): INR in the last 72 hours. No results for input(s): Cervantes Shelly in the last 72 hours. Urinalysis:      Lab Results   Component Value Date    NITRU POSITIVE 02/27/2021    WBCUA >100 02/27/2021    BACTERIA 4+ 02/05/2021    RBCUA >100 02/27/2021    BLOODU LARGE 02/27/2021    SPECGRAV 1.020 02/27/2021    GLUCOSEU Negative 02/27/2021    GLUCOSEU NEGATIVE 03/17/2011       Radiology:  CT ABDOMEN PELVIS WO CONTRAST    (Results Pending)           Assessment/Plan:    Active Hospital Problems    Diagnosis    Acute cystitis [N30.00]    Acute encephalopathy [G93.40]    Obesity [E66.9]    CHF (congestive heart failure) ([facility]) [I50.9]    Essential hypertension [I10]    CKD (chronic kidney disease) stage 3, GFR 30-59 ml/min [N18.30]     UTI  - noted on UA. Urine culture with E coli  - continue ceftin    Acute on chronic anemia with hematuria  - Urology consulted  - Grajeda placed  - CT abd/pel ordered  - 1u pRBCs ordered  - occult stool ordered    Acute encephalopathy with baseline dementia  - unclear baseline  - likely due to above  - supportive care  - plan to resume hospice on discharge    DMII  - poorly controlled  - holding home metformin  - continue SSI    Chronic diastolic heart failure  - no evidence of exacerbation  - continue home lisinopril, coreg. Not on diuretics at baseline    CKD  - Cr at baseline  - continue to monitor    HTN  - well controlled  - continue home regimen    HLD  - continue home statin    Obesity  With Body mass index is 31 kg/m². Complicating assessment and treatment. Placing patient at risk for multiple co-morbidities as well as early death and contributing to the patient's presentation. Counseled on weight loss.      DVT Prophylaxis: SQH

## 2021-03-01 NOTE — CARE COORDINATION
Chart review day 2: Patient on C3 re Acute Cystitis followed by IM consult to Urology. Patient getting blood transfusion this date. Per family would like H/H in therapeutic range range prior to dc, MD aware of conversation with family. Patient is LTC at [facility] followed by [facility]. Traci Parikh, [address]: Writer left VM with hospice SW notified charlee not dc until 03/02 re H/H. SUREKHA Sanz

## 2021-03-01 NOTE — CONSULTS
Urology Attending Consult Note      Reason for Consultation: 79 y.o. female gross hematuria    History: 79 y. o.female with Zuleika consulted for gross hematuria. Denies history of gross hematuria. Seen by [facility] in the past for Zuleika and IC, botox injections. Last cysto  was normal. Patient has some tenderness with palpation of abdomen, but otherwise no pain. Family History, Social History, Review of Systems:  Reviewed and agreed to as per chart    Vitals:  BP (!) 172/81   Pulse 80   Temp 97.8 °F (36.6 °C) (Oral)   Resp 18   Ht 5' 5\" (1.651 m)   Wt 186 lb 4.6 oz (84.5 kg)   SpO2 98%   BMI 31.00 kg/m²   Temp  Av °F (36.7 °C)  Min: 97.6 °F (36.4 °C)  Max: 98.4 °F (36.9 °C)    Intake/Output Summary (Last 24 hours) at 3/1/2021 1158  Last data filed at 3/1/2021 0912  Gross per 24 hour   Intake 840 ml   Output 1060 ml   Net -220 ml         Physical:  ? Well developed, well nourished in no acute distress  ? Orientated to time and place  ? Neck is supple, trachea is midline  ? Respiratory effort is normal without distress  ? Cardiovascular show no extremity swelling  ? Abdomen soft, tender upon palpation,   ? Skin warm and dry,  ? Musculoskeletal no digital cyanosis, head normocephalic  ?  Psych shows normal mood and affect, alert and appropriately answers questions    Labs:  WBC:    Lab Results   Component Value Date    WBC 4.8 2021     Hemoglobin/Hematocrit:    Lab Results   Component Value Date    HGB 6.6 2021    HCT 19.8 2021     BMP:    Lab Results   Component Value Date     2021    K 4.3 2021    K 3.9 2021     2021    CO2 23 2021    BUN 30 2021    LABALBU 3.0 2021    CREATININE 1.8 2021    CALCIUM 9.1 2021    GFRAA 33 2021    GFRAA >60 2013    LABGLOM 27 2021     PT/INR:    Lab Results   Component Value Date    PROTIME 18.8 2020    INR 1.64 2020     PTT:    Lab Results Component Value Date    APTT 22.6 12/03/2020   [APTT      Urine Culture: E. Coli - 02/27/21    Blood Culture: no growth to date    Antibiotic Therapy:  Ceftin      Impression/Plan: 79 y. o.female with pmh Zuleika, Interstitial cystitis, AUR after botox injection--consulted for gross hematuria, states has not happened in the past, been present for the last week. Has extensive history with [facility].  Last cystoscopy was done March 2020 with Dr. Sobeida Wren.   -Last CT a/p done 10/20/2020 and showed possible acute cystitis, can get repeat CT a/p wo contrast (GFR) to reevaluate new hematuria  -want to insert mendez catheter to maximize  decompression, history of AUR <1 year ago, please record initial amount  -if CT a/p ok, fu outpatient--can consider another cystoscopy at that time, UTI possible cause of hematuria    Lanie Shukla PA-C

## 2021-03-01 NOTE — PROGRESS NOTES
Pt alert, with confusion. VSS, room air. Assessment completed as charted. Pt repositioned for comfort. Purewick changed/in place for incontinence. Resting in bed, denies any needs at present time. Call light and bedside table within reach. Bed locked and in lowest position.

## 2021-03-02 LAB
GLUCOSE BLD-MCNC: 131 MG/DL (ref 70–99)
GLUCOSE BLD-MCNC: 134 MG/DL (ref 70–99)
GLUCOSE BLD-MCNC: 167 MG/DL (ref 70–99)
GLUCOSE BLD-MCNC: 174 MG/DL (ref 70–99)
HCT VFR BLD CALC: 24.7 % (ref 36–48)
HEMOGLOBIN: 8 G/DL (ref 12–16)
MCH RBC QN AUTO: 31 PG (ref 26–34)
MCHC RBC AUTO-ENTMCNC: 32.6 G/DL (ref 31–36)
MCV RBC AUTO: 95.1 FL (ref 80–100)
OCCULT BLOOD SCREENING: NORMAL
PDW BLD-RTO: 19 % (ref 12.4–15.4)
PERFORMED ON: ABNORMAL
PLATELET # BLD: 206 K/UL (ref 135–450)
PMV BLD AUTO: 8.5 FL (ref 5–10.5)
RBC # BLD: 2.59 M/UL (ref 4–5.2)
WBC # BLD: 4.4 K/UL (ref 4–11)

## 2021-03-02 PROCEDURE — 1200000000 HC SEMI PRIVATE

## 2021-03-02 PROCEDURE — 2580000003 HC RX 258: Performed by: NURSE PRACTITIONER

## 2021-03-02 PROCEDURE — 6370000000 HC RX 637 (ALT 250 FOR IP): Performed by: INTERNAL MEDICINE

## 2021-03-02 PROCEDURE — 85027 COMPLETE CBC AUTOMATED: CPT

## 2021-03-02 PROCEDURE — 36415 COLL VENOUS BLD VENIPUNCTURE: CPT

## 2021-03-02 PROCEDURE — G0328 FECAL BLOOD SCRN IMMUNOASSAY: HCPCS

## 2021-03-02 PROCEDURE — 6370000000 HC RX 637 (ALT 250 FOR IP): Performed by: NURSE PRACTITIONER

## 2021-03-02 PROCEDURE — 6360000002 HC RX W HCPCS: Performed by: NURSE PRACTITIONER

## 2021-03-02 PROCEDURE — 51702 INSERT TEMP BLADDER CATH: CPT

## 2021-03-02 RX ADMIN — INSULIN LISPRO 1 UNITS: 100 INJECTION, SOLUTION INTRAVENOUS; SUBCUTANEOUS at 20:58

## 2021-03-02 RX ADMIN — HEPARIN SODIUM 5000 UNITS: 5000 INJECTION INTRAVENOUS; SUBCUTANEOUS at 21:01

## 2021-03-02 RX ADMIN — PRAMIPEXOLE DIHYDROCHLORIDE 0.5 MG: 0.25 TABLET ORAL at 20:58

## 2021-03-02 RX ADMIN — ACETAMINOPHEN 650 MG: 325 TABLET ORAL at 09:17

## 2021-03-02 RX ADMIN — CEFUROXIME AXETIL 250 MG: 250 TABLET ORAL at 09:16

## 2021-03-02 RX ADMIN — CARVEDILOL 25 MG: 25 TABLET, FILM COATED ORAL at 20:58

## 2021-03-02 RX ADMIN — INSULIN LISPRO 2 UNITS: 100 INJECTION, SOLUTION INTRAVENOUS; SUBCUTANEOUS at 18:47

## 2021-03-02 RX ADMIN — CLONIDINE HYDROCHLORIDE 0.1 MG: 0.1 TABLET ORAL at 14:20

## 2021-03-02 RX ADMIN — DULOXETINE HYDROCHLORIDE 120 MG: 60 CAPSULE, DELAYED RELEASE ORAL at 09:18

## 2021-03-02 RX ADMIN — OXYBUTYNIN CHLORIDE 10 MG: 5 TABLET, EXTENDED RELEASE ORAL at 09:18

## 2021-03-02 RX ADMIN — PRAVASTATIN SODIUM 20 MG: 20 TABLET ORAL at 20:58

## 2021-03-02 RX ADMIN — HEPARIN SODIUM 5000 UNITS: 5000 INJECTION INTRAVENOUS; SUBCUTANEOUS at 15:24

## 2021-03-02 RX ADMIN — ASPIRIN 81 MG 81 MG: 81 TABLET ORAL at 09:16

## 2021-03-02 RX ADMIN — PRAMIPEXOLE DIHYDROCHLORIDE 0.5 MG: 0.25 TABLET ORAL at 09:17

## 2021-03-02 RX ADMIN — HEPARIN SODIUM 5000 UNITS: 5000 INJECTION INTRAVENOUS; SUBCUTANEOUS at 05:56

## 2021-03-02 RX ADMIN — ACETAMINOPHEN 650 MG: 325 TABLET ORAL at 20:00

## 2021-03-02 RX ADMIN — PRAMIPEXOLE DIHYDROCHLORIDE 0.5 MG: 0.25 TABLET ORAL at 14:20

## 2021-03-02 RX ADMIN — CEFUROXIME AXETIL 250 MG: 250 TABLET ORAL at 20:58

## 2021-03-02 RX ADMIN — CARVEDILOL 25 MG: 25 TABLET, FILM COATED ORAL at 09:17

## 2021-03-02 RX ADMIN — LISINOPRIL 40 MG: 20 TABLET ORAL at 09:18

## 2021-03-02 RX ADMIN — CLONIDINE HYDROCHLORIDE 0.1 MG: 0.1 TABLET ORAL at 20:58

## 2021-03-02 RX ADMIN — ISOSORBIDE MONONITRATE 30 MG: 30 TABLET, EXTENDED RELEASE ORAL at 09:18

## 2021-03-02 RX ADMIN — SODIUM CHLORIDE, PRESERVATIVE FREE 10 ML: 5 INJECTION INTRAVENOUS at 09:19

## 2021-03-02 RX ADMIN — DONEPEZIL HYDROCHLORIDE 5 MG: 5 TABLET, FILM COATED ORAL at 20:58

## 2021-03-02 RX ADMIN — CLONIDINE HYDROCHLORIDE 0.1 MG: 0.1 TABLET ORAL at 09:16

## 2021-03-02 NOTE — PROGRESS NOTES
Hospitalist Progress Note      PCP: Benoit Garg    Date of Admission: 2/26/2021    Chief Complaint: AMS    Hospital Course:   79 y.o. female, HTN, HLD, obesity, DM, CHF and CKD, who presented to [facility] with altered mental status. The patient is a poor historian d/t h/o AMS, history obtained from the review of EMR. The patient is a resident of [address] [place] [address] in [facility]. She has been experiencing a fever with hematuria intermittently for the last week. The patient symptoms have been left untreated and now the patient has a new onset of altered mental status. She is typically alert and oriented x4. In the emergency room the patient's urinalysis was positive for greater than 100 WBCs and positive nitrites. A urine culture has been ordered as well. The patient was given cefepime. She will be admitted for further evaluation and management. The patient denied any other associated symptoms as well as any aggravating and/or alleviating factors. At the time of this assessment, the patient was resting comfortably in bed. She currently denies any chest pain, back pain, abdominal pain, shortness of breath, numbness, tingling, N/V/C/D, fever and/or chills. Subjective: mental status appears stable. 1u pRBCs ordered today. Noted hematuria being worked up by urology.        Medications:  Reviewed    Infusion Medications    dextrose       Scheduled Medications    cefUROXime  250 mg Oral 2 times per day    aspirin  81 mg Oral Daily    carvedilol  25 mg Oral BID    cloNIDine  0.1 mg Oral TID    donepezil  5 mg Oral Nightly    DULoxetine  120 mg Oral Daily    lisinopril  40 mg Oral Daily    isosorbide mononitrate  30 mg Oral Daily    oxybutynin  10 mg Oral Daily    pramipexole  0.5 mg Oral TID    pravastatin  20 mg Oral Nightly    sodium chloride flush  10 mL Intravenous 2 times per day    insulin lispro  0-12 Units Subcutaneous TID WC PT/OT Eval Status: not ordered    Dispo - plan to resume home hospice on discharge.  1-2 days    Yandy Armijo MD

## 2021-03-02 NOTE — PROGRESS NOTES
Graejda catheter placed per order using sterile technique. Pt tolerated well. Brown, hazy, sediment filled urine returned. Will continue to monitor.

## 2021-03-02 NOTE — PROGRESS NOTES
Pt assessment completed and charted. VSS. Pt a/self only. Pt c/o 8/10 chronic hip pain. Pt complete Q2T. Feeds self but needs setup. Bed in lowest position and wheels locked. Call light within reach. Bedside table within reach. Non-skid footwear in place. Pt denies any other needs at this time. Pt calls out. Will continue to monitor.

## 2021-03-02 NOTE — CARE COORDINATION
Chart review day 3: Patient on C3 re Acute cystitis followed by IM and urology. Patient with mendez placed this date and possible cystoscopy on 03/03, will need to be NP after midnight. [facility] and [facility], per SW will ct to get daily visits while in hospital. SW will follow for possible DCP needs. SUREKHA Cazares

## 2021-03-02 NOTE — PROGRESS NOTES
Urology Attending Progress Note      Subjective: Patient states \"ok\"    79 y. o.female with PMH of recurrent UTI, consulted for gross hematuria. Vitals:  BP (!) 182/75   Pulse 66   Temp 98.6 °F (37 °C) (Oral)   Resp 16   Ht 5' 5\" (1.651 m)   Wt 186 lb 4.6 oz (84.5 kg)   SpO2 95%   BMI 31.00 kg/m²   Temp  Av °F (36.7 °C)  Min: 97.7 °F (36.5 °C)  Max: 98.6 °F (37 °C)    Intake/Output Summary (Last 24 hours) at 3/2/2021 1115  Last data filed at 3/2/2021 0842  Gross per 24 hour   Intake 600 ml   Output 100 ml   Net 500 ml       Exam:   ? Well developed, well nourished in no acute distress  ? Neck is supple, trachea is midline  ? Respiratory effort is normal without distress  ? Cardiovascular show no extremity swelling  ? Abdomen soft, nontender, nondistended, no guarding   ? Skin warm and dry, exposed skin shows no abnormal lesions, rashes  ? Musculoskeletal no digital cyanosis, head normocephalic  ? Brown-tinged urine in purewick catheter    Labs:  WBC:    Lab Results   Component Value Date    WBC 4.4 2021     Hemoglobin/Hematocrit:    Lab Results   Component Value Date    HGB 8.0 2021    HCT 24.7 2021     BMP:    Lab Results   Component Value Date     2021    K 4.3 2021    K 3.9 2021     2021    CO2 23 2021    BUN 30 2021    LABALBU 3.0 2021    CREATININE 1.8 2021    CALCIUM 9.1 2021    GFRAA 33 2021    GFRAA >60 2013    LABGLOM 27 2021     PT/INR:    Lab Results   Component Value Date    PROTIME 18.8 2020    INR 1.64 2020     PTT:    Lab Results   Component Value Date    APTT 22.6 2020   [APTT    Urine Culture: E. coli    Blood Culture: No growth to date    Antibiotic Therapy: Ceftin    Imaging: CT A/P without contrast shows mild bilateral hydronephrosis, no obvious ureteral stones seen. Mild injection of the fat surrounding the bladder, suspicious for underlying cystitis. Impression/Plan:   79 y. o.female with recurrent UTI, gross hematuria. Patient is receiving pRBCs due to drop in hemoglobin. CT abdomen pelvis shows mild bilateral hydronephrosis. She will have mendez catheter inserted today, this may help resolve hematuria.   Patient ate today.    -We will see how she does, make n.p.o. at midnight for possible add on cystoscopy     Antonio Walsh PA-C

## 2021-03-02 NOTE — PROGRESS NOTES
1 unit PRBC ordered. Consent received via phone from Jesús Byrnes. Verified w/ LEONOR Martinez. Blood verified w/ Yamilex Boyle RN. All vs per protocol. Pt tolerated well. Will continue to monitor.

## 2021-03-03 VITALS
DIASTOLIC BLOOD PRESSURE: 66 MMHG | HEART RATE: 71 BPM | HEIGHT: 65 IN | BODY MASS INDEX: 31.52 KG/M2 | WEIGHT: 189.15 LBS | SYSTOLIC BLOOD PRESSURE: 151 MMHG | OXYGEN SATURATION: 94 % | RESPIRATION RATE: 18 BRPM | TEMPERATURE: 98.1 F

## 2021-03-03 LAB
BLOOD CULTURE, ROUTINE: NORMAL
CULTURE, BLOOD 2: NORMAL
GLUCOSE BLD-MCNC: 103 MG/DL (ref 70–99)
GLUCOSE BLD-MCNC: 270 MG/DL (ref 70–99)
GLUCOSE BLD-MCNC: 96 MG/DL (ref 70–99)
HCT VFR BLD CALC: 25.9 % (ref 36–48)
HEMOGLOBIN: 8.3 G/DL (ref 12–16)
MCH RBC QN AUTO: 30.8 PG (ref 26–34)
MCHC RBC AUTO-ENTMCNC: 32 G/DL (ref 31–36)
MCV RBC AUTO: 96.3 FL (ref 80–100)
PDW BLD-RTO: 19 % (ref 12.4–15.4)
PERFORMED ON: ABNORMAL
PERFORMED ON: ABNORMAL
PERFORMED ON: NORMAL
PLATELET # BLD: 231 K/UL (ref 135–450)
PMV BLD AUTO: 8.8 FL (ref 5–10.5)
RBC # BLD: 2.69 M/UL (ref 4–5.2)
WBC # BLD: 5 K/UL (ref 4–11)

## 2021-03-03 PROCEDURE — 6360000002 HC RX W HCPCS: Performed by: NURSE PRACTITIONER

## 2021-03-03 PROCEDURE — 6370000000 HC RX 637 (ALT 250 FOR IP): Performed by: NURSE PRACTITIONER

## 2021-03-03 PROCEDURE — 51702 INSERT TEMP BLADDER CATH: CPT

## 2021-03-03 PROCEDURE — 85027 COMPLETE CBC AUTOMATED: CPT

## 2021-03-03 PROCEDURE — 36415 COLL VENOUS BLD VENIPUNCTURE: CPT

## 2021-03-03 PROCEDURE — 6370000000 HC RX 637 (ALT 250 FOR IP): Performed by: INTERNAL MEDICINE

## 2021-03-03 PROCEDURE — 2580000003 HC RX 258: Performed by: NURSE PRACTITIONER

## 2021-03-03 RX ADMIN — HEPARIN SODIUM 5000 UNITS: 5000 INJECTION INTRAVENOUS; SUBCUTANEOUS at 05:22

## 2021-03-03 RX ADMIN — CARVEDILOL 25 MG: 25 TABLET, FILM COATED ORAL at 10:39

## 2021-03-03 RX ADMIN — OXYBUTYNIN CHLORIDE 10 MG: 5 TABLET, EXTENDED RELEASE ORAL at 10:38

## 2021-03-03 RX ADMIN — PRAMIPEXOLE DIHYDROCHLORIDE 0.5 MG: 0.25 TABLET ORAL at 14:38

## 2021-03-03 RX ADMIN — SODIUM CHLORIDE, PRESERVATIVE FREE 10 ML: 5 INJECTION INTRAVENOUS at 10:38

## 2021-03-03 RX ADMIN — CLONIDINE HYDROCHLORIDE 0.1 MG: 0.1 TABLET ORAL at 10:37

## 2021-03-03 RX ADMIN — HEPARIN SODIUM 5000 UNITS: 5000 INJECTION INTRAVENOUS; SUBCUTANEOUS at 14:47

## 2021-03-03 RX ADMIN — INSULIN LISPRO 6 UNITS: 100 INJECTION, SOLUTION INTRAVENOUS; SUBCUTANEOUS at 18:24

## 2021-03-03 RX ADMIN — CEFUROXIME AXETIL 250 MG: 250 TABLET ORAL at 10:38

## 2021-03-03 RX ADMIN — CLONIDINE HYDROCHLORIDE 0.1 MG: 0.1 TABLET ORAL at 14:38

## 2021-03-03 RX ADMIN — PRAMIPEXOLE DIHYDROCHLORIDE 0.5 MG: 0.25 TABLET ORAL at 10:38

## 2021-03-03 RX ADMIN — ASPIRIN 81 MG 81 MG: 81 TABLET ORAL at 12:31

## 2021-03-03 RX ADMIN — DULOXETINE HYDROCHLORIDE 120 MG: 60 CAPSULE, DELAYED RELEASE ORAL at 10:39

## 2021-03-03 RX ADMIN — ISOSORBIDE MONONITRATE 30 MG: 30 TABLET, EXTENDED RELEASE ORAL at 10:38

## 2021-03-03 RX ADMIN — LISINOPRIL 40 MG: 20 TABLET ORAL at 10:38

## 2021-03-03 NOTE — PROGRESS NOTES
79 y.o.female with recurrent UTI, gross hematuria. CT abdomen pelvis showed mild bilateral hydronephrosis. She had mendez catheter inserted yesterday.  Mendez catheter today shows clear, yellow urine.  -Hold off on cystoscopy, and can see outpatient for further evaluation  -discharge with catheter and follow up outpatient for voiding trial    Kylee Alcocer PA-C

## 2021-03-03 NOTE — PLAN OF CARE
Problem: Falls - Risk of:  Goal: Will remain free from falls  Description: Will remain free from falls  Outcome: Met This Shift  Goal: Absence of physical injury  Description: Absence of physical injury  Outcome: Met This Shift     Problem: Skin Integrity:  Goal: Will show no infection signs and symptoms  Description: Will show no infection signs and symptoms  Outcome: Met This Shift     Problem: Skin Integrity:  Goal: Absence of new skin breakdown  Description: Absence of new skin breakdown  Outcome: Ongoing     Problem: Pain:  Goal: Pain level will decrease  Description: Pain level will decrease  Outcome: Ongoing  Goal: Control of chronic pain  Description: Control of chronic pain  Outcome: Ongoing

## 2021-03-03 NOTE — DISCHARGE SUMMARY
Hospital Medicine Discharge Summary    Patient ID: Patrick Schreiber      Patient's PCP: Shaye Pugh    Admit Date: 2/26/2021     Discharge Date:   03/03/21    Admitting Physician: Annamaria Ladd MD     Discharge Physician: Kade Saab MD     Discharge Diagnoses: Active Hospital Problems    Diagnosis    Acute cystitis [N30.00]    Acute encephalopathy [G93.40]    Obesity [E66.9]    CHF (congestive heart failure) (HCC) [I50.9]    Essential hypertension [I10]    CKD (chronic kidney disease) stage 3, GFR 30-59 ml/min [N18.30]       The patient was seen and examined on day of discharge and this discharge summary is in conjunction with any daily progress note from day of discharge. Hospital Course:   79 y.o. female, HTN, HLD, obesity, DM, CHF and CKD, who presented to [facility] with altered mental status. The patient is a poor historian d/t h/o AMS, history obtained from the review of EMR.  The patient is a resident of [address] [place] [address] in [facility]. Julio C Mullins has been experiencing a fever with hematuria intermittently for the last week.  The patient symptoms have been left untreated and now the patient has a new onset of altered mental status. Julio C Mullins is typically alert and oriented x4.  In the emergency room the patient's urinalysis was positive for greater than 100 WBCs and positive nitrites.  A urine culture has been ordered as well.  The patient was given cefepime.  She will be admitted for further evaluation and management. The patient denied any other associated symptoms as well as any aggravating and/or alleviating factors. At the time of this assessment, the patient was resting comfortably in bed. She currently denies any chest pain, back pain, abdominal pain, shortness of breath, numbness, tingling, N/V/C/D, fever and/or chills.     UTI  - noted on UA.  Urine culture with E coli  - continue ceftin     Acute on chronic anemia with hematuria  - Urology consulted - Grajeda to remain in place on discharge  - CT abd/pel showing mild bilateral hydronephrosis  - 1u pRBCs given  - occult stool negative  - hematuria seems improved  - follow up with urology as outpatient     Acute encephalopathy with baseline dementia  - unclear baseline  - likely due to above  - supportive care   - plan to resume hospice on discharge     DMII  - poorly controlled  - holding home metformin  - continue SSI     Chronic diastolic heart failure  - no evidence of exacerbation  - continue home lisinopril, coreg. Not on diuretics at baseline     CKD  - Cr at baseline  - continue to monitor     HTN  - well controlled  - continue home regimen     HLD  - continue home statin     Obesity  With Body mass index is 31 kg/m². Complicating assessment and treatment. Placing patient at risk for multiple co-morbidities as well as early death and contributing to the patient's presentation. Counseled on weight loss. Physical Exam Performed:     BP (!) 184/82   Pulse 61   Temp 98.2 °F (36.8 °C) (Oral)   Resp 18   Ht 5' 5\" (1.651 m)   Wt 189 lb 2.5 oz (85.8 kg)   SpO2 95%   BMI 31.48 kg/m²       General appearance:  Pleasant, obese, elderly female in no apparent distress, appears stated age and cooperative. HEENT: Pupils equal, round, and reactive to light.  Extra ocular muscles intact. Conjunctivae/corneas clear. Neck: Supple, with full range of motion. No jugular venous distention. Trachea midline. Respiratory:  Normal respiratory effort. Clear to auscultation, bilaterally without Rales/Wheezes/Rhonchi. Cardiovascular:  Regular rate and rhythm with normal S1/S2 without murmurs, rubs or gallops. Abdomen: Soft, obese, round non-tender, non-distended with normal bowel sounds. Musculoskeletal:  No clubbing, cyanosis or edema bilaterally.  Full range of motion without deformity. Skin: Skin color, texture, turgor normal.  No significant rashes or lesions. Neurologic:  Neurovascularly intact. Cranial nerves: II-XII intact, grossly non-focal.  Psychiatric:  Alert and oriented to self only, thought content appropriate, normal insight  Capillary Refill: Brisk,< 3 seconds   Peripheral Pulses: +2 palpable, equal bilaterally     Labs:  For convenience and continuity at follow-up the following most recent labs are provided:      CBC:    Lab Results   Component Value Date    WBC 5.0 03/03/2021    HGB 8.3 03/03/2021    HCT 25.9 03/03/2021     03/03/2021       Renal:    Lab Results   Component Value Date     03/01/2021    K 4.3 03/01/2021    K 3.9 02/27/2021     03/01/2021    CO2 23 03/01/2021    BUN 30 03/01/2021    CREATININE 1.8 03/01/2021    CALCIUM 9.1 03/01/2021    PHOS 4.2 10/20/2020         Significant Diagnostic Studies    Radiology:   CT ABDOMEN PELVIS WO CONTRAST   Final Result             Consults:     IP CONSULT TO HOSPITALIST  IP CONSULT TO UROLOGY  IP CONSULT TO HOSPICE    Disposition:  LTC with hospice     Condition at Discharge: Terminal    Discharge Instructions/Follow-up:  Follow up with urology within 1-2 weeks    Code Status:  DNR-CCA     Activity: activity as tolerated    Diet: diabetic diet      Discharge Medications:     Current Discharge Medication List           Details   metFORMIN (GLUCOPHAGE-XR) 500 MG extended release tablet Take 1 tablet by mouth daily (with breakfast)  Qty: 120 tablet, Refills: 5    Associated Diagnoses: Controlled type 2 diabetes mellitus without complication, without long-term current use of insulin (HCC)      lisinopril (PRINIVIL;ZESTRIL) 40 MG tablet Take 40 mg by mouth daily      busPIRone (BUSPAR) 5 MG tablet 10 mg       !! insulin lispro (HUMALOG) 100 UNIT/ML injection vial Inject 0-3 Units into the skin nightly  Qty: 1 vial, Refills: 3      !! insulin lispro (HUMALOG) 100 UNIT/ML injection vial Inject 0-6 Units into the skin 3 times daily (with meals)  Qty: 1 vial, Refills: 3 pravastatin (PRAVACHOL) 20 MG tablet Take 1 tablet by mouth nightly TAKE ONE TABLET BY MOUTH DAILY    Associated Diagnoses: Mixed hyperlipidemia      cloNIDine (CATAPRES) 0.1 MG tablet Take 1 tablet by mouth 3 times daily      blood glucose test strips (ACCU-CHEK ARABELLA PLUS) strip TEST BLOOD SUGAR TWICE A DAY  Qty: 200 strip, Refills: 11    Associated Diagnoses: Controlled type 2 diabetes mellitus with diabetic neuropathy, without long-term current use of insulin ([facility])      donepezil (ARICEPT) 5 MG tablet Take 1 tablet by mouth nightly  Qty: 90 tablet, Refills: 1    Associated Diagnoses: Memory loss      SENNA PLUS 8.6-50 MG per tablet TAKE 2 TABLETS BY MOUTH DAILY  Qty: 60 tablet, Refills: 5      carvedilol (COREG) 25 MG tablet TAKE ONE TABLET BY MOUTH TWICE A DAY WITH FOOD  Qty: 180 tablet, Refills: 1      DULoxetine (CYMBALTA) 60 MG extended release capsule Take 2 capsules by mouth daily  Qty: 60 capsule, Refills: 5    Associated Diagnoses: Severe episode of recurrent major depressive disorder, without psychotic features ([facility])      pramipexole (MIRAPEX) 0.5 MG tablet TAKE ONE TABLET BY MOUTH ONCE NIGHTLY  Qty: 30 tablet, Refills: 5    Associated Diagnoses: RLS (restless legs syndrome)      omeprazole (PRILOSEC) 20 MG delayed release capsule TAKE ONE CAPSULE TWICE A DAY  Qty: 60 capsule, Refills: 5      oxybutynin (DITROPAN-XL) 10 MG extended release tablet Take 10 mg by mouth daily      albuterol sulfate  (90 Base) MCG/ACT inhaler Inhale 2 puffs into the lungs 4 times daily as needed for Wheezing  Qty: 3 Inhaler, Refills: 1      isosorbide mononitrate (IMDUR) 30 MG extended release tablet TAKE ONE-HALF TABLET BY MOUTH ONE TIME A DAY  Qty: 45 tablet, Refills: 3    Associated Diagnoses: Controlled type 2 diabetes mellitus without complication, without long-term current use of insulin ([facility])      clotrimazole-betamethasone (LOTRISONE) 1-0.05 % cream Apply bid to affected area.   Qty: 45 g, Refills: 3

## 2021-03-03 NOTE — DISCHARGE INSTR - DIET

## 2021-03-03 NOTE — PROGRESS NOTES
Shift assessment completed and charted. VSS. A&Ox1, person only. Bed alarm on. Q2 turn. Zinc cream applied to buttocks. F/C drainage yellow/cloudy urine at this time. Bed locked and in lowest position. Call light within reach. Pt denies any other needs at this time. Will continue to monitor.

## 2021-03-03 NOTE — PROGRESS NOTES
Pt assessment completed and charted. VSS. Pt alert to self only. Pt c/o \"my butt hurts\". PRN medication administered, pt repositioned and barrier cream applied to excoriated area. Grajeda catheter in place and patent. Pt complete Q2T. Pt able to feed self but needs set up. Bed in lowest position and wheels locked. Call light within reach. Bedside table within reach. Non-skid footwear in place. Will continue to monitor.

## 2021-03-03 NOTE — CARE COORDINATION
CASE MANAGEMENT DISCHARGE SUMMARY    Discharge to: [facility]     Precertification completed: NA [facility] Exemption Notification (HENS) completed: NA LTC    New Durable Medical Equipment ordered/agency: [facility]    Transportation:    Medical Transport explained to pt/family. Pt/family voice no agency preference. Agency used: [facility] care    time: 7pm    Ambulance form completed: Yes    Confirmed discharge plan with: [facility] and dtr via phone     Facility/Agency, name:[address]/[facility]      [phone]        SHELBY/AVS faxed [phone]    Phone number for report to facility: [phone]      RN, name: Golden Lovell RN     Note: Discharging nurse to complete SHELBY, reconcile AVS, and place final copy with patient's discharge packet. RN to ensure that written prescriptions for  Level II medications are sent with patient to the facility as per protocol.   SUREKHA Duval

## 2021-03-03 NOTE — DISCHARGE INSTR - COC
Continuity of Care Form    Patient Name: Pipo Ferreira   :  1940  MRN:  [id number]    Admit date:  2021  Discharge date:  3/3/2021    Code Status Order: DNR-CCA   Advance Directives:   [facility] Documentation       Date/Time Healthcare Directive Type of Healthcare Directive Copy in [address] Agent's Name Healthcare Agent's Phone Number    [phone]  Yes, patient has an advance directive for healthcare treatment  Living will;Durable power of  for health care  No, copy requested from family  Adult Children  debo  [phone]            Admitting Physician:  Mason Austin MD  PCP: Deborah Givens    Discharging Nurse: [facility]/Room#: 7217/2907-14  Discharging Unit Phone Number: [phone]    Emergency Contact:   Extended Emergency Contact Information  Primary Emergency Contact: [address] Phone: [phone]  Mobile Phone: [phone]  Relation: Child  Secondary Emergency Contact: Ce Licea Phone: [phone]  Mobile Phone: [phone]  Relation: Child    Past Surgical History:  Past Surgical History:   Procedure Laterality Date    ABDOMEN SURGERY      ACHILLES TENDON SURGERY  12    LEFT ACHILLES DEBRIDEMENT, HAGLUNDS AND RETROCALCANEAL BURSA EXCISION WITH FLEXOR HALLUS LONGUS TENDON TRANSFER WITH BLOCK FOR PAIN CONTROL    APPENDECTOMY      CARDIAC CATHETERIZATION  14    CARDIAC CATHETERIZATION  2020    Non Obs CAD, medical management    CARPAL TUNNEL RELEASE      both hands    CHOLECYSTECTOMY      COLONOSCOPY      COLONOSCOPY      COLONOSCOPY  2/10/2016    CYSTOSCOPY  10/02/2017    DIAGNOSTIC CARDIAC CATH LAB PROCEDURE      ENDOSCOPY, COLON, DIAGNOSTIC      EYE SURGERY      HYSTERECTOMY      HYSTERECTOMY, TOTAL ABDOMINAL      JOINT REPLACEMENT      bilateral knee    KNEE SURGERY      both replaced  OTHER SURGICAL HISTORY  07/09/2018    CYSTOSCOPY, HYDRODISTENTION OF BLADDER WITH INSTILLATION OF    POLYPECTOMY      SHOULDER SURGERY      TOENAIL EXCISION  08/04/2016    right big toe    TONSILLECTOMY      TUBAL LIGATION      UPPER GASTROINTESTINAL ENDOSCOPY  10/29/12    gastritis    UPPER GASTROINTESTINAL ENDOSCOPY  2/10/2016    URETHRAL STRICTURE DILATATION         Immunization History:   Immunization History   Administered Date(s) Administered    Influenza Vaccine, unspecified formulation 09/21/2015    Pneumococcal Conjugate 13-valent ([id number]) 09/21/2015    Pneumococcal Polysaccharide ([id number]) 05/25/2013    Tdap (Boostrix, Adacel) 09/13/2013       Active Problems:  Patient Active Problem List   Diagnosis Code    Arthritis M19.90    Constipation K59.00    Leg edema R60.0    Dizziness R42    Premature atrial beats I49.1    Mixed hyperlipidemia E78.2    SOB (shortness of breath) R06.02    CHF (congestive heart failure) ([facility]) L48.6    Uncomplicated asthma G12.616    Essential hypertension I10    CKD (chronic kidney disease) stage 3, GFR 30-59 ml/min N18.30    Tremor, essential G25.0    Hypersomnia G47.10    Severe obstructive sleep apnea G47.33    Persistent disorder of initiating or maintaining sleep G47.00    Restless legs syndrome (RLS) G25.81    Sensory hearing loss, bilateral H90.3    Anxiety and depression F41.9, F32.9    Peroneal tendon injury S86.309A    Aortic stenosis, mild I35.0    Facial asymmetry Q67.0    CAD (coronary artery disease) I25.10    Controlled type 2 diabetes mellitus with diabetic neuropathy, without long-term current use of insulin ([facility]) E11.40    Interstitial cystitis N30.10    COPD (chronic obstructive pulmonary disease) ([facility]) J44.9    Gastroesophageal reflux disease K21.9    Lumbar radiculopathy M54.16    Right hip pain R93.303    Metabolic encephalopathy D70.65    Restrictive lung disease J98.4    Acute confusion R41.0  Obesity E66.9    Severe episode of recurrent major depressive disorder, without psychotic features ([facility] [address] F33.2    Arthritis of shoulder region, left M19.012    Acute encephalopathy G93.40    Generalized weakness R53.1    Acute cystitis N30.00    Anemia D64.9    UBALDO (acute kidney injury) ([facility] [address] N17.9    Chest pain R07.9    Unstable angina (HCC) I20.0       Isolation/Infection:   Isolation            No Isolation          Patient Infection Status       Infection Onset Added Last Indicated Last Indicated By Review Planned Expiration Resolved Resolved By    None active    Resolved    COVID-19 Rule Out 12/03/20 12/03/20 12/03/20 COVID-19 (Ordered)   12/03/20 Rule-Out Test Resulted    COVID-19 Rule Out 10/23/20 10/23/20 10/23/20 COVID-19 (Ordered)   10/23/20 Rule-Out Test Resulted    COVID-19 Rule Out 10/20/20 10/20/20 10/20/20 COVID-19 (Ordered)   10/20/20 Rule-Out Test Resulted            Nurse Assessment:  Last Vital Signs: BP (!) 184/82   Pulse 61   Temp 98.2 °F (36.8 °C) (Oral)   Resp 18   Ht 5' 5\" (1.651 m)   Wt 189 lb 2.5 oz (85.8 kg)   SpO2 95%   BMI 31.48 kg/m²     Last documented pain score (0-10 scale): Pain Level: 8  Last Weight:   Wt Readings from Last 1 Encounters:   03/03/21 189 lb 2.5 oz (85.8 kg)     Mental Status:  disoriented and alert    IV Access:  - None    Nursing Mobility/ADLs:  Walking   Dependent  Transfer  Dependent  Bathing  Dependent  Dressing  Dependent  Toileting  Dependent  Feeding  Assisted  Med Admin  Assisted  Med Delivery   prefers mixed with applesauce     Wound Care Documentation and Therapy:        Elimination:  Continence:   · Bowel: Yes  · Bladder: No  Urinary Catheter:  Insertion Date: 3/2/2021   Colostomy/Ileostomy/Ileal Conduit: No       Date of Last BM: 3/2/2021    Intake/Output Summary (Last 24 hours) at 3/3/2021 1113  Last data filed at 3/3/2021 1040  Gross per 24 hour   Intake 240 ml   Output 2350 ml   Net -2110 ml     I/O last 3 completed shifts: In: 600 [P.O.:600]  Out: 1950 [Urine:1950]    Safety Concerns: At Risk for Falls    Impairments/Disabilities:      None    Nutrition Therapy:  Current Nutrition Therapy:   - Oral Diet:  Carb Control 4 carbs/meal (1800kcals/day)    Routes of Feeding: Oral  Liquids: No Restrictions  Daily Fluid Restriction: no  Last Modified Barium Swallow with Video (Video Swallowing Test): not done    Treatments at the Time of Hospital Discharge:   Respiratory Treatments: n/a  Oxygen Therapy:  is not on home oxygen therapy. Ventilator:    - No ventilator support    Rehab Therapies: n/a  Weight Bearing Status/Restrictions: No weight bearing restirctions  Other Medical Equipment (for information only, NOT a DME order):  wheelchair and hospital bed  Other Treatments: n/a    Patient's personal belongings (please select all that are sent with patient):  None    RN SIGNATURE:  Electronically signed by Sana Solorio RN on 3/3/21 at 7:11 PM EST    CASE MANAGEMENT/SOCIAL WORK SECTION    Inpatient Status Date:02/27/2021    Readmission Risk Assessment Score:  Readmission Risk              Risk of Unplanned Readmission:        27         Discharging to Facility/ Monica Delaney JOHANNA Cadena/Delphine    [phone]  / signature: Electronically signed by SUREKHA Dobbs on 3/3/21 at 11:17 AM EST    PHYSICIAN SECTION    Prognosis: Poor    Condition at Discharge: Terminal    Rehab Potential (if transferring to Rehab): Poor    Recommended Labs or Other Treatments After Discharge: N/A    Physician Certification: I certify the above information and transfer of Llia Bell  is necessary for the continuing treatment of the diagnosis listed and that she requires Intermediate Nursing Care for greater 30 days.      Update Admission H&P: No change in H&P    PHYSICIAN SIGNATURE:  Electronically signed by Luis Pierce MD on 3/3/21 at 3:43 PM EST

## 2021-03-04 NOTE — PROGRESS NOTES
Discharge: Pt discharged to [facility] as per order via first care. IV removed. Paperwork provided. Grajeda catheter discharged with pt per MD order. Denied questions.

## 2021-03-04 NOTE — PROGRESS NOTES
Attempted to call report to [facility] in [facility] with the number that was provided by case management and no answer. Voicemail left.

## 2021-04-13 ENCOUNTER — TELEPHONE (OUTPATIENT)
Dept: PULMONOLOGY | Age: 81
End: 2021-04-13

## 2021-04-13 NOTE — TELEPHONE ENCOUNTER
Patient did not show for follow up appointment  with Savilla Kanner on 4/13/21    Same Day Cancellation: NA, patient is in nursing facility    Patient rescheduled:  No    New appointment:     Assessment:       · Severe GRACE. BIPAP 14/8 cmH2O. Optimal compliance and efficacy on review today. · Obesity  · HTN- BP elevated in office  · Asthma and cough followed by Dr. Reginaldo English  · Sleep onset and maintenance insomnia- poor sleep hygiene, leg pain likely contributing-improving  · Fatigue and tiredness during the day-improving         Plan:       · Continue BIPAP 14/8 cm H2O  · Mask fitting by RT in facility or with DME when able. · Heated tubing  · Distilled water in humidifier patient's states nursing home is using drinking water  · Advised to use BiPAP 6-8 hrs at night and during naps. · Replacement of mask, tubing, head straps every 3-6 months or sooner if damaged. · Sleep hygiene  · Cognitive behavioral therapy was again discussed with patient including stimulus control and sleep restriction   · Avoid sedatives, alcohol and caffeinated drinks at bed time. · No driving motorized vehicles or operating heavy machinery while fatigue, drowsy or sleepy. · Weight loss is also recommended as a long-term intervention. · Complications of GRACE if not treated were discussed with patient patient to include systemic hypertension, pulmonary hypertension, cardiovascular morbidities, car accidents and all cause mortality.   · Patient educationregarding sleep tips and CPAP cleaning recommendations   · Continue to follow up with Dr. Reginaldo Englsih as recommended        Follow up 1 year, sooner if needed

## 2021-09-04 ENCOUNTER — APPOINTMENT (OUTPATIENT)
Dept: GENERAL RADIOLOGY | Age: 81
DRG: 689 | End: 2021-09-04
Payer: COMMERCIAL

## 2021-09-04 ENCOUNTER — HOSPITAL ENCOUNTER (INPATIENT)
Age: 81
LOS: 3 days | Discharge: NURSING, REHAB OR LONG-TERM CARE | DRG: 689 | End: 2021-09-07
Attending: EMERGENCY MEDICINE | Admitting: INTERNAL MEDICINE
Payer: COMMERCIAL

## 2021-09-04 DIAGNOSIS — R41.82 ALTERED MENTAL STATUS, UNSPECIFIED ALTERED MENTAL STATUS TYPE: Primary | ICD-10-CM

## 2021-09-04 DIAGNOSIS — N30.00 ACUTE CYSTITIS WITHOUT HEMATURIA: ICD-10-CM

## 2021-09-04 PROBLEM — N39.0 UTI (URINARY TRACT INFECTION): Status: ACTIVE | Noted: 2021-09-04

## 2021-09-04 LAB
A/G RATIO: 0.9 (ref 1.1–2.2)
ALBUMIN SERPL-MCNC: 3.5 G/DL (ref 3.4–5)
ALP BLD-CCNC: 155 U/L (ref 40–129)
ALT SERPL-CCNC: 7 U/L (ref 10–40)
ANION GAP SERPL CALCULATED.3IONS-SCNC: 11 MMOL/L (ref 3–16)
AST SERPL-CCNC: 10 U/L (ref 15–37)
BACTERIA: ABNORMAL /HPF
BASOPHILS ABSOLUTE: 0.1 K/UL (ref 0–0.2)
BASOPHILS RELATIVE PERCENT: 1.1 %
BILIRUB SERPL-MCNC: 0.4 MG/DL (ref 0–1)
BILIRUBIN URINE: NEGATIVE
BLOOD, URINE: ABNORMAL
BUN BLDV-MCNC: 29 MG/DL (ref 7–20)
CALCIUM SERPL-MCNC: 10.3 MG/DL (ref 8.3–10.6)
CHLORIDE BLD-SCNC: 96 MMOL/L (ref 99–110)
CLARITY: ABNORMAL
CO2: 23 MMOL/L (ref 21–32)
COLOR: YELLOW
CREAT SERPL-MCNC: 1.4 MG/DL (ref 0.6–1.2)
EKG ATRIAL RATE: 98 BPM
EKG DIAGNOSIS: NORMAL
EKG P AXIS: 61 DEGREES
EKG P-R INTERVAL: 186 MS
EKG Q-T INTERVAL: 354 MS
EKG QRS DURATION: 94 MS
EKG QTC CALCULATION (BAZETT): 451 MS
EKG R AXIS: -21 DEGREES
EKG T AXIS: 41 DEGREES
EKG VENTRICULAR RATE: 98 BPM
EOSINOPHILS ABSOLUTE: 0.1 K/UL (ref 0–0.6)
EOSINOPHILS RELATIVE PERCENT: 0.7 %
EPITHELIAL CELLS, UA: ABNORMAL /HPF (ref 0–5)
GFR AFRICAN AMERICAN: 44
GFR NON-AFRICAN AMERICAN: 36
GLOBULIN: 3.8 G/DL
GLUCOSE BLD-MCNC: 154 MG/DL (ref 70–99)
GLUCOSE BLD-MCNC: 180 MG/DL (ref 70–99)
GLUCOSE URINE: NEGATIVE MG/DL
HCT VFR BLD CALC: 24.1 % (ref 36–48)
HEMOGLOBIN: 8.4 G/DL (ref 12–16)
KETONES, URINE: NEGATIVE MG/DL
LACTIC ACID, SEPSIS: 0.6 MMOL/L (ref 0.4–1.9)
LEUKOCYTE ESTERASE, URINE: ABNORMAL
LYMPHOCYTES ABSOLUTE: 1.1 K/UL (ref 1–5.1)
LYMPHOCYTES RELATIVE PERCENT: 15.5 %
MCH RBC QN AUTO: 33 PG (ref 26–34)
MCHC RBC AUTO-ENTMCNC: 34.9 G/DL (ref 31–36)
MCV RBC AUTO: 94.5 FL (ref 80–100)
MICROSCOPIC EXAMINATION: YES
MONOCYTES ABSOLUTE: 0.6 K/UL (ref 0–1.3)
MONOCYTES RELATIVE PERCENT: 7.8 %
NEUTROPHILS ABSOLUTE: 5.5 K/UL (ref 1.7–7.7)
NEUTROPHILS RELATIVE PERCENT: 74.9 %
NITRITE, URINE: POSITIVE
PDW BLD-RTO: 13.6 % (ref 12.4–15.4)
PERFORMED ON: ABNORMAL
PH UA: 6 (ref 5–8)
PLATELET # BLD: 264 K/UL (ref 135–450)
PMV BLD AUTO: 8.3 FL (ref 5–10.5)
POTASSIUM REFLEX MAGNESIUM: 4 MMOL/L (ref 3.5–5.1)
PROTEIN UA: ABNORMAL MG/DL
RBC # BLD: 2.55 M/UL (ref 4–5.2)
RBC UA: ABNORMAL /HPF (ref 0–4)
SODIUM BLD-SCNC: 130 MMOL/L (ref 136–145)
SPECIFIC GRAVITY UA: 1.01 (ref 1–1.03)
TOTAL PROTEIN: 7.3 G/DL (ref 6.4–8.2)
TROPONIN: <0.01 NG/ML
URINE REFLEX TO CULTURE: YES
URINE TYPE: ABNORMAL
UROBILINOGEN, URINE: 0.2 E.U./DL
WBC # BLD: 7.3 K/UL (ref 4–11)
WBC UA: >100 /HPF (ref 0–5)

## 2021-09-04 PROCEDURE — 6370000000 HC RX 637 (ALT 250 FOR IP): Performed by: EMERGENCY MEDICINE

## 2021-09-04 PROCEDURE — 99285 EMERGENCY DEPT VISIT HI MDM: CPT

## 2021-09-04 PROCEDURE — 83605 ASSAY OF LACTIC ACID: CPT

## 2021-09-04 PROCEDURE — G0378 HOSPITAL OBSERVATION PER HR: HCPCS

## 2021-09-04 PROCEDURE — 80053 COMPREHEN METABOLIC PANEL: CPT

## 2021-09-04 PROCEDURE — 6360000002 HC RX W HCPCS: Performed by: NURSE PRACTITIONER

## 2021-09-04 PROCEDURE — 93010 ELECTROCARDIOGRAM REPORT: CPT | Performed by: INTERNAL MEDICINE

## 2021-09-04 PROCEDURE — 6370000000 HC RX 637 (ALT 250 FOR IP)

## 2021-09-04 PROCEDURE — 6360000002 HC RX W HCPCS: Performed by: EMERGENCY MEDICINE

## 2021-09-04 PROCEDURE — 87040 BLOOD CULTURE FOR BACTERIA: CPT

## 2021-09-04 PROCEDURE — 87186 SC STD MICRODIL/AGAR DIL: CPT

## 2021-09-04 PROCEDURE — 87086 URINE CULTURE/COLONY COUNT: CPT

## 2021-09-04 PROCEDURE — 96361 HYDRATE IV INFUSION ADD-ON: CPT

## 2021-09-04 PROCEDURE — 84484 ASSAY OF TROPONIN QUANT: CPT

## 2021-09-04 PROCEDURE — 51701 INSERT BLADDER CATHETER: CPT

## 2021-09-04 PROCEDURE — 93005 ELECTROCARDIOGRAM TRACING: CPT | Performed by: EMERGENCY MEDICINE

## 2021-09-04 PROCEDURE — 81001 URINALYSIS AUTO W/SCOPE: CPT

## 2021-09-04 PROCEDURE — 6370000000 HC RX 637 (ALT 250 FOR IP): Performed by: NURSE PRACTITIONER

## 2021-09-04 PROCEDURE — 87077 CULTURE AEROBIC IDENTIFY: CPT

## 2021-09-04 PROCEDURE — 2580000003 HC RX 258: Performed by: EMERGENCY MEDICINE

## 2021-09-04 PROCEDURE — 1200000000 HC SEMI PRIVATE

## 2021-09-04 PROCEDURE — 85025 COMPLETE CBC W/AUTO DIFF WBC: CPT

## 2021-09-04 PROCEDURE — 71045 X-RAY EXAM CHEST 1 VIEW: CPT

## 2021-09-04 PROCEDURE — 96372 THER/PROPH/DIAG INJ SC/IM: CPT

## 2021-09-04 PROCEDURE — 6370000000 HC RX 637 (ALT 250 FOR IP): Performed by: INTERNAL MEDICINE

## 2021-09-04 PROCEDURE — 96365 THER/PROPH/DIAG IV INF INIT: CPT

## 2021-09-04 PROCEDURE — 6360000002 HC RX W HCPCS: Performed by: INTERNAL MEDICINE

## 2021-09-04 PROCEDURE — 96375 TX/PRO/DX INJ NEW DRUG ADDON: CPT

## 2021-09-04 PROCEDURE — 2580000003 HC RX 258: Performed by: NURSE PRACTITIONER

## 2021-09-04 RX ORDER — ALBUTEROL SULFATE 90 UG/1
2 AEROSOL, METERED RESPIRATORY (INHALATION) 4 TIMES DAILY PRN
Status: DISCONTINUED | OUTPATIENT
Start: 2021-09-04 | End: 2021-09-07 | Stop reason: HOSPADM

## 2021-09-04 RX ORDER — DULOXETIN HYDROCHLORIDE 60 MG/1
120 CAPSULE, DELAYED RELEASE ORAL DAILY
Status: DISCONTINUED | OUTPATIENT
Start: 2021-09-04 | End: 2021-09-07 | Stop reason: HOSPADM

## 2021-09-04 RX ORDER — ONDANSETRON 4 MG/1
4 TABLET, ORALLY DISINTEGRATING ORAL EVERY 8 HOURS PRN
Status: DISCONTINUED | OUTPATIENT
Start: 2021-09-04 | End: 2021-09-07 | Stop reason: HOSPADM

## 2021-09-04 RX ORDER — OXYBUTYNIN CHLORIDE 5 MG/1
10 TABLET, EXTENDED RELEASE ORAL DAILY
Status: DISCONTINUED | OUTPATIENT
Start: 2021-09-04 | End: 2021-09-07 | Stop reason: HOSPADM

## 2021-09-04 RX ORDER — CARVEDILOL 25 MG/1
25 TABLET ORAL 2 TIMES DAILY WITH MEALS
Status: DISCONTINUED | OUTPATIENT
Start: 2021-09-04 | End: 2021-09-07 | Stop reason: HOSPADM

## 2021-09-04 RX ORDER — NICOTINE POLACRILEX 4 MG
15 LOZENGE BUCCAL PRN
Status: DISCONTINUED | OUTPATIENT
Start: 2021-09-04 | End: 2021-09-07 | Stop reason: HOSPADM

## 2021-09-04 RX ORDER — DEXTROSE MONOHYDRATE 25 G/50ML
12.5 INJECTION, SOLUTION INTRAVENOUS PRN
Status: DISCONTINUED | OUTPATIENT
Start: 2021-09-04 | End: 2021-09-07 | Stop reason: HOSPADM

## 2021-09-04 RX ORDER — INSULIN GLARGINE 100 [IU]/ML
0.15 INJECTION, SOLUTION SUBCUTANEOUS NIGHTLY
Status: DISCONTINUED | OUTPATIENT
Start: 2021-09-04 | End: 2021-09-07 | Stop reason: HOSPADM

## 2021-09-04 RX ORDER — SODIUM CHLORIDE 0.9 % (FLUSH) 0.9 %
5-40 SYRINGE (ML) INJECTION EVERY 12 HOURS SCHEDULED
Status: DISCONTINUED | OUTPATIENT
Start: 2021-09-04 | End: 2021-09-07 | Stop reason: HOSPADM

## 2021-09-04 RX ORDER — SODIUM CHLORIDE 9 MG/ML
25 INJECTION, SOLUTION INTRAVENOUS PRN
Status: DISCONTINUED | OUTPATIENT
Start: 2021-09-04 | End: 2021-09-07 | Stop reason: HOSPADM

## 2021-09-04 RX ORDER — DEXTROSE MONOHYDRATE 50 MG/ML
100 INJECTION, SOLUTION INTRAVENOUS PRN
Status: DISCONTINUED | OUTPATIENT
Start: 2021-09-04 | End: 2021-09-07 | Stop reason: HOSPADM

## 2021-09-04 RX ORDER — ACETAMINOPHEN 650 MG/1
650 SUPPOSITORY RECTAL ONCE
Status: COMPLETED | OUTPATIENT
Start: 2021-09-04 | End: 2021-09-04

## 2021-09-04 RX ORDER — ASPIRIN 81 MG/1
81 TABLET, CHEWABLE ORAL DAILY
Status: DISCONTINUED | OUTPATIENT
Start: 2021-09-04 | End: 2021-09-07 | Stop reason: HOSPADM

## 2021-09-04 RX ORDER — DONEPEZIL HYDROCHLORIDE 5 MG/1
5 TABLET, FILM COATED ORAL NIGHTLY
Status: DISCONTINUED | OUTPATIENT
Start: 2021-09-04 | End: 2021-09-07 | Stop reason: HOSPADM

## 2021-09-04 RX ORDER — ACETAMINOPHEN 325 MG/1
650 TABLET ORAL ONCE
Status: DISCONTINUED | OUTPATIENT
Start: 2021-09-04 | End: 2021-09-04

## 2021-09-04 RX ORDER — PRAMIPEXOLE DIHYDROCHLORIDE 0.25 MG/1
0.5 TABLET ORAL NIGHTLY
Status: DISCONTINUED | OUTPATIENT
Start: 2021-09-04 | End: 2021-09-07 | Stop reason: HOSPADM

## 2021-09-04 RX ORDER — ISOSORBIDE MONONITRATE 30 MG/1
15 TABLET, EXTENDED RELEASE ORAL DAILY
Status: DISCONTINUED | OUTPATIENT
Start: 2021-09-04 | End: 2021-09-07 | Stop reason: HOSPADM

## 2021-09-04 RX ORDER — ACETAMINOPHEN 325 MG/1
650 TABLET ORAL EVERY 6 HOURS PRN
Status: DISCONTINUED | OUTPATIENT
Start: 2021-09-04 | End: 2021-09-07 | Stop reason: HOSPADM

## 2021-09-04 RX ORDER — PRAVASTATIN SODIUM 20 MG
20 TABLET ORAL NIGHTLY
Status: DISCONTINUED | OUTPATIENT
Start: 2021-09-04 | End: 2021-09-07 | Stop reason: HOSPADM

## 2021-09-04 RX ORDER — MORPHINE SULFATE 2 MG/ML
2 INJECTION, SOLUTION INTRAMUSCULAR; INTRAVENOUS EVERY 4 HOURS PRN
Status: DISCONTINUED | OUTPATIENT
Start: 2021-09-04 | End: 2021-09-07 | Stop reason: HOSPADM

## 2021-09-04 RX ORDER — BUSPIRONE HYDROCHLORIDE 5 MG/1
10 TABLET ORAL DAILY
Status: DISCONTINUED | OUTPATIENT
Start: 2021-09-04 | End: 2021-09-07 | Stop reason: HOSPADM

## 2021-09-04 RX ORDER — SENNA PLUS 8.6 MG/1
1 TABLET ORAL DAILY PRN
Status: DISCONTINUED | OUTPATIENT
Start: 2021-09-04 | End: 2021-09-07 | Stop reason: HOSPADM

## 2021-09-04 RX ORDER — CLONIDINE HYDROCHLORIDE 0.1 MG/1
0.1 TABLET ORAL 3 TIMES DAILY
Status: DISCONTINUED | OUTPATIENT
Start: 2021-09-04 | End: 2021-09-07 | Stop reason: HOSPADM

## 2021-09-04 RX ORDER — 0.9 % SODIUM CHLORIDE 0.9 %
30 INTRAVENOUS SOLUTION INTRAVENOUS ONCE
Status: COMPLETED | OUTPATIENT
Start: 2021-09-04 | End: 2021-09-04

## 2021-09-04 RX ORDER — ACETAMINOPHEN 650 MG/1
650 SUPPOSITORY RECTAL EVERY 6 HOURS PRN
Status: DISCONTINUED | OUTPATIENT
Start: 2021-09-04 | End: 2021-09-07 | Stop reason: HOSPADM

## 2021-09-04 RX ORDER — ONDANSETRON 2 MG/ML
4 INJECTION INTRAMUSCULAR; INTRAVENOUS EVERY 6 HOURS PRN
Status: DISCONTINUED | OUTPATIENT
Start: 2021-09-04 | End: 2021-09-07 | Stop reason: HOSPADM

## 2021-09-04 RX ORDER — SODIUM CHLORIDE 0.9 % (FLUSH) 0.9 %
5-40 SYRINGE (ML) INJECTION PRN
Status: DISCONTINUED | OUTPATIENT
Start: 2021-09-04 | End: 2021-09-07 | Stop reason: HOSPADM

## 2021-09-04 RX ADMIN — ENOXAPARIN SODIUM 40 MG: 40 INJECTION SUBCUTANEOUS at 14:31

## 2021-09-04 RX ADMIN — ACETAMINOPHEN 650 MG: 650 SUPPOSITORY RECTAL at 07:49

## 2021-09-04 RX ADMIN — CARVEDILOL 25 MG: 25 TABLET, FILM COATED ORAL at 18:33

## 2021-09-04 RX ADMIN — SODIUM CHLORIDE, PRESERVATIVE FREE 10 ML: 5 INJECTION INTRAVENOUS at 20:40

## 2021-09-04 RX ADMIN — MORPHINE SULFATE 2 MG: 2 INJECTION, SOLUTION INTRAMUSCULAR; INTRAVENOUS at 22:41

## 2021-09-04 RX ADMIN — PRAVASTATIN SODIUM 20 MG: 20 TABLET ORAL at 20:40

## 2021-09-04 RX ADMIN — CLONIDINE HYDROCHLORIDE 0.1 MG: 0.1 TABLET ORAL at 20:40

## 2021-09-04 RX ADMIN — CEFTRIAXONE SODIUM 1000 MG: 1 INJECTION, POWDER, FOR SOLUTION INTRAMUSCULAR; INTRAVENOUS at 10:10

## 2021-09-04 RX ADMIN — Medication: at 20:40

## 2021-09-04 RX ADMIN — INSULIN GLARGINE 12 UNITS: 100 INJECTION, SOLUTION SUBCUTANEOUS at 22:18

## 2021-09-04 RX ADMIN — PRAMIPEXOLE DIHYDROCHLORIDE 0.5 MG: 0.25 TABLET ORAL at 20:40

## 2021-09-04 RX ADMIN — DONEPEZIL HYDROCHLORIDE 5 MG: 5 TABLET, FILM COATED ORAL at 20:43

## 2021-09-04 RX ADMIN — INSULIN LISPRO 1 UNITS: 100 INJECTION, SOLUTION INTRAVENOUS; SUBCUTANEOUS at 22:16

## 2021-09-04 RX ADMIN — ACETAMINOPHEN 650 MG: 325 TABLET ORAL at 22:22

## 2021-09-04 RX ADMIN — SODIUM CHLORIDE 2448 ML: 9 INJECTION, SOLUTION INTRAVENOUS at 07:48

## 2021-09-04 ASSESSMENT — PAIN SCALES - GENERAL
PAINLEVEL_OUTOF10: 8
PAINLEVEL_OUTOF10: 8

## 2021-09-04 ASSESSMENT — PAIN SCALES - PAIN ASSESSMENT IN ADVANCED DEMENTIA (PAINAD)
TOTALSCORE: 2
BODYLANGUAGE: 1
BREATHING: 0
NEGVOCALIZATION: 0
CONSOLABILITY: 1
FACIALEXPRESSION: 0

## 2021-09-04 ASSESSMENT — ENCOUNTER SYMPTOMS: TACHYPNEA: 1

## 2021-09-04 NOTE — ED NOTES

## 2021-09-04 NOTE — ED NOTES
Spoke to daughter, Elvia De Los Santos, on phone. Updated her on patient condition and plan of care.      Naomy Le RN  09/04/21 [id number]

## 2021-09-04 NOTE — ED NOTES
Patient is active with [facility] [phone]). Please call agency with updates and notify if patient is admitted.      Barbara Garza RN  09/04/21 0027

## 2021-09-04 NOTE — ED NOTES
Spoke with staff at [facility], stated they will relay information about patient being admitted to RN on-call.      César Forman RN  09/04/21 2952

## 2021-09-04 NOTE — H&P
CHOLECYSTECTOMY      COLONOSCOPY      COLONOSCOPY      COLONOSCOPY  2/10/2016    CYSTOSCOPY  10/02/2017    DIAGNOSTIC CARDIAC CATH LAB PROCEDURE      ENDOSCOPY, COLON, DIAGNOSTIC      EYE SURGERY      HYSTERECTOMY      HYSTERECTOMY, TOTAL ABDOMINAL      JOINT REPLACEMENT      bilateral knee    KNEE SURGERY      both replaced    OTHER SURGICAL HISTORY  07/09/2018    CYSTOSCOPY, HYDRODISTENTION OF BLADDER WITH INSTILLATION OF    POLYPECTOMY      SHOULDER SURGERY      TOENAIL EXCISION  08/04/2016    right big toe    TONSILLECTOMY      TUBAL LIGATION      UPPER GASTROINTESTINAL ENDOSCOPY  10/29/12    gastritis    UPPER GASTROINTESTINAL ENDOSCOPY  2/10/2016    URETHRAL STRICTURE DILATATION         Medications Prior to Admission:      Prior to Admission medications    Medication Sig Start Date End Date Taking?  Authorizing Provider   metFORMIN (GLUCOPHAGE-XR) 500 MG extended release tablet Take 1 tablet by mouth daily (with breakfast) 91/6/71   KIMI Solis CNP   lisinopril (PRINIVIL;ZESTRIL) 40 MG tablet Take 40 mg by mouth daily    Historical Provider, MD   busPIRone (BUSPAR) 5 MG tablet 10 mg  10/16/19   Historical Provider, MD   insulin lispro (HUMALOG) 100 UNIT/ML injection vial Inject 0-3 Units into the skin nightly 9/11/19   Lucy Merritt MD   insulin lispro (HUMALOG) 100 UNIT/ML injection vial Inject 0-6 Units into the skin 3 times daily (with meals) 9/11/19   Lucy Merritt MD   pravastatin (PRAVACHOL) 20 MG tablet Take 1 tablet by mouth nightly TAKE ONE TABLET BY MOUTH DAILY 9/11/19   Lucy Merritt MD   cloNIDine (CATAPRES) 0.1 MG tablet Take 1 tablet by mouth 3 times daily  Patient taking differently: Take 0.1 mg by mouth 3 times daily For HTN 9/11/19   Lucy Merritt MD   blood glucose test strips (ACCU-CHEK ARABELLA PLUS) strip TEST BLOOD SUGAR TWICE A DAY 8/21/19   Tiffanie Adkins DO   donepezil (ARICEPT) 5 MG tablet Take 1 tablet by mouth nightly  Patient not taking: sal]    Social History:      The patient currently lives at [address] [place] [address] [place]:   reports that she quit smoking about 46 years ago. Her smoking use included cigarettes. She has a 0.03 pack-year smoking history. She has never used smokeless tobacco.  ETOH:   reports no history of alcohol use. E-Cigarettes/Vaping Use     Questions Responses    E-Cigarette/Vaping Use Never User    Start Date     Passive Exposure     Quit Date     Counseling Given     Comments             Family History:      Reviewed in detail and negative for DM, CAD, Cancer, CVA. Positive as follows:        Problem Relation Age of Onset    Cancer Father         prostate    Heart Disease Mother     Heart Disease Brother     Heart Disease Brother     Heart Disease Sister     Diabetes Sister     Dementia Sister        REVIEW OF SYSTEMS COMPLETED:   Pertinent positives as noted in the HPI. All other systems reviewed and negative. PHYSICAL EXAM PERFORMED:    BP (!) 125/52   Pulse 70   Temp 98.3 °F (36.8 °C) (Oral)   Resp 20   Ht 5' 5\" (1.651 m)   Wt 180 lb (81.6 kg)   SpO2 95%   BMI 29.95 kg/m²     General appearance: Gen weak. No apparent distress, appears stated age and cooperative. HEENT:  Normal cephalic, atraumatic without obvious deformity. Pupils equal, round, and reactive to light. Extra ocular muscles intact. Conjunctivae/corneas clear. Neck: Supple, with full range of motion. No jugular venous distention. Trachea midline. Respiratory:  Normal respiratory effort. Clear to auscultation, bilaterally without Rales/Wheezes/Rhonchi. Cardiovascular:  Irreg, + 3/6 murmur  Abdomen: Soft, non-tender, non-distended with normal bowel sounds. Musculoskeletal:  No clubbing, cyanosis or edema bilaterally. Full range of motion without deformity. Skin: Skin color-pale, No rashes or lesions. Neurologic:  Neurovascularly intact without any focal sensory/motor deficits.  Cranial nerves: II-XII intact, grossly non-focal.  Psychiatric:  Alert, Bs dementia  Capillary Refill: Brisk,3 seconds, normal  Peripheral Pulses: +2 palpable, equal bilaterally       Labs:     Recent Labs     09/04/21  0730   WBC 7.3   HGB 8.4*   HCT 24.1*        Recent Labs     09/04/21  0730   *   K 4.0   CL 96*   CO2 23   BUN 29*   CREATININE 1.4*   CALCIUM 10.3     Recent Labs     09/04/21  0730   AST 10*   ALT 7*   BILITOT 0.4   ALKPHOS 155*     No results for input(s): INR in the last 72 hours. Recent Labs     09/04/21  0730   TROPONINI <0.01       Urinalysis:      Lab Results   Component Value Date    NITRU POSITIVE 09/04/2021    WBCUA >100 09/04/2021    BACTERIA 4+ 09/04/2021    RBCUA see below 09/04/2021    BLOODU SMALL 09/04/2021    SPECGRAV 1.010 09/04/2021    GLUCOSEU Negative 09/04/2021    GLUCOSEU NEGATIVE 03/17/2011       Radiology:     CXR: I have reviewed the CXR with the following interpretation: Negative portable chest x-ray  EKG:  I have reviewed the EKG with the following interpretation: Sinus rhythm with Premature atrial complexes with Aberrant conduction    XR CHEST PORTABLE   Final Result   Negative portable chest x-ray.              ASSESSMENT:    Active Hospital Problems    Diagnosis Date Noted    UTI (urinary tract infection) [N39.0] 49/86/1066    Metabolic encephalopathy [K58.42] 04/13/2018    Controlled type 2 diabetes mellitus with diabetic neuropathy, without long-term current use of insulin ([facility] [address] [E11.40] 08/18/2015    CAD (coronary artery disease) [I25.10] 08/28/2014    Aortic stenosis, mild [I35.0] 02/27/2014    Essential hypertension [I10] 05/23/2013    CKD (chronic kidney disease) stage 3, GFR 30-59 ml/min ([facility] [address] [N18.30] 05/23/2013         PLAN:    UTI  - UA positive Urine culture pending  - Rocephin, prev cx ecoli sensitive to rocephin     CKD3  -I/O  -avoid nephrotoxins  -dose meds to gfr     Acute encephalopathy with baseline dementia  - unclear baseline  - likely due to above  - supportive care   - plan to resume hospice on discharge    Anemia, chronic disease  -normocytic,stable, monitor     DMII  - poorly controlled  - holding home metformin  - continue SSI     Chronic diastolic heart failure  - no evidence of exacerbation  - continue home lisinopril, coreg. Not on diuretics at baseline  -stable CXR     CKD  - Cr at baseline  - continue to monitor     HTN  - well controlled  - continue home regimen     HLD  - continue home statin     Obesity  With Body mass index is 29.95kg/m². Complicating assessment and treatment. Placing patient at risk for multiple co-morbidities as well as early death and contributing to the patient's presentation. Counseled on weight loss.        DVT Prophylaxis: scds  Diet: No diet orders on file  Code Status: Prior    PT/OT Eval Status:     Dispo - pending course       KIMI Gallo - CNP    Thank you Eric Westbrook for the opportunity to be involved in this patient's care. If you have any questions or concerns please feel free to contact me at [phone].

## 2021-09-04 NOTE — PROGRESS NOTES
Pt is alert and oriented to self. Oriented to room to best of ability. Writer called [facility] to get report and medication list, waiting for a call back. Writer called daughter Jatin Pagan and gave update on pt's admission, update on care of plan and daughter stated pt was wheelchair bound . Pt has lunch and is currently eating. Bed alarm on. Bed locked in lowest position; side rails 3/4; call light and bedside table within reach; will continue to monitor.

## 2021-09-04 NOTE — ED PROVIDER NOTES
[address] [facility]  ED  EMERGENCY DEPARTMENT ENCOUNTER      Pt Name: Marcus Plata  MRN: [id number]  Betzaidagfjames 1940  Date of evaluation: 9/4/2021  Provider: Brianne Marte MD    CHIEF COMPLAINT       Chief Complaint   Patient presents with    Fever     per EMS report, pt was found \"less responsive than baseline\" at [facility] (where she resides long term care). Per report from [place] hospice nurse, pt started on antibiotics yesterday for UTI and also received covid vaccine yesterday.  Altered Mental Status         HISTORY OF PRESENT ILLNESS   (Location/Symptom, Timing/Onset, Context/Setting, Quality, Duration, Modifying Factors, Severity)  Note limiting factors. Marcus Plata is a [de-identified] y.o. female with past medical history of hypertension, CHF, CKD, coronary artery disease, diabetes and dementia here today with fever and altered mental status    Patient currently living in a long-term care facility for her underlying comorbidities and dementia and reportedly was just recently started on antibiotics for a urinary tract infection. Today she was febrile more altered and confused than usual and was sent to the emergency department. Due to her current dementia and mental status changes she provides no insight to her current clinical condition. No further meaningful information able to be obtained. [facility]    Nursing Notes were reviewed. REVIEW OF SYSTEMS    (2-9 systems for level 4, 10 or more for level 5)     Review of Systems    Please see HPI for pertinent positive and negative review of system findings. A full 10 system ROS was performed and otherwise negative.         PAST MEDICAL HISTORY     Past Medical History:   Diagnosis Date    Asthma     Bronchial asthma     Bursitis 12/2011    ACHILLES TENDON LEFT    Chest pain 12/2020    CHF (congestive heart failure) (HCC)     Constipation     COPD [facility]ronic obstructive pulmonary disease) [facility]     Dementia ([facility] 09/11/2019 unspecificied dementia without behavioral disturbance    Depression     Diverticulitis     Dizziness     GERD (gastroesophageal reflux disease)     Hearing loss     History of blood transfusion     Hyperlipidemia     Hypertension     Leg edema     Lung disease     Osteoarthritis     Sleep apnea     CPAP, SLEEP STUDY 6/28 OVERNIGHT AT HOME    Tinnitus     Type II or unspecified type diabetes mellitus without mention of complication, not stated as uncontrolled     Unspecified cerebral artery occlusion with cerebral infarction     mini stroke         SURGICAL HISTORY       Past Surgical History:   Procedure Laterality Date    ABDOMEN SURGERY      ACHILLES TENDON SURGERY  2/2/12    LEFT ACHILLES DEBRIDEMENT, HAGLUNDS AND RETROCALCANEAL BURSA EXCISION WITH FLEXOR HALLUS LONGUS TENDON TRANSFER WITH BLOCK FOR PAIN CONTROL    APPENDECTOMY      CARDIAC CATHETERIZATION  8/21/14    CARDIAC CATHETERIZATION  12/07/2020    Non Obs CAD, medical management    CARPAL TUNNEL RELEASE      both hands    CHOLECYSTECTOMY      COLONOSCOPY      COLONOSCOPY      COLONOSCOPY  2/10/2016    CYSTOSCOPY  10/02/2017    DIAGNOSTIC CARDIAC CATH LAB PROCEDURE      ENDOSCOPY, COLON, DIAGNOSTIC      EYE SURGERY      HYSTERECTOMY      HYSTERECTOMY, TOTAL ABDOMINAL      JOINT REPLACEMENT      bilateral knee    KNEE SURGERY      both replaced    OTHER SURGICAL HISTORY  07/09/2018    CYSTOSCOPY, HYDRODISTENTION OF BLADDER WITH INSTILLATION OF    POLYPECTOMY      SHOULDER SURGERY      TOENAIL EXCISION  08/04/2016    right big toe    TONSILLECTOMY      TUBAL LIGATION      UPPER GASTROINTESTINAL ENDOSCOPY  10/29/12    gastritis    UPPER GASTROINTESTINAL ENDOSCOPY  2/10/2016    URETHRAL STRICTURE DILATATION           CURRENT MEDICATIONS       Previous Medications    ALBUTEROL SULFATE  (90 BASE) MCG/ACT INHALER    Inhale 2 puffs into the lungs 4 times daily as needed for Wheezing    ASPIRIN 81 MG TABLET    Take products, Flagyl [metronidazole], Macrodantin [nitrofurantoin], Pyridium [phenazopyridine hcl], Sulfa antibiotics, and Urised [methen-jenifer-meth bl-phen sal]    FAMILY HISTORY       Family History   Problem Relation Age of Onset    Cancer Father         prostate    Heart Disease Mother     Heart Disease Brother     Heart Disease Brother     Heart Disease Sister     Diabetes Sister     Dementia Sister           SOCIAL HISTORY       Social History     Socioeconomic History    Marital status:      Spouse name: None    Number of children: 3    Years of education: 15    Highest education level: None   Occupational History    Occupation: retired   Tobacco Use    Smoking status: Former Smoker     Packs/day: 0.25     Years: 0.10     Pack years: 0.02     Types: Cigarettes     Quit date: 1975     Years since quittin.6    Smokeless tobacco: Never Used    Tobacco comment: only smoked for 1 month   Vaping Use    Vaping Use: Never used   Substance and Sexual Activity    Alcohol use: No     Alcohol/week: 0.0 standard drinks    Drug use: No    Sexual activity: Not Currently     Partners: Male   Other Topics Concern    None   Social History Narrative    None     Social Determinants of Health     Financial Resource Strain:     Difficulty of Paying Living Expenses:    Food Insecurity:     Worried About Running Out of Food in the Last Year:     Ran Out of Food in the Last Year:    Transportation Needs:     Lack of Transportation (Medical):      Lack of Transportation (Non-Medical):    Physical Activity:     Days of Exercise per Week:     Minutes of Exercise per Session:    Stress:     Feeling of Stress :    Social Connections:     Frequency of Communication with Friends and Family:     Frequency of Social Gatherings with Friends and Family:     Attends [employer] Services:     Active Member of Clubs or Organizations:     Attends Club or Organization Meetings:     Marital Status: Intimate Partner Violence:     Fear of Current or Ex-Partner:     Emotionally Abused:     Physically Abused:     Sexually Abused:        SCREENINGS    [place] Coma Scale  Eye Opening: To speech  Best Verbal Response: Confused  Best Motor Response: Localizes pain  [place] Coma Scale Score: 12          PHYSICAL EXAM    (up to 7 for level 4, 8 or more for level 5)     ED Triage Vitals [09/04/21 0717]   BP Temp Temp Source Pulse Resp SpO2 Height Weight   (!) 186/75 102.7 °F (39.3 °C) Oral 101 30 92 % 5' 5\" (1.651 m) 180 lb (81.6 kg)       Physical Exam    General appearance: Awake and in no acute distress  Skin:  Warm. Dry. Eye:  Extraocular movements intact. Ears, nose, mouth and throat:  Oral mucosa moist,  Neck:  Trachea midline. Heart: Tachycardic but regular  Perfusion:  intact  Respiratory:  Lungs clear to auscultation bilaterally. Respirations nonlabored. Abdominal:   Non distended. Nontender  Neurological: Awake and alert but disoriented. Will intermittently follow basic commands.   Moves all extremities spontaneously  Musculoskeletal:   Normal ROM, no deformities          Psychiatric:  Normal mood      DIAGNOSTIC RESULTS       Labs Reviewed   CBC WITH AUTO DIFFERENTIAL - Abnormal; Notable for the following components:       Result Value    RBC 2.55 (*)     Hemoglobin 8.4 (*)     Hematocrit 24.1 (*)     All other components within normal limits    Narrative:     Performed at:  [address]   Phone [phone]   COMPREHENSIVE METABOLIC PANEL W/ REFLEX TO MG FOR LOW K - Abnormal; Notable for the following components:    Sodium 130 (*)     Chloride 96 (*)     Glucose 154 (*)     BUN 29 (*)     CREATININE 1.4 (*)     GFR Non- 36 (*)     GFR African American 44 (*)     Albumin/Globulin Ratio 0.9 (*)     Alkaline Phosphatase 155 (*)     ALT 7 (*)     AST 10 (*)     All other components within normal limits Narrative:     Performed at:  [address], [facility]   Phone [phone]   URINE RT REFLEX TO CULTURE - Abnormal; Notable for the following components:    Clarity, UA SL CLOUDY (*)     Blood, Urine SMALL (*)     Protein, UA TRACE (*)     Nitrite, Urine POSITIVE (*)     Leukocyte Esterase, Urine LARGE (*)     All other components within normal limits    Narrative:     Performed at:  [address], [facility]   Phone [phone]   MICROSCOPIC URINALYSIS - Abnormal; Notable for the following components:    WBC, UA >100 (*)     RBC, UA see below (*)     Bacteria, UA 4+ (*)     All other components within normal limits    Narrative:     Performed at:  [address], [facility]   Phone [phone]   CULTURE, BLOOD 1   CULTURE, BLOOD 2   CULTURE, URINE   TROPONIN    Narrative:     Performed at:  [address], [facility]   Phone [phone]   LACTATE, SEPSIS    Narrative:     Performed at:  [address], [facility]   Phone [phone]       Interpretation per the Radiologist below, if obtained/available at the time of this note:    XR CHEST PORTABLE   Final Result   Negative portable chest x-ray. All other labs/imaging were within normal range or not returned as of this dictation.     EMERGENCY DEPARTMENT COURSE and DIFFERENTIAL DIAGNOSIS/MDM:   Vitals:    Vitals:    09/04/21 0754 09/04/21 0845 09/04/21 0958 09/04/21 1014   BP: (!) 175/59 (!) 157/86 (!) 146/60 (!) 142/57   Pulse: 94 91 66 79   Resp: (!) 36 28 16    Temp:  99.9 °F (37.7 °C)     TempSrc:  Oral     SpO2: 91% 92% 92% 94%   Weight:       Height:           EKG: Sinus rhythm with premature atrial complexes rate of 98 bpm.  Left ventricular hypertrophy. Baseline artifact does negatively affect interpretation but no gross ST elevation. When compared to prior EKG from 12/3/2020 no gross change    Patient presents emergency department today with confusion. Here provides no meaningful insight to her current clinical condition. Found to be febrile tachycardic. Ultimate etiology secondary to underlying acute cystitis. Abdomen soft otherwise. Labs more or less consistent with baseline. Tachycardia and fever improved with IV fluids and Tylenol the plan to admit to the hospital for continued IV antibiotics. Given Rocephin here. MDM    CONSULTS     IP CONSULT TO HOSPITALIST    Critical Care:     CRITICAL CARE TIME   Total Critical Care time was 30 minutes, excluding separately reportable procedures. There was a high probability of clinically significant/life threatening deterioration in the patient's condition which required my urgent intervention. This time was spent reviewing the patient chart, interpreting diagnostic/laboratory data, transfusion of large-volume IV fluids and broad-spectrum antibiotics and treatment for sepsis      REASSESSMENT          PROCEDURE     Unless otherwise noted below, none     Procedures      FINAL IMPRESSION      1. Altered mental status, unspecified altered mental status type    2. Acute cystitis without hematuria            DISPOSITION/PLAN   DISPOSITION Decision To Discharge 09/04/2021 10:15:31 AM        PATIENT REFERRED TO:  No follow-up provider specified. DISCHARGE MEDICATIONS:  New Prescriptions    No medications on file     Controlled Substances Monitoring:     No flowsheet data found.     (Please note that portions of this note were completed with a voice recognition program.  Efforts were made to edit the dictations but occasionally words are mis-transcribed.)    Oeflia Nunez MD (electronically signed)  Attending Emergency Physician            Romelia Ballesteros MD  09/04/21 1777

## 2021-09-04 NOTE — ED NOTES
Pt straight cathed for urine sample per destiny Grossman.       Darryl Orozco, LEONOR  09/04/21 2382

## 2021-09-04 NOTE — ED NOTES
Bed: 16  Expected date: 9/9/21  Expected time:   Means of arrival:   Comments:  [address], [facility]  09/04/21 5014

## 2021-09-04 NOTE — VCC REMOTE MONITORING
[facility]  Sepsis bundle.   Thank you,    Chloe Leon 227 RN LYNNE EarlN, Kaveh Kimbrough 20  Callback# [phone]

## 2021-09-04 NOTE — PROGRESS NOTES
Chandana returned message and verified medications with writer over the phone. PATTI Hinton notified that hospice has a form for him to sign and that home medication list has been updated.

## 2021-09-04 NOTE — ED NOTES
Spoke to charge nurse for C[facility] who stated RN, Cassius Lobo, is finishing up a discharge and will be down to take this patient soon.      Gina Piedra RN  09/04/21 8943

## 2021-09-04 NOTE — PROGRESS NOTES
Perfect serve message sent to Lenora Ornelas MD, \"Pt normally takes Humalog at [facility]. Home med list is updated. Are we able to get orders for this and ACHS blood glucose checks? Thanks\", awaiting response.     See new orders

## 2021-09-05 LAB
ANION GAP SERPL CALCULATED.3IONS-SCNC: 10 MMOL/L (ref 3–16)
BUN BLDV-MCNC: 25 MG/DL (ref 7–20)
CALCIUM SERPL-MCNC: 9.5 MG/DL (ref 8.3–10.6)
CHLORIDE BLD-SCNC: 105 MMOL/L (ref 99–110)
CO2: 21 MMOL/L (ref 21–32)
CREAT SERPL-MCNC: 1.3 MG/DL (ref 0.6–1.2)
GFR AFRICAN AMERICAN: 48
GFR NON-AFRICAN AMERICAN: 39
GLUCOSE BLD-MCNC: 137 MG/DL (ref 70–99)
GLUCOSE BLD-MCNC: 159 MG/DL (ref 70–99)
GLUCOSE BLD-MCNC: 165 MG/DL (ref 70–99)
GLUCOSE BLD-MCNC: 186 MG/DL (ref 70–99)
GLUCOSE BLD-MCNC: 242 MG/DL (ref 70–99)
GLUCOSE BLD-MCNC: 97 MG/DL (ref 70–99)
HCT VFR BLD CALC: 22.2 % (ref 36–48)
HEMOGLOBIN: 7.6 G/DL (ref 12–16)
MCH RBC QN AUTO: 32.6 PG (ref 26–34)
MCHC RBC AUTO-ENTMCNC: 34.2 G/DL (ref 31–36)
MCV RBC AUTO: 95.2 FL (ref 80–100)
PDW BLD-RTO: 13.7 % (ref 12.4–15.4)
PERFORMED ON: ABNORMAL
PERFORMED ON: NORMAL
PLATELET # BLD: 237 K/UL (ref 135–450)
PMV BLD AUTO: 8.7 FL (ref 5–10.5)
POTASSIUM REFLEX MAGNESIUM: 3.7 MMOL/L (ref 3.5–5.1)
RBC # BLD: 2.33 M/UL (ref 4–5.2)
SODIUM BLD-SCNC: 136 MMOL/L (ref 136–145)
WBC # BLD: 6.8 K/UL (ref 4–11)

## 2021-09-05 PROCEDURE — G0378 HOSPITAL OBSERVATION PER HR: HCPCS

## 2021-09-05 PROCEDURE — 1200000000 HC SEMI PRIVATE

## 2021-09-05 PROCEDURE — 6360000002 HC RX W HCPCS: Performed by: NURSE PRACTITIONER

## 2021-09-05 PROCEDURE — 2580000003 HC RX 258: Performed by: NURSE PRACTITIONER

## 2021-09-05 PROCEDURE — 96372 THER/PROPH/DIAG INJ SC/IM: CPT

## 2021-09-05 PROCEDURE — 36415 COLL VENOUS BLD VENIPUNCTURE: CPT

## 2021-09-05 PROCEDURE — 80048 BASIC METABOLIC PNL TOTAL CA: CPT

## 2021-09-05 PROCEDURE — 6370000000 HC RX 637 (ALT 250 FOR IP): Performed by: INTERNAL MEDICINE

## 2021-09-05 PROCEDURE — 85027 COMPLETE CBC AUTOMATED: CPT

## 2021-09-05 PROCEDURE — 6370000000 HC RX 637 (ALT 250 FOR IP): Performed by: NURSE PRACTITIONER

## 2021-09-05 PROCEDURE — 96366 THER/PROPH/DIAG IV INF ADDON: CPT

## 2021-09-05 RX ORDER — LANOLIN ALCOHOL/MO/W.PET/CERES
3 CREAM (GRAM) TOPICAL NIGHTLY PRN
Status: DISCONTINUED | OUTPATIENT
Start: 2021-09-06 | End: 2021-09-05

## 2021-09-05 RX ORDER — LANOLIN ALCOHOL/MO/W.PET/CERES
3 CREAM (GRAM) TOPICAL NIGHTLY PRN
Status: DISCONTINUED | OUTPATIENT
Start: 2021-09-05 | End: 2021-09-07 | Stop reason: HOSPADM

## 2021-09-05 RX ADMIN — INSULIN LISPRO 1 UNITS: 100 INJECTION, SOLUTION INTRAVENOUS; SUBCUTANEOUS at 08:14

## 2021-09-05 RX ADMIN — DULOXETINE HYDROCHLORIDE 120 MG: 60 CAPSULE, DELAYED RELEASE ORAL at 08:02

## 2021-09-05 RX ADMIN — OXYBUTYNIN CHLORIDE 10 MG: 5 TABLET, EXTENDED RELEASE ORAL at 08:02

## 2021-09-05 RX ADMIN — INSULIN LISPRO 1 UNITS: 100 INJECTION, SOLUTION INTRAVENOUS; SUBCUTANEOUS at 13:17

## 2021-09-05 RX ADMIN — INSULIN LISPRO 4 UNITS: 100 INJECTION, SOLUTION INTRAVENOUS; SUBCUTANEOUS at 13:16

## 2021-09-05 RX ADMIN — BUSPIRONE HYDROCHLORIDE 10 MG: 5 TABLET ORAL at 08:02

## 2021-09-05 RX ADMIN — CARVEDILOL 25 MG: 25 TABLET, FILM COATED ORAL at 08:17

## 2021-09-05 RX ADMIN — ISOSORBIDE MONONITRATE 15 MG: 30 TABLET, EXTENDED RELEASE ORAL at 08:03

## 2021-09-05 RX ADMIN — CLONIDINE HYDROCHLORIDE 0.1 MG: 0.1 TABLET ORAL at 22:47

## 2021-09-05 RX ADMIN — PRAMIPEXOLE DIHYDROCHLORIDE 0.5 MG: 0.25 TABLET ORAL at 22:47

## 2021-09-05 RX ADMIN — INSULIN LISPRO 4 UNITS: 100 INJECTION, SOLUTION INTRAVENOUS; SUBCUTANEOUS at 17:39

## 2021-09-05 RX ADMIN — SODIUM CHLORIDE 25 ML: 9 INJECTION, SOLUTION INTRAVENOUS at 17:37

## 2021-09-05 RX ADMIN — INSULIN LISPRO 1 UNITS: 100 INJECTION, SOLUTION INTRAVENOUS; SUBCUTANEOUS at 22:53

## 2021-09-05 RX ADMIN — CLONIDINE HYDROCHLORIDE 0.1 MG: 0.1 TABLET ORAL at 13:16

## 2021-09-05 RX ADMIN — SODIUM CHLORIDE, PRESERVATIVE FREE 10 ML: 5 INJECTION INTRAVENOUS at 08:13

## 2021-09-05 RX ADMIN — INSULIN LISPRO 4 UNITS: 100 INJECTION, SOLUTION INTRAVENOUS; SUBCUTANEOUS at 08:14

## 2021-09-05 RX ADMIN — CEFTRIAXONE SODIUM 1000 MG: 1 INJECTION, POWDER, FOR SOLUTION INTRAMUSCULAR; INTRAVENOUS at 17:39

## 2021-09-05 RX ADMIN — SODIUM CHLORIDE, PRESERVATIVE FREE 10 ML: 5 INJECTION INTRAVENOUS at 22:47

## 2021-09-05 RX ADMIN — INSULIN GLARGINE 12 UNITS: 100 INJECTION, SOLUTION SUBCUTANEOUS at 22:54

## 2021-09-05 RX ADMIN — DONEPEZIL HYDROCHLORIDE 5 MG: 5 TABLET, FILM COATED ORAL at 22:46

## 2021-09-05 RX ADMIN — ASPIRIN 81 MG: 81 TABLET, CHEWABLE ORAL at 08:03

## 2021-09-05 RX ADMIN — Medication 3 MG: at 22:47

## 2021-09-05 RX ADMIN — CLONIDINE HYDROCHLORIDE 0.1 MG: 0.1 TABLET ORAL at 08:03

## 2021-09-05 RX ADMIN — CARVEDILOL 25 MG: 25 TABLET, FILM COATED ORAL at 16:18

## 2021-09-05 RX ADMIN — ENOXAPARIN SODIUM 40 MG: 40 INJECTION SUBCUTANEOUS at 08:13

## 2021-09-05 RX ADMIN — PRAVASTATIN SODIUM 20 MG: 20 TABLET ORAL at 22:46

## 2021-09-05 NOTE — PROGRESS NOTES
Pt assessment completed and charted. VSS. Pt alert and oriented to self only. Pt repositioned Q2 hours. Pt has purewick in place for incontinence. Pt has some confusion after being reoriented. Pt [place], usually wears hearing aids but states that they are at home. Bed check active for safety. Bed in lowest position and wheels locked. Call light within reach. Bedside table within reach. Non-skid footwear in place. Pt denies any other needs at this time. Will continue to monitor.

## 2021-09-05 NOTE — PROGRESS NOTES
Pt is alert with some confusion. Able to engage in conversation and cooperative. Appetite good. Pt ate % of breakfast and lunch and 26-50% of dinner. Taking in PO fluids. Incontinent of urine, pure wick in place. Call light within reach; will continue to monitor.

## 2021-09-05 NOTE — PROGRESS NOTES
Perfect serve message sent to Kenia Noland MD, \"Pt c/o abdominal pain and moaning. Are we able to get a prn?\", awaiting response.     Provider response \"please check bladder scan\"    See new orders    Message sent \"bladder scan shows 231 ml\"    Provider aware of bladder scan volume, no new orders

## 2021-09-05 NOTE — PROGRESS NOTES
Hospitalist Progress Note      PCP: Jak Ferreira    Date of Admission: 9/4/2021    Chief Complaint: AMS/UTI    Hospital Course: [de-identified] y.o. female with a PMH of HTN, HLD, AVS, Obesity, DM, CHF and CKD who presented to [facility] with altered mental status. The patient is a poor historian d/t AMS, history obtained from the review of EMR. Hx of previous UTI, Today: UA bld, cloudy, +nitirites, lg leuks, started on Rocephin, WBC 7.3, LA 0.6,  IV fluid bolus in ED. Patient is DNR-CCA. Patient alert, resting in NAD, gen weak. Subjective: Alert today, no complaints, resting in nad      Medications:  Reviewed    Infusion Medications    sodium chloride      dextrose       Scheduled Medications    aspirin  81 mg Oral Daily    carvedilol  25 mg Oral BID WC    cloNIDine  0.1 mg Oral TID    pravastatin  20 mg Oral Nightly    sodium chloride flush  5-40 mL IntraVENous 2 times per day    enoxaparin  40 mg SubCUTAneous Daily    busPIRone  10 mg Oral Daily    donepezil  5 mg Oral Nightly    DULoxetine  120 mg Oral Daily    isosorbide mononitrate  15 mg Oral Daily    oxybutynin  10 mg Oral Daily    pramipexole  0.5 mg Oral Nightly    insulin glargine  0.15 Units/kg SubCUTAneous Nightly    insulin lispro  0.05 Units/kg SubCUTAneous TID WC    insulin lispro  0-6 Units SubCUTAneous TID WC    insulin lispro  0-3 Units SubCUTAneous Nightly     PRN Meds: albuterol sulfate HFA, sodium chloride flush, sodium chloride, ondansetron **OR** ondansetron, acetaminophen **OR** acetaminophen, senna, glucose, dextrose, glucagon (rDNA), dextrose, morphine      Intake/Output Summary (Last 24 hours) at 9/5/2021 1510  Last data filed at 9/5/2021 0903  Gross per 24 hour   Intake 660 ml   Output 800 ml   Net -140 ml       Physical Exam Performed:    /73   Pulse 73   Temp 97.8 °F (36.6 °C) (Oral)   Resp 18   Ht 5' 5\" (1.651 m)   Wt 180 lb (81.6 kg)   SpO2 96%   BMI 29.95 kg/m²     General appearance: Gen weak. No apparent distress, appears stated age and cooperative. HEENT:  Normal cephalic, atraumatic without obvious deformity. Pupils equal, round, and reactive to light. Extra ocular muscles intact. Conjunctivae/corneas clear. Neck: Supple, with full range of motion. No jugular venous distention. Trachea midline. Respiratory:  Normal respiratory effort. Clear to auscultation, bilaterally without Rales/Wheezes/Rhonchi. Cardiovascular:  Irreg, + 3/6 murmur  Abdomen: Soft, non-tender, non-distended with normal bowel sounds. Musculoskeletal:  No clubbing, cyanosis or edema bilaterally. Full range of motion without deformity. Skin: Skin color-pale, No rashes or lesions. Neurologic:  Neurovascularly intact without any focal sensory/motor deficits. Cranial nerves: II-XII intact, grossly non-focal.  Psychiatric:  Alert, Bs dementia  Capillary Refill: Brisk,3 seconds, normal  Peripheral Pulses: +2 palpable, equal bilaterally       Labs:   Recent Labs     09/04/21  0730 09/05/21  0616   WBC 7.3 6.8   HGB 8.4* 7.6*   HCT 24.1* 22.2*    237     Recent Labs     09/04/21  0730 09/05/21  0609   * 136   K 4.0 3.7   CL 96* 105   CO2 23 21   BUN 29* 25*   CREATININE 1.4* 1.3*   CALCIUM 10.3 9.5     Recent Labs     09/04/21  0730   AST 10*   ALT 7*   BILITOT 0.4   ALKPHOS 155*     No results for input(s): INR in the last 72 hours. Recent Labs     09/04/21  0730   TROPONINI <0.01       Urinalysis:      Lab Results   Component Value Date    NITRU POSITIVE 09/04/2021    WBCUA >100 09/04/2021    BACTERIA 4+ 09/04/2021    RBCUA see below 09/04/2021    BLOODU SMALL 09/04/2021    SPECGRAV 1.010 09/04/2021    GLUCOSEU Negative 09/04/2021    GLUCOSEU NEGATIVE 03/17/2011       Radiology:  XR CHEST PORTABLE   Final Result   Negative portable chest x-ray.                  Assessment/Plan:    Active Hospital Problems    Diagnosis     UTI (urinary tract infection) [J29.4]     Metabolic encephalopathy [O13.01]     Controlled type 2 diabetes mellitus with diabetic neuropathy, without long-term current use of insulin ([facility]) [E11.40]     CAD (coronary artery disease) [I25.10]     Aortic stenosis, mild [I35.0]     Essential hypertension [I10]     CKD (chronic kidney disease) stage 3, GFR 30-59 ml/min ([facility]) [N18.30]    UTI  - UA positive Urine culture Klebsiella pneumoniae, sensitivities pending  - Rocephin     CKD3  -I/O  -avoid nephrotoxins  -dose meds to gfr      Acute encephalopathy with baseline dementia  - unclear baseline  - likely due to above  - supportive care   - plan to resume hospice on discharge     Anemia, chronic disease  -normocytic,stable, monitor     DMII  - poorly controlled  - holding home metformin  - continue SSI     Chronic diastolic heart failure  - no evidence of exacerbation  - continue home lisinopril, coreg. Not on diuretics at baseline  -stable CXR     CKD  - Cr at baseline  - continue to monitor     HTN  - well controlled  - continue home regimen     HLD  - continue home statin     Obesity  With Body mass index is 29.95kg/m². Complicating assessment and treatment. Placing patient at risk for multiple co-morbidities as well as early death and contributing to the patient's presentation. Counseled on weight loss.        DVT Prophylaxis: lovenox  Diet: ADULT DIET;  Regular; 4 carb choices (60 gm/meal)  Code Status: DNR-CCA    PT/OT Eval Status:     Dispo - active w [facility], anticipate dc tomorrow    Cassi William APRN - CNP

## 2021-09-05 NOTE — PLAN OF CARE
Problem: Falls - Risk of:  Goal: Will remain free from falls  Description: Will remain free from falls  Outcome: Ongoing  Note: Pt remains safe. Uses call light most of the time to call out for assistance. Bed alarm on. Yellow non skid footwear on. Bed locked in lowest position; side rails 2/4; call light and bedside table within reach of pt.

## 2021-09-05 NOTE — PLAN OF CARE
Pt has absence of new skin breakdown. Pt repositioned Q2 hours. Zinc cream applied for prevention. Purewick in place. Will continue to monitor.   Problem: Skin Integrity:  Goal: Will show no infection signs and symptoms  Description: Will show no infection signs and symptoms  Outcome: Ongoing  Goal: Absence of new skin breakdown  Description: Absence of new skin breakdown  Outcome: Ongoing

## 2021-09-06 LAB
GLUCOSE BLD-MCNC: 108 MG/DL (ref 70–99)
GLUCOSE BLD-MCNC: 125 MG/DL (ref 70–99)
GLUCOSE BLD-MCNC: 132 MG/DL (ref 70–99)
GLUCOSE BLD-MCNC: 236 MG/DL (ref 70–99)
GLUCOSE BLD-MCNC: 98 MG/DL (ref 70–99)
ORGANISM: ABNORMAL
PERFORMED ON: ABNORMAL
PERFORMED ON: NORMAL
URINE CULTURE, ROUTINE: ABNORMAL

## 2021-09-06 PROCEDURE — 96366 THER/PROPH/DIAG IV INF ADDON: CPT

## 2021-09-06 PROCEDURE — 1200000000 HC SEMI PRIVATE

## 2021-09-06 PROCEDURE — 6370000000 HC RX 637 (ALT 250 FOR IP): Performed by: INTERNAL MEDICINE

## 2021-09-06 PROCEDURE — 2580000003 HC RX 258: Performed by: STUDENT IN AN ORGANIZED HEALTH CARE EDUCATION/TRAINING PROGRAM

## 2021-09-06 PROCEDURE — 2580000003 HC RX 258: Performed by: NURSE PRACTITIONER

## 2021-09-06 PROCEDURE — 6360000002 HC RX W HCPCS: Performed by: STUDENT IN AN ORGANIZED HEALTH CARE EDUCATION/TRAINING PROGRAM

## 2021-09-06 PROCEDURE — 6360000002 HC RX W HCPCS: Performed by: NURSE PRACTITIONER

## 2021-09-06 PROCEDURE — 6370000000 HC RX 637 (ALT 250 FOR IP): Performed by: STUDENT IN AN ORGANIZED HEALTH CARE EDUCATION/TRAINING PROGRAM

## 2021-09-06 PROCEDURE — G0378 HOSPITAL OBSERVATION PER HR: HCPCS

## 2021-09-06 PROCEDURE — 96372 THER/PROPH/DIAG INJ SC/IM: CPT

## 2021-09-06 PROCEDURE — 6370000000 HC RX 637 (ALT 250 FOR IP): Performed by: NURSE PRACTITIONER

## 2021-09-06 RX ORDER — LISINOPRIL 20 MG/1
40 TABLET ORAL DAILY
Status: DISCONTINUED | OUTPATIENT
Start: 2021-09-06 | End: 2021-09-07 | Stop reason: HOSPADM

## 2021-09-06 RX ORDER — POLYETHYLENE GLYCOL 3350 17 G/17G
17 POWDER, FOR SOLUTION ORAL DAILY
Status: DISCONTINUED | OUTPATIENT
Start: 2021-09-06 | End: 2021-09-07 | Stop reason: HOSPADM

## 2021-09-06 RX ORDER — CEFDINIR 300 MG/1
300 CAPSULE ORAL EVERY 12 HOURS SCHEDULED
Status: DISCONTINUED | OUTPATIENT
Start: 2021-09-07 | End: 2021-09-07 | Stop reason: HOSPADM

## 2021-09-06 RX ADMIN — ASPIRIN 81 MG: 81 TABLET, CHEWABLE ORAL at 08:51

## 2021-09-06 RX ADMIN — PRAMIPEXOLE DIHYDROCHLORIDE 0.5 MG: 0.25 TABLET ORAL at 21:11

## 2021-09-06 RX ADMIN — CLONIDINE HYDROCHLORIDE 0.1 MG: 0.1 TABLET ORAL at 16:34

## 2021-09-06 RX ADMIN — CLONIDINE HYDROCHLORIDE 0.1 MG: 0.1 TABLET ORAL at 21:11

## 2021-09-06 RX ADMIN — INSULIN GLARGINE 12 UNITS: 100 INJECTION, SOLUTION SUBCUTANEOUS at 21:16

## 2021-09-06 RX ADMIN — ACETAMINOPHEN 650 MG: 325 TABLET ORAL at 22:39

## 2021-09-06 RX ADMIN — CLONIDINE HYDROCHLORIDE 0.1 MG: 0.1 TABLET ORAL at 08:51

## 2021-09-06 RX ADMIN — BUSPIRONE HYDROCHLORIDE 10 MG: 5 TABLET ORAL at 08:51

## 2021-09-06 RX ADMIN — CARVEDILOL 25 MG: 25 TABLET, FILM COATED ORAL at 16:33

## 2021-09-06 RX ADMIN — PRAVASTATIN SODIUM 20 MG: 20 TABLET ORAL at 21:11

## 2021-09-06 RX ADMIN — CEFTRIAXONE SODIUM 1000 MG: 1 INJECTION, POWDER, FOR SOLUTION INTRAMUSCULAR; INTRAVENOUS at 16:39

## 2021-09-06 RX ADMIN — OXYBUTYNIN CHLORIDE 10 MG: 5 TABLET, EXTENDED RELEASE ORAL at 08:51

## 2021-09-06 RX ADMIN — POLYETHYLENE GLYCOL 3350 17 G: 17 POWDER, FOR SOLUTION ORAL at 16:34

## 2021-09-06 RX ADMIN — ISOSORBIDE MONONITRATE 15 MG: 30 TABLET, EXTENDED RELEASE ORAL at 08:51

## 2021-09-06 RX ADMIN — LISINOPRIL 40 MG: 20 TABLET ORAL at 16:34

## 2021-09-06 RX ADMIN — INSULIN LISPRO 4 UNITS: 100 INJECTION, SOLUTION INTRAVENOUS; SUBCUTANEOUS at 08:52

## 2021-09-06 RX ADMIN — INSULIN LISPRO 4 UNITS: 100 INJECTION, SOLUTION INTRAVENOUS; SUBCUTANEOUS at 11:46

## 2021-09-06 RX ADMIN — DONEPEZIL HYDROCHLORIDE 5 MG: 5 TABLET, FILM COATED ORAL at 21:11

## 2021-09-06 RX ADMIN — DULOXETINE HYDROCHLORIDE 120 MG: 60 CAPSULE, DELAYED RELEASE ORAL at 08:51

## 2021-09-06 RX ADMIN — SODIUM CHLORIDE, PRESERVATIVE FREE 10 ML: 5 INJECTION INTRAVENOUS at 08:52

## 2021-09-06 RX ADMIN — SODIUM CHLORIDE, PRESERVATIVE FREE 10 ML: 5 INJECTION INTRAVENOUS at 21:19

## 2021-09-06 RX ADMIN — INSULIN LISPRO 1 UNITS: 100 INJECTION, SOLUTION INTRAVENOUS; SUBCUTANEOUS at 21:15

## 2021-09-06 RX ADMIN — INSULIN LISPRO 4 UNITS: 100 INJECTION, SOLUTION INTRAVENOUS; SUBCUTANEOUS at 17:37

## 2021-09-06 RX ADMIN — ENOXAPARIN SODIUM 40 MG: 40 INJECTION SUBCUTANEOUS at 08:51

## 2021-09-06 RX ADMIN — CARVEDILOL 25 MG: 25 TABLET, FILM COATED ORAL at 08:51

## 2021-09-06 ASSESSMENT — PAIN SCALES - GENERAL: PAINLEVEL_OUTOF10: 8

## 2021-09-06 NOTE — PROGRESS NOTES
PVR is 345. Notified Dr. Belinda Das. He recommended to hold ditropan if not already given today. Today's dose was given at 0851. He states to monitor and no new orders at this time.

## 2021-09-06 NOTE — PROGRESS NOTES
Pt assessment completed and charted. VSS. Pt alert with some confusion. Moisturizer applied to psoriasis areas on scalp, pt reporting itching. Pt repositioned Q2 hours. Pure wick in place for incontinence. Pt having snacks. Bed check active for safety. Bed in lowest position and wheels locked. Call light within reach. Bedside table within reach. Non-skid footwear in place. Pt denies any other needs at this time. Will continue to monitor.

## 2021-09-06 NOTE — PLAN OF CARE
Pt denies pain at this time. Pt resting comfortably. Will continue to monitor.   Problem: Pain:  Goal: Pain level will decrease  Description: Pain level will decrease  Outcome: Ongoing  Goal: Control of acute pain  Description: Control of acute pain  Outcome: Ongoing

## 2021-09-06 NOTE — CARE COORDINATION
CASE MANAGEMENT INITIAL ASSESSMENT      Reviewed chart and completed assessment via telephone with:daughter Swati  Explained Case Management role/services. Primary contact information:Swati    [facility] Decision Maker :           Can this person be reached and be able to respond quickly, such as within a few minutes or hours? Yes    Admit date/status:9/4/21  Diagnosis:UTI   Is this a Readmission?:  No      Insurance:sawyer   Precert required for SNF: No       3 night stay required: No    Living arrangements, Adls, care needs, prior to admission:lives LTC at [address] at home:  Walker__Cane__RTS__ BSC__Shower Chair__  02__ HHN__ CPAP__  BiPap__  Hospital Bed__ W/C___ Other__________    Services in the home and/or outpatient, prior to admission:LTC    Dialysis Facility (if applicable)   · Name:  · Address:  · Dialysis Schedule:  · Phone:  · Fax:    PT/OT recs:none    Hospital Exemption Notification (HEN):na    Barriers to discharge:none    Plan/comments:spoke with patients  Daughter. Reported from LTC at [facility] with plans ot return when medically ready. reported is w/c dependent and can feed self. Spoke with [place] at [facility] and is LTC and can return when medically ready.  Ruma Brothers RN      ECOC on chart for MD signature

## 2021-09-06 NOTE — PROGRESS NOTES
Perfect serve message sent to Lenora Ornelas MD, \"Pt requesting prn for sleep, can we try melatonin? Pt also c/o itching of her psoriasis on scalp, can we do something for this? \", awaiting response.     See new order

## 2021-09-06 NOTE — PROGRESS NOTES
Pt. Resting in bed. Alert/oriented. Vitals and assessment stable as charted. Denies any pain/nausea or any other discomfort at present time. Repositioned to left side for comfort. Call light in reach. Will continue to monitor.

## 2021-09-07 VITALS
HEIGHT: 65 IN | WEIGHT: 180 LBS | BODY MASS INDEX: 29.99 KG/M2 | SYSTOLIC BLOOD PRESSURE: 128 MMHG | HEART RATE: 67 BPM | TEMPERATURE: 97.4 F | OXYGEN SATURATION: 96 % | RESPIRATION RATE: 16 BRPM | DIASTOLIC BLOOD PRESSURE: 67 MMHG

## 2021-09-07 LAB
ANION GAP SERPL CALCULATED.3IONS-SCNC: 11 MMOL/L (ref 3–16)
BUN BLDV-MCNC: 27 MG/DL (ref 7–20)
CALCIUM SERPL-MCNC: 9.2 MG/DL (ref 8.3–10.6)
CHLORIDE BLD-SCNC: 104 MMOL/L (ref 99–110)
CO2: 22 MMOL/L (ref 21–32)
CREAT SERPL-MCNC: 1.4 MG/DL (ref 0.6–1.2)
GFR AFRICAN AMERICAN: 44
GFR NON-AFRICAN AMERICAN: 36
GLUCOSE BLD-MCNC: 108 MG/DL (ref 70–99)
GLUCOSE BLD-MCNC: 112 MG/DL (ref 70–99)
GLUCOSE BLD-MCNC: 120 MG/DL (ref 70–99)
GLUCOSE BLD-MCNC: 152 MG/DL (ref 70–99)
PERFORMED ON: ABNORMAL
POTASSIUM SERPL-SCNC: 4.2 MMOL/L (ref 3.5–5.1)
SARS-COV-2, NAAT: NOT DETECTED
SODIUM BLD-SCNC: 137 MMOL/L (ref 136–145)

## 2021-09-07 PROCEDURE — 6370000000 HC RX 637 (ALT 250 FOR IP): Performed by: NURSE PRACTITIONER

## 2021-09-07 PROCEDURE — 80048 BASIC METABOLIC PNL TOTAL CA: CPT

## 2021-09-07 PROCEDURE — 6370000000 HC RX 637 (ALT 250 FOR IP): Performed by: STUDENT IN AN ORGANIZED HEALTH CARE EDUCATION/TRAINING PROGRAM

## 2021-09-07 PROCEDURE — 87635 SARS-COV-2 COVID-19 AMP PRB: CPT

## 2021-09-07 PROCEDURE — 2580000003 HC RX 258: Performed by: NURSE PRACTITIONER

## 2021-09-07 PROCEDURE — G0378 HOSPITAL OBSERVATION PER HR: HCPCS

## 2021-09-07 PROCEDURE — 6370000000 HC RX 637 (ALT 250 FOR IP): Performed by: INTERNAL MEDICINE

## 2021-09-07 PROCEDURE — 36415 COLL VENOUS BLD VENIPUNCTURE: CPT

## 2021-09-07 PROCEDURE — 96372 THER/PROPH/DIAG INJ SC/IM: CPT

## 2021-09-07 PROCEDURE — 6360000002 HC RX W HCPCS: Performed by: NURSE PRACTITIONER

## 2021-09-07 RX ORDER — CEFDINIR 300 MG/1
300 CAPSULE ORAL EVERY 12 HOURS SCHEDULED
Qty: 12 CAPSULE | Refills: 0
Start: 2021-09-07 | End: 2021-09-13

## 2021-09-07 RX ORDER — POLYETHYLENE GLYCOL 3350 17 G/17G
17 POWDER, FOR SOLUTION ORAL 2 TIMES DAILY
Qty: 510 G | Refills: 0
Start: 2021-09-07 | End: 2021-09-22

## 2021-09-07 RX ADMIN — CLONIDINE HYDROCHLORIDE 0.1 MG: 0.1 TABLET ORAL at 14:46

## 2021-09-07 RX ADMIN — INSULIN LISPRO 4 UNITS: 100 INJECTION, SOLUTION INTRAVENOUS; SUBCUTANEOUS at 10:06

## 2021-09-07 RX ADMIN — BUSPIRONE HYDROCHLORIDE 10 MG: 5 TABLET ORAL at 09:59

## 2021-09-07 RX ADMIN — ASPIRIN 81 MG: 81 TABLET, CHEWABLE ORAL at 09:59

## 2021-09-07 RX ADMIN — CLONIDINE HYDROCHLORIDE 0.1 MG: 0.1 TABLET ORAL at 09:59

## 2021-09-07 RX ADMIN — POLYETHYLENE GLYCOL 3350 17 G: 17 POWDER, FOR SOLUTION ORAL at 10:13

## 2021-09-07 RX ADMIN — ENOXAPARIN SODIUM 40 MG: 40 INJECTION SUBCUTANEOUS at 10:05

## 2021-09-07 RX ADMIN — CARVEDILOL 25 MG: 25 TABLET, FILM COATED ORAL at 10:16

## 2021-09-07 RX ADMIN — SODIUM CHLORIDE, PRESERVATIVE FREE 10 ML: 5 INJECTION INTRAVENOUS at 09:59

## 2021-09-07 RX ADMIN — ISOSORBIDE MONONITRATE 15 MG: 30 TABLET, EXTENDED RELEASE ORAL at 09:59

## 2021-09-07 RX ADMIN — INSULIN LISPRO 1 UNITS: 100 INJECTION, SOLUTION INTRAVENOUS; SUBCUTANEOUS at 12:22

## 2021-09-07 RX ADMIN — LISINOPRIL 40 MG: 20 TABLET ORAL at 09:58

## 2021-09-07 RX ADMIN — DULOXETINE HYDROCHLORIDE 120 MG: 60 CAPSULE, DELAYED RELEASE ORAL at 09:58

## 2021-09-07 RX ADMIN — OXYBUTYNIN CHLORIDE 10 MG: 5 TABLET, EXTENDED RELEASE ORAL at 09:59

## 2021-09-07 RX ADMIN — INSULIN LISPRO 4 UNITS: 100 INJECTION, SOLUTION INTRAVENOUS; SUBCUTANEOUS at 12:22

## 2021-09-07 RX ADMIN — CEFDINIR 300 MG: 300 CAPSULE ORAL at 10:04

## 2021-09-07 NOTE — DISCHARGE INSTR - COC
Continuity of Care Form    Patient Name: Cleopatra Allen   :    MRN:  [id number]    Admit date:  2021  Discharge date:  21    Code Status Order: DNR-CCA   Advance Directives:      Admitting Physician:  Rosalinda Blair MD  PCP: Rita Potts    Discharging Nurse: Lluvia Chase RN  Discharging Hospital Unit/Room#: 4588/2146-47  Discharging Unit Phone Number: [phone]    Emergency Contact:   Extended Emergency Contact Information  Primary Emergency Contact: Rhina Grewal[address] Phone: [phone]  Mobile Phone: [phone]  Relation: Child  Secondary Emergency Contact: Vinayak [address] Phone: [phone]  Mobile Phone: [phone]  Relation: Child    Past Surgical History:  Past Surgical History:   Procedure Laterality Date    ABDOMEN SURGERY      ACHILLES TENDON SURGERY  12    LEFT ACHILLES DEBRIDEMENT, HAGLUNDS AND RETROCALCANEAL BURSA EXCISION WITH FLEXOR HALLUS LONGUS TENDON TRANSFER WITH BLOCK FOR PAIN CONTROL    APPENDECTOMY      CARDIAC CATHETERIZATION  14    CARDIAC CATHETERIZATION  2020    Non Obs CAD, medical management    CARPAL TUNNEL RELEASE      both hands    CHOLECYSTECTOMY      COLONOSCOPY      COLONOSCOPY      COLONOSCOPY  2/10/2016    CYSTOSCOPY  10/02/2017    DIAGNOSTIC CARDIAC CATH LAB PROCEDURE      ENDOSCOPY, COLON, DIAGNOSTIC      EYE SURGERY      HYSTERECTOMY      HYSTERECTOMY, TOTAL ABDOMINAL      JOINT REPLACEMENT      bilateral knee    KNEE SURGERY      both replaced    OTHER SURGICAL HISTORY  2018    CYSTOSCOPY, HYDRODISTENTION OF BLADDER WITH INSTILLATION OF    POLYPECTOMY      SHOULDER SURGERY      TOENAIL EXCISION  2016    right big toe    TONSILLECTOMY      TUBAL LIGATION      UPPER GASTROINTESTINAL ENDOSCOPY  10/29/12    gastritis    UPPER GASTROINTESTINAL ENDOSCOPY  2/10/2016    URETHRAL STRICTURE DILATATION         Immunization History:   Immunization History Administered Date(s) Administered    Influenza Vaccine, unspecified formulation 09/21/2015    Pneumococcal Conjugate 13-valent (Kettckc22) 09/21/2015    Pneumococcal Polysaccharide ([id number]) 05/25/2013    Tdap (Boostrix, Adacel) 09/13/2013       Active Problems:  Patient Active Problem List   Diagnosis Code    Arthritis M19.90    Constipation K59.00    Leg edema R60.0    Dizziness R42    Premature atrial beats I49.1    Mixed hyperlipidemia E78.2    SOB (shortness of breath) R06.02    CHF (congestive heart failure) ([facility]) C44.8    Uncomplicated asthma R89.732    Essential hypertension I10    CKD (chronic kidney disease) stage 3, GFR 30-59 ml/min ([facility]) N18.30    Tremor, essential G25.0    Hypersomnia G47.10    Severe obstructive sleep apnea G47.33    Persistent disorder of initiating or maintaining sleep G47.00    Restless legs syndrome (RLS) G25.81    Sensory hearing loss, bilateral H90.3    Anxiety and depression F41.9, F32.9    Peroneal tendon injury S86.309A    Aortic stenosis, mild I35.0    Facial asymmetry Q67.0    CAD (coronary artery disease) I25.10    Controlled type 2 diabetes mellitus with diabetic neuropathy, without long-term current use of insulin ([facility]) E11.40    Interstitial cystitis N30.10    COPD (chronic obstructive pulmonary disease) ([facility]) J44.9    Gastroesophageal reflux disease K21.9    Lumbar radiculopathy M54.16    Right hip pain L24.577    Metabolic encephalopathy K21.44    Restrictive lung disease J98.4    Acute confusion R41.0    Obesity E66.9    Severe episode of recurrent major depressive disorder, without psychotic features ([facility] F33.2    Arthritis of shoulder region, left M19.012    Acute encephalopathy G93.40    Generalized weakness R53.1    Acute cystitis N30.00    Anemia D64.9    UBALDO (acute kidney injury) ([facility] [facility] N17.9    Chest pain R07.9    Unstable angina ([facility]) I20.0    UTI (urinary tract infection) N39.0       Isolation/Infection: Isolation            No Isolation          Patient Infection Status       Infection Onset Added Last Indicated Last Indicated By Review Planned Expiration Resolved Resolved By    None active    Resolved    COVID-19 Rule Out 12/03/20 12/03/20 12/03/20 COVID-19 (Ordered)   12/03/20 Rule-Out Test Resulted    COVID-19 Rule Out 10/23/20 10/23/20 10/23/20 COVID-19 (Ordered)   10/23/20 Rule-Out Test Resulted    COVID-19 Rule Out 10/20/20 10/20/20 10/20/20 COVID-19 (Ordered)   10/20/20 Rule-Out Test Resulted            Nurse Assessment:  Last Vital Signs: BP (!) 162/61   Pulse 66   Temp 97.8 °F (36.6 °C) (Oral)   Resp 20   Ht 5' 5\" (1.651 m)   Wt 180 lb (81.6 kg)   SpO2 99%   BMI 29.95 kg/m²     Last documented pain score (0-10 scale): Pain Level: 8  Last Weight:   Wt Readings from Last 1 Encounters:   09/04/21 180 lb (81.6 kg)     Mental Status:  oriented    IV Access:  - None    Nursing Mobility/ADLs:  Walking   Dependent  Transfer  Dependent  Bathing  Dependent  Dressing  Dependent  Toileting  Dependent  Feeding  Dependent  Med Admin  Assisted  Med Delivery   prefers mixed with Apple sauce    Wound Care Documentation and Therapy:        Elimination:  Continence:   · Bowel: No  · Bladder: No  Urinary Catheter: None   Colostomy/Ileostomy/Ileal Conduit: No       Date of Last BM: 09/07/21    Intake/Output Summary (Last 24 hours) at 9/7/2021 1305  Last data filed at 9/7/2021 0545  Gross per 24 hour   Intake 100 ml   Output 1850 ml   Net -1750 ml     I/O last 3 completed shifts: In: 100 [P.O.:100]  Out: 1850 [Urine:1850]    Safety Concerns:      At Risk for Falls    Impairments/Disabilities:      Vision and Hearing    Nutrition Therapy:  Current Nutrition Therapy:   - Oral Diet:  Carb Control 4 carbs/meal (1800kcals/day) and Low Fat    Routes of Feeding: Oral  Liquids: No Restrictions  Daily Fluid Restriction: no  Last Modified Barium Swallow with Video (Video Swallowing Test): not done    Treatments at the Time of Hospital Discharge:   Respiratory Treatments: None  Oxygen Therapy:  is not on home oxygen therapy. Ventilator:     - No ventilator support    Rehab Therapies: Physical Therapy and Occupational Therapy  Weight Bearing Status/Restrictions: No weight bearing restirctions  Other Medical Equipment (for information only, NOT a DME order):  wheelchair and Lift  Other Treatments: None    Patient's personal belongings (please select all that are sent with patient):  [address] house dress    RN SIGNATURE:  Electronically signed by James Kerns RN on 9/7/21 at 2:34 PM EDT    CASE MANAGEMENT/SOCIAL WORK SECTION    Inpatient Status Date: 9/4/21    Readmission Risk Assessment Score:  Readmission Risk              Risk of Unplanned Readmission:  0           Discharging to Facility/ Agency    [facility] [address]/[place]    [phone]   ·     / signature: Electronically signed by Anabelle Andrade RN on 9/7/21 at 1:05 PM EDT    PHYSICIAN SECTION    Prognosis: Good    Condition at Discharge: Stable    Rehab Potential (if transferring to Rehab): Good    Recommended Labs or Other Treatments After Discharge:   Recommended Follow-up, Labs or Other Treatments After Discharge:               Physician Certification: I certify the above information and transfer of Nia Stallings  is necessary for the continuing treatment of the diagnosis listed and that she requires [facility] for greater 30 days.      Update Admission H&P: No change in H&P    PHYSICIAN SIGNATURE:  Electronically signed by Gallito Snell MD on 9/7/21 at 2:17 PM EDT

## 2021-09-07 NOTE — CARE COORDINATION
Spoke with Dr Glenda Tee. Stated patient likely to d/c today. CASE MANAGEMENT DISCHARGE SUMMARY      Discharge to: LTC at [facility]      IMM given: (date) on admission    New Durable Medical Equipment ordered/agency: none    Transportation:    Family/car:   Medical Transport explained to [employer]. Pt/family voice no agency preference. [facility]   time:   Ambulance form completed: Yes    Confirmed discharge plan with:     Patient: yes     Family:  Yes Swati message left. With d/c info and nursing contact info. Facility/Agency, name:  [facility] faxed when available to [phone]   Phone number for report to facility:  [phone]     RN, name: Jorge Erazo    Note: Discharging nurse to complete [facility], reconcile [facility], and place final copy with patient's discharge packet. RN to ensure that written prescriptions for  Level II medications are sent with patient to the facility as per protocol. Georgia Llanos RN  Spoke with [facility] updated on transport time reported was active with her at [facility].  Georgia Llanos RN

## 2021-09-07 NOTE — PROGRESS NOTES
Pt alert and oriented, forgetful. VSS. Assessment completed as charted. Pt repositioned for comfort. Resting in bed, denies any pain or needs at present time. Call light and bedside table within reach. Bed locked and in lowest position.

## 2021-09-07 NOTE — PROGRESS NOTES
m)   Wt 180 lb (81.6 kg)   SpO2 97%   BMI 29.95 kg/m²     General appearance: No apparent distress, appears stated age and cooperative. HEENT: Pupils equal, round, and reactive to light. Conjunctivae/corneas clear. Neck: Supple, with full range of motion. No jugular venous distention. Trachea midline. Respiratory:  Normal respiratory effort. Clear to auscultation, bilaterally without Rales/Wheezes/Rhonchi. Cardiovascular: Regular rate and rhythm with normal S1/S2 without murmurs, rubs or gallops. Abdomen: Soft, non-tender, non-distended with normal bowel sounds. Musculoskeletal: No clubbing, cyanosis or edema bilaterally. Full range of motion without deformity. Skin: Skin color, texture, turgor normal.  No rashes or lesions. Neurologic:  Neurovascularly intact without any focal sensory/motor deficits. Cranial nerves: II-XII intact, grossly non-focal.  Psychiatric: Alert and oriented, thought content appropriate, normal insight  Capillary Refill: Brisk,3 seconds, normal   Peripheral Pulses: +2 palpable, equal bilaterally       Labs:   Recent Labs     09/04/21  0730 09/05/21  0616   WBC 7.3 6.8   HGB 8.4* 7.6*   HCT 24.1* 22.2*    237     Recent Labs     09/04/21  0730 09/05/21  0609   * 136   K 4.0 3.7   CL 96* 105   CO2 23 21   BUN 29* 25*   CREATININE 1.4* 1.3*   CALCIUM 10.3 9.5     Recent Labs     09/04/21  0730   AST 10*   ALT 7*   BILITOT 0.4   ALKPHOS 155*     No results for input(s): INR in the last 72 hours. Recent Labs     09/04/21  0730   TROPONINI <0.01       Urinalysis:      Lab Results   Component Value Date    NITRU POSITIVE 09/04/2021    WBCUA >100 09/04/2021    BACTERIA 4+ 09/04/2021    RBCUA see below 09/04/2021    BLOODU SMALL 09/04/2021    SPECGRAV 1.010 09/04/2021    GLUCOSEU Negative 09/04/2021    GLUCOSEU NEGATIVE 03/17/2011       Radiology:  XR CHEST PORTABLE   Final Result   Negative portable chest x-ray.                  Assessment/Plan:    Active Hospital Problems Diagnosis     UTI (urinary tract infection) [J56.1]     Metabolic encephalopathy [C68.64]     Controlled type 2 diabetes mellitus with diabetic neuropathy, without long-term current use of insulin ([facility]) [E11.40]     CAD (coronary artery disease) [I25.10]     Aortic stenosis, mild [I35.0]     Essential hypertension [I10]     CKD (chronic kidney disease) stage 3, GFR 30-59 ml/min ([facility]) [N18.30]      Klebsiella complicated UTI- third UTI in 9 months. No gross urologic abnormality however has been noted to have bilateral hydronephrosis on previous imaging. Has demonstrated post void retention this hospital admission. May benefit from outpt urologic eval. In the meantime will continue supportive care with scheduled voiding and oxybutinin. -ceftriaxone 09/04-06, transitioned to oral cefdinir and complete a 7 day course for complicated UTI (p/w fever and AMS)    Dementia delirium- mental status appears to be at baseline. TIIDM  - continue weight based dosing and SSI    Obesity  With Body mass index is 29.95kg/m². Complicating assessment and treatment. Placing patient at risk for multiple co-morbidities as well as early death and contributing to the patient's presentation. Counseled on weight loss.     Continue home medications for HTN, HLDand Diastolic HF. DVT Prophylaxis: enoxparin  Diet: ADULT DIET;  Regular; 4 carb choices (60 gm/meal)  Code Status: DNR-CCA    PT/OT Eval Status: none    Dispo - dispo ready 09/07    Dottie Moncada MD

## 2021-09-07 NOTE — FLOWSHEET NOTE
IC 9.7 conversed with Glenna about how she was feeling. Didn't have much to share, but she appreciated my visit.

## 2021-09-07 NOTE — PROGRESS NOTES
Pt. Discharged to [facility]. Called report to oncoming nurse. All questions answered with call-back number provided. IV catheter removed and was intact. Pt. left floor in stable condition.

## 2021-09-07 NOTE — DISCHARGE SUMMARY
Hospital Medicine Discharge Summary    Patient ID: Ky Wganer      Patient's PCP: Alexi Martinez    Admit Date: 9/4/2021     Discharge Date:   09/07/2021    Admitting Physician: Janes Nice MD     Discharge Physician: Rosanna Marie MD     Discharge Diagnoses: Active Hospital Problems    Diagnosis     UTI (urinary tract infection) [N91.6]     Metabolic encephalopathy [O47.11]     Controlled type 2 diabetes mellitus with diabetic neuropathy, without long-term current use of insulin ([facility]) [E11.40]     CAD (coronary artery disease) [I25.10]     Aortic stenosis, mild [I35.0]     Essential hypertension [I10]     CKD (chronic kidney disease) stage 3, GFR 30-59 ml/min ([facility]) [N18.30]        The patient was seen and examined on day of discharge and this discharge summary is in conjunction with any daily progress note from day of discharge. Hospital Course:  [de-identified] yoF who p/w altered mental status and abdominal pain that was found to have Klebsiella urinary tract infection. Problem based hospital course    Klebsiella complicated UTI- third UTI in 9 months. No gross urologic abnormality however has been noted to have bilateral hydronephrosis on previous imaging. Has demonstrated  post void retention this hospital admission (<400cc x1) though with good UOP. May benefit from outpt urologic eval. In the meantime will continue supportive care with scheduled voiding and oxybutinin. Recommend SNF continue to do serial bladder scan and intermittent cath if retaining >400cc. If occurs x 3 will need urology eval and mendez placement while at SNF. -ceftriaxone 09/04-06, transitioned to oral cefdinir and complete a 7 day course for complicated UTI (p/w fever and AMS)     Dementia delirium- mental status appears to be at baseline.     TIIDM  - continue weight based dosing and SSI    Constipation- scheduled miralax BID.  Can increase as needed.     Obesity  With Body mass index is 29.95kg/m². Complicating assessment and treatment. Placing patient at risk for multiple co-morbidities as well as early death and contributing to the patient's presentation. Counseled on weight loss.      Continue home medications for HTN, HLDand Diastolic HF.         Physical Exam Performed:     BP (!) 162/61   Pulse 66   Temp 97.8 °F (36.6 °C) (Oral)   Resp 20   Ht 5' 5\" (1.651 m)   Wt 180 lb (81.6 kg)   SpO2 99%   BMI 29.95 kg/m²       General appearance: No apparent distress, appears stated age and cooperative. HEENT: Pupils equal, round, and reactive to light. Conjunctivae/corneas clear. Neck: Supple, with full range of motion. No jugular venous distention. Trachea midline. Respiratory:  Normal respiratory effort. Clear to auscultation, bilaterally without Rales/Wheezes/Rhonchi. Cardiovascular: Regular rate and rhythm with normal S1/S2 without murmurs, rubs or gallops. Abdomen: Soft, non-tender, non-distended with normal bowel sounds. Musculoskeletal: No clubbing, cyanosis or edema bilaterally. Full range of motion without deformity. Skin: Skin color, texture, turgor normal.  No rashes or lesions. Neurologic:  Neurovascularly intact without any focal sensory/motor deficits. Cranial nerves: II-XII intact, grossly non-focal.  Psychiatric: Alert and oriented, thought content appropriate, normal insight  Capillary Refill: Brisk,3 seconds, normal   Peripheral Pulses: +2 palpable, equal bilaterally          Labs:  For convenience and continuity at follow-up the following most recent labs are provided:      CBC:    Lab Results   Component Value Date    WBC 6.8 09/05/2021    HGB 7.6 09/05/2021    HCT 22.2 09/05/2021     09/05/2021       Renal:    Lab Results   Component Value Date     09/07/2021    K 4.2 09/07/2021    K 3.7 09/05/2021     09/07/2021    CO2 22 09/07/2021    BUN 27 09/07/2021    CREATININE 1.4 09/07/2021    CALCIUM 9.2 09/07/2021    PHOS 4.2 10/20/2020         Significant Diagnostic Studies    Radiology:   XR CHEST PORTABLE   Final Result   Negative portable chest x-ray.                 Consults:     IP CONSULT TO HOSPITALIST    Disposition:  SNF     Condition at Discharge: Stable    Discharge Instructions/Follow-up:  Bladder scan, urology eval    Code Status:  DNR-CCA     Activity: activity as tolerated    Diet: regular diet      Discharge Medications:     Current Discharge Medication List           Details   cefdinir (OMNICEF) 300 MG capsule Take 1 capsule by mouth every 12 hours for 6 days  Qty: 12 capsule, Refills: 0      polyethylene glycol (GLYCOLAX) 17 GM/SCOOP powder Take 17 g by mouth 2 times daily for 15 days  Qty: 510 g, Refills: 0              Details   lisinopril (PRINIVIL;ZESTRIL) 40 MG tablet Take 40 mg by mouth daily      busPIRone (BUSPAR) 5 MG tablet Take 10 mg by mouth daily       !! insulin lispro (HUMALOG) 100 UNIT/ML injection vial Inject 0-3 Units into the skin nightly  Qty: 1 vial, Refills: 3      !! insulin lispro (HUMALOG) 100 UNIT/ML injection vial Inject 0-6 Units into the skin 3 times daily (with meals)  Qty: 1 vial, Refills: 3      pravastatin (PRAVACHOL) 20 MG tablet Take 1 tablet by mouth nightly TAKE ONE TABLET BY MOUTH DAILY    Associated Diagnoses: Mixed hyperlipidemia      cloNIDine (CATAPRES) 0.1 MG tablet Take 1 tablet by mouth 3 times daily      blood glucose test strips (ACCU-CHEK ARABELLA PLUS) strip TEST BLOOD SUGAR TWICE A DAY  Qty: 200 strip, Refills: 11    Associated Diagnoses: Controlled type 2 diabetes mellitus with diabetic neuropathy, without long-term current use of insulin (HCC)      donepezil (ARICEPT) 5 MG tablet Take 1 tablet by mouth nightly  Qty: 90 tablet, Refills: 1    Associated Diagnoses: Memory loss      carvedilol (COREG) 25 MG tablet TAKE ONE TABLET BY MOUTH TWICE A DAY WITH FOOD  Qty: 180 tablet, Refills: 1      DULoxetine (CYMBALTA) 60 MG extended release capsule Take 2 capsules by mouth daily  Qty: 60 capsule, Refills: 5 concerns please feel free to contact me at [phone].

## 2021-09-08 LAB
BLOOD CULTURE, ROUTINE: NORMAL
CULTURE, BLOOD 2: NORMAL

## 2021-10-07 PROBLEM — N39.0 UTI (URINARY TRACT INFECTION): Status: RESOLVED | Noted: 2021-09-04 | Resolved: 2021-10-07

## 2021-10-11 ENCOUNTER — APPOINTMENT (OUTPATIENT)
Dept: CT IMAGING | Age: 81
DRG: 871 | End: 2021-10-11
Payer: COMMERCIAL

## 2021-10-11 ENCOUNTER — HOSPITAL ENCOUNTER (INPATIENT)
Age: 81
LOS: 8 days | Discharge: NURSING, REHAB OR LONG-TERM CARE | DRG: 871 | End: 2021-10-20
Attending: EMERGENCY MEDICINE | Admitting: INTERNAL MEDICINE
Payer: COMMERCIAL

## 2021-10-11 DIAGNOSIS — N17.9 ACUTE RENAL FAILURE, UNSPECIFIED ACUTE RENAL FAILURE TYPE (HCC): Primary | ICD-10-CM

## 2021-10-11 DIAGNOSIS — A04.72 C. DIFFICILE COLITIS: ICD-10-CM

## 2021-10-11 DIAGNOSIS — N39.0 URINARY TRACT INFECTION WITHOUT HEMATURIA, SITE UNSPECIFIED: ICD-10-CM

## 2021-10-11 LAB
A/G RATIO: 1 (ref 1.1–2.2)
ALBUMIN SERPL-MCNC: 2.8 G/DL (ref 3.4–5)
ALP BLD-CCNC: 129 U/L (ref 40–129)
ALT SERPL-CCNC: <5 U/L (ref 10–40)
ANION GAP SERPL CALCULATED.3IONS-SCNC: 16 MMOL/L (ref 3–16)
ANISOCYTOSIS: ABNORMAL
AST SERPL-CCNC: 9 U/L (ref 15–37)
ATYPICAL LYMPHOCYTE RELATIVE PERCENT: 1 % (ref 0–6)
BACTERIA: ABNORMAL /HPF
BANDED NEUTROPHILS RELATIVE PERCENT: 23 % (ref 0–7)
BASOPHILS ABSOLUTE: 0.2 K/UL (ref 0–0.2)
BASOPHILS RELATIVE PERCENT: 1 %
BILIRUB SERPL-MCNC: <0.2 MG/DL (ref 0–1)
BILIRUBIN URINE: ABNORMAL
BLOOD, URINE: ABNORMAL
BUN BLDV-MCNC: 62 MG/DL (ref 7–20)
CALCIUM SERPL-MCNC: 9.4 MG/DL (ref 8.3–10.6)
CHLORIDE BLD-SCNC: 102 MMOL/L (ref 99–110)
CLARITY: CLEAR
CO2: 14 MMOL/L (ref 21–32)
COLOR: YELLOW
CREAT SERPL-MCNC: 5.4 MG/DL (ref 0.6–1.2)
EOSINOPHILS ABSOLUTE: 0.5 K/UL (ref 0–0.6)
EOSINOPHILS RELATIVE PERCENT: 3 %
EPITHELIAL CELLS, UA: ABNORMAL /HPF (ref 0–5)
GFR AFRICAN AMERICAN: 9
GFR NON-AFRICAN AMERICAN: 8
GLOBULIN: 2.9 G/DL
GLUCOSE BLD-MCNC: 138 MG/DL (ref 70–99)
GLUCOSE URINE: NEGATIVE MG/DL
HCT VFR BLD CALC: 24.3 % (ref 36–48)
HEMOGLOBIN: 8 G/DL (ref 12–16)
KETONES, URINE: NEGATIVE MG/DL
LACTIC ACID, SEPSIS: 0.8 MMOL/L (ref 0.4–1.9)
LEUKOCYTE ESTERASE, URINE: ABNORMAL
LYMPHOCYTES ABSOLUTE: 3.2 K/UL (ref 1–5.1)
LYMPHOCYTES RELATIVE PERCENT: 18 %
MACROCYTES: ABNORMAL
MCH RBC QN AUTO: 32.3 PG (ref 26–34)
MCHC RBC AUTO-ENTMCNC: 33 G/DL (ref 31–36)
MCV RBC AUTO: 97.7 FL (ref 80–100)
MICROCYTES: ABNORMAL
MICROSCOPIC EXAMINATION: YES
MONOCYTES ABSOLUTE: 0.3 K/UL (ref 0–1.3)
MONOCYTES RELATIVE PERCENT: 2 %
NEUTROPHILS ABSOLUTE: 12.5 K/UL (ref 1.7–7.7)
NEUTROPHILS RELATIVE PERCENT: 52 %
NITRITE, URINE: NEGATIVE
PDW BLD-RTO: 15.8 % (ref 12.4–15.4)
PH UA: 5 (ref 5–8)
PLATELET # BLD: 435 K/UL (ref 135–450)
PLATELET SLIDE REVIEW: ADEQUATE
PMV BLD AUTO: 8.6 FL (ref 5–10.5)
POIKILOCYTES: ABNORMAL
POLYCHROMASIA: ABNORMAL
POTASSIUM REFLEX MAGNESIUM: 4.5 MMOL/L (ref 3.5–5.1)
PROTEIN UA: ABNORMAL MG/DL
RBC # BLD: 2.48 M/UL (ref 4–5.2)
RBC UA: ABNORMAL /HPF (ref 0–4)
SLIDE REVIEW: ABNORMAL
SODIUM BLD-SCNC: 132 MMOL/L (ref 136–145)
SPECIFIC GRAVITY UA: 1.02 (ref 1–1.03)
TOTAL PROTEIN: 5.7 G/DL (ref 6.4–8.2)
TOXIC GRANULATION: PRESENT
URINE TYPE: ABNORMAL
UROBILINOGEN, URINE: 0.2 E.U./DL
VACUOLATED NEUTROPHILS: PRESENT
WBC # BLD: 16.6 K/UL (ref 4–11)
WBC UA: >100 /HPF (ref 0–5)
YEAST: PRESENT /HPF

## 2021-10-11 PROCEDURE — 96367 TX/PROPH/DG ADDL SEQ IV INF: CPT

## 2021-10-11 PROCEDURE — 2580000003 HC RX 258: Performed by: EMERGENCY MEDICINE

## 2021-10-11 PROCEDURE — 96375 TX/PRO/DX INJ NEW DRUG ADDON: CPT

## 2021-10-11 PROCEDURE — 87040 BLOOD CULTURE FOR BACTERIA: CPT

## 2021-10-11 PROCEDURE — 80053 COMPREHEN METABOLIC PANEL: CPT

## 2021-10-11 PROCEDURE — 96374 THER/PROPH/DIAG INJ IV PUSH: CPT

## 2021-10-11 PROCEDURE — 74176 CT ABD & PELVIS W/O CONTRAST: CPT

## 2021-10-11 PROCEDURE — 83605 ASSAY OF LACTIC ACID: CPT

## 2021-10-11 PROCEDURE — 96365 THER/PROPH/DIAG IV INF INIT: CPT

## 2021-10-11 PROCEDURE — 99284 EMERGENCY DEPT VISIT MOD MDM: CPT

## 2021-10-11 PROCEDURE — 6360000002 HC RX W HCPCS: Performed by: EMERGENCY MEDICINE

## 2021-10-11 PROCEDURE — 85025 COMPLETE CBC W/AUTO DIFF WBC: CPT

## 2021-10-11 PROCEDURE — 81001 URINALYSIS AUTO W/SCOPE: CPT

## 2021-10-11 RX ORDER — HYDROCODONE BITARTRATE AND ACETAMINOPHEN 5; 325 MG/1; MG/1
1 TABLET ORAL EVERY 6 HOURS PRN
Status: ON HOLD | COMMUNITY
End: 2021-10-20 | Stop reason: SDUPTHER

## 2021-10-11 RX ORDER — ONDANSETRON 2 MG/ML
4 INJECTION INTRAMUSCULAR; INTRAVENOUS ONCE
Status: COMPLETED | OUTPATIENT
Start: 2021-10-11 | End: 2021-10-11

## 2021-10-11 RX ORDER — LEVOFLOXACIN 5 MG/ML
500 INJECTION, SOLUTION INTRAVENOUS ONCE
Status: DISCONTINUED | OUTPATIENT
Start: 2021-10-11 | End: 2021-10-11

## 2021-10-11 RX ORDER — MORPHINE SULFATE 2 MG/ML
4 INJECTION, SOLUTION INTRAMUSCULAR; INTRAVENOUS ONCE
Status: COMPLETED | OUTPATIENT
Start: 2021-10-11 | End: 2021-10-11

## 2021-10-11 RX ORDER — 0.9 % SODIUM CHLORIDE 0.9 %
500 INTRAVENOUS SOLUTION INTRAVENOUS ONCE
Status: DISCONTINUED | OUTPATIENT
Start: 2021-10-12 | End: 2021-10-21 | Stop reason: HOSPADM

## 2021-10-11 RX ORDER — 0.9 % SODIUM CHLORIDE 0.9 %
500 INTRAVENOUS SOLUTION INTRAVENOUS ONCE
Status: COMPLETED | OUTPATIENT
Start: 2021-10-11 | End: 2021-10-11

## 2021-10-11 RX ADMIN — MORPHINE SULFATE 4 MG: 2 INJECTION, SOLUTION INTRAMUSCULAR; INTRAVENOUS at 19:53

## 2021-10-11 RX ADMIN — CEFTRIAXONE SODIUM 2000 MG: 2 INJECTION, POWDER, FOR SOLUTION INTRAMUSCULAR; INTRAVENOUS at 22:27

## 2021-10-11 RX ADMIN — ONDANSETRON 4 MG: 2 INJECTION INTRAMUSCULAR; INTRAVENOUS at 19:53

## 2021-10-11 RX ADMIN — SODIUM CHLORIDE 500 ML: 9 INJECTION, SOLUTION INTRAVENOUS at 19:56

## 2021-10-11 RX ADMIN — MORPHINE SULFATE 4 MG: 2 INJECTION, SOLUTION INTRAMUSCULAR; INTRAVENOUS at 23:21

## 2021-10-11 ASSESSMENT — PAIN DESCRIPTION - FREQUENCY: FREQUENCY: CONTINUOUS

## 2021-10-11 ASSESSMENT — PAIN SCALES - GENERAL
PAINLEVEL_OUTOF10: 7
PAINLEVEL_OUTOF10: 10
PAINLEVEL_OUTOF10: 10
PAINLEVEL_OUTOF10: 8

## 2021-10-11 ASSESSMENT — PAIN DESCRIPTION - DESCRIPTORS: DESCRIPTORS: SHARP

## 2021-10-11 ASSESSMENT — PAIN DESCRIPTION - LOCATION: LOCATION: ABDOMEN

## 2021-10-11 ASSESSMENT — PAIN DESCRIPTION - ORIENTATION: ORIENTATION: RIGHT

## 2021-10-11 ASSESSMENT — PAIN DESCRIPTION - PAIN TYPE: TYPE: ACUTE PAIN

## 2021-10-11 NOTE — ED NOTES
Bed: 15  Expected date:   Expected time:   Means of arrival:   Comments:  estefanía Corey, RN  10/11/21 6827

## 2021-10-11 NOTE — ED TRIAGE NOTES
Presents from [place] with right sided abdominal pain. Per patient has been going on for several weeks Per facility decreased PO intake loose stools. Xray + SBO. Upon arrival moaning in pain guarding abdomen.  On Hospice care per EMS with [facility]

## 2021-10-12 PROBLEM — A04.72 C. DIFFICILE COLITIS: Status: ACTIVE | Noted: 2021-10-12

## 2021-10-12 LAB
ANION GAP SERPL CALCULATED.3IONS-SCNC: 17 MMOL/L (ref 3–16)
ANISOCYTOSIS: ABNORMAL
ATYPICAL LYMPHOCYTE RELATIVE PERCENT: 1 % (ref 0–6)
BANDED NEUTROPHILS RELATIVE PERCENT: 22 % (ref 0–7)
BASOPHILS ABSOLUTE: 0 K/UL (ref 0–0.2)
BASOPHILS RELATIVE PERCENT: 0 %
BUN BLDV-MCNC: 67 MG/DL (ref 7–20)
BURR CELLS: ABNORMAL
CALCIUM SERPL-MCNC: 8.9 MG/DL (ref 8.3–10.6)
CHLORIDE BLD-SCNC: 102 MMOL/L (ref 99–110)
CO2: 15 MMOL/L (ref 21–32)
CREAT SERPL-MCNC: 5.9 MG/DL (ref 0.6–1.2)
EOSINOPHILS ABSOLUTE: 0.8 K/UL (ref 0–0.6)
EOSINOPHILS RELATIVE PERCENT: 4 %
ESTIMATED AVERAGE GLUCOSE: 151.3 MG/DL
GFR AFRICAN AMERICAN: 8
GFR NON-AFRICAN AMERICAN: 7
GLUCOSE BLD-MCNC: 109 MG/DL (ref 70–99)
GLUCOSE BLD-MCNC: 121 MG/DL (ref 70–99)
GLUCOSE BLD-MCNC: 133 MG/DL (ref 70–99)
GLUCOSE BLD-MCNC: 136 MG/DL (ref 70–99)
GLUCOSE BLD-MCNC: 146 MG/DL (ref 70–99)
HBA1C MFR BLD: 6.9 %
HCT VFR BLD CALC: 24.7 % (ref 36–48)
HEMOGLOBIN: 8.1 G/DL (ref 12–16)
LYMPHOCYTES ABSOLUTE: 3.4 K/UL (ref 1–5.1)
LYMPHOCYTES RELATIVE PERCENT: 16 %
MACROCYTES: ABNORMAL
MCH RBC QN AUTO: 31.4 PG (ref 26–34)
MCHC RBC AUTO-ENTMCNC: 32.7 G/DL (ref 31–36)
MCV RBC AUTO: 96.2 FL (ref 80–100)
METAMYELOCYTES RELATIVE PERCENT: 2 %
MONOCYTES ABSOLUTE: 0.8 K/UL (ref 0–1.3)
MONOCYTES RELATIVE PERCENT: 4 %
NEUTROPHILS ABSOLUTE: 14.9 K/UL (ref 1.7–7.7)
NEUTROPHILS RELATIVE PERCENT: 51 %
OVALOCYTES: ABNORMAL
PDW BLD-RTO: 15.8 % (ref 12.4–15.4)
PERFORMED ON: ABNORMAL
PLATELET # BLD: 427 K/UL (ref 135–450)
PLATELET SLIDE REVIEW: ADEQUATE
PMV BLD AUTO: 8.9 FL (ref 5–10.5)
POIKILOCYTES: ABNORMAL
POLYCHROMASIA: ABNORMAL
POTASSIUM REFLEX MAGNESIUM: 4.6 MMOL/L (ref 3.5–5.1)
RBC # BLD: 2.57 M/UL (ref 4–5.2)
SODIUM BLD-SCNC: 134 MMOL/L (ref 136–145)
TOXIC GRANULATION: PRESENT
VACUOLATED NEUTROPHILS: PRESENT
WBC # BLD: 19.8 K/UL (ref 4–11)

## 2021-10-12 PROCEDURE — 2500000003 HC RX 250 WO HCPCS: Performed by: EMERGENCY MEDICINE

## 2021-10-12 PROCEDURE — 85025 COMPLETE CBC W/AUTO DIFF WBC: CPT

## 2021-10-12 PROCEDURE — 2500000003 HC RX 250 WO HCPCS: Performed by: NURSE PRACTITIONER

## 2021-10-12 PROCEDURE — 83036 HEMOGLOBIN GLYCOSYLATED A1C: CPT

## 2021-10-12 PROCEDURE — 2500000003 HC RX 250 WO HCPCS: Performed by: INTERNAL MEDICINE

## 2021-10-12 PROCEDURE — 2580000003 HC RX 258: Performed by: NURSE PRACTITIONER

## 2021-10-12 PROCEDURE — 6360000002 HC RX W HCPCS: Performed by: NURSE PRACTITIONER

## 2021-10-12 PROCEDURE — 2580000003 HC RX 258: Performed by: INTERNAL MEDICINE

## 2021-10-12 PROCEDURE — 6370000000 HC RX 637 (ALT 250 FOR IP): Performed by: NURSE PRACTITIONER

## 2021-10-12 PROCEDURE — 6370000000 HC RX 637 (ALT 250 FOR IP): Performed by: INTERNAL MEDICINE

## 2021-10-12 PROCEDURE — 6370000000 HC RX 637 (ALT 250 FOR IP): Performed by: EMERGENCY MEDICINE

## 2021-10-12 PROCEDURE — 80048 BASIC METABOLIC PNL TOTAL CA: CPT

## 2021-10-12 PROCEDURE — 1200000000 HC SEMI PRIVATE

## 2021-10-12 RX ORDER — ONDANSETRON 4 MG/1
4 TABLET, ORALLY DISINTEGRATING ORAL EVERY 8 HOURS PRN
Status: DISCONTINUED | OUTPATIENT
Start: 2021-10-12 | End: 2021-10-21 | Stop reason: HOSPADM

## 2021-10-12 RX ORDER — CLONIDINE HYDROCHLORIDE 0.1 MG/1
0.1 TABLET ORAL 3 TIMES DAILY
Status: DISCONTINUED | OUTPATIENT
Start: 2021-10-12 | End: 2021-10-12

## 2021-10-12 RX ORDER — MIDODRINE HYDROCHLORIDE 5 MG/1
10 TABLET ORAL
Status: DISCONTINUED | OUTPATIENT
Start: 2021-10-12 | End: 2021-10-16

## 2021-10-12 RX ORDER — LISINOPRIL 20 MG/1
40 TABLET ORAL DAILY
Status: DISCONTINUED | OUTPATIENT
Start: 2021-10-12 | End: 2021-10-12

## 2021-10-12 RX ORDER — HEPARIN SODIUM 5000 [USP'U]/ML
5000 INJECTION, SOLUTION INTRAVENOUS; SUBCUTANEOUS EVERY 8 HOURS SCHEDULED
Status: DISCONTINUED | OUTPATIENT
Start: 2021-10-12 | End: 2021-10-21 | Stop reason: HOSPADM

## 2021-10-12 RX ORDER — POLYETHYLENE GLYCOL 3350 17 G/17G
17 POWDER, FOR SOLUTION ORAL DAILY PRN
Status: DISCONTINUED | OUTPATIENT
Start: 2021-10-12 | End: 2021-10-21 | Stop reason: HOSPADM

## 2021-10-12 RX ORDER — ASPIRIN 81 MG/1
81 TABLET ORAL DAILY
Status: DISCONTINUED | OUTPATIENT
Start: 2021-10-12 | End: 2021-10-21 | Stop reason: HOSPADM

## 2021-10-12 RX ORDER — NICOTINE POLACRILEX 4 MG
15 LOZENGE BUCCAL PRN
Status: DISCONTINUED | OUTPATIENT
Start: 2021-10-12 | End: 2021-10-21 | Stop reason: HOSPADM

## 2021-10-12 RX ORDER — ALBUTEROL SULFATE 90 UG/1
2 AEROSOL, METERED RESPIRATORY (INHALATION) 4 TIMES DAILY PRN
Status: DISCONTINUED | OUTPATIENT
Start: 2021-10-12 | End: 2021-10-12

## 2021-10-12 RX ORDER — CARVEDILOL 6.25 MG/1
12.5 TABLET ORAL 2 TIMES DAILY WITH MEALS
Status: DISCONTINUED | OUTPATIENT
Start: 2021-10-12 | End: 2021-10-12

## 2021-10-12 RX ORDER — ACETAMINOPHEN 325 MG/1
650 TABLET ORAL EVERY 6 HOURS PRN
Status: DISCONTINUED | OUTPATIENT
Start: 2021-10-12 | End: 2021-10-21 | Stop reason: HOSPADM

## 2021-10-12 RX ORDER — ALBUTEROL SULFATE 90 UG/1
2 AEROSOL, METERED RESPIRATORY (INHALATION) EVERY 4 HOURS PRN
Status: DISCONTINUED | OUTPATIENT
Start: 2021-10-12 | End: 2021-10-21 | Stop reason: HOSPADM

## 2021-10-12 RX ORDER — SODIUM CHLORIDE 0.9 % (FLUSH) 0.9 %
5-40 SYRINGE (ML) INJECTION EVERY 12 HOURS SCHEDULED
Status: DISCONTINUED | OUTPATIENT
Start: 2021-10-12 | End: 2021-10-21 | Stop reason: HOSPADM

## 2021-10-12 RX ORDER — OXYBUTYNIN CHLORIDE 5 MG/1
10 TABLET, EXTENDED RELEASE ORAL DAILY
Status: DISCONTINUED | OUTPATIENT
Start: 2021-10-12 | End: 2021-10-21 | Stop reason: HOSPADM

## 2021-10-12 RX ORDER — DULOXETIN HYDROCHLORIDE 60 MG/1
120 CAPSULE, DELAYED RELEASE ORAL DAILY
Status: DISCONTINUED | OUTPATIENT
Start: 2021-10-12 | End: 2021-10-12

## 2021-10-12 RX ORDER — MORPHINE SULFATE 2 MG/ML
2 INJECTION, SOLUTION INTRAMUSCULAR; INTRAVENOUS EVERY 4 HOURS PRN
Status: DISCONTINUED | OUTPATIENT
Start: 2021-10-12 | End: 2021-10-12

## 2021-10-12 RX ORDER — CARVEDILOL 25 MG/1
25 TABLET ORAL 2 TIMES DAILY WITH MEALS
Status: DISCONTINUED | OUTPATIENT
Start: 2021-10-12 | End: 2021-10-12

## 2021-10-12 RX ORDER — PANTOPRAZOLE SODIUM 40 MG/1
40 TABLET, DELAYED RELEASE ORAL DAILY
Status: DISCONTINUED | OUTPATIENT
Start: 2021-10-12 | End: 2021-10-21 | Stop reason: HOSPADM

## 2021-10-12 RX ORDER — SODIUM CHLORIDE 9 MG/ML
INJECTION, SOLUTION INTRAVENOUS CONTINUOUS
Status: DISCONTINUED | OUTPATIENT
Start: 2021-10-12 | End: 2021-10-12

## 2021-10-12 RX ORDER — DEXTROSE MONOHYDRATE 25 G/50ML
12.5 INJECTION, SOLUTION INTRAVENOUS PRN
Status: DISCONTINUED | OUTPATIENT
Start: 2021-10-12 | End: 2021-10-21 | Stop reason: HOSPADM

## 2021-10-12 RX ORDER — PRAVASTATIN SODIUM 20 MG
20 TABLET ORAL NIGHTLY
Status: DISCONTINUED | OUTPATIENT
Start: 2021-10-12 | End: 2021-10-21 | Stop reason: HOSPADM

## 2021-10-12 RX ORDER — DONEPEZIL HYDROCHLORIDE 5 MG/1
5 TABLET, FILM COATED ORAL NIGHTLY
Status: DISCONTINUED | OUTPATIENT
Start: 2021-10-12 | End: 2021-10-12

## 2021-10-12 RX ORDER — MORPHINE SULFATE 2 MG/ML
0.5 INJECTION, SOLUTION INTRAMUSCULAR; INTRAVENOUS
Status: ACTIVE | OUTPATIENT
Start: 2021-10-12 | End: 2021-10-12

## 2021-10-12 RX ORDER — DEXTROSE MONOHYDRATE 50 MG/ML
100 INJECTION, SOLUTION INTRAVENOUS PRN
Status: DISCONTINUED | OUTPATIENT
Start: 2021-10-12 | End: 2021-10-21 | Stop reason: HOSPADM

## 2021-10-12 RX ORDER — SODIUM CHLORIDE 9 MG/ML
25 INJECTION, SOLUTION INTRAVENOUS PRN
Status: DISCONTINUED | OUTPATIENT
Start: 2021-10-12 | End: 2021-10-21 | Stop reason: HOSPADM

## 2021-10-12 RX ORDER — ISOSORBIDE MONONITRATE 30 MG/1
30 TABLET, EXTENDED RELEASE ORAL DAILY
Status: DISCONTINUED | OUTPATIENT
Start: 2021-10-12 | End: 2021-10-12

## 2021-10-12 RX ORDER — BUSPIRONE HYDROCHLORIDE 5 MG/1
10 TABLET ORAL DAILY
Status: DISCONTINUED | OUTPATIENT
Start: 2021-10-12 | End: 2021-10-12

## 2021-10-12 RX ORDER — SODIUM CHLORIDE 0.9 % (FLUSH) 0.9 %
5-40 SYRINGE (ML) INJECTION PRN
Status: DISCONTINUED | OUTPATIENT
Start: 2021-10-12 | End: 2021-10-21 | Stop reason: HOSPADM

## 2021-10-12 RX ORDER — ONDANSETRON 2 MG/ML
4 INJECTION INTRAMUSCULAR; INTRAVENOUS EVERY 6 HOURS PRN
Status: DISCONTINUED | OUTPATIENT
Start: 2021-10-12 | End: 2021-10-21 | Stop reason: HOSPADM

## 2021-10-12 RX ORDER — ACETAMINOPHEN 650 MG/1
650 SUPPOSITORY RECTAL EVERY 6 HOURS PRN
Status: DISCONTINUED | OUTPATIENT
Start: 2021-10-12 | End: 2021-10-21 | Stop reason: HOSPADM

## 2021-10-12 RX ADMIN — ASPIRIN 81 MG: 81 TABLET, COATED ORAL at 07:55

## 2021-10-12 RX ADMIN — BUSPIRONE HYDROCHLORIDE 10 MG: 5 TABLET ORAL at 11:34

## 2021-10-12 RX ADMIN — HEPARIN SODIUM 5000 UNITS: 5000 INJECTION INTRAVENOUS; SUBCUTANEOUS at 05:50

## 2021-10-12 RX ADMIN — METRONIDAZOLE 500 MG: 500 INJECTION, SOLUTION INTRAVENOUS at 17:38

## 2021-10-12 RX ADMIN — PRAVASTATIN SODIUM 20 MG: 20 TABLET ORAL at 22:28

## 2021-10-12 RX ADMIN — CLONIDINE HYDROCHLORIDE 0.1 MG: 0.1 TABLET ORAL at 07:55

## 2021-10-12 RX ADMIN — Medication 500 ML: at 00:17

## 2021-10-12 RX ADMIN — Medication 125 MG: at 02:03

## 2021-10-12 RX ADMIN — SODIUM CHLORIDE: 9 INJECTION, SOLUTION INTRAVENOUS at 05:05

## 2021-10-12 RX ADMIN — OXYBUTYNIN CHLORIDE 10 MG: 5 TABLET, EXTENDED RELEASE ORAL at 11:34

## 2021-10-12 RX ADMIN — METRONIDAZOLE 500 MG: 500 INJECTION, SOLUTION INTRAVENOUS at 00:48

## 2021-10-12 RX ADMIN — HEPARIN SODIUM 5000 UNITS: 5000 INJECTION INTRAVENOUS; SUBCUTANEOUS at 16:37

## 2021-10-12 RX ADMIN — HEPARIN SODIUM 5000 UNITS: 5000 INJECTION INTRAVENOUS; SUBCUTANEOUS at 22:28

## 2021-10-12 RX ADMIN — PANTOPRAZOLE SODIUM 40 MG: 40 TABLET, DELAYED RELEASE ORAL at 11:41

## 2021-10-12 RX ADMIN — Medication 125 MG: at 11:34

## 2021-10-12 RX ADMIN — ISOSORBIDE MONONITRATE 30 MG: 30 TABLET, EXTENDED RELEASE ORAL at 11:34

## 2021-10-12 RX ADMIN — DULOXETINE HYDROCHLORIDE 120 MG: 60 CAPSULE, DELAYED RELEASE ORAL at 11:41

## 2021-10-12 RX ADMIN — SODIUM BICARBONATE: 84 INJECTION, SOLUTION INTRAVENOUS at 13:37

## 2021-10-12 RX ADMIN — CARVEDILOL 25 MG: 25 TABLET, FILM COATED ORAL at 07:55

## 2021-10-12 RX ADMIN — Medication 125 MG: at 20:23

## 2021-10-12 RX ADMIN — METRONIDAZOLE 500 MG: 500 INJECTION, SOLUTION INTRAVENOUS at 07:58

## 2021-10-12 RX ADMIN — MIDODRINE HYDROCHLORIDE 10 MG: 5 TABLET ORAL at 16:37

## 2021-10-12 ASSESSMENT — PAIN DESCRIPTION - PAIN TYPE
TYPE: ACUTE PAIN
TYPE: ACUTE PAIN

## 2021-10-12 ASSESSMENT — PAIN SCALES - GENERAL: PAINLEVEL_OUTOF10: 5

## 2021-10-12 ASSESSMENT — PAIN DESCRIPTION - LOCATION: LOCATION: ABDOMEN

## 2021-10-12 NOTE — ED NOTES
Returned from CT scan reporting continued pain. Attempted to flush IV post scan and noted swelling at site.       Timoteo Toure RN  10/11/21 2969

## 2021-10-12 NOTE — H&P
[facility] Medicine History & Physical      PCP: [facility]    Date of Admission: 10/11/2021    Date of Service: Pt seen/examined on 10/12/2021 and Admitted to Inpatient with expected LOS greater than two midnights due to medical therapy. Chief Complaint:  Right side abdominal pain and diarrhea    History Of Present Illness:      [de-identified] y.o. female, with PMH of dementia, DM, Obesity, HTN, CAD, HLD, who presented to [facility] with right sided abdominal pain. The patient is a poor historian d/t dementia, history obtained from the review of EMR. The patient was brought to the emergency room last evening with complaints of right sided abdominal pain. Staff reported that this has been going on for a few weeks now. Per ECF, the patient was diagnosed with D. Diff colitis and being treated with oral vancomycin. However, the patient has been complaining of worsening abdominal pain and loose stools. An Xray was obtained that was concerning for a SBO, so the patient was brought for further evaluation. In the emergency room, a CT abdomen pelvis was obtained that revealed significant colitis of the cecum and ascending colon, likely infectious or inflammatory. Sigmoid diverticulosis. Diverticulitis can not be excluded. A C diff was ordered and the patient was given oral vancomycin and IV flagyl. She was also found to have a UTI with a WBC > 100 and treated with ceftriaxone. The patient will be admitted for further evaluation and treatment. She denied any other associated symptoms as well as any aggravating and/or alleviating factors. At the time of this assessment, the patient was resting comfortably in bed. She currently denies any chest pain, back pain, abdominal pain, shortness of breath, numbness, tingling, N/V/C/D, fever and/or chills.      Past Medical History:          Diagnosis Date    Asthma     Bronchial asthma     Bursitis 12/2011    ACHILLES TENDON LEFT    Chest pain 12/2020    [facility]congest[facility] failure) [facility])     Constipation     COPD (chronic obstructive pulmonary disease) (HCC)     Dementia ([facility]) 09/11/2019    unspecificied dementia without behavioral disturbance    Depression     Diverticulitis     Dizziness     GERD (gastroesophageal reflux disease)     Hearing loss     History of blood transfusion     Hyperlipidemia     Hypertension     Leg edema     Lung disease     Osteoarthritis     Sleep apnea     CPAP, SLEEP STUDY 6/28 OVERNIGHT AT HOME    Tinnitus     Type II or unspecified type diabetes mellitus without mention of complication, not stated as uncontrolled     Unspecified cerebral artery occlusion with cerebral infarction     mini stroke     Past Surgical History:          Procedure Laterality Date    ABDOMEN SURGERY      ACHILLES TENDON SURGERY  2/2/12    LEFT ACHILLES DEBRIDEMENT, HAGLUNDS AND RETROCALCANEAL BURSA EXCISION WITH FLEXOR HALLUS LONGUS TENDON TRANSFER WITH BLOCK FOR PAIN CONTROL    APPENDECTOMY      CARDIAC CATHETERIZATION  8/21/14    CARDIAC CATHETERIZATION  12/07/2020    Non Obs CAD, medical management    CARPAL TUNNEL RELEASE      both hands    CHOLECYSTECTOMY      COLONOSCOPY      COLONOSCOPY      COLONOSCOPY  2/10/2016    CYSTOSCOPY  10/02/2017    DIAGNOSTIC CARDIAC CATH LAB PROCEDURE      ENDOSCOPY, COLON, DIAGNOSTIC      EYE SURGERY      HYSTERECTOMY      HYSTERECTOMY, TOTAL ABDOMINAL      JOINT REPLACEMENT      bilateral knee    KNEE SURGERY      both replaced    OTHER SURGICAL HISTORY  07/09/2018    CYSTOSCOPY, HYDRODISTENTION OF BLADDER WITH INSTILLATION OF    POLYPECTOMY      SHOULDER SURGERY      TOENAIL EXCISION  08/04/2016    right big toe    TONSILLECTOMY      TUBAL LIGATION      UPPER GASTROINTESTINAL ENDOSCOPY  10/29/12    gastritis    UPPER GASTROINTESTINAL ENDOSCOPY  2/10/2016    URETHRAL STRICTURE DILATATION       Medications Prior to Admission:      Prior to Admission medications    Medication Sig Start Date End Date Taking? Authorizing Provider   HYDROcodone-acetaminophen (NORCO) 5-325 MG per tablet Take 1 tablet by mouth every 6 hours as needed for Pain.    Yes Historical Provider, MD   lisinopril (PRINIVIL;ZESTRIL) 40 MG tablet Take 40 mg by mouth daily   Yes Historical Provider, MD   busPIRone (BUSPAR) 5 MG tablet Take 10 mg by mouth daily  10/16/19  Yes Historical Provider, MD   insulin lispro (HUMALOG) 100 UNIT/ML injection vial Inject 0-3 Units into the skin nightly 9/11/19  Yes Lucretia Abrams MD   insulin lispro (HUMALOG) 100 UNIT/ML injection vial Inject 0-6 Units into the skin 3 times daily (with meals) 9/11/19  Yes Lucretia Abrams MD   pravastatin (PRAVACHOL) 20 MG tablet Take 1 tablet by mouth nightly TAKE ONE TABLET BY MOUTH DAILY 9/11/19  Yes Lucretia Abrams MD   cloNIDine (CATAPRES) 0.1 MG tablet Take 1 tablet by mouth 3 times daily  Patient taking differently: Take 0.1 mg by mouth 3 times daily For HTN 9/11/19  Yes Lucretia Abrams MD   blood glucose test strips (ACCU-CHEK ARABELLA PLUS) strip TEST BLOOD SUGAR TWICE A DAY 8/21/19  Yes Michelle Brown,    donepezil (ARICEPT) 5 MG tablet Take 1 tablet by mouth nightly 7/19/19  Yes KIMI Hamm CNP   carvedilol (COREG) 25 MG tablet TAKE ONE TABLET BY MOUTH TWICE A DAY WITH FOOD 6/17/19  Yes Michelle Brown,    DULoxetine (CYMBALTA) 60 MG extended release capsule Take 2 capsules by mouth daily 5/24/19  Yes Tiffanie Adkins DO   pramipexole (MIRAPEX) 0.5 MG tablet TAKE ONE TABLET BY MOUTH ONCE NIGHTLY 5/20/19  Yes Tiffanie Adkins DO   oxybutynin (DITROPAN-XL) 10 MG extended release tablet Take 10 mg by mouth daily   Yes Historical Provider, MD   albuterol sulfate  (90 Base) MCG/ACT inhaler Inhale 2 puffs into the lungs 4 times daily as needed for Wheezing 2/26/19  Yes Chaya Palmer MD   isosorbide mononitrate (IMDUR) 30 MG extended release tablet TAKE ONE-HALF TABLET BY MOUTH ONE TIME A DAY 1/15/19  Yes Michelle Brown, DO clotrimazole-betamethasone (LOTRISONE) 1-0.05 % cream Apply bid to affected area. 10/12/18  Yes Tiffanie Adkins DO   nitroGLYCERIN (NITROSTAT) 0.4 MG SL tablet Place 1 tablet under the tongue every 5 minutes as needed for Chest pain 9/27/18  Yes KIMI Johnson - PATTI   calcium carbonate 600 MG TABS tablet Take 1 tablet by mouth 2 times daily 6/25/18  Yes WILLIE Petersen   vitamin E 400 UNIT capsule Take 400 Units by mouth daily   Yes Historical Provider, MD   aspirin 81 MG tablet Take 81 mg by mouth daily. Yes Historical Provider, MD   omeprazole (PRILOSEC) 20 MG delayed release capsule TAKE ONE CAPSULE TWICE A DAY 5/6/19   Adrián Noland MD     Allergies:  Latex, Cephalexin, Codeine, Levofloxacin, Pce [erythromycin], Pcn [penicillins], Pentazocine, Sumycin [tetracycline hcl], Atarax [hydroxyzine hcl], Beef-derived products, Flagyl [metronidazole], Macrodantin [nitrofurantoin], Pyridium [phenazopyridine hcl], Sulfa antibiotics, and Urised [methen-jenifer-meth bl-phen sal]    Social History:      The [facility]     TOBACCO:   reports that she quit smoking about 46 years ago. Her smoking use included cigarettes. She has a 0.03 pack-year smoking history. She has never used smokeless tobacco.  ETOH:   reports no history of alcohol use. E-Cigarettes/Vaping Use     Questions Responses    E-Cigarette/Vaping Use Never User    Start Date     Passive Exposure     Quit Date     Counseling Given     Comments         Family History:      Reviewed in detail. Positive as follows:        Problem Relation Age of Onset    Cancer Father         prostate    Heart Disease Mother     Heart Disease Brother     Heart Disease Brother     Heart Disease Sister     Diabetes Sister     Dementia Sister      REVIEW OF SYSTEMS COMPLETED:   Pertinent positives as noted in the HPI. All other systems reviewed and negative.     PHYSICAL EXAM PERFORMED:    BP (!) 103/47   Pulse 83   Temp 98.2 °F (36.8 °C) Result   1. Significant colitis of the cecum and ascending colon, likely infectious or   inflammatory. 2. Sigmoid diverticulosis. Diverticulitis cannot be excluded. 3. Urinary bladder wall thickening consistent with cystitis. 4. Bilateral pleural effusions with right lower lobe atelectasis or pneumonia. ASSESSMENT:    Active Hospital Problems    Diagnosis Date Noted    Acute cystitis [N30.00]     Controlled type 2 diabetes mellitus with diabetic neuropathy, without long-term current use of insulin ([facility] [E11.40] 08/18/2015    CAD (coronary artery disease) [I25.10] 08/28/2014    Essential hypertension [I10] 05/23/2013    CKD (chronic kidney disease) stage 3, GFR 30-59 ml/min ([facility] [N18.30] 05/23/2013     PLAN:    Right sided abdominal pain and diarrhea likely 2/2 C diff colitis  -CT abdomen pelvis revealed: significant colitis of the cecum and ascending colon, likely infectious or inflammatory. Sigmoid diverticulosis. Diverticulitis can not be excluded.   -C diff pending  -Cdiff isolation  -oral vancomycin and flagyl given in ED and continued 10/12/2021  -blood cultures ordered in ED  -NPO  -500 ml NS given in ED  -morphine given in ED  -MIVF  -PRN pain medication  -GI consult - appreciate recommendations in advance    Acute cystitis with hematuria  -UA positive > 100 WBC and 1+ bacteria  -rocephin given in ED    UBALDO superimposed on CKD stabe 3, cr 5.4 and GFR 8 on admission  -baseline cr and GFR appears to be 1.3/39  -monitor with IVF  -bmp in am  -nephrology consult - appreciate recommendations in advance    DM2, uncontrolled  -  -hemglobin a1c in am  -mdssi  -poct ac/hs  -hypoglycemia protocol  -carb control diet    Obesity  With Body mass index is 30 kg/m². Complicating assessment and treatment. Placing patient at risk for multiple co-morbidities as well as early death and contributing to the patient's presentation.  Counseled on weight loss    Essential HTN in setting of known CAD  -continue coreg, lisinopril and clonidine  -continue imdur and ASA    HLD  -continue pravastatin    Dementia  -supportive care  -continue aricept    Leukocytosis, 16.6 on admission  -likely 2/2 c diff colitis  -abx as indicated above  -cbc in am    Chronic normocytic anemia, 8.0/24.3 on admission  -no s/s of bleeding at this time  -cbc in am    DVT Prophylaxis: heparin    Diet: Diet NPO     Code Status: Prior    PT/OT Eval Status: No indication for need at this time    Dispo - 2-3 days pending clinical improvement     Maranda Davila, APRN - CNP    Thank you Mamta Hong for the opportunity to be involved in this patient's care.  If you have any questions or concerns please feel free to contact me at [phone].  --------------------------Anticipated Dr. matt --------------------------------------

## 2021-10-12 NOTE — PROGRESS NOTES
10/12/21 0311   RT Protocol   History Pulmonary Disease 0   Respiratory pattern 0   Breath sounds 2   Cough 0   Indications for Bronchodilator Therapy On home bronchodilators   Bronchodilator Assessment Score 2

## 2021-10-12 NOTE — ED NOTES
Straight cath per sterile procedure. 200 ML brown color perciptate filled urine returned. Tolerated with minimal discomfort.  [facility]oro-care pre and post procedure, urine to lab      Cara Mccord, RN  10/11/21 2021

## 2021-10-12 NOTE — CONSULTS
Consult placed    Who:  Date:10/12/2021,  Time:10:46 AM        Electronically signed by Walter Regalado on 10/12/2021 at 10:46 AM

## 2021-10-12 NOTE — ED NOTES
Called [facility] Nurse Matha Gilford RN at [phone], Informed patient would be admitted.       Yuriy Pinto RN  10/11/21 2052

## 2021-10-12 NOTE — PROGRESS NOTES
Chart reviewed. Pt seen, examined in ER. Agree with note and plan of care. Notes ongoing buttock pain from ongoing diarrhea. Trial low dose morphine iv.

## 2021-10-12 NOTE — CARE COORDINATION
Patient is active with [facility], per Kye Nogueira ([phone]). States pt should have been admitted under HOSPICE as GIP. CM will address with provider at 1300 huddle.

## 2021-10-12 NOTE — ED NOTES
Patient identified as a positive fall risk on the ED triage fall screening. Patient placed in fall precautions which includes:  yellow fall risk bracelet on wrist and yellow socks on feet. Patient instructed on importance of not getting out of bed or ambulating without assistance for safety. Pt verbalized understanding.        Raji Kaiser RN  10/12/21 9947

## 2021-10-12 NOTE — CONSULTS
[facility]    [facility]              [phone]                         Interval History and plan:      Lethargic  Confused  Got morphine last night    Plan:  Dc NS  Start bicarb drip  Dc lisinopril  Dc clonidine  Dc coreg( do not see h/o Afib in system, watch pulse)  Hold cymbalta in presence of UBALDO  May need to hold  No edema  Start midodrine                       Assessment :     Acute Kidney Injury  UBALDO likely due to - pre-renal/ATN due to diarrhea/sepsis  Cr on consultation  5.9  Baseline Cr- 1.4   She has CKD stage IIIa at baseline    UA- mod blood, large LE  Renal Imaging:- no renal abnormality, thickened bladder on 10/21  Echo: EF 55%. Grade II DD    Hypertension   BP: ()/(45-56)  Pulse:  [76-91]   BP goal inpatient [id number] systolic inpatient  BP low      Cdiff diarrhea    Dementia  DM  obesity      [facility] would like to thank Seb Wright MD   for opportunity to serve this patient      Please call with questions at-   24 Hrs Answering service [phone] or  7 am- 5 pm via Perfect serve or cell phone  Shanique Foster MD          CC/reason for consult :     UBALDO     HPI :     Akua Forte is a [de-identified] y.o. female presented to   the hospital on 10/11/2021 with pain abdomen for several weeks. She is confused, so most info from the chart. She is diagnosed to have C diff and was on po vancomycin at the nursing home. Brought to ED, has significant colitis. On iv flagyl and po vancomycin.  Also getting iv ceftriaxone    We are consulted for UBALDO and related issues    ROS:     Seen with- no family    Unable to obtain ROS due to  Altered mental status           PMH/PSH/SH/Family History:     Past Medical History:   Diagnosis Date    Asthma     Bronchial asthma     Bursitis 12/2011    ACHILLES TENDON LEFT    Chest pain 12/2020    [facility])     Constipation     COPD (chronic obstructive pulmonary disease) ([facility])     Dementia ([facility] 09/11/2019    unspecificied dementia without behavioral disturbance    Depression     Diverticulitis     Dizziness     GERD (gastroesophageal reflux disease)     Hearing loss     History of blood transfusion     Hyperlipidemia     Hypertension     Leg edema     Lung disease     Osteoarthritis     Sleep apnea     CPAP, SLEEP STUDY 6/28 OVERNIGHT AT HOME    Tinnitus     Type II or unspecified type diabetes mellitus without mention of complication, not stated as uncontrolled     Unspecified cerebral artery occlusion with cerebral infarction     mini stroke       Past Surgical History:   Procedure Laterality Date    ABDOMEN SURGERY      ACHILLES TENDON SURGERY  2/2/12    LEFT ACHILLES DEBRIDEMENT, HAGLUNDS AND RETROCALCANEAL BURSA EXCISION WITH FLEXOR HALLUS LONGUS TENDON TRANSFER WITH BLOCK FOR PAIN CONTROL    APPENDECTOMY      CARDIAC CATHETERIZATION  8/21/14    CARDIAC CATHETERIZATION  12/07/2020    Non Obs CAD, medical management    CARPAL TUNNEL RELEASE      both hands    CHOLECYSTECTOMY      COLONOSCOPY      COLONOSCOPY      COLONOSCOPY  2/10/2016    CYSTOSCOPY  10/02/2017    DIAGNOSTIC CARDIAC CATH LAB PROCEDURE      ENDOSCOPY, COLON, DIAGNOSTIC      EYE SURGERY      HYSTERECTOMY      HYSTERECTOMY, TOTAL ABDOMINAL      JOINT REPLACEMENT      bilateral knee    KNEE SURGERY      both replaced    OTHER SURGICAL HISTORY  07/09/2018    CYSTOSCOPY, HYDRODISTENTION OF BLADDER WITH INSTILLATION OF    POLYPECTOMY      SHOULDER SURGERY      TOENAIL EXCISION  08/04/2016    right big toe    TONSILLECTOMY      TUBAL LIGATION      UPPER GASTROINTESTINAL ENDOSCOPY  10/29/12    gastritis    UPPER GASTROINTESTINAL ENDOSCOPY  2/10/2016    URETHRAL STRICTURE DILATATION          reports that she quit smoking about 46 years ago. Her smoking use included cigarettes. She has a 0.03 pack-year smoking history.  She has never used smokeless tobacco. She reports that she does not drink alcohol and does not use drugs. family history includes Cancer in her father; Dementia in her sister; Diabetes in her sister; Heart Disease in her brother, brother, mother, and sister.          Medication:     Current Facility-Administered Medications: aspirin EC tablet 81 mg, 81 mg, Oral, Daily  busPIRone (BUSPAR) tablet 10 mg, 10 mg, Oral, Daily  donepezil (ARICEPT) tablet 5 mg, 5 mg, Oral, Nightly  DULoxetine (CYMBALTA) extended release capsule 120 mg, 120 mg, Oral, Daily  isosorbide mononitrate (IMDUR) extended release tablet 30 mg, 30 mg, Oral, Daily  pantoprazole (PROTONIX) tablet 40 mg, 40 mg, Oral, Daily  oxybutynin (DITROPAN-XL) extended release tablet 10 mg, 10 mg, Oral, Daily  pravastatin (PRAVACHOL) tablet 20 mg, 20 mg, Oral, Nightly  sodium chloride flush 0.9 % injection 5-40 mL, 5-40 mL, IntraVENous, 2 times per day  sodium chloride flush 0.9 % injection 5-40 mL, 5-40 mL, IntraVENous, PRN  0.9 % sodium chloride infusion, 25 mL, IntraVENous, PRN  heparin (porcine) injection 5,000 Units, 5,000 Units, SubCUTAneous, 3 times per day  ondansetron (ZOFRAN-ODT) disintegrating tablet 4 mg, 4 mg, Oral, Q8H PRN **OR** ondansetron (ZOFRAN) injection 4 mg, 4 mg, IntraVENous, Q6H PRN  polyethylene glycol (GLYCOLAX) packet 17 g, 17 g, Oral, Daily PRN  acetaminophen (TYLENOL) tablet 650 mg, 650 mg, Oral, Q6H PRN **OR** acetaminophen (TYLENOL) suppository 650 mg, 650 mg, Rectal, Q6H PRN  metronidazole (FLAGYL) 500 mg in NaCl 100 mL IVPB premix, 500 mg, IntraVENous, Q8H  vancomycin (VANCOCIN) oral solution 125 mg, 125 mg, Oral, 4 times per day  [START ON 10/13/2021] cefTRIAXone (ROCEPHIN) 1000 mg IVPB in 50 mL D5W minibag, 1,000 mg, IntraVENous, Q24H  morphine (PF) injection 2 mg, 2 mg, IntraVENous, Q4H PRN  insulin lispro (HUMALOG) injection vial 0-12 Units, 0-12 Units, SubCUTAneous, TID WC  insulin lispro (HUMALOG) injection vial 0-6 Units, 0-6 Units, SubCUTAneous, Nightly  glucose (GLUTOSE) 40 % oral gel 15 g, 15 g, Oral, PRN  dextrose 50 % IV solution, 12.5 g, IntraVENous, PRN  glucagon (rDNA) injection 1 mg, 1 mg, IntraMUSCular, PRN  dextrose 5 % solution, 100 mL/hr, IntraVENous, PRN  albuterol sulfate  (90 Base) MCG/ACT inhaler 2 puff, 2 puff, Inhalation, Q4H PRN  sodium bicarbonate 100 mEq in dextrose 5 % 1,000 mL infusion, , IntraVENous, Continuous  carvedilol (COREG) tablet 12.5 mg, 12.5 mg, Oral, BID WC  0.9 % sodium chloride bolus, 500 mL, IntraVENous, Once       Vitals :     Vitals:    10/12/21 0901   BP: (!) 97/46   Pulse: 85   Resp: 18   Temp:    SpO2:        I & O :       Intake/Output Summary (Last 24 hours) at 10/12/2021 1043  Last data filed at 10/12/2021 0900  Gross per 24 hour   Intake 748.57 ml   Output    Net 748.57 ml        Physical Examination :     General appearance: confused,   HEENT: Lips- normal, teeth- ok , oral mucosa- dry  Neck : Mass- no, appears symmetrical, JVD- not visible  Respiratory: Respiratory effort-  normal, wheeze- no, crackles - no  Cardiovascular:  Ausculation- No M/R/G, Edema none  Abdomen: visible mass- no, distention- no, scar- no, tenderness- no                            hepatosplenomegaly-  No--  Musculoskeletal:  clubbing no,cyanosis- no , digital ischemia- no                           muscle strength- patient unable to co-operate     , tone - patient unable to co-operate -  Skin: rashes- no , ulcers- no, induration- no, tightening - no  Psychiatric:  confused -  Additional findings:-        LABS:     Recent Labs     10/11/21  1930 10/12/21  0502   WBC 16.6* 19.8*   HGB 8.0* 8.1*   HCT 24.3* 24.7*    427     Recent Labs     10/11/21  1930 10/12/21  0502   * 134*   K 4.5 4.6    102   CO2 14* 15*   BUN 62* 67*   CREATININE 5.4* 5.9*   GLUCOSE 138* 133*

## 2021-10-12 NOTE — ED PROVIDER NOTES
[facility] Emergency Department      CHIEF COMPLAINT  Abdominal Pain      HISTORY OF PRESENT ILLNESS  Breonna Lord is a [de-identified] y.o. female with a history of dementia presents with abdominal pain. Most of the history is gained from EMS and the nursing home notes. She apparently living in a nursing home and being treated for C. difficile with oral vancomycin. She has been complaining of worsening abdominal pain and been having diarrhea. They did x-rays at the nursing home that were concerning for a bowel obstruction so she was sent to the ER for further evaluation. There is no report of any fevers. According to chart review she also has a history of congestive heart failure and COPD. She is DNR CC. I did speak with her daughter Paresh Lopez who confirms this. She states they do however want treatment for her pain and would like further investigation for what is causing her pain. Her daughter tells me she is on hospice for her dementia. .   No other complaints, modifying factors or associated symptoms. I have reviewed the following from the nursing documentation.     Past Medical History:   Diagnosis Date    Asthma     Bronchial asthma     Bursitis 12/2011    ACHILLES TENDON LEFT    Chest pain 12/2020    CHF [facility]congestive heart failure) ([facility])     Constipation     COPD (chronic obstructive pulmonary disease) ([facility])     Dementia ([facility] [address] 09/11/2019    unspecificied dementia without behavioral disturbance    Depression     Diverticulitis     Dizziness     GERD (gastroesophageal reflux disease)     Hearing loss     History of blood transfusion     Hyperlipidemia     Hypertension     Leg edema     Lung disease     Osteoarthritis     Sleep apnea     CPAP, SLEEP STUDY 6/28 OVERNIGHT AT HOME    Tinnitus     Type II or unspecified type diabetes mellitus without mention of complication, not stated as uncontrolled     Unspecified cerebral artery occlusion with cerebral infarction     mini stroke     Past Surgical History:   Procedure Laterality Date    ABDOMEN SURGERY      ACHILLES TENDON SURGERY  12    LEFT ACHILLES DEBRIDEMENT, HAGLUNDS AND RETROCALCANEAL BURSA EXCISION WITH FLEXOR HALLUS LONGUS TENDON TRANSFER WITH BLOCK FOR PAIN CONTROL    APPENDECTOMY      CARDIAC CATHETERIZATION  14    CARDIAC CATHETERIZATION  2020    Non Obs CAD, medical management    CARPAL TUNNEL RELEASE      both hands    CHOLECYSTECTOMY      COLONOSCOPY      COLONOSCOPY      COLONOSCOPY  2/10/2016    CYSTOSCOPY  10/02/2017    DIAGNOSTIC CARDIAC CATH LAB PROCEDURE      ENDOSCOPY, COLON, DIAGNOSTIC      EYE SURGERY      HYSTERECTOMY      HYSTERECTOMY, TOTAL ABDOMINAL      JOINT REPLACEMENT      bilateral knee    KNEE SURGERY      both replaced    OTHER SURGICAL HISTORY  2018    CYSTOSCOPY, HYDRODISTENTION OF BLADDER WITH INSTILLATION OF    POLYPECTOMY      SHOULDER SURGERY      TOENAIL EXCISION  2016    right big toe    TONSILLECTOMY      TUBAL LIGATION      UPPER GASTROINTESTINAL ENDOSCOPY  10/29/12    gastritis    UPPER GASTROINTESTINAL ENDOSCOPY  2/10/2016    URETHRAL STRICTURE DILATATION       Family History   Problem Relation Age of Onset    Cancer Father         prostate    Heart Disease Mother     Heart Disease Brother     Heart Disease Brother     Heart Disease Sister     Diabetes Sister     Dementia Sister      Social History     Socioeconomic History    Marital status:       Spouse name: Not on file    Number of children: 3    Years of education: 15    Highest education level: Not on file   Occupational History    Occupation: retired   Tobacco Use    Smoking status: Former Smoker     Packs/day: 0.25     Years: 0.10     Pack years: 0.02     Types: Cigarettes     Quit date: 1975     Years since quittin.8    Smokeless tobacco: Never Used    Tobacco comment: only smoked for 1 month   Vaping Use    Vaping Use: Never used   Substance and Sexual Activity    Alcohol use: No     Alcohol/week: 0.0 standard drinks    Drug use: No    Sexual activity: Not Currently     Partners: Male   Other Topics Concern    Not on file   Social History Narrative    Not on file     Social Determinants of Health     Financial Resource Strain:     Difficulty of Paying Living Expenses:    Food Insecurity:     Worried About Running Out of Food in the Last Year:     [address] in the Last Year:    Transportation Needs:     Lack of Transportation (Medical):  Lack of Transportation (Non-Medical):    Physical Activity:     Days of Exercise per Week:     Minutes of Exercise per Session:    Stress:     Feeling of Stress :    Social Connections:     Frequency of Communication with Friends and Family:     Frequency of Social Gatherings with Friends and Family:     Attends Scientologist Services:     Active Member of Clubs or Organizations:     Attends Club or Organization Meetings:     Marital Status:    Intimate Partner Violence:     Fear of Current or Ex-Partner:     Emotionally Abused:     Physically Abused:     Sexually Abused:      Current Facility-Administered Medications   Medication Dose Route Frequency Provider Last Rate Last Admin    metronidazole (FLAGYL) 500 mg in NaCl 100 mL IVPB premix  500 mg IntraVENous Once Noemi Kaur MD        vancomycin (VANCOCIN) oral solution 125 mg  125 mg Oral Once Noemi Kaur MD         Current Outpatient Medications   Medication Sig Dispense Refill    HYDROcodone-acetaminophen (NORCO) 5-325 MG per tablet Take 1 tablet by mouth every 6 hours as needed for Pain.       lisinopril (PRINIVIL;ZESTRIL) 40 MG tablet Take 40 mg by mouth daily      busPIRone (BUSPAR) 5 MG tablet Take 10 mg by mouth daily       insulin lispro (HUMALOG) 100 UNIT/ML injection vial Inject 0-3 Units into the skin nightly 1 vial 3    insulin lispro (HUMALOG) 100 UNIT/ML injection vial Inject 0-6 Units into the skin 3 times daily (with meals) 1 vial 3    pravastatin (PRAVACHOL) 20 MG tablet Take 1 tablet by mouth nightly TAKE ONE TABLET BY MOUTH DAILY      cloNIDine (CATAPRES) 0.1 MG tablet Take 1 tablet by mouth 3 times daily (Patient taking differently: Take 0.1 mg by mouth 3 times daily For HTN)      blood glucose test strips (ACCU-CHEK ARABELLA PLUS) strip TEST BLOOD SUGAR TWICE A  strip 11    donepezil (ARICEPT) 5 MG tablet Take 1 tablet by mouth nightly 90 tablet 1    carvedilol (COREG) 25 MG tablet TAKE ONE TABLET BY MOUTH TWICE A DAY WITH FOOD 180 tablet 1    DULoxetine (CYMBALTA) 60 MG extended release capsule Take 2 capsules by mouth daily 60 capsule 5    pramipexole (MIRAPEX) 0.5 MG tablet TAKE ONE TABLET BY MOUTH ONCE NIGHTLY 30 tablet 5    oxybutynin (DITROPAN-XL) 10 MG extended release tablet Take 10 mg by mouth daily      albuterol sulfate  (90 Base) MCG/ACT inhaler Inhale 2 puffs into the lungs 4 times daily as needed for Wheezing 3 Inhaler 1    isosorbide mononitrate (IMDUR) 30 MG extended release tablet TAKE ONE-HALF TABLET BY MOUTH ONE TIME A DAY 45 tablet 3    clotrimazole-betamethasone (LOTRISONE) 1-0.05 % cream Apply bid to affected area. 45 g 3    nitroGLYCERIN (NITROSTAT) 0.4 MG SL tablet Place 1 tablet under the tongue every 5 minutes as needed for Chest pain 25 tablet 1    calcium carbonate 600 MG TABS tablet Take 1 tablet by mouth 2 times daily 30 tablet 1    vitamin E 400 UNIT capsule Take 400 Units by mouth daily      aspirin 81 MG tablet Take 81 mg by mouth daily.       omeprazole (PRILOSEC) 20 MG delayed release capsule TAKE ONE CAPSULE TWICE A DAY 60 capsule 5     Allergies   Allergen Reactions    Latex Rash    Cephalexin Other (See Comments)     SHAKY AND SWEATY    Codeine Nausea Only     STOMACH HURTS    Levofloxacin Nausea Only    Pce [Erythromycin] Nausea Only     STOMACH HURTS    Pcn [Penicillins] Other (See Comments)     SHAKY AND SWEATY    Pentazocine      UNSURE OF REATION    Sumycin [Tetracycline Hcl] Nausea Only    Atarax [Hydroxyzine Hcl] Nausea And Vomiting    Beef-Derived Products Nausea And Vomiting    Flagyl [Metronidazole] Nausea And Vomiting    Macrodantin [Nitrofurantoin] Palpitations and Other (See Comments)     SWEATY    Pyridium [Phenazopyridine Hcl] Palpitations     SWEATY - PT DENIES REACTION    Sulfa Antibiotics Nausea And Vomiting and Nausea Only    Urised [Methen-Lisa-Meth Bl-Phen Sal] Palpitations     SWEATY       REVIEW OF SYSTEMS  Review of systems is limited secondary to chronic dementia. PHYSICAL EXAM  BP (!) 106/43   Pulse 85   Temp 98.2 °F (36.8 °C) (Oral)   Resp 19   Ht 5' 5\" (1.651 m)   Wt 180 lb (81.6 kg)   SpO2 93%   BMI 29.95 kg/m²   GENERAL APPEARANCE: Awake and alert. Cooperative. Appears uncomfortable. Ye Yu HEAD: Normocephalic. Atraumatic. EYES: EOM's grossly intact. ENT: Mucous membranes are dry. NECK: Supple, trachea midline. HEART: RRR. LUNGS: Respirations unlabored. CTAB. Good air exchange. No wheezes, rales, or rhonchi. Speaking comfortably in full sentences. ABDOMEN: Diffuse tenderness, mild distention. Voluntary guarding. EXTREMITIES: No peripheral edema. MAEE. No acute deformities. SKIN: Warm, dry and intact. No acute rashes. NEUROLOGICAL: Alert and oriented X to self only. CN II-XII grossly intact. Strength and sensation grossly intact. PSYCHIATRIC: Normal mood and affect. LABS  I have reviewed all labs for this visit.    Results for orders placed or performed during the hospital encounter of 10/11/21   CBC Auto Differential   Result Value Ref Range    WBC 16.6 (H) 4.0 - 11.0 K/uL    RBC 2.48 (L) 4.00 - 5.20 M/uL    Hemoglobin 8.0 (L) 12.0 - 16.0 g/dL    Hematocrit 24.3 (L) 36.0 - 48.0 %    MCV 97.7 80.0 - 100.0 fL    MCH 32.3 26.0 - 34.0 pg    MCHC 33.0 31.0 - 36.0 g/dL    RDW 15.8 (H) 12.4 - 15.4 %    Platelets 312 942 - 876 K/uL    MPV 8.6 5.0 - 10.5 fL    PLATELET SLIDE REVIEW Adequate     SLIDE REVIEW see below     Neutrophils % 52.0 %    Lymphocytes % 18.0 %    Monocytes % 2.0 %    Eosinophils % 3.0 %    Basophils % 1.0 %    Neutrophils Absolute 12.5 (H) 1.7 - 7.7 K/uL    Lymphocytes Absolute 3.2 1.0 - 5.1 K/uL    Monocytes Absolute 0.3 0.0 - 1.3 K/uL    Eosinophils Absolute 0.5 0.0 - 0.6 K/uL    Basophils Absolute 0.2 0.0 - 0.2 K/uL    Bands Relative 23 (H) 0 - 7 %    Atypical Lymphocytes Relative 1 0 - 6 %    Toxic Granulation Present (A)     Vacuolated Neutrophils Present (A)     Anisocytosis 2+ (A)     Macrocytes Occasional (A)     Microcytes Occasional (A)     Polychromasia Occasional (A)     Poikilocytes Occasional (A)    Comprehensive Metabolic Panel w/ Reflex to MG   Result Value Ref Range    Sodium 132 (L) 136 - 145 mmol/L    Potassium reflex Magnesium 4.5 3.5 - 5.1 mmol/L    Chloride 102 99 - 110 mmol/L    CO2 14 (LL) 21 - 32 mmol/L    Anion Gap 16 3 - 16    Glucose 138 (H) 70 - 99 mg/dL    BUN 62 (H) 7 - 20 mg/dL    CREATININE 5.4 (HH) 0.6 - 1.2 mg/dL    GFR Non-African American 8 (A) >60    GFR  9 (A) >60    Calcium 9.4 8.3 - 10.6 mg/dL    Total Protein 5.7 (L) 6.4 - 8.2 g/dL    Albumin 2.8 (L) 3.4 - 5.0 g/dL    Albumin/Globulin Ratio 1.0 (L) 1.1 - 2.2    Total Bilirubin <0.2 0.0 - 1.0 mg/dL    Alkaline Phosphatase 129 40 - 129 U/L    ALT <5 (L) 10 - 40 U/L    AST 9 (L) 15 - 37 U/L    Globulin 2.9 Not Established g/dL   Lactate, Sepsis   Result Value Ref Range    Lactic Acid, Sepsis 0.8 0.4 - 1.9 mmol/L   Urinalysis   Result Value Ref Range    Color, UA Yellow Straw/Yellow    Clarity, UA Clear Clear    Glucose, Ur Negative Negative mg/dL    Bilirubin Urine SMALL (A) Negative    Ketones, Urine Negative Negative mg/dL    Specific Gravity, UA 1.025 1.005 - 1.030    Blood, Urine MODERATE (A) Negative    pH, UA 5.0 5.0 - 8.0    Protein, UA TRACE (A) Negative mg/dL    Urobilinogen, Urine 0.2 <2.0 E.U./dL    Nitrite, Urine Negative Negative    Leukocyte Esterase, Urine MODERATE (A) Negative    Microscopic Examination YES     Urine Type NotGiven    Microscopic Urinalysis   Result Value Ref Range    WBC, UA >100 (A) 0 - 5 /HPF    RBC, UA see below (A) 0 - 4 /HPF    Epithelial Cells, UA 2-5 0 - 5 /HPF    Bacteria, UA 1+ (A) None Seen /HPF    Yeast, UA Present (A) None Seen /HPF         Cardiac Monitoring: The cardiac monitor revealed normal sinus rhythm as interpreted by me. The cardiac monitor was ordered secondary to the patient's complaint of abd pain and to monitor the patient for dysrhythmia. RADIOLOGY  X-RAYS:  I have reviewed radiologic plain film image(s). ALL OTHER NON-PLAIN FILM IMAGES SUCH AS CT, ULTRASOUND AND MRI HAVE BEEN READ BY THE RADIOLOGIST. CT ABDOMEN PELVIS WO CONTRAST Additional Contrast? None   Final Result   1. Significant colitis of the cecum and ascending colon, likely infectious or   inflammatory. 2. Sigmoid diverticulosis. Diverticulitis cannot be excluded. 3. Urinary bladder wall thickening consistent with cystitis. 4. Bilateral pleural effusions with right lower lobe atelectasis or pneumonia. Rechecks: Physical assessment performed. Her pain is improved with IV morphine here. The patient has been seen on labor admission. Critical Care:I personally spent a total of 50 minutes of critical care time in obtaining history, performing a physical exam, bedside monitoring of interventions, collecting and interpreting tests and discussion with consultants but excluding time spent performing procedures, treating other patients and teaching time. ED COURSE/MDM  Patient seen and evaluated. Here the patient is afebrile with normal vitals signs. Old records reviewed, including a recent admission for Klebsiella UTI. Here she appears uncomfortable and has diffuse abdominal tenderness. She is afebrile with normal vitals.   Lab work does show acute renal failure with a creatinine of 5.4 which is new. Her potassium is normal.  She also has leukocytosis and anemia. The anemia does appear chronic. Lactic acid is normal.  I did consider ischemic colitis however with a normal lactic acid I have low suspicion for this. I think this is likely infectious knowing that she does have C. difficile. I do think however it is a severe C. difficile infection. Urinalysis does show UTI. C. difficile test here is pending. CT scan has been completed. There was significant delay in getting the CT report due to a technical issue with the computer system. CT does show significant colitis. No pneumoperitoneum. She also has bladder wall thickening and small bilateral pleural effusions. I have given her Rocephin for the UTI because I saw she tolerated this previously and her previous UTI was sensitive to this. Now with the CT findings I have given her oral vancomycin and IV Flagyl to treat presumed C. difficile colitis. She has had a total of 1 L of IV fluids in the ER to treat her acute renal failure. I will get her to the hospitalist for further work-up. Her daughter has been updated on the findings today. Labs and imaging reviewed and results discussed with patient. Patient was reassessed as noted above . Plan of care discussed with patient. Patient in agreement with plan. CLINICAL IMPRESSION  1. Acute renal failure, unspecified acute renal failure type (HCC)    2. C. difficile colitis    3. Urinary tract infection without hematuria, site unspecified        Blood pressure (!) 106/43, pulse 85, temperature 98.2 °F (36.8 °C), temperature source Oral, resp. rate 19, height 5' 5\" (1.651 m), weight 180 lb (81.6 kg), SpO2 93 %, not currently breastfeeding. Alexandrea Choi was admitted in stable condition.     (Please note this note was completed with a voice recognition program.  Efforts were made to edit the dictations but occasionally words are mis-transcribed.)       Octavia Ogden MD  10/12/21 [id number]

## 2021-10-12 NOTE — ED NOTES
Gave report to carmel from [facility], transportation called, Dr. Guilherme Rogers at [address] [facility] [facility]  10/12/21 7330

## 2021-10-12 NOTE — CONSULTS
Consult placed    Who:Dr. Jose Dunlap  Date:10/12/2021,  Time:10:35 AM        Electronically signed by Glenna Hale on 10/12/2021 at 10:35 AM

## 2021-10-13 LAB
ALBUMIN SERPL-MCNC: 2.7 G/DL (ref 3.4–5)
ANION GAP SERPL CALCULATED.3IONS-SCNC: 13 MMOL/L (ref 3–16)
BASOPHILS ABSOLUTE: 0.1 K/UL (ref 0–0.2)
BASOPHILS RELATIVE PERCENT: 1 %
BUN BLDV-MCNC: 65 MG/DL (ref 7–20)
C DIFF TOXIN/ANTIGEN: NORMAL
CALCIUM SERPL-MCNC: 8.3 MG/DL (ref 8.3–10.6)
CHLORIDE BLD-SCNC: 103 MMOL/L (ref 99–110)
CO2: 17 MMOL/L (ref 21–32)
CREAT SERPL-MCNC: 6.2 MG/DL (ref 0.6–1.2)
EOSINOPHILS ABSOLUTE: 0.5 K/UL (ref 0–0.6)
EOSINOPHILS RELATIVE PERCENT: 4.6 %
GFR AFRICAN AMERICAN: 8
GFR NON-AFRICAN AMERICAN: 6
GLUCOSE BLD-MCNC: 146 MG/DL (ref 70–99)
GLUCOSE BLD-MCNC: 147 MG/DL (ref 70–99)
GLUCOSE BLD-MCNC: 158 MG/DL (ref 70–99)
GLUCOSE BLD-MCNC: 165 MG/DL (ref 70–99)
GLUCOSE BLD-MCNC: 184 MG/DL (ref 70–99)
HCT VFR BLD CALC: 22.2 % (ref 36–48)
HEMOGLOBIN: 7.2 G/DL (ref 12–16)
INR BLD: 1.12 (ref 0.88–1.12)
LYMPHOCYTES ABSOLUTE: 2.2 K/UL (ref 1–5.1)
LYMPHOCYTES RELATIVE PERCENT: 19.9 %
MCH RBC QN AUTO: 31.6 PG (ref 26–34)
MCHC RBC AUTO-ENTMCNC: 32.3 G/DL (ref 31–36)
MCV RBC AUTO: 97.9 FL (ref 80–100)
MONOCYTES ABSOLUTE: 0.7 K/UL (ref 0–1.3)
MONOCYTES RELATIVE PERCENT: 6.6 %
NEUTROPHILS ABSOLUTE: 7.6 K/UL (ref 1.7–7.7)
NEUTROPHILS RELATIVE PERCENT: 67.9 %
PDW BLD-RTO: 15.9 % (ref 12.4–15.4)
PERFORMED ON: ABNORMAL
PHOSPHORUS: 7.3 MG/DL (ref 2.5–4.9)
PLATELET # BLD: 400 K/UL (ref 135–450)
PMV BLD AUTO: 8 FL (ref 5–10.5)
POTASSIUM SERPL-SCNC: 3.9 MMOL/L (ref 3.5–5.1)
PROTHROMBIN TIME: 12.7 SEC (ref 9.9–12.7)
RBC # BLD: 2.27 M/UL (ref 4–5.2)
SODIUM BLD-SCNC: 133 MMOL/L (ref 136–145)
WBC # BLD: 11.2 K/UL (ref 4–11)

## 2021-10-13 PROCEDURE — 80069 RENAL FUNCTION PANEL: CPT

## 2021-10-13 PROCEDURE — 6370000000 HC RX 637 (ALT 250 FOR IP): Performed by: NURSE PRACTITIONER

## 2021-10-13 PROCEDURE — 85610 PROTHROMBIN TIME: CPT

## 2021-10-13 PROCEDURE — 1200000000 HC SEMI PRIVATE

## 2021-10-13 PROCEDURE — 36415 COLL VENOUS BLD VENIPUNCTURE: CPT

## 2021-10-13 PROCEDURE — 87449 NOS EACH ORGANISM AG IA: CPT

## 2021-10-13 PROCEDURE — 2500000003 HC RX 250 WO HCPCS: Performed by: NURSE PRACTITIONER

## 2021-10-13 PROCEDURE — 51798 US URINE CAPACITY MEASURE: CPT

## 2021-10-13 PROCEDURE — 6360000002 HC RX W HCPCS: Performed by: NURSE PRACTITIONER

## 2021-10-13 PROCEDURE — 87324 CLOSTRIDIUM AG IA: CPT

## 2021-10-13 PROCEDURE — 85025 COMPLETE CBC W/AUTO DIFF WBC: CPT

## 2021-10-13 PROCEDURE — 2580000003 HC RX 258: Performed by: INTERNAL MEDICINE

## 2021-10-13 PROCEDURE — 2580000003 HC RX 258: Performed by: NURSE PRACTITIONER

## 2021-10-13 PROCEDURE — 2500000003 HC RX 250 WO HCPCS: Performed by: INTERNAL MEDICINE

## 2021-10-13 PROCEDURE — 6370000000 HC RX 637 (ALT 250 FOR IP): Performed by: INTERNAL MEDICINE

## 2021-10-13 RX ADMIN — Medication 125 MG: at 22:07

## 2021-10-13 RX ADMIN — SODIUM BICARBONATE: 84 INJECTION, SOLUTION INTRAVENOUS at 04:58

## 2021-10-13 RX ADMIN — CEFTRIAXONE SODIUM 1000 MG: 1 INJECTION, POWDER, FOR SOLUTION INTRAMUSCULAR; INTRAVENOUS at 00:59

## 2021-10-13 RX ADMIN — INSULIN LISPRO 2 UNITS: 100 INJECTION, SOLUTION INTRAVENOUS; SUBCUTANEOUS at 08:56

## 2021-10-13 RX ADMIN — MIDODRINE HYDROCHLORIDE 10 MG: 5 TABLET ORAL at 17:33

## 2021-10-13 RX ADMIN — Medication 125 MG: at 13:01

## 2021-10-13 RX ADMIN — Medication 125 MG: at 08:49

## 2021-10-13 RX ADMIN — SODIUM CHLORIDE, PRESERVATIVE FREE 10 ML: 5 INJECTION INTRAVENOUS at 22:55

## 2021-10-13 RX ADMIN — OXYBUTYNIN CHLORIDE 10 MG: 5 TABLET, EXTENDED RELEASE ORAL at 08:48

## 2021-10-13 RX ADMIN — PRAVASTATIN SODIUM 20 MG: 20 TABLET ORAL at 22:09

## 2021-10-13 RX ADMIN — INSULIN LISPRO 2 UNITS: 100 INJECTION, SOLUTION INTRAVENOUS; SUBCUTANEOUS at 12:55

## 2021-10-13 RX ADMIN — MIDODRINE HYDROCHLORIDE 10 MG: 5 TABLET ORAL at 08:48

## 2021-10-13 RX ADMIN — PANTOPRAZOLE SODIUM 40 MG: 40 TABLET, DELAYED RELEASE ORAL at 08:48

## 2021-10-13 RX ADMIN — HEPARIN SODIUM 5000 UNITS: 5000 INJECTION INTRAVENOUS; SUBCUTANEOUS at 06:53

## 2021-10-13 RX ADMIN — CEFTRIAXONE SODIUM 1000 MG: 1 INJECTION, POWDER, FOR SOLUTION INTRAMUSCULAR; INTRAVENOUS at 23:53

## 2021-10-13 RX ADMIN — MIDODRINE HYDROCHLORIDE 10 MG: 5 TABLET ORAL at 12:55

## 2021-10-13 RX ADMIN — ASPIRIN 81 MG: 81 TABLET, COATED ORAL at 08:48

## 2021-10-13 RX ADMIN — ACETAMINOPHEN 650 MG: 325 TABLET ORAL at 08:48

## 2021-10-13 RX ADMIN — HEPARIN SODIUM 5000 UNITS: 5000 INJECTION INTRAVENOUS; SUBCUTANEOUS at 22:09

## 2021-10-13 RX ADMIN — INSULIN LISPRO 1 UNITS: 100 INJECTION, SOLUTION INTRAVENOUS; SUBCUTANEOUS at 22:09

## 2021-10-13 RX ADMIN — METRONIDAZOLE 500 MG: 500 INJECTION, SOLUTION INTRAVENOUS at 17:33

## 2021-10-13 RX ADMIN — HEPARIN SODIUM 5000 UNITS: 5000 INJECTION INTRAVENOUS; SUBCUTANEOUS at 12:55

## 2021-10-13 RX ADMIN — METRONIDAZOLE 500 MG: 500 INJECTION, SOLUTION INTRAVENOUS at 01:42

## 2021-10-13 RX ADMIN — INSULIN LISPRO 2 UNITS: 100 INJECTION, SOLUTION INTRAVENOUS; SUBCUTANEOUS at 17:34

## 2021-10-13 RX ADMIN — METRONIDAZOLE 500 MG: 500 INJECTION, SOLUTION INTRAVENOUS at 08:56

## 2021-10-13 ASSESSMENT — PAIN SCALES - GENERAL
PAINLEVEL_OUTOF10: 5
PAINLEVEL_OUTOF10: 0

## 2021-10-13 ASSESSMENT — PAIN SCALES - PAIN ASSESSMENT IN ADVANCED DEMENTIA (PAINAD)
FACIALEXPRESSION: 1
TOTALSCORE: 4
BODYLANGUAGE: 1
NEGVOCALIZATION: 1
CONSOLABILITY: 1
BREATHING: 0

## 2021-10-13 NOTE — PROGRESS NOTES
4 Eyes Skin Assessment     The patient is being assess for   Admission    I agree that 2 RN's have performed a thorough Head to Toe Skin Assessment on the patient. ALL assessment sites listed below have been assessed. Areas assessed for pressure by both nurses:   [x]   Head, Face, and Ears   [x]   Shoulders, Back, and Chest, Abdomen  [x]   Arms, Elbows, and Hands   [x]   Coccyx, Sacrum, and Ischium  [x]   Legs, Feet, and Heels        Skin Assessed Under all Medical Devices by both nurses:                All Mepilex Borders were peeled back and area peeked at by both nurses:  Yes  Please list where Mepilex Borders are located:  Sacrum              **SHARE this note so that the co-signing nurse is able to place an eSignature**    Co-signer eSignature: {[email or web address]:[id number]}    Does the Patient have Skin Breakdown related to pressure?   No     (Insert Photo here)         Joshua Prevention initiated:  Yes   Wound Care Orders initiated:  No      WOC nurse consulted for Pressure Injury (Stage 3,4, Unstageable, DTI, NWPT, Complex wounds)and New or Established Ostomies:  No      Primary Nurse eSignature: Kimani Chakraborty RN

## 2021-10-13 NOTE — CONSULTS
Yes Mary Ann Thayer MD   cloNIDine (CATAPRES) 0.1 MG tablet Take 1 tablet by mouth 3 times daily  Patient taking differently: Take 0.1 mg by mouth 3 times daily For HTN 9/11/19  Yes Mary Ann Thayer MD   blood glucose test strips (ACCU-CHEK ARABELLA PLUS) strip TEST BLOOD SUGAR TWICE A DAY 8/21/19  Yes Marquise Villa DO   donepezil (ARICEPT) 5 MG tablet Take 1 tablet by mouth nightly 7/19/19  Yes KIMI Moreno CNP   carvedilol (COREG) 25 MG tablet TAKE ONE TABLET BY MOUTH TWICE A DAY WITH FOOD 6/17/19  Yes Marquise Villa DO   DULoxetine (CYMBALTA) 60 MG extended release capsule Take 2 capsules by mouth daily 5/24/19  Yes Tiffanie Adkins DO   pramipexole (MIRAPEX) 0.5 MG tablet TAKE ONE TABLET BY MOUTH ONCE NIGHTLY 5/20/19  Yes Tiffanie Adkins DO   oxybutynin (DITROPAN-XL) 10 MG extended release tablet Take 10 mg by mouth daily   Yes Historical Provider, MD   albuterol sulfate  (90 Base) MCG/ACT inhaler Inhale 2 puffs into the lungs 4 times daily as needed for Wheezing 2/26/19  Yes Kanu Whitmore MD   isosorbide mononitrate (IMDUR) 30 MG extended release tablet TAKE ONE-HALF TABLET BY MOUTH ONE TIME A DAY 1/15/19  Yes Tiffanie Adkins DO   clotrimazole-betamethasone (LOTRISONE) 1-0.05 % cream Apply bid to affected area. 10/12/18  Yes Tiffanie Adkins DO   nitroGLYCERIN (NITROSTAT) 0.4 MG SL tablet Place 1 tablet under the tongue every 5 minutes as needed for Chest pain 9/27/18  Yes KIMI Ortiz CNP   calcium carbonate 600 MG TABS tablet Take 1 tablet by mouth 2 times daily 6/25/18  Yes Phyllistine WILLIE Mesa   vitamin E 400 UNIT capsule Take 400 Units by mouth daily   Yes Historical Provider, MD   aspirin 81 MG tablet Take 81 mg by mouth daily.    Yes Historical Provider, MD   omeprazole (PRILOSEC) 20 MG delayed release capsule TAKE ONE CAPSULE TWICE A DAY 5/6/19   Kanu Whitmore MD      Scheduled Medications:    aspirin  81 mg Oral Daily    pantoprazole  40 mg Oral Daily    oxybutynin  10 mg unspecificied dementia without behavioral disturbance    Depression     Diverticulitis     Dizziness     GERD (gastroesophageal reflux disease)     Hearing loss     History of blood transfusion     Hyperlipidemia     Hypertension     Leg edema     Lung disease     Osteoarthritis     Sleep apnea     CPAP, SLEEP STUDY  OVERNIGHT AT HOME    Tinnitus     Type II or unspecified type diabetes mellitus without mention of complication, not stated as uncontrolled     Unspecified cerebral artery occlusion with cerebral infarction     mini stroke     Past Surgical History:   Procedure Laterality Date    ABDOMEN SURGERY      ACHILLES TENDON SURGERY  12    LEFT ACHILLES DEBRIDEMENT, HAGLUNDS AND RETROCALCANEAL BURSA EXCISION WITH FLEXOR HALLUS LONGUS TENDON TRANSFER WITH BLOCK FOR PAIN CONTROL    APPENDECTOMY      CARDIAC CATHETERIZATION  14    CARDIAC CATHETERIZATION  2020    Non Obs CAD, medical management    CARPAL TUNNEL RELEASE      both hands    CHOLECYSTECTOMY      COLONOSCOPY      COLONOSCOPY      COLONOSCOPY  2/10/2016    CYSTOSCOPY  10/02/2017    DIAGNOSTIC CARDIAC CATH LAB PROCEDURE      ENDOSCOPY, COLON, DIAGNOSTIC      EYE SURGERY      HYSTERECTOMY      HYSTERECTOMY, TOTAL ABDOMINAL      JOINT REPLACEMENT      bilateral knee    KNEE SURGERY      both replaced    OTHER SURGICAL HISTORY  2018    CYSTOSCOPY, HYDRODISTENTION OF BLADDER WITH INSTILLATION OF    POLYPECTOMY      SHOULDER SURGERY      TOENAIL EXCISION  2016    right big toe    TONSILLECTOMY      TUBAL LIGATION      UPPER GASTROINTESTINAL ENDOSCOPY  10/29/12    gastritis    UPPER GASTROINTESTINAL ENDOSCOPY  2/10/2016    URETHRAL STRICTURE DILATATION         Social:   Social History     Tobacco Use    Smoking status: Former Smoker     Packs/day: 0.25     Years: 0.10     Pack years: 0.02     Types: Cigarettes     Quit date: 1975     Years since quittin.8    Smokeless tobacco: Never Used    Tobacco comment: only smoked for 1 month   Substance Use Topics    Alcohol use: No     Alcohol/week: 0.0 standard drinks     Family:   Family History   Problem Relation Age of Onset    Cancer Father         prostate    Heart Disease Mother     Heart Disease Brother     Heart Disease Brother     Heart Disease Sister     Diabetes Sister     Dementia Sister        ROS: Pertinent items are noted in HPI.     Objective:   Vital Signs:  Temp (24hrs), Av.5 °F (36.4 °C), Min:97.5 °F (36.4 °C), Max:97.5 °F (21.8 °C)     Systolic (89WYQ), BBY:10 , Min:89 , BKB:217      Diastolic (53HVW), IPS:92, Min:46, Max:61     Pulse  Av  Min: 81  Max: 86  BP (!) 112/57   Pulse 81   Temp 97.5 °F (36.4 °C) (Axillary)   Resp 17   Ht 5' 5\" (1.651 m)   Wt 180 lb (81.6 kg)   SpO2 98%   BMI 29.95 kg/m²      Physical Exam:   BP (!) 112/57   Pulse 81   Temp 97.5 °F (36.4 °C) (Axillary)   Resp 17   Ht 5' 5\" (1.651 m)   Wt 180 lb (81.6 kg)   SpO2 98%   BMI 29.95 kg/m²   General appearance: alert, appears stated age and cooperative  Lungs: clear to auscultation bilaterally  Chest wall: no tenderness  Heart: regular rate and rhythm, S1, S2 normal, no murmur, click, rub or gallop  Abdomen: abnormal findings:  tenderness moderate in the RUQ, in the RLQ and in the upper abdomen  Extremities: extremities normal, atraumatic, no cyanosis or edema  Skin: Skin color, texture, turgor normal. No rashes or lesions  Neurologic: Grossly normal    Lab and Imaging Review   Recent Labs     10/11/21  1930 10/12/21  0502   WBC 16.6* 19.8*   HGB 8.0* 8.1*   MCV 97.7 96.2    427   * 134*   K 4.5 4.6    102   CO2 14* 15*   BUN 62* 67*   CREATININE 5.4* 5.9*   GLUCOSE 138* 133*   CALCIUM 9.4 8.9   PROT 5.7*  --    LABALBU 2.8*  --    AST 9*  --    ALT <5*  --    ALKPHOS 129  --    BILITOT <0.2  --        Assessment:     Patient Active Problem List    Diagnosis Date Noted    Arthritis     C. difficile colitis 10/12/2021    Unstable angina ([facility] 12/06/2020    Chest pain 12/03/2020    UBALDO (acute kidney injury) ([facility] 10/21/2020    Generalized weakness     Acute cystitis     Anemia     Acute encephalopathy 09/07/2019    Arthritis of shoulder region, left 01/07/2019    Severe episode of recurrent major depressive disorder, without psychotic features ([facility] 10/12/2018    Obesity 05/16/2018    Restrictive lung disease 04/14/2018    Acute confusion     Metabolic encephalopathy 62/36/3441    Lumbar radiculopathy 03/29/2018    Right hip pain 03/29/2018    Gastroesophageal reflux disease     COPD (chronic obstructive pulmonary disease) ([facility] 01/04/2018    Interstitial cystitis 09/29/2017    Controlled type 2 diabetes mellitus with diabetic neuropathy, without long-term current use of insulin ([facility] 08/18/2015    CAD (coronary artery disease) 08/28/2014    Facial asymmetry 08/20/2014    Aortic stenosis, mild 02/27/2014    Peroneal tendon injury 01/15/2014    Anxiety and depression 12/13/2013    Tremor, essential 09/03/2013    SOB (shortness of breath) 05/23/2013    CHF (congestive heart failure) ([facility] 93/07/1657    Uncomplicated asthma 36/32/3020    Essential hypertension 05/23/2013    CKD (chronic kidney disease) stage 3, GFR 30-59 ml/min ([facility] 05/23/2013    Mixed hyperlipidemia 04/02/2013    Premature atrial beats 02/04/2013    Constipation     Leg edema     Dizziness     Restless legs syndrome (RLS) 12/12/2011    Hypersomnia 05/19/2011    Severe obstructive sleep apnea 05/19/2011    Persistent disorder of initiating or maintaining sleep 05/19/2011    Sensory hearing loss, bilateral 09/29/2008     81 w deafness, dementia, DM, Obesity, HTN, CAD, HLD, who presented for abd pain and diarrhea. CT w Rt colon colitis    Plan:   1. Supportive care  2. Agree w Abx's  3. Will start clear liquid diet  4. Needs outpatient colonoscopy, or sooner if necessary  5.  Will follow    Naif Gibbs MD       (O) [phone]

## 2021-10-13 NOTE — PROGRESS NOTES
Hospitalist Progress Note      PCP: Maria Esther Wakefield    Date of Admission: 10/11/2021    Chief Complaint:  Right side abdominal pain and diarrhea    Hospital Course: [de-identified] y.o. female, with PMH of dementia, DM, Obesity, HTN, CAD, HLD, who presented to [facility] with right sided abdominal pain and diagnosed with colitis and UTI. Subjective: resting comfortably tonight, no major events today       Medications:  Reviewed    Infusion Medications    sodium chloride      dextrose      sodium bicarbonate infusion 125 mL/hr at 10/13/21 0458     Scheduled Medications    aspirin  81 mg Oral Daily    pantoprazole  40 mg Oral Daily    oxybutynin  10 mg Oral Daily    pravastatin  20 mg Oral Nightly    sodium chloride flush  5-40 mL IntraVENous 2 times per day    heparin (porcine)  5,000 Units SubCUTAneous 3 times per day    metroNIDAZOLE  500 mg IntraVENous Q8H    vancomycin  125 mg Oral 4 times per day    cefTRIAXone (ROCEPHIN) IV  1,000 mg IntraVENous Q24H    insulin lispro  0-12 Units SubCUTAneous TID WC    insulin lispro  0-6 Units SubCUTAneous Nightly    midodrine  10 mg Oral TID WC    sodium chloride  500 mL IntraVENous Once     PRN Meds: sodium chloride flush, sodium chloride, ondansetron **OR** ondansetron, polyethylene glycol, acetaminophen **OR** acetaminophen, glucose, dextrose, glucagon (rDNA), dextrose, albuterol sulfate HFA      Intake/Output Summary (Last 24 hours) at 10/13/2021 1952  Last data filed at 10/13/2021 1841  Gross per 24 hour   Intake 3439.03 ml   Output 350 ml   Net 3089.03 ml       Physical Exam Performed:    /65   Pulse 77   Temp 97.3 °F (36.3 °C) (Axillary)   Resp 17   Ht 5' 5\" (1.651 m)   Wt 180 lb (81.6 kg)   SpO2 96%   BMI 29.95 kg/m²     General appearance:  Pleasant, elderly, obese female in no apparent distress, appears stated age and cooperative. HEENT: Pupils equal, round, and reactive to light. Extra ocular muscles intact.  Conjunctivae/corneas clear.  Neck: Supple, with full range of motion. No jugular venous distention. Trachea midline. Respiratory:  Normal respiratory effort. Clear to auscultation, bilaterally without Rales/Wheezes/Rhonchi. Cardiovascular:  Regular rate and rhythm with normal S1/S2 without murmurs, rubs or gallops. Abdomen: Soft, obese, round tender, non-distended with normal bowel sounds. Musculoskeletal:  No clubbing, cyanosis or edema bilaterally. Full range of motion without deformity. Skin: Skin color, texture, turgor normal.  No significant rashes or lesions. Neurologic:  Neurovascularly intact. Cranial nerves: II-XII intact, grossly non-focal.  Psychiatric:  Alert and oriented to self only, thought content appropriate, normal insight  Capillary Refill: Brisk,3 seconds, normal  Peripheral Pulses: +2 palpable, equal bilaterally       Labs:   Recent Labs     10/11/21  1930 10/12/21  0502 10/13/21  0842   WBC 16.6* 19.8* 11.2*   HGB 8.0* 8.1* 7.2*   HCT 24.3* 24.7* 22.2*    427 400     Recent Labs     10/11/21  1930 10/12/21  0502 10/13/21  0842   * 134* 133*   K 4.5 4.6 3.9    102 103   CO2 14* 15* 17*   BUN 62* 67* 65*   CREATININE 5.4* 5.9* 6.2*   CALCIUM 9.4 8.9 8.3   PHOS  --   --  7.3*     Recent Labs     10/11/21  1930   AST 9*   ALT <5*   BILITOT <0.2   ALKPHOS 129     Recent Labs     10/13/21  0842   INR 1.12     No results for input(s): Mlalorie Meier in the last 72 hours. Urinalysis:      Lab Results   Component Value Date    NITRU Negative 10/11/2021    WBCUA >100 10/11/2021    BACTERIA 1+ 10/11/2021    RBCUA see below 10/11/2021    BLOODU MODERATE 10/11/2021    SPECGRAV 1.025 10/11/2021    GLUCOSEU Negative 10/11/2021    GLUCOSEU NEGATIVE 03/17/2011       Radiology:  CT ABDOMEN PELVIS WO CONTRAST Additional Contrast? None   Final Result   1. Significant colitis of the cecum and ascending colon, likely infectious or   inflammatory. 2. Sigmoid diverticulosis.   Diverticulitis cannot be pravastatin     Dementia  -supportive care  -continue aricept     Leukocytosis, 16.6 on admission  -likely 2/2 c diff colitis  -abx as indicated above  -cbc in am     Chronic normocytic anemia, 8.0/24.3 on admission  -no s/s of bleeding at this time  -cbc monitored    DVT Prophylaxis: heparin  Diet: ADULT DIET;  Clear Liquid  Code Status: DNR-CC    PT/OT Eval Status: not ordered    Dispo - pending improvement,     Silvia Ewing MD

## 2021-10-13 NOTE — PROGRESS NOTES
[facility]    [facility] [employer]              [phone]                         Interval History and plan:      She is less lethargic today  Creatinine continues to go up  Does not appear to have uremic symptoms  Grajeda placed to monitor urine output more closely  On bicarbonate drip  Her bicarbonate is slightly better but is still low at 17  Creatinine peaked to 6.2  WBC is better  Blood pressure is better on midodrine  Her pulse is stable despite holding Coreg and clonidine  Plan:  Continue bicarbonate drip  She is DNR-CCA and was hospice  She probably will not be a candidate for renal replacement therapy  Hopefully her renal function will start improving tomorrow                     Assessment :     Acute Kidney Injury  UBALDO likely due to - pre-renal/ATN due to diarrhea/sepsis  Cr on consultation  5.9  Baseline Cr- 1.4   She has CKD stage IIIa at baseline    UA- mod blood, large LE  Renal Imaging:- no renal abnormality, thickened bladder on 10/21  Echo: EF 55%. Grade II DD    Hypertension   BP: (101)/(64)  Pulse:  [81]   BP goal inpatient 523-440 systolic inpatient  BP low      Cdiff diarrhea    Dementia  DM  obesity      [facility] would like to thank Rajni Jones MD   for opportunity to serve this patient      Please call with questions at-   24 Hrs Answering service [phone] or  7 am- 5 pm via Perfect [employer] or cell phone  Ileana Alexander MD          CC/reason for consult :     UBALDO     HPI :     Robby Santo is a [de-identified] y.o. female presented to   the hospital on 10/11/2021 with pain abdomen for several weeks. She is confused, so most info from the chart. She is diagnosed to have C diff and was on po vancomycin at the nursing home. Brought to ED, has significant colitis. On iv flagyl and po vancomycin.  Also getting iv ceftriaxone    We are consulted for UBALDO and related issues    ROS:     Seen with- no family    Unable to obtain ROS due to  Altered mental status PMH/PSH/SH/Family History:     Past Medical History:   Diagnosis Date    Asthma     Bronchial asthma     Bursitis 12/2011    ACHILLES TENDON LEFT    Chest pain 12/2020    CHF [facility]congestive heart failure) ([facility]     Constipation     COPD (chronic obstructive pulmonary disease) (HCC)     Dementia ([facility] 09/11/2019    unspecificied dementia without behavioral disturbance    Depression     Diverticulitis     Dizziness     GERD (gastroesophageal reflux disease)     Hearing loss     History of blood transfusion     Hyperlipidemia     Hypertension     Leg edema     Lung disease     Osteoarthritis     Sleep apnea     CPAP, SLEEP STUDY 6/28 OVERNIGHT AT HOME    Tinnitus     Type II or unspecified type diabetes mellitus without mention of complication, not stated as uncontrolled     Unspecified cerebral artery occlusion with cerebral infarction     mini stroke       Past Surgical History:   Procedure Laterality Date    ABDOMEN SURGERY      ACHILLES TENDON SURGERY  2/2/12    LEFT ACHILLES DEBRIDEMENT, HAGLUNDS AND RETROCALCANEAL BURSA EXCISION WITH FLEXOR HALLUS LONGUS TENDON TRANSFER WITH BLOCK FOR PAIN CONTROL    APPENDECTOMY      CARDIAC CATHETERIZATION  8/21/14    CARDIAC CATHETERIZATION  12/07/2020    Non Obs CAD, medical management    CARPAL TUNNEL RELEASE      both hands    CHOLECYSTECTOMY      COLONOSCOPY      COLONOSCOPY      COLONOSCOPY  2/10/2016    CYSTOSCOPY  10/02/2017    DIAGNOSTIC CARDIAC CATH LAB PROCEDURE      ENDOSCOPY, COLON, DIAGNOSTIC      EYE SURGERY      HYSTERECTOMY      HYSTERECTOMY, TOTAL ABDOMINAL      JOINT REPLACEMENT      bilateral knee    KNEE SURGERY      both replaced    OTHER SURGICAL HISTORY  07/09/2018    CYSTOSCOPY, HYDRODISTENTION OF BLADDER WITH INSTILLATION OF    POLYPECTOMY      SHOULDER SURGERY      TOENAIL EXCISION  08/04/2016    right big toe    TONSILLECTOMY      TUBAL LIGATION      UPPER GASTROINTESTINAL ENDOSCOPY  10/29/12 gastritis    UPPER GASTROINTESTINAL ENDOSCOPY  2/10/2016    URETHRAL STRICTURE DILATATION          reports that she quit smoking about 46 years ago. Her smoking use included cigarettes. She has a 0.03 pack-year smoking history. She has never used smokeless tobacco. She reports that she does not drink alcohol and does not use drugs. family history includes Cancer in her father; Dementia in her sister; Diabetes in her sister; Heart Disease in her brother, brother, mother, and sister.          Medication:     Current Facility-Administered Medications: aspirin EC tablet 81 mg, 81 mg, Oral, Daily  pantoprazole (PROTONIX) tablet 40 mg, 40 mg, Oral, Daily  oxybutynin (DITROPAN-XL) extended release tablet 10 mg, 10 mg, Oral, Daily  pravastatin (PRAVACHOL) tablet 20 mg, 20 mg, Oral, Nightly  sodium chloride flush 0.9 % injection 5-40 mL, 5-40 mL, IntraVENous, 2 times per day  sodium chloride flush 0.9 % injection 5-40 mL, 5-40 mL, IntraVENous, PRN  0.9 % sodium chloride infusion, 25 mL, IntraVENous, PRN  heparin (porcine) injection 5,000 Units, 5,000 Units, SubCUTAneous, 3 times per day  ondansetron (ZOFRAN-ODT) disintegrating tablet 4 mg, 4 mg, Oral, Q8H PRN **OR** ondansetron (ZOFRAN) injection 4 mg, 4 mg, IntraVENous, Q6H PRN  polyethylene glycol (GLYCOLAX) packet 17 g, 17 g, Oral, Daily PRN  acetaminophen (TYLENOL) tablet 650 mg, 650 mg, Oral, Q6H PRN **OR** acetaminophen (TYLENOL) suppository 650 mg, 650 mg, Rectal, Q6H PRN  metronidazole (FLAGYL) 500 mg in NaCl 100 mL IVPB premix, 500 mg, IntraVENous, Q8H  vancomycin (VANCOCIN) oral solution 125 mg, 125 mg, Oral, 4 times per day  cefTRIAXone (ROCEPHIN) 1000 mg IVPB in 50 mL D5W minibag, 1,000 mg, IntraVENous, Q24H  insulin lispro (HUMALOG) injection vial 0-12 Units, 0-12 Units, SubCUTAneous, TID WC  insulin lispro (HUMALOG) injection vial 0-6 Units, 0-6 Units, SubCUTAneous, Nightly  glucose (GLUTOSE) 40 % oral gel 15 g, 15 g, Oral, PRN  dextrose 50 % IV solution, 12.5 g, IntraVENous, PRN  glucagon (rDNA) injection 1 mg, 1 mg, IntraMUSCular, PRN  dextrose 5 % solution, 100 mL/hr, IntraVENous, PRN  albuterol sulfate  (90 Base) MCG/ACT inhaler 2 puff, 2 puff, Inhalation, Q4H PRN  sodium bicarbonate 100 mEq in dextrose 5 % 1,000 mL infusion, , IntraVENous, Continuous  midodrine (PROAMATINE) tablet 10 mg, 10 mg, Oral, TID WC  0.9 % sodium chloride bolus, 500 mL, IntraVENous, Once       Vitals :     Vitals:    10/13/21 0830   BP: 101/64   Pulse: 81   Resp: 18   Temp: 97 °F (36.1 °C)   SpO2: 97%       I & O :       Intake/Output Summary (Last 24 hours) at 10/13/2021 1518  Last data filed at 10/13/2021 3771  Gross per 24 hour   Intake 2689.03 ml   Output    Net 2689.03 ml        Physical Examination :     General appearance: confused,   HEENT: Lips- normal, teeth- ok , oral mucosa- dry  Neck : Mass- no, appears symmetrical, JVD- not visible  Respiratory: Respiratory effort-  normal, wheeze- no, crackles - no  Cardiovascular:  Ausculation- No M/R/G, Edema none  Abdomen: visible mass- no, distention- no, scar- no, tenderness- no                            hepatosplenomegaly-  No--  Musculoskeletal:  clubbing no,cyanosis- no , digital ischemia- no                           muscle strength- patient unable to co-operate     , tone - patient unable to co-operate -  Skin: rashes- no , ulcers- no, induration- no, tightening - no  Psychiatric:  confused -  Additional findings:-        LABS:     Recent Labs     10/11/21  1930 10/12/21  0502 10/13/21  0842   WBC 16.6* 19.8* 11.2*   HGB 8.0* 8.1* 7.2*   HCT 24.3* 24.7* 22.2*    427 400     Recent Labs     10/11/21  1930 10/12/21  0502 10/13/21  0842   * 134* 133*   K 4.5 4.6 3.9    102 103   CO2 14* 15* 17*   BUN 62* 67* 65*   CREATININE 5.4* 5.9* 6.2*   GLUCOSE 138* 133* 158*   PHOS  --   --  7.3*

## 2021-10-14 LAB
ALBUMIN SERPL-MCNC: 2.9 G/DL (ref 3.4–5)
ANION GAP SERPL CALCULATED.3IONS-SCNC: 14 MMOL/L (ref 3–16)
BASOPHILS ABSOLUTE: 0.2 K/UL (ref 0–0.2)
BASOPHILS RELATIVE PERCENT: 1.4 %
BUN BLDV-MCNC: 58 MG/DL (ref 7–20)
CALCIUM SERPL-MCNC: 8.3 MG/DL (ref 8.3–10.6)
CHLORIDE BLD-SCNC: 95 MMOL/L (ref 99–110)
CO2: 21 MMOL/L (ref 21–32)
CREAT SERPL-MCNC: 5.5 MG/DL (ref 0.6–1.2)
EOSINOPHILS ABSOLUTE: 0.4 K/UL (ref 0–0.6)
EOSINOPHILS RELATIVE PERCENT: 3.5 %
GFR AFRICAN AMERICAN: 9
GFR NON-AFRICAN AMERICAN: 7
GLUCOSE BLD-MCNC: 134 MG/DL (ref 70–99)
GLUCOSE BLD-MCNC: 134 MG/DL (ref 70–99)
GLUCOSE BLD-MCNC: 144 MG/DL (ref 70–99)
GLUCOSE BLD-MCNC: 152 MG/DL (ref 70–99)
GLUCOSE BLD-MCNC: 158 MG/DL (ref 70–99)
HCT VFR BLD CALC: 23.8 % (ref 36–48)
HEMOGLOBIN: 8 G/DL (ref 12–16)
LYMPHOCYTES ABSOLUTE: 2.4 K/UL (ref 1–5.1)
LYMPHOCYTES RELATIVE PERCENT: 19.7 %
MCH RBC QN AUTO: 32.3 PG (ref 26–34)
MCHC RBC AUTO-ENTMCNC: 33.5 G/DL (ref 31–36)
MCV RBC AUTO: 96.2 FL (ref 80–100)
MONOCYTES ABSOLUTE: 0.7 K/UL (ref 0–1.3)
MONOCYTES RELATIVE PERCENT: 6.2 %
NEUTROPHILS ABSOLUTE: 8.4 K/UL (ref 1.7–7.7)
NEUTROPHILS RELATIVE PERCENT: 69.2 %
PDW BLD-RTO: 15.9 % (ref 12.4–15.4)
PERFORMED ON: ABNORMAL
PHOSPHORUS: 5.5 MG/DL (ref 2.5–4.9)
PLATELET # BLD: 449 K/UL (ref 135–450)
PMV BLD AUTO: 7.7 FL (ref 5–10.5)
POTASSIUM SERPL-SCNC: 3.8 MMOL/L (ref 3.5–5.1)
RBC # BLD: 2.48 M/UL (ref 4–5.2)
SODIUM BLD-SCNC: 130 MMOL/L (ref 136–145)
WBC # BLD: 12.1 K/UL (ref 4–11)

## 2021-10-14 PROCEDURE — 85025 COMPLETE CBC W/AUTO DIFF WBC: CPT

## 2021-10-14 PROCEDURE — 6370000000 HC RX 637 (ALT 250 FOR IP): Performed by: INTERNAL MEDICINE

## 2021-10-14 PROCEDURE — 6360000002 HC RX W HCPCS: Performed by: NURSE PRACTITIONER

## 2021-10-14 PROCEDURE — 6370000000 HC RX 637 (ALT 250 FOR IP): Performed by: NURSE PRACTITIONER

## 2021-10-14 PROCEDURE — 2500000003 HC RX 250 WO HCPCS: Performed by: NURSE PRACTITIONER

## 2021-10-14 PROCEDURE — 2580000003 HC RX 258: Performed by: INTERNAL MEDICINE

## 2021-10-14 PROCEDURE — 2580000003 HC RX 258: Performed by: NURSE PRACTITIONER

## 2021-10-14 PROCEDURE — 80069 RENAL FUNCTION PANEL: CPT

## 2021-10-14 PROCEDURE — 1200000000 HC SEMI PRIVATE

## 2021-10-14 PROCEDURE — 2500000003 HC RX 250 WO HCPCS: Performed by: INTERNAL MEDICINE

## 2021-10-14 PROCEDURE — 36415 COLL VENOUS BLD VENIPUNCTURE: CPT

## 2021-10-14 RX ADMIN — PRAVASTATIN SODIUM 20 MG: 20 TABLET ORAL at 21:50

## 2021-10-14 RX ADMIN — MIDODRINE HYDROCHLORIDE 10 MG: 5 TABLET ORAL at 08:57

## 2021-10-14 RX ADMIN — SODIUM BICARBONATE: 84 INJECTION, SOLUTION INTRAVENOUS at 02:42

## 2021-10-14 RX ADMIN — ASPIRIN 81 MG: 81 TABLET, COATED ORAL at 08:58

## 2021-10-14 RX ADMIN — Medication 125 MG: at 01:24

## 2021-10-14 RX ADMIN — OXYBUTYNIN CHLORIDE 10 MG: 5 TABLET, EXTENDED RELEASE ORAL at 08:58

## 2021-10-14 RX ADMIN — Medication 125 MG: at 20:09

## 2021-10-14 RX ADMIN — PANTOPRAZOLE SODIUM 40 MG: 40 TABLET, DELAYED RELEASE ORAL at 08:58

## 2021-10-14 RX ADMIN — MIDODRINE HYDROCHLORIDE 10 MG: 5 TABLET ORAL at 13:00

## 2021-10-14 RX ADMIN — HEPARIN SODIUM 5000 UNITS: 5000 INJECTION INTRAVENOUS; SUBCUTANEOUS at 21:50

## 2021-10-14 RX ADMIN — INSULIN LISPRO 2 UNITS: 100 INJECTION, SOLUTION INTRAVENOUS; SUBCUTANEOUS at 08:58

## 2021-10-14 RX ADMIN — METRONIDAZOLE 500 MG: 500 INJECTION, SOLUTION INTRAVENOUS at 18:13

## 2021-10-14 RX ADMIN — ONDANSETRON 4 MG: 2 INJECTION INTRAMUSCULAR; INTRAVENOUS at 06:35

## 2021-10-14 RX ADMIN — METRONIDAZOLE 500 MG: 500 INJECTION, SOLUTION INTRAVENOUS at 09:06

## 2021-10-14 RX ADMIN — HEPARIN SODIUM 5000 UNITS: 5000 INJECTION INTRAVENOUS; SUBCUTANEOUS at 05:41

## 2021-10-14 RX ADMIN — MIDODRINE HYDROCHLORIDE 10 MG: 5 TABLET ORAL at 17:18

## 2021-10-14 RX ADMIN — Medication 125 MG: at 09:02

## 2021-10-14 RX ADMIN — SODIUM BICARBONATE: 84 INJECTION, SOLUTION INTRAVENOUS at 18:12

## 2021-10-14 RX ADMIN — Medication 125 MG: at 13:01

## 2021-10-14 RX ADMIN — INSULIN LISPRO 2 UNITS: 100 INJECTION, SOLUTION INTRAVENOUS; SUBCUTANEOUS at 13:01

## 2021-10-14 RX ADMIN — ACETAMINOPHEN 650 MG: 325 TABLET ORAL at 08:58

## 2021-10-14 RX ADMIN — METRONIDAZOLE 500 MG: 500 INJECTION, SOLUTION INTRAVENOUS at 00:51

## 2021-10-14 RX ADMIN — SODIUM CHLORIDE, PRESERVATIVE FREE 10 ML: 5 INJECTION INTRAVENOUS at 21:12

## 2021-10-14 RX ADMIN — HEPARIN SODIUM 5000 UNITS: 5000 INJECTION INTRAVENOUS; SUBCUTANEOUS at 13:00

## 2021-10-14 ASSESSMENT — PAIN SCALES - PAIN ASSESSMENT IN ADVANCED DEMENTIA (PAINAD)
CONSOLABILITY: 0
BREATHING: 0
BODYLANGUAGE: 0
NEGVOCALIZATION: 0
TOTALSCORE: 0
FACIALEXPRESSION: 0

## 2021-10-14 ASSESSMENT — PAIN SCALES - GENERAL
PAINLEVEL_OUTOF10: 0

## 2021-10-14 NOTE — PROGRESS NOTES
Progress Note  Date:10/14/2021       Room:[facility]-  Patient Bette Boas     YOB: 1940     Age:81 y.o. Subjective    Subjective:  Symptoms:  Stable. Pain:  She reports no pain. Review of Systems  Objective         Vitals Last 24 Hours:  TEMPERATURE:  Temp  Av.1 °F (31.7 °C)  Min: 45.9 °F (7.7 °C)  Max: 97.8 °F (36.6 °C)  RESPIRATIONS RANGE: Resp  Av  Min: 16  Max: 16  PULSE OXIMETRY RANGE: SpO2  Av.4 %  Min: 94 %  Max: 97 %  PULSE RANGE: Pulse  Av.2  Min: 71  Max: 82  BLOOD PRESSURE RANGE: Systolic (21TAR), NASRIN:612 , Min:112 , CV   ; Diastolic (59ZWC), UHP:23, Min:51, Max:81    I/O (24Hr): Intake/Output Summary (Last 24 hours) at 10/14/2021 1857  Last data filed at 10/14/2021 1850  Gross per 24 hour   Intake 1130 ml   Output 2100 ml   Net -970 ml     Objective:  General Appearance:  Not in pain and in no acute distress. Vital signs: (most recent): Blood pressure (!) 146/70, pulse 71, temperature 97.8 °F (36.6 °C), temperature source Axillary, resp. rate 16, height 5' 5\" (1.651 m), weight 180 lb (81.6 kg), SpO2 94 %, not currently breastfeeding. Abdomen: Abdomen is soft. Bowel sounds are normal.   There is no abdominal tenderness. Labs/Imaging/Diagnostics    Labs:  CBC:  Recent Labs     10/12/21  0502 10/13/21  0842 10/14/21  0925   WBC 19.8* 11.2* 12.1*   RBC 2.57* 2.27* 2.48*   HGB 8.1* 7.2* 8.0*   HCT 24.7* 22.2* 23.8*   MCV 96.2 97.9 96.2   RDW 15.8* 15.9* 15.9*    400 449     CHEMISTRIES:  Recent Labs     10/12/21  0502 10/13/21  0842 10/14/21  0925   * 133* 130*   K 4.6 3.9 3.8    103 95*   CO2 15* 17* 21   BUN 67* 65* 58*   CREATININE 5.9* 6.2* 5.5*   GLUCOSE 133* 158* 144*   PHOS  --  7.3* 5.5*     PT/INR:  Recent Labs     10/13/21  0842   PROTIME 12.7   INR 1.12     APTT:No results for input(s): APTT in the last 72 hours.   LIVER PROFILE:  Recent Labs     10/11/21  1930   AST 9*   ALT <5*   BILITOT <0.2 ALKPHOS 129       Imaging Last 24 Hours:  No results found. Assessment//Plan           Hospital Problems         Last Modified POA    Essential hypertension (Chronic) 10/12/2021 Yes    CKD (chronic kidney disease) stage 3, GFR 30-59 ml/min ([facility] 10/12/2021 Yes    CAD (coronary artery disease) 10/12/2021 Yes    Controlled type 2 diabetes mellitus with diabetic neuropathy, without long-term current use of insulin ([facility] 10/12/2021 Yes    Acute cystitis 10/12/2021 Yes    C. difficile colitis 10/12/2021 Yes        Assessment & Plan  [de-identified] yo w deafness, dementia, DM, Obesity, HTN, CAD, HLD, who presented for abd pain and diarrhea w UBALDO. CT w Rt colon colitis     Plan:   1. Supportive care  2. Agree w Abx's  3. OK to advance diet  4. Needs outpatient colonoscopy, or sooner if necessary unless family elects to go w comfort measures  5.  Will follow     Thomas Ring MD       (O) [phone]    Electronically signed by Thomas Ring MD on 10/14/21 at 6:57 PM EDT

## 2021-10-14 NOTE — CARE COORDINATION
Pt Dtr discontinued hospice services for admission. Will need to be resumed at d/c. Plan to return to [facility].

## 2021-10-14 NOTE — PROGRESS NOTES
Hospitalist Progress Note      PCP: Robert Benjamin    Date of Admission: 10/11/2021    Chief Complaint:  Right side abdominal pain and diarrhea     Hospital Course: [de-identified] y.o. female, with PMH of dementia, DM, Obesity, HTN, CAD, HLD, who presented to [facility] with right sided abdominal pain and diagnosed with colitis and UTI.      Subjective: resting comfortably , no major events overnight, no complaints this AM    Medications:  Reviewed    Infusion Medications    sodium chloride      dextrose      sodium bicarbonate infusion 125 mL/hr at 10/14/21 0242     Scheduled Medications    influenza virus vaccine  0.5 mL IntraMUSCular Prior to discharge    aspirin  81 mg Oral Daily    pantoprazole  40 mg Oral Daily    oxybutynin  10 mg Oral Daily    pravastatin  20 mg Oral Nightly    sodium chloride flush  5-40 mL IntraVENous 2 times per day    heparin (porcine)  5,000 Units SubCUTAneous 3 times per day    metroNIDAZOLE  500 mg IntraVENous Q8H    vancomycin  125 mg Oral 4 times per day    cefTRIAXone (ROCEPHIN) IV  1,000 mg IntraVENous Q24H    insulin lispro  0-12 Units SubCUTAneous TID WC    insulin lispro  0-6 Units SubCUTAneous Nightly    midodrine  10 mg Oral TID WC    sodium chloride  500 mL IntraVENous Once     PRN Meds: sodium chloride flush, sodium chloride, ondansetron **OR** ondansetron, polyethylene glycol, acetaminophen **OR** acetaminophen, glucose, dextrose, glucagon (rDNA), dextrose, albuterol sulfate HFA      Intake/Output Summary (Last 24 hours) at 10/14/2021 0917  Last data filed at 10/14/2021 0230  Gross per 24 hour   Intake 750 ml   Output 1050 ml   Net -300 ml       Physical Exam Performed:    /81   Pulse 81   Temp 97.6 °F (36.4 °C) (Axillary)   Resp 16   Ht 5' 5\" (1.651 m)   Wt 180 lb (81.6 kg)   SpO2 95%   BMI 29.95 kg/m²      General appearance:  Pleasant, elderly, obese female in no apparent distress, appears stated age and cooperative.   HEENT: Pupils equal, round, and reactive to light.  Extra ocular muscles intact. Conjunctivae/corneas clear. Neck: Supple, with full range of motion. No jugular venous distention. Trachea midline. Respiratory:  Normal respiratory effort. Clear to auscultation, bilaterally without Rales/Wheezes/Rhonchi. Cardiovascular:  Regular rate and rhythm with normal S1/S2 without murmurs, rubs or gallops. Abdomen: Soft, obese, round, tender, non-distended with normal bowel sounds. Musculoskeletal:  No clubbing, cyanosis or edema bilaterally.  Full range of motion without deformity. Skin: Skin color, texture, turgor normal.  No significant rashes or lesions. Neurologic:  Neurovascularly intact. Cranial nerves: II-XII intact, grossly non-focal.  Psychiatric:  Alert and oriented to self only, thought content appropriate, normal insight  Capillary Refill: Brisk,3 seconds, normal  Peripheral Pulses: +2 palpable, equal bilaterally        Labs:   Recent Labs     10/11/21  1930 10/12/21  0502 10/13/21  0842   WBC 16.6* 19.8* 11.2*   HGB 8.0* 8.1* 7.2*   HCT 24.3* 24.7* 22.2*    427 400     Recent Labs     10/11/21  1930 10/12/21  0502 10/13/21  0842   * 134* 133*   K 4.5 4.6 3.9    102 103   CO2 14* 15* 17*   BUN 62* 67* 65*   CREATININE 5.4* 5.9* 6.2*   CALCIUM 9.4 8.9 8.3   PHOS  --   --  7.3*     Recent Labs     10/11/21  1930   AST 9*   ALT <5*   BILITOT <0.2   ALKPHOS 129     Recent Labs     10/13/21  0842   INR 1.12     No results for input(s): Noy Estrada in the last 72 hours. Urinalysis:      Lab Results   Component Value Date    NITRU Negative 10/11/2021    WBCUA >100 10/11/2021    BACTERIA 1+ 10/11/2021    RBCUA see below 10/11/2021    BLOODU MODERATE 10/11/2021    SPECGRAV 1.025 10/11/2021    GLUCOSEU Negative 10/11/2021    GLUCOSEU NEGATIVE 03/17/2011       Radiology:  CT ABDOMEN PELVIS WO CONTRAST Additional Contrast? None   Final Result   1.  Significant colitis of the cecum and ascending colon, likely infectious or   inflammatory. 2. Sigmoid diverticulosis. Diverticulitis cannot be excluded. 3. Urinary bladder wall thickening consistent with cystitis. 4. Bilateral pleural effusions with right lower lobe atelectasis or pneumonia. Assessment/Plan:    Active Hospital Problems    Diagnosis     C. difficile colitis [A04.72]     Acute cystitis [N30.00]     Controlled type 2 diabetes mellitus with diabetic neuropathy, without long-term current use of insulin (HCC) [E11.40]     CAD (coronary artery disease) [I25.10]     Essential hypertension [I10]     CKD (chronic kidney disease) stage 3, GFR 30-59 ml/min ([facility]) [N18.30]           Right sided abdominal pain and diarrhea likely 2/2 C diff colitis  -CT abdomen pelvis revealed: significant colitis of the cecum and ascending colon, likely infectious or inflammatory. Sigmoid diverticulosis. Diverticulitis can not be excluded.   -C diff negative  -Cdiff isolation  -oral vancomycin and flagyl given in ED and continued 10/12/2021  -blood cultures ordered in ED  -NPO  -500 ml NS given in ED  -morphine given in ED  -MIVF  -PRN pain medication  -GI consulted - appreciate recommendations in advance     Acute cystitis with hematuria  -UA positive > 100 WBC and 1+ bacteria  -rocephin given in ED   -unfortunately no ucx ordered, unable to add on now     UBALDO superimposed on CKD stabe 3, cr 5.4 and GFR 8 on admission  -baseline cr and GFR appears to be 1.3/39  -monitored with IVF  -bmp monitored  -nephrology consulted - appreciate recommendations in advance     DM2, uncontrolled  -CAMILLE 765  -hemglobin a1c 6.9  -mdssi  -poct ac/hs  -hypoglycemia protocol  -carb control diet     Obesity  With Body mass index is 30 kg/m². Complicating assessment and treatment. Placing patient at risk for multiple co-morbidities as well as early death and contributing to the patient's presentation.  Counseled on weight loss     Essential HTN in setting of known CAD  -held coreg, lisinopril and clonidine given austen  -held  imdur given austen  -on ASA     HLD  -continue pravastatin     Dementia  -supportive care  -continue aricept     Leukocytosis, 16.6 on admission  -likely 2/2 c diff colitis  -abx as indicated above  -cbc monitored     Chronic normocytic anemia, 8.0/24.3 on admission  -no s/s of bleeding at this time  -cbc monitored     DVT Prophylaxis: heparinDiet: ADULT DIET;  Clear Liquid  Code Status: DNR-CC      PT/OT Eval Status: not ordered     Dispo - unclear, pending improvement, improved renal fcn, nephro recs    Chely Davidson MD

## 2021-10-14 NOTE — PROGRESS NOTES
pts daughter Dolores Herman Updated on current plan of care. She is requesting call back from Nephrology and  GI if possible . Her number is . Another called at noon. Updated on current labs and nephrology plan.

## 2021-10-14 NOTE — DISCHARGE INSTR - COC
Continuity of Care Form    Patient Name: Nohemi Vanessa   :    MRN:  [id number]    Admit date:  10/11/2021  Discharge date: 2021    Code Status Order: [facility]   Advance Directives:      Admitting Physician:  Arely Cummings MD  PCP: Bailey Hammond    Discharging Nurse: [facility] Unit/Room#: 8741/6692-21  Discharging Unit Phone Number: ***    Emergency Contact:   Extended Emergency Contact Information  Primary Emergency Contact: [facility] [address]  Phone: [phone]  Mobile Phone: [phone]  Relation: Child  Secondary Emergency Contact: Ce Liceahiyovani Leong Phone: [phone]  Mobile Phone: [phone]  Relation: Child    Past Surgical History:  Past Surgical History:   Procedure Laterality Date    ABDOMEN SURGERY      ACHILLES TENDON SURGERY  12    LEFT ACHILLES DEBRIDEMENT, HAGLUNDS AND RETROCALCANEAL BURSA EXCISION WITH FLEXOR HALLUS LONGUS TENDON TRANSFER WITH BLOCK FOR PAIN CONTROL    APPENDECTOMY      CARDIAC CATHETERIZATION  14    CARDIAC CATHETERIZATION  2020    Non Obs CAD, medical management    CARPAL TUNNEL RELEASE      both hands    CHOLECYSTECTOMY      COLONOSCOPY      COLONOSCOPY      COLONOSCOPY  2/10/2016    CYSTOSCOPY  10/02/2017    DIAGNOSTIC CARDIAC CATH LAB PROCEDURE      ENDOSCOPY, COLON, DIAGNOSTIC      EYE SURGERY      HYSTERECTOMY      HYSTERECTOMY, TOTAL ABDOMINAL      JOINT REPLACEMENT      bilateral knee    KNEE SURGERY      both replaced    OTHER SURGICAL HISTORY  2018    CYSTOSCOPY, HYDRODISTENTION OF BLADDER WITH INSTILLATION OF    POLYPECTOMY      SHOULDER SURGERY      TOENAIL EXCISION  2016    right big toe    TONSILLECTOMY      TUBAL LIGATION      UPPER GASTROINTESTINAL ENDOSCOPY  10/29/12    gastritis    UPPER GASTROINTESTINAL ENDOSCOPY  2/10/2016    URETHRAL STRICTURE DILATATION         Immunization History:   Immunization History   Administered Date(s) Administered    Influenza Vaccine, unspecified formulation 09/21/2015    Pneumococcal Conjugate 13-valent (Ewoptop90) 09/21/2015    Pneumococcal Polysaccharide ([id number]) 05/25/2013    Tdap (Boostrix, Adacel) 09/13/2013       Active Problems:  Patient Active Problem List   Diagnosis Code    Arthritis M19.90    Constipation K59.00    Leg edema R60.0    Dizziness R42    Premature atrial beats I49.1    Mixed hyperlipidemia E78.2    SOB (shortness of breath) R06.02    CHF (congestive heart failure) ([facility]) J70.0    Uncomplicated asthma W60.604    Essential hypertension I10    CKD (chronic kidney disease) stage 3, GFR 30-59 ml/min ([facility]) N18.30    Tremor, essential G25.0    Hypersomnia G47.10    Severe obstructive sleep apnea G47.33    Persistent disorder of initiating or maintaining sleep G47.00    Restless legs syndrome (RLS) G25.81    Sensory hearing loss, bilateral H90.3    Anxiety and depression F41.9, F32. A    Peroneal tendon injury S86.309A    Aortic stenosis, mild I35.0    Facial asymmetry Q67.0    CAD (coronary artery disease) I25.10    Controlled type 2 diabetes mellitus with diabetic neuropathy, without long-term current use of insulin ([facility]) E11.40    Interstitial cystitis N30.10    COPD (chronic obstructive pulmonary disease) ([facility]) J44.9    Gastroesophageal reflux disease K21.9    Lumbar radiculopathy M54.16    Right hip pain Y06.991    Metabolic encephalopathy C03.89    Restrictive lung disease J98.4    Acute confusion R41.0    Obesity E66.9    Severe episode of recurrent major depressive disorder, without psychotic features ([address] F33.2    Arthritis of shoulder region, left M19.012    Acute encephalopathy G93.40    Generalized weakness R53.1    Acute cystitis N30.00    Anemia D64.9    UBALDO (acute kidney injury) ([facility] [address] N17.9    Chest pain R07.9    Unstable angina ([facility]) I20.0    C. difficile colitis A04.72       Isolation/Infection:   Isolation            No Isolation          Patient Infection Status       Infection Onset Added Last Indicated Last Indicated By Review Planned Expiration Resolved Resolved By    None active    Resolved    C-diff Rule Out 10/11/21 10/11/21 10/13/21 Clostridium difficile toxin/antigen (Ordered)   10/13/21 Rule-Out Test Resulted    COVID-19 Rule Out 12/03/20 12/03/20 12/03/20 COVID-19 (Ordered)   12/03/20 Rule-Out Test Resulted    COVID-19 Rule Out 10/23/20 10/23/20 10/23/20 COVID-19 (Ordered)   10/23/20 Rule-Out Test Resulted    COVID-19 Rule Out 10/20/20 10/20/20 10/20/20 COVID-19 (Ordered)   10/20/20 Rule-Out Test Resulted            Nurse Assessment:  Last Vital Signs: /81   Pulse 81   Temp 97.6 °F (36.4 °C) (Axillary)   Resp 16   Ht 5' 5\" (1.651 m)   Wt 180 lb (81.6 kg)   SpO2 95%   BMI 29.95 kg/m²     Last documented pain score (0-10 scale): Pain Level: 0  Last Weight:   Wt Readings from Last 1 Encounters:   10/11/21 180 lb (81.6 kg)     Mental Status:  oriented and Confusion at times waxes and wanes    IV Access:  - None    Nursing Mobility/ADLs:  Walking   Dependent  Transfer  Dependent  Bathing  Dependent  Dressing  Dependent  Toileting  Dependent  Feeding  Dependent  Med Admin  Dependent  Med Delivery   prefers mixed with whole in applesauce and applesauce    Wound Care Documentation and Therapy:  Wound 10/12/21 Buttocks Right open area on right buttocks (Active)   Dressing Status Clean;Dry; Intact 10/14/21 0855   Dressing/Treatment Foam 10/14/21 0855   Wound Assessment Pink/red 10/14/21 0855   Drainage Amount Scant 10/12/21 2046   Number of days: 1        Elimination:  Continence:   · Bowel: No  · Bladder: No  Urinary Catheter: None   Colostomy/Ileostomy/Ileal Conduit: No       Date of Last BM: 10/20/2021    Intake/Output Summary (Last 24 hours) at 10/14/2021 1009  Last data filed at 10/14/2021 0947  Gross per 24 hour   Intake 1110 ml   Output 1050 ml   Net 60 ml     I/O last 3 completed shifts:   In: 750 [P.O.:750]  Out: 1050 [Urine:1050]    Safety Concerns: At Risk for Falls    Impairments/Disabilities:      Hearing    Nutrition Therapy:  Current Nutrition Therapy:   - Oral Diet:  General    Routes of Feeding: Oral  Liquids: Thin Liquids  Daily Fluid Restriction: no  Last Modified Barium Swallow with Video (Video Swallowing Test): not done    Treatments at the Time of Hospital Discharge:   Respiratory Treatments: ***  Oxygen Therapy:  is not on home oxygen therapy. Ventilator:    - No ventilator support    Rehab Therapies: Physical Therapy and Occupational Therapy  Weight Bearing Status/Restrictions: No weight bearing restirctions  Other Medical Equipment (for information only, NOT a DME order):  {EQUIPMENT:[id number]}  Other Treatments: ***    Patient's personal belongings (please select all that are sent with patient):  None    RN SIGNATURE:  Electronically signed by Atif Montalvo RN on 10/20/21 at 7:57 PM EDT    CASE MANAGEMENT/SOCIAL WORK SECTION    Inpatient Status Date: 10/12/21    Readmission Risk Assessment Score:  Readmission Risk              Risk of Unplanned Readmission:  29           Discharging to Facility/ Agency   · Name: [facility]   · Phone:[phone]  · Fax:[phone]    / signature: Electronically signed by Patrica Shea RN on 10/20/21 at 3:14 PM EDT    PHYSICIAN SECTION    Prognosis: Good    Condition at Discharge: Stable    Rehab Potential (if transferring to Rehab): Good    Recommended Labs or Other Treatments After Discharge: None    Physician Certification: I certify the above information and transfer of Nia Goodwin  is necessary for the continuing treatment of the diagnosis listed and that she requires [facility] for less 30 days.      Update Admission H&P: No change in H&P    PHYSICIAN SIGNATURE:  Electronically signed by Lino Dickens MD on 10/20/21 at 1:30 PM EDT

## 2021-10-14 NOTE — PROGRESS NOTES
[facility] NEPHROLOGY    [facility]              [phone]                         Interval History and plan:      Looking better today  Made at least 1 L of urine yesterday  WBC coming down  Creatinine coming down to 5.5 from peak  of 5.9  Bicarbonate is a stable    Plan:  Continue bicarbonate drip  She is DNR-CCA and was hospice  She probably will not be a candidate for renal replacement therapy-spoke to daughter Raúl Thompson over the phone and she agrees that her mother should not be subjected to dialysis if her kidneys fail to that point  However kidney number is improving now    To avoid further chance of infection will remove Grajeda                      Assessment :     Acute Kidney Injury  UBALDO likely due to - pre-renal/ATN due to diarrhea/sepsis  Cr on consultation  5.9  Baseline Cr- 1.4   She has CKD stage IIIa at baseline    UA- mod blood, large LE  Renal Imaging:- no renal abnormality, thickened bladder on 10/21  Echo: EF 55%. Grade II DD    Hypertension   BP: (117-138)/(59-81)  Pulse:  [76-81]   BP goal inpatient 945-880 systolic inpatient  BP low      Cdiff diarrhea    Dementia  DM  obesity      [facility] Nephrology would like to thank Lexy Angeles MD   for opportunity to serve this patient      Please call with questions at-   24 Hrs Answering service [phone] or  7 am- 5 pm via Perfect serve or cell phone  Ernst Nunez MD          CC/reason for consult :     UABLDO     HPI :     Ailyn Pickard is a [de-identified] y.o. female presented to   the hospital on 10/11/2021 with pain abdomen for several weeks. She is confused, so most info from the chart. She is diagnosed to have C diff and was on po vancomycin at the nursing home. Brought to ED, has significant colitis. On iv flagyl and po vancomycin.  Also getting iv ceftriaxone    We are consulted for UBALDO and related issues    ROS:     Seen with- no family    Unable to obtain ROS due to  Altered mental status           PMH/PSH/SH/Family GASTROINTESTINAL ENDOSCOPY  2/10/2016    URETHRAL STRICTURE DILATATION          reports that she quit smoking about 46 years ago. Her smoking use included cigarettes. She has a 0.03 pack-year smoking history. She has never used smokeless tobacco. She reports that she does not drink alcohol and does not use drugs. family history includes Cancer in her father; Dementia in her sister; Diabetes in her sister; Heart Disease in her brother, brother, mother, and sister.          Medication:     Current Facility-Administered Medications: influenza quadrivalent split vaccine (FLUZONE;FLUARIX;FLULAVAL;AFLURIA) injection 0.5 mL, 0.5 mL, IntraMUSCular, Prior to discharge  aspirin EC tablet 81 mg, 81 mg, Oral, Daily  pantoprazole (PROTONIX) tablet 40 mg, 40 mg, Oral, Daily  oxybutynin (DITROPAN-XL) extended release tablet 10 mg, 10 mg, Oral, Daily  pravastatin (PRAVACHOL) tablet 20 mg, 20 mg, Oral, Nightly  sodium chloride flush 0.9 % injection 5-40 mL, 5-40 mL, IntraVENous, 2 times per day  sodium chloride flush 0.9 % injection 5-40 mL, 5-40 mL, IntraVENous, PRN  0.9 % sodium chloride infusion, 25 mL, IntraVENous, PRN  heparin (porcine) injection 5,000 Units, 5,000 Units, SubCUTAneous, 3 times per day  ondansetron (ZOFRAN-ODT) disintegrating tablet 4 mg, 4 mg, Oral, Q8H PRN **OR** ondansetron (ZOFRAN) injection 4 mg, 4 mg, IntraVENous, Q6H PRN  polyethylene glycol (GLYCOLAX) packet 17 g, 17 g, Oral, Daily PRN  acetaminophen (TYLENOL) tablet 650 mg, 650 mg, Oral, Q6H PRN **OR** acetaminophen (TYLENOL) suppository 650 mg, 650 mg, Rectal, Q6H PRN  metronidazole (FLAGYL) 500 mg in NaCl 100 mL IVPB premix, 500 mg, IntraVENous, Q8H  vancomycin (VANCOCIN) oral solution 125 mg, 125 mg, Oral, 4 times per day  cefTRIAXone (ROCEPHIN) 1000 mg IVPB in 50 mL D5W minibag, 1,000 mg, IntraVENous, Q24H  insulin lispro (HUMALOG) injection vial 0-12 Units, 0-12 Units, SubCUTAneous, TID WC  insulin lispro (HUMALOG) injection vial 0-6 Units, 0-6 Units, SubCUTAneous, Nightly  glucose (GLUTOSE) 40 % oral gel 15 g, 15 g, Oral, PRN  dextrose 50 % IV solution, 12.5 g, IntraVENous, PRN  glucagon (rDNA) injection 1 mg, 1 mg, IntraMUSCular, PRN  dextrose 5 % solution, 100 mL/hr, IntraVENous, PRN  albuterol sulfate  (90 Base) MCG/ACT inhaler 2 puff, 2 puff, Inhalation, Q4H PRN  sodium bicarbonate 100 mEq in dextrose 5 % 1,000 mL infusion, , IntraVENous, Continuous  midodrine (PROAMATINE) tablet 10 mg, 10 mg, Oral, TID WC  0.9 % sodium chloride bolus, 500 mL, IntraVENous, Once       Vitals :     Vitals:    10/14/21 0854   BP: 138/81   Pulse: 81   Resp: 16   Temp: 97.6 °F (36.4 °C)   SpO2: 95%       I & O :       Intake/Output Summary (Last 24 hours) at 10/14/2021 1010  Last data filed at 10/14/2021 0947  Gross per 24 hour   Intake 1110 ml   Output 1050 ml   Net 60 ml        Physical Examination :     General appearance: alert, hard of hearing   HEENT: Lips- normal, teeth- ok , oral mucosa- dry  Neck : Mass- no, appears symmetrical, JVD- not visible  Respiratory: Respiratory effort-  normal, wheeze- no, crackles - no  Cardiovascular:  Ausculation- No M/R/G, Edema none  Abdomen: visible mass- no, distention- no, scar- no, tenderness- no                            hepatosplenomegaly-  No--  Musculoskeletal:  clubbing no,cyanosis- no , digital ischemia- no                           muscle strength- patient unable to co-operate     , tone - patient unable to co-operate -  Skin: rashes- no , ulcers- no, induration- no, tightening - no  Psychiatric:  confused -  Additional findings:-        LABS:     Recent Labs     10/12/21  0502 10/13/21  0842 10/14/21  0925   WBC 19.8* 11.2* 12.1*   HGB 8.1* 7.2* 8.0*   HCT 24.7* 22.2* 23.8*    400 449     Recent Labs     10/12/21  0502 10/13/21  0842 10/14/21  0925   * 133* 130*   K 4.6 3.9 3.8    103 95*   CO2 15* 17* 21   BUN 67* 65* 58*   CREATININE 5.9* 6.2* 5.5*   GLUCOSE 133* 158* 144*   PHOS  --  7.3* 5.5*

## 2021-10-14 NOTE — PROGRESS NOTES
[de-identified] w deafness, dementia, DM, Obesity, HTN, CAD, HLD, who presented for abd pain and diarrhea. CT w Rt colon colitis    Plan:   1. Supportive care  2. Agree w Abx's  3. Will start clear liquid diet  4. Needs outpatient colonoscopy, or sooner if necessary  5.  Will follow    Tena Mott MD       [phone]) [phone]

## 2021-10-15 LAB
ABO/RH: NORMAL
ALBUMIN SERPL-MCNC: 2.2 G/DL (ref 3.4–5)
ANION GAP SERPL CALCULATED.3IONS-SCNC: 13 MMOL/L (ref 3–16)
ANTIBODY SCREEN: NORMAL
BASOPHILS ABSOLUTE: 0.1 K/UL (ref 0–0.2)
BASOPHILS RELATIVE PERCENT: 1 %
BLOOD BANK DISPENSE STATUS: NORMAL
BLOOD BANK PRODUCT CODE: NORMAL
BLOOD CULTURE, ROUTINE: NORMAL
BPU ID: NORMAL
BUN BLDV-MCNC: 51 MG/DL (ref 7–20)
CALCIUM SERPL-MCNC: 7.8 MG/DL (ref 8.3–10.6)
CHLORIDE BLD-SCNC: 99 MMOL/L (ref 99–110)
CO2: 23 MMOL/L (ref 21–32)
CREAT SERPL-MCNC: 4.6 MG/DL (ref 0.6–1.2)
CULTURE, BLOOD 2: NORMAL
DESCRIPTION BLOOD BANK: NORMAL
EOSINOPHILS ABSOLUTE: 0.5 K/UL (ref 0–0.6)
EOSINOPHILS RELATIVE PERCENT: 5.3 %
GFR AFRICAN AMERICAN: 11
GFR NON-AFRICAN AMERICAN: 9
GLUCOSE BLD-MCNC: 131 MG/DL (ref 70–99)
GLUCOSE BLD-MCNC: 166 MG/DL (ref 70–99)
GLUCOSE BLD-MCNC: 172 MG/DL (ref 70–99)
GLUCOSE BLD-MCNC: 173 MG/DL (ref 70–99)
GLUCOSE BLD-MCNC: 178 MG/DL (ref 70–99)
HCT VFR BLD CALC: 19.1 % (ref 36–48)
HCT VFR BLD CALC: 25.1 % (ref 36–48)
HEMOGLOBIN: 6.4 G/DL (ref 12–16)
HEMOGLOBIN: 8.4 G/DL (ref 12–16)
LYMPHOCYTES ABSOLUTE: 2.3 K/UL (ref 1–5.1)
LYMPHOCYTES RELATIVE PERCENT: 22.9 %
MAGNESIUM: 1.6 MG/DL (ref 1.8–2.4)
MCH RBC QN AUTO: 32.2 PG (ref 26–34)
MCHC RBC AUTO-ENTMCNC: 33.6 G/DL (ref 31–36)
MCV RBC AUTO: 95.7 FL (ref 80–100)
MONOCYTES ABSOLUTE: 0.8 K/UL (ref 0–1.3)
MONOCYTES RELATIVE PERCENT: 7.9 %
NEUTROPHILS ABSOLUTE: 6.3 K/UL (ref 1.7–7.7)
NEUTROPHILS RELATIVE PERCENT: 62.9 %
OCCULT BLOOD SCREENING: NORMAL
PDW BLD-RTO: 15.7 % (ref 12.4–15.4)
PERFORMED ON: ABNORMAL
PHOSPHORUS: 4.3 MG/DL (ref 2.5–4.9)
PLATELET # BLD: 394 K/UL (ref 135–450)
PMV BLD AUTO: 7.8 FL (ref 5–10.5)
POTASSIUM SERPL-SCNC: 3.5 MMOL/L (ref 3.5–5.1)
RBC # BLD: 1.99 M/UL (ref 4–5.2)
SODIUM BLD-SCNC: 135 MMOL/L (ref 136–145)
WBC # BLD: 10.1 K/UL (ref 4–11)

## 2021-10-15 PROCEDURE — 82270 OCCULT BLOOD FECES: CPT

## 2021-10-15 PROCEDURE — 86923 COMPATIBILITY TEST ELECTRIC: CPT

## 2021-10-15 PROCEDURE — 80069 RENAL FUNCTION PANEL: CPT

## 2021-10-15 PROCEDURE — 85018 HEMOGLOBIN: CPT

## 2021-10-15 PROCEDURE — 2580000003 HC RX 258: Performed by: NURSE PRACTITIONER

## 2021-10-15 PROCEDURE — 6370000000 HC RX 637 (ALT 250 FOR IP): Performed by: INTERNAL MEDICINE

## 2021-10-15 PROCEDURE — 86900 BLOOD TYPING SEROLOGIC ABO: CPT

## 2021-10-15 PROCEDURE — 2500000003 HC RX 250 WO HCPCS: Performed by: NURSE PRACTITIONER

## 2021-10-15 PROCEDURE — 86850 RBC ANTIBODY SCREEN: CPT

## 2021-10-15 PROCEDURE — P9016 RBC LEUKOCYTES REDUCED: HCPCS

## 2021-10-15 PROCEDURE — 83735 ASSAY OF MAGNESIUM: CPT

## 2021-10-15 PROCEDURE — 6360000002 HC RX W HCPCS: Performed by: INTERNAL MEDICINE

## 2021-10-15 PROCEDURE — 6370000000 HC RX 637 (ALT 250 FOR IP): Performed by: NURSE PRACTITIONER

## 2021-10-15 PROCEDURE — 6360000002 HC RX W HCPCS: Performed by: NURSE PRACTITIONER

## 2021-10-15 PROCEDURE — 36430 TRANSFUSION BLD/BLD COMPNT: CPT

## 2021-10-15 PROCEDURE — 86901 BLOOD TYPING SEROLOGIC RH(D): CPT

## 2021-10-15 PROCEDURE — 85014 HEMATOCRIT: CPT

## 2021-10-15 PROCEDURE — 36415 COLL VENOUS BLD VENIPUNCTURE: CPT

## 2021-10-15 PROCEDURE — 85025 COMPLETE CBC W/AUTO DIFF WBC: CPT

## 2021-10-15 PROCEDURE — 1200000000 HC SEMI PRIVATE

## 2021-10-15 RX ORDER — SODIUM CHLORIDE 9 MG/ML
INJECTION, SOLUTION INTRAVENOUS PRN
Status: DISCONTINUED | OUTPATIENT
Start: 2021-10-15 | End: 2021-10-21 | Stop reason: HOSPADM

## 2021-10-15 RX ORDER — MAGNESIUM SULFATE IN WATER 40 MG/ML
4000 INJECTION, SOLUTION INTRAVENOUS ONCE
Status: COMPLETED | OUTPATIENT
Start: 2021-10-15 | End: 2021-10-16

## 2021-10-15 RX ADMIN — MIDODRINE HYDROCHLORIDE 10 MG: 5 TABLET ORAL at 13:58

## 2021-10-15 RX ADMIN — Medication 125 MG: at 02:28

## 2021-10-15 RX ADMIN — METRONIDAZOLE 500 MG: 500 INJECTION, SOLUTION INTRAVENOUS at 08:20

## 2021-10-15 RX ADMIN — SODIUM CHLORIDE, PRESERVATIVE FREE 10 ML: 5 INJECTION INTRAVENOUS at 21:01

## 2021-10-15 RX ADMIN — METRONIDAZOLE 500 MG: 500 INJECTION, SOLUTION INTRAVENOUS at 00:57

## 2021-10-15 RX ADMIN — ASPIRIN 81 MG: 81 TABLET, COATED ORAL at 08:13

## 2021-10-15 RX ADMIN — ACETAMINOPHEN 650 MG: 325 TABLET ORAL at 10:19

## 2021-10-15 RX ADMIN — INSULIN LISPRO 1 UNITS: 100 INJECTION, SOLUTION INTRAVENOUS; SUBCUTANEOUS at 22:33

## 2021-10-15 RX ADMIN — MIDODRINE HYDROCHLORIDE 10 MG: 5 TABLET ORAL at 08:13

## 2021-10-15 RX ADMIN — MIDODRINE HYDROCHLORIDE 10 MG: 5 TABLET ORAL at 17:00

## 2021-10-15 RX ADMIN — INSULIN LISPRO 2 UNITS: 100 INJECTION, SOLUTION INTRAVENOUS; SUBCUTANEOUS at 18:41

## 2021-10-15 RX ADMIN — PRAVASTATIN SODIUM 20 MG: 20 TABLET ORAL at 22:33

## 2021-10-15 RX ADMIN — OXYBUTYNIN CHLORIDE 10 MG: 5 TABLET, EXTENDED RELEASE ORAL at 08:14

## 2021-10-15 RX ADMIN — CEFTRIAXONE SODIUM 1000 MG: 1 INJECTION, POWDER, FOR SOLUTION INTRAMUSCULAR; INTRAVENOUS at 00:07

## 2021-10-15 RX ADMIN — HEPARIN SODIUM 5000 UNITS: 5000 INJECTION INTRAVENOUS; SUBCUTANEOUS at 05:49

## 2021-10-15 RX ADMIN — ONDANSETRON 4 MG: 2 INJECTION INTRAMUSCULAR; INTRAVENOUS at 22:41

## 2021-10-15 RX ADMIN — SODIUM CHLORIDE, PRESERVATIVE FREE 10 ML: 5 INJECTION INTRAVENOUS at 08:14

## 2021-10-15 RX ADMIN — Medication 125 MG: at 08:13

## 2021-10-15 RX ADMIN — HEPARIN SODIUM 5000 UNITS: 5000 INJECTION INTRAVENOUS; SUBCUTANEOUS at 22:33

## 2021-10-15 RX ADMIN — METRONIDAZOLE 500 MG: 500 INJECTION, SOLUTION INTRAVENOUS at 18:16

## 2021-10-15 RX ADMIN — PANTOPRAZOLE SODIUM 40 MG: 40 TABLET, DELAYED RELEASE ORAL at 08:13

## 2021-10-15 RX ADMIN — MAGNESIUM SULFATE HEPTAHYDRATE 4000 MG: 40 INJECTION, SOLUTION INTRAVENOUS at 21:01

## 2021-10-15 ASSESSMENT — PAIN SCALES - GENERAL
PAINLEVEL_OUTOF10: 6
PAINLEVEL_OUTOF10: 0

## 2021-10-15 NOTE — CARE COORDINATION
Chart review- from [address] with [facility]. Will need to resume Hospice at dc. Cr elevated. Possible dc this weekend.

## 2021-10-15 NOTE — PROGRESS NOTES
Hospitalist Progress Note      PCP: Mary Petersen    Date of Admission: 10/11/2021     Chief Complaint:  Right side abdominal pain and diarrhea     Hospital Course: 81 y.o. female, with PMH of dementia, DM, Obesity, HTN, CAD, HLD, who presented to [facility] with right sided abdominal pain and diagnosed with colitis and UTI.      Subjective: resting comfortably , no major events overnight, no complaints this AM, states hasn't eaten yet      Medications:  Reviewed    Infusion Medications    sodium chloride      dextrose      sodium bicarbonate infusion 125 mL/hr at 10/14/21 1812     Scheduled Medications    influenza virus vaccine  0.5 mL IntraMUSCular Prior to discharge    aspirin  81 mg Oral Daily    pantoprazole  40 mg Oral Daily    oxybutynin  10 mg Oral Daily    pravastatin  20 mg Oral Nightly    sodium chloride flush  5-40 mL IntraVENous 2 times per day    heparin (porcine)  5,000 Units SubCUTAneous 3 times per day    metroNIDAZOLE  500 mg IntraVENous Q8H    vancomycin  125 mg Oral 4 times per day    cefTRIAXone (ROCEPHIN) IV  1,000 mg IntraVENous Q24H    insulin lispro  0-12 Units SubCUTAneous TID WC    insulin lispro  0-6 Units SubCUTAneous Nightly    midodrine  10 mg Oral TID WC    sodium chloride  500 mL IntraVENous Once     PRN Meds: sodium chloride flush, sodium chloride, ondansetron **OR** ondansetron, polyethylene glycol, acetaminophen **OR** acetaminophen, glucose, dextrose, glucagon (rDNA), dextrose, albuterol sulfate HFA      Intake/Output Summary (Last 24 hours) at 10/15/2021 1112  Last data filed at 10/15/2021 0257  Gross per 24 hour   Intake 770 ml   Output 1401 ml   Net -631 ml       Physical Exam Performed:    /69   Pulse 70   Temp 97.8 °F (36.6 °C) (Oral)   Resp 16   Ht 5' 5\" (1.651 m)   Wt 180 lb (81.6 kg)   SpO2 93%   BMI 29.95 kg/m²     General appearance:  Pleasant, elderly, obese female in no apparent distress, appears stated age and cooperative. HEENT: Pupils equal, round, and reactive to light.  Extra ocular muscles intact. Conjunctivae/corneas clear. Neck: Supple, with full range of motion. No jugular venous distention. Trachea midline. Respiratory:  Normal respiratory effort. Clear to auscultation, bilaterally without Rales/Wheezes/Rhonchi. Cardiovascular:  Regular rate and rhythm with normal S1/S2 without murmurs, rubs or gallops. Abdomen: Soft, obese, round, tender, non-distended with normal bowel sounds. Musculoskeletal:  No clubbing, cyanosis or edema bilaterally.  Full range of motion without deformity. Skin: Skin color, texture, turgor normal.  No significant rashes or lesions. Neurologic:  Neurovascularly intact. Cranial nerves: II-XII intact, grossly non-focal.  Psychiatric:  Alert and oriented to self only, thought content appropriate, normal insight  Capillary Refill: Brisk,3 seconds, normal  Peripheral Pulses: +2 palpable, equal bilaterally     Labs:   Recent Labs     10/13/21  0842 10/14/21  0925   WBC 11.2* 12.1*   HGB 7.2* 8.0*   HCT 22.2* 23.8*    449     Recent Labs     10/13/21  0842 10/14/21  0925   * 130*   K 3.9 3.8    95*   CO2 17* 21   BUN 65* 58*   CREATININE 6.2* 5.5*   CALCIUM 8.3 8.3   PHOS 7.3* 5.5*     No results for input(s): AST, ALT, BILIDIR, BILITOT, ALKPHOS in the last 72 hours. Recent Labs     10/13/21  0842   INR 1.12     No results for input(s): Dayton Schreiber in the last 72 hours. Urinalysis:      Lab Results   Component Value Date    NITRU Negative 10/11/2021    WBCUA >100 10/11/2021    BACTERIA 1+ 10/11/2021    RBCUA see below 10/11/2021    BLOODU MODERATE 10/11/2021    SPECGRAV 1.025 10/11/2021    GLUCOSEU Negative 10/11/2021    GLUCOSEU NEGATIVE 03/17/2011       Radiology:  CT ABDOMEN PELVIS WO CONTRAST Additional Contrast? None   Final Result   1. Significant colitis of the cecum and ascending colon, likely infectious or   inflammatory. 2. Sigmoid diverticulosis. Diverticulitis cannot be excluded. 3. Urinary bladder wall thickening consistent with cystitis. 4. Bilateral pleural effusions with right lower lobe atelectasis or pneumonia. Assessment/Plan:    Active Hospital Problems    Diagnosis     C. difficile colitis [A04.72]     Acute cystitis [N30.00]     Controlled type 2 diabetes mellitus with diabetic neuropathy, without long-term current use of insulin (HCC) [E11.40]     CAD (coronary artery disease) [I25.10]     Essential hypertension [I10]     CKD (chronic kidney disease) stage 3, GFR 30-59 ml/min ([facility]) [N18.30]      Right sided abdominal pain and diarrhea likely 2/2 C diff colitis  -CT abdomen pelvis revealed: significant colitis of the cecum and ascending colon, likely infectious or inflammatory. Sigmoid diverticulosis. Diverticulitis can not be excluded.   -C diff negative  -Cdiff isolation  -oral vancomycin and flagyl given in ED and continued 10/12/2021  -blood cultures ordered in ED  -NPO  -500 ml NS given in ED  -morphine given in ED  -MIVF  -PRN pain medication  -GI consulted - appreciate recommendations in advance, supportive care, consider outpt cscope     Acute cystitis with hematuria  -UA positive > 100 WBC and 1+ bacteria  -rocephin started/given in ED on 10/11   -unfortunately no ucx ordered, unable to add on now     UBALDO superimposed on CKD stabe 3, cr 5.4 and GFR 8 on admission  -baseline cr and GFR appears to be 1.3/39  -monitored with IVF  -bmp monitored  -nephrology consulted - appreciate recommendations in advance     DM2, uncontrolled  -UU 111  -hemglobin a1c 6.9  -mdssi  -poct ac/hs  -hypoglycemia protocol  -carb control diet     Obesity  With Body mass index is 30 kg/m². Complicating assessment and treatment. Placing patient at risk for multiple co-morbidities as well as early death and contributing to the patient's presentation.  Counseled on weight loss     Essential HTN in setting of known CAD  -held coreg, lisinopril and clonidine given austen  -held  imdur given austen  -on ASA     HLD  -continue pravastatin     Dementia  -supportive care  -continue aricept     Leukocytosis, 16.6 on admission  -likely 2/2 c diff colitis  -abx as indicated above  -cbc monitored     Chronic normocytic anemia, 8.0/24.3 on admission  -no s/s of bleeding at this time  -cbc monitored       DVT Prophylaxis: heparin  Diet: ADULT DIET; Full Liquid  Code Status: DNR-CC    PT/OT Eval Status: ordered 10/15    Dispo - pending improved renal fcn, nephro recs, ?by Sunday.  Stop oral Abdiaziz Gallardo MD

## 2021-10-15 NOTE — PROGRESS NOTES
Progress Note  Date:10/15/2021       Room:0360362-01  Patient Sarthak Aggarwal     YOB: 1940     Age:81 y.o. Subjective    Subjective:  Symptoms:  Stable. Pain:  She reports no pain. Review of Systems  Objective         Vitals Last 24 Hours:  TEMPERATURE:  Temp  Av °F (36.7 °C)  Min: 97.5 °F (36.4 °C)  Max: 98.4 °F (36.9 °C)  RESPIRATIONS RANGE: Resp  Av  Min: 16  Max: 16  PULSE OXIMETRY RANGE: SpO2  Av %  Min: 93 %  Max: 98 %  PULSE RANGE: Pulse  Av.2  Min: 62  Max: 73  BLOOD PRESSURE RANGE: Systolic (37JQM), ERP:088 , Min:124 , UMO:657   ; Diastolic (58GJP), CY, Min:60, Max:74    I/O (24Hr): Intake/Output Summary (Last 24 hours) at 10/15/2021 1838  Last data filed at 10/15/2021 0257  Gross per 24 hour   Intake 400 ml   Output 1401 ml   Net -1001 ml     Objective:  General Appearance:  Not in pain and in no acute distress. Vital signs: (most recent): Blood pressure (!) 183/71, pulse 62, temperature 98 °F (36.7 °C), temperature source Oral, resp. rate 16, height 5' 5\" (1.651 m), weight 180 lb (81.6 kg), SpO2 95 %, not currently breastfeeding. Abdomen: Abdomen is soft. Bowel sounds are normal.   There is no abdominal tenderness. Labs/Imaging/Diagnostics    Labs:  CBC:  Recent Labs     10/13/21  0842 10/14/21  0925 10/15/21  1219   WBC 11.2* 12.1* 10.1   RBC 2.27* 2.48* 1.99*   HGB 7.2* 8.0* 6.4*   HCT 22.2* 23.8* 19.1*   MCV 97.9 96.2 95.7   RDW 15.9* 15.9* 15.7*    449 394     CHEMISTRIES:  Recent Labs     10/13/21  0842 10/14/21  0925 10/15/21  1219   * 130* 135*   K 3.9 3.8 3.5    95* 99   CO2 17* 21 23   BUN 65* 58* 51*   CREATININE 6.2* 5.5* 4.6*   GLUCOSE 158* 144* 173*   PHOS 7.3* 5.5* 4.3   MG  --   --  1.60*     PT/INR:  Recent Labs     10/13/21  0842   PROTIME 12.7   INR 1.12     APTT:No results for input(s): APTT in the last 72 hours.   LIVER PROFILE:No results for input(s): AST, ALT, BILIDIR, BILITOT, ALKPHOS in the last 72 hours. Imaging Last 24 Hours:  No results found. Assessment//Plan           Hospital Problems         Last Modified POA    Essential hypertension (Chronic) 10/12/2021 Yes    CKD (chronic kidney disease) stage 3, GFR 30-59 ml/min ([address] 10/12/2021 Yes    CAD (coronary artery disease) 10/12/2021 Yes    Controlled type 2 diabetes mellitus with diabetic neuropathy, without long-term current use of insulin ([address] 10/12/2021 Yes    Acute cystitis 10/12/2021 Yes    C. difficile colitis 10/12/2021 Yes        Assessment & Plan  [de-identified] w deafness, dementia, DM, Obesity, HTN, CAD, HLD, who presented for abd pain and diarrhea w UBALDO. CT w Rt colon colitis     Plan:   1. Supportive care  2. Agree w Abx's  3. OK to advance diet  4. Needs outpatient colonoscopy, or sooner if necessary unless family elects to go w comfort measures  5.  Will follow at a distance     MD Mario [place]) [phone]    Electronically signed by Nura Powers MD on 10/15/21 at 6:38 PM EDT

## 2021-10-15 NOTE — PROGRESS NOTES
[facility]                                               Progress note    Summary:   Danny Read is being seen by nephrology for Acute kidney injury (UBALDO). She presented to the hospital on 10/11/2021 with pain abdomen for several weeks. She wass confused, so most info from the chart. She is diagnosed to have C diff and was on po vancomycin at the nursing home. Brought to ED, has significant colitis. On iv flagyl and po vancomycin. Also getting iv ceftriaxone  Of note she is DNR-cc and was enrolled in hospice. Confirmed with family that she would not want dialysis if renal failure was to worsen. Interval History  Cr beginning to improve, made 1401 mL urine yesterday. Cr down to 5.5 today, acidosis improved  Mental status improving but is complaining of back pain. Plan:   - continue bicarb drip for today and will re-assess tomorrow. - would be ok to stop IVF if PO intake improves. Reba Leonard MD  [facility]  Office: [phone]    Assessment:   Acute Kidney Injury  UBALDO likely due to - pre-renal/ATN due to diarrhea/sepsis  Cr on consultation  5.9  Baseline Cr- 1.4   She has CKD stage IIIa at baseline  Not a candidate for RRT as per prior stated goals of care. Confirmed this admission.     UA- mod blood, large LE  Renal Imaging:- no renal abnormality, thickened bladder on 10/21  Echo: EF 55%. Grade II DD     Hypertension   BP at goal.        Cdiff diarrhea     Dementia  DM  obesity      ROS:     Positives Listed Bold. All other remaining systems are negative. Constitutional:  fever, chills, weakness, weight change, fatigue,      Skin:  rash, pruritus, hair loss, bruising, dry skin, petechiae. Head, Face, Neck   headaches, swelling,  cervical adenopathy.      Respiratory: shortness of breath, cough, or wheezing  Cardiovascular: chest pain, palpitations, dizzy, edema  Gastrointestinal: nausea, vomiting, diarrhea, constipation,belly pain    Yellow skin, blood in stool  Musculoskeletal:  back pain, muscle weakness, gait problems,       joint pain or swelling. Genitourinary:  dysuria, poor urine flow, flank pain, blood in urine  Neurologic:  vertigo, TIA'S, syncope, seizures, focal weakness  Psychosocial:  insomnia, anxiety, or depression. Additional positive findings: -     PMH:   Past medical history, surgical history, social history, family history are reviewed and updated as appropriate. Reviewed current medication list.   Allergies reviewed and updated as needed. PE:   Vitals:    10/15/21 0810   BP: 124/69   Pulse: 70   Resp: 16   Temp: 97.8 °F (36.6 °C)   SpO2: 93%       General appearance: alert, hard of hearing   HEENT: Lips- normal, teeth- ok , oral mucosa- dry  Neck : Mass- no, appears symmetrical, JVD- not visible  Respiratory: Respiratory effort-  normal, wheeze- no, crackles - no  Cardiovascular:  Ausculation- No M/R/G, Edema none  Abdomen: visible mass- no, distention- no, scar- no, tenderness- no                            hepatosplenomegaly-  No--  Musculoskeletal:  clubbing no,cyanosis- no , digital ischemia- no                           muscle strength- patient unable to co-operate     , tone - patient unable to co-operate -  Skin: rashes- no , ulcers- no, induration- no, tightening - no  Psychiatric:  no SI/HI  Additional findings:-        Lab Results   Component Value Date    CREATININE 5.5 (HH) 10/14/2021    BUN 58 (H) 10/14/2021     (L) 10/14/2021    K 3.8 10/14/2021    CL 95 (L) 10/14/2021    CO2 21 10/14/2021      Lab Results   Component Value Date    WBC 12.1 (H) 10/14/2021    HGB 8.0 (L) 10/14/2021    HCT 23.8 (L) 10/14/2021    MCV 96.2 10/14/2021     10/14/2021     Lab Results   Component Value Date    CALCIUM 8.3 10/14/2021    PHOS 5.5 (H) 10/14/2021

## 2021-10-16 LAB
ALBUMIN SERPL-MCNC: 2.8 G/DL (ref 3.4–5)
ANION GAP SERPL CALCULATED.3IONS-SCNC: 15 MMOL/L (ref 3–16)
BASOPHILS ABSOLUTE: 0.1 K/UL (ref 0–0.2)
BASOPHILS RELATIVE PERCENT: 1.1 %
BUN BLDV-MCNC: 45 MG/DL (ref 7–20)
CALCIUM SERPL-MCNC: 8.7 MG/DL (ref 8.3–10.6)
CHLORIDE BLD-SCNC: 102 MMOL/L (ref 99–110)
CO2: 25 MMOL/L (ref 21–32)
CREAT SERPL-MCNC: 4.1 MG/DL (ref 0.6–1.2)
EOSINOPHILS ABSOLUTE: 0.5 K/UL (ref 0–0.6)
EOSINOPHILS RELATIVE PERCENT: 4.8 %
GFR AFRICAN AMERICAN: 13
GFR NON-AFRICAN AMERICAN: 10
GLUCOSE BLD-MCNC: 109 MG/DL (ref 70–99)
GLUCOSE BLD-MCNC: 134 MG/DL (ref 70–99)
GLUCOSE BLD-MCNC: 141 MG/DL (ref 70–99)
GLUCOSE BLD-MCNC: 141 MG/DL (ref 70–99)
GLUCOSE BLD-MCNC: 92 MG/DL (ref 70–99)
HCT VFR BLD CALC: 28.6 % (ref 36–48)
HEMOGLOBIN: 9.6 G/DL (ref 12–16)
LYMPHOCYTES ABSOLUTE: 3.1 K/UL (ref 1–5.1)
LYMPHOCYTES RELATIVE PERCENT: 29.2 %
MAGNESIUM: 2.8 MG/DL (ref 1.8–2.4)
MCH RBC QN AUTO: 31.2 PG (ref 26–34)
MCHC RBC AUTO-ENTMCNC: 33.4 G/DL (ref 31–36)
MCV RBC AUTO: 93.3 FL (ref 80–100)
MONOCYTES ABSOLUTE: 0.8 K/UL (ref 0–1.3)
MONOCYTES RELATIVE PERCENT: 7.3 %
NEUTROPHILS ABSOLUTE: 6 K/UL (ref 1.7–7.7)
NEUTROPHILS RELATIVE PERCENT: 57.6 %
PDW BLD-RTO: 16.7 % (ref 12.4–15.4)
PERFORMED ON: ABNORMAL
PERFORMED ON: NORMAL
PHOSPHORUS: 4 MG/DL (ref 2.5–4.9)
PLATELET # BLD: 429 K/UL (ref 135–450)
PMV BLD AUTO: 8.3 FL (ref 5–10.5)
POTASSIUM SERPL-SCNC: 3.4 MMOL/L (ref 3.5–5.1)
RBC # BLD: 3.07 M/UL (ref 4–5.2)
SODIUM BLD-SCNC: 142 MMOL/L (ref 136–145)
WBC # BLD: 10.4 K/UL (ref 4–11)

## 2021-10-16 PROCEDURE — 2580000003 HC RX 258: Performed by: NURSE PRACTITIONER

## 2021-10-16 PROCEDURE — 36415 COLL VENOUS BLD VENIPUNCTURE: CPT

## 2021-10-16 PROCEDURE — 6370000000 HC RX 637 (ALT 250 FOR IP): Performed by: NURSE PRACTITIONER

## 2021-10-16 PROCEDURE — 2580000003 HC RX 258: Performed by: INTERNAL MEDICINE

## 2021-10-16 PROCEDURE — 83735 ASSAY OF MAGNESIUM: CPT

## 2021-10-16 PROCEDURE — 80069 RENAL FUNCTION PANEL: CPT

## 2021-10-16 PROCEDURE — 6360000002 HC RX W HCPCS: Performed by: NURSE PRACTITIONER

## 2021-10-16 PROCEDURE — 85025 COMPLETE CBC W/AUTO DIFF WBC: CPT

## 2021-10-16 PROCEDURE — 2500000003 HC RX 250 WO HCPCS: Performed by: NURSE PRACTITIONER

## 2021-10-16 PROCEDURE — 1200000000 HC SEMI PRIVATE

## 2021-10-16 RX ORDER — SODIUM CHLORIDE, SODIUM GLUCONATE, SODIUM ACETATE, POTASSIUM CHLORIDE AND MAGNESIUM CHLORIDE 526; 502; 368; 37; 30 MG/100ML; MG/100ML; MG/100ML; MG/100ML; MG/100ML
INJECTION, SOLUTION INTRAVENOUS ONCE
Status: COMPLETED | OUTPATIENT
Start: 2021-10-16 | End: 2021-10-16

## 2021-10-16 RX ADMIN — ASPIRIN 81 MG: 81 TABLET, COATED ORAL at 08:10

## 2021-10-16 RX ADMIN — METRONIDAZOLE 500 MG: 500 INJECTION, SOLUTION INTRAVENOUS at 17:19

## 2021-10-16 RX ADMIN — PANTOPRAZOLE SODIUM 40 MG: 40 TABLET, DELAYED RELEASE ORAL at 08:10

## 2021-10-16 RX ADMIN — SODIUM CHLORIDE, PRESERVATIVE FREE 10 ML: 5 INJECTION INTRAVENOUS at 08:10

## 2021-10-16 RX ADMIN — CEFTRIAXONE SODIUM 1000 MG: 1 INJECTION, POWDER, FOR SOLUTION INTRAMUSCULAR; INTRAVENOUS at 04:06

## 2021-10-16 RX ADMIN — INSULIN LISPRO 2 UNITS: 100 INJECTION, SOLUTION INTRAVENOUS; SUBCUTANEOUS at 18:32

## 2021-10-16 RX ADMIN — METRONIDAZOLE 500 MG: 500 INJECTION, SOLUTION INTRAVENOUS at 02:50

## 2021-10-16 RX ADMIN — OXYBUTYNIN CHLORIDE 10 MG: 5 TABLET, EXTENDED RELEASE ORAL at 08:10

## 2021-10-16 RX ADMIN — HEPARIN SODIUM 5000 UNITS: 5000 INJECTION INTRAVENOUS; SUBCUTANEOUS at 14:46

## 2021-10-16 RX ADMIN — PRAVASTATIN SODIUM 20 MG: 20 TABLET ORAL at 20:42

## 2021-10-16 RX ADMIN — SODIUM CHLORIDE 25 ML: 9 INJECTION, SOLUTION INTRAVENOUS at 04:05

## 2021-10-16 RX ADMIN — HEPARIN SODIUM 5000 UNITS: 5000 INJECTION INTRAVENOUS; SUBCUTANEOUS at 06:48

## 2021-10-16 RX ADMIN — HEPARIN SODIUM 5000 UNITS: 5000 INJECTION INTRAVENOUS; SUBCUTANEOUS at 20:42

## 2021-10-16 RX ADMIN — SODIUM CHLORIDE, SODIUM GLUCONATE, SODIUM ACETATE, POTASSIUM CHLORIDE AND MAGNESIUM CHLORIDE: 526; 502; 368; 37; 30 INJECTION, SOLUTION INTRAVENOUS at 09:54

## 2021-10-16 RX ADMIN — METRONIDAZOLE 500 MG: 500 INJECTION, SOLUTION INTRAVENOUS at 08:10

## 2021-10-16 RX ADMIN — ACETAMINOPHEN 650 MG: 325 TABLET ORAL at 15:24

## 2021-10-16 ASSESSMENT — PAIN SCALES - GENERAL
PAINLEVEL_OUTOF10: 0
PAINLEVEL_OUTOF10: 8
PAINLEVEL_OUTOF10: 8
PAINLEVEL_OUTOF10: 0

## 2021-10-16 NOTE — PROGRESS NOTES
Physician Progress Note      Sharron Fragoso  CSN #:                  [id number]  :                       1940  ADMIT DATE:       10/11/2021 7:07 PM  [address] [place] DATE:  RESPONDING  PROVIDER #:        Sejal Watson MD          QUERY TEXT:    Pt admitted with C. difficile colitis. Noted documentation of sepsis per   nephrology consultant. If possible, please document in progress notes and   discharge summary:      The medical record reflects the following:  Risk Factors: C. difficile colitis, UTI, UBALDO  Clinical Indicators: Nephrology notes- UBLADO likely due to - pre-renal/ATN due   to diarrhea/sepsis. WBC 16.6, HR up to 91, RR up to 22  Treatment: monitor vitals, monitor labs/images, Rocephin, oral vancomycin and   flagyl , GI and nephrology consult    Thank You Ronnie Moe RN, [email or web address] [email or web address]  Options provided:  -- Sepsis confirmed present on admission  -- Sepsis confirmed not present on admission  -- Sepsis ruled out  -- Other - I will add my own diagnosis  -- Disagree - Not applicable / Not valid  -- Disagree - Clinically unable to determine / Unknown  -- Refer to Clinical Documentation Reviewer    PROVIDER RESPONSE TEXT:    The diagnosis of sepsis was confirmed as present on admission.     Query created by: Sheela Pope on 10/13/2021 9:47 AM      Electronically signed by:  Sejal Watson MD 10/16/2021 1:34 PM

## 2021-10-16 NOTE — PLAN OF CARE
Problem: OXYGENATION/RESPIRATORY FUNCTION  Goal: Patient will maintain patent airway  Outcome: Ongoing  Note:   Patient's EF (Ejection Fraction) is greater than 40%    Heart Failure Medications:  Diuretics[de-identified] None    (One of the following REQUIRED for EF <40%/SYSTOLIC FAILURE but MAY be used in EF% >40%/DIASTOLIC FAILURE)        ACE[de-identified] None        ARB[de-identified] None         ARNI[de-identified] None    (Beta Blockers)  NON- Evidenced Based Beta Blocker (for EF% >40%/DIASTOLIC FAILURE): None    Evidenced Based Beta Blocker::(REQUIRED for EF% <40%/SYSTOLIC FAILURE) None  . .................................................................................................................................................. Patient's weights and intake/output reviewed: Yes    Patient's Last Weight: 198 lbs obtained by bed scale. Difference of 18 lbs more than last documented weight. Intake/Output Summary (Last 24 hours) at 10/16/2021 0046  Last data filed at 10/15/2021 2336  Gross per 24 hour   Intake 240 ml   Output 1 ml   Net 239 ml       Comorbidities Reviewed Yes    Patient has a past medical history of Asthma, Bronchial asthma, Bursitis, Chest pain, CHF (congestive heart failure) ([address] Constipation, COPD (chronic obstructive pulmonary disease) ([address] Dementia ([address] Depression, Diverticulitis, Dizziness, GERD (gastroesophageal reflux disease), Hearing loss, History of blood transfusion, Hyperlipidemia, Hypertension, Leg edema, Lung disease, Osteoarthritis, Sleep apnea, Tinnitus, Type II or unspecified type diabetes mellitus without mention of complication, not stated as uncontrolled, and Unspecified cerebral artery occlusion with cerebral infarction.      >>For CHF and Comorbidity documentation on Education Time and Topics, please see Education Tab    Progressive Mobility Assessment:  What is this patient's Current Level of Mobility?: Requires Bed Rest  How was this patient Mobilized today?: Unable to Mobilize and Patient Refuses to Mobilize                 With Whom? Nurse, PCA, PT, OT, and Self                 Level of Difficulty/Assistance: 2x Assist     Pt resting in bed at this time on room air. Pt denies shortness of breath. Pt without lower extremity edema. Patient and/or Family's stated Goal of Care this Admission: increase activity tolerance and be more comfortable prior to discharge        :      Problem: Metabolic:  Goal: Ability to maintain appropriate glucose levels will improve  Description: Ability to maintain appropriate glucose levels will improve  Outcome: Ongoing  Note: Pt will have accuchecks before meals and at bedtime with sliding scale insulin in place for coverage. Will continue to monitor for signs and symptoms of hypoglycemia and hyperglycemia throughout shift. Problem: Pain:  Goal: Pain level will decrease  Description: Pain level will decrease  Outcome: Ongoing  Note: Pt will be satisfied with pain control. Pt uses numeric pain rating scale with reassessments after pain med administration. Will continue to monitor progression throughout shift. Problem: Skin Integrity:  Goal: Will show no infection signs and symptoms  Description: Will show no infection signs and symptoms  Outcome: Ongoing     Problem: Falls - Risk of:  Goal: Will remain free from falls  Description: Will remain free from falls  Outcome: Ongoing  Note: Pt will remain free from falls throughout hospital stay. Fall precautions in place, bed alarm on, bed in lowest position with wheels locked and side rails 2/4 up. Room door open and hourly rounding completed. Will continue to monitor throughout shift.

## 2021-10-16 NOTE — PROGRESS NOTES
[facility]                                               Progress note    Summary:   Janelle Strauss is being seen by nephrology for Acute kidney injury (UBALDO). She presented to the hospital on 10/11/2021 with pain abdomen for several weeks. She wass confused, so most info from the chart. She is diagnosed to have C diff and was on po vancomycin at the nursing home. Brought to ED, has significant colitis. On iv flagyl and po vancomycin. Also getting iv ceftriaxone  Of note she is DNR-cc and was enrolled in hospice. Confirmed with family that she would not want dialysis if renal failure was to worsen. Interval History  Feels better  Still with pain    Plan:   - stop Bicarb   Cretinine better  UP better  - change to plasmalyte at low dose       Jovany Noe MD  [facility]  Office: [phone]    Assessment:   Acute Kidney Injury  UBALDO likely due to - pre-renal/ATN due to diarrhea/sepsis  Cr on consultation  5.9  Baseline Cr- 1.4   She has CKD stage IIIa at baseline  Not a candidate for RRT as per prior stated goals of care. Confirmed this admission.     UA- mod blood, large LE  Renal Imaging:- no renal abnormality, thickened bladder on 10/21  Echo: EF 55%. Grade II DD     Hypertension   BP at goal.        Cdiff diarrhea     Dementia  DM  obesity      ROS:     Positives Listed Bold. All other remaining systems are negative. Constitutional:  fever, chills, weakness, weight change, fatigue,      Skin:  rash, pruritus, hair loss, bruising, dry skin, petechiae. Head, Face, Neck   headaches, swelling,  cervical adenopathy. Respiratory: shortness of breath, cough, or wheezing  Cardiovascular: chest pain, palpitations, dizzy, edema  Gastrointestinal: nausea, vomiting, diarrhea, constipation,belly pain    Yellow skin, blood in stool  Musculoskeletal:  back pain, muscle weakness, gait problems,       joint pain or swelling.   Genitourinary:  dysuria, poor urine flow, flank pain, blood in urine  Neurologic:  vertigo, TIA'S, syncope, seizures, focal weakness  Psychosocial:  insomnia, anxiety, or depression. Additional positive findings: -     PMH:   Past medical history, surgical history, social history, family history are reviewed and updated as appropriate. Reviewed current medication list.   Allergies reviewed and updated as needed. PE:   Vitals:    10/16/21 0431   BP: (!) 156/78   Pulse: 65   Resp: 16   Temp: 97.7 °F (36.5 °C)   SpO2: 95%       General appearance: alert, hard of hearing   HEENT: Lips- normal, teeth- ok , oral mucosa- dry  Neck : Mass- no, appears symmetrical, JVD- not visible  Respiratory: Respiratory effort-  normal, wheeze- no, crackles - no  Cardiovascular:  Ausculation- No M/R/G, Edema none  Abdomen: visible mass- no, distention- no, scar- no, tenderness- no                            hepatosplenomegaly-  No--  Musculoskeletal:  clubbing no,cyanosis- no , digital ischemia- no                           muscle strength- patient unable to co-operate     , tone - patient unable to co-operate -  Skin: rashes- no , ulcers- no, induration- no, tightening - no  Psychiatric:  no SI/HI  Additional findings:-        Lab Results   Component Value Date    CREATININE 4.1 (H) 10/16/2021    BUN 45 (H) 10/16/2021     10/16/2021    K 3.4 (L) 10/16/2021     10/16/2021    CO2 25 10/16/2021      Lab Results   Component Value Date    WBC 10.4 10/16/2021    HGB 9.6 (L) 10/16/2021    HCT 28.6 (L) 10/16/2021    MCV 93.3 10/16/2021     10/16/2021     Lab Results   Component Value Date    CALCIUM 8.7 10/16/2021    PHOS 4.0 10/16/2021

## 2021-10-16 NOTE — PROGRESS NOTES
Hospitalist Progress Note  10/16/2021 8:36 AM  Subjective:   Admit Date: 10/11/2021  PCP: Jarred Bain Status: Inpatient [101]  Interval History: Hospital Day: 6, admitted [facility] with [place] [facility] with right colitis and hemorrhagic cystitis treated with IV metronidazole and ceftriaxone. She was diagnosed with C difficile and was on oral vancomycin at nursing home. C. diff negative. Diet progressed from full liquids. Acute kidney injury on stage 3 CKD with creatine 5.4 and eGFR 8 on admission. Transfused 1 unit PRBC (10/15) for Hgb 6.4 gm/dL. Complains of back pain.       Diet: Full liquid - > controlled carbohydrate (10/16)  Right wrist peripheral IV (10/14, day #3)  External urinary catheter (10/16, day #1)  Medications:     aspirin  81 mg Oral Daily   pantoprazole  40 mg Oral Daily   oxybutynin  10 mg Oral Daily   pravastatin  20 mg Oral Nightly   heparin (porcine)  5,000 Units SubCUTAneous 3 times per day   metronidazole  500 mg IntraVENous Q8H (10/11, day #6)   ceftriaxone  1,000 mg IntraVENous Q24H (10/11, day #6)   insulin lispro SSI  0-12 Units SubCUTAneous TID WC       10/14/21  0925 10/15/21  1219 10/15/21  1852 10/16/21  0625   WBC 12.1* 10.1  --  10.4   HGB 8.0* 6.4* 8.4* 9.6*    394  --  429   MCV 96.2 95.7  --  93.3     Recent Labs     10/14/21  0925 10/15/21  1219 10/16/21  0625   * 135* 142   K 3.8 3.5 3.4*   CL 95* 99 102   CO2 21 23 25   BUN 58* 51* 45*   CREATININE 5.5* 4.6* 4.1*   GLUCOSE 144* 173* 109*     POC Glucose:   Recent Labs     10/15/21  1208 10/15/21  1755 10/15/21  2109 10/16/21  0736   POCGLU 172* 166* 178* 92     Magnesium (10/15) 1.60, (10/16) 2.80 mg/dL  C.diff Toxin/Antigen (10/13) negative  FOBT (10/15) negative  Blood culture x 2 (10/11) no growth x 4 days  Lactate (10/11) 0.8 mmol/L    Objective:   Vitals:  BP (!) 151/70   Pulse 65   Temp 98 °F (36.7 °C) (Oral)   Resp 16   Ht 5' 5\" (1.651 m)   Wt 198 lb 3.1 oz (89.9 kg)   SpO2 96%   BMI 32.98 kg/m²   General appearance:  Anxious, hard of hearing, alert and cooperative with exam  Lungs: clear to auscultation bilaterally  Heart: regular rate and rhythm, S1, S2 normal, no murmur, click, rub or gallop  Abdomen: soft, non-tender; bowel sounds normal; no masses,  no organomegaly  Extremities: extremities normal, atraumatic, no cyanosis or edema  Neurologic: No obvious focal neurologic deficits. Skin:  Open area right buttocks     Assessment and Plan:   1. Right sided colitis:  Antibiotic therapy with metronidazole 500 mg IV every 8 hours. Needs outpatient colonoscopy, or sooner if necessary unless family elects to go w comfort measures. Advancing diet. 2. Acute blood loss anemia:  Transfused 1 unit PRBC for Hgb 6.4 gm/dL (10/15). Stable hemoglobin since. 3. Acute cystitis with hematuria treated with ceftriaxone (10/11, day #6 of 7). 4. Acute kidney injury on stage 3 CKD. Treated with bicarbonate gtt. 5. Type 2 Diabetes (HgbA1c 6.9%). Cover with a \"sliding scale\" lispro moderate scale prandial correction insulin. 6. Hypertension:  Carvedilol, lisinopril, and clonidine held. Continues on aspirin 81 mg daily. 7. Hypotension requiring midodrine, discontinue due to elevated blood pressure. 8. Right buttocks ulceration:  Topical wound care. Advance Directive: DNR-CC  DVT prophylaxis with heparin 5,000 units subs TID. Discharge planning: enrolled in hospice.  Confirmed with family that she would not want dialysis if renal failure was to worsen      Dylan Tyson MD  [facility] Hospitalist

## 2021-10-17 LAB
ALBUMIN SERPL-MCNC: 2.8 G/DL (ref 3.4–5)
ANION GAP SERPL CALCULATED.3IONS-SCNC: 11 MMOL/L (ref 3–16)
BASOPHILS ABSOLUTE: 0.1 K/UL (ref 0–0.2)
BASOPHILS RELATIVE PERCENT: 0.7 %
BUN BLDV-MCNC: 36 MG/DL (ref 7–20)
CALCIUM SERPL-MCNC: 8.3 MG/DL (ref 8.3–10.6)
CHLORIDE BLD-SCNC: 102 MMOL/L (ref 99–110)
CO2: 28 MMOL/L (ref 21–32)
CREAT SERPL-MCNC: 4.1 MG/DL (ref 0.6–1.2)
EOSINOPHILS ABSOLUTE: 0.4 K/UL (ref 0–0.6)
EOSINOPHILS RELATIVE PERCENT: 4.1 %
GFR AFRICAN AMERICAN: 13
GFR NON-AFRICAN AMERICAN: 10
GLUCOSE BLD-MCNC: 112 MG/DL (ref 70–99)
GLUCOSE BLD-MCNC: 123 MG/DL (ref 70–99)
GLUCOSE BLD-MCNC: 173 MG/DL (ref 70–99)
GLUCOSE BLD-MCNC: 205 MG/DL (ref 70–99)
GLUCOSE BLD-MCNC: 90 MG/DL (ref 70–99)
HCT VFR BLD CALC: 25.3 % (ref 36–48)
HEMOGLOBIN: 8.4 G/DL (ref 12–16)
LYMPHOCYTES ABSOLUTE: 2.8 K/UL (ref 1–5.1)
LYMPHOCYTES RELATIVE PERCENT: 26.5 %
MAGNESIUM: 2.5 MG/DL (ref 1.8–2.4)
MCH RBC QN AUTO: 31.6 PG (ref 26–34)
MCHC RBC AUTO-ENTMCNC: 33.2 G/DL (ref 31–36)
MCV RBC AUTO: 95 FL (ref 80–100)
MONOCYTES ABSOLUTE: 0.7 K/UL (ref 0–1.3)
MONOCYTES RELATIVE PERCENT: 6.6 %
NEUTROPHILS ABSOLUTE: 6.6 K/UL (ref 1.7–7.7)
NEUTROPHILS RELATIVE PERCENT: 62.1 %
PDW BLD-RTO: 17.1 % (ref 12.4–15.4)
PERFORMED ON: ABNORMAL
PERFORMED ON: NORMAL
PHOSPHORUS: 3.4 MG/DL (ref 2.5–4.9)
PLATELET # BLD: 394 K/UL (ref 135–450)
PMV BLD AUTO: 7.8 FL (ref 5–10.5)
POTASSIUM SERPL-SCNC: 3.5 MMOL/L (ref 3.5–5.1)
RBC # BLD: 2.66 M/UL (ref 4–5.2)
SODIUM BLD-SCNC: 141 MMOL/L (ref 136–145)
WBC # BLD: 10.7 K/UL (ref 4–11)

## 2021-10-17 PROCEDURE — 2500000003 HC RX 250 WO HCPCS: Performed by: NURSE PRACTITIONER

## 2021-10-17 PROCEDURE — 6360000002 HC RX W HCPCS: Performed by: NURSE PRACTITIONER

## 2021-10-17 PROCEDURE — 36415 COLL VENOUS BLD VENIPUNCTURE: CPT

## 2021-10-17 PROCEDURE — 2580000003 HC RX 258: Performed by: INTERNAL MEDICINE

## 2021-10-17 PROCEDURE — 1200000000 HC SEMI PRIVATE

## 2021-10-17 PROCEDURE — 6370000000 HC RX 637 (ALT 250 FOR IP): Performed by: NURSE PRACTITIONER

## 2021-10-17 PROCEDURE — 85025 COMPLETE CBC W/AUTO DIFF WBC: CPT

## 2021-10-17 PROCEDURE — 83735 ASSAY OF MAGNESIUM: CPT

## 2021-10-17 PROCEDURE — 80069 RENAL FUNCTION PANEL: CPT

## 2021-10-17 PROCEDURE — 2580000003 HC RX 258: Performed by: NURSE PRACTITIONER

## 2021-10-17 RX ORDER — SODIUM CHLORIDE, SODIUM GLUCONATE, SODIUM ACETATE, POTASSIUM CHLORIDE AND MAGNESIUM CHLORIDE 526; 502; 368; 37; 30 MG/100ML; MG/100ML; MG/100ML; MG/100ML; MG/100ML
INJECTION, SOLUTION INTRAVENOUS CONTINUOUS
Status: DISCONTINUED | OUTPATIENT
Start: 2021-10-17 | End: 2021-10-19

## 2021-10-17 RX ORDER — GABAPENTIN 100 MG/1
100 CAPSULE ORAL ONCE
Status: COMPLETED | OUTPATIENT
Start: 2021-10-17 | End: 2021-10-17

## 2021-10-17 RX ADMIN — HEPARIN SODIUM 5000 UNITS: 5000 INJECTION INTRAVENOUS; SUBCUTANEOUS at 06:16

## 2021-10-17 RX ADMIN — GABAPENTIN 100 MG: 100 CAPSULE ORAL at 23:45

## 2021-10-17 RX ADMIN — METRONIDAZOLE 500 MG: 500 INJECTION, SOLUTION INTRAVENOUS at 16:57

## 2021-10-17 RX ADMIN — PANTOPRAZOLE SODIUM 40 MG: 40 TABLET, DELAYED RELEASE ORAL at 08:12

## 2021-10-17 RX ADMIN — HEPARIN SODIUM 5000 UNITS: 5000 INJECTION INTRAVENOUS; SUBCUTANEOUS at 13:16

## 2021-10-17 RX ADMIN — ACETAMINOPHEN 650 MG: 325 TABLET ORAL at 15:58

## 2021-10-17 RX ADMIN — PRAVASTATIN SODIUM 20 MG: 20 TABLET ORAL at 20:57

## 2021-10-17 RX ADMIN — SODIUM CHLORIDE, PRESERVATIVE FREE 10 ML: 5 INJECTION INTRAVENOUS at 08:13

## 2021-10-17 RX ADMIN — SODIUM CHLORIDE, SODIUM GLUCONATE, SODIUM ACETATE, POTASSIUM CHLORIDE AND MAGNESIUM CHLORIDE: 526; 502; 368; 37; 30 INJECTION, SOLUTION INTRAVENOUS at 13:15

## 2021-10-17 RX ADMIN — CEFTRIAXONE SODIUM 1000 MG: 1 INJECTION, POWDER, FOR SOLUTION INTRAMUSCULAR; INTRAVENOUS at 23:45

## 2021-10-17 RX ADMIN — METRONIDAZOLE 500 MG: 500 INJECTION, SOLUTION INTRAVENOUS at 08:13

## 2021-10-17 RX ADMIN — ASPIRIN 81 MG: 81 TABLET, COATED ORAL at 08:12

## 2021-10-17 RX ADMIN — CEFTRIAXONE SODIUM 1000 MG: 1 INJECTION, POWDER, FOR SOLUTION INTRAMUSCULAR; INTRAVENOUS at 00:29

## 2021-10-17 RX ADMIN — SODIUM CHLORIDE, PRESERVATIVE FREE 10 ML: 5 INJECTION INTRAVENOUS at 21:36

## 2021-10-17 RX ADMIN — HEPARIN SODIUM 5000 UNITS: 5000 INJECTION INTRAVENOUS; SUBCUTANEOUS at 20:57

## 2021-10-17 RX ADMIN — ACETAMINOPHEN 650 MG: 325 TABLET ORAL at 00:47

## 2021-10-17 RX ADMIN — INSULIN LISPRO 2 UNITS: 100 INJECTION, SOLUTION INTRAVENOUS; SUBCUTANEOUS at 21:21

## 2021-10-17 RX ADMIN — OXYBUTYNIN CHLORIDE 10 MG: 5 TABLET, EXTENDED RELEASE ORAL at 08:12

## 2021-10-17 RX ADMIN — INSULIN LISPRO 2 UNITS: 100 INJECTION, SOLUTION INTRAVENOUS; SUBCUTANEOUS at 18:06

## 2021-10-17 RX ADMIN — METRONIDAZOLE 500 MG: 500 INJECTION, SOLUTION INTRAVENOUS at 01:38

## 2021-10-17 ASSESSMENT — PAIN SCALES - PAIN ASSESSMENT IN ADVANCED DEMENTIA (PAINAD)
NEGVOCALIZATION: 0
TOTALSCORE: 0
FACIALEXPRESSION: 0
NEGVOCALIZATION: 0
TOTALSCORE: 0
BREATHING: 0
FACIALEXPRESSION: 0
CONSOLABILITY: 0
BREATHING: 0
BODYLANGUAGE: 0
BODYLANGUAGE: 0
CONSOLABILITY: 0

## 2021-10-17 ASSESSMENT — PAIN SCALES - GENERAL
PAINLEVEL_OUTOF10: 0
PAINLEVEL_OUTOF10: 4
PAINLEVEL_OUTOF10: 3
PAINLEVEL_OUTOF10: 0
PAINLEVEL_OUTOF10: 8
PAINLEVEL_OUTOF10: 0

## 2021-10-17 NOTE — PROGRESS NOTES
Hospitalist Progress Note  10/17/2021 11:12 AM  Subjective:   Admit Date: 10/11/2021  PCP: Antonio Correa Status: Inpatient [101]  Interval History: Hospital Day: 7     History of present illness:  admitted [facility] with [place] [facility] with right colitis and hemorrhagic cystitis treated with IV metronidazole and ceftriaxone. She was diagnosed with C difficile and was on oral vancomycin at nursing home. C. diff negative. Diet progressed from full liquids. Acute kidney injury on stage 3 CKD with creatine 5.4 and eGFR 8 on admission. Transfused 1 unit PRBC (10/15) for Hgb 6.4 gm/dL. Complains of back pain.       Diet: Full liquid - > controlled carbohydrate (10/16)  Right wrist peripheral IV (10/14, day #3)  External urinary catheter (10/16, day #1)  Medications:     aspirin  81 mg Oral Daily   pantoprazole  40 mg Oral Daily   oxybutynin  10 mg Oral Daily   pravastatin  20 mg Oral Nightly   heparin (porcine)  5,000 Units SubCUTAneous 3 times per day   metronidazole  500 mg IntraVENous Q8H (10/11, day #7)   ceftriaxone  1,000 mg IntraVENous Q24H (10/11, day #7)   insulin lispro SSI  0-12 Units SubCUTAneous TID WC / HS       10/15/21  1219 10/15/21  1852 10/16/21  0625 10/17/21  0607   WBC 10.1  --  10.4 10.7   HGB 6.4* 8.4* 9.6* 8.4*     --  429 394   MCV 95.7  --  93.3 95.0     Recent Labs     10/15/21  1219 10/16/21  0625 10/17/21  0607   * 142 141   K 3.5 3.4* 3.5   CL 99 102 102   CO2 23 25 28   BUN 51* 45* 36*   CREATININE 4.6* 4.1* 4.1*   GLUCOSE 173* 109* 123*     POC Glucose:   10/16/21  1249 10/16/21  1733 10/16/21  1937 10/17/21  0737   134* 141* 141* 90     Magnesium (10/15) 1.60, (10/16) 2.80, (10/17) 2.50 mg/dL  C.diff Toxin/Antigen (10/13) negative  FOBT (10/15) negative  Blood culture x 2 (10/11) no growth x 4 days  Lactate (10/11) 0.8 mmol/L    Renal Imaging:- no renal abnormality, thickened bladder on 10/21  Echo: EF 55%.  Grade 2 diastolic dysfunction    CT abd / dialysis if renal failure was to worsen. She can return to [place] [id number] with hospice at any time.         Herve Zaidi MD  [facility] Hospitalist

## 2021-10-17 NOTE — PROGRESS NOTES
[facility]                                               Progress note    Summary:   Kenzie Tripathi is being seen by nephrology for Acute kidney injury (UBALDO). She presented to the hospital on 10/11/2021 with pain abdomen for several weeks. She wass confused, so most info from the chart. She is diagnosed to have C diff and was on po vancomycin at the nursing home. Brought to ED, has significant colitis. On iv flagyl and po vancomycin. Also getting iv ceftriaxone  Of note she is DNR-cc and was enrolled in hospice. Confirmed with family that she would not want dialysis if renal failure was to worsen. Interval History  Feels okay    Plan:   - stopped Bicarb   Creatinine continues to improve. Urine output is stable but slow down   Bun and lytes better  Creatinine still 4.1 likely should be better by tomorrow further   Follow with the trend closely  Continue Plasma-Lyte at low-dose      Greg Tucker MD  [facility] Nephrology  Office: [phone]    Assessment:   Acute Kidney Injury  UBALDO likely due to - pre-renal/ATN due to diarrhea/sepsis  Cr on consultation  5.9  Baseline Cr- 1.4   She has CKD stage IIIa at baseline  Not a candidate for RRT as per prior stated goals of care. Confirmed this admission.     UA- mod blood, large LE  Renal Imaging:- no renal abnormality, thickened bladder on 10/21  Echo: EF 55%. Grade II DD     Hypertension   BP at goal.        Cdiff diarrhea     Dementia  DM  obesity      ROS:     Positives Listed Bold. All other remaining systems are negative. Constitutional:  fever, chills, weakness, weight change, fatigue,      Skin:  rash, pruritus, hair loss, bruising, dry skin, petechiae. Head, Face, Neck   headaches, swelling,  cervical adenopathy.      Respiratory: shortness of breath, cough, or wheezing  Cardiovascular: chest pain, palpitations, dizzy, edema  Gastrointestinal: nausea, vomiting, diarrhea, constipation,belly pain    Yellow skin, blood in stool  Musculoskeletal:  back pain, muscle weakness, gait problems,       joint pain or swelling. Genitourinary:  dysuria, poor urine flow, flank pain, blood in urine  Neurologic:  vertigo, TIA'S, syncope, seizures, focal weakness  Psychosocial:  insomnia, anxiety, or depression. Additional positive findings: -     PMH:   Past medical history, surgical history, social history, family history are reviewed and updated as appropriate. Reviewed current medication list.   Allergies reviewed and updated as needed. PE:   Vitals:    10/17/21 0806   BP: (!) 146/88   Pulse: 69   Resp: 16   Temp: 97.6 °F (36.4 °C)   SpO2: 97%       General appearance: alert, hard of hearing   HEENT: Lips- normal, teeth- ok , oral mucosa- dry  Neck : Mass- no, appears symmetrical, JVD- not visible  Respiratory: Respiratory effort-  normal, wheeze- no, crackles - no  Cardiovascular:  Ausculation- No M/R/G, Edema none  Abdomen: visible mass- no, distention- no, scar- no, tenderness- no                            hepatosplenomegaly-  No--  Musculoskeletal:  clubbing no,cyanosis- no , digital ischemia- no                           muscle strength- patient unable to co-operate     , tone - patient unable to co-operate -  Skin: rashes- no , ulcers- no, induration- no, tightening - no  Psychiatric:  no SI/HI  Additional findings:-        Lab Results   Component Value Date    CREATININE 4.1 (H) 10/17/2021    BUN 36 (H) 10/17/2021     10/17/2021    K 3.5 10/17/2021     10/17/2021    CO2 28 10/17/2021      Lab Results   Component Value Date    WBC 10.7 10/17/2021    HGB 8.4 (L) 10/17/2021    HCT 25.3 (L) 10/17/2021    MCV 95.0 10/17/2021     10/17/2021     Lab Results   Component Value Date    CALCIUM 8.3 10/17/2021    PHOS 3.4 10/17/2021

## 2021-10-18 LAB
ALBUMIN SERPL-MCNC: 2.9 G/DL (ref 3.4–5)
ANION GAP SERPL CALCULATED.3IONS-SCNC: 9 MMOL/L (ref 3–16)
BASOPHILS ABSOLUTE: 0.1 K/UL (ref 0–0.2)
BASOPHILS RELATIVE PERCENT: 0.9 %
BUN BLDV-MCNC: 30 MG/DL (ref 7–20)
CALCIUM SERPL-MCNC: 8.3 MG/DL (ref 8.3–10.6)
CHLORIDE BLD-SCNC: 106 MMOL/L (ref 99–110)
CO2: 27 MMOL/L (ref 21–32)
CREAT SERPL-MCNC: 3.3 MG/DL (ref 0.6–1.2)
EOSINOPHILS ABSOLUTE: 0.6 K/UL (ref 0–0.6)
EOSINOPHILS RELATIVE PERCENT: 4.3 %
GFR AFRICAN AMERICAN: 16
GFR NON-AFRICAN AMERICAN: 13
GLUCOSE BLD-MCNC: 114 MG/DL (ref 70–99)
GLUCOSE BLD-MCNC: 141 MG/DL (ref 70–99)
GLUCOSE BLD-MCNC: 151 MG/DL (ref 70–99)
GLUCOSE BLD-MCNC: 176 MG/DL (ref 70–99)
GLUCOSE BLD-MCNC: 215 MG/DL (ref 70–99)
HCT VFR BLD CALC: 25.5 % (ref 36–48)
HEMOGLOBIN: 8.3 G/DL (ref 12–16)
LYMPHOCYTES ABSOLUTE: 2.8 K/UL (ref 1–5.1)
LYMPHOCYTES RELATIVE PERCENT: 20.9 %
MAGNESIUM: 2.2 MG/DL (ref 1.8–2.4)
MCH RBC QN AUTO: 30.9 PG (ref 26–34)
MCHC RBC AUTO-ENTMCNC: 32.4 G/DL (ref 31–36)
MCV RBC AUTO: 95.3 FL (ref 80–100)
MONOCYTES ABSOLUTE: 1 K/UL (ref 0–1.3)
MONOCYTES RELATIVE PERCENT: 7.5 %
NEUTROPHILS ABSOLUTE: 8.8 K/UL (ref 1.7–7.7)
NEUTROPHILS RELATIVE PERCENT: 66.4 %
PDW BLD-RTO: 16.6 % (ref 12.4–15.4)
PERFORMED ON: ABNORMAL
PHOSPHORUS: 3.1 MG/DL (ref 2.5–4.9)
PLATELET # BLD: 353 K/UL (ref 135–450)
PMV BLD AUTO: 7.7 FL (ref 5–10.5)
POTASSIUM SERPL-SCNC: 3.3 MMOL/L (ref 3.5–5.1)
RBC # BLD: 2.68 M/UL (ref 4–5.2)
SODIUM BLD-SCNC: 142 MMOL/L (ref 136–145)
WBC # BLD: 13.3 K/UL (ref 4–11)

## 2021-10-18 PROCEDURE — 80069 RENAL FUNCTION PANEL: CPT

## 2021-10-18 PROCEDURE — 36415 COLL VENOUS BLD VENIPUNCTURE: CPT

## 2021-10-18 PROCEDURE — 6370000000 HC RX 637 (ALT 250 FOR IP): Performed by: NURSE PRACTITIONER

## 2021-10-18 PROCEDURE — 83735 ASSAY OF MAGNESIUM: CPT

## 2021-10-18 PROCEDURE — 2580000003 HC RX 258: Performed by: INTERNAL MEDICINE

## 2021-10-18 PROCEDURE — 1200000000 HC SEMI PRIVATE

## 2021-10-18 PROCEDURE — 85025 COMPLETE CBC W/AUTO DIFF WBC: CPT

## 2021-10-18 PROCEDURE — 6360000002 HC RX W HCPCS: Performed by: NURSE PRACTITIONER

## 2021-10-18 PROCEDURE — 2580000003 HC RX 258: Performed by: NURSE PRACTITIONER

## 2021-10-18 PROCEDURE — 2500000003 HC RX 250 WO HCPCS: Performed by: NURSE PRACTITIONER

## 2021-10-18 PROCEDURE — 6370000000 HC RX 637 (ALT 250 FOR IP): Performed by: STUDENT IN AN ORGANIZED HEALTH CARE EDUCATION/TRAINING PROGRAM

## 2021-10-18 RX ORDER — CARVEDILOL 25 MG/1
25 TABLET ORAL 2 TIMES DAILY WITH MEALS
Status: DISCONTINUED | OUTPATIENT
Start: 2021-10-18 | End: 2021-10-21 | Stop reason: HOSPADM

## 2021-10-18 RX ORDER — POTASSIUM CHLORIDE 20 MEQ/1
40 TABLET, EXTENDED RELEASE ORAL 2 TIMES DAILY
Status: COMPLETED | OUTPATIENT
Start: 2021-10-18 | End: 2021-10-18

## 2021-10-18 RX ADMIN — OXYBUTYNIN CHLORIDE 10 MG: 5 TABLET, EXTENDED RELEASE ORAL at 09:04

## 2021-10-18 RX ADMIN — HEPARIN SODIUM 5000 UNITS: 5000 INJECTION INTRAVENOUS; SUBCUTANEOUS at 05:10

## 2021-10-18 RX ADMIN — INSULIN LISPRO 2 UNITS: 100 INJECTION, SOLUTION INTRAVENOUS; SUBCUTANEOUS at 18:44

## 2021-10-18 RX ADMIN — METRONIDAZOLE 500 MG: 500 INJECTION, SOLUTION INTRAVENOUS at 16:39

## 2021-10-18 RX ADMIN — HEPARIN SODIUM 5000 UNITS: 5000 INJECTION INTRAVENOUS; SUBCUTANEOUS at 22:41

## 2021-10-18 RX ADMIN — METRONIDAZOLE 500 MG: 500 INJECTION, SOLUTION INTRAVENOUS at 09:04

## 2021-10-18 RX ADMIN — PRAVASTATIN SODIUM 20 MG: 20 TABLET ORAL at 22:41

## 2021-10-18 RX ADMIN — SODIUM CHLORIDE, SODIUM GLUCONATE, SODIUM ACETATE, POTASSIUM CHLORIDE AND MAGNESIUM CHLORIDE: 526; 502; 368; 37; 30 INJECTION, SOLUTION INTRAVENOUS at 14:07

## 2021-10-18 RX ADMIN — PANTOPRAZOLE SODIUM 40 MG: 40 TABLET, DELAYED RELEASE ORAL at 09:04

## 2021-10-18 RX ADMIN — POTASSIUM CHLORIDE 40 MEQ: 20 TABLET, EXTENDED RELEASE ORAL at 12:43

## 2021-10-18 RX ADMIN — INSULIN LISPRO 2 UNITS: 100 INJECTION, SOLUTION INTRAVENOUS; SUBCUTANEOUS at 22:40

## 2021-10-18 RX ADMIN — CARVEDILOL 25 MG: 25 TABLET, FILM COATED ORAL at 16:39

## 2021-10-18 RX ADMIN — HEPARIN SODIUM 5000 UNITS: 5000 INJECTION INTRAVENOUS; SUBCUTANEOUS at 14:07

## 2021-10-18 RX ADMIN — INSULIN LISPRO 2 UNITS: 100 INJECTION, SOLUTION INTRAVENOUS; SUBCUTANEOUS at 14:10

## 2021-10-18 RX ADMIN — CARVEDILOL 25 MG: 25 TABLET, FILM COATED ORAL at 12:42

## 2021-10-18 RX ADMIN — METRONIDAZOLE 500 MG: 500 INJECTION, SOLUTION INTRAVENOUS at 00:20

## 2021-10-18 RX ADMIN — POTASSIUM CHLORIDE 40 MEQ: 20 TABLET, EXTENDED RELEASE ORAL at 23:05

## 2021-10-18 RX ADMIN — SODIUM CHLORIDE, PRESERVATIVE FREE 10 ML: 5 INJECTION INTRAVENOUS at 09:04

## 2021-10-18 RX ADMIN — ASPIRIN 81 MG: 81 TABLET, COATED ORAL at 09:04

## 2021-10-18 ASSESSMENT — PAIN SCALES - PAIN ASSESSMENT IN ADVANCED DEMENTIA (PAINAD)
FACIALEXPRESSION: 0
FACIALEXPRESSION: 0
BODYLANGUAGE: 0
BODYLANGUAGE: 0
BREATHING: 0
BODYLANGUAGE: 0
FACIALEXPRESSION: 0
TOTALSCORE: 0
FACIALEXPRESSION: 0
CONSOLABILITY: 0
FACIALEXPRESSION: 0
NEGVOCALIZATION: 0
BODYLANGUAGE: 0
TOTALSCORE: 0
FACIALEXPRESSION: 0
CONSOLABILITY: 0
TOTALSCORE: 0
NEGVOCALIZATION: 0
TOTALSCORE: 0
TOTALSCORE: 0
FACIALEXPRESSION: 0
NEGVOCALIZATION: 0
NEGVOCALIZATION: 0
BREATHING: 0
CONSOLABILITY: 0
BODYLANGUAGE: 0
BREATHING: 0
NEGVOCALIZATION: 0
BODYLANGUAGE: 0
TOTALSCORE: 0
CONSOLABILITY: 0
BREATHING: 0
CONSOLABILITY: 0
TOTALSCORE: 0
BREATHING: 0
FACIALEXPRESSION: 0
NEGVOCALIZATION: 0
NEGVOCALIZATION: 0
FACIALEXPRESSION: 0
TOTALSCORE: 0
CONSOLABILITY: 0
CONSOLABILITY: 0
FACIALEXPRESSION: 0
BODYLANGUAGE: 0
CONSOLABILITY: 0
BREATHING: 0
TOTALSCORE: 0
NEGVOCALIZATION: 0
CONSOLABILITY: 0
CONSOLABILITY: 0
BREATHING: 0
BODYLANGUAGE: 0
BREATHING: 0
FACIALEXPRESSION: 0
NEGVOCALIZATION: 0
BODYLANGUAGE: 0
NEGVOCALIZATION: 0
BODYLANGUAGE: 0
BODYLANGUAGE: 0
FACIALEXPRESSION: 0
BREATHING: 0
BREATHING: 0
CONSOLABILITY: 0
BODYLANGUAGE: 0
BODYLANGUAGE: 0
CONSOLABILITY: 0
CONSOLABILITY: 0
BREATHING: 0
NEGVOCALIZATION: 0
BREATHING: 0
TOTALSCORE: 0
NEGVOCALIZATION: 0
FACIALEXPRESSION: 0
BREATHING: 0
TOTALSCORE: 0
NEGVOCALIZATION: 0
TOTALSCORE: 0
TOTALSCORE: 0

## 2021-10-18 ASSESSMENT — PAIN SCALES - GENERAL
PAINLEVEL_OUTOF10: 0
PAINLEVEL_OUTOF10: 3
PAINLEVEL_OUTOF10: 0

## 2021-10-18 NOTE — PROGRESS NOTES
Hospitalist Progress Note      PCP: Amy Chacon    Date of Admission: 10/11/2021    Chief Complaint: Right side abdominal pain and diarrhea    Subjective: no new c/o. Medications:  Reviewed    Infusion Medications    electrolyte-A 50 mL/hr at 10/17/21 1315    sodium chloride      sodium chloride 25 mL (10/16/21 0405)    dextrose       Scheduled Medications    influenza virus vaccine  0.5 mL IntraMUSCular Prior to discharge    aspirin  81 mg Oral Daily    pantoprazole  40 mg Oral Daily    oxybutynin  10 mg Oral Daily    pravastatin  20 mg Oral Nightly    sodium chloride flush  5-40 mL IntraVENous 2 times per day    heparin (porcine)  5,000 Units SubCUTAneous 3 times per day    metroNIDAZOLE  500 mg IntraVENous Q8H    cefTRIAXone (ROCEPHIN) IV  1,000 mg IntraVENous Q24H    insulin lispro  0-12 Units SubCUTAneous TID WC    insulin lispro  0-6 Units SubCUTAneous Nightly    sodium chloride  500 mL IntraVENous Once     PRN Meds: sodium chloride, sodium chloride flush, sodium chloride, ondansetron **OR** ondansetron, polyethylene glycol, acetaminophen **OR** acetaminophen, glucose, dextrose, glucagon (rDNA), dextrose, albuterol sulfate HFA      Intake/Output Summary (Last 24 hours) at 10/18/2021 0935  Last data filed at 10/18/2021 0906  Gross per 24 hour   Intake 330 ml   Output 1301 ml   Net -971 ml       Physical Exam Performed:    BP (!) 169/74   Pulse 89   Temp 97.9 °F (36.6 °C) (Axillary)   Resp 18   Ht 5' 5\" (1.651 m)   Wt 188 lb 0.8 oz (85.3 kg)   SpO2 99%   BMI 31.29 kg/m²     General appearance: No apparent distress, appears stated age and cooperative. HEENT: Pupils equal, round, and reactive to light. Conjunctivae/corneas clear. Neck: Supple, with full range of motion. No jugular venous distention. Trachea midline. Respiratory:  Normal respiratory effort. Clear to auscultation, bilaterally without Rales/Wheezes/Rhonchi.   Cardiovascular: Regular rate and rhythm with normal S1/S2 without murmurs, rubs or gallops. Abdomen: Soft, non-tender, non-distended with normal bowel sounds. Musculoskeletal: No clubbing, cyanosis or edema bilaterally. Full range of motion without deformity. Skin: Skin color, texture, turgor normal.  No rashes or lesions. Neurologic:  Neurovascularly intact without any focal sensory/motor deficits. Cranial nerves: II-XII intact, grossly non-focal.  Psychiatric: Alert and oriented, thought content appropriate, normal insight  Capillary Refill: Brisk,< 3 seconds   Peripheral Pulses: +2 palpable, equal bilaterally       Labs:   Recent Labs     10/16/21  0625 10/17/21  0607 10/18/21  0629   WBC 10.4 10.7 13.3*   HGB 9.6* 8.4* 8.3*   HCT 28.6* 25.3* 25.5*    394 353     Recent Labs     10/16/21  0625 10/17/21  0607 10/18/21  0629    141 142   K 3.4* 3.5 3.3*    102 106   CO2 25 28 27   BUN 45* 36* 30*   CREATININE 4.1* 4.1* 3.3*   CALCIUM 8.7 8.3 8.3   PHOS 4.0 3.4 3.1     No results for input(s): AST, ALT, BILIDIR, BILITOT, ALKPHOS in the last 72 hours. No results for input(s): INR in the last 72 hours. No results for input(s): Cherre Gash in the last 72 hours.     Urinalysis:      Lab Results   Component Value Date    NITRU Negative 10/11/2021    WBCUA >100 10/11/2021    BACTERIA 1+ 10/11/2021    RBCUA see below 10/11/2021    BLOODU MODERATE 10/11/2021    SPECGRAV 1.025 10/11/2021    GLUCOSEU Negative 10/11/2021    GLUCOSEU NEGATIVE 03/17/2011       Consults:    IP CONSULT TO HOSPITALIST  IP CONSULT TO GI  IP CONSULT TO NEPHROLOGY      Assessment/Plan:    Active Hospital Problems    Diagnosis     C. difficile colitis [A04.72]     Acute cystitis [N30.00]     Controlled type 2 diabetes mellitus with diabetic neuropathy, without long-term current use of insulin (HCC) [E11.40]     CAD (coronary artery disease) [I25.10]     Essential hypertension [I10]     CKD (chronic kidney disease) stage 3, GFR 30-59 ml/[facility] [N18.30] Right sided colitis:  Antibiotic therapy with metronidazole 500 mg IV every 8 hours.  Considered outpatient colonoscopy but family elects to go w comfort measures.  Advancing diet. Acute blood loss anemia:  Transfused 1 unit PRBC for Hgb 6.4 gm/dL (10/15).  Stable hemoglobin since. FOBT negative. Acute cystitis with hematuria treated with ceftriaxone - completed    Acute kidney injury on stage 3a CKD.  Treated with bicarbonate gtt (d/c 10/17). Continue Plasma-Lyte at low dose. Type 2 Diabetes (HgbA1c 6.9%).   Cover with a \"sliding scale\" lispro moderate scale prandial correction insulin.      Hypertension:  Carvedilol, lisinopril, and clonidine held.  Continues on aspirin 81 mg daily. Hypotension requiring midodrine, discontinue due to elevated blood pressure.     Right buttocks ulceration:  Topical wound care.         DVT Prophylaxis: SQ Heparin    Recent Labs     10/16/21  0625 10/17/21  0607 10/18/21  0629    394 353     Diet: ADULT DIET; Regular  Code Status: DNR-CC      PT/OT Eval Status: not ordered. Dispo - enrolled in hospice. Confirmed with family that she would not want dialysis if renal failure was to worsen. She can return to [place] [id number] with hospice at any time.      Ramon Elizondo MD

## 2021-10-18 NOTE — PROGRESS NOTES
Progress Note  Date:10/18/2021       Room:[facility]036-  Patient Navjot Oglesby     YOB: 1940     Age:81 y.o. Subjective    Subjective:  Symptoms:  Stable. Pain:  She reports no pain. Review of Systems  Objective         Vitals Last 24 Hours:  TEMPERATURE:  Temp  Av °F (36.7 °C)  Min: 97.5 °F (36.4 °C)  Max: 98.8 °F (37.1 °C)  RESPIRATIONS RANGE: Resp  Av.2  Min: 17  Max: 20  PULSE OXIMETRY RANGE: SpO2  Av.7 %  Min: 90 %  Max: 99 %  PULSE RANGE: Pulse  Av.7  Min: 75  Max: 94  BLOOD PRESSURE RANGE: Systolic (15WGD), LHE:198 , Min:145 , UHP:168   ; Diastolic (93JVE), YR, Min:67, Max:81    I/O (24Hr): Intake/Output Summary (Last 24 hours) at 10/18/2021 1708  Last data filed at 10/18/2021 1503  Gross per 24 hour   Intake 690 ml   Output 1850 ml   Net -1160 ml     Objective:  General Appearance:  Not in pain and in no acute distress. Vital signs: (most recent): Blood pressure (!) 146/67, pulse 84, temperature 97.7 °F (36.5 °C), temperature source Axillary, resp. rate 18, height 5' 5\" (1.651 m), weight 188 lb 0.8 oz (85.3 kg), SpO2 98 %, not currently breastfeeding. Abdomen: Abdomen is soft. Bowel sounds are normal.   There is no abdominal tenderness. Labs/Imaging/Diagnostics    Labs:  CBC:  Recent Labs     10/16/21  0625 10/17/21  0607 10/18/21  0629   WBC 10.4 10.7 13.3*   RBC 3.07* 2.66* 2.68*   HGB 9.6* 8.4* 8.3*   HCT 28.6* 25.3* 25.5*   MCV 93.3 95.0 95.3   RDW 16.7* 17.1* 16.6*    394 353     CHEMISTRIES:  Recent Labs     10/16/21  0625 10/17/21  0607 10/18/21  0629    141 142   K 3.4* 3.5 3.3*    102 106   CO2 25 28 27   BUN 45* 36* 30*   CREATININE 4.1* 4.1* 3.3*   GLUCOSE 109* 123* 141*   PHOS 4.0 3.4 3.1   MG 2.80* 2.50* 2.20     PT/INR:No results for input(s): PROTIME, INR in the last 72 hours. APTT:No results for input(s): APTT in the last 72 hours.   LIVER PROFILE:No results for input(s): AST, ALT, BILIDIR, BILITOT, ALKPHOS in the last 72 hours. Imaging Last 24 Hours:  No results found. Assessment//Plan           Hospital Problems         Last Modified POA    * (Principal) C. difficile colitis 10/18/2021 Yes    Essential hypertension (Chronic) 10/12/2021 Yes    CKD (chronic kidney disease) stage 3, GFR 30-59 ml/min ([facility]) 10/12/2021 Yes    CAD (coronary artery disease) 10/12/2021 Yes    Controlled type 2 diabetes mellitus with diabetic neuropathy, without long-term current use of insulin ([facility] 10/12/2021 Yes    Acute cystitis 10/12/2021 Yes        Assessment & Plan  82 yo w deafness, dementia, DM, Obesity, HTN, CAD, HLD, who presented for abd pain and diarrhea w UBALDO. CT w Rt colon colitis     Plan:   1. Supportive care  2. Agree w Abx's  3. OK to advance diet  4. Needs outpatient colonoscopy, or sooner if necessary unless family elects to go w comfort measures  5.  Will follow     MD Byron [facility]) [phone]    Electronically signed by Funmi Corona MD on 10/18/21 at 5:08 PM EDT

## 2021-10-18 NOTE — PROGRESS NOTES
[facility]                                               Progress note    Summary:   Sarah Danielson is being seen by nephrology for Acute kidney injury (UBALDO). She presented to the hospital on 10/11/2021 with pain abdomen for several weeks. She wass confused, so most info from the chart. She is diagnosed to have C diff and was on po vancomycin at the nursing home. Brought to ED, has significant colitis. On iv flagyl and po vancomycin. Also getting iv ceftriaxone  Of note she is DNR-cc and was enrolled in hospice. Confirmed with family that she would not want dialysis if renal failure was to worsen. Interval History  Cr beginning to improve, made 901 mL urine yesterday. Cr down to 3.3 today from 4.1  BP is elevated. Plan:   - continue plasmalyte 50 mL/h  - start home carvedilol 25 mg BID for hypertension  - dora Bravo MD  [facility] Nephrology  Office: [phone]    Assessment:   Acute Kidney Injury  UBALDO likely due to - pre-renal/ATN due to diarrhea/sepsis  Cr on consultation  5.9  Baseline Cr- 1.4   She has CKD stage IIIa at baseline  Not a candidate for RRT as per prior stated goals of care. Confirmed this admission.     UA- mod blood, large LE  Renal Imaging:- no renal abnormality, thickened bladder on 10/21  Echo: EF 55%. Grade II DD     Hypertension   BP now elevated. Was on lisinopril 40 mg daily, carvedilol 25 mg bID, imdur 30 mg daily at home.          Cdiff diarrhea     Dementia  DM  obesity      ROS:     Positives Listed Bold. All other remaining systems are negative. Constitutional:  fever, chills, weakness, weight change, fatigue,      Skin:  rash, pruritus, hair loss, bruising, dry skin, petechiae. Head, Face, Neck   headaches, swelling,  cervical adenopathy.      Respiratory: shortness of breath, cough, or wheezing  Cardiovascular: chest pain, palpitations, dizzy, edema  Gastrointestinal: nausea, vomiting, diarrhea, constipation,belly pain    Yellow skin, blood in stool  Musculoskeletal:  back pain, muscle weakness, gait problems,       joint pain or swelling. Genitourinary:  dysuria, poor urine flow, flank pain, blood in urine  Neurologic:  vertigo, TIA'S, syncope, seizures, focal weakness  Psychosocial:  insomnia, anxiety, or depression. Additional positive findings: -     PMH:   Past medical history, surgical history, social history, family history are reviewed and updated as appropriate. Reviewed current medication list.   Allergies reviewed and updated as needed. PE:   Vitals:    10/18/21 0856   BP: (!) 169/74   Pulse: 89   Resp: 18   Temp: 97.9 °F (36.6 °C)   SpO2: 99%       General appearance: alert, hard of hearing   HEENT: Lips- normal, teeth- ok , oral mucosa- dry  Neck : Mass- no, appears symmetrical, JVD- not visible  Respiratory: Respiratory effort-  normal, wheeze- no, crackles - no  Cardiovascular:  Ausculation- No M/R/G, Edema none  Abdomen: visible mass- no, distention- no, scar- no, tenderness- no                            hepatosplenomegaly-  No--  Musculoskeletal:  clubbing no,cyanosis- no , digital ischemia- no                           muscle strength- patient unable to co-operate     , tone - patient unable to co-operate -  Skin: rashes- no , ulcers- no, induration- no, tightening - no  Psychiatric:  no SI/HI  Additional findings:-        Lab Results   Component Value Date    CREATININE 3.3 (H) 10/18/2021    BUN 30 (H) 10/18/2021     10/18/2021    K 3.3 (L) 10/18/2021     10/18/2021    CO2 27 10/18/2021      Lab Results   Component Value Date    WBC 13.3 (H) 10/18/2021    HGB 8.3 (L) 10/18/2021    HCT 25.5 (L) 10/18/2021    MCV 95.3 10/18/2021     10/18/2021     Lab Results   Component Value Date    CALCIUM 8.3 10/18/2021    PHOS 3.1 10/18/2021

## 2021-10-18 NOTE — CARE COORDINATION
Per discussion with nursing at 1100 IDR, pt not discharge ready. Pending nephrology signing off. Discharge plan is to return to [facility] where she resides long term care. Also is active with [facility] at facility. CM left VM with [facility] in admissions at [facility].

## 2021-10-18 NOTE — CONSULTS
[facility] Nurse  Consult Note       NAME:  [place] RECORD NUMBER:  [id number]  AGE: [de-identified] y.o. GENDER: female  : 1940  TODAY'S DATE:  10/18/2021    Subjective   Reason for WOCN Evaluation and Assessment: R Buttock - MASD and shearing      Keely Hall is a [de-identified] y.o. female referred by:   [] Physician  [x] Nursing  [] Other:     Wound Identification:  Wound Type: MASD & Shearing  Contributing Factors: diabetes, chronic pressure, decreased mobility, shear force, obesity, incontinence of stool and incontinence of urine    Wound History: [de-identified] y.o. female, with PMH of dementia, DM, Obesity, HTN, CAD, HLD, who presented to [facility] with right sided abdominal pain. The patient is a poor historian d/t dementia, history obtained from the review of EMR. The patient was brought to the emergency room last evening with complaints of right sided abdominal pain. Staff reported that this has been going on for a few weeks now. Per ECF, the patient was diagnosed with D. Diff colitis and being treated with oral vancomycin. However, the patient has been complaining of worsening abdominal pain and loose stools. An Xray was obtained that was concerning for a SBO, so the patient was brought for further evaluation. In the emergency room, a CT abdomen pelvis was obtained that revealed significant colitis of the cecum and ascending colon, likely infectious or inflammatory. Sigmoid diverticulosis. Diverticulitis can not be excluded. A C diff was ordered and the patient was given oral vancomycin and IV flagyl. She was also found to have a UTI with a WBC > 100 and treated with ceftriaxone. The patient will be admitted for further evaluation and treatment. She denied any other associated symptoms as well as any aggravating and/or alleviating factors. At the time of this assessment, the patient was resting comfortably in bed.  She currently denies any chest pain, back pain, abdominal pain, shortness of breath, numbness, tingling, N/V/C/D, fever and/or chills  Current Wound Care Treatment: R Buttock - Zinc Paste    Patient Goal of Care:  [x] Wound Healing  [] Odor Control  [] Palliative Care  [] Pain Control   [] Other:         PAST MEDICAL HISTORY        Diagnosis Date    Asthma     Bronchial asthma     Bursitis 12/2011    ACHILLES TENDON LEFT    Chest pain 12/2020    [facility]     Constipation     COPD (chronic obstructive pulmonary disease) ([facility]     Dementia ([facility] 09/11/2019    unspecificied dementia without behavioral disturbance    Depression     Diverticulitis     Dizziness     GERD (gastroesophageal reflux disease)     Hearing loss     History of blood transfusion     Hyperlipidemia     Hypertension     Leg edema     Lung disease     Osteoarthritis     Sleep apnea     CPAP, SLEEP STUDY 6/28 OVERNIGHT AT HOME    Tinnitus     Type II or unspecified type diabetes mellitus without mention of complication, not stated as uncontrolled     Unspecified cerebral artery occlusion with cerebral infarction     mini stroke       PAST SURGICAL HISTORY    Past Surgical History:   Procedure Laterality Date    ABDOMEN SURGERY      ACHILLES TENDON SURGERY  2/2/12    LEFT ACHILLES DEBRIDEMENT, HAGLUNDS AND RETROCALCANEAL BURSA EXCISION WITH FLEXOR HALLUS LONGUS TENDON TRANSFER WITH BLOCK FOR PAIN CONTROL    APPENDECTOMY      CARDIAC CATHETERIZATION  8/21/14    CARDIAC CATHETERIZATION  12/07/2020    Non Obs CAD, medical management    CARPAL TUNNEL RELEASE      both hands    CHOLECYSTECTOMY      COLONOSCOPY      COLONOSCOPY      COLONOSCOPY  2/10/2016    CYSTOSCOPY  10/02/2017    DIAGNOSTIC CARDIAC CATH LAB PROCEDURE      ENDOSCOPY, COLON, DIAGNOSTIC      EYE SURGERY      HYSTERECTOMY      HYSTERECTOMY, TOTAL ABDOMINAL      JOINT REPLACEMENT      bilateral knee    KNEE SURGERY      both replaced    OTHER SURGICAL HISTORY  07/09/2018    CYSTOSCOPY, HYDRODISTENTION OF BLADDER WITH INSTILLATION OF    POLYPECTOMY      SHOULDER SURGERY      TOENAIL EXCISION  2016    right big toe    TONSILLECTOMY      TUBAL LIGATION      UPPER GASTROINTESTINAL ENDOSCOPY  10/29/12    gastritis    UPPER GASTROINTESTINAL ENDOSCOPY  2/10/2016    URETHRAL STRICTURE DILATATION         FAMILY HISTORY    Family History   Problem Relation Age of Onset    Cancer Father         prostate    Heart Disease Mother     Heart Disease Brother     Heart Disease Brother     Heart Disease Sister     Diabetes Sister     Dementia Sister        SOCIAL HISTORY    Social History     Tobacco Use    Smoking status: Former Smoker     Packs/day: 0.25     Years: 0.10     Pack years: 0.02     Types: Cigarettes     Quit date: 1975     Years since quittin.8    Smokeless tobacco: Never Used    Tobacco comment: only smoked for 1 month   Vaping Use    Vaping Use: Never used   Substance Use Topics    Alcohol use: No     Alcohol/week: 0.0 standard drinks    Drug use: No       ALLERGIES    Allergies   Allergen Reactions    Latex Rash    Cephalexin Other (See Comments)     SHAKY AND SWEATY    Codeine Nausea Only     STOMACH HURTS    Levofloxacin Nausea Only    Pce [Erythromycin] Nausea Only     STOMACH HURTS    Pcn [Penicillins] Other (See Comments)     SHAKY AND SWEATY    Pentazocine      UNSURE OF REATION    Sumycin [Tetracycline Hcl] Nausea Only    Atarax [Hydroxyzine Hcl] Nausea And Vomiting    Beef-Derived Products Nausea And Vomiting    Flagyl [Metronidazole] Nausea And Vomiting    Macrodantin [Nitrofurantoin] Palpitations and Other (See Comments)     SWEATY    Pyridium [Phenazopyridine Hcl] Palpitations     SWEATY - PT DENIES REACTION    Sulfa Antibiotics Nausea And Vomiting and Nausea Only    Urised [Methen-Lisa-Meth Bl-Phen Sal] Palpitations     SWEATY       MEDICATIONS    No current facility-administered medications on file prior to encounter.      Current Outpatient Medications on File Prior to Encounter   Medication Sig Dispense Refill    HYDROcodone-acetaminophen (NORCO) 5-325 MG per tablet Take 1 tablet by mouth every 6 hours as needed for Pain.  lisinopril (PRINIVIL;ZESTRIL) 40 MG tablet Take 40 mg by mouth daily      busPIRone (BUSPAR) 5 MG tablet Take 10 mg by mouth daily       insulin lispro (HUMALOG) 100 UNIT/ML injection vial Inject 0-3 Units into the skin nightly 1 vial 3    insulin lispro (HUMALOG) 100 UNIT/ML injection vial Inject 0-6 Units into the skin 3 times daily (with meals) 1 vial 3    pravastatin (PRAVACHOL) 20 MG tablet Take 1 tablet by mouth nightly TAKE ONE TABLET BY MOUTH DAILY      cloNIDine (CATAPRES) 0.1 MG tablet Take 1 tablet by mouth 3 times daily (Patient taking differently: Take 0.1 mg by mouth 3 times daily For HTN)      blood glucose test strips (ACCU-CHEK ARABELLA PLUS) strip TEST BLOOD SUGAR TWICE A  strip 11    donepezil (ARICEPT) 5 MG tablet Take 1 tablet by mouth nightly 90 tablet 1    carvedilol (COREG) 25 MG tablet TAKE ONE TABLET BY MOUTH TWICE A DAY WITH FOOD 180 tablet 1    DULoxetine (CYMBALTA) 60 MG extended release capsule Take 2 capsules by mouth daily 60 capsule 5    pramipexole (MIRAPEX) 0.5 MG tablet TAKE ONE TABLET BY MOUTH ONCE NIGHTLY 30 tablet 5    oxybutynin (DITROPAN-XL) 10 MG extended release tablet Take 10 mg by mouth daily      albuterol sulfate  (90 Base) MCG/ACT inhaler Inhale 2 puffs into the lungs 4 times daily as needed for Wheezing 3 Inhaler 1    isosorbide mononitrate (IMDUR) 30 MG extended release tablet TAKE ONE-HALF TABLET BY MOUTH ONE TIME A DAY 45 tablet 3    clotrimazole-betamethasone (LOTRISONE) 1-0.05 % cream Apply bid to affected area.  45 g 3    nitroGLYCERIN (NITROSTAT) 0.4 MG SL tablet Place 1 tablet under the tongue every 5 minutes as needed for Chest pain 25 tablet 1    calcium carbonate 600 MG TABS tablet Take 1 tablet by mouth 2 times daily 30 tablet 1  vitamin E 400 UNIT capsule Take 400 Units by mouth daily      aspirin 81 MG tablet Take 81 mg by mouth daily.  omeprazole (PRILOSEC) 20 MG delayed release capsule TAKE ONE CAPSULE TWICE A DAY 60 capsule 5       Objective: Asleep, PureWick, depends    BP (!) 177/80   Pulse 94   Temp 98 °F (36.7 °C) (Oral)   Resp 17   Ht 5' 5\" (1.651 m)   Wt 188 lb 0.8 oz (85.3 kg)   SpO2 99%   BMI 31.29 kg/m²     LABS:  WBC:    Lab Results   Component Value Date    WBC 13.3 10/18/2021     H/H:    Lab Results   Component Value Date    HGB 8.3 10/18/2021    HCT 25.5 10/18/2021     PTT:    Lab Results   Component Value Date    APTT 22.6 12/03/2020   [APTT}  PT/INR:    Lab Results   Component Value Date    PROTIME 12.7 10/13/2021    INR 1.12 10/13/2021     HgBA1c:    Lab Results   Component Value Date    LABA1C 6.9 10/12/2021       Assessment: R Buttock - small denuded area, evidence of shearing   Joshua Risk Score: Joshua Scale Score: 14    Patient Active Problem List   Diagnosis Code    Arthritis M19.90    Constipation K59.00    Leg edema R60.0    Dizziness R42    Premature atrial beats I49.1    Mixed hyperlipidemia E78.2    SOB (shortness of breath) R06.02    CHF (congestive heart failure) ([facility]) O20.9    Uncomplicated asthma V30.422    Essential hypertension I10    CKD (chronic kidney disease) stage 3, GFR 30-59 ml/min ([facility]) N18.30    Tremor, essential G25.0    Hypersomnia G47.10    Severe obstructive sleep apnea G47.33    Persistent disorder of initiating or maintaining sleep G47.00    Restless legs syndrome (RLS) G25.81    Sensory hearing loss, bilateral H90.3    Anxiety and depression F41.9, F32. A    Peroneal tendon injury S86.309A    Aortic stenosis, mild I35.0    Facial asymmetry Q67.0    CAD (coronary artery disease) I25.10    Controlled type 2 diabetes mellitus with diabetic neuropathy, without long-term current use of insulin ([facility]) E11.40    Interstitial cystitis N30.10    COPD (chronic obstructive pulmonary disease) ([facility]) J44.9    Gastroesophageal reflux disease K21.9    Lumbar radiculopathy M54.16    Right hip pain Y26.949    Metabolic encephalopathy M36.26    Restrictive lung disease J98.4    Acute confusion R41.0    Obesity E66.9    Severe episode of recurrent major depressive disorder, without psychotic features ([facility] F33.2    Arthritis of shoulder region, left M19.012    Acute encephalopathy G93.40    Generalized weakness R53.1    Acute cystitis N30.00    Anemia D64.9    UBALDO (acute kidney injury) ([facility] N17.9    Chest pain R07.9    Unstable angina ([facility]) I20.0    C. difficile colitis A04.72       Measurements:  Wound 10/12/21 Buttocks Right open area on right buttocks (Active)   Wound Image   10/18/21 1558   Wound Etiology Other 10/18/21 1558   Dressing Status Intact 10/18/21 1558   Wound Cleansed Not Cleansed 10/18/21 1558   Dressing/Treatment Zinc paste 10/18/21 1558   Wound Length (cm) 0.7 cm 10/18/21 1558   Wound Width (cm) 0.4 cm 10/18/21 1558   Wound Depth (cm) 0.1 cm 10/18/21 1558   Wound Surface Area (cm^2) 0.28 cm^2 10/18/21 1558   Wound Volume (cm^3) 0.028 cm^3 10/18/21 1558   Wound Assessment Pink/red 10/18/21 1558   Drainage Amount None 10/18/21 1558   Odor None 10/18/21 1558   Roro-wound Assessment Blanchable erythema 10/18/21 1558   Margins Defined edges 10/18/21 1558   Number of days: 5   R Buttock:        Response to treatment:  Well tolerated by patient. Pain Assessment:  Severity:  0 / 10  Quality of pain: N/A  Wound Pain Timing/Severity: none  Premedicated: No    Plan   Plan of Care: Wound 10/12/21 Buttocks Right open area on right buttocks-Dressing/Treatment: Zinc paste   R Buttocks - apply Zinc Paste, if soiled only need to wipe of Zinc Paste that is soiled  Reposition pt every 2 hours  Wound Care to sign off, please re-consult for changes or deterioration.     Specialty Bed Required : Yes   [] Low Air Loss   [x] Pressure Redistribution  [] Fluid Immersion  [] Bariatric  [] Total Pressure Relief  [] Other:     Current Diet: ADULT DIET;  Regular  Dietician consult:  No    Discharge Plan:  Placement for patient upon discharge: intermediate care facility    Patient appropriate for Outpatient [address]: No    Referrals:  [x]   [] [facility]  [] Supplies  [] Other    Patient/Caregiver Teaching:  Level of patient/caregiver understanding able to:   [] Indicates understanding       [] Needs reinforcement  [] Unsuccessful      [] Verbal Understanding  [] Demonstrated understanding       [] No evidence of learning  [] Refused teaching         [x] N/A       Electronically signed by Alyse Roman RN, CWOCN on 10/18/2021 at 4:00 PM

## 2021-10-19 LAB
ALBUMIN SERPL-MCNC: 2.8 G/DL (ref 3.4–5)
ANION GAP SERPL CALCULATED.3IONS-SCNC: 10 MMOL/L (ref 3–16)
BASOPHILS ABSOLUTE: 0.1 K/UL (ref 0–0.2)
BASOPHILS RELATIVE PERCENT: 0.9 %
BUN BLDV-MCNC: 26 MG/DL (ref 7–20)
CALCIUM SERPL-MCNC: 8.5 MG/DL (ref 8.3–10.6)
CHLORIDE BLD-SCNC: 107 MMOL/L (ref 99–110)
CO2: 26 MMOL/L (ref 21–32)
CREAT SERPL-MCNC: 3.1 MG/DL (ref 0.6–1.2)
EOSINOPHILS ABSOLUTE: 0.5 K/UL (ref 0–0.6)
EOSINOPHILS RELATIVE PERCENT: 4.5 %
GFR AFRICAN AMERICAN: 17
GFR NON-AFRICAN AMERICAN: 14
GLUCOSE BLD-MCNC: 110 MG/DL (ref 70–99)
GLUCOSE BLD-MCNC: 140 MG/DL (ref 70–99)
GLUCOSE BLD-MCNC: 194 MG/DL (ref 70–99)
GLUCOSE BLD-MCNC: 199 MG/DL (ref 70–99)
GLUCOSE BLD-MCNC: 206 MG/DL (ref 70–99)
HCT VFR BLD CALC: 23 % (ref 36–48)
HEMOGLOBIN: 7.4 G/DL (ref 12–16)
LYMPHOCYTES ABSOLUTE: 2.6 K/UL (ref 1–5.1)
LYMPHOCYTES RELATIVE PERCENT: 23.5 %
MAGNESIUM: 1.9 MG/DL (ref 1.8–2.4)
MCH RBC QN AUTO: 31 PG (ref 26–34)
MCHC RBC AUTO-ENTMCNC: 32 G/DL (ref 31–36)
MCV RBC AUTO: 96.9 FL (ref 80–100)
MONOCYTES ABSOLUTE: 0.8 K/UL (ref 0–1.3)
MONOCYTES RELATIVE PERCENT: 6.8 %
NEUTROPHILS ABSOLUTE: 7.1 K/UL (ref 1.7–7.7)
NEUTROPHILS RELATIVE PERCENT: 64.3 %
PDW BLD-RTO: 17 % (ref 12.4–15.4)
PERFORMED ON: ABNORMAL
PHOSPHORUS: 2.3 MG/DL (ref 2.5–4.9)
PLATELET # BLD: 297 K/UL (ref 135–450)
PMV BLD AUTO: 7.9 FL (ref 5–10.5)
POTASSIUM SERPL-SCNC: 4.3 MMOL/L (ref 3.5–5.1)
RBC # BLD: 2.37 M/UL (ref 4–5.2)
SODIUM BLD-SCNC: 143 MMOL/L (ref 136–145)
WBC # BLD: 11 K/UL (ref 4–11)

## 2021-10-19 PROCEDURE — 80069 RENAL FUNCTION PANEL: CPT

## 2021-10-19 PROCEDURE — 6370000000 HC RX 637 (ALT 250 FOR IP): Performed by: STUDENT IN AN ORGANIZED HEALTH CARE EDUCATION/TRAINING PROGRAM

## 2021-10-19 PROCEDURE — 1200000000 HC SEMI PRIVATE

## 2021-10-19 PROCEDURE — 6360000002 HC RX W HCPCS: Performed by: NURSE PRACTITIONER

## 2021-10-19 PROCEDURE — 6370000000 HC RX 637 (ALT 250 FOR IP): Performed by: INTERNAL MEDICINE

## 2021-10-19 PROCEDURE — 6370000000 HC RX 637 (ALT 250 FOR IP): Performed by: NURSE PRACTITIONER

## 2021-10-19 PROCEDURE — 36415 COLL VENOUS BLD VENIPUNCTURE: CPT

## 2021-10-19 PROCEDURE — 85025 COMPLETE CBC W/AUTO DIFF WBC: CPT

## 2021-10-19 PROCEDURE — 2580000003 HC RX 258: Performed by: NURSE PRACTITIONER

## 2021-10-19 PROCEDURE — 2500000003 HC RX 250 WO HCPCS: Performed by: NURSE PRACTITIONER

## 2021-10-19 PROCEDURE — 83735 ASSAY OF MAGNESIUM: CPT

## 2021-10-19 RX ORDER — METRONIDAZOLE 250 MG/1
500 TABLET ORAL EVERY 8 HOURS SCHEDULED
Status: DISCONTINUED | OUTPATIENT
Start: 2021-10-19 | End: 2021-10-21 | Stop reason: HOSPADM

## 2021-10-19 RX ORDER — NIFEDIPINE 30 MG/1
30 TABLET, EXTENDED RELEASE ORAL DAILY
Status: DISCONTINUED | OUTPATIENT
Start: 2021-10-19 | End: 2021-10-21 | Stop reason: HOSPADM

## 2021-10-19 RX ADMIN — OXYBUTYNIN CHLORIDE 10 MG: 5 TABLET, EXTENDED RELEASE ORAL at 09:02

## 2021-10-19 RX ADMIN — INSULIN LISPRO 1 UNITS: 100 INJECTION, SOLUTION INTRAVENOUS; SUBCUTANEOUS at 22:17

## 2021-10-19 RX ADMIN — HEPARIN SODIUM 5000 UNITS: 5000 INJECTION INTRAVENOUS; SUBCUTANEOUS at 22:17

## 2021-10-19 RX ADMIN — METRONIDAZOLE 500 MG: 500 INJECTION, SOLUTION INTRAVENOUS at 01:24

## 2021-10-19 RX ADMIN — HEPARIN SODIUM 5000 UNITS: 5000 INJECTION INTRAVENOUS; SUBCUTANEOUS at 12:34

## 2021-10-19 RX ADMIN — PANTOPRAZOLE SODIUM 40 MG: 40 TABLET, DELAYED RELEASE ORAL at 09:02

## 2021-10-19 RX ADMIN — NIFEDIPINE 30 MG: 30 TABLET, FILM COATED, EXTENDED RELEASE ORAL at 12:34

## 2021-10-19 RX ADMIN — CARVEDILOL 25 MG: 25 TABLET, FILM COATED ORAL at 09:02

## 2021-10-19 RX ADMIN — SODIUM CHLORIDE, PRESERVATIVE FREE 10 ML: 5 INJECTION INTRAVENOUS at 22:18

## 2021-10-19 RX ADMIN — PRAVASTATIN SODIUM 20 MG: 20 TABLET ORAL at 22:17

## 2021-10-19 RX ADMIN — CARVEDILOL 25 MG: 25 TABLET, FILM COATED ORAL at 17:46

## 2021-10-19 RX ADMIN — METRONIDAZOLE 500 MG: 250 TABLET ORAL at 22:17

## 2021-10-19 RX ADMIN — INSULIN LISPRO 2 UNITS: 100 INJECTION, SOLUTION INTRAVENOUS; SUBCUTANEOUS at 17:46

## 2021-10-19 RX ADMIN — INSULIN LISPRO 4 UNITS: 100 INJECTION, SOLUTION INTRAVENOUS; SUBCUTANEOUS at 12:35

## 2021-10-19 RX ADMIN — METRONIDAZOLE 500 MG: 500 INJECTION, SOLUTION INTRAVENOUS at 09:02

## 2021-10-19 RX ADMIN — ASPIRIN 81 MG: 81 TABLET, COATED ORAL at 09:02

## 2021-10-19 RX ADMIN — METRONIDAZOLE 500 MG: 250 TABLET ORAL at 14:53

## 2021-10-19 RX ADMIN — HEPARIN SODIUM 5000 UNITS: 5000 INJECTION INTRAVENOUS; SUBCUTANEOUS at 05:12

## 2021-10-19 ASSESSMENT — PAIN SCALES - PAIN ASSESSMENT IN ADVANCED DEMENTIA (PAINAD)
TOTALSCORE: 0
BODYLANGUAGE: 0
FACIALEXPRESSION: 0
CONSOLABILITY: 0
BODYLANGUAGE: 0
NEGVOCALIZATION: 0
TOTALSCORE: 0
NEGVOCALIZATION: 0
FACIALEXPRESSION: 0
FACIALEXPRESSION: 0
CONSOLABILITY: 0
TOTALSCORE: 0
FACIALEXPRESSION: 0
NEGVOCALIZATION: 0
CONSOLABILITY: 0
FACIALEXPRESSION: 0
BODYLANGUAGE: 0
TOTALSCORE: 0
CONSOLABILITY: 0
BODYLANGUAGE: 0
CONSOLABILITY: 0
TOTALSCORE: 0
BODYLANGUAGE: 0
BREATHING: 0
FACIALEXPRESSION: 0
NEGVOCALIZATION: 0
TOTALSCORE: 0
CONSOLABILITY: 0
BODYLANGUAGE: 0
BREATHING: 0
CONSOLABILITY: 0
BODYLANGUAGE: 0
BREATHING: 0
NEGVOCALIZATION: 0
BREATHING: 0
CONSOLABILITY: 0
BREATHING: 0
BODYLANGUAGE: 0
TOTALSCORE: 0
BODYLANGUAGE: 0
TOTALSCORE: 0
NEGVOCALIZATION: 0
BREATHING: 0
NEGVOCALIZATION: 0
FACIALEXPRESSION: 0
BREATHING: 0
FACIALEXPRESSION: 0
FACIALEXPRESSION: 0
BREATHING: 0
NEGVOCALIZATION: 0
CONSOLABILITY: 0
BREATHING: 0
TOTALSCORE: 0
NEGVOCALIZATION: 0

## 2021-10-19 ASSESSMENT — PAIN SCALES - GENERAL
PAINLEVEL_OUTOF10: 0

## 2021-10-19 NOTE — PROGRESS NOTES
Progress Note  Date:10/19/2021       Room:[facility]  Patient Shirley Glez     YOB: 1940     Age:81 y.o. Subjective    Subjective:  Symptoms:  Stable. Pain:  She reports no pain. Review of Systems  Objective         Vitals Last 24 Hours:  TEMPERATURE:  Temp  Av.3 °F (36.3 °C)  Min: 94.3 °F (34.6 °C)  Max: 98 °F (36.7 °C)  RESPIRATIONS RANGE: Resp  Av.3  Min: 16  Max: 18  PULSE OXIMETRY RANGE: SpO2  Av.8 %  Min: 94 %  Max: 100 %  PULSE RANGE: Pulse  Av.6  Min: 77  Max: 94  BLOOD PRESSURE RANGE: Systolic (73FBZ), XGA:428 , Min:123 , EJJ:353   ; Diastolic (10CRE), UVP:22, Min:65, Max:85    I/O (24Hr): Intake/Output Summary (Last 24 hours) at 10/19/2021 1105  Last data filed at 10/19/2021 0928  Gross per 24 hour   Intake 1680 ml   Output 1600 ml   Net 80 ml     Objective:  General Appearance:  Not in pain. Vital signs: (most recent): Blood pressure (!) 181/85, pulse 89, temperature 94.3 °F (34.6 °C), temperature source Axillary, resp. rate 16, height 5' 5\" (1.651 m), weight 188 lb 0.8 oz (85.3 kg), SpO2 95 %, not currently breastfeeding. Abdomen: Abdomen is soft. Bowel sounds are normal.   There is no abdominal tenderness. Labs/Imaging/Diagnostics    Labs:  CBC:  Recent Labs     10/17/21  0607 10/18/21  0629 10/19/21  0720   WBC 10.7 13.3* 11.0   RBC 2.66* 2.68* 2.37*   HGB 8.4* 8.3* 7.4*   HCT 25.3* 25.5* 23.0*   MCV 95.0 95.3 96.9   RDW 17.1* 16.6* 17.0*    353 297     CHEMISTRIES:  Recent Labs     10/17/21  0607 10/18/21  0629 10/19/21  0720    142 143   K 3.5 3.3* 4.3    106 107   CO2 28 27 26   BUN 36* 30* 26*   CREATININE 4.1* 3.3* 3.1*   GLUCOSE 123* 141* 140*   PHOS 3.4 3.1 2.3*   MG 2.50* 2.20 1.90     PT/INR:No results for input(s): PROTIME, INR in the last 72 hours. APTT:No results for input(s): APTT in the last 72 hours.   LIVER PROFILE:No results for input(s): AST, ALT, BILIDIR, BILITOT, ALKPHOS in the last 72 hours.    Imaging Last 24 Hours:  No results found. Assessment//Plan           Hospital Problems         Last Modified POA    * (Principal) C. difficile colitis 10/18/2021 Yes    Essential hypertension (Chronic) 10/12/2021 Yes    CKD (chronic kidney disease) stage 3, GFR 30-59 ml/min (HCC) 10/12/2021 Yes    CAD (coronary artery disease) 10/12/2021 Yes    Controlled type 2 diabetes mellitus with diabetic neuropathy, without long-term current use of insulin ([facility] 10/12/2021 Yes    Acute cystitis 10/12/2021 Yes        Assessment & Plan  82 yo w deafness, dementia, DM, Obesity, HTN, CAD, HLD, who presented for abd pain and diarrhea w UBALDO. CT w Rt colon colitis     Plan:   1. Supportive care  2. Agree w Abx's  3. OK to advance diet  4. Needs outpatient colonoscopy but family elects to go w comfort measures  5.  Will sign off     Jami rBush MD       [facility]) [phone]    Electronically signed by Jami Brush MD on 10/19/21 at 11:05 AM EDT

## 2021-10-19 NOTE — PROGRESS NOTES
[facility]    [facility]              [phone]                         Interval History and plan:      Creatinine down to 3.1  Electrolytes and bicarbonate are stable  Blood pressure was good right now getting higher    Plan:  Discontinue Plasma-Lyte-that might help blood pressure also  Now on higher dose of Coreg  Blood pressure still high-started on nifedipine  Removed lisinopril from outpatient medication                   Assessment :     Acute Kidney Injury  UBALDO likely due to - pre-renal/ATN due to diarrhea/sepsis  Cr on consultation  5.9  Baseline Cr- 1.4   She has CKD stage IIIa at baseline    UA- mod blood, large LE  Renal Imaging:- no renal abnormality, thickened bladder on 10/21  Echo: EF 55%. Grade II DD    Hypertension   BP: (123-181)/(85)  Pulse:  [77-89]   BP goal inpatient 495-104 systolic inpatient  BP low      Cdiff diarrhea    Dementia  DM  obesity      [facility] would like to thank Deena Johnson MD   for opportunity to serve this patient      Please call with questions at-   24 Hrs Answering service [phone] or  7 am- 5 pm via Perfect serve or cell phone  Mckenna Jackson MD          CC/reason for consult :     UBALDO     HPI :     Uri Nassar is a [de-identified] y.o. female presented to   the hospital on 10/11/2021 with pain abdomen for several weeks. She is confused, so most info from the chart. She is diagnosed to have C diff and was on po vancomycin at the nursing home. Brought to ED, has significant colitis. On iv flagyl and po vancomycin.  Also getting iv ceftriaxone    We are consulted for UBALDO and related issues    ROS:     Seen with- no family    Unable to obtain ROS due to  Altered mental status           PMH/PSH/SH/Family History:     Past Medical History:   Diagnosis Date    Asthma     Bronchial asthma     Bursitis 12/2011    ACHILLES TENDON LEFT    Chest pain 12/2020    CHF (congestive heart failure) (HCC)     Constipation     [facility]     [facility] 09/[facility]/2019    unspecificied dementia without behavioral disturbance    Depression     Diverticulitis     Dizziness     GERD (gastroesophageal reflux disease)     Hearing loss     History of blood transfusion     Hyperlipidemia     Hypertension     Leg edema     Lung disease     Osteoarthritis     Sleep apnea     CPAP, SLEEP STUDY 6/28 OVERNIGHT AT HOME    Tinnitus     Type II or unspecified type diabetes mellitus without mention of complication, not stated as uncontrolled     Unspecified cerebral artery occlusion with cerebral infarction     mini stroke       Past Surgical History:   Procedure Laterality Date    ABDOMEN SURGERY      ACHILLES TENDON SURGERY  2/2/12    LEFT ACHILLES DEBRIDEMENT, HAGLUNDS AND RETROCALCANEAL BURSA EXCISION WITH FLEXOR HALLUS LONGUS TENDON TRANSFER WITH BLOCK FOR PAIN CONTROL    APPENDECTOMY      CARDIAC CATHETERIZATION  8/21/14    CARDIAC CATHETERIZATION  12/07/2020    Non Obs CAD, medical management    CARPAL TUNNEL RELEASE      both hands    CHOLECYSTECTOMY      COLONOSCOPY      COLONOSCOPY      COLONOSCOPY  2/10/2016    CYSTOSCOPY  10/02/2017    DIAGNOSTIC CARDIAC CATH LAB PROCEDURE      ENDOSCOPY, COLON, DIAGNOSTIC      EYE SURGERY      HYSTERECTOMY      HYSTERECTOMY, TOTAL ABDOMINAL      JOINT REPLACEMENT      bilateral knee    KNEE SURGERY      both replaced    OTHER SURGICAL HISTORY  07/09/2018    CYSTOSCOPY, HYDRODISTENTION OF BLADDER WITH INSTILLATION OF    POLYPECTOMY      SHOULDER SURGERY      TOENAIL EXCISION  08/04/2016    right big toe    TONSILLECTOMY      TUBAL LIGATION      UPPER GASTROINTESTINAL ENDOSCOPY  10/29/12    gastritis    UPPER GASTROINTESTINAL ENDOSCOPY  2/10/2016    URETHRAL STRICTURE DILATATION          reports that she quit smoking about 46 years ago. Her smoking use included cigarettes. She has a 0.03 pack-year smoking history.  She has never used smokeless tobacco. She reports that she does not drink alcohol and does not use drugs. family history includes Cancer in her father; Dementia in her sister; Diabetes in her sister; Heart Disease in her brother, brother, mother, and sister.          Medication:     Current Facility-Administered Medications: carvedilol (COREG) tablet 25 mg, 25 mg, Oral, BID WC  electrolyte-A (PLASMALYTE-A) solution, , IntraVENous, Continuous  0.9 % sodium chloride infusion, , IntraVENous, PRN  influenza quadrivalent split vaccine (FLUZONE;FLUARIX;FLULAVAL;AFLURIA) injection 0.5 mL, 0.5 mL, IntraMUSCular, Prior to discharge  aspirin EC tablet 81 mg, 81 mg, Oral, Daily  pantoprazole (PROTONIX) tablet 40 mg, 40 mg, Oral, Daily  oxybutynin (DITROPAN-XL) extended release tablet 10 mg, 10 mg, Oral, Daily  pravastatin (PRAVACHOL) tablet 20 mg, 20 mg, Oral, Nightly  sodium chloride flush 0.9 % injection 5-40 mL, 5-40 mL, IntraVENous, 2 times per day  sodium chloride flush 0.9 % injection 5-40 mL, 5-40 mL, IntraVENous, PRN  0.9 % sodium chloride infusion, 25 mL, IntraVENous, PRN  heparin (porcine) injection 5,000 Units, 5,000 Units, SubCUTAneous, 3 times per day  ondansetron (ZOFRAN-ODT) disintegrating tablet 4 mg, 4 mg, Oral, Q8H PRN **OR** ondansetron (ZOFRAN) injection 4 mg, 4 mg, IntraVENous, Q6H PRN  polyethylene glycol (GLYCOLAX) packet 17 g, 17 g, Oral, Daily PRN  acetaminophen (TYLENOL) tablet 650 mg, 650 mg, Oral, Q6H PRN **OR** acetaminophen (TYLENOL) suppository 650 mg, 650 mg, Rectal, Q6H PRN  metronidazole (FLAGYL) 500 mg in NaCl 100 mL IVPB premix, 500 mg, IntraVENous, Q8H  insulin lispro (HUMALOG) injection vial 0-12 Units, 0-12 Units, SubCUTAneous, TID WC  insulin lispro (HUMALOG) injection vial 0-6 Units, 0-6 Units, SubCUTAneous, Nightly  glucose (GLUTOSE) 40 % oral gel 15 g, 15 g, Oral, PRN  dextrose 50 % IV solution, 12.5 g, IntraVENous, PRN  glucagon (rDNA) injection 1 mg, 1 mg, IntraMUSCular, PRN  dextrose 5 % solution, 100 mL/hr, IntraVENous, PRN  albuterol sulfate  (90 Base) MCG/ACT inhaler 2 puff, 2 puff, Inhalation, Q4H PRN  0.9 % sodium chloride bolus, 500 mL, IntraVENous, Once       Vitals :     Vitals:    10/19/21 0855   BP: (!) 181/85   Pulse: 89   Resp: 16   Temp: 94.3 °F (34.6 °C)   SpO2: 95%       I & O :       Intake/Output Summary (Last 24 hours) at 10/19/2021 1128  Last data filed at 10/19/2021 0177  Gross per 24 hour   Intake 1680 ml   Output 1600 ml   Net 80 ml        Physical Examination :     General appearance: alert, hard of hearing   HEENT: Lips- normal, teeth- ok , oral mucosa- dry  Neck : Mass- no, appears symmetrical, JVD- not visible  Respiratory: Respiratory effort-  normal, wheeze- no, crackles - no  Cardiovascular:  Ausculation- No M/R/G, Edema none  Abdomen: visible mass- no, distention- no, scar- no, tenderness- no                            hepatosplenomegaly-  No--  Musculoskeletal:  clubbing no,cyanosis- no , digital ischemia- no                           muscle strength- patient unable to co-operate     , tone - patient unable to co-operate -  Skin: rashes- no , ulcers- no, induration- no, tightening - no  Psychiatric:  confused -  Additional findings:-        LABS:     Recent Labs     10/17/21  0607 10/18/21  0629 10/19/21  0720   WBC 10.7 13.3* 11.0   HGB 8.4* 8.3* 7.4*   HCT 25.3* 25.5* 23.0*    353 297     Recent Labs     10/17/21  0607 10/18/21  0629 10/19/21  0720    142 143   K 3.5 3.3* 4.3    106 107   CO2 28 27 26   BUN 36* 30* 26*   CREATININE 4.1* 3.3* 3.1*   GLUCOSE 123* 141* 140*   MG 2.50* 2.20 1.90   PHOS 3.4 3.1 2.3*

## 2021-10-19 NOTE — PROGRESS NOTES
Hospitalist Progress Note      PCP: Mary Petersen    Date of Admission: 10/11/2021    Chief Complaint: Right side abdominal pain and diarrhea    Subjective: no new c/o. Medications:  Reviewed    Infusion Medications    electrolyte-A 50 mL/hr at 10/19/21 0300    sodium chloride      sodium chloride 25 mL (10/16/21 0405)    dextrose       Scheduled Medications    carvedilol  25 mg Oral BID WC    influenza virus vaccine  0.5 mL IntraMUSCular Prior to discharge    aspirin  81 mg Oral Daily    pantoprazole  40 mg Oral Daily    oxybutynin  10 mg Oral Daily    pravastatin  20 mg Oral Nightly    sodium chloride flush  5-40 mL IntraVENous 2 times per day    heparin (porcine)  5,000 Units SubCUTAneous 3 times per day    metroNIDAZOLE  500 mg IntraVENous Q8H    insulin lispro  0-12 Units SubCUTAneous TID WC    insulin lispro  0-6 Units SubCUTAneous Nightly    sodium chloride  500 mL IntraVENous Once     PRN Meds: sodium chloride, sodium chloride flush, sodium chloride, ondansetron **OR** ondansetron, polyethylene glycol, acetaminophen **OR** acetaminophen, glucose, dextrose, glucagon (rDNA), dextrose, albuterol sulfate HFA      Intake/Output Summary (Last 24 hours) at 10/19/2021 0921  Last data filed at 10/19/2021 0415  Gross per 24 hour   Intake 1680 ml   Output 1600 ml   Net 80 ml       Physical Exam Performed:    BP (!) 181/85   Pulse 89   Temp 94.3 °F (34.6 °C) (Axillary)   Resp 16   Ht 5' 5\" (1.651 m)   Wt 188 lb 0.8 oz (85.3 kg)   SpO2 95%   BMI 31.29 kg/m²     General appearance: No apparent distress, appears stated age and cooperative. HEENT: Pupils equal, round, and reactive to light. Conjunctivae/corneas clear. Neck: Supple, with full range of motion. No jugular venous distention. Trachea midline. Respiratory:  Normal respiratory effort. Clear to auscultation, bilaterally without Rales/Wheezes/Rhonchi.   Cardiovascular: Regular rate and rhythm with normal S1/S2 without murmurs, rubs or gallops. Abdomen: Soft, non-tender, non-distended with normal bowel sounds. Musculoskeletal: No clubbing, cyanosis or edema bilaterally. Full range of motion without deformity. Skin: Skin color, texture, turgor normal.  No rashes or lesions. Neurologic:  Neurovascularly intact without any focal sensory/motor deficits. Cranial nerves: II-XII intact, grossly non-focal.  Psychiatric: Alert and oriented, thought content appropriate, normal insight  Capillary Refill: Brisk,< 3 seconds   Peripheral Pulses: +2 palpable, equal bilaterally       Labs:   Recent Labs     10/17/21  0607 10/18/21  0629 10/19/21  0720   WBC 10.7 13.3* 11.0   HGB 8.4* 8.3* 7.4*   HCT 25.3* 25.5* 23.0*    353 297     Recent Labs     10/17/21  0607 10/18/21  0629 10/19/21  0720    142 143   K 3.5 3.3* 4.3    106 107   CO2 28 27 26   BUN 36* 30* 26*   CREATININE 4.1* 3.3* 3.1*   CALCIUM 8.3 8.3 8.5   PHOS 3.4 3.1 2.3*     No results for input(s): AST, ALT, BILIDIR, BILITOT, ALKPHOS in the last 72 hours. No results for input(s): INR in the last 72 hours. No results for input(s): Dayton Schreiber in the last 72 hours.     Urinalysis:      Lab Results   Component Value Date    NITRU Negative 10/11/2021    WBCUA >100 10/11/2021    BACTERIA 1+ 10/11/2021    RBCUA see below 10/11/2021    BLOODU MODERATE 10/11/2021    SPECGRAV 1.025 10/11/2021    GLUCOSEU Negative 10/11/2021    GLUCOSEU NEGATIVE 03/17/2011       Consults:    IP CONSULT TO HOSPITALIST  IP CONSULT TO GI  IP CONSULT TO NEPHROLOGY      Assessment/Plan:    Active Hospital Problems    Diagnosis     C. difficile colitis [A04.72]     Acute cystitis [N30.00]     Controlled type 2 diabetes mellitus with diabetic neuropathy, without long-term current use of insulin (HCC) [E11.40]     CAD (coronary artery disease) [I25.10]     Essential hypertension [I10]     CKD (chronic kidney disease) stage 3, GFR 30-59 ml/min (HCC) [N18.30]        Right sided colitis -  Antibiotic therapy with Metronidazole - changed to PO 19 October.  Considered outpatient colonoscopy but family elects to go w comfort measures.  Advancing diet and tolerated. Acute blood loss anemia -  Transfused 1 unit PRBC for Hgb 6.4 gm/dL (10/15).  Stable hemoglobin since. FOBT negative. Acute cystitis with hematuria treated with Rocephin - completed    Acute kidney injury on stage 3a CKD w/ baseline Cr 1.3 to 1.4.  Treated with bicarbonate gtt (d/c 10/17). Continued Plasma-Lyte at low dose per Nephrology recs now d/c'd. Type 2 Diabetes (HgbA1c 6.9%).   Cover with a \"sliding scale\" lispro moderate scale prandial correction insulin.      HTN/CAD - w/ known CAD and no evidence of active signs/sxs of ischemia/failure. Initially hypotensive, started on Midodrine, now d/c'd. Currently controlled on home Coreg w/ vitals reviewed and documented as above. Lisinopril and Conidine held.  Continues on ASA. Nifedipine added 19 October. Right buttocks ulceration - Topical wound care.         DVT Prophylaxis: SQ Heparin    Recent Labs     10/17/21  0607 10/18/21  0629 10/19/21  0720    353 297     Diet: ADULT DIET; Regular  Code Status: DNR-CC      PT/OT Eval Status: not ordered. Dispo - enrolled in hospice. Confirmed with family that she would not want dialysis if renal failure was to worsen. She can return to [place] [id number] with hospice at any time pending Nephrology recs. Possibly Wed 20 October pending response to latest changes.       Beth Smith MD

## 2021-10-19 NOTE — PROGRESS NOTES
Comprehensive Nutrition Assessment    Type and Reason for Visit:  Initial, RD Nutrition Re-Screen/LOS    Nutrition Recommendations/Plan:   1. Recommend General diet order, free of therapeutic restrictions, to promote adequate nutrient intake  2. RD to add glucerna BID for extra protein and calories  3. Monitor nutrition adequacy, pertinent labs, bowel habits, wt changes, and clinical progress    Nutrition Assessment:  LOS assessment: [de-identified] yo w deafness, dementia, DM, Obesity, HTN, CAD, HLD, admitted with abd pain and diarrhea w UBALDO. CT w Rt colon colitis. On regular diet with PO intakes % of meals. RD to add ONS to promote optimal nutrition. Plan to discharge to [place] [address] with hospice. Will continue to monitor. Malnutrition Assessment:  Malnutrition Status: At risk for malnutrition (Comment)    Context:  Chronic Illness     Findings of the 6 clinical characteristics of malnutrition:  Energy Intake:  No significant decrease in energy intake  Fluid Accumulation:  1 - Mild      Nutrition Related Findings:  Labs reviewed. + 2 BM today. Wounds:  Pressure Injury (Pressure injury on coccyx)       Current Nutrition Therapies:    ADULT DIET; Regular    Anthropometric Measures:  · Height: 5' 5\" (165.1 cm)  · Current Body Weight: 188 lb (85.3 kg)   · Ideal Body Weight: 125 lbs; % Ideal Body Weight 150.4 %   · BMI: 31.3   · BMI Categories: Obese Class 1 (BMI 30.0-34. 9)       Nutrition Diagnosis:   No nutrition diagnosis at this time     Nutrition Interventions:   Food and/or Nutrient Delivery:  Continue Current Diet, Start Oral Nutrition Supplement  Nutrition Education/Counseling:  Education not appropriate   Coordination of Nutrition Care:  Continue to monitor while inpatient    Goals:  Consume 50% or greater of 3 meals per day and ONS during this admission.        Nutrition Monitoring and Evaluation:   Behavioral-Environmental Outcomes:  None Identified   Food/Nutrient Intake Outcomes:  Food and Nutrient

## 2021-10-20 VITALS
RESPIRATION RATE: 18 BRPM | OXYGEN SATURATION: 94 % | DIASTOLIC BLOOD PRESSURE: 70 MMHG | HEIGHT: 65 IN | BODY MASS INDEX: 31.33 KG/M2 | TEMPERATURE: 98.5 F | WEIGHT: 188.05 LBS | HEART RATE: 92 BPM | SYSTOLIC BLOOD PRESSURE: 121 MMHG

## 2021-10-20 LAB
ALBUMIN SERPL-MCNC: 2.8 G/DL (ref 3.4–5)
ANION GAP SERPL CALCULATED.3IONS-SCNC: 11 MMOL/L (ref 3–16)
BUN BLDV-MCNC: 23 MG/DL (ref 7–20)
CALCIUM SERPL-MCNC: 8.9 MG/DL (ref 8.3–10.6)
CHLORIDE BLD-SCNC: 110 MMOL/L (ref 99–110)
CO2: 20 MMOL/L (ref 21–32)
CREAT SERPL-MCNC: 2.8 MG/DL (ref 0.6–1.2)
GFR AFRICAN AMERICAN: 20
GFR NON-AFRICAN AMERICAN: 16
GLUCOSE BLD-MCNC: 138 MG/DL (ref 70–99)
GLUCOSE BLD-MCNC: 143 MG/DL (ref 70–99)
GLUCOSE BLD-MCNC: 146 MG/DL (ref 70–99)
GLUCOSE BLD-MCNC: 216 MG/DL (ref 70–99)
MAGNESIUM: 1.8 MG/DL (ref 1.8–2.4)
PERFORMED ON: ABNORMAL
PHOSPHORUS: 2.7 MG/DL (ref 2.5–4.9)
POTASSIUM SERPL-SCNC: 4.5 MMOL/L (ref 3.5–5.1)
SODIUM BLD-SCNC: 141 MMOL/L (ref 136–145)

## 2021-10-20 PROCEDURE — 6370000000 HC RX 637 (ALT 250 FOR IP): Performed by: STUDENT IN AN ORGANIZED HEALTH CARE EDUCATION/TRAINING PROGRAM

## 2021-10-20 PROCEDURE — 36415 COLL VENOUS BLD VENIPUNCTURE: CPT

## 2021-10-20 PROCEDURE — 6370000000 HC RX 637 (ALT 250 FOR IP): Performed by: INTERNAL MEDICINE

## 2021-10-20 PROCEDURE — G0008 ADMIN INFLUENZA VIRUS VAC: HCPCS | Performed by: NURSE PRACTITIONER

## 2021-10-20 PROCEDURE — 6360000002 HC RX W HCPCS: Performed by: NURSE PRACTITIONER

## 2021-10-20 PROCEDURE — 83735 ASSAY OF MAGNESIUM: CPT

## 2021-10-20 PROCEDURE — 6370000000 HC RX 637 (ALT 250 FOR IP): Performed by: NURSE PRACTITIONER

## 2021-10-20 PROCEDURE — 2580000003 HC RX 258: Performed by: NURSE PRACTITIONER

## 2021-10-20 PROCEDURE — 90686 IIV4 VACC NO PRSV 0.5 ML IM: CPT | Performed by: NURSE PRACTITIONER

## 2021-10-20 PROCEDURE — 80069 RENAL FUNCTION PANEL: CPT

## 2021-10-20 RX ORDER — NIFEDIPINE 30 MG/1
30 TABLET, FILM COATED, EXTENDED RELEASE ORAL DAILY
Qty: 30 TABLET | Refills: 0 | Status: SHIPPED | OUTPATIENT
Start: 2021-10-21 | End: 2021-11-20

## 2021-10-20 RX ORDER — METRONIDAZOLE 500 MG/1
500 TABLET ORAL 3 TIMES DAILY
Qty: 21 TABLET | Refills: 0 | Status: SHIPPED | OUTPATIENT
Start: 2021-10-20 | End: 2021-10-27

## 2021-10-20 RX ORDER — HYDROCODONE BITARTRATE AND ACETAMINOPHEN 5; 325 MG/1; MG/1
1 TABLET ORAL EVERY 6 HOURS PRN
Qty: 28 TABLET | Refills: 0 | Status: SHIPPED | OUTPATIENT
Start: 2021-10-20 | End: 2021-10-27

## 2021-10-20 RX ADMIN — NIFEDIPINE 30 MG: 30 TABLET, FILM COATED, EXTENDED RELEASE ORAL at 10:35

## 2021-10-20 RX ADMIN — PANTOPRAZOLE SODIUM 40 MG: 40 TABLET, DELAYED RELEASE ORAL at 10:35

## 2021-10-20 RX ADMIN — ACETAMINOPHEN 650 MG: 325 TABLET ORAL at 15:52

## 2021-10-20 RX ADMIN — INFLUENZA A VIRUS A/VICTORIA/2570/2019 IVR-215 (H1N1) ANTIGEN (PROPIOLACTONE INACTIVATED), INFLUENZA A VIRUS A/CAMBODIA/E0826360/2020 IVR-224 (H3N2) ANTIGEN (PROPIOLACTONE INACTIVATED), INFLUENZA B VIRUS B/VICTORIA/705/2018 BVR-11 ANTIGEN (PROPIOLACTONE INACTIVATED), INFLUENZA B VIRUS B/PHUKET/3073/2013 BVR-1B ANTIGEN (PROPIOLACTONE INACTIVATED) 0.5 ML: 15; 15; 15; 15 INJECTION, SUSPENSION INTRAMUSCULAR at 15:41

## 2021-10-20 RX ADMIN — CARVEDILOL 25 MG: 25 TABLET, FILM COATED ORAL at 15:52

## 2021-10-20 RX ADMIN — HEPARIN SODIUM 5000 UNITS: 5000 INJECTION INTRAVENOUS; SUBCUTANEOUS at 15:52

## 2021-10-20 RX ADMIN — HEPARIN SODIUM 5000 UNITS: 5000 INJECTION INTRAVENOUS; SUBCUTANEOUS at 06:17

## 2021-10-20 RX ADMIN — OXYBUTYNIN CHLORIDE 10 MG: 5 TABLET, EXTENDED RELEASE ORAL at 10:35

## 2021-10-20 RX ADMIN — METRONIDAZOLE 500 MG: 250 TABLET ORAL at 15:52

## 2021-10-20 RX ADMIN — CARVEDILOL 25 MG: 25 TABLET, FILM COATED ORAL at 10:36

## 2021-10-20 RX ADMIN — INSULIN LISPRO 4 UNITS: 100 INJECTION, SOLUTION INTRAVENOUS; SUBCUTANEOUS at 18:16

## 2021-10-20 RX ADMIN — METRONIDAZOLE 500 MG: 250 TABLET ORAL at 06:17

## 2021-10-20 RX ADMIN — INSULIN LISPRO 2 UNITS: 100 INJECTION, SOLUTION INTRAVENOUS; SUBCUTANEOUS at 13:04

## 2021-10-20 RX ADMIN — ASPIRIN 81 MG: 81 TABLET, COATED ORAL at 10:36

## 2021-10-20 RX ADMIN — SODIUM CHLORIDE, PRESERVATIVE FREE 10 ML: 5 INJECTION INTRAVENOUS at 10:44

## 2021-10-20 RX ADMIN — INSULIN LISPRO 2 UNITS: 100 INJECTION, SOLUTION INTRAVENOUS; SUBCUTANEOUS at 10:38

## 2021-10-20 ASSESSMENT — PAIN SCALES - GENERAL
PAINLEVEL_OUTOF10: 4
PAINLEVEL_OUTOF10: 0
PAINLEVEL_OUTOF10: 0

## 2021-10-20 NOTE — PROGRESS NOTES
Tried to call report for the patient to [facility]. Could not get through to a live person.  Left message on DON phone

## 2021-10-20 NOTE — PROGRESS NOTES
[facility]    [facility]              [phone]                         Interval History and plan:      Creatinine continues to get better to 2.8 today  Electrolytes are stable  She looks much better  Hemoglobin now down to 7.4  Blood pressure is stable  Plan:  Continue nifedipine for high blood pressure  Will not bring blood pressure down fast due to UBALDO                     Assessment :     Acute Kidney Injury  UBALDO likely due to - pre-renal/ATN due to diarrhea/sepsis  Cr on consultation  5.9  Baseline Cr- 1.4   She has CKD stage IIIa at baseline    UA- mod blood, large LE  Renal Imaging:- no renal abnormality, thickened bladder on 10/21  Echo: EF 55%. Grade II DD    Hypertension   BP: (151)/(90)  Pulse:  [93]   BP goal inpatient [phone] systolic inpatient  BP low      Cdiff diarrhea    Dementia  DM  obesity      [facility] would like to thank Cris Hodges MD   for opportunity to serve this patient      Please call with questions at-   24 Hrs Answering service [phone] or  7 am- 5 pm via Perfect serve or cell phone  Gilberto Briceño MD          CC/reason for consult :     UBALDO     HPI :     Casandra Aguayo is a [de-identified] y.o. female presented to   the hospital on 10/11/2021 with pain abdomen for several weeks. She is confused, so most info from the chart. She is diagnosed to have C diff and was on po vancomycin at the nursing home. Brought to ED, has significant colitis. On iv flagyl and po vancomycin.  Also getting iv ceftriaxone    We are consulted for UBALDO and related issues    ROS:     Seen with- no family    Unable to obtain ROS due to  Altered mental status           PMH/PSH/SH/Family History:     Past Medical History:   Diagnosis Date    Asthma     Bronchial asthma     Bursitis 12/2011    ACHILLES TENDON LEFT    Chest pain 12/2020    CHF (congestive heart failure) (HCC)     Constipation     COPD (chronic obstructive pulmonary disease) (HCC)     Dementia ([facility] 09/11/2019    unspecificied dementia without behavioral disturbance    Depression     Diverticulitis     Dizziness     GERD (gastroesophageal reflux disease)     Hearing loss     History of blood transfusion     Hyperlipidemia     Hypertension     Leg edema     Lung disease     Osteoarthritis     Sleep apnea     CPAP, SLEEP STUDY 6/28 OVERNIGHT AT HOME    Tinnitus     Type II or unspecified type diabetes mellitus without mention of complication, not stated as uncontrolled     Unspecified cerebral artery occlusion with cerebral infarction     mini stroke       Past Surgical History:   Procedure Laterality Date    ABDOMEN SURGERY      ACHILLES TENDON SURGERY  2/2/12    LEFT ACHILLES DEBRIDEMENT, HAGLUNDS AND RETROCALCANEAL BURSA EXCISION WITH FLEXOR HALLUS LONGUS TENDON TRANSFER WITH BLOCK FOR PAIN CONTROL    APPENDECTOMY      CARDIAC CATHETERIZATION  8/21/14    CARDIAC CATHETERIZATION  12/07/2020    Non Obs CAD, medical management    CARPAL TUNNEL RELEASE      both hands    CHOLECYSTECTOMY      COLONOSCOPY      COLONOSCOPY      COLONOSCOPY  2/10/2016    CYSTOSCOPY  10/02/2017    DIAGNOSTIC CARDIAC CATH LAB PROCEDURE      ENDOSCOPY, COLON, DIAGNOSTIC      EYE SURGERY      HYSTERECTOMY      HYSTERECTOMY, TOTAL ABDOMINAL      JOINT REPLACEMENT      bilateral knee    KNEE SURGERY      both replaced    OTHER SURGICAL HISTORY  07/09/2018    CYSTOSCOPY, HYDRODISTENTION OF BLADDER WITH INSTILLATION OF    POLYPECTOMY      SHOULDER SURGERY      TOENAIL EXCISION  08/04/2016    right big toe    TONSILLECTOMY      TUBAL LIGATION      UPPER GASTROINTESTINAL ENDOSCOPY  10/29/12    gastritis    UPPER GASTROINTESTINAL ENDOSCOPY  2/10/2016    URETHRAL STRICTURE DILATATION          reports that she quit smoking about 46 years ago. Her smoking use included cigarettes. She has a 0.03 pack-year smoking history.  She has never used smokeless tobacco. She reports that she does not drink alcohol and does not use drugs. family history includes Cancer in her father; Dementia in her sister; Diabetes in her sister; Heart Disease in her brother, brother, mother, and sister.          Medication:     Current Facility-Administered Medications: NIFEdipine (PROCARDIA XL) extended release tablet 30 mg, 30 mg, Oral, Daily  metroNIDAZOLE (FLAGYL) tablet 500 mg, 500 mg, Oral, 3 times per day  carvedilol (COREG) tablet 25 mg, 25 mg, Oral, BID WC  0.9 % sodium chloride infusion, , IntraVENous, PRN  influenza quadrivalent split vaccine (FLUZONE;FLUARIX;FLULAVAL;AFLURIA) injection 0.5 mL, 0.5 mL, IntraMUSCular, Prior to discharge  aspirin EC tablet 81 mg, 81 mg, Oral, Daily  pantoprazole (PROTONIX) tablet 40 mg, 40 mg, Oral, Daily  oxybutynin (DITROPAN-XL) extended release tablet 10 mg, 10 mg, Oral, Daily  pravastatin (PRAVACHOL) tablet 20 mg, 20 mg, Oral, Nightly  sodium chloride flush 0.9 % injection 5-40 mL, 5-40 mL, IntraVENous, 2 times per day  sodium chloride flush 0.9 % injection 5-40 mL, 5-40 mL, IntraVENous, PRN  0.9 % sodium chloride infusion, 25 mL, IntraVENous, PRN  heparin (porcine) injection 5,000 Units, 5,000 Units, SubCUTAneous, 3 times per day  ondansetron (ZOFRAN-ODT) disintegrating tablet 4 mg, 4 mg, Oral, Q8H PRN **OR** ondansetron (ZOFRAN) injection 4 mg, 4 mg, IntraVENous, Q6H PRN  polyethylene glycol (GLYCOLAX) packet 17 g, 17 g, Oral, Daily PRN  acetaminophen (TYLENOL) tablet 650 mg, 650 mg, Oral, Q6H PRN **OR** acetaminophen (TYLENOL) suppository 650 mg, 650 mg, Rectal, Q6H PRN  insulin lispro (HUMALOG) injection vial 0-12 Units, 0-12 Units, SubCUTAneous, TID WC  insulin lispro (HUMALOG) injection vial 0-6 Units, 0-6 Units, SubCUTAneous, Nightly  glucose (GLUTOSE) 40 % oral gel 15 g, 15 g, Oral, PRN  dextrose 50 % IV solution, 12.5 g, IntraVENous, PRN  glucagon (rDNA) injection 1 mg, 1 mg, IntraMUSCular, PRN  dextrose 5 % solution, 100 mL/hr, IntraVENous, PRN  albuterol sulfate  (90 Base)

## 2021-10-20 NOTE — CARE COORDINATION
CASE MANAGEMENT DISCHARGE SUMMARY      Discharge to: [facility] Karla with [address] completed: [facility] Exemption Notification (HENS) completed: na    IMM given: (date)      Transportation:    Medical Transport explained to [employer]. Pt/family voice no agency preference. Agency used: [facility] up time: 2130   Ambulance form completed: Yes    Confirmed discharge plan with:     Patient: yes     Family:  Yes. DaughterMitali, [phone]     Facility/Agency, name:  [facility] faxed to facility and admissions team notified (VM left for Poonam at facility)     Phone number for report to facility:  ** unsure of house number to call nursing report. (general facility number is [phone]). Call liaison, Susan Bowser, to obtain report number [phone]    \"Dee\" with [facility] notified of discharge and transport time     RN, name: Russell    Note: Discharging nurse to complete [facility], reconcile AVS, and place final copy with patient's discharge packet. RN to ensure that written prescriptions for Level II medications are sent with patient to the facility as per protocol.     Funmilayo Aburto RN

## 2021-10-20 NOTE — PROGRESS NOTES
Hospitalist Progress Note      PCP: Barron Ralph    Date of Admission: 10/11/2021    Chief Complaint: Right side abdominal pain and diarrhea    Subjective: no new c/o. Medications:  Reviewed    Infusion Medications    sodium chloride      sodium chloride 25 mL (10/16/21 0405)    dextrose       Scheduled Medications    NIFEdipine  30 mg Oral Daily    metroNIDAZOLE  500 mg Oral 3 times per day    carvedilol  25 mg Oral BID WC    influenza virus vaccine  0.5 mL IntraMUSCular Prior to discharge    aspirin  81 mg Oral Daily    pantoprazole  40 mg Oral Daily    oxybutynin  10 mg Oral Daily    pravastatin  20 mg Oral Nightly    sodium chloride flush  5-40 mL IntraVENous 2 times per day    heparin (porcine)  5,000 Units SubCUTAneous 3 times per day    insulin lispro  0-12 Units SubCUTAneous TID WC    insulin lispro  0-6 Units SubCUTAneous Nightly    sodium chloride  500 mL IntraVENous Once     PRN Meds: sodium chloride, sodium chloride flush, sodium chloride, ondansetron **OR** ondansetron, polyethylene glycol, acetaminophen **OR** acetaminophen, glucose, dextrose, glucagon (rDNA), dextrose, albuterol sulfate HFA      Intake/Output Summary (Last 24 hours) at 10/20/2021 0923  Last data filed at 10/19/2021 2324  Gross per 24 hour   Intake 960 ml   Output 1100 ml   Net -140 ml       Physical Exam Performed:    BP (!) 141/71   Pulse 96   Temp 98.1 °F (36.7 °C) (Oral)   Resp 18   Ht 5' 5\" (1.651 m)   Wt 188 lb 0.8 oz (85.3 kg)   SpO2 93%   BMI 31.29 kg/m²     General appearance: No apparent distress, appears stated age and cooperative. HEENT: Pupils equal, round, and reactive to light. Conjunctivae/corneas clear. Neck: Supple, with full range of motion. No jugular venous distention. Trachea midline. Respiratory:  Normal respiratory effort. Clear to auscultation, bilaterally without Rales/Wheezes/Rhonchi.   Cardiovascular: Regular rate and rhythm with normal S1/S2 without murmurs, rubs or gallops. Abdomen: Soft, non-tender, non-distended with normal bowel sounds. Musculoskeletal: No clubbing, cyanosis or edema bilaterally. Full range of motion without deformity. Skin: Skin color, texture, turgor normal.  No rashes or lesions. Neurologic:  Neurovascularly intact without any focal sensory/motor deficits. Cranial nerves: II-XII intact, grossly non-focal.  Psychiatric: Alert and oriented, thought content appropriate, normal insight  Capillary Refill: Brisk,< 3 seconds   Peripheral Pulses: +2 palpable, equal bilaterally       Labs:   Recent Labs     10/18/21  0629 10/19/21  0720   WBC 13.3* 11.0   HGB 8.3* 7.4*   HCT 25.5* 23.0*    297     Recent Labs     10/18/21  0629 10/19/21  0720 10/20/21  0709    143 141   K 3.3* 4.3 4.5    107 110   CO2 27 26 20*   BUN 30* 26* 23*   CREATININE 3.3* 3.1* 2.8*   CALCIUM 8.3 8.5 8.9   PHOS 3.1 2.3* 2.7     No results for input(s): AST, ALT, BILIDIR, BILITOT, ALKPHOS in the last 72 hours. No results for input(s): INR in the last 72 hours. No results for input(s): Ardelle Chalk in the last 72 hours.     Urinalysis:      Lab Results   Component Value Date    NITRU Negative 10/11/2021    WBCUA >100 10/11/2021    BACTERIA 1+ 10/11/2021    RBCUA see below 10/11/2021    BLOODU MODERATE 10/11/2021    SPECGRAV 1.025 10/11/2021    GLUCOSEU Negative 10/11/2021    GLUCOSEU NEGATIVE 03/17/2011       Consults:    IP CONSULT TO HOSPITALIST  IP CONSULT TO GI  IP CONSULT TO NEPHROLOGY      Assessment/Plan:    Active Hospital Problems    Diagnosis     C. difficile colitis [A04.72]     Acute cystitis [N30.00]     Controlled type 2 diabetes mellitus with diabetic neuropathy, without long-term current use of insulin ([facility]) [E11.40]     CAD (coronary artery disease) [I25.10]     Essential hypertension [I10]     CKD (chronic kidney disease) stage 3, GFR 30-59 ml/min ([facility]) [N18.30]        Right sided colitis -  Antibiotic therapy with Metronidazole - changed to PO 19 October.  Considered outpatient colonoscopy but family elects to go w comfort measures.  Advancing diet and tolerated. Acute blood loss anemia -  Transfused 1 unit PRBC for Hgb 6.4 gm/dL (10/15).  Stable hemoglobin. FOBT negative. Acute cystitis with hematuria treated with Rocephin - completed    Acute kidney injury on stage 3a CKD w/ baseline Cr 1.3 to 1.4.  Treated with bicarbonate gtt (d/c 10/17). Continued Plasma-Lyte at low dose per Nephrology recs now d/c'd. Type 2 Diabetes (HgbA1c 6.9%).   Cover with a \"sliding scale\" lispro moderate scale prandial correction insulin.      HTN/CAD - w/ known CAD and no evidence of active signs/sxs of ischemia/failure. Initially hypotensive, started on Midodrine, now d/c'd. Currently controlled on home Coreg w/ vitals reviewed and documented as above. Lisinopril and Conidine held.  Continues on ASA. Nifedipine added 19 October w/ BP improved. Right buttocks ulceration - Topical wound care.         DVT Prophylaxis: SQ Heparin    Recent Labs     10/18/21  0629 10/19/21  0720    297     Diet: ADULT DIET; Regular  Adult Oral Nutrition Supplement; Diabetic Oral Supplement  Code Status: DNR-CC      PT/OT Eval Status: not ordered. Dispo - enrolled in hospice. Confirmed with family that she would not want dialysis if renal failure was to worsen. She can return to [place] [address] with hospice at any time pending Nephrology recs. Likely Wed 20 October pending response to latest changes.       Pili Rueda MD

## 2021-10-20 NOTE — CARE COORDINATION
Discharge order noted. Transport arranged with [facility] for 2130  - back to [place]. (this was earliest transport available).

## 2021-10-21 NOTE — ADT AUTH CERT
sodium bicarbonate infusion 125 mL/hr at 10/14/21 1812          Scheduled Medications    Scheduled Medications   · influenza virus vaccine 0.5 mL IntraMUSCular Prior to discharge   · aspirin 81 mg Oral Daily   · pantoprazole 40 mg Oral Daily   · oxybutynin 10 mg Oral Daily   · pravastatin 20 mg Oral Nightly   · sodium chloride flush 5-40 mL IntraVENous 2 times per day   · heparin (porcine) 5,000 Units SubCUTAneous 3 times per day   · metroNIDAZOLE 500 mg IntraVENous Q8H   · vancomycin 125 mg Oral 4 times per day   · cefTRIAXone (ROCEPHIN) IV 1,000 mg IntraVENous Q24H   · insulin lispro 0-12 Units SubCUTAneous TID WC   · insulin lispro 0-6 Units SubCUTAneous Nightly   · midodrine 10 mg Oral TID WC   · sodium chloride 500 mL IntraVENous Once         Internal Medicine:   GI consulted - appreciate recommendations in advance, supportive care, consider outpt cscope       Acute cystitis with hematuria   -UA positive > 100 WBC and 1+ bacteria   -rocephin started/given in ED on 10/11      DVT Prophylaxis: heparin   Diet: ADULT DIET; Full Liquid   Code Status: DNR-CC       PT/OT Eval Status: ordered 10/15       Dispo - pending improved renal fcn, nephro recs, ?by Sunday. Stop oral vanc         Nephro:   Summary:    Jaron Abbott is being seen by nephrology for Acute kidney injury (UBALDO). She presented to the hospital on 10/11/2021 with pain abdomen for several weeks. She wass confused, so most info from the chart. She is diagnosed to have C diff and was on po vancomycin at the nursing home. Brought to ED, has significant colitis. On iv flagyl and po vancomycin. Also getting iv ceftriaxone   Of note she is DNR-cc and was enrolled in hospice. Confirmed with family that she would not want dialysis if renal failure was to worsen.           Interval History   Cr beginning to improve, made 1401 mL urine yesterday.    Cr down to 5.5 today, acidosis improved   Mental status improving but is complaining of back pain.     Plan:    - continue bicarb drip for today and will re-assess tomorrow. - would be ok to stop IVF if PO intake improves.             Gastro:    Will follow at a distance      Gastroenteritis - Care Day 3 (10/14/2021) by Tessie Villa RN       Review Status Review Entered   Completed 10/21/2021 15:56      Criteria Review      Care Day: 3 Care Date: 10/14/2021 Level of Care:    Guideline Day 2    Clinical Status    (X) * Hypotension absent    10/21/2021 3:56 PM EDT by Yoni Watkins      /81   Pulse 81   Temp 97.6 °F (36.4 °C) (Axillary)   Resp 16    (X) * Bleeding absent    (X) * Vomiting absent or managed    ( ) * Electrolyte levels normal or improved    Activity    (X) * Ambulatory    Routes    ( ) * Oral hydration    10/21/2021 3:56 PM EDT by Yoni Watkins      ivf @ 125 and oral    (X) * Oral medications    10/21/2021 3:56 PM EDT by Yoni Watkins      see med list    (X) * Liquid diet, advance as tolerated    Interventions    (X) Electrolytes    Medications    (X) Possible antibiotics    * Milestone   Additional Notes   10/14         Lab Results   Component Value Date     CREATININE 5.5 (HH) 10/14/2021     BUN 58 (H) 10/14/2021      (L) 10/14/2021     K 3.8 10/14/2021     CL 95 (L) 10/14/2021     CO2 21 10/14/2021       Lab Results   Component Value Date     WBC 12.1 (H) 10/14/2021     HGB 8.0 (L) 10/14/2021     HCT 23.8 (L) 10/14/2021     MCV 96.2 10/14/2021      10/14/2021       Lab Results   Component Value Date     CALCIUM 8.3 10/14/2021     PHOS 5.5 (H) 10/14/2021             · dextrose    · sodium bicarbonate infusion 125 mL/hr at 10/14/21 0242          Scheduled Medications    Scheduled Medications   · influenza virus vaccine 0.5 mL IntraMUSCular Prior to discharge   · aspirin 81 mg Oral Daily   · pantoprazole 40 mg Oral Daily   · oxybutynin 10 mg Oral Daily   · pravastatin 20 mg Oral Nightly   · sodium chloride flush 5-40 mL IntraVENous 2 times per day   · heparin (porcine) 5,000 Units SubCUTAneous 3 times per day   · metroNIDAZOLE 500 mg IntraVENous Q8H   · vancomycin 125 mg Oral 4 times per day   · cefTRIAXone (ROCEPHIN) IV 1,000 mg IntraVENous Q24H   · insulin lispro 0-12 Units SubCUTAneous TID WC   · insulin lispro 0-6 Units SubCUTAneous Nightly   · midodrine 10 mg Oral TID WC   · sodium chloride 500 mL IntraVENous Once          Internal Medicine:   DVT Prophylaxis: heparinDiet: ADULT DIET; Clear Liquid   Code Status: DNR-CC           PT/OT Eval Status: not ordered       Dispo - unclear, pending improvement, improved renal fcn, nephro recs   CBC monitored         Nephro:   She probably will not be a candidate for renal replacement therapy-spoke to daughter Parvin Fay over the phone and she agrees that her mother should not be subjected to dialysis if her kidneys fail to that point   However kidney number is improving now       To avoid further chance of infection will remove Grajeda    Interval History and plan:       Looking better today   Made at least 1 L of urine yesterday   WBC coming down   Creatinine coming down to 5.5 from peak  of 5.9   Bicarbonate is a stable                Gastro:   1. OK to advance diet   2.  Needs outpatient colonoscopy, or sooner if necessary unless family elects to go w comfort measures

## 2021-10-21 NOTE — DISCHARGE SUMMARY
Hospital Medicine Discharge Summary    Patient ID: Brandi Betts      Patient's PCP: Nasrin Rodríguez    Admit Date: 10/11/2021     Discharge Date: 10/20/2021      Admitting Physician: Nancy Francois MD     Discharge Physician: Maricarmen Chaparro MD     Discharge Diagnoses: Active Hospital Problems    Diagnosis     C. difficile colitis [A04.72]     Acute cystitis [N30.00]     Controlled type 2 diabetes mellitus with diabetic neuropathy, without long-term current use of insulin (HCC) [E11.40]     CAD (coronary artery disease) [I25.10]     Essential hypertension [I10]     CKD (chronic kidney disease) stage 3, GFR 30-59 ml/min ([facility]) [N18.30]        The patient was seen and examined on day of discharge and this discharge summary is in conjunction with any daily progress note from day of discharge. Hospital Course:       Right sided colitis -  Antibiotic therapy with Metronidazole - changed to PO 19 October.  Considered outpatient colonoscopy but family elects to go with comfort measures.  Advanced diet and tolerated.     Acute blood loss anemia -  Transfused 1 unit PRBC for Hgb 6.4 gm/dL (10/15).  Stable hemoglobin.  FOBT negative.     Acute cystitis with hematuria treated with Rocephin - completed     Acute kidney injury on stage 3a CKD w/ baseline Cr 1.3 to 1.4.  Treated with bicarbonate gtt (d/c 10/17).   Continued Plasma-Lyte at low dose per Nephrology recs now d/c'd.     Type 2 Diabetes (HgbA1c 6.9%).   Cover with a \"sliding scale\" lispro moderate scale prandial correction insulin.       HTN/CAD - w/ known CAD and no evidence of active signs/sxs of ischemia/failure. Initially hypotensive, started on Midodrine, now d/c'd. Currently controlled on home Coreg w/ vitals reviewed and documented as above. Lisinopril and Conidine held.  Continues on ASA. Nifedipine added 19 October w/ BP improved.      Right buttocks ulceration - Topical wound care.         Labs:  For convenience and continuity at follow-up the following most recent labs are provided:      CBC:    Lab Results   Component Value Date    WBC 11.0 10/19/2021    HGB 7.4 10/19/2021    HCT 23.0 10/19/2021     10/19/2021       Renal:    Lab Results   Component Value Date     10/20/2021    K 4.5 10/20/2021    K 4.6 10/12/2021     10/20/2021    CO2 20 10/20/2021    BUN 23 10/20/2021    CREATININE 2.8 10/20/2021    CALCIUM 8.9 10/20/2021    PHOS 2.7 10/20/2021         Significant Diagnostic Studies    Radiology:   CT ABDOMEN PELVIS WO CONTRAST Additional Contrast? None   Final Result   1. Significant colitis of the cecum and ascending colon, likely infectious or   inflammatory. 2. Sigmoid diverticulosis. Diverticulitis cannot be excluded. 3. Urinary bladder wall thickening consistent with cystitis. 4. Bilateral pleural effusions with right lower lobe atelectasis or pneumonia. Consults:     IP CONSULT TO HOSPITALIST  IP CONSULT TO GI  IP CONSULT TO NEPHROLOGY    Disposition: [facility] [place]     Condition at Discharge: Stable    Discharge Instructions/Follow-up:  w/ PCP 1-2 weeks and subspecialists as arranged. Code Status:  DNR/CC    Activity: activity as tolerated    Diet: regular diet      Discharge Medications:     Discharge Medication List as of 10/20/2021  7:58 PM           Details   metroNIDAZOLE (FLAGYL) 500 MG tablet Take 1 tablet by mouth 3 times daily for 7 days, Disp-21 tablet, R-0Print      NIFEdipine (ADALAT CC) 30 MG extended release tablet Take 1 tablet by mouth daily, Disp-30 tablet, R-0Print              Details   HYDROcodone-acetaminophen (NORCO) 5-325 MG per tablet Take 1 tablet by mouth every 6 hours as needed for Pain for up to 7 days. , Disp-28 tablet, R-0Print              Details   !! insulin lispro (HUMALOG) 100 UNIT/ML injection vial Inject 0-3 Units into the skin nightly, Disp-1 vial, R-3DC to [facility]      !! insulin lispro (HUMALOG) 100 UNIT/ML injection vial Inject 0-6 Units into the skin 3 times daily (with meals), Disp-1 vial, R-3DC to SNF      pravastatin (PRAVACHOL) 20 MG tablet Take 1 tablet by mouth nightly TAKE ONE TABLET BY MOUTH DAILYDC to SNF      cloNIDine (CATAPRES) 0.1 MG tablet Take 1 tablet by mouth 3 times dailyDC to SNF      blood glucose test strips (ACCU-CHEK ARABELLA PLUS) strip Disp-200 strip, R-11, NormalTEST BLOOD SUGAR TWICE A DAY      donepezil (ARICEPT) 5 MG tablet Take 1 tablet by mouth nightly, Disp-90 tablet, R-1Normal      carvedilol (COREG) 25 MG tablet TAKE ONE TABLET BY MOUTH TWICE A DAY WITH FOOD, Disp-180 tablet, R-1Normal      DULoxetine (CYMBALTA) 60 MG extended release capsule Take 2 capsules by mouth daily, Disp-60 capsule, R-5Normal      pramipexole (MIRAPEX) 0.5 MG tablet TAKE ONE TABLET BY MOUTH ONCE NIGHTLY, Disp-30 tablet, R-5Normal      omeprazole (PRILOSEC) 20 MG delayed release capsule TAKE ONE CAPSULE TWICE A DAY, Disp-60 capsule, R-5Normal      oxybutynin (DITROPAN-XL) 10 MG extended release tablet Take 10 mg by mouth dailyHistorical Med      albuterol sulfate  (90 Base) MCG/ACT inhaler Inhale 2 puffs into the lungs 4 times daily as needed for Wheezing, Disp-3 Inhaler, R-1Normal      isosorbide mononitrate (IMDUR) 30 MG extended release tablet TAKE ONE-HALF TABLET BY MOUTH ONE TIME A DAY, Disp-45 tablet, R-3Normal      clotrimazole-betamethasone (LOTRISONE) 1-0.05 % cream Apply bid to affected area., Disp-45 g, R-3, Normal      nitroGLYCERIN (NITROSTAT) 0.4 MG SL tablet Place 1 tablet under the tongue every 5 minutes as needed for Chest pain, Disp-25 tablet, R-1Normal      calcium carbonate 600 MG TABS tablet Take 1 tablet by mouth 2 times daily, Disp-30 tablet, R-1Normal      vitamin E 400 UNIT capsule Take 400 Units by mouth daily      aspirin 81 MG tablet Take 81 mg by mouth daily. !! - Potential duplicate medications found. Please discuss with provider.           Time Spent on discharge is more than 30 minutes in the examination, evaluation, counseling and review of medications and discharge plan. Signed:    Elina Petersen MD   10/21/2021      Thank you Francesca Toledo for the opportunity to be involved in this patient's care. If you have any questions or concerns please feel free to contact me at [phone].

## 2022-03-18 ENCOUNTER — HOSPITAL ENCOUNTER (EMERGENCY)
Age: 82
Discharge: HOME | End: 2022-03-18
Attending: EMERGENCY MEDICINE
Payer: MEDICARE

## 2022-03-18 ENCOUNTER — APPOINTMENT (OUTPATIENT)
Dept: CT IMAGING | Age: 82
End: 2022-03-18
Payer: MEDICARE

## 2022-03-18 VITALS
SYSTOLIC BLOOD PRESSURE: 150 MMHG | TEMPERATURE: 98 F | HEIGHT: 65 IN | DIASTOLIC BLOOD PRESSURE: 67 MMHG | OXYGEN SATURATION: 98 % | BODY MASS INDEX: 31.32 KG/M2 | HEART RATE: 65 BPM | RESPIRATION RATE: 17 BRPM | WEIGHT: 188 LBS

## 2022-03-18 DIAGNOSIS — R33.9 URINARY RETENTION: ICD-10-CM

## 2022-03-18 DIAGNOSIS — R91.1 INCIDENTAL LUNG NODULE: ICD-10-CM

## 2022-03-18 DIAGNOSIS — N30.01 ACUTE CYSTITIS WITH HEMATURIA: Primary | ICD-10-CM

## 2022-03-18 DIAGNOSIS — D64.9 CHRONIC ANEMIA: ICD-10-CM

## 2022-03-18 LAB
A/G RATIO: 1.9 (ref 1.1–2.2)
ALBUMIN SERPL-MCNC: 4.1 G/DL (ref 3.4–5)
ALP BLD-CCNC: 129 U/L (ref 40–129)
ALT SERPL-CCNC: 7 U/L (ref 10–40)
ANION GAP SERPL CALCULATED.3IONS-SCNC: 9 MMOL/L (ref 3–16)
AST SERPL-CCNC: 11 U/L (ref 15–37)
BACTERIA: ABNORMAL /HPF
BASOPHILS ABSOLUTE: 0.1 K/UL (ref 0–0.2)
BASOPHILS RELATIVE PERCENT: 1.1 %
BILIRUB SERPL-MCNC: <0.2 MG/DL (ref 0–1)
BILIRUBIN URINE: NEGATIVE
BLOOD, URINE: ABNORMAL
BUN BLDV-MCNC: 49 MG/DL (ref 7–20)
CALCIUM SERPL-MCNC: 9 MG/DL (ref 8.3–10.6)
CHLORIDE BLD-SCNC: 101 MMOL/L (ref 99–110)
CLARITY: ABNORMAL
CO2: 24 MMOL/L (ref 21–32)
COLOR: YELLOW
COMMENT UA: ABNORMAL
CREAT SERPL-MCNC: 2.2 MG/DL (ref 0.6–1.2)
EKG ATRIAL RATE: 63 BPM
EKG DIAGNOSIS: NORMAL
EKG P AXIS: 59 DEGREES
EKG P-R INTERVAL: 220 MS
EKG Q-T INTERVAL: 468 MS
EKG QRS DURATION: 106 MS
EKG QTC CALCULATION (BAZETT): 478 MS
EKG R AXIS: -21 DEGREES
EKG T AXIS: 44 DEGREES
EKG VENTRICULAR RATE: 63 BPM
EOSINOPHILS ABSOLUTE: 0.2 K/UL (ref 0–0.6)
EOSINOPHILS RELATIVE PERCENT: 4.6 %
GFR AFRICAN AMERICAN: 26
GFR NON-AFRICAN AMERICAN: 21
GLUCOSE BLD-MCNC: 106 MG/DL (ref 70–99)
GLUCOSE BLD-MCNC: 126 MG/DL (ref 70–99)
GLUCOSE URINE: NEGATIVE MG/DL
HCT VFR BLD CALC: 21.1 % (ref 36–48)
HEMOGLOBIN: 6.9 G/DL (ref 12–16)
KETONES, URINE: NEGATIVE MG/DL
LACTIC ACID: 0.7 MMOL/L (ref 0.4–2)
LEUKOCYTE ESTERASE, URINE: ABNORMAL
LIPASE: 21 U/L (ref 13–60)
LYMPHOCYTES ABSOLUTE: 2 K/UL (ref 1–5.1)
LYMPHOCYTES RELATIVE PERCENT: 40.2 %
MCH RBC QN AUTO: 32.3 PG (ref 26–34)
MCHC RBC AUTO-ENTMCNC: 32.6 G/DL (ref 31–36)
MCV RBC AUTO: 99.1 FL (ref 80–100)
MICROSCOPIC EXAMINATION: YES
MONOCYTES ABSOLUTE: 0.4 K/UL (ref 0–1.3)
MONOCYTES RELATIVE PERCENT: 8.5 %
MUCUS: ABNORMAL /LPF
NEUTROPHILS ABSOLUTE: 2.2 K/UL (ref 1.7–7.7)
NEUTROPHILS RELATIVE PERCENT: 45.6 %
NITRITE, URINE: NEGATIVE
PDW BLD-RTO: 17.6 % (ref 12.4–15.4)
PERFORMED ON: ABNORMAL
PH UA: 6 (ref 5–8)
PLATELET # BLD: 151 K/UL (ref 135–450)
PMV BLD AUTO: 9.2 FL (ref 5–10.5)
POTASSIUM REFLEX MAGNESIUM: 4.7 MMOL/L (ref 3.5–5.1)
PROTEIN UA: NEGATIVE MG/DL
RBC # BLD: 2.13 M/UL (ref 4–5.2)
RBC UA: ABNORMAL /HPF (ref 0–4)
SODIUM BLD-SCNC: 134 MMOL/L (ref 136–145)
SPECIFIC GRAVITY UA: 1.01 (ref 1–1.03)
SPECIMEN STATUS: NORMAL
TOTAL PROTEIN: 6.3 G/DL (ref 6.4–8.2)
TROPONIN: <0.01 NG/ML
URINE REFLEX TO CULTURE: YES
URINE TYPE: ABNORMAL
UROBILINOGEN, URINE: 0.2 E.U./DL
WBC # BLD: 4.9 K/UL (ref 4–11)
WBC UA: >100 /HPF (ref 0–5)

## 2022-03-18 PROCEDURE — 51798 US URINE CAPACITY MEASURE: CPT

## 2022-03-18 PROCEDURE — 2580000003 HC RX 258: Performed by: EMERGENCY MEDICINE

## 2022-03-18 PROCEDURE — 74176 CT ABD & PELVIS W/O CONTRAST: CPT

## 2022-03-18 PROCEDURE — 51702 INSERT TEMP BLADDER CATH: CPT

## 2022-03-18 PROCEDURE — 80053 COMPREHEN METABOLIC PANEL: CPT

## 2022-03-18 PROCEDURE — 84484 ASSAY OF TROPONIN QUANT: CPT

## 2022-03-18 PROCEDURE — 87077 CULTURE AEROBIC IDENTIFY: CPT

## 2022-03-18 PROCEDURE — 93010 ELECTROCARDIOGRAM REPORT: CPT | Performed by: INTERNAL MEDICINE

## 2022-03-18 PROCEDURE — 81001 URINALYSIS AUTO W/SCOPE: CPT

## 2022-03-18 PROCEDURE — 96375 TX/PRO/DX INJ NEW DRUG ADDON: CPT

## 2022-03-18 PROCEDURE — 6360000002 HC RX W HCPCS: Performed by: EMERGENCY MEDICINE

## 2022-03-18 PROCEDURE — 87186 SC STD MICRODIL/AGAR DIL: CPT

## 2022-03-18 PROCEDURE — 93005 ELECTROCARDIOGRAM TRACING: CPT | Performed by: EMERGENCY MEDICINE

## 2022-03-18 PROCEDURE — 83690 ASSAY OF LIPASE: CPT

## 2022-03-18 PROCEDURE — 87086 URINE CULTURE/COLONY COUNT: CPT

## 2022-03-18 PROCEDURE — 83605 ASSAY OF LACTIC ACID: CPT

## 2022-03-18 PROCEDURE — 96365 THER/PROPH/DIAG IV INF INIT: CPT

## 2022-03-18 PROCEDURE — 85025 COMPLETE CBC W/AUTO DIFF WBC: CPT

## 2022-03-18 PROCEDURE — 99284 EMERGENCY DEPT VISIT MOD MDM: CPT

## 2022-03-18 RX ORDER — BUSPIRONE HYDROCHLORIDE 5 MG/1
5 TABLET ORAL 2 TIMES DAILY
COMMUNITY

## 2022-03-18 RX ORDER — LACTULOSE 10 G/15ML
20 SOLUTION ORAL 3 TIMES DAILY
COMMUNITY

## 2022-03-18 RX ORDER — MORPHINE SULFATE 10 MG/5ML
SOLUTION ORAL EVERY 4 HOURS PRN
COMMUNITY

## 2022-03-18 RX ORDER — SULFAMETHOXAZOLE AND TRIMETHOPRIM 800; 160 MG/1; MG/1
1 TABLET ORAL 2 TIMES DAILY
Qty: 14 TABLET | Refills: 0 | Status: SHIPPED | OUTPATIENT
Start: 2022-03-18 | End: 2022-03-25

## 2022-03-18 RX ORDER — MORPHINE SULFATE 4 MG/ML
4 INJECTION, SOLUTION INTRAMUSCULAR; INTRAVENOUS ONCE
Status: COMPLETED | OUTPATIENT
Start: 2022-03-18 | End: 2022-03-18

## 2022-03-18 RX ORDER — SULFAMETHOXAZOLE AND TRIMETHOPRIM 800; 160 MG/1; MG/1
1 TABLET ORAL 2 TIMES DAILY
Qty: 14 TABLET | Refills: 0 | Status: SHIPPED | OUTPATIENT
Start: 2022-03-18 | End: 2022-03-18 | Stop reason: SDUPTHER

## 2022-03-18 RX ORDER — FERROUS SULFATE 325(65) MG
325 TABLET ORAL
COMMUNITY

## 2022-03-18 RX ORDER — HYDROCODONE BITARTRATE AND ACETAMINOPHEN 5; 325 MG/1; MG/1
1 TABLET ORAL EVERY 6 HOURS PRN
COMMUNITY

## 2022-03-18 RX ORDER — ACETAMINOPHEN 325 MG/1
650 TABLET ORAL EVERY 6 HOURS PRN
COMMUNITY

## 2022-03-18 RX ADMIN — MORPHINE SULFATE 4 MG: 4 INJECTION, SOLUTION INTRAMUSCULAR; INTRAVENOUS at 01:30

## 2022-03-18 RX ADMIN — CEFTRIAXONE SODIUM 1000 MG: 1 INJECTION, POWDER, FOR SOLUTION INTRAMUSCULAR; INTRAVENOUS at 04:23

## 2022-03-18 ASSESSMENT — ENCOUNTER SYMPTOMS
ABDOMINAL PAIN: 1
RHINORRHEA: 0
BACK PAIN: 0
SHORTNESS OF BREATH: 0
CONSTIPATION: 1

## 2022-03-18 ASSESSMENT — PAIN SCALES - GENERAL: PAINLEVEL_OUTOF10: 6

## 2022-03-18 NOTE — ED NOTES
Neisha called and aware that pt is on her way back and aware that she has script for abx.       Layne Velasquez RN  03/18/22 [id number]

## 2022-03-18 NOTE — ED NOTES
Called report to [employer] at [facility].  Informed Nurse that we do not have an ETA at this time but patient will be coming back with a [address], RN  03/18/22 0595

## 2022-03-18 NOTE — ED NOTES
Patient identified as a positive fall risk on the ED triage fall screening. Patient placed in fall precautions which includes:  yellow fall risk bracelet on wrist and yellow socks on feet. Patient instructed on importance of not getting out of bed or ambulating without assistance for safety. Pt verbalized understanding.        Elizabeth Mcguire RN  03/18/22 0143

## 2022-03-18 NOTE — ED NOTES
Bladder scan done 315 ml in bladder after urine in diaper. MD made aware. Mendez catheter ordered MD. Pt had small amount of BM.  PT cleaned and mendez inserted     Alexia Correia RN  03/18/22 [id number]

## 2022-03-18 NOTE — ED PROVIDER NOTES
without behavioral disturbance    Depression     Diverticulitis     Dizziness     GERD (gastroesophageal reflux disease)     Hearing loss     History of blood transfusion     Hyperlipidemia     Hypertension     Leg edema     Lung disease     Osteoarthritis     Sleep apnea     CPAP, SLEEP STUDY 6/28 OVERNIGHT AT HOME    Tinnitus     Type II or unspecified type diabetes mellitus without mention of complication, not stated as uncontrolled     Unspecified cerebral artery occlusion with cerebral infarction     mini stroke         SURGICALHISTORY       Past Surgical History:   Procedure Laterality Date    ABDOMEN SURGERY      ACHILLES TENDON SURGERY  2/2/12    LEFT ACHILLES DEBRIDEMENT, HAGLUNDS AND RETROCALCANEAL BURSA EXCISION WITH FLEXOR HALLUS LONGUS TENDON TRANSFER WITH BLOCK FOR PAIN CONTROL    APPENDECTOMY      CARDIAC CATHETERIZATION  8/21/14    CARDIAC CATHETERIZATION  12/07/2020    Non Obs CAD, medical management    CARPAL TUNNEL RELEASE      both hands    CHOLECYSTECTOMY      COLONOSCOPY      COLONOSCOPY      COLONOSCOPY  2/10/2016    CYSTOSCOPY  10/02/2017    DIAGNOSTIC CARDIAC CATH LAB PROCEDURE      ENDOSCOPY, COLON, DIAGNOSTIC      EYE SURGERY      HYSTERECTOMY      HYSTERECTOMY, TOTAL ABDOMINAL      JOINT REPLACEMENT      bilateral knee    KNEE SURGERY      both replaced    OTHER SURGICAL HISTORY  07/09/2018    CYSTOSCOPY, HYDRODISTENTION OF BLADDER WITH INSTILLATION OF    POLYPECTOMY      SHOULDER SURGERY      TOENAIL EXCISION  08/04/2016    right big toe    TONSILLECTOMY      TUBAL LIGATION      UPPER GASTROINTESTINAL ENDOSCOPY  10/29/12    gastritis    UPPER GASTROINTESTINAL ENDOSCOPY  2/10/2016    URETHRAL STRICTURE DILATATION           CURRENT MEDICATIONS       Previous Medications    ACETAMINOPHEN (TYLENOL) 325 MG TABLET    Take 650 mg by mouth every 6 hours as needed for Pain    ALBUTEROL SULFATE  (90 BASE) MCG/ACT INHALER    Inhale 2 puffs into the lungs 4 times daily as needed for Wheezing    ASPIRIN 81 MG TABLET    Take 81 mg by mouth daily. BLOOD GLUCOSE TEST STRIPS (ACCU-CHEK ARABELLA PLUS) STRIP    TEST BLOOD SUGAR TWICE A DAY    BUSPIRONE (BUSPAR) 5 MG TABLET    Take 5 mg by mouth 2 times daily    CALCIUM CARBONATE 600 MG TABS TABLET    Take 1 tablet by mouth 2 times daily    CARVEDILOL (COREG) 25 MG TABLET    TAKE ONE TABLET BY MOUTH TWICE A DAY WITH FOOD    CLONIDINE (CATAPRES) 0.1 MG TABLET    Take 1 tablet by mouth 3 times daily    CLOTRIMAZOLE-BETAMETHASONE (LOTRISONE) 1-0.05 % CREAM    Apply bid to affected area. DONEPEZIL (ARICEPT) 5 MG TABLET    Take 1 tablet by mouth nightly    DULOXETINE (CYMBALTA) 60 MG EXTENDED RELEASE CAPSULE    Take 2 capsules by mouth daily    FERROUS SULFATE (IRON 325) 325 (65 FE) MG TABLET    Take 325 mg by mouth daily (with breakfast)    HYDROCODONE-ACETAMINOPHEN (NORCO) 5-325 MG PER TABLET    Take 1 tablet by mouth every 6 hours as needed for Pain. INSULIN LISPRO (HUMALOG) 100 UNIT/ML INJECTION VIAL    Inject 0-3 Units into the skin nightly    INSULIN LISPRO (HUMALOG) 100 UNIT/ML INJECTION VIAL    Inject 0-6 Units into the skin 3 times daily (with meals)    ISOSORBIDE MONONITRATE (IMDUR) 30 MG EXTENDED RELEASE TABLET    TAKE ONE-HALF TABLET BY MOUTH ONE TIME A DAY    LACTULOSE (CHRONULAC) 10 GM/15ML SOLUTION    Take 20 g by mouth 3 times daily    MAGNESIUM HYDROXIDE (MILK OF MAGNESIA) 400 MG/5ML SUSPENSION    Take by mouth daily as needed for Constipation    MORPHINE 10 MG/5ML SOLUTION    Take by mouth every 4 hours as needed for Pain.     NIFEDIPINE (ADALAT CC) 30 MG EXTENDED RELEASE TABLET    Take 1 tablet by mouth daily    NITROGLYCERIN (NITROSTAT) 0.4 MG SL TABLET    Place 1 tablet under the tongue every 5 minutes as needed for Chest pain    OMEPRAZOLE (PRILOSEC) 20 MG DELAYED RELEASE CAPSULE    TAKE ONE CAPSULE TWICE A DAY    OXYBUTYNIN (DITROPAN-XL) 10 MG EXTENDED RELEASE TABLET    Take 10 mg by mouth daily PRAMIPEXOLE (MIRAPEX) 0.5 MG TABLET    TAKE ONE TABLET BY MOUTH ONCE NIGHTLY    PRAVASTATIN (PRAVACHOL) 20 MG TABLET    Take 1 tablet by mouth nightly TAKE ONE TABLET BY MOUTH DAILY    VITAMIN E 400 UNIT CAPSULE    Take 400 Units by mouth daily       ALLERGIES     Latex, Cephalexin, Codeine, Levofloxacin, Pce [erythromycin], Pcn [penicillins], Pentazocine, Sumycin [tetracycline hcl], Atarax [hydroxyzine hcl], Beef-derived products, Flagyl [metronidazole], Macrodantin [nitrofurantoin], Pyridium [phenazopyridine hcl], Sulfa antibiotics, and Urised [methen-jenifer-meth bl-phen sal]    FAMILY HISTORY       Family History   Problem Relation Age of Onset    Cancer Father         prostate    Heart Disease Mother     Heart Disease Brother     Heart Disease Brother     Heart Disease Sister     Diabetes Sister     Dementia Sister           SOCIAL HISTORY       Social History     Socioeconomic History    Marital status:       Spouse name: None    Number of children: 3    Years of education: 15    Highest education level: None   Occupational History    Occupation: retired   Tobacco Use    Smoking status: Former Smoker     Packs/day: 0.25     Years: 0.10     Pack years: 0.02     Types: Cigarettes     Quit date: 1975     Years since quittin.3    Smokeless tobacco: Never Used    Tobacco comment: only smoked for 1 month   Vaping Use    Vaping Use: Never used   Substance and Sexual Activity    Alcohol use: No     Alcohol/week: 0.0 standard drinks    Drug use: No    Sexual activity: Not Currently     Partners: Male   Other Topics Concern    None   Social History Narrative    None     Social Determinants of Health     Financial Resource Strain:     Difficulty of Paying Living Expenses: Not on file   Food Insecurity:     Worried About Running Out of Food in the Last Year: Not on file    Bella of Food in the Last Year: Not on file   Transportation Needs:     Lack of Transportation (Medical): Not on file    Lack of Transportation (Non-Medical): Not on file   Physical Activity:     Days of Exercise per Week: Not on file    Minutes of Exercise per Session: Not on file   Stress:     Feeling of Stress : Not on file   Social Connections:     Frequency of Communication with Friends and Family: Not on file    Frequency of Social Gatherings with Friends and Family: Not on file    Attends Buddhism Services: Not on file    Active Member of [address] [place] [address] or Organizations: Not on file    Attends Club or Organization Meetings: Not on file    Marital Status: Not on file   Intimate Partner Violence:     Fear of Current or Ex-Partner: Not on file    Emotionally Abused: Not on file    Physically Abused: Not on file    Sexually Abused: Not on file   Housing Stability:     Unable to Pay for Housing in the Last Year: Not on file    Number of [place] in the Last Year: Not on file    Unstable Housing in the Last Year: Not on file       SCREENINGS    [place] Coma Scale  Eye Opening: Spontaneous  Best Verbal Response: Oriented  Best Motor Response: Obeys commands  [place] Coma Scale Score: 15        PHYSICAL EXAM    (up to 7 for level 4, 8 or more for level 5)     ED Triage Vitals [03/18/22 0042]   BP Temp Temp Source Pulse Resp SpO2 Height Weight   (!) 148/62 98.2 °F (36.8 °C) Oral 65 16 97 % 5' 5\" (1.651 m) 188 lb (85.3 kg)       Physical Exam  Vitals and nursing note reviewed. Constitutional:       Appearance: Normal appearance. She is well-developed. She is not ill-appearing. HENT:      Head: Normocephalic and atraumatic. Right Ear: External ear normal.      Left Ear: External ear normal.      Nose: Nose normal.   Eyes:      General: No scleral icterus. Right eye: No discharge. Left eye: No discharge. Conjunctiva/sclera: Conjunctivae normal.   Cardiovascular:      Rate and Rhythm: Normal rate and regular rhythm. Heart sounds: Normal heart sounds.    Pulmonary:      Effort: Pulmonary effort is normal. No respiratory distress. Breath sounds: Normal breath sounds. No wheezing or rales. Abdominal:      General: Bowel sounds are increased. There is distension. Palpations: Abdomen is soft. Tenderness: There is no abdominal tenderness. There is no guarding or rebound. Musculoskeletal:      Cervical back: Neck supple. Skin:     Coloration: Skin is not pale. Neurological:      Mental Status: She is alert. Psychiatric:         Mood and Affect: Mood normal.         Behavior: Behavior normal.             DIAGNOSTIC RESULTS     EKG: All EKG's are interpreted by the Emergency Department Physician who either signs or Co-signs this chart in the absence of a cardiologist.    12 lead EKG shows normal sinus rhythm with first-degree AV block at a rate of 63 bpm, AR interval and QRS prolonged. Normal QTC. No acute ischemic changes no significant changes when compared to September 21. RADIOLOGY:   Non-plain film images such as CT, Ultrasound and MRI are read by the radiologist. Plain radiographic images are visualized and preliminarily interpreted by the emergency physician with the below findings:        Interpretation per the Radiologist below, if available at the time of this note:    CT ABDOMEN PELVIS WO CONTRAST Additional Contrast? None   Final Result   Distended urinary bladder with moderate bilateral hydroureteronephrosis. These findings likely reflect a chronic bladder outlet obstruction. 1.7 cm nodular opacity is seen in the periphery of the left lung base. Recommend prompt non-contrast Chest CT for further evaluation. These   guidelines do not apply to immunocompromised patients and patients with   cancer. Follow up in patients with significant comorbidities as clinically   warranted. For lung cancer screening, adhere to Lung-RADS guidelines. Reference: Radiology. 2017; 284(1):228-43.                ED BEDSIDE ULTRASOUND:   Performed by ED Physician - none    LABS:  Labs Reviewed   CBC WITH AUTO DIFFERENTIAL - Abnormal; Notable for the following components:       Result Value    RBC 2.13 (*)     Hemoglobin 6.9 (*)     Hematocrit 21.1 (*)     RDW 17.6 (*)     All other components within normal limits    Narrative:     Nancy Zuñiga  SAMANTHA tel. [phone],  Hematology results called to and read back by Shira CAMARGO, 03/18/2022  01:25, by COUAM   COMPREHENSIVE METABOLIC PANEL W/ REFLEX TO MG FOR LOW K - Abnormal; Notable for the following components:    Sodium 134 (*)     Glucose 126 (*)     BUN 49 (*)     CREATININE 2.2 (*)     GFR Non- 21 (*)     GFR  26 (*)     Total Protein 6.3 (*)     ALT 7 (*)     AST 11 (*)     All other components within normal limits   URINALYSIS WITH REFLEX TO CULTURE - Abnormal; Notable for the following components:    Clarity, UA CLOUDY (*)     Blood, Urine MODERATE (*)     Leukocyte Esterase, Urine LARGE (*)     All other components within normal limits   MICROSCOPIC URINALYSIS - Abnormal; Notable for the following components:    Mucus, UA 1+ (*)     WBC, UA >100 (*)     RBC, UA see below (*)     Bacteria, UA 4+ (*)     All other components within normal limits   CULTURE, URINE   LIPASE   LACTIC ACID   SAMPLE POSSIBLE BLOOD BANK TESTING   TROPONIN       All other labs were within normal range or not returned as of this dictation. EMERGENCY DEPARTMENT COURSE and DIFFERENTIAL DIAGNOSIS/MDM:   Vitals:    Vitals:    03/18/22 0407 03/18/22 0409 03/18/22 0501 03/18/22 0531   BP:  (!) 142/62 135/67 (!) 134/54   Pulse: 60 66 59 59   Resp: 15 23 16 18   Temp:       TempSrc:       SpO2: 96% 98% 98% 96%   Weight:       Height:           Adult female who comes in for abdominal pain. Laboratory studies and a CT scan ordered. She is given morphine for her pain. Laboratory studies show normocytic anemia however the patient's baseline is low and her last hemoglobin from October was 7.4.   She is only had point 5 drop in the past 5 months. She does not have any symptoms of anemia presently. She also has redemonstration of chronic kidney disease. CT scan of the abdomen shows distended bladder with hydronephrosis. Bladder scan is performed and even after he urinated into her brief the patient has greater than 300 cc in the bladder therefore Grajeda catheter was placed. Urinalysis was done and the patient does have findings of urinary tract infection. She is given a dose of ceftriaxone here and her most recent urine culture was consulted before choosing an appropriate antibiotic. For her constipation and enema was performed here by nursing staff. The patient does have success with her bowel movement. Based on history physical exam and diagnostic work-up I believe the patient is low risk for serious life-threatening condition that would require further admission or work-up at this time. She will be discharged back to her facility. It was an incidental lung finding. The patient is updated on this and discharge instructions updated appropriately. CRITICAL CARE TIME   None       CONSULTS:  None    PROCEDURES:       Procedures    FINAL IMPRESSION      1. Acute cystitis with hematuria    2. Urinary retention    3. Chronic anemia    4.  Incidental lung nodule          DISPOSITION/PLAN   DISPOSITION Discharge - Pending Orders Complete 03/18/2022 04:14:09 AM      PATIENT REFERREDTO:  Mary Ann Loera  [address]  [phone]    Schedule an appointment as soon as possible for a visit       [facility] Madonna Goldman  [address] [place] [address] [address] [facility]  [phone]    Schedule an appointment as soon as possible for a visit         DISCHARGEMEDICATIONS:  New Prescriptions    SULFAMETHOXAZOLE-TRIMETHOPRIM (BACTRIM DS;SEPTRA DS) 800-160 MG PER TABLET    Take 1 tablet by mouth 2 times daily for 7 days          (Please note that portions of this note were completed with a voice recognition program.  Efforts were made to edit the dictations but occasionally words are mis-transcribed.)    Abelardo Pal MD (electronically signed)  Attending Emergency Physician        Abelardo Pal MD  03/18/22 1973

## 2022-03-18 NOTE — ED NOTES
Report from Health system pt dx with UTI, urinary retention and pt is going back to facility with indwelling mendez. MD Dr. Kat Mejia also aware that pt with decrease HR 30's while asleep however pt HR 60's while awake.        Erica Potts, RN  03/18/22 3788

## 2022-03-25 ENCOUNTER — HOSPITAL ENCOUNTER (EMERGENCY)
Age: 82
Discharge: HOME | End: 2022-03-25
Attending: EMERGENCY MEDICINE
Payer: MEDICARE

## 2022-03-25 ENCOUNTER — APPOINTMENT (OUTPATIENT)
Dept: CT IMAGING | Age: 82
End: 2022-03-25
Payer: MEDICARE

## 2022-03-25 VITALS
HEART RATE: 62 BPM | WEIGHT: 188 LBS | SYSTOLIC BLOOD PRESSURE: 148 MMHG | BODY MASS INDEX: 31.32 KG/M2 | RESPIRATION RATE: 17 BRPM | TEMPERATURE: 97.1 F | OXYGEN SATURATION: 94 % | DIASTOLIC BLOOD PRESSURE: 53 MMHG | HEIGHT: 65 IN

## 2022-03-25 DIAGNOSIS — R31.9 URINARY TRACT INFECTION WITH HEMATURIA, SITE UNSPECIFIED: ICD-10-CM

## 2022-03-25 DIAGNOSIS — N39.0 URINARY TRACT INFECTION WITH HEMATURIA, SITE UNSPECIFIED: ICD-10-CM

## 2022-03-25 DIAGNOSIS — K57.32 DIVERTICULITIS OF COLON: Primary | ICD-10-CM

## 2022-03-25 DIAGNOSIS — R91.1 PULMONARY NODULE: ICD-10-CM

## 2022-03-25 LAB
A/G RATIO: 1.9 (ref 1.1–2.2)
ALBUMIN SERPL-MCNC: 4.3 G/DL (ref 3.4–5)
ALP BLD-CCNC: 113 U/L (ref 40–129)
ALT SERPL-CCNC: 15 U/L (ref 10–40)
AMORPHOUS: ABNORMAL /HPF
ANION GAP SERPL CALCULATED.3IONS-SCNC: 13 MMOL/L (ref 3–16)
AST SERPL-CCNC: 21 U/L (ref 15–37)
BACTERIA: ABNORMAL /HPF
BASOPHILS ABSOLUTE: 0.1 K/UL (ref 0–0.2)
BASOPHILS RELATIVE PERCENT: 1.3 %
BILIRUB SERPL-MCNC: 0.3 MG/DL (ref 0–1)
BILIRUBIN URINE: NEGATIVE
BLOOD, URINE: ABNORMAL
BUN BLDV-MCNC: 35 MG/DL (ref 7–20)
CALCIUM SERPL-MCNC: 9.1 MG/DL (ref 8.3–10.6)
CHLORIDE BLD-SCNC: 103 MMOL/L (ref 99–110)
CLARITY: ABNORMAL
CO2: 20 MMOL/L (ref 21–32)
COLOR: YELLOW
CREAT SERPL-MCNC: 2 MG/DL (ref 0.6–1.2)
EKG ATRIAL RATE: 62 BPM
EKG DIAGNOSIS: NORMAL
EKG P AXIS: 59 DEGREES
EKG P-R INTERVAL: 210 MS
EKG Q-T INTERVAL: 458 MS
EKG QRS DURATION: 100 MS
EKG QTC CALCULATION (BAZETT): 464 MS
EKG R AXIS: -18 DEGREES
EKG T AXIS: 43 DEGREES
EKG VENTRICULAR RATE: 62 BPM
EOSINOPHILS ABSOLUTE: 0.2 K/UL (ref 0–0.6)
EOSINOPHILS RELATIVE PERCENT: 3.2 %
EPITHELIAL CELLS, UA: ABNORMAL /HPF (ref 0–5)
GFR AFRICAN AMERICAN: 29
GFR NON-AFRICAN AMERICAN: 24
GLUCOSE BLD-MCNC: 97 MG/DL (ref 70–99)
GLUCOSE URINE: NEGATIVE MG/DL
HCT VFR BLD CALC: 22.9 % (ref 36–48)
HEMOGLOBIN: 7.1 G/DL (ref 12–16)
KETONES, URINE: NEGATIVE MG/DL
LACTIC ACID: 1.2 MMOL/L (ref 0.4–2)
LEUKOCYTE ESTERASE, URINE: ABNORMAL
LIPASE: 19 U/L (ref 13–60)
LYMPHOCYTES ABSOLUTE: 1.7 K/UL (ref 1–5.1)
LYMPHOCYTES RELATIVE PERCENT: 29.7 %
MCH RBC QN AUTO: 31.8 PG (ref 26–34)
MCHC RBC AUTO-ENTMCNC: 30.8 G/DL (ref 31–36)
MCV RBC AUTO: 103.1 FL (ref 80–100)
MICROSCOPIC EXAMINATION: YES
MONOCYTES ABSOLUTE: 0.4 K/UL (ref 0–1.3)
MONOCYTES RELATIVE PERCENT: 6.8 %
MUCUS: ABNORMAL /LPF
NEUTROPHILS ABSOLUTE: 3.5 K/UL (ref 1.7–7.7)
NEUTROPHILS RELATIVE PERCENT: 59 %
NITRITE, URINE: NEGATIVE
PDW BLD-RTO: 18.4 % (ref 12.4–15.4)
PH UA: 6.5 (ref 5–8)
PLATELET # BLD: 149 K/UL (ref 135–450)
PMV BLD AUTO: 10 FL (ref 5–10.5)
POTASSIUM REFLEX MAGNESIUM: 4.9 MMOL/L (ref 3.5–5.1)
PROTEIN UA: ABNORMAL MG/DL
RBC # BLD: 2.22 M/UL (ref 4–5.2)
RBC UA: ABNORMAL /HPF (ref 0–4)
SODIUM BLD-SCNC: 136 MMOL/L (ref 136–145)
SPECIFIC GRAVITY UA: 1.01 (ref 1–1.03)
TOTAL PROTEIN: 6.6 G/DL (ref 6.4–8.2)
URINE REFLEX TO CULTURE: YES
URINE TYPE: ABNORMAL
UROBILINOGEN, URINE: 0.2 E.U./DL
WBC # BLD: 5.9 K/UL (ref 4–11)
WBC UA: ABNORMAL /HPF (ref 0–5)
YEAST: PRESENT /HPF

## 2022-03-25 PROCEDURE — 93005 ELECTROCARDIOGRAM TRACING: CPT | Performed by: EMERGENCY MEDICINE

## 2022-03-25 PROCEDURE — 81001 URINALYSIS AUTO W/SCOPE: CPT

## 2022-03-25 PROCEDURE — 6360000002 HC RX W HCPCS: Performed by: EMERGENCY MEDICINE

## 2022-03-25 PROCEDURE — 87086 URINE CULTURE/COLONY COUNT: CPT

## 2022-03-25 PROCEDURE — 93010 ELECTROCARDIOGRAM REPORT: CPT | Performed by: INTERNAL MEDICINE

## 2022-03-25 PROCEDURE — 87186 SC STD MICRODIL/AGAR DIL: CPT

## 2022-03-25 PROCEDURE — 96375 TX/PRO/DX INJ NEW DRUG ADDON: CPT

## 2022-03-25 PROCEDURE — 83690 ASSAY OF LIPASE: CPT

## 2022-03-25 PROCEDURE — 74176 CT ABD & PELVIS W/O CONTRAST: CPT

## 2022-03-25 PROCEDURE — 83605 ASSAY OF LACTIC ACID: CPT

## 2022-03-25 PROCEDURE — 80053 COMPREHEN METABOLIC PANEL: CPT

## 2022-03-25 PROCEDURE — 85025 COMPLETE CBC W/AUTO DIFF WBC: CPT

## 2022-03-25 PROCEDURE — 87077 CULTURE AEROBIC IDENTIFY: CPT

## 2022-03-25 PROCEDURE — 99285 EMERGENCY DEPT VISIT HI MDM: CPT

## 2022-03-25 PROCEDURE — 96374 THER/PROPH/DIAG INJ IV PUSH: CPT

## 2022-03-25 RX ORDER — CIPROFLOXACIN 500 MG/1
500 TABLET, FILM COATED ORAL 3 TIMES DAILY
Qty: 30 TABLET | Refills: 0 | Status: SHIPPED | OUTPATIENT
Start: 2022-03-25 | End: 2022-04-04

## 2022-03-25 RX ORDER — POLYETHYLENE GLYCOL 3350 17 G/17G
17 POWDER, FOR SOLUTION ORAL DAILY
Qty: 510 G | Refills: 0 | Status: SHIPPED | OUTPATIENT
Start: 2022-03-25 | End: 2022-04-24

## 2022-03-25 RX ORDER — MORPHINE SULFATE 4 MG/ML
4 INJECTION, SOLUTION INTRAMUSCULAR; INTRAVENOUS
Status: DISCONTINUED | OUTPATIENT
Start: 2022-03-25 | End: 2022-03-25 | Stop reason: HOSPADM

## 2022-03-25 RX ORDER — ONDANSETRON 2 MG/ML
4 INJECTION INTRAMUSCULAR; INTRAVENOUS
Status: DISCONTINUED | OUTPATIENT
Start: 2022-03-25 | End: 2022-03-25 | Stop reason: HOSPADM

## 2022-03-25 RX ORDER — METRONIDAZOLE 500 MG/1
500 TABLET ORAL 3 TIMES DAILY
Qty: 30 TABLET | Refills: 0 | Status: SHIPPED | OUTPATIENT
Start: 2022-03-25 | End: 2022-04-04

## 2022-03-25 RX ADMIN — ONDANSETRON 4 MG: 2 INJECTION INTRAMUSCULAR; INTRAVENOUS at 10:39

## 2022-03-25 RX ADMIN — MORPHINE SULFATE 4 MG: 4 INJECTION, SOLUTION INTRAMUSCULAR; INTRAVENOUS at 10:39

## 2022-03-25 ASSESSMENT — PAIN SCALES - GENERAL
PAINLEVEL_OUTOF10: 7
PAINLEVEL_OUTOF10: 7

## 2022-03-25 NOTE — ED PROVIDER NOTES
Emergency Physician Note        Note Open Time: 11:54 AM EDT    Chief Complaint  Abdominal Pain (RLQ pain x 2 weeks)       History of Present Illness  Kandy Manriquez is a [de-identified] y.o. female who presents to the ED for abdominal pain. Patient complains of right lower quadrant abdominal pain for the last couple weeks. Its moderate and constant and nonradiating. She has some nausea but no vomiting. She denies any fevers, chills or sweats. She is been somewhat constipated but had a normal bowel movement of 3 medium-sized turds today. She denies any urinary symptoms. 10 systems reviewed, pertinent positives per HPI otherwise noted to be negative    I have reviewed the following from the nursing documentation:      Prior to Admission medications    Medication Sig Start Date End Date Taking? Authorizing Provider   acetaminophen (TYLENOL) 325 MG tablet Take 650 mg by mouth every 6 hours as needed for Pain    Historical Provider, MD   busPIRone (BUSPAR) 5 MG tablet Take 5 mg by mouth 2 times daily    Historical Provider, MD   HYDROcodone-acetaminophen (NORCO) 5-325 MG per tablet Take 1 tablet by mouth every 6 hours as needed for Pain. Historical Provider, MD   ferrous sulfate (IRON 325) 325 (65 Fe) MG tablet Take 325 mg by mouth daily (with breakfast)    Historical Provider, MD   lactulose (CHRONULAC) 10 GM/15ML solution Take 20 g by mouth 3 times daily    Historical Provider, MD   magnesium hydroxide (MILK OF MAGNESIA) 400 MG/5ML suspension Take by mouth daily as needed for Constipation    Historical Provider, MD   morphine 10 MG/5ML solution Take by mouth every 4 hours as needed for Pain.     Historical Provider, MD   sulfamethoxazole-trimethoprim (BACTRIM DS;SEPTRA DS) 800-160 MG per tablet Take 1 tablet by mouth 2 times daily for 7 days 3/18/22 3/25/22  Leo Roland DO   NIFEdipine (ADALAT CC) 30 MG extended release tablet Take 1 tablet by mouth daily 10/21/21 11/20/21  Yana Vasquez MD   insulin lispro (HUMALOG) 100 UNIT/ML injection vial Inject 0-3 Units into the skin nightly 9/11/19   Alesha Luu MD   insulin lispro (HUMALOG) 100 UNIT/ML injection vial Inject 0-6 Units into the skin 3 times daily (with meals) 9/11/19   Alesha Luu MD   pravastatin (PRAVACHOL) 20 MG tablet Take 1 tablet by mouth nightly TAKE ONE TABLET BY MOUTH DAILY 9/11/19   Alesha Luu MD   cloNIDine (CATAPRES) 0.1 MG tablet Take 1 tablet by mouth 3 times daily  Patient taking differently: Take 0.1 mg by mouth 3 times daily For HTN 9/11/19   Alesha Luu MD   blood glucose test strips (ACCU-CHEK ARABELLA PLUS) strip TEST BLOOD SUGAR TWICE A DAY 8/21/19   Tiffanie Bowman DO   donepezil (ARICEPT) 5 MG tablet Take 1 tablet by mouth nightly 7/19/19   [place], APRN - CNP   carvedilol (COREG) 25 MG tablet TAKE ONE TABLET BY MOUTH TWICE A DAY WITH FOOD 6/17/19   Tiffanie Adkins DO   DULoxetine (CYMBALTA) 60 MG extended release capsule Take 2 capsules by mouth daily 5/24/19   Tiffanie Adkins DO   pramipexole (MIRAPEX) 0.5 MG tablet TAKE ONE TABLET BY MOUTH ONCE NIGHTLY 5/20/19   Tiffanie Adkins DO   omeprazole (PRILOSEC) 20 MG delayed release capsule TAKE ONE CAPSULE TWICE A DAY 5/6/19   Cecelia Connelly MD   oxybutynin (DITROPAN-XL) 10 MG extended release tablet Take 10 mg by mouth daily    Historical Provider, MD   albuterol sulfate  (90 Base) MCG/ACT inhaler Inhale 2 puffs into the lungs 4 times daily as needed for Wheezing 2/26/19   Cecelia Connelly MD   isosorbide mononitrate (IMDUR) 30 MG extended release tablet TAKE ONE-HALF TABLET BY MOUTH ONE TIME A DAY 1/15/19   Tiffanie Adkins DO   clotrimazole-betamethasone (LOTRISONE) 1-0.05 % cream Apply bid to affected area.  10/12/18   Tiffanie Adkins DO   nitroGLYCERIN (NITROSTAT) 0.4 MG SL tablet Place 1 tablet under the tongue every 5 minutes as needed for Chest pain 9/27/18   KIMI Daigle - PATTI   calcium carbonate 600 MG TABS tablet Take 1 tablet by mouth 2 times daily 6/25/18   WILLIE Bill   vitamin E 400 UNIT capsule Take 400 Units by mouth daily    Historical Provider, MD   aspirin 81 MG tablet Take 81 mg by mouth daily.     Historical Provider, MD       Allergies as of 03/25/2022 - Fully Reviewed 03/25/2022   Allergen Reaction Noted    Latex Rash 11/22/2010    Cephalexin Other (See Comments) 10/15/2010    Codeine Nausea Only 10/15/2010    Levofloxacin Nausea Only 01/04/2018    Pce [erythromycin] Nausea Only 10/15/2010    Pcn [penicillins] Other (See Comments) 10/15/2010    Pentazocine  10/15/2010    Sumycin [tetracycline hcl] Nausea Only 10/15/2010    Atarax [hydroxyzine hcl] Nausea And Vomiting 10/15/2010    Beef-derived products Nausea And Vomiting 10/15/2010    Flagyl [metronidazole] Nausea And Vomiting 10/15/2010    Macrodantin [nitrofurantoin] Palpitations and Other (See Comments) 10/15/2010    Pyridium [phenazopyridine hcl] Palpitations 10/15/2010    Sulfa antibiotics Nausea And Vomiting and Nausea Only 10/15/2010    Urised [methen-jenifer-meth bl-phen sal] Palpitations 10/15/2010       Past Medical History:   Diagnosis Date    Asthma     Bronchial asthma     Bursitis 12/2011    ACHILLES TENDON LEFT    Chest pain 12/2020    [facility]ive [facility]     Constipation     [facility]     Dementia ([facility] 09/11/2019    unspecificied dementia without behavioral disturbance    Depression     Diverticulitis     Dizziness     GERD (gastroesophageal reflux disease)     Hearing loss     History of blood transfusion     Hyperlipidemia     Hypertension     Leg edema     Lung disease     Osteoarthritis     Sleep apnea     CPAP, SLEEP STUDY 6/28 OVERNIGHT AT HOME    Tinnitus     Type II or unspecified type diabetes mellitus without mention of complication, not stated as uncontrolled     Unspecified cerebral artery occlusion with cerebral infarction     mini stroke        Surgical History: Past Surgical History:   Procedure Laterality Date    ABDOMEN SURGERY      ACHILLES TENDON SURGERY  12    LEFT ACHILLES DEBRIDEMENT, HAGLUNDS AND RETROCALCANEAL BURSA EXCISION WITH FLEXOR HALLUS LONGUS TENDON TRANSFER WITH BLOCK FOR PAIN CONTROL    APPENDECTOMY      CARDIAC CATHETERIZATION  14    CARDIAC CATHETERIZATION  2020    Non Obs CAD, medical management    CARPAL TUNNEL RELEASE      both hands    CHOLECYSTECTOMY      COLONOSCOPY      COLONOSCOPY      COLONOSCOPY  2/10/2016    CYSTOSCOPY  10/02/2017    DIAGNOSTIC CARDIAC CATH LAB PROCEDURE      ENDOSCOPY, COLON, DIAGNOSTIC      EYE SURGERY      HYSTERECTOMY      HYSTERECTOMY, TOTAL ABDOMINAL      JOINT REPLACEMENT      bilateral knee    KNEE SURGERY      both replaced    OTHER SURGICAL HISTORY  2018    CYSTOSCOPY, HYDRODISTENTION OF BLADDER WITH INSTILLATION OF    POLYPECTOMY      SHOULDER SURGERY      TOENAIL EXCISION  2016    right big toe    TONSILLECTOMY      TUBAL LIGATION      UPPER GASTROINTESTINAL ENDOSCOPY  10/29/12    gastritis    UPPER GASTROINTESTINAL ENDOSCOPY  2/10/2016    URETHRAL STRICTURE DILATATION          Family History:    Family History   Problem Relation Age of Onset   [de-identified] Cancer Father         prostate    Heart Disease Mother     Heart Disease Brother     Heart Disease Brother     Heart Disease Sister     Diabetes Sister     Dementia Sister        Social History     Socioeconomic History    Marital status:       Spouse name: Not on file    Number of children: 3    Years of education: 15    Highest education level: Not on file   Occupational History    Occupation: retired   Tobacco Use    Smoking status: Former Smoker     Packs/day: 0.25     Years: 0.10     Pack years: 0.02     Types: Cigarettes     Quit date: 1975     Years since quittin.3    Smokeless tobacco: Never Used    Tobacco comment: only smoked for 1 month   Vaping Use    Vaping Use: Never used Substance and Sexual Activity    Alcohol use: No     Alcohol/week: 0.0 standard drinks    Drug use: No    Sexual activity: Not Currently     Partners: Male   Other Topics Concern    Not on file   Social History Narrative    Not on file     Social Determinants of Health     Financial Resource Strain:     Difficulty of Paying Living Expenses: Not on file   Food Insecurity:     Worried About Running Out of Food in the Last Year: Not on file    Bella of Food in the Last Year: Not on file   Transportation Needs:     Lack of Transportation (Medical): Not on file    Lack of Transportation (Non-Medical): Not on file   Physical Activity:     Days of Exercise per Week: Not on file    Minutes of Exercise per Session: Not on file   Stress:     Feeling of Stress : Not on file   Social Connections:     Frequency of Communication with Friends and Family: Not on file    Frequency of Social Gatherings with Friends and Family: Not on file    Attends Taoist Services: Not on file    Active Member of [address] [place] [address] or Organizations: Not on file    Attends Club or Organization Meetings: Not on file    Marital Status: Not on file   Intimate Partner Violence:     Fear of Current or Ex-Partner: Not on file    Emotionally Abused: Not on file    Physically Abused: Not on file    Sexually Abused: Not on file   Housing Stability:     Unable to Pay for Housing in the Last Year: Not on file    Number of [place] in the Last Year: Not on file    Unstable Housing in the Last Year: Not on file       Nursing notes reviewed.     ED Triage Vitals   Enc Vitals Group      BP 03/25/22 0947 (!) 157/62      Pulse 03/25/22 0947 60      Resp 03/25/22 0947 18      Temp 03/25/22 0947 97.1 °F (36.2 °C)      Temp Source 03/25/22 0947 Oral      SpO2 03/25/22 0947 96 %      Weight 03/25/22 0947 188 lb (85.3 kg)      Height 03/25/22 1137 5' 5\" (1.651 m)      Head Circumference --       Peak Flow --       Pain Score --       Pain Loc -- Pain Edu? --       Excl. in GC? --        GENERAL:  Awake, alert. Well developed, well nourished with no apparent distress. HENT:  Normocephalic, Atraumatic, moist mucous membranes. EYES:  Pupils equal round and reactive to light, Conjunctiva normal, extraocular movements normal.  NECK:  No meningeal signs, Supple. CHEST:  Regular rate and rhythm, chest wall non-tender. LUNGS:  Clear to auscultation bilaterally. ABDOMEN:  Soft, moderate right lower quadrant tenderness, no rebound, rigidity or guarding, non-distended, normal bowel sounds. No costovertebral angle tenderness to palpation. BACK:  No tenderness. EXTREMITIES:  Normal range of motion, no edema, no bony tenderness, no deformity, distal pulses present. SKIN: Warm, dry and intact. NEUROLOGIC: Normal mental status. Moving all extremities to command. LABS and DIAGNOSTIC RESULTS  EKG  The Ekg interpreted by me shows  normal sinus rhythm with a rate of 62  Axis is   Normal  QTc is  normal  First-degree AV block  ST Segments: no acute change  Delta waves, Brugada Syndrome, and Short CO are not present. No significant change from prior EKG dated 18 mar 2022    RADIOLOGY  X-RAYS:  I have reviewed radiologic plain film image(s). ALL OTHER NON-PLAIN FILM IMAGES SUCH AS CT, ULTRASOUND AND MRI HAVE BEEN READ BY THE RADIOLOGIST. CT ABDOMEN PELVIS WO CONTRAST Additional Contrast? None   Final Result   1. Acute diverticulitis involving the mid sigmoid colon. 2. Improved with mild residual bilateral hydroureteronephrosis and   periureteral inflammation likely due to urinary tract infection with residual   vesicular ureteral reflux; correlate with urinalysis. 3. Unchanged 1.3 cm solid left lower lobe pulmonary nodule. Recommend   nonemergent CT of the chest with contrast for further evaluation.               LABS  Labs Reviewed   CBC WITH AUTO DIFFERENTIAL - Abnormal; Notable for the following components:       Result Value    RBC 2.22 (*) Hemoglobin 7.1 (*)     Hematocrit 22.9 (*)     .1 (*)     MCHC 30.8 (*)     RDW 18.4 (*)     All other components within normal limits   COMPREHENSIVE METABOLIC PANEL W/ REFLEX TO MG FOR LOW K - Abnormal; Notable for the following components:    CO2 20 (*)     BUN 35 (*)     CREATININE 2.0 (*)     GFR Non- 24 (*)     GFR  29 (*)     All other components within normal limits   URINALYSIS WITH REFLEX TO CULTURE - Abnormal; Notable for the following components:    Clarity, UA SL CLOUDY (*)     Blood, Urine SMALL (*)     Protein, UA TRACE (*)     Leukocyte Esterase, Urine MODERATE (*)     All other components within normal limits   MICROSCOPIC URINALYSIS - Abnormal; Notable for the following components:    Mucus, UA 1+ (*)     WBC, UA 21-50 (*)     RBC, UA 11-20 (*)     Epithelial Cells, UA 21-50 (*)     Bacteria, UA 3+ (*)     Yeast, UA Present (*)     All other components within normal limits   CULTURE, URINE   LACTIC ACID   LIPASE       MEDICAL DECISION MAKING        I advised the patient to return to the emergency department immediately for any new or worsening symptoms, such as fever, vomiting or increased pain. The patient voiced agreement and understanding of the treatment plan. I estimate there is LOW risk for ACUTE APPENDICITIS, BOWEL OBSTRUCTION, CHOLECYSTITIS, INCARCERATED HERNIA, PANCREATITIS, or PERFORATED BOWEL or ULCER, thus I consider the discharge disposition reasonable. Also, there is no evidence or peritonitis, sepsis, or toxicity. Ciro Bowen and I have discussed the diagnosis and risks, and we agree with discharging home to follow-up with their primary doctor. We also discussed returning to the Emergency Department immediately if new or worsening symptoms occur. We have discussed the symptoms which are most concerning (e.g., bloody stool, fever, changing or worsening pain, vomiting) that necessitate immediate return. FINAL Impression    1. Diverticulitis of colon    2. Pulmonary nodule    3. Urinary tract infection with hematuria, site unspecified        Blood pressure (!) 148/53, pulse 62, temperature 97.1 °F (36.2 °C), temperature source Oral, resp. rate 17, height 5' 5\" (1.651 m), weight 188 lb (85.3 kg), SpO2 94 %, not currently breastfeeding. Patient was given scripts for the following medications. I counseled patient how to take these medications. Discharge Medication List as of 3/25/2022  1:32 PM      START taking these medications    Details   metroNIDAZOLE (FLAGYL) 500 MG tablet Take 1 tablet by mouth 3 times daily for 10 days, Disp-30 tablet, R-0Normal      ciprofloxacin (CIPRO) 500 MG tablet Take 1 tablet by mouth 3 times daily for 10 days, Disp-30 tablet, R-0Normal      polyethylene glycol (GLYCOLAX) 17 GM/SCOOP powder Take 17 g by mouth daily, Disp-510 g, R-0Normal             Disposition  Pt is in good condition upon Discharge to home. This chart was generated using the [employer] dictation system. I created this record but it may contain dictation errors.           Edilma Cervantes MD  03/25/22 5039

## 2022-03-25 NOTE — ED NOTES
Report given to [address] and [place] transport. Pt stable on discharge.      Gurinder Peralta RN  03/25/22 [id number]

## 2022-03-27 LAB
ORGANISM: ABNORMAL
URINE CULTURE, ROUTINE: ABNORMAL

## 2022-04-03 ENCOUNTER — TELEPHONE (OUTPATIENT)
Dept: CASE MANAGEMENT | Age: 82
End: 2022-04-03

## 2022-04-03 NOTE — TELEPHONE ENCOUNTER
Imaging report CT ABD PELVIS 3/25/2022 with F/U imaging recommendations routed to PCP [facility].     Thank you,  Verena Tran RN  [facility] Lung Navigator  [phone]

## 2024-04-23 NOTE — CARE COORDINATION
Call to [address] with PCP instructions  Pt called as well. She is advised warm compresses for relief and advised Cipro for UTI would help the skin lesion. She is unable to reschedule appt now d/t being outside with friends. She agrees to call on Monday. Reports that she has been told she does not have insurance for her medications any longer.     Plan:  FU Monday for reschedule of missed appt   Social work notification of issue re Part [employer].
Pt has not attended OT since admit.  Will continue to encourage participation and completion of  self-assessment as able. OT staff will explain the purpose of being involved with treatment plan and provide options to meet current needs and goals.     
Pt was no show for appt today. Noted instructions called in re Cipro. Will follow up with patient on Monday     [facility] RN called and she reports pt has a spot on her cheek or chin that is reddened. Message to PCP    PT order for Vestibular Eval to [address] [facility] alerted to No Show for PCP Appt today.       Plan:  Follow up next week    Image sent by [facility] RN of cheek/chin area
no weight-bearing restrictions

## 2024-06-04 ENCOUNTER — TELEPHONE (OUTPATIENT)
Dept: BARIATRICS/WEIGHT MGMT | Age: 84
End: 2024-06-04

## 2024-06-04 NOTE — TELEPHONE ENCOUNTER
Left message for pt regarding [employer] seminar for [employer]. Office number provided to return call to Jojo Quinones